# Patient Record
Sex: FEMALE | Race: BLACK OR AFRICAN AMERICAN | Employment: OTHER | URBAN - METROPOLITAN AREA
[De-identification: names, ages, dates, MRNs, and addresses within clinical notes are randomized per-mention and may not be internally consistent; named-entity substitution may affect disease eponyms.]

---

## 2017-03-15 ENCOUNTER — HOSPITAL ENCOUNTER (EMERGENCY)
Age: 22
Discharge: HOME | End: 2017-03-15
Attending: EMERGENCY MEDICINE
Payer: MEDICARE

## 2017-03-15 VITALS
TEMPERATURE: 98.2 F | BODY MASS INDEX: 20.89 KG/M2 | HEART RATE: 100 BPM | RESPIRATION RATE: 16 BRPM | HEIGHT: 66 IN | WEIGHT: 130 LBS | DIASTOLIC BLOOD PRESSURE: 101 MMHG | SYSTOLIC BLOOD PRESSURE: 154 MMHG | OXYGEN SATURATION: 100 %

## 2017-03-15 DIAGNOSIS — I15.9 SECONDARY HYPERTENSION: ICD-10-CM

## 2017-03-15 DIAGNOSIS — M54.50 CHRONIC BILATERAL LOW BACK PAIN WITHOUT SCIATICA: Primary | ICD-10-CM

## 2017-03-15 DIAGNOSIS — G89.29 CHRONIC BILATERAL LOW BACK PAIN WITHOUT SCIATICA: Primary | ICD-10-CM

## 2017-03-15 DIAGNOSIS — M62.838 SPASM OF MUSCLE: ICD-10-CM

## 2017-03-15 DIAGNOSIS — J06.9 UPPER RESPIRATORY TRACT INFECTION, UNSPECIFIED TYPE: ICD-10-CM

## 2017-03-15 PROCEDURE — 6370000000 HC RX 637 (ALT 250 FOR IP): Performed by: EMERGENCY MEDICINE

## 2017-03-15 PROCEDURE — 99283 EMERGENCY DEPT VISIT LOW MDM: CPT

## 2017-03-15 RX ORDER — CYCLOBENZAPRINE HCL 10 MG
10 TABLET ORAL ONCE
Status: COMPLETED | OUTPATIENT
Start: 2017-03-15 | End: 2017-03-15

## 2017-03-15 RX ORDER — IBUPROFEN 600 MG/1
600 TABLET ORAL EVERY 6 HOURS PRN
Qty: 30 TABLET | Refills: 0 | Status: SHIPPED | OUTPATIENT
Start: 2017-03-15 | End: 2018-08-19

## 2017-03-15 RX ORDER — IBUPROFEN 400 MG/1
400 TABLET ORAL ONCE
Status: COMPLETED | OUTPATIENT
Start: 2017-03-15 | End: 2017-03-15

## 2017-03-15 RX ORDER — CYCLOBENZAPRINE HCL 10 MG
10 TABLET ORAL 3 TIMES DAILY PRN
Qty: 30 TABLET | Refills: 0 | Status: SHIPPED | OUTPATIENT
Start: 2017-03-15 | End: 2017-03-25

## 2017-03-15 RX ADMIN — IBUPROFEN 400 MG: 400 TABLET, FILM COATED ORAL at 20:57

## 2017-03-15 RX ADMIN — CYCLOBENZAPRINE HYDROCHLORIDE 10 MG: 10 TABLET, FILM COATED ORAL at 20:57

## 2017-03-15 ASSESSMENT — PAIN SCALES - GENERAL
PAINLEVEL_OUTOF10: 8
PAINLEVEL_OUTOF10: 8

## 2017-03-15 ASSESSMENT — ENCOUNTER SYMPTOMS
SHORTNESS OF BREATH: 0
TROUBLE SWALLOWING: 0
PHOTOPHOBIA: 0
BACK PAIN: 1
ABDOMINAL PAIN: 0
RHINORRHEA: 1
SORE THROAT: 1
COUGH: 1

## 2017-03-15 ASSESSMENT — PAIN DESCRIPTION - PAIN TYPE: TYPE: ACUTE PAIN

## 2017-03-15 ASSESSMENT — PAIN DESCRIPTION - FREQUENCY: FREQUENCY: CONTINUOUS

## 2017-03-15 ASSESSMENT — PAIN DESCRIPTION - DESCRIPTORS: DESCRIPTORS: SHARP

## 2017-03-15 ASSESSMENT — PAIN DESCRIPTION - ORIENTATION: ORIENTATION: LOWER

## 2017-03-15 ASSESSMENT — PAIN DESCRIPTION - LOCATION: LOCATION: BACK

## 2017-08-25 ENCOUNTER — HOSPITAL ENCOUNTER (EMERGENCY)
Age: 22
Discharge: HOME | End: 2017-08-25
Attending: EMERGENCY MEDICINE
Payer: COMMERCIAL

## 2017-08-25 VITALS
HEART RATE: 79 BPM | DIASTOLIC BLOOD PRESSURE: 88 MMHG | WEIGHT: 130 LBS | HEIGHT: 66 IN | SYSTOLIC BLOOD PRESSURE: 142 MMHG | RESPIRATION RATE: 18 BRPM | OXYGEN SATURATION: 97 % | TEMPERATURE: 97.9 F | BODY MASS INDEX: 20.89 KG/M2

## 2017-08-25 DIAGNOSIS — N10 ACUTE PYELONEPHRITIS: Primary | ICD-10-CM

## 2017-08-25 LAB
-: ABNORMAL
AMORPHOUS: ABNORMAL
BACTERIA: ABNORMAL
BILIRUBIN URINE: NEGATIVE
CASTS UA: ABNORMAL /LPF (ref 0–8)
COLOR: YELLOW
COMMENT UA: ABNORMAL
CRYSTALS, UA: ABNORMAL /HPF
EPITHELIAL CELLS UA: ABNORMAL /HPF (ref 0–5)
GLUCOSE URINE: ABNORMAL
HCG(URINE) PREGNANCY TEST: NEGATIVE
KETONES, URINE: NEGATIVE
LEUKOCYTE ESTERASE, URINE: ABNORMAL
MUCUS: ABNORMAL
NITRITE, URINE: POSITIVE
OTHER OBSERVATIONS UA: ABNORMAL
PH UA: 7 (ref 5–8)
PROTEIN UA: ABNORMAL
RBC UA: ABNORMAL /HPF (ref 0–4)
RENAL EPITHELIAL, UA: ABNORMAL /HPF
SPECIFIC GRAVITY UA: 1.01 (ref 1–1.03)
TRICHOMONAS: ABNORMAL
TURBIDITY: ABNORMAL
URINE HGB: ABNORMAL
UROBILINOGEN, URINE: NORMAL
WBC UA: ABNORMAL /HPF (ref 0–5)
YEAST: ABNORMAL

## 2017-08-25 PROCEDURE — 96372 THER/PROPH/DIAG INJ SC/IM: CPT

## 2017-08-25 PROCEDURE — 6360000002 HC RX W HCPCS: Performed by: EMERGENCY MEDICINE

## 2017-08-25 PROCEDURE — 81001 URINALYSIS AUTO W/SCOPE: CPT

## 2017-08-25 PROCEDURE — 87077 CULTURE AEROBIC IDENTIFY: CPT

## 2017-08-25 PROCEDURE — 6370000000 HC RX 637 (ALT 250 FOR IP): Performed by: EMERGENCY MEDICINE

## 2017-08-25 PROCEDURE — 86403 PARTICLE AGGLUT ANTBDY SCRN: CPT

## 2017-08-25 PROCEDURE — 87186 SC STD MICRODIL/AGAR DIL: CPT

## 2017-08-25 PROCEDURE — 87086 URINE CULTURE/COLONY COUNT: CPT

## 2017-08-25 PROCEDURE — 84703 CHORIONIC GONADOTROPIN ASSAY: CPT

## 2017-08-25 PROCEDURE — 99283 EMERGENCY DEPT VISIT LOW MDM: CPT

## 2017-08-25 RX ORDER — CIPROFLOXACIN 500 MG/1
500 TABLET, FILM COATED ORAL ONCE
Status: COMPLETED | OUTPATIENT
Start: 2017-08-25 | End: 2017-08-25

## 2017-08-25 RX ORDER — KETOROLAC TROMETHAMINE 30 MG/ML
15 INJECTION, SOLUTION INTRAMUSCULAR; INTRAVENOUS ONCE
Status: COMPLETED | OUTPATIENT
Start: 2017-08-25 | End: 2017-08-25

## 2017-08-25 RX ORDER — CIPROFLOXACIN 500 MG/1
500 TABLET, FILM COATED ORAL 2 TIMES DAILY
Qty: 20 TABLET | Refills: 0 | Status: SHIPPED | OUTPATIENT
Start: 2017-08-25 | End: 2017-09-04

## 2017-08-25 RX ADMIN — KETOROLAC TROMETHAMINE 15 MG: 30 INJECTION, SOLUTION INTRAMUSCULAR at 14:18

## 2017-08-25 RX ADMIN — CIPROFLOXACIN 500 MG: 500 TABLET, FILM COATED ORAL at 14:41

## 2017-08-25 ASSESSMENT — PAIN SCALES - GENERAL
PAINLEVEL_OUTOF10: 8
PAINLEVEL_OUTOF10: 8

## 2017-08-25 ASSESSMENT — ENCOUNTER SYMPTOMS
VOMITING: 0
SHORTNESS OF BREATH: 0
NAUSEA: 0
BACK PAIN: 0
COUGH: 0
ABDOMINAL PAIN: 0
ABDOMINAL DISTENTION: 0

## 2017-08-25 ASSESSMENT — PAIN DESCRIPTION - LOCATION: LOCATION: ABDOMEN

## 2017-08-25 ASSESSMENT — PAIN DESCRIPTION - PAIN TYPE: TYPE: ACUTE PAIN

## 2017-08-25 ASSESSMENT — PAIN DESCRIPTION - FREQUENCY: FREQUENCY: CONTINUOUS

## 2017-08-25 ASSESSMENT — PAIN DESCRIPTION - ORIENTATION: ORIENTATION: LEFT;RIGHT

## 2017-08-25 ASSESSMENT — PAIN DESCRIPTION - DESCRIPTORS: DESCRIPTORS: SHARP;THROBBING

## 2017-08-27 LAB
CULTURE: ABNORMAL
Lab: ABNORMAL
ORGANISM: ABNORMAL
SPECIMEN DESCRIPTION: ABNORMAL
STATUS: ABNORMAL

## 2017-08-29 ENCOUNTER — HOSPITAL ENCOUNTER (OUTPATIENT)
Age: 22
Setting detail: OBSERVATION
Discharge: HOME | DRG: 690 | End: 2017-08-30
Attending: EMERGENCY MEDICINE | Admitting: EMERGENCY MEDICINE
Payer: COMMERCIAL

## 2017-08-29 DIAGNOSIS — N10 ACUTE PYELONEPHRITIS: Primary | ICD-10-CM

## 2017-08-29 DIAGNOSIS — I10 ESSENTIAL HYPERTENSION: ICD-10-CM

## 2017-08-29 DIAGNOSIS — K59.00 CONSTIPATION, UNSPECIFIED CONSTIPATION TYPE: ICD-10-CM

## 2017-08-29 DIAGNOSIS — R73.9 HYPERGLYCEMIA: ICD-10-CM

## 2017-08-29 DIAGNOSIS — R10.9 ACUTE RIGHT FLANK PAIN: ICD-10-CM

## 2017-08-29 DIAGNOSIS — N39.0 URINARY TRACT INFECTION WITHOUT HEMATURIA, SITE UNSPECIFIED: ICD-10-CM

## 2017-08-29 PROCEDURE — 96376 TX/PRO/DX INJ SAME DRUG ADON: CPT

## 2017-08-29 PROCEDURE — 99285 EMERGENCY DEPT VISIT HI MDM: CPT

## 2017-08-29 ASSESSMENT — PAIN DESCRIPTION - LOCATION: LOCATION: FLANK

## 2017-08-29 ASSESSMENT — PAIN DESCRIPTION - ORIENTATION: ORIENTATION: RIGHT

## 2017-08-29 ASSESSMENT — PAIN DESCRIPTION - DESCRIPTORS: DESCRIPTORS: CONSTANT;SHARP

## 2017-08-29 ASSESSMENT — PAIN DESCRIPTION - ONSET: ONSET: ON-GOING

## 2017-08-29 ASSESSMENT — PAIN SCALES - GENERAL: PAINLEVEL_OUTOF10: 9

## 2017-08-29 ASSESSMENT — PAIN DESCRIPTION - PAIN TYPE: TYPE: ACUTE PAIN

## 2017-08-29 ASSESSMENT — PAIN DESCRIPTION - FREQUENCY: FREQUENCY: CONTINUOUS

## 2017-08-29 ASSESSMENT — PAIN DESCRIPTION - PROGRESSION: CLINICAL_PROGRESSION: GRADUALLY WORSENING

## 2017-08-30 ENCOUNTER — APPOINTMENT (OUTPATIENT)
Dept: CT IMAGING | Age: 22
DRG: 690 | End: 2017-08-30
Payer: COMMERCIAL

## 2017-08-30 VITALS
RESPIRATION RATE: 16 BRPM | TEMPERATURE: 98 F | WEIGHT: 130 LBS | OXYGEN SATURATION: 98 % | HEART RATE: 85 BPM | HEIGHT: 66 IN | BODY MASS INDEX: 20.89 KG/M2 | SYSTOLIC BLOOD PRESSURE: 148 MMHG | DIASTOLIC BLOOD PRESSURE: 102 MMHG

## 2017-08-30 LAB
-: ABNORMAL
ABSOLUTE EOS #: 0.3 K/UL (ref 0–0.4)
ABSOLUTE LYMPH #: 4.5 K/UL (ref 1–4.8)
ABSOLUTE MONO #: 0.6 K/UL (ref 0.1–1.2)
AMORPHOUS: ABNORMAL
ANION GAP SERPL CALCULATED.3IONS-SCNC: 14 MMOL/L (ref 9–17)
BACTERIA: ABNORMAL
BASOPHILS # BLD: 1 %
BASOPHILS ABSOLUTE: 0.1 K/UL (ref 0–0.2)
BILIRUBIN URINE: NEGATIVE
BUN BLDV-MCNC: 5 MG/DL (ref 6–20)
BUN/CREAT BLD: ABNORMAL (ref 9–20)
CALCIUM SERPL-MCNC: 9 MG/DL (ref 8.6–10.4)
CASTS UA: ABNORMAL /LPF (ref 0–8)
CHLORIDE BLD-SCNC: 98 MMOL/L (ref 98–107)
CO2: 24 MMOL/L (ref 20–31)
COLOR: YELLOW
COMMENT UA: ABNORMAL
CREAT SERPL-MCNC: 0.81 MG/DL (ref 0.5–0.9)
CRYSTALS, UA: ABNORMAL /HPF
DIFFERENTIAL TYPE: ABNORMAL
EOSINOPHILS RELATIVE PERCENT: 3 %
EPITHELIAL CELLS UA: ABNORMAL /HPF (ref 0–5)
ESTIMATED AVERAGE GLUCOSE: 235 MG/DL
GFR AFRICAN AMERICAN: >60 ML/MIN
GFR NON-AFRICAN AMERICAN: >60 ML/MIN
GFR SERPL CREATININE-BSD FRML MDRD: ABNORMAL ML/MIN/{1.73_M2}
GFR SERPL CREATININE-BSD FRML MDRD: ABNORMAL ML/MIN/{1.73_M2}
GLUCOSE BLD-MCNC: 128 MG/DL (ref 65–105)
GLUCOSE BLD-MCNC: 164 MG/DL (ref 65–105)
GLUCOSE BLD-MCNC: 165 MG/DL (ref 65–105)
GLUCOSE BLD-MCNC: 172 MG/DL (ref 65–105)
GLUCOSE BLD-MCNC: 312 MG/DL (ref 70–99)
GLUCOSE BLD-MCNC: 79 MG/DL (ref 65–105)
GLUCOSE URINE: ABNORMAL
HBA1C MFR BLD: 9.8 % (ref 4–6)
HCG QUALITATIVE: NEGATIVE
HCT VFR BLD CALC: 41.1 % (ref 36–46)
HEMOGLOBIN: 13.8 G/DL (ref 12–16)
KETONES, URINE: NEGATIVE
LEUKOCYTE ESTERASE, URINE: ABNORMAL
LYMPHOCYTES # BLD: 44 %
MCH RBC QN AUTO: 26.3 PG (ref 26–34)
MCHC RBC AUTO-ENTMCNC: 33.6 G/DL (ref 31–37)
MCV RBC AUTO: 78.2 FL (ref 80–100)
MONOCYTES # BLD: 6 %
MUCUS: ABNORMAL
NITRITE, URINE: NEGATIVE
OTHER OBSERVATIONS UA: ABNORMAL
PDW BLD-RTO: 18.4 % (ref 12.5–15.4)
PH UA: 7 (ref 5–8)
PLATELET # BLD: 434 K/UL (ref 140–450)
PLATELET ESTIMATE: ABNORMAL
PMV BLD AUTO: 9.1 FL (ref 6–12)
POTASSIUM SERPL-SCNC: 4.2 MMOL/L (ref 3.7–5.3)
PROTEIN UA: ABNORMAL
RBC # BLD: 5.26 M/UL (ref 4–5.2)
RBC # BLD: ABNORMAL 10*6/UL
RBC UA: ABNORMAL /HPF (ref 0–4)
RENAL EPITHELIAL, UA: ABNORMAL /HPF
SEG NEUTROPHILS: 46 %
SEGMENTED NEUTROPHILS ABSOLUTE COUNT: 4.8 K/UL (ref 1.8–7.7)
SODIUM BLD-SCNC: 136 MMOL/L (ref 135–144)
SPECIFIC GRAVITY UA: 1.01 (ref 1–1.03)
TRICHOMONAS: ABNORMAL
TURBIDITY: ABNORMAL
URINE HGB: NEGATIVE
UROBILINOGEN, URINE: NORMAL
WBC # BLD: 10.3 K/UL (ref 3.5–11)
WBC # BLD: ABNORMAL 10*3/UL
WBC UA: ABNORMAL /HPF (ref 0–5)
YEAST: ABNORMAL

## 2017-08-30 PROCEDURE — G0108 DIAB MANAGE TRN  PER INDIV: HCPCS

## 2017-08-30 PROCEDURE — 6370000000 HC RX 637 (ALT 250 FOR IP): Performed by: EMERGENCY MEDICINE

## 2017-08-30 PROCEDURE — 83036 HEMOGLOBIN GLYCOSYLATED A1C: CPT

## 2017-08-30 PROCEDURE — 85025 COMPLETE CBC W/AUTO DIFF WBC: CPT

## 2017-08-30 PROCEDURE — G0378 HOSPITAL OBSERVATION PER HR: HCPCS

## 2017-08-30 PROCEDURE — 96374 THER/PROPH/DIAG INJ IV PUSH: CPT

## 2017-08-30 PROCEDURE — 2580000003 HC RX 258: Performed by: EMERGENCY MEDICINE

## 2017-08-30 PROCEDURE — 96375 TX/PRO/DX INJ NEW DRUG ADDON: CPT

## 2017-08-30 PROCEDURE — 96361 HYDRATE IV INFUSION ADD-ON: CPT

## 2017-08-30 PROCEDURE — 1200000000 HC SEMI PRIVATE

## 2017-08-30 PROCEDURE — 81001 URINALYSIS AUTO W/SCOPE: CPT

## 2017-08-30 PROCEDURE — 80048 BASIC METABOLIC PNL TOTAL CA: CPT

## 2017-08-30 PROCEDURE — 6360000002 HC RX W HCPCS: Performed by: EMERGENCY MEDICINE

## 2017-08-30 PROCEDURE — 84703 CHORIONIC GONADOTROPIN ASSAY: CPT

## 2017-08-30 PROCEDURE — 96372 THER/PROPH/DIAG INJ SC/IM: CPT

## 2017-08-30 PROCEDURE — 96365 THER/PROPH/DIAG IV INF INIT: CPT

## 2017-08-30 PROCEDURE — 74176 CT ABD & PELVIS W/O CONTRAST: CPT

## 2017-08-30 PROCEDURE — 87086 URINE CULTURE/COLONY COUNT: CPT

## 2017-08-30 PROCEDURE — 2500000003 HC RX 250 WO HCPCS: Performed by: EMERGENCY MEDICINE

## 2017-08-30 PROCEDURE — 82947 ASSAY GLUCOSE BLOOD QUANT: CPT

## 2017-08-30 RX ORDER — 0.9 % SODIUM CHLORIDE 0.9 %
1000 INTRAVENOUS SOLUTION INTRAVENOUS ONCE
Status: COMPLETED | OUTPATIENT
Start: 2017-08-30 | End: 2017-08-30

## 2017-08-30 RX ORDER — ONDANSETRON 2 MG/ML
4 INJECTION INTRAMUSCULAR; INTRAVENOUS ONCE
Status: COMPLETED | OUTPATIENT
Start: 2017-08-30 | End: 2017-08-30

## 2017-08-30 RX ORDER — MORPHINE SULFATE 4 MG/ML
4 INJECTION, SOLUTION INTRAMUSCULAR; INTRAVENOUS ONCE
Status: COMPLETED | OUTPATIENT
Start: 2017-08-30 | End: 2017-08-30

## 2017-08-30 RX ORDER — ONDANSETRON 2 MG/ML
4 INJECTION INTRAMUSCULAR; INTRAVENOUS EVERY 8 HOURS PRN
Status: DISCONTINUED | OUTPATIENT
Start: 2017-08-30 | End: 2017-08-30 | Stop reason: HOSPADM

## 2017-08-30 RX ORDER — DEXTROSE MONOHYDRATE 25 G/50ML
12.5 INJECTION, SOLUTION INTRAVENOUS PRN
Status: DISCONTINUED | OUTPATIENT
Start: 2017-08-30 | End: 2017-08-30 | Stop reason: HOSPADM

## 2017-08-30 RX ORDER — METOPROLOL TARTRATE 5 MG/5ML
5 INJECTION INTRAVENOUS EVERY 6 HOURS PRN
Status: DISCONTINUED | OUTPATIENT
Start: 2017-08-30 | End: 2017-08-30 | Stop reason: HOSPADM

## 2017-08-30 RX ORDER — SODIUM CHLORIDE 9 MG/ML
INJECTION, SOLUTION INTRAVENOUS CONTINUOUS
Status: DISCONTINUED | OUTPATIENT
Start: 2017-08-30 | End: 2017-08-30 | Stop reason: HOSPADM

## 2017-08-30 RX ORDER — METOPROLOL TARTRATE 5 MG/5ML
5 INJECTION INTRAVENOUS ONCE
Status: DISCONTINUED | OUTPATIENT
Start: 2017-08-30 | End: 2017-08-30 | Stop reason: HOSPADM

## 2017-08-30 RX ORDER — DOCUSATE SODIUM 100 MG/1
100 CAPSULE, LIQUID FILLED ORAL DAILY
Status: DISCONTINUED | OUTPATIENT
Start: 2017-08-30 | End: 2017-08-30 | Stop reason: HOSPADM

## 2017-08-30 RX ORDER — HYDROCODONE BITARTRATE AND ACETAMINOPHEN 5; 325 MG/1; MG/1
1 TABLET ORAL EVERY 4 HOURS PRN
Status: DISCONTINUED | OUTPATIENT
Start: 2017-08-30 | End: 2017-08-30 | Stop reason: HOSPADM

## 2017-08-30 RX ORDER — DOCUSATE SODIUM 100 MG/1
100 CAPSULE, LIQUID FILLED ORAL ONCE
Status: COMPLETED | OUTPATIENT
Start: 2017-08-30 | End: 2017-08-30

## 2017-08-30 RX ORDER — HYDROCODONE BITARTRATE AND ACETAMINOPHEN 5; 325 MG/1; MG/1
2 TABLET ORAL EVERY 4 HOURS PRN
Status: DISCONTINUED | OUTPATIENT
Start: 2017-08-30 | End: 2017-08-30 | Stop reason: HOSPADM

## 2017-08-30 RX ORDER — NICOTINE POLACRILEX 4 MG
15 LOZENGE BUCCAL PRN
Status: DISCONTINUED | OUTPATIENT
Start: 2017-08-30 | End: 2017-08-30 | Stop reason: HOSPADM

## 2017-08-30 RX ORDER — SODIUM CHLORIDE 0.9 % (FLUSH) 0.9 %
10 SYRINGE (ML) INJECTION PRN
Status: DISCONTINUED | OUTPATIENT
Start: 2017-08-30 | End: 2017-08-30 | Stop reason: HOSPADM

## 2017-08-30 RX ORDER — ACETAMINOPHEN 325 MG/1
650 TABLET ORAL EVERY 4 HOURS PRN
Status: DISCONTINUED | OUTPATIENT
Start: 2017-08-30 | End: 2017-08-30 | Stop reason: HOSPADM

## 2017-08-30 RX ORDER — DIPHENHYDRAMINE HCL 25 MG
25 TABLET ORAL EVERY 6 HOURS PRN
Status: DISCONTINUED | OUTPATIENT
Start: 2017-08-30 | End: 2017-08-30 | Stop reason: HOSPADM

## 2017-08-30 RX ORDER — DEXTROSE MONOHYDRATE 50 MG/ML
100 INJECTION, SOLUTION INTRAVENOUS PRN
Status: DISCONTINUED | OUTPATIENT
Start: 2017-08-30 | End: 2017-08-30 | Stop reason: HOSPADM

## 2017-08-30 RX ORDER — MORPHINE SULFATE 2 MG/ML
2 INJECTION, SOLUTION INTRAMUSCULAR; INTRAVENOUS EVERY 4 HOURS PRN
Status: DISCONTINUED | OUTPATIENT
Start: 2017-08-30 | End: 2017-08-30

## 2017-08-30 RX ORDER — MORPHINE SULFATE 4 MG/ML
4 INJECTION, SOLUTION INTRAMUSCULAR; INTRAVENOUS EVERY 4 HOURS PRN
Status: DISCONTINUED | OUTPATIENT
Start: 2017-08-30 | End: 2017-08-30

## 2017-08-30 RX ORDER — SODIUM CHLORIDE 0.9 % (FLUSH) 0.9 %
10 SYRINGE (ML) INJECTION EVERY 12 HOURS SCHEDULED
Status: DISCONTINUED | OUTPATIENT
Start: 2017-08-30 | End: 2017-08-30 | Stop reason: HOSPADM

## 2017-08-30 RX ADMIN — MORPHINE SULFATE 4 MG: 4 INJECTION, SOLUTION INTRAMUSCULAR; INTRAVENOUS at 03:56

## 2017-08-30 RX ADMIN — HYDROCODONE BITARTRATE AND ACETAMINOPHEN 2 TABLET: 5; 325 TABLET ORAL at 14:32

## 2017-08-30 RX ADMIN — SODIUM CHLORIDE: 9 INJECTION, SOLUTION INTRAVENOUS at 03:43

## 2017-08-30 RX ADMIN — HYDROCODONE BITARTRATE AND ACETAMINOPHEN 2 TABLET: 5; 325 TABLET ORAL at 08:23

## 2017-08-30 RX ADMIN — CEFTRIAXONE SODIUM 1 G: 1 INJECTION, POWDER, FOR SOLUTION INTRAMUSCULAR; INTRAVENOUS at 02:31

## 2017-08-30 RX ADMIN — DOCUSATE SODIUM 100 MG: 100 CAPSULE, LIQUID FILLED ORAL at 03:11

## 2017-08-30 RX ADMIN — DOCUSATE SODIUM 100 MG: 100 CAPSULE ORAL at 08:23

## 2017-08-30 RX ADMIN — ONDANSETRON 4 MG: 2 INJECTION, SOLUTION INTRAMUSCULAR; INTRAVENOUS at 05:44

## 2017-08-30 RX ADMIN — INSULIN LISPRO 1 UNITS: 100 INJECTION, SOLUTION INTRAVENOUS; SUBCUTANEOUS at 15:44

## 2017-08-30 RX ADMIN — ONDANSETRON 4 MG: 2 INJECTION, SOLUTION INTRAMUSCULAR; INTRAVENOUS at 00:58

## 2017-08-30 RX ADMIN — SODIUM CHLORIDE 1000 ML: 9 INJECTION, SOLUTION INTRAVENOUS at 00:57

## 2017-08-30 RX ADMIN — MORPHINE SULFATE 4 MG: 4 INJECTION, SOLUTION INTRAMUSCULAR; INTRAVENOUS at 00:58

## 2017-08-30 RX ADMIN — INSULIN LISPRO 5 UNITS: 100 INJECTION, SOLUTION INTRAVENOUS; SUBCUTANEOUS at 02:53

## 2017-08-30 RX ADMIN — METOPROLOL TARTRATE 5 MG: 5 INJECTION INTRAVENOUS at 03:56

## 2017-08-30 RX ADMIN — INSULIN LISPRO 1 UNITS: 100 INJECTION, SOLUTION INTRAVENOUS; SUBCUTANEOUS at 08:23

## 2017-08-30 ASSESSMENT — PAIN SCALES - GENERAL
PAINLEVEL_OUTOF10: 9
PAINLEVEL_OUTOF10: 9
PAINLEVEL_OUTOF10: 6
PAINLEVEL_OUTOF10: 5
PAINLEVEL_OUTOF10: 7
PAINLEVEL_OUTOF10: 8
PAINLEVEL_OUTOF10: 9
PAINLEVEL_OUTOF10: 5

## 2017-08-30 ASSESSMENT — ENCOUNTER SYMPTOMS
BACK PAIN: 0
RHINORRHEA: 0
SHORTNESS OF BREATH: 0
VOMITING: 0
DIARRHEA: 0
NAUSEA: 0
ABDOMINAL PAIN: 0
CHEST TIGHTNESS: 0
SORE THROAT: 0
CONSTIPATION: 0
BLOOD IN STOOL: 0

## 2017-08-30 ASSESSMENT — PAIN DESCRIPTION - PROGRESSION
CLINICAL_PROGRESSION: GRADUALLY IMPROVING
CLINICAL_PROGRESSION: GRADUALLY WORSENING

## 2017-08-30 ASSESSMENT — PAIN DESCRIPTION - FREQUENCY
FREQUENCY: CONTINUOUS
FREQUENCY: CONTINUOUS

## 2017-08-30 ASSESSMENT — PAIN DESCRIPTION - ORIENTATION
ORIENTATION: RIGHT
ORIENTATION: RIGHT

## 2017-08-30 ASSESSMENT — PAIN DESCRIPTION - PAIN TYPE
TYPE: ACUTE PAIN

## 2017-08-30 ASSESSMENT — PAIN DESCRIPTION - LOCATION
LOCATION: FLANK
LOCATION: ABDOMEN
LOCATION: FLANK

## 2017-08-30 ASSESSMENT — PAIN DESCRIPTION - ONSET
ONSET: ON-GOING
ONSET: ON-GOING

## 2017-08-30 ASSESSMENT — PAIN DESCRIPTION - DESCRIPTORS
DESCRIPTORS: CONSTANT;SHARP
DESCRIPTORS: SHARP

## 2017-08-31 LAB
CULTURE: NORMAL
CULTURE: NORMAL
Lab: NORMAL
SPECIMEN DESCRIPTION: NORMAL
STATUS: NORMAL

## 2017-12-16 ENCOUNTER — HOSPITAL ENCOUNTER (EMERGENCY)
Age: 22
Discharge: HOME | End: 2017-12-16
Attending: EMERGENCY MEDICINE
Payer: COMMERCIAL

## 2017-12-16 VITALS
RESPIRATION RATE: 14 BRPM | BODY MASS INDEX: 20.18 KG/M2 | SYSTOLIC BLOOD PRESSURE: 117 MMHG | DIASTOLIC BLOOD PRESSURE: 94 MMHG | OXYGEN SATURATION: 100 % | TEMPERATURE: 97.9 F | HEART RATE: 97 BPM | WEIGHT: 125 LBS

## 2017-12-16 DIAGNOSIS — N10 ACUTE PYELONEPHRITIS: Primary | ICD-10-CM

## 2017-12-16 LAB
-: ABNORMAL
ABSOLUTE EOS #: 0.16 K/UL (ref 0–0.44)
ABSOLUTE IMMATURE GRANULOCYTE: 0.16 K/UL (ref 0–0.3)
ABSOLUTE LYMPH #: 2.95 K/UL (ref 1.1–3.7)
ABSOLUTE MONO #: 1.8 K/UL (ref 0.1–1.2)
AMORPHOUS: ABNORMAL
ANION GAP SERPL CALCULATED.3IONS-SCNC: 12 MMOL/L (ref 9–17)
BACTERIA: ABNORMAL
BASOPHILS # BLD: 0 % (ref 0–2)
BASOPHILS ABSOLUTE: 0 K/UL (ref 0–0.2)
BILIRUBIN URINE: NEGATIVE
BUN BLDV-MCNC: 11 MG/DL (ref 6–20)
BUN/CREAT BLD: ABNORMAL (ref 9–20)
CALCIUM SERPL-MCNC: 8.3 MG/DL (ref 8.6–10.4)
CASTS UA: ABNORMAL /LPF (ref 0–2)
CHLORIDE BLD-SCNC: 98 MMOL/L (ref 98–107)
CO2: 24 MMOL/L (ref 20–31)
COLOR: YELLOW
COMMENT UA: ABNORMAL
CREAT SERPL-MCNC: 1.02 MG/DL (ref 0.5–0.9)
CRYSTALS, UA: ABNORMAL /HPF
DIFFERENTIAL TYPE: ABNORMAL
DIRECT EXAM: NORMAL
EOSINOPHILS RELATIVE PERCENT: 1 % (ref 1–4)
EPITHELIAL CELLS UA: ABNORMAL /HPF (ref 0–5)
GFR AFRICAN AMERICAN: >60 ML/MIN
GFR NON-AFRICAN AMERICAN: >60 ML/MIN
GFR SERPL CREATININE-BSD FRML MDRD: ABNORMAL ML/MIN/{1.73_M2}
GFR SERPL CREATININE-BSD FRML MDRD: ABNORMAL ML/MIN/{1.73_M2}
GLUCOSE BLD-MCNC: 249 MG/DL (ref 70–99)
GLUCOSE URINE: ABNORMAL
HCG(URINE) PREGNANCY TEST: NEGATIVE
HCT VFR BLD CALC: 35.5 % (ref 36.3–47.1)
HEMOGLOBIN: 12.3 G/DL (ref 11.9–15.1)
IMMATURE GRANULOCYTES: 1 %
KETONES, URINE: NEGATIVE
LEUKOCYTE ESTERASE, URINE: ABNORMAL
LYMPHOCYTES # BLD: 18 % (ref 24–43)
Lab: NORMAL
MCH RBC QN AUTO: 26.1 PG (ref 25.2–33.5)
MCHC RBC AUTO-ENTMCNC: 34.6 G/DL (ref 28.4–34.8)
MCV RBC AUTO: 75.4 FL (ref 82.6–102.9)
MONOCYTES # BLD: 11 % (ref 3–12)
MORPHOLOGY: ABNORMAL
MUCUS: ABNORMAL
NITRITE, URINE: POSITIVE
OTHER OBSERVATIONS UA: ABNORMAL
PDW BLD-RTO: 15.9 % (ref 11.8–14.4)
PH UA: 6.5 (ref 5–8)
PLATELET # BLD: 379 K/UL (ref 138–453)
PLATELET ESTIMATE: ABNORMAL
PMV BLD AUTO: 11.1 FL (ref 8.1–13.5)
POTASSIUM SERPL-SCNC: 4.3 MMOL/L (ref 3.7–5.3)
PROTEIN UA: ABNORMAL
RBC # BLD: 4.71 M/UL (ref 3.95–5.11)
RBC # BLD: ABNORMAL 10*6/UL
RBC UA: ABNORMAL /HPF (ref 0–2)
RENAL EPITHELIAL, UA: ABNORMAL /HPF
SEG NEUTROPHILS: 69 % (ref 36–65)
SEGMENTED NEUTROPHILS ABSOLUTE COUNT: 11.33 K/UL (ref 1.5–8.1)
SODIUM BLD-SCNC: 134 MMOL/L (ref 135–144)
SPECIFIC GRAVITY UA: 1.01 (ref 1–1.03)
SPECIMEN DESCRIPTION: NORMAL
STATUS: NORMAL
TRICHOMONAS: ABNORMAL
TURBIDITY: ABNORMAL
URINE HGB: ABNORMAL
UROBILINOGEN, URINE: NORMAL
WBC # BLD: 16.4 K/UL (ref 3.5–11.3)
WBC # BLD: ABNORMAL 10*3/UL
WBC UA: ABNORMAL /HPF (ref 0–5)
YEAST: ABNORMAL

## 2017-12-16 PROCEDURE — 84703 CHORIONIC GONADOTROPIN ASSAY: CPT

## 2017-12-16 PROCEDURE — 87186 SC STD MICRODIL/AGAR DIL: CPT

## 2017-12-16 PROCEDURE — 6360000002 HC RX W HCPCS: Performed by: STUDENT IN AN ORGANIZED HEALTH CARE EDUCATION/TRAINING PROGRAM

## 2017-12-16 PROCEDURE — 2580000003 HC RX 258: Performed by: STUDENT IN AN ORGANIZED HEALTH CARE EDUCATION/TRAINING PROGRAM

## 2017-12-16 PROCEDURE — 81001 URINALYSIS AUTO W/SCOPE: CPT

## 2017-12-16 PROCEDURE — 96374 THER/PROPH/DIAG INJ IV PUSH: CPT

## 2017-12-16 PROCEDURE — 87804 INFLUENZA ASSAY W/OPTIC: CPT

## 2017-12-16 PROCEDURE — 80048 BASIC METABOLIC PNL TOTAL CA: CPT

## 2017-12-16 PROCEDURE — 96375 TX/PRO/DX INJ NEW DRUG ADDON: CPT

## 2017-12-16 PROCEDURE — 87077 CULTURE AEROBIC IDENTIFY: CPT

## 2017-12-16 PROCEDURE — 87086 URINE CULTURE/COLONY COUNT: CPT

## 2017-12-16 PROCEDURE — 6370000000 HC RX 637 (ALT 250 FOR IP): Performed by: STUDENT IN AN ORGANIZED HEALTH CARE EDUCATION/TRAINING PROGRAM

## 2017-12-16 PROCEDURE — 99284 EMERGENCY DEPT VISIT MOD MDM: CPT

## 2017-12-16 PROCEDURE — 85025 COMPLETE CBC W/AUTO DIFF WBC: CPT

## 2017-12-16 RX ORDER — HYDROCODONE BITARTRATE AND ACETAMINOPHEN 5; 325 MG/1; MG/1
1 TABLET ORAL ONCE
Status: COMPLETED | OUTPATIENT
Start: 2017-12-16 | End: 2017-12-16

## 2017-12-16 RX ORDER — ONDANSETRON 4 MG/1
4 TABLET, FILM COATED ORAL EVERY 8 HOURS PRN
Qty: 8 TABLET | Refills: 0 | Status: SHIPPED | OUTPATIENT
Start: 2017-12-16 | End: 2018-08-19

## 2017-12-16 RX ORDER — ONDANSETRON 2 MG/ML
4 INJECTION INTRAMUSCULAR; INTRAVENOUS ONCE
Status: COMPLETED | OUTPATIENT
Start: 2017-12-16 | End: 2017-12-16

## 2017-12-16 RX ORDER — KETOROLAC TROMETHAMINE 30 MG/ML
30 INJECTION, SOLUTION INTRAMUSCULAR; INTRAVENOUS ONCE
Status: COMPLETED | OUTPATIENT
Start: 2017-12-16 | End: 2017-12-16

## 2017-12-16 RX ORDER — FENTANYL CITRATE 50 UG/ML
25 INJECTION, SOLUTION INTRAMUSCULAR; INTRAVENOUS ONCE
Status: COMPLETED | OUTPATIENT
Start: 2017-12-16 | End: 2017-12-16

## 2017-12-16 RX ORDER — CIPROFLOXACIN 500 MG/1
500 TABLET, FILM COATED ORAL 2 TIMES DAILY
Qty: 14 TABLET | Refills: 0 | Status: SHIPPED | OUTPATIENT
Start: 2017-12-16 | End: 2017-12-23

## 2017-12-16 RX ORDER — HYDROCODONE BITARTRATE AND ACETAMINOPHEN 5; 325 MG/1; MG/1
1 TABLET ORAL EVERY 6 HOURS PRN
Qty: 10 TABLET | Refills: 0 | Status: SHIPPED | OUTPATIENT
Start: 2017-12-16 | End: 2017-12-23

## 2017-12-16 RX ORDER — 0.9 % SODIUM CHLORIDE 0.9 %
1000 INTRAVENOUS SOLUTION INTRAVENOUS ONCE
Status: COMPLETED | OUTPATIENT
Start: 2017-12-16 | End: 2017-12-16

## 2017-12-16 RX ADMIN — KETOROLAC TROMETHAMINE 30 MG: 30 INJECTION, SOLUTION INTRAMUSCULAR at 14:59

## 2017-12-16 RX ADMIN — HYDROCODONE BITARTRATE AND ACETAMINOPHEN 1 TABLET: 5; 325 TABLET ORAL at 15:29

## 2017-12-16 RX ADMIN — CEFTRIAXONE SODIUM 1 G: 1 INJECTION, POWDER, FOR SOLUTION INTRAMUSCULAR; INTRAVENOUS at 15:22

## 2017-12-16 RX ADMIN — SODIUM CHLORIDE 1000 ML: 9 INJECTION, SOLUTION INTRAVENOUS at 13:57

## 2017-12-16 RX ADMIN — ONDANSETRON 4 MG: 2 INJECTION, SOLUTION INTRAMUSCULAR; INTRAVENOUS at 13:57

## 2017-12-16 RX ADMIN — FENTANYL CITRATE 25 MCG: 50 INJECTION INTRAMUSCULAR; INTRAVENOUS at 15:22

## 2017-12-16 ASSESSMENT — PAIN SCALES - GENERAL
PAINLEVEL_OUTOF10: 10

## 2017-12-16 ASSESSMENT — ENCOUNTER SYMPTOMS
BLOOD IN STOOL: 0
RHINORRHEA: 0
COUGH: 0
PHOTOPHOBIA: 0
VOMITING: 1
ABDOMINAL DISTENTION: 0
ABDOMINAL PAIN: 0
SHORTNESS OF BREATH: 0
NAUSEA: 1
SORE THROAT: 0
WHEEZING: 0
BACK PAIN: 1

## 2017-12-16 ASSESSMENT — PAIN DESCRIPTION - PROGRESSION: CLINICAL_PROGRESSION: GRADUALLY WORSENING

## 2017-12-16 ASSESSMENT — PAIN DESCRIPTION - DESCRIPTORS: DESCRIPTORS: ACHING

## 2017-12-16 ASSESSMENT — PAIN DESCRIPTION - LOCATION: LOCATION: GENERALIZED

## 2017-12-16 ASSESSMENT — PAIN DESCRIPTION - ONSET: ONSET: PROGRESSIVE

## 2017-12-16 ASSESSMENT — PAIN DESCRIPTION - PAIN TYPE: TYPE: ACUTE PAIN

## 2017-12-16 NOTE — ED PROVIDER NOTES
[facility] ED  Emergency Department Encounter  Emergency Medicine Resident     Pt Name: Alana Ravi  MRN: [id number]  [place] 1995  Date of evaluation: 12/16/17  PCP:  No primary care provider on file. CHIEF COMPLAINT       Chief Complaint   Patient presents with    Generalized Body Aches     pt with c/o generalized body aches, nausea, and loss of appetite x three days. pt type 1 diabetic. pt checked glucose pta and it was 115.  Nausea       HISTORY OF PRESENT ILLNESS  (Location/Symptom, Timing/Onset, Context/Setting, Quality, Duration, Modifying Factors, Severity.)      Alana Ravi is a 23 y.o. female who presents with Generalized body aches, nausea and vomiting with associated back pain. Patient states she's had symptoms like this before in August when she was diagnosed with pyelonephritis. She does not have any dysuria or hematuria. She is well-appearing and nontoxic. She denies any sore throat, congestion, abdominal pain, fever or chills. She does not take any medications and does not have any significant surgical history. PAST MEDICAL / SURGICAL / SOCIAL / FAMILY HISTORY      has a past medical history of Diabetes mellitus ([address] and MRSA (methicillin resistant staph aureus) culture positive. No significan t surgical history when discussed with patient. Social History     Social History    Marital status: Single     Spouse name: N/A    Number of children: N/A    Years of education: N/A     Occupational History    Not on file. Social History Main Topics    Smoking status: Current Every Day Smoker     Types: Cigarettes    Smokeless tobacco: Not on file      Comment: 5/day    Alcohol use No    Drug use: No    Sexual activity: No     Other Topics Concern    Not on file     Social History Narrative    No narrative on file       History reviewed. No pertinent family history.     Allergies:  Shrimp flavor    Home Medications:  Prior Result Value Ref Range    WBC 16.4 (H) 3.5 - 11.3 k/uL    RBC 4.71 3.95 - 5.11 m/uL    Hemoglobin 12.3 11.9 - 15.1 g/dL    Hematocrit 35.5 (L) 36.3 - 47.1 %    MCV 75.4 (L) 82.6 - 102.9 fL    MCH 26.1 25.2 - 33.5 pg    MCHC 34.6 28.4 - 34.8 g/dL    RDW 15.9 (H) 11.8 - 14.4 %    Platelets 876 286 - 331 k/uL    MPV 11.1 8.1 - 13.5 fL    Differential Type NOT REPORTED     WBC Morphology NOT REPORTED     RBC Morphology NOT REPORTED     Platelet Estimate NOT REPORTED     Immature Granulocytes 1 (H) 0 %    Seg Neutrophils 69 (H) 36 - 65 %    Lymphocytes 18 (L) 24 - 43 %    Monocytes 11 3 - 12 %    Eosinophils % 1 1 - 4 %    Basophils 0 0 - 2 %    Absolute Immature Granulocyte 0.16 0.00 - 0.30 k/uL    Segs Absolute 11.33 (H) 1.50 - 8.10 k/uL    Absolute Lymph # 2.95 1.10 - 3.70 k/uL    Absolute Mono # 1.80 (H) 0.10 - 1.20 k/uL    Absolute Eos # 0.16 0.00 - 0.44 k/uL    Basophils # 0.00 0.00 - 0.20 k/uL    Morphology ANISOCYTOSIS PRESENT    Basic Metabolic Panel   Result Value Ref Range    Glucose 249 (H) 70 - 99 mg/dL    BUN 11 6 - 20 mg/dL    CREATININE 1.02 (H) 0.50 - 0.90 mg/dL    Bun/Cre Ratio NOT REPORTED 9 - 20    Calcium 8.3 (L) 8.6 - 10.4 mg/dL    Sodium 134 (L) 135 - 144 mmol/L    Potassium 4.3 3.7 - 5.3 mmol/L    Chloride 98 98 - 107 mmol/L    CO2 24 20 - 31 mmol/L    Anion Gap 12 9 - 17 mmol/L    GFR Non-African American >60 >60 mL/min    GFR African American >60 >60 mL/min    GFR Comment          GFR Staging NOT REPORTED        IMPRESSION: Pyelonephritis      EMERGENCY DEPARTMENT COURSE:  42-year-old female presents with generalized body aches, nausea and vomiting with associated back pain. Patient has had pyelonephritis in the past.  We'll do labs, urine, flu swab, fluids and antiemetics and reevaluate. 3:49 PM Had discussion with patient on whether she felt well enough to go home and try and manage this on an outpatient basis.   Patient states she is feeling better after fluids and medication and would like to try and go home and manage this with oral medications. She is tolerating fluids by mouth. I believe this is reasonable. I encouraged her to return to the emergency department if she had any new or worsening signs or symptoms. FINAL IMPRESSION      1. Acute pyelonephritis          DISPOSITION / PLAN     DISPOSITION Decision to Discharge    PATIENT REFERRED TO:  [place] [id number]  [phone]  Schedule an appointment as soon as possible for a visit in 3 days  follow up from ER visit      DISCHARGE MEDICATIONS:  New Prescriptions    CIPROFLOXACIN (CIPRO) 500 MG TABLET    Take 1 tablet by mouth 2 times daily for 7 days    HYDROCODONE-ACETAMINOPHEN (NORCO) 5-325 MG PER TABLET    Take 1 tablet by mouth every 6 hours as needed for Pain .     ONDANSETRON (ZOFRAN) 4 MG TABLET    Take 1 tablet by mouth every 8 hours as needed for Nausea       Jossie Almanzar DO  Emergency Medicine Resident    (Please note that portions of this note were completed with a voice recognition program.  Efforts were made to edit the dictations but occasionally words are mis-transcribed.)       Yolanda Wang DO  Resident  12/16/17 4617

## 2017-12-17 LAB
CULTURE: ABNORMAL
CULTURE: ABNORMAL
Lab: ABNORMAL
ORGANISM: ABNORMAL
SPECIMEN DESCRIPTION: ABNORMAL
STATUS: ABNORMAL

## 2017-12-18 NOTE — PROGRESS NOTES
Reviewed patient's urine culture - culture positive for Ecoli. Patient was discharged on ciprofloxacin, and culture is sensitive to prescribed medication. Antibiotic prescribed at discharge is appropriate - no changes made to antibiotic regimen. Kin Meckel, PharmStephanie D.

## 2018-01-24 ENCOUNTER — HOSPITAL ENCOUNTER (EMERGENCY)
Age: 23
Discharge: HOME | End: 2018-01-25
Attending: EMERGENCY MEDICINE
Payer: COMMERCIAL

## 2018-01-24 DIAGNOSIS — R07.9 CHEST PAIN, UNSPECIFIED TYPE: Primary | ICD-10-CM

## 2018-01-24 LAB
EKG ATRIAL RATE: 85 BPM
EKG P AXIS: 68 DEGREES
EKG P-R INTERVAL: 112 MS
EKG Q-T INTERVAL: 354 MS
EKG QRS DURATION: 68 MS
EKG QTC CALCULATION (BAZETT): 421 MS
EKG R AXIS: 81 DEGREES
EKG T AXIS: 48 DEGREES
EKG VENTRICULAR RATE: 85 BPM

## 2018-01-24 PROCEDURE — 99285 EMERGENCY DEPT VISIT HI MDM: CPT

## 2018-01-24 PROCEDURE — 93005 ELECTROCARDIOGRAM TRACING: CPT

## 2018-01-24 ASSESSMENT — PAIN DESCRIPTION - PAIN TYPE: TYPE: ACUTE PAIN

## 2018-01-24 ASSESSMENT — PAIN DESCRIPTION - LOCATION: LOCATION: CHEST

## 2018-01-24 ASSESSMENT — PAIN SCALES - GENERAL: PAINLEVEL_OUTOF10: 8

## 2018-01-25 ENCOUNTER — APPOINTMENT (OUTPATIENT)
Dept: GENERAL RADIOLOGY | Age: 23
End: 2018-01-25
Payer: COMMERCIAL

## 2018-01-25 VITALS
HEIGHT: 65 IN | DIASTOLIC BLOOD PRESSURE: 92 MMHG | OXYGEN SATURATION: 98 % | SYSTOLIC BLOOD PRESSURE: 168 MMHG | WEIGHT: 130 LBS | RESPIRATION RATE: 16 BRPM | TEMPERATURE: 98.9 F | BODY MASS INDEX: 21.66 KG/M2 | HEART RATE: 96 BPM

## 2018-01-25 LAB
ABSOLUTE EOS #: 0.25 K/UL (ref 0–0.44)
ABSOLUTE IMMATURE GRANULOCYTE: 0.03 K/UL (ref 0–0.3)
ABSOLUTE LYMPH #: 4.61 K/UL (ref 1.1–3.7)
ABSOLUTE MONO #: 0.69 K/UL (ref 0.1–1.2)
ANION GAP SERPL CALCULATED.3IONS-SCNC: 10 MMOL/L (ref 9–17)
BASOPHILS # BLD: 1 % (ref 0–2)
BASOPHILS ABSOLUTE: 0.05 K/UL (ref 0–0.2)
BUN BLDV-MCNC: 8 MG/DL (ref 6–20)
BUN/CREAT BLD: ABNORMAL (ref 9–20)
CALCIUM SERPL-MCNC: 9 MG/DL (ref 8.6–10.4)
CHLORIDE BLD-SCNC: 99 MMOL/L (ref 98–107)
CO2: 23 MMOL/L (ref 20–31)
CREAT SERPL-MCNC: 0.8 MG/DL (ref 0.5–0.9)
D-DIMER QUANTITATIVE: 0.26 MG/L FEU
DIFFERENTIAL TYPE: ABNORMAL
EOSINOPHILS RELATIVE PERCENT: 2 % (ref 1–4)
GFR AFRICAN AMERICAN: >60 ML/MIN
GFR NON-AFRICAN AMERICAN: >60 ML/MIN
GFR SERPL CREATININE-BSD FRML MDRD: ABNORMAL ML/MIN/{1.73_M2}
GFR SERPL CREATININE-BSD FRML MDRD: ABNORMAL ML/MIN/{1.73_M2}
GLUCOSE BLD-MCNC: 347 MG/DL (ref 70–99)
HCT VFR BLD CALC: 35.9 % (ref 36.3–47.1)
HEMOGLOBIN: 12.4 G/DL (ref 11.9–15.1)
IMMATURE GRANULOCYTES: 0 %
LYMPHOCYTES # BLD: 43 % (ref 24–43)
MCH RBC QN AUTO: 26.4 PG (ref 25.2–33.5)
MCHC RBC AUTO-ENTMCNC: 34.5 G/DL (ref 28.4–34.8)
MCV RBC AUTO: 76.4 FL (ref 82.6–102.9)
MONOCYTES # BLD: 7 % (ref 3–12)
NRBC AUTOMATED: 0 PER 100 WBC
PDW BLD-RTO: 15.7 % (ref 11.8–14.4)
PLATELET # BLD: 404 K/UL (ref 138–453)
PLATELET ESTIMATE: ABNORMAL
PMV BLD AUTO: 11 FL (ref 8.1–13.5)
POC TROPONIN I: 0 NG/ML (ref 0–0.1)
POC TROPONIN INTERP: NORMAL
POTASSIUM SERPL-SCNC: 5.4 MMOL/L (ref 3.7–5.3)
RBC # BLD: 4.7 M/UL (ref 3.95–5.11)
RBC # BLD: ABNORMAL 10*6/UL
SEG NEUTROPHILS: 47 % (ref 36–65)
SEGMENTED NEUTROPHILS ABSOLUTE COUNT: 5 K/UL (ref 1.5–8.1)
SODIUM BLD-SCNC: 132 MMOL/L (ref 135–144)
WBC # BLD: 10.6 K/UL (ref 3.5–11.3)
WBC # BLD: ABNORMAL 10*3/UL

## 2018-01-25 PROCEDURE — 6370000000 HC RX 637 (ALT 250 FOR IP): Performed by: EMERGENCY MEDICINE

## 2018-01-25 PROCEDURE — 80048 BASIC METABOLIC PNL TOTAL CA: CPT

## 2018-01-25 PROCEDURE — 94640 AIRWAY INHALATION TREATMENT: CPT

## 2018-01-25 PROCEDURE — 71046 X-RAY EXAM CHEST 2 VIEWS: CPT

## 2018-01-25 PROCEDURE — 85379 FIBRIN DEGRADATION QUANT: CPT

## 2018-01-25 PROCEDURE — 85025 COMPLETE CBC W/AUTO DIFF WBC: CPT

## 2018-01-25 PROCEDURE — 84484 ASSAY OF TROPONIN QUANT: CPT

## 2018-01-25 PROCEDURE — 94664 DEMO&/EVAL PT USE INHALER: CPT

## 2018-01-25 RX ORDER — ALBUTEROL SULFATE 90 UG/1
2 AEROSOL, METERED RESPIRATORY (INHALATION) EVERY 6 HOURS PRN
Status: DISCONTINUED | OUTPATIENT
Start: 2018-01-25 | End: 2018-01-25 | Stop reason: HOSPADM

## 2018-01-25 RX ORDER — IBUPROFEN 800 MG/1
800 TABLET ORAL ONCE
Status: COMPLETED | OUTPATIENT
Start: 2018-01-25 | End: 2018-01-25

## 2018-01-25 RX ORDER — MAGNESIUM HYDROXIDE/ALUMINUM HYDROXICE/SIMETHICONE 120; 1200; 1200 MG/30ML; MG/30ML; MG/30ML
30 SUSPENSION ORAL
Status: COMPLETED | OUTPATIENT
Start: 2018-01-25 | End: 2018-01-25

## 2018-01-25 RX ORDER — IBUPROFEN 800 MG/1
800 TABLET ORAL EVERY 8 HOURS PRN
Qty: 12 TABLET | Refills: 0 | Status: SHIPPED | OUTPATIENT
Start: 2018-01-25 | End: 2018-08-19

## 2018-01-25 RX ADMIN — ALUMINUM HYDROXIDE, MAGNESIUM HYDROXIDE, AND SIMETHICONE 30 ML: 200; 200; 20 SUSPENSION ORAL at 00:45

## 2018-01-25 RX ADMIN — IBUPROFEN 800 MG: 800 TABLET, FILM COATED ORAL at 00:45

## 2018-01-25 RX ADMIN — ALBUTEROL SULFATE 2 PUFF: 90 AEROSOL, METERED RESPIRATORY (INHALATION) at 01:33

## 2018-01-25 ASSESSMENT — ENCOUNTER SYMPTOMS
VOMITING: 0
DIARRHEA: 0
RHINORRHEA: 0
SINUS PRESSURE: 0
BLOOD IN STOOL: 0
NAUSEA: 0
SHORTNESS OF BREATH: 0
WHEEZING: 1
PHOTOPHOBIA: 0
SORE THROAT: 0
COUGH: 1
ABDOMINAL PAIN: 0
CONSTIPATION: 0
BACK PAIN: 0

## 2018-01-25 ASSESSMENT — PAIN SCALES - GENERAL: PAINLEVEL_OUTOF10: 8

## 2018-01-25 NOTE — ED PROVIDER NOTES
medications    Medication Sig Start Date End Date Taking? Authorizing Provider   ibuprofen (ADVIL;MOTRIN) 800 MG tablet Take 1 tablet by mouth every 8 hours as needed for Pain 1/25/18  Yes Jose Samson, DO   ondansetron (ZOFRAN) 4 MG tablet Take 1 tablet by mouth every 8 hours as needed for Nausea 12/16/17   Dago Cortez, DO   insulin lispro (HUMALOG) 100 UNIT/ML injection vial Inject into the skin 3 times daily (before meals) Indications: 70:30    Historical Provider, MD   ibuprofen (ADVIL;MOTRIN) 600 MG tablet Take 1 tablet by mouth every 6 hours as needed for Pain 3/15/17   [facility], DO       REVIEW OF SYSTEMS    (2-9 systems for level 4, 10 or more for level 5)      Review of Systems   Constitutional: Negative for chills and fever. HENT: Negative for congestion, rhinorrhea, sinus pressure and sore throat. Eyes: Negative for photophobia and visual disturbance. Respiratory: Positive for cough and wheezing. Negative for shortness of breath. Cardiovascular: Positive for chest pain. Gastrointestinal: Negative for abdominal pain, blood in stool, constipation, diarrhea, nausea and vomiting. Genitourinary: Negative for dysuria and hematuria. Musculoskeletal: Negative for back pain and neck pain. Skin: Negative for rash. Neurological: Negative for dizziness, weakness, light-headedness, numbness and headaches. Psychiatric/Behavioral: Negative for suicidal ideas. PHYSICAL EXAM   (up to 7 for level 4, 8 or more for level 5)      INITIAL VITALS:   BP (!) 168/92   Pulse 96   Temp 98.9 °F (37.2 °C) (Oral)   Resp 16   Ht 5' 5\" (1.651 m)   Wt 130 lb (59 kg)   LMP 01/13/2018   SpO2 98%   BMI 21.63 kg/m²     Physical Exam   Constitutional: She is oriented to person, place, and time. She appears well-developed and well-nourished. No distress. HENT:   Head: Normocephalic and atraumatic.    Right Ear: Tympanic membrane normal.   Left Ear: Tympanic membrane normal.   Eyes: EOM are normal. Pupils are equal, round, and reactive to light. No scleral icterus. Neck: Normal range of motion. Neck supple. No JVD present. Cardiovascular: Normal rate, regular rhythm, normal heart sounds and intact distal pulses. Exam reveals no gallop and no friction rub. No murmur heard. Pulmonary/Chest: Effort normal. No respiratory distress. She has wheezes (mild in bilateral upper lobes). She has no rales. She exhibits tenderness (sternal). Abdominal: Soft. Bowel sounds are normal. She exhibits no distension and no mass. There is no tenderness. There is no rebound and no guarding. Musculoskeletal: Normal range of motion. She exhibits no edema. Lymphadenopathy:     She has no cervical adenopathy. Neurological: She is alert and oriented to person, place, and time. GCS eye subscore is 4. GCS verbal subscore is 5. GCS motor subscore is 6. Skin: Skin is warm and dry. No rash noted. She is not diaphoretic. Psychiatric: She expresses no homicidal and no suicidal ideation. Nursing note and vitals reviewed.       DIFFERENTIAL  DIAGNOSIS     PLAN (LABS / IMAGING / EKG):  Orders Placed This Encounter   Procedures    XR CHEST STANDARD (2 VW)    CBC auto differential    Basic Metabolic Panel w/ Reflex to MG    D-Dimer, Quantitative    POCT troponin    POCT troponin    EKG 12 lead       MEDICATIONS ORDERED:  Orders Placed This Encounter   Medications    ibuprofen (ADVIL;MOTRIN) tablet 800 mg    aluminum & magnesium hydroxide-simethicone (MAALOX) 200-200-20 MG/5ML suspension 30 mL    albuterol sulfate  (90 Base) MCG/ACT inhaler 2 puff    ibuprofen (ADVIL;MOTRIN) 800 MG tablet     Sig: Take 1 tablet by mouth every 8 hours as needed for Pain     Dispense:  12 tablet     Refill:  0       DDX: ACS, PE, musculoskeletal pain    DIAGNOSTIC RESULTS / EMERGENCY DEPARTMENT COURSE / MDM     LABS:  Results for orders placed or performed during the hospital encounter of 01/24/18   CBC auto differential

## 2018-01-25 NOTE — ED NOTES
Pt to ED with LS with c/o of CP. Pt states that CP started 1 hour PTA while sitting down. Pt states pain is in center of chest and does not radiate anywhere. Pt denies any SOB, abdominal pain, n/v/d. Pt states she has been out of Glucose test strips and her insulin x 1 wk. Pt has an appointment with PCP for refill on 2/2. Pt states she has maintained her regular diet of potatoes. Pt glucose was in 400s for EMS. Pt is A&O x 4. Pt on monitor. Await orders.              Suyapa June RN  01/25/18 0000

## 2018-08-18 ENCOUNTER — TELEPHONE (OUTPATIENT)
Dept: OTHER | Age: 23
End: 2018-08-18

## 2018-08-19 ENCOUNTER — HOSPITAL ENCOUNTER (EMERGENCY)
Age: 23
Discharge: HOME | End: 2018-08-19
Attending: EMERGENCY MEDICINE
Payer: COMMERCIAL

## 2018-08-19 VITALS
TEMPERATURE: 98.9 F | WEIGHT: 140 LBS | SYSTOLIC BLOOD PRESSURE: 128 MMHG | RESPIRATION RATE: 16 BRPM | HEART RATE: 108 BPM | OXYGEN SATURATION: 98 % | BODY MASS INDEX: 23.3 KG/M2 | DIASTOLIC BLOOD PRESSURE: 84 MMHG

## 2018-08-19 DIAGNOSIS — L03.119 CELLULITIS OF LOWER EXTREMITY, UNSPECIFIED LATERALITY: Primary | ICD-10-CM

## 2018-08-19 PROCEDURE — 99283 EMERGENCY DEPT VISIT LOW MDM: CPT

## 2018-08-19 PROCEDURE — 6370000000 HC RX 637 (ALT 250 FOR IP): Performed by: EMERGENCY MEDICINE

## 2018-08-19 RX ORDER — IBUPROFEN 800 MG/1
800 TABLET ORAL EVERY 8 HOURS PRN
Qty: 30 TABLET | Refills: 0 | Status: SHIPPED | OUTPATIENT
Start: 2018-08-19 | End: 2018-09-19 | Stop reason: ALTCHOICE

## 2018-08-19 RX ORDER — CEPHALEXIN 500 MG/1
500 CAPSULE ORAL 4 TIMES DAILY
Qty: 28 CAPSULE | Refills: 0 | Status: SHIPPED | OUTPATIENT
Start: 2018-08-19 | End: 2018-08-26

## 2018-08-19 RX ORDER — SULFAMETHOXAZOLE AND TRIMETHOPRIM 800; 160 MG/1; MG/1
1 TABLET ORAL ONCE
Status: COMPLETED | OUTPATIENT
Start: 2018-08-19 | End: 2018-08-19

## 2018-08-19 RX ORDER — SULFAMETHOXAZOLE AND TRIMETHOPRIM 800; 160 MG/1; MG/1
1 TABLET ORAL 2 TIMES DAILY
Qty: 14 TABLET | Refills: 0 | Status: SHIPPED | OUTPATIENT
Start: 2018-08-19 | End: 2018-08-26

## 2018-08-19 RX ORDER — CEPHALEXIN 500 MG/1
500 CAPSULE ORAL ONCE
Status: COMPLETED | OUTPATIENT
Start: 2018-08-19 | End: 2018-08-19

## 2018-08-19 RX ORDER — HYDROCODONE BITARTRATE AND ACETAMINOPHEN 5; 325 MG/1; MG/1
1 TABLET ORAL ONCE
Status: COMPLETED | OUTPATIENT
Start: 2018-08-19 | End: 2018-08-19

## 2018-08-19 RX ADMIN — CEPHALEXIN 500 MG: 500 CAPSULE ORAL at 07:13

## 2018-08-19 RX ADMIN — SULFAMETHOXAZOLE AND TRIMETHOPRIM 1 TABLET: 800; 160 TABLET ORAL at 07:13

## 2018-08-19 RX ADMIN — HYDROCODONE BITARTRATE AND ACETAMINOPHEN 1 TABLET: 5; 325 TABLET ORAL at 04:58

## 2018-08-19 ASSESSMENT — PAIN DESCRIPTION - FREQUENCY: FREQUENCY: INTERMITTENT

## 2018-08-19 ASSESSMENT — PAIN SCALES - GENERAL
PAINLEVEL_OUTOF10: 10
PAINLEVEL_OUTOF10: 10

## 2018-08-19 ASSESSMENT — PAIN DESCRIPTION - ORIENTATION: ORIENTATION: RIGHT

## 2018-08-19 ASSESSMENT — PAIN DESCRIPTION - DESCRIPTORS: DESCRIPTORS: BURNING;THROBBING

## 2018-08-19 ASSESSMENT — PAIN DESCRIPTION - LOCATION: LOCATION: LEG

## 2018-08-19 NOTE — ED NOTES
Pt sleeping on stretcher with call light in reach. Awaiting any further orders. RR even and non labored. Will continue to monitor.       Melba iJmenez RN  08/19/18 5012

## 2018-08-19 NOTE — ED PROVIDER NOTES
[facility] ED  Emergency Department Encounter  Emergency Medicine Resident Note     Pt Name: Keisha Huntley  MRN: [id number]  [place] 1995  Date of evaluation: 8/19/2018  PCP:  No primary care provider on file. CHIEF COMPLAINT       Chief Complaint   Patient presents with    Leg Pain       HISTORY OF PRESENT ILLNESS  (Location/Symptom, Timing/Onset, Context/Setting, Quality, Duration, Modifying Factors, Severity.)      Keisha Huntley is a 23 y.o. female who presents with left leg pain that started suddenly and woke her from her sleep. She states that it came out of nowhere and rates it as a 10/10. States it radiates down up and down the leg. Patient reports a history of diabetes. Denies any recent injury to the area. Does not typically get a reaction ot bugs like this. No similar areas in the past.  No fevers or chills. No history of DVTs or PEs in the past.  No recent travel, immobilizations, surgeries, or malignancies. PAST MEDICAL / SURGICAL / SOCIAL / FAMILY HISTORY     Past Medical History:  has a past medical history of Diabetes mellitus ([address] and MRSA (methicillin resistant staph aureus) culture positive. Past Surgical History: reviewed with patient and none reported. Allergies:  Shrimp flavor     Home Meds:   Prior to Visit Medications    Medication Sig Taking?  Authorizing Provider   ibuprofen (ADVIL;MOTRIN) 800 MG tablet Take 1 tablet by mouth every 8 hours as needed for Pain Yes Rufino Peter MD   cephALEXin (KEFLEX) 500 MG capsule Take 1 capsule by mouth 4 times daily for 7 days Yes Rufino Peter MD   sulfamethoxazole-trimethoprim (BACTRIM DS) 800-160 MG per tablet Take 1 tablet by mouth 2 times daily for 7 days Yes Rufino Peter MD   insulin lispro (HUMALOG) 100 UNIT/ML injection vial Inject into the skin 3 times daily (before meals) Indications: 70:30  Historical Provider, MD     Please note that medications prescribed at discharge will auto-populate into this medication list when note is refreshed. Please look at prescription date and prescriber to clarify. Family History: reviewed with patient and none reported. Social History: She reports that she has been smoking Cigarettes. She does not have any smokeless tobacco history on file. She reports that she does not drink alcohol or use drugs. She reports that she does not engage in sexual activity. REVIEW OF SYSTEMS    (2-9 systems for level 4, 10 or more for level 5)      Review of Systems   Constitutional: Negative for chills and fever. Gastrointestinal: Negative for nausea and vomiting. Musculoskeletal: Positive for myalgias. Negative for back pain, gait problem, neck pain and neck stiffness. Skin: Positive for wound. Negative for color change and pallor. Neurological: Negative for weakness and numbness. PHYSICAL EXAM   (up to 7 for level 4, 8 or more for level 5)      Initial Vitals   ED Triage Vitals   BP Temp Temp Source Pulse Resp SpO2 Height Weight   08/19/18 0444 08/19/18 0441 08/19/18 0441 08/19/18 0441 08/19/18 0627 08/19/18 0441 -- 08/19/18 0441   (!) 138/90 98.9 °F (37.2 °C) Oral 108 16 100 %  140 lb (63.5 kg)       Physical Exam   Constitutional: She is oriented to person, place, and time. Thin female who appears to be in pain, but non-toxic appearing. HENT:   Head: Normocephalic and atraumatic. Cardiovascular: Regular rhythm. Tachycardia present. Exam reveals no gallop and no friction rub. No murmur heard. 2+ DP/PT pulses on the left leg and normal capillary refill   Pulmonary/Chest: Effort normal and breath sounds normal. No respiratory distress. She has no wheezes. She has no rales. Musculoskeletal: Normal range of motion. She exhibits no deformity. Neurological: She is alert and oriented to person, place, and time. Skin:   Left leg with an erythematous area just distal to the knee joint.   Area is ~ 3 x 4 cm area of erythema, slightly raised with no fluctuance. Extremely tender. No active drainage. No other areas of lesions visualized. Nursing note and vitals reviewed. DIFFERENTIAL DIAGNOSIS/IMPRESSION     DDX: insect bite, local reaction, allergic reaction, cellulitis, abscess, erythema nodosum    Impression: 23 y.o. female who presents with leg pain. Patient has a 3x4 cm area of erythema that is raised, firm, and exquisitely tender. No history of similar areas. Concern for insect bite vs local reaction vs cellulitis +/- abscess, or erythema nodosum. Given location and appearance not concerning for DVT. Does not involve a joint. Give severe tenderness will give analgesics. Will do bedside ultrasound so for possible fluid collection. DIAGNOSTIC RESULTS     EKG: All EKG's are interpreted by the Emergency Department Physician who either signs or Co-signs this chart in the absence of a cardiologist.    Not clinically indicated at this time. LABS: Labs were reviewed by me and abnormal results are displayed above     Labs Reviewed - No data to display    RADIOLOGY: All plain film, CT, MRI, and formal ultrasound images (except ED bedside ultrasound) are read by the radiologist, see reports below, unless otherwise noted in ED Course, MDM or here. No results found. BEDSIDE ULTRASOUND:  SOFT TISSUE ULTRASOUND:   A limited bedside ultrasound of the soft tissue was performed. The purpose of this study was to evaluate for fluid collection concerning for abscess. The structures studied was the skin and underlying soft tissue. FINDINGS:  Study was Negative for soft tissue fluid collection. Incision and drainage was was not indicated. The study was technically adequate.     IMAGES:  Electronically uploaded to the PACS system      ED COURSE      ED Medication Orders     Start Ordered     Status Ordering Provider    08/19/18 0715 08/19/18 0709  cephALEXin (KEFLEX) capsule 500 mg  ONCE      Last MAR action:  Given - by [facility], SOHEILA NOBLE on 08/19/18 at 0713 Neo Young    08/19/18 0715 08/19/18 0709  sulfamethoxazole-trimethoprim (BACTRIM DS;SEPTRA DS) 800-160 MG per tablet 1 tablet  ONCE      Last MAR action:  Given - by Santy Bloom on 08/19/18 at 0713 Jose Francsico Dalys E    08/19/18 0500 08/19/18 0455  HYDROcodone-acetaminophen (NORCO) 5-325 MG per tablet 1 tablet  ONCE      Last MAR action:  Given - by Dima Ramirez on 08/19/18 at 0458 Josetataa Kubas E          EMERGENCY DEPARTMENT COURSE:    Patient's ultrasound negative. Still unclear etiology. No enlargement or development of new lesions while in the ED. Given that patient is diabetic and is at increased risk for infection, will treat as a cellulitis. Started on keflex and bactrim, first dose given in the ED. Patient instructed to call primary care provider for follow-up within 2 days. No PCP is listed in [facility] and therefore patient was provided with clinic list.  Return precautions provided. Patient is comfortable with discharge at this time. PROCEDURES:  None     CONSULTS:  None    CRITICAL CARE:  0 min, please also see attending note    FINAL IMPRESSION      1.  Cellulitis of lower extremity, unspecified laterality          DISPOSITION / PLAN     DISPOSITION Decision To Discharge 08/19/2018 07:21:51 AM      PATIENT REFERRED TO:  [facility]  [address] [place] [address]  [phone]  Go to   As needed, If symptoms worsen      DISCHARGE MEDICATIONS:  New Prescriptions    CEPHALEXIN (KEFLEX) 500 MG CAPSULE    Take 1 capsule by mouth 4 times daily for 7 days    IBUPROFEN (ADVIL;MOTRIN) 800 MG TABLET    Take 1 tablet by mouth every 8 hours as needed for Pain    SULFAMETHOXAZOLE-TRIMETHOPRIM (BACTRIM DS) 800-160 MG PER TABLET    Take 1 tablet by mouth 2 times daily for 7 days       Aubree Peterson MD  Emergency Medicine Resident    (Please note that portions of this note were completed with a voice recognition program. Efforts were made to edit the dictations but occasionally words are mis-transcribed.)        Faisal Garcia MD  Resident  08/21/18 2061

## 2018-08-19 NOTE — ED PROVIDER NOTES
[facility]     Emergency Department     Faculty Attestation    I performed a history and physical examination of the patient and discussed management with the resident. I have reviewed and agree with the residents findings including all diagnostic interpretations, and treatment plans as written. Any areas of disagreement are noted on the chart. I was personally present for the key portions of any procedures. I have documented in the chart those procedures where I was not present during the key portions. I have reviewed the emergency nurses triage note. I agree with the chief complaint, past medical history, past surgical history, allergies, medications, social and family history as documented unless otherwise noted below. Documentation of the HPI, Physical Exam and Medical Decision Making performed by abdiel is based on my personal performance of the HPI, PE and MDM. For Physician Assistant/ Nurse Practitioner cases/documentation I have personally evaluated this patient and have completed at least one if not all key elements of the E/M (history, physical exam, and MDM). Additional findings are as noted. Primary Care Physician: No primary care provider on file. History: This is a 23 y.o. female who presents to the Emergency Department with complaint of acute onset leg pain, and no specific distribution on the lateral aspect of left lower leg. It with redness, pain and swelling. She denies any trauma to the area, no bites no exposures. She denies any recent caffeine or mosquito bites. No new medications. Patient denies any fevers or chills, she does report that the pain radiates downward and upward. She does have a history of type 1 diabetes    Physical:   weight is 140 lb (63.5 kg). Her oral temperature is 98.9 °F (37.2 °C). Her blood pressure is 128/84 and her pulse is 108. Her oxygen saturation is 98%.     Patient with approximately 3 x 4 cm area of erythema, that is slightly raised, and exquisitely tender to palpation. No fluctuance palpated. No increased warmth noted, no additional skin or lesions noted on her legs.     Impression: painful nodule    Plan: ?localized reaction to bite v nodosa?  US, pain control, due to her diabetes, plan to start on abx and cover for cellulitis         Danielle Pena D.O, M.P.H  Attending Emergency Medicine Physician         Danielle Pena,   08/19/18 5490

## 2018-08-21 ASSESSMENT — ENCOUNTER SYMPTOMS
COLOR CHANGE: 0
BACK PAIN: 0
NAUSEA: 0
VOMITING: 0

## 2018-09-19 ENCOUNTER — APPOINTMENT (OUTPATIENT)
Dept: GENERAL RADIOLOGY | Age: 23
DRG: 603 | End: 2018-09-19
Payer: COMMERCIAL

## 2018-09-19 ENCOUNTER — HOSPITAL ENCOUNTER (EMERGENCY)
Age: 23
Discharge: HOME | DRG: 603 | End: 2018-09-20
Attending: EMERGENCY MEDICINE
Payer: COMMERCIAL

## 2018-09-19 VITALS
WEIGHT: 140 LBS | SYSTOLIC BLOOD PRESSURE: 135 MMHG | TEMPERATURE: 99 F | HEART RATE: 112 BPM | RESPIRATION RATE: 16 BRPM | OXYGEN SATURATION: 100 % | DIASTOLIC BLOOD PRESSURE: 92 MMHG | BODY MASS INDEX: 23.32 KG/M2 | HEIGHT: 65 IN

## 2018-09-19 DIAGNOSIS — L03.115 CELLULITIS OF RIGHT LOWER EXTREMITY: Primary | ICD-10-CM

## 2018-09-19 LAB
ABSOLUTE EOS #: 0.21 K/UL (ref 0–0.44)
ABSOLUTE IMMATURE GRANULOCYTE: 0.08 K/UL (ref 0–0.3)
ABSOLUTE LYMPH #: 3.45 K/UL (ref 1.1–3.7)
ABSOLUTE MONO #: 0.86 K/UL (ref 0.1–1.2)
ANION GAP SERPL CALCULATED.3IONS-SCNC: 9 MMOL/L (ref 9–17)
BASOPHILS # BLD: 0 % (ref 0–2)
BASOPHILS ABSOLUTE: 0.06 K/UL (ref 0–0.2)
BUN BLDV-MCNC: 10 MG/DL (ref 6–20)
BUN/CREAT BLD: ABNORMAL (ref 9–20)
C-REACTIVE PROTEIN: 34.5 MG/L (ref 0–5)
CALCIUM SERPL-MCNC: 8.2 MG/DL (ref 8.6–10.4)
CHLORIDE BLD-SCNC: 97 MMOL/L (ref 98–107)
CO2: 25 MMOL/L (ref 20–31)
CREAT SERPL-MCNC: 0.9 MG/DL (ref 0.5–0.9)
DIFFERENTIAL TYPE: ABNORMAL
EOSINOPHILS RELATIVE PERCENT: 2 % (ref 1–4)
GFR AFRICAN AMERICAN: >60 ML/MIN
GFR NON-AFRICAN AMERICAN: >60 ML/MIN
GFR SERPL CREATININE-BSD FRML MDRD: ABNORMAL ML/MIN/{1.73_M2}
GFR SERPL CREATININE-BSD FRML MDRD: ABNORMAL ML/MIN/{1.73_M2}
GLUCOSE BLD-MCNC: 319 MG/DL (ref 70–99)
HCT VFR BLD CALC: 35.3 % (ref 36.3–47.1)
HEMOGLOBIN: 12.2 G/DL (ref 11.9–15.1)
IMMATURE GRANULOCYTES: 1 %
LACTIC ACID, WHOLE BLOOD: 1.3 MMOL/L (ref 0.7–2.1)
LACTIC ACID: NORMAL MMOL/L
LYMPHOCYTES # BLD: 24 % (ref 24–43)
MCH RBC QN AUTO: 26.9 PG (ref 25.2–33.5)
MCHC RBC AUTO-ENTMCNC: 34.6 G/DL (ref 28.4–34.8)
MCV RBC AUTO: 77.9 FL (ref 82.6–102.9)
MONOCYTES # BLD: 6 % (ref 3–12)
NRBC AUTOMATED: 0 PER 100 WBC
PDW BLD-RTO: 15.5 % (ref 11.8–14.4)
PLATELET # BLD: 464 K/UL (ref 138–453)
PLATELET ESTIMATE: ABNORMAL
PMV BLD AUTO: 10.5 FL (ref 8.1–13.5)
POTASSIUM SERPL-SCNC: 4.9 MMOL/L (ref 3.7–5.3)
RBC # BLD: 4.53 M/UL (ref 3.95–5.11)
RBC # BLD: ABNORMAL 10*6/UL
SEDIMENTATION RATE, ERYTHROCYTE: 26 MM (ref 0–20)
SEG NEUTROPHILS: 67 % (ref 36–65)
SEGMENTED NEUTROPHILS ABSOLUTE COUNT: 9.77 K/UL (ref 1.5–8.1)
SODIUM BLD-SCNC: 131 MMOL/L (ref 135–144)
WBC # BLD: 14.4 K/UL (ref 3.5–11.3)
WBC # BLD: ABNORMAL 10*3/UL

## 2018-09-19 PROCEDURE — 85025 COMPLETE CBC W/AUTO DIFF WBC: CPT

## 2018-09-19 PROCEDURE — 80048 BASIC METABOLIC PNL TOTAL CA: CPT

## 2018-09-19 PROCEDURE — 73610 X-RAY EXAM OF ANKLE: CPT

## 2018-09-19 PROCEDURE — 99283 EMERGENCY DEPT VISIT LOW MDM: CPT

## 2018-09-19 PROCEDURE — 6370000000 HC RX 637 (ALT 250 FOR IP): Performed by: EMERGENCY MEDICINE

## 2018-09-19 PROCEDURE — 85651 RBC SED RATE NONAUTOMATED: CPT

## 2018-09-19 PROCEDURE — 83605 ASSAY OF LACTIC ACID: CPT

## 2018-09-19 PROCEDURE — 73630 X-RAY EXAM OF FOOT: CPT

## 2018-09-19 PROCEDURE — 86140 C-REACTIVE PROTEIN: CPT

## 2018-09-19 RX ORDER — CEPHALEXIN 500 MG/1
500 CAPSULE ORAL 4 TIMES DAILY
Qty: 28 CAPSULE | Refills: 0 | Status: ON HOLD | OUTPATIENT
Start: 2018-09-19 | End: 2018-09-22 | Stop reason: HOSPADM

## 2018-09-19 RX ORDER — SULFAMETHOXAZOLE AND TRIMETHOPRIM 800; 160 MG/1; MG/1
1 TABLET ORAL 2 TIMES DAILY
Qty: 14 TABLET | Refills: 0 | Status: ON HOLD | OUTPATIENT
Start: 2018-09-19 | End: 2018-09-22 | Stop reason: HOSPADM

## 2018-09-19 RX ORDER — IBUPROFEN 800 MG/1
800 TABLET ORAL EVERY 8 HOURS PRN
Qty: 30 TABLET | Refills: 0 | Status: SHIPPED | OUTPATIENT
Start: 2018-09-19 | End: 2019-01-08 | Stop reason: ALTCHOICE

## 2018-09-19 RX ORDER — SULFAMETHOXAZOLE AND TRIMETHOPRIM 800; 160 MG/1; MG/1
1 TABLET ORAL ONCE
Status: COMPLETED | OUTPATIENT
Start: 2018-09-20 | End: 2018-09-20

## 2018-09-19 RX ORDER — CEPHALEXIN 250 MG/1
500 CAPSULE ORAL ONCE
Status: COMPLETED | OUTPATIENT
Start: 2018-09-20 | End: 2018-09-20

## 2018-09-19 RX ORDER — OXYCODONE HYDROCHLORIDE AND ACETAMINOPHEN 5; 325 MG/1; MG/1
2 TABLET ORAL ONCE
Status: COMPLETED | OUTPATIENT
Start: 2018-09-19 | End: 2018-09-19

## 2018-09-19 RX ADMIN — OXYCODONE HYDROCHLORIDE AND ACETAMINOPHEN 2 TABLET: 5; 325 TABLET ORAL at 21:53

## 2018-09-19 ASSESSMENT — PAIN DESCRIPTION - LOCATION: LOCATION: LEG

## 2018-09-19 ASSESSMENT — PAIN SCALES - GENERAL: PAINLEVEL_OUTOF10: 10

## 2018-09-19 ASSESSMENT — PAIN DESCRIPTION - ORIENTATION: ORIENTATION: RIGHT;LOWER

## 2018-09-19 ASSESSMENT — PAIN DESCRIPTION - PAIN TYPE: TYPE: ACUTE PAIN

## 2018-09-20 PROCEDURE — 6370000000 HC RX 637 (ALT 250 FOR IP): Performed by: EMERGENCY MEDICINE

## 2018-09-20 RX ORDER — VANCOMYCIN HYDROCHLORIDE 1 G/200ML
1000 INJECTION, SOLUTION INTRAVENOUS ONCE
Status: DISCONTINUED | OUTPATIENT
Start: 2018-09-20 | End: 2018-09-20 | Stop reason: HOSPADM

## 2018-09-20 RX ADMIN — CEPHALEXIN 500 MG: 250 CAPSULE ORAL at 00:00

## 2018-09-20 RX ADMIN — SULFAMETHOXAZOLE AND TRIMETHOPRIM 1 TABLET: 800; 160 TABLET ORAL at 00:00

## 2018-09-20 ASSESSMENT — ENCOUNTER SYMPTOMS
ABDOMINAL PAIN: 0
NAUSEA: 0
COUGH: 0
VOMITING: 0
RHINORRHEA: 0
SHORTNESS OF BREATH: 0
EYE PAIN: 0

## 2018-09-20 NOTE — ED PROVIDER NOTES
Efforts were made to edit the dictations but occasionally words are mis-transcribed.  Whenever words are used in this note in any gender, they shall be construed as though they were used in the gender appropriate to the circumstances; and whenever words are used in this note in the singular or plural form, they shall be construed as though they were used in the form appropriate to the circumstances.)    MD Angus Gu  Attending Emergency Medicine Physician            Arcelia Shoemaker MD  09/19/18 9054       Arcelia Shoemaker MD  09/19/18 1909       Arcelia Shoemaker MD  09/20/18 1314

## 2018-09-20 NOTE — ED PROVIDER NOTES
(VANCOCIN) 1000 mg in dextrose 5% 200 mL IVPB       DDX: Cellulitis, DVT, septic arthritis, dependent edema    DIAGNOSTIC RESULTS / EMERGENCY DEPARTMENT COURSE / MDM     LABS:  Results for orders placed or performed during the hospital encounter of 09/19/18   CBC WITH AUTO DIFFERENTIAL   Result Value Ref Range    WBC 14.4 (H) 3.5 - 11.3 k/uL    RBC 4.53 3.95 - 5.11 m/uL    Hemoglobin 12.2 11.9 - 15.1 g/dL    Hematocrit 35.3 (L) 36.3 - 47.1 %    MCV 77.9 (L) 82.6 - 102.9 fL    MCH 26.9 25.2 - 33.5 pg    MCHC 34.6 28.4 - 34.8 g/dL    RDW 15.5 (H) 11.8 - 14.4 %    Platelets 542 (H) 209 - 453 k/uL    MPV 10.5 8.1 - 13.5 fL    NRBC Automated 0.0 0.0 per 100 WBC    Differential Type NOT REPORTED     Seg Neutrophils 67 (H) 36 - 65 %    Lymphocytes 24 24 - 43 %    Monocytes 6 3 - 12 %    Eosinophils % 2 1 - 4 %    Basophils 0 0 - 2 %    Immature Granulocytes 1 (H) 0 %    Segs Absolute 9.77 (H) 1.50 - 8.10 k/uL    Absolute Lymph # 3.45 1.10 - 3.70 k/uL    Absolute Mono # 0.86 0.10 - 1.20 k/uL    Absolute Eos # 0.21 0.00 - 0.44 k/uL    Basophils # 0.06 0.00 - 0.20 k/uL    Absolute Immature Granulocyte 0.08 0.00 - 0.30 k/uL    WBC Morphology NOT REPORTED     RBC Morphology ANISOCYTOSIS PRESENT     Platelet Estimate NOT REPORTED    BASIC METABOLIC PANEL   Result Value Ref Range    Glucose 319 (H) 70 - 99 mg/dL    BUN 10 6 - 20 mg/dL    CREATININE 0.90 0.50 - 0.90 mg/dL    Bun/Cre Ratio NOT REPORTED 9 - 20    Calcium 8.2 (L) 8.6 - 10.4 mg/dL    Sodium 131 (L) 135 - 144 mmol/L    Potassium 4.9 3.7 - 5.3 mmol/L    Chloride 97 (L) 98 - 107 mmol/L    CO2 25 20 - 31 mmol/L    Anion Gap 9 9 - 17 mmol/L    GFR Non-African American >60 >60 mL/min    GFR African American >60 >60 mL/min    GFR Comment          GFR Staging NOT REPORTED    C-REACTIVE PROTEIN   Result Value Ref Range    CRP 34.5 (H) 0.0 - 5.0 mg/L   Sedimentation Rate   Result Value Ref Range    Sed Rate 26 (H) 0 - 20 mm   Lactic Acid, Plasma   Result Value Ref Range Lactic Acid NOT REPORTED mmol/L    Lactic Acid, Whole Blood 1.3 0.7 - 2.1 mmol/L       IMPRESSION: Patient evaluated by myself and attending physician. Patient with swelling and erythema to the right ankle and right foot. Patient tender to palpation in this area but no crepitance. Is warm to the touch. Patient able wiggle toes. Good cap refill in all toes of the right foot. Sensation is intact. Based on history and physical, patient likely suffering from cellulitis. Given her atraumatic swelling will get Doppler to rule out blood clot. We'll get x-ray to evaluate for soft tissue gas. We'll get labs. RADIOLOGY:  Xr Ankle Right (min 3 Views)    Result Date: 9/19/2018  EXAMINATION: 3 XRAY VIEWS OF THE RIGHT ANKLE 9/19/2018 10:31 pm COMPARISON: None. HISTORY: ORDERING SYSTEM PROVIDED HISTORY: right ankle swelling TECHNOLOGIST PROVIDED HISTORY: right ankle swelling FINDINGS: No evidence of acute fracture or dislocation. Normal alignment of the ankle mortise. No focal osseous lesion. No evidence of joint effusion. No focal soft tissue abnormality. No acute abnormality of the ankle. Xr Foot Right (min 3 Views)    Result Date: 9/19/2018  EXAMINATION: 3 XRAY VIEWS OF THE RIGHT FOOT 9/19/2018 10:31 pm COMPARISON: None. HISTORY: ORDERING SYSTEM PROVIDED HISTORY: pain and swelling foot TECHNOLOGIST PROVIDED HISTORY: pain and swelling foot FINDINGS: Moderate soft tissue swelling of foot. Cortical margins intact. Alignment intact. Soft tissue swelling. EKG  None    All EKG's are interpreted by the Emergency Department Physician who either signs or Co-signs this chart in the absence of a cardiologist.    EMERGENCY DEPARTMENT COURSE:  Patient did have mildly elevated white count about 14 and was tachycardic. We'll concern for sepsis with believe her tachycardia is related to pain. Patient reassessed and sleeping comfortably in bed.   Bleach does have cellulitis and we'll add lactic acid on to

## 2018-09-21 ENCOUNTER — HOSPITAL ENCOUNTER (INPATIENT)
Age: 23
LOS: 1 days | Discharge: HOME | DRG: 603 | End: 2018-09-22
Attending: EMERGENCY MEDICINE | Admitting: EMERGENCY MEDICINE
Payer: COMMERCIAL

## 2018-09-21 DIAGNOSIS — L03.115 CELLULITIS OF RIGHT LOWER EXTREMITY: Primary | ICD-10-CM

## 2018-09-21 PROBLEM — L03.90 CELLULITIS: Status: ACTIVE | Noted: 2018-09-21

## 2018-09-21 LAB
ABSOLUTE EOS #: 0.19 K/UL (ref 0–0.44)
ABSOLUTE IMMATURE GRANULOCYTE: 0.09 K/UL (ref 0–0.3)
ABSOLUTE LYMPH #: 3.85 K/UL (ref 1.1–3.7)
ABSOLUTE MONO #: 0.87 K/UL (ref 0.1–1.2)
ANION GAP SERPL CALCULATED.3IONS-SCNC: 10 MMOL/L (ref 9–17)
ANION GAP SERPL CALCULATED.3IONS-SCNC: 8 MMOL/L (ref 9–17)
BASOPHILS # BLD: 0 % (ref 0–2)
BASOPHILS ABSOLUTE: 0.05 K/UL (ref 0–0.2)
BUN BLDV-MCNC: 8 MG/DL (ref 6–20)
BUN BLDV-MCNC: 9 MG/DL (ref 6–20)
BUN/CREAT BLD: ABNORMAL (ref 9–20)
BUN/CREAT BLD: ABNORMAL (ref 9–20)
C-REACTIVE PROTEIN: 41.2 MG/L (ref 0–5)
CALCIUM SERPL-MCNC: 7.8 MG/DL (ref 8.6–10.4)
CALCIUM SERPL-MCNC: 8.7 MG/DL (ref 8.6–10.4)
CHLORIDE BLD-SCNC: 102 MMOL/L (ref 98–107)
CHLORIDE BLD-SCNC: 99 MMOL/L (ref 98–107)
CO2: 23 MMOL/L (ref 20–31)
CO2: 25 MMOL/L (ref 20–31)
CREAT SERPL-MCNC: 0.91 MG/DL (ref 0.5–0.9)
CREAT SERPL-MCNC: 1.01 MG/DL (ref 0.5–0.9)
DIFFERENTIAL TYPE: ABNORMAL
EOSINOPHILS RELATIVE PERCENT: 2 % (ref 1–4)
ESTIMATED AVERAGE GLUCOSE: 283 MG/DL
GFR AFRICAN AMERICAN: >60 ML/MIN
GFR AFRICAN AMERICAN: >60 ML/MIN
GFR NON-AFRICAN AMERICAN: >60 ML/MIN
GFR NON-AFRICAN AMERICAN: >60 ML/MIN
GFR SERPL CREATININE-BSD FRML MDRD: ABNORMAL ML/MIN/{1.73_M2}
GLUCOSE BLD-MCNC: 139 MG/DL (ref 65–105)
GLUCOSE BLD-MCNC: 153 MG/DL (ref 70–99)
GLUCOSE BLD-MCNC: 225 MG/DL (ref 65–105)
GLUCOSE BLD-MCNC: 272 MG/DL (ref 65–105)
GLUCOSE BLD-MCNC: 326 MG/DL (ref 70–99)
GLUCOSE BLD-MCNC: 74 MG/DL (ref 65–105)
HBA1C MFR BLD: 11.5 % (ref 4–6)
HCG QUALITATIVE: NEGATIVE
HCT VFR BLD CALC: 37.6 % (ref 36.3–47.1)
HEMOGLOBIN: 12.8 G/DL (ref 11.9–15.1)
IMMATURE GRANULOCYTES: 1 %
LYMPHOCYTES # BLD: 30 % (ref 24–43)
MCH RBC QN AUTO: 26.1 PG (ref 25.2–33.5)
MCHC RBC AUTO-ENTMCNC: 34 G/DL (ref 28.4–34.8)
MCV RBC AUTO: 76.6 FL (ref 82.6–102.9)
MONOCYTES # BLD: 7 % (ref 3–12)
NRBC AUTOMATED: 0 PER 100 WBC
PDW BLD-RTO: 15.5 % (ref 11.8–14.4)
PLATELET # BLD: 492 K/UL (ref 138–453)
PLATELET ESTIMATE: ABNORMAL
PMV BLD AUTO: 10.1 FL (ref 8.1–13.5)
POTASSIUM SERPL-SCNC: 4.5 MMOL/L (ref 3.7–5.3)
POTASSIUM SERPL-SCNC: 4.6 MMOL/L (ref 3.7–5.3)
RBC # BLD: 4.91 M/UL (ref 3.95–5.11)
RBC # BLD: ABNORMAL 10*6/UL
SEDIMENTATION RATE, ERYTHROCYTE: 22 MM (ref 0–20)
SEG NEUTROPHILS: 60 % (ref 36–65)
SEGMENTED NEUTROPHILS ABSOLUTE COUNT: 7.89 K/UL (ref 1.5–8.1)
SODIUM BLD-SCNC: 132 MMOL/L (ref 135–144)
SODIUM BLD-SCNC: 135 MMOL/L (ref 135–144)
WBC # BLD: 12.9 K/UL (ref 3.5–11.3)
WBC # BLD: ABNORMAL 10*3/UL

## 2018-09-21 PROCEDURE — 2580000003 HC RX 258: Performed by: EMERGENCY MEDICINE

## 2018-09-21 PROCEDURE — 76937 US GUIDE VASCULAR ACCESS: CPT

## 2018-09-21 PROCEDURE — 6360000002 HC RX W HCPCS: Performed by: EMERGENCY MEDICINE

## 2018-09-21 PROCEDURE — 2580000003 HC RX 258: Performed by: STUDENT IN AN ORGANIZED HEALTH CARE EDUCATION/TRAINING PROGRAM

## 2018-09-21 PROCEDURE — 96366 THER/PROPH/DIAG IV INF ADDON: CPT

## 2018-09-21 PROCEDURE — 36415 COLL VENOUS BLD VENIPUNCTURE: CPT

## 2018-09-21 PROCEDURE — 93971 EXTREMITY STUDY: CPT

## 2018-09-21 PROCEDURE — 96367 TX/PROPH/DG ADDL SEQ IV INF: CPT

## 2018-09-21 PROCEDURE — 80048 BASIC METABOLIC PNL TOTAL CA: CPT

## 2018-09-21 PROCEDURE — 85651 RBC SED RATE NONAUTOMATED: CPT

## 2018-09-21 PROCEDURE — G0378 HOSPITAL OBSERVATION PER HR: HCPCS

## 2018-09-21 PROCEDURE — 85025 COMPLETE CBC W/AUTO DIFF WBC: CPT

## 2018-09-21 PROCEDURE — 96375 TX/PRO/DX INJ NEW DRUG ADDON: CPT

## 2018-09-21 PROCEDURE — 96365 THER/PROPH/DIAG IV INF INIT: CPT

## 2018-09-21 PROCEDURE — 86140 C-REACTIVE PROTEIN: CPT

## 2018-09-21 PROCEDURE — G0108 DIAB MANAGE TRN  PER INDIV: HCPCS

## 2018-09-21 PROCEDURE — 6370000000 HC RX 637 (ALT 250 FOR IP): Performed by: EMERGENCY MEDICINE

## 2018-09-21 PROCEDURE — 6370000000 HC RX 637 (ALT 250 FOR IP): Performed by: STUDENT IN AN ORGANIZED HEALTH CARE EDUCATION/TRAINING PROGRAM

## 2018-09-21 PROCEDURE — 1200000000 HC SEMI PRIVATE

## 2018-09-21 PROCEDURE — 99284 EMERGENCY DEPT VISIT MOD MDM: CPT

## 2018-09-21 PROCEDURE — 84703 CHORIONIC GONADOTROPIN ASSAY: CPT

## 2018-09-21 PROCEDURE — 82947 ASSAY GLUCOSE BLOOD QUANT: CPT

## 2018-09-21 PROCEDURE — 83036 HEMOGLOBIN GLYCOSYLATED A1C: CPT

## 2018-09-21 RX ORDER — OXYCODONE HYDROCHLORIDE AND ACETAMINOPHEN 5; 325 MG/1; MG/1
1 TABLET ORAL EVERY 4 HOURS PRN
Status: DISCONTINUED | OUTPATIENT
Start: 2018-09-21 | End: 2018-09-22 | Stop reason: HOSPADM

## 2018-09-21 RX ORDER — SODIUM CHLORIDE, SODIUM LACTATE, POTASSIUM CHLORIDE, CALCIUM CHLORIDE 600; 310; 30; 20 MG/100ML; MG/100ML; MG/100ML; MG/100ML
INJECTION, SOLUTION INTRAVENOUS CONTINUOUS
Status: DISCONTINUED | OUTPATIENT
Start: 2018-09-21 | End: 2018-09-22 | Stop reason: HOSPADM

## 2018-09-21 RX ORDER — INSULIN GLARGINE 100 [IU]/ML
10 INJECTION, SOLUTION SUBCUTANEOUS NIGHTLY
Status: DISCONTINUED | OUTPATIENT
Start: 2018-09-21 | End: 2018-09-21

## 2018-09-21 RX ORDER — INSULIN GLARGINE 100 [IU]/ML
15 INJECTION, SOLUTION SUBCUTANEOUS NIGHTLY
Status: DISCONTINUED | OUTPATIENT
Start: 2018-09-21 | End: 2018-09-22 | Stop reason: HOSPADM

## 2018-09-21 RX ORDER — OXYCODONE HYDROCHLORIDE AND ACETAMINOPHEN 5; 325 MG/1; MG/1
1 TABLET ORAL ONCE
Status: COMPLETED | OUTPATIENT
Start: 2018-09-21 | End: 2018-09-21

## 2018-09-21 RX ORDER — DEXTROSE MONOHYDRATE 25 G/50ML
12.5 INJECTION, SOLUTION INTRAVENOUS PRN
Status: DISCONTINUED | OUTPATIENT
Start: 2018-09-21 | End: 2018-09-22 | Stop reason: HOSPADM

## 2018-09-21 RX ORDER — ONDANSETRON 2 MG/ML
4 INJECTION INTRAMUSCULAR; INTRAVENOUS EVERY 8 HOURS PRN
Status: DISCONTINUED | OUTPATIENT
Start: 2018-09-21 | End: 2018-09-22 | Stop reason: HOSPADM

## 2018-09-21 RX ORDER — 0.9 % SODIUM CHLORIDE 0.9 %
1000 INTRAVENOUS SOLUTION INTRAVENOUS ONCE
Status: COMPLETED | OUTPATIENT
Start: 2018-09-21 | End: 2018-09-21

## 2018-09-21 RX ORDER — OXYCODONE HYDROCHLORIDE AND ACETAMINOPHEN 5; 325 MG/1; MG/1
2 TABLET ORAL EVERY 4 HOURS PRN
Status: DISCONTINUED | OUTPATIENT
Start: 2018-09-21 | End: 2018-09-22 | Stop reason: HOSPADM

## 2018-09-21 RX ORDER — DEXTROSE MONOHYDRATE 50 MG/ML
100 INJECTION, SOLUTION INTRAVENOUS PRN
Status: DISCONTINUED | OUTPATIENT
Start: 2018-09-21 | End: 2018-09-22 | Stop reason: HOSPADM

## 2018-09-21 RX ORDER — NICOTINE POLACRILEX 4 MG
15 LOZENGE BUCCAL PRN
Status: DISCONTINUED | OUTPATIENT
Start: 2018-09-21 | End: 2018-09-22 | Stop reason: HOSPADM

## 2018-09-21 RX ORDER — ACETAMINOPHEN 325 MG/1
650 TABLET ORAL EVERY 4 HOURS PRN
Status: DISCONTINUED | OUTPATIENT
Start: 2018-09-21 | End: 2018-09-22 | Stop reason: HOSPADM

## 2018-09-21 RX ORDER — VANCOMYCIN HYDROCHLORIDE 1 G/200ML
1000 INJECTION, SOLUTION INTRAVENOUS EVERY 12 HOURS
Status: DISCONTINUED | OUTPATIENT
Start: 2018-09-21 | End: 2018-09-22 | Stop reason: HOSPADM

## 2018-09-21 RX ORDER — SODIUM CHLORIDE 0.9 % (FLUSH) 0.9 %
10 SYRINGE (ML) INJECTION EVERY 12 HOURS SCHEDULED
Status: DISCONTINUED | OUTPATIENT
Start: 2018-09-21 | End: 2018-09-22 | Stop reason: HOSPADM

## 2018-09-21 RX ORDER — SODIUM CHLORIDE 0.9 % (FLUSH) 0.9 %
10 SYRINGE (ML) INJECTION PRN
Status: DISCONTINUED | OUTPATIENT
Start: 2018-09-21 | End: 2018-09-22 | Stop reason: HOSPADM

## 2018-09-21 RX ORDER — VANCOMYCIN HYDROCHLORIDE 1 G/200ML
15 INJECTION, SOLUTION INTRAVENOUS ONCE
Status: COMPLETED | OUTPATIENT
Start: 2018-09-21 | End: 2018-09-21

## 2018-09-21 RX ADMIN — INSULIN LISPRO 6 UNITS: 100 INJECTION, SOLUTION INTRAVENOUS; SUBCUTANEOUS at 09:44

## 2018-09-21 RX ADMIN — OXYCODONE HYDROCHLORIDE AND ACETAMINOPHEN 2 TABLET: 5; 325 TABLET ORAL at 21:46

## 2018-09-21 RX ADMIN — SODIUM CHLORIDE 1000 ML: 9 INJECTION, SOLUTION INTRAVENOUS at 02:10

## 2018-09-21 RX ADMIN — VANCOMYCIN HYDROCHLORIDE 1000 MG: 1 INJECTION, SOLUTION INTRAVENOUS at 14:54

## 2018-09-21 RX ADMIN — INSULIN GLARGINE 15 UNITS: 100 INJECTION, SOLUTION SUBCUTANEOUS at 21:54

## 2018-09-21 RX ADMIN — Medication 10 ML: at 21:59

## 2018-09-21 RX ADMIN — SODIUM CHLORIDE, POTASSIUM CHLORIDE, SODIUM LACTATE AND CALCIUM CHLORIDE: 600; 310; 30; 20 INJECTION, SOLUTION INTRAVENOUS at 09:44

## 2018-09-21 RX ADMIN — OXYCODONE HYDROCHLORIDE AND ACETAMINOPHEN 2 TABLET: 5; 325 TABLET ORAL at 07:24

## 2018-09-21 RX ADMIN — VANCOMYCIN HYDROCHLORIDE 1000 MG: 1 INJECTION, SOLUTION INTRAVENOUS at 02:43

## 2018-09-21 RX ADMIN — OXYCODONE HYDROCHLORIDE AND ACETAMINOPHEN 1 TABLET: 5; 325 TABLET ORAL at 01:30

## 2018-09-21 RX ADMIN — OXYCODONE HYDROCHLORIDE AND ACETAMINOPHEN 1 TABLET: 5; 325 TABLET ORAL at 14:55

## 2018-09-21 RX ADMIN — ONDANSETRON HYDROCHLORIDE 4 MG: 2 INJECTION, SOLUTION INTRAMUSCULAR; INTRAVENOUS at 17:04

## 2018-09-21 RX ADMIN — CEFTRIAXONE SODIUM 1 G: 1 INJECTION, POWDER, FOR SOLUTION INTRAMUSCULAR; INTRAVENOUS at 02:10

## 2018-09-21 ASSESSMENT — PAIN DESCRIPTION - ORIENTATION: ORIENTATION: RIGHT

## 2018-09-21 ASSESSMENT — PAIN SCALES - GENERAL
PAINLEVEL_OUTOF10: 8
PAINLEVEL_OUTOF10: 8
PAINLEVEL_OUTOF10: 5
PAINLEVEL_OUTOF10: 10
PAINLEVEL_OUTOF10: 8
PAINLEVEL_OUTOF10: 8
PAINLEVEL_OUTOF10: 9

## 2018-09-21 ASSESSMENT — PAIN DESCRIPTION - LOCATION: LOCATION: FOOT

## 2018-09-21 ASSESSMENT — ENCOUNTER SYMPTOMS
COUGH: 0
EYE PAIN: 0
ABDOMINAL PAIN: 0
SHORTNESS OF BREATH: 0
NAUSEA: 0
VOMITING: 0
RHINORRHEA: 0

## 2018-09-21 ASSESSMENT — PAIN DESCRIPTION - FREQUENCY: FREQUENCY: CONTINUOUS

## 2018-09-21 ASSESSMENT — PAIN DESCRIPTION - PAIN TYPE
TYPE: ACUTE PAIN

## 2018-09-21 ASSESSMENT — PAIN DESCRIPTION - DESCRIPTORS: DESCRIPTORS: CONSTANT;ACHING

## 2018-09-21 NOTE — H&P
Acute onset of right lower extremity Erythema, edema, and pain, Likely secondary to cellulitis:  -Labwork and ED significant for a mild leukocytosis of 12.9.  -Will obtain ESR, CRP.  -IV vancomycin, IV Rocephin. 2. Acute elevation in creatinine, uncertain etiology:  -Creatinine elevated to 1.01  -IV fluids. Reassess. 3. Acute pseudohyponatremia, secondary to hyperglycemia:  -Will improve blood glucose control. 4. Chronic Type 1 diabetes mellitus with poor control:  -HbA1c is 11.5, indicating poor control.   -Patient on medium sliding scale  -Will add 15 units of Lantus nightly. · Continue home medications and pain control  · Monitor vitals, labs, and imaging  · DISPO: pending consults and clinical improvement    CONSULTS:    PHARMACY TO DOSE VANCOMYCIN  PHARMACY TO DOSE VANCOMYCIN    PROCEDURES:  Not indicated     PATIENT REFERRED TO:    Juliet Francis MD  [address] [place] [address] [address]  [facility].  [phone]            --  Dana Redmond, DO Dana Redmond    This dictation was generated by voice recognition computer software. Although all attempts are made to edit the dictation for accuracy, there may be errors in the transcription that are not intended.

## 2018-09-21 NOTE — PLAN OF CARE
Problem: Infection:  Goal: Will remain free from infection  Will remain free from infection  Outcome: Ongoing      Problem: Pain:  Goal: Patient's pain/discomfort is manageable  Patient's pain/discomfort is manageable  Outcome: Ongoing

## 2018-09-21 NOTE — ED PROVIDER NOTES
[facility]  Emergency Department Encounter  Emergency Medicine Resident     Pt Name: Cabrera Shea  MRN: [id number]  [place] 1995  Date of evaluation: 9/21/18  PCP:  No primary care provider on file. CHIEF COMPLAINT       Chief Complaint   Patient presents with    Wound Infection       HISTORY OF PRESENT ILLNESS  (Location/Symptom, Timing/Onset, Context/Setting, Quality, Duration, Modifying Factors, Severity.)      Cabrera Shea is a 23 y.o. female who presents with Right leg swelling and pain. Patient was seen here yesterday with similar issue and diagnosed with cellulitis. It was recommended the patient be admitted but she left because she had to go home and take care of her children. Patient returns today stating that the swelling has gotten a little worse in her foot. Patient was given antibiotics but did not fill them. Patient denies any numbness. Patient denies any fevers, chest pain, shortness of breath, nausea, or vomiting. Agent states she is in agreement with admission. PAST MEDICAL / SURGICAL / SOCIAL / FAMILY HISTORY      has a past medical history of Diabetes mellitus ([address] and MRSA (methicillin resistant staph aureus) culture positive. has no past surgical history on file. Social History     Social History    Marital status: Single     Spouse name: N/A    Number of children: N/A    Years of education: N/A     Occupational History    Not on file. Social History Main Topics    Smoking status: Current Every Day Smoker     Types: Cigarettes    Smokeless tobacco: Not on file      Comment: 5/day    Alcohol use No    Drug use: No    Sexual activity: No     Other Topics Concern    Not on file     Social History Narrative    No narrative on file       History reviewed. No pertinent family history. Allergies:  Shrimp flavor    Home Medications:  Prior to Admission medications    Medication Sig Start Date End Date Taking?  Authorizing 10 9 - 17 mmol/L    GFR Non-African American >60 >60 mL/min    GFR African American >60 >60 mL/min    GFR Comment          GFR Staging NOT REPORTED    HCG Qualitative, Serum   Result Value Ref Range    hCG Qual NEGATIVE NEG       IMPRESSION: Patient evaluated by myself and the attending physician. Patient appears in no acute distress. Patient with edema circumferentially around the lower leg on the right slightly above her ankle down through her foot. Area is tender to touch. Warm and erythematous. Consistent with cellulitis. Slightly worsened yesterday but not significantly worse. Will repeat labs, get pregnancy test, and start antibiotics. Will plan for admission. Patient was both a Doppler yesterday but left prior to receiving this test to go home and take care of her children. We'll order venous Doppler to rule out DVT. RADIOLOGY:  None    EKG  None    All EKG's are interpreted by the Emergency Department Physician who either signs or Co-signs this chart in the absence of a cardiologist.    EMERGENCY DEPARTMENT COURSE:  Patient admitted to ETU. Antibiotics started. Labs with CBC showing a improved white count. Patient afebrile and not tachycardic. No concern for sepsis at this time. PROCEDURES:  None    CONSULTS:  PHARMACY TO DOSE VANCOMYCIN  PHARMACY TO DOSE VANCOMYCIN    CRITICAL CARE:  None    FINAL IMPRESSION      1.  Cellulitis of right lower extremity          DISPOSITION / PLAN     DISPOSITION Admitted 09/21/2018 02:50:56 AM      PATIENT REFERRED TO:  Baljinder Barton MD  [address] [place] [address] [address] [place] [address]  [phone]            DISCHARGE MEDICATIONS:  Current Discharge Medication List          Mike Jacinto DO  Emergency Medicine Resident    (Please note that portions of this note were completed with a voice recognition program.  Efforts were made to edit the dictations but occasionally words are mis-transcribed.)        Mike Jacinto DO  09/21/18 [id number]

## 2018-09-21 NOTE — ED PROVIDER NOTES
[facility]     Emergency Department     Faculty Attestation    I performed a history and physical examination of the patient and discussed management with the resident. I have reviewed and agree with the residents findings including all diagnostic interpretations, and treatment plans as written. Any areas of disagreement are noted on the chart. I was personally present for the key portions of any procedures. I have documented in the chart those procedures where I was not present during the key portions. I have reviewed the emergency nurses triage note. I agree with the chief complaint, past medical history, past surgical history, allergies, medications, social and family history as documented unless otherwise noted below. Documentation of the HPI, Physical Exam and Medical Decision Making performed by scribjulia is based on my personal performance of the HPI, PE and MDM. For Physician Assistant/ Nurse Practitioner cases/documentation I have personally evaluated this patient and have completed at least one if not all key elements of the E/M (history, physical exam, and MDM). Additional findings are as noted. 24 yo F return for r foot infection was supposed to be admitted, no fever pt left last night now agrees with admit,   pe gcs 15, erythema swelling dorsal r foot,     abx started, pt admitted for abx,       Pre-hypertension/Hypertension: The patient has been informed that they may have pre-hypertension or Hypertension based on a blood pressure reading in the emergency department. I recommend that the patient call the primary care provider listed on their discharge instructions or a physician of their choice this week to arrange follow up for further evaluation of possible pre-hypertension or Hypertension.       EKG Interpretation    Interpreted by me      CRITICAL CARE: There was a high probability of clinically significant/life threatening deterioration

## 2018-09-22 VITALS
WEIGHT: 121 LBS | RESPIRATION RATE: 16 BRPM | HEART RATE: 76 BPM | DIASTOLIC BLOOD PRESSURE: 73 MMHG | BODY MASS INDEX: 21.44 KG/M2 | OXYGEN SATURATION: 100 % | SYSTOLIC BLOOD PRESSURE: 118 MMHG | HEIGHT: 63 IN | TEMPERATURE: 97.7 F

## 2018-09-22 LAB
ABSOLUTE EOS #: 0.38 K/UL (ref 0–0.4)
ABSOLUTE IMMATURE GRANULOCYTE: 0 K/UL (ref 0–0.3)
ABSOLUTE LYMPH #: 5.07 K/UL (ref 1–4.8)
ABSOLUTE MONO #: 0.28 K/UL (ref 0.1–0.8)
ANION GAP SERPL CALCULATED.3IONS-SCNC: 11 MMOL/L (ref 9–17)
BASOPHILS # BLD: 0 % (ref 0–2)
BASOPHILS ABSOLUTE: 0 K/UL (ref 0–0.2)
BUN BLDV-MCNC: 7 MG/DL (ref 6–20)
BUN/CREAT BLD: ABNORMAL (ref 9–20)
CALCIUM SERPL-MCNC: 7.8 MG/DL (ref 8.6–10.4)
CHLORIDE BLD-SCNC: 102 MMOL/L (ref 98–107)
CO2: 21 MMOL/L (ref 20–31)
CREAT SERPL-MCNC: 0.9 MG/DL (ref 0.5–0.9)
DIFFERENTIAL TYPE: ABNORMAL
EOSINOPHILS RELATIVE PERCENT: 4 % (ref 1–4)
GFR AFRICAN AMERICAN: >60 ML/MIN
GFR NON-AFRICAN AMERICAN: >60 ML/MIN
GFR SERPL CREATININE-BSD FRML MDRD: ABNORMAL ML/MIN/{1.73_M2}
GFR SERPL CREATININE-BSD FRML MDRD: ABNORMAL ML/MIN/{1.73_M2}
GLUCOSE BLD-MCNC: 125 MG/DL (ref 70–99)
GLUCOSE BLD-MCNC: 137 MG/DL (ref 65–105)
GLUCOSE BLD-MCNC: 191 MG/DL (ref 65–105)
HCT VFR BLD CALC: 32.6 % (ref 36.3–47.1)
HEMOGLOBIN: 10.8 G/DL (ref 11.9–15.1)
IMMATURE GRANULOCYTES: 0 %
LYMPHOCYTES # BLD: 54 % (ref 24–44)
MCH RBC QN AUTO: 26.2 PG (ref 25.2–33.5)
MCHC RBC AUTO-ENTMCNC: 33.1 G/DL (ref 28.4–34.8)
MCV RBC AUTO: 79.1 FL (ref 82.6–102.9)
MONOCYTES # BLD: 3 % (ref 1–7)
MORPHOLOGY: ABNORMAL
NRBC AUTOMATED: 0 PER 100 WBC
PDW BLD-RTO: 15.6 % (ref 11.8–14.4)
PLATELET # BLD: 429 K/UL (ref 138–453)
PLATELET ESTIMATE: ABNORMAL
PMV BLD AUTO: 10 FL (ref 8.1–13.5)
POTASSIUM SERPL-SCNC: 3.8 MMOL/L (ref 3.7–5.3)
RBC # BLD: 4.12 M/UL (ref 3.95–5.11)
RBC # BLD: ABNORMAL 10*6/UL
SEG NEUTROPHILS: 39 % (ref 36–66)
SEGMENTED NEUTROPHILS ABSOLUTE COUNT: 3.67 K/UL (ref 1.8–7.7)
SODIUM BLD-SCNC: 134 MMOL/L (ref 135–144)
WBC # BLD: 9.4 K/UL (ref 3.5–11.3)
WBC # BLD: ABNORMAL 10*3/UL

## 2018-09-22 PROCEDURE — 80048 BASIC METABOLIC PNL TOTAL CA: CPT

## 2018-09-22 PROCEDURE — 96376 TX/PRO/DX INJ SAME DRUG ADON: CPT

## 2018-09-22 PROCEDURE — 6370000000 HC RX 637 (ALT 250 FOR IP): Performed by: EMERGENCY MEDICINE

## 2018-09-22 PROCEDURE — 36415 COLL VENOUS BLD VENIPUNCTURE: CPT

## 2018-09-22 PROCEDURE — 82947 ASSAY GLUCOSE BLOOD QUANT: CPT

## 2018-09-22 PROCEDURE — G0378 HOSPITAL OBSERVATION PER HR: HCPCS

## 2018-09-22 PROCEDURE — 6360000002 HC RX W HCPCS: Performed by: EMERGENCY MEDICINE

## 2018-09-22 PROCEDURE — 85025 COMPLETE CBC W/AUTO DIFF WBC: CPT

## 2018-09-22 PROCEDURE — 96366 THER/PROPH/DIAG IV INF ADDON: CPT

## 2018-09-22 PROCEDURE — 2580000003 HC RX 258: Performed by: STUDENT IN AN ORGANIZED HEALTH CARE EDUCATION/TRAINING PROGRAM

## 2018-09-22 RX ORDER — SULFAMETHOXAZOLE AND TRIMETHOPRIM 400; 80 MG/1; MG/1
1 TABLET ORAL 2 TIMES DAILY
Qty: 14 TABLET | Refills: 0 | Status: SHIPPED | OUTPATIENT
Start: 2018-09-22 | End: 2018-09-29

## 2018-09-22 RX ORDER — CEPHALEXIN 500 MG/1
500 CAPSULE ORAL 2 TIMES DAILY
Qty: 40 CAPSULE | Refills: 0 | Status: SHIPPED | OUTPATIENT
Start: 2018-09-22 | End: 2018-09-29

## 2018-09-22 RX ADMIN — ONDANSETRON HYDROCHLORIDE 4 MG: 2 INJECTION, SOLUTION INTRAMUSCULAR; INTRAVENOUS at 07:12

## 2018-09-22 RX ADMIN — SODIUM CHLORIDE, POTASSIUM CHLORIDE, SODIUM LACTATE AND CALCIUM CHLORIDE: 600; 310; 30; 20 INJECTION, SOLUTION INTRAVENOUS at 03:41

## 2018-09-22 RX ADMIN — VANCOMYCIN HYDROCHLORIDE 1000 MG: 1 INJECTION, SOLUTION INTRAVENOUS at 03:41

## 2018-09-22 RX ADMIN — INSULIN LISPRO 2 UNITS: 100 INJECTION, SOLUTION INTRAVENOUS; SUBCUTANEOUS at 13:50

## 2018-09-22 RX ADMIN — OXYCODONE HYDROCHLORIDE AND ACETAMINOPHEN 2 TABLET: 5; 325 TABLET ORAL at 03:41

## 2018-09-22 RX ADMIN — OXYCODONE HYDROCHLORIDE AND ACETAMINOPHEN 2 TABLET: 5; 325 TABLET ORAL at 11:23

## 2018-09-22 ASSESSMENT — PAIN DESCRIPTION - PAIN TYPE: TYPE: ACUTE PAIN

## 2018-09-22 ASSESSMENT — PAIN SCALES - GENERAL
PAINLEVEL_OUTOF10: 0
PAINLEVEL_OUTOF10: 7
PAINLEVEL_OUTOF10: 10

## 2018-09-22 ASSESSMENT — PAIN DESCRIPTION - LOCATION: LOCATION: LEG

## 2018-09-22 ASSESSMENT — PAIN DESCRIPTION - ORIENTATION: ORIENTATION: RIGHT

## 2018-09-22 ASSESSMENT — PAIN DESCRIPTION - DESCRIPTORS: DESCRIPTORS: ACHING

## 2018-09-22 NOTE — PLAN OF CARE
Problem: Infection:  Goal: Will remain free from infection  Will remain free from infection   Outcome: Ongoing      Problem: Pain:  Goal: Patient's pain/discomfort is manageable  Patient's pain/discomfort is manageable   Outcome: Ongoing    Goal: Pain level will decrease  Pain level will decrease   Outcome: Ongoing    Goal: Control of acute pain  Control of acute pain   Outcome: Ongoing    Goal: Control of chronic pain  Control of chronic pain   Outcome: Ongoing      Problem: Falls - Risk of:  Goal: Will remain free from falls  Will remain free from falls   Outcome: Ongoing    Goal: Absence of physical injury  Absence of physical injury   Outcome: Ongoing

## 2018-09-22 NOTE — PROGRESS NOTES
[facility]  CDU / OBSERVATION eNCOUnter  Attending NOte       I performed a history and physical examination of the patient and discussed management with the resident. I reviewed the residents note and agree with the documented findings and plan of care. Any areas of disagreement are noted on the chart. I was personally present for the key portions of any procedures. I have documented in the chart those procedures where I was not present during the key portions. I have reviewed the nurses notes. I agree with the chief complaint, past medical history, past surgical history, allergies, medications, social and family history as documented unless otherwise noted below. The Family history, social history, and ROS are effectively unchanged since admission unless noted elsewhere in the chart. Ongoing treatment for cellulitis. Patient has been seen by diabetic educator. Patient with hemoglobin A1c of 11.5 indicating poor control. Blood sugars here have been running high but patient not ketotic. Awaiting morning labs. We will need to begin discharge planning with finding patient primary care physician for follow-up.     Vomitting this morning but much improved swelling    Michelle Ding MD  Attending Emergency  Physician

## 2018-09-23 ENCOUNTER — CARE COORDINATION (OUTPATIENT)
Dept: CASE MANAGEMENT | Age: 23
End: 2018-09-23

## 2018-11-05 ENCOUNTER — HOSPITAL ENCOUNTER (EMERGENCY)
Age: 23
Discharge: HOME | End: 2018-11-05
Attending: EMERGENCY MEDICINE
Payer: COMMERCIAL

## 2018-11-05 VITALS
HEART RATE: 94 BPM | BODY MASS INDEX: 20.83 KG/M2 | WEIGHT: 122 LBS | HEIGHT: 64 IN | DIASTOLIC BLOOD PRESSURE: 94 MMHG | RESPIRATION RATE: 14 BRPM | OXYGEN SATURATION: 100 % | SYSTOLIC BLOOD PRESSURE: 141 MMHG | TEMPERATURE: 98.8 F

## 2018-11-05 DIAGNOSIS — J06.9 VIRAL URI WITH COUGH: Primary | ICD-10-CM

## 2018-11-05 PROCEDURE — G0382 LEV 3 HOSP TYPE B ED VISIT: HCPCS

## 2018-11-05 PROCEDURE — 6370000000 HC RX 637 (ALT 250 FOR IP): Performed by: STUDENT IN AN ORGANIZED HEALTH CARE EDUCATION/TRAINING PROGRAM

## 2018-11-05 RX ORDER — BENZONATATE 100 MG/1
100 CAPSULE ORAL 3 TIMES DAILY PRN
Qty: 21 CAPSULE | Refills: 0 | Status: SHIPPED | OUTPATIENT
Start: 2018-11-05 | End: 2018-11-12

## 2018-11-05 RX ORDER — BENZONATATE 100 MG/1
100 CAPSULE ORAL ONCE
Status: COMPLETED | OUTPATIENT
Start: 2018-11-05 | End: 2018-11-05

## 2018-11-05 RX ADMIN — BENZONATATE 100 MG: 100 CAPSULE ORAL at 21:06

## 2018-11-05 ASSESSMENT — ENCOUNTER SYMPTOMS
EYE DISCHARGE: 0
BACK PAIN: 0
EYE PAIN: 0
SORE THROAT: 0
VOMITING: 0
ABDOMINAL PAIN: 0
COUGH: 1
DOUBLE VISION: 0
SPUTUM PRODUCTION: 0
WHEEZING: 0
SHORTNESS OF BREATH: 0
NAUSEA: 0
HEARTBURN: 0
BLURRED VISION: 0

## 2018-11-05 ASSESSMENT — PAIN SCALES - GENERAL: PAINLEVEL_OUTOF10: 8

## 2018-11-05 ASSESSMENT — PAIN DESCRIPTION - PAIN TYPE: TYPE: ACUTE PAIN

## 2018-11-05 ASSESSMENT — PAIN DESCRIPTION - LOCATION: LOCATION: HEAD

## 2018-11-06 NOTE — ED PROVIDER NOTES
[facility] ED     Emergency Department     Faculty Attestation    I performed a history and physical examination of the patient and discussed management with the resident. I reviewed the residents note and agree with the documented findings and plan of care. Any areas of disagreement are noted on the chart. I was personally present for the key portions of any procedures. I have documented in the chart those procedures where I was not present during the key portions. I have reviewed the emergency nurses triage note. I agree with the chief complaint, past medical history, past surgical history, allergies, medications, social and family history as documented unless otherwise noted below. For Physician Assistant/ Nurse Practitioner cases/documentation I have personally evaluated this patient and have completed at least one if not all key elements of the E/M (history, physical exam, and MDM). Additional findings are as noted. URI, no fever, minimally productive cough. No chest pain. No vomiting no diarrhea. Multiple children at home sick with similar symptoms last week. On exam well-appearing watching TV. Throat clear except for cobblestoning. Lungs clear no wheezing or rhonchi. Heart normal.  Abdomen soft nontender.   Do feel has URI, we'll discharge home supportive care with [facility]     Almita Kearns MD, Teddi Gitelman  Attending Emergency  Physician             Deana Kearns MD  11/05/18 [id number]

## 2019-01-08 ENCOUNTER — OFFICE VISIT (OUTPATIENT)
Dept: FAMILY MEDICINE CLINIC | Age: 24
End: 2019-01-08
Payer: COMMERCIAL

## 2019-01-08 ENCOUNTER — HOSPITAL ENCOUNTER (OUTPATIENT)
Age: 24
Setting detail: SPECIMEN
Discharge: HOME | End: 2019-01-08
Payer: COMMERCIAL

## 2019-01-08 VITALS
SYSTOLIC BLOOD PRESSURE: 124 MMHG | RESPIRATION RATE: 16 BRPM | OXYGEN SATURATION: 99 % | HEIGHT: 65 IN | DIASTOLIC BLOOD PRESSURE: 78 MMHG | WEIGHT: 116 LBS | BODY MASS INDEX: 19.33 KG/M2 | TEMPERATURE: 98.4 F | HEART RATE: 71 BPM

## 2019-01-08 DIAGNOSIS — E10.42 TYPE 1 DIABETES MELLITUS WITH DIABETIC POLYNEUROPATHY (HCC): Primary | ICD-10-CM

## 2019-01-08 DIAGNOSIS — Z13.29 SCREENING FOR THYROID DISORDER: ICD-10-CM

## 2019-01-08 DIAGNOSIS — D64.9 ANEMIA, UNSPECIFIED TYPE: ICD-10-CM

## 2019-01-08 DIAGNOSIS — Z13.220 SCREENING FOR HYPERLIPIDEMIA: ICD-10-CM

## 2019-01-08 DIAGNOSIS — G25.81 RESTLESS LEG SYNDROME: ICD-10-CM

## 2019-01-08 DIAGNOSIS — B37.31 VULVOVAGINAL CANDIDIASIS: ICD-10-CM

## 2019-01-08 DIAGNOSIS — E10.42 TYPE 1 DIABETES MELLITUS WITH DIABETIC POLYNEUROPATHY (HCC): ICD-10-CM

## 2019-01-08 PROBLEM — L03.90 CELLULITIS: Status: RESOLVED | Noted: 2018-09-21 | Resolved: 2019-01-08

## 2019-01-08 LAB
ABSOLUTE EOS #: 0.16 K/UL (ref 0–0.44)
ABSOLUTE IMMATURE GRANULOCYTE: 0.04 K/UL (ref 0–0.3)
ABSOLUTE LYMPH #: 3.76 K/UL (ref 1.1–3.7)
ABSOLUTE MONO #: 0.45 K/UL (ref 0.1–1.2)
BASOPHILS # BLD: 1 % (ref 0–2)
BASOPHILS ABSOLUTE: 0.06 K/UL (ref 0–0.2)
CREATININE URINE: 30.8 MG/DL (ref 28–217)
DIFFERENTIAL TYPE: ABNORMAL
EOSINOPHILS RELATIVE PERCENT: 2 % (ref 1–4)
HBA1C MFR BLD: 10.1 %
HCT VFR BLD CALC: 36.6 % (ref 36.3–47.1)
HEMOGLOBIN: 12.1 G/DL (ref 11.9–15.1)
IMMATURE GRANULOCYTES: 0 %
LYMPHOCYTES # BLD: 40 % (ref 24–43)
MCH RBC QN AUTO: 24.9 PG (ref 25.2–33.5)
MCHC RBC AUTO-ENTMCNC: 33.1 G/DL (ref 28.4–34.8)
MCV RBC AUTO: 75.3 FL (ref 82.6–102.9)
MICROALBUMIN/CREAT 24H UR: 217 MG/L
MICROALBUMIN/CREAT UR-RTO: 705 MCG/MG CREAT
MONOCYTES # BLD: 5 % (ref 3–12)
NRBC AUTOMATED: 0 PER 100 WBC
PDW BLD-RTO: 16.2 % (ref 11.8–14.4)
PLATELET # BLD: 461 K/UL (ref 138–453)
PLATELET ESTIMATE: ABNORMAL
PMV BLD AUTO: 11.4 FL (ref 8.1–13.5)
RBC # BLD: 4.86 M/UL (ref 3.95–5.11)
RBC # BLD: ABNORMAL 10*6/UL
SEG NEUTROPHILS: 52 % (ref 36–65)
SEGMENTED NEUTROPHILS ABSOLUTE COUNT: 4.87 K/UL (ref 1.5–8.1)
WBC # BLD: 9.3 K/UL (ref 3.5–11.3)
WBC # BLD: ABNORMAL 10*3/UL

## 2019-01-08 PROCEDURE — 83036 HEMOGLOBIN GLYCOSYLATED A1C: CPT | Performed by: INTERNAL MEDICINE

## 2019-01-08 PROCEDURE — 99203 OFFICE O/P NEW LOW 30 MIN: CPT | Performed by: INTERNAL MEDICINE

## 2019-01-08 RX ORDER — GLUCOSAMINE HCL/CHONDROITIN SU 500-400 MG
CAPSULE ORAL
Qty: 50 STRIP | Refills: 6 | Status: SHIPPED | OUTPATIENT
Start: 2019-01-08 | End: 2020-02-11 | Stop reason: SDUPTHER

## 2019-01-08 RX ORDER — FLUCONAZOLE 100 MG/1
100 TABLET ORAL DAILY
Qty: 7 TABLET | Refills: 0 | Status: SHIPPED | OUTPATIENT
Start: 2019-01-08 | End: 2019-01-15

## 2019-01-08 RX ORDER — LANCETS 30 GAUGE
1 EACH MISCELLANEOUS 4 TIMES DAILY
Qty: 200 EACH | Refills: 5 | Status: SHIPPED | OUTPATIENT
Start: 2019-01-08 | End: 2020-02-11 | Stop reason: SDUPTHER

## 2019-01-08 RX ORDER — BLOOD SUGAR DIAGNOSTIC
200 STRIP MISCELLANEOUS 4 TIMES DAILY
Qty: 200 EACH | Refills: 5 | Status: SHIPPED | OUTPATIENT
Start: 2019-01-08 | End: 2020-02-11 | Stop reason: SDUPTHER

## 2019-01-08 ASSESSMENT — PATIENT HEALTH QUESTIONNAIRE - PHQ9
2. FEELING DOWN, DEPRESSED OR HOPELESS: 0
SUM OF ALL RESPONSES TO PHQ QUESTIONS 1-9: 0
SUM OF ALL RESPONSES TO PHQ QUESTIONS 1-9: 0
SUM OF ALL RESPONSES TO PHQ9 QUESTIONS 1 & 2: 0
1. LITTLE INTEREST OR PLEASURE IN DOING THINGS: 0

## 2019-01-09 ENCOUNTER — TELEPHONE (OUTPATIENT)
Dept: FAMILY MEDICINE CLINIC | Age: 24
End: 2019-01-09

## 2019-01-09 DIAGNOSIS — E61.1 IRON DEFICIENCY: Primary | ICD-10-CM

## 2019-01-09 LAB
ALBUMIN SERPL-MCNC: 3.1 G/DL (ref 3.5–5.2)
ALBUMIN/GLOBULIN RATIO: 0.8 (ref 1–2.5)
ALP BLD-CCNC: 115 U/L (ref 35–104)
ALT SERPL-CCNC: 10 U/L (ref 5–33)
ANION GAP SERPL CALCULATED.3IONS-SCNC: 12 MMOL/L (ref 9–17)
AST SERPL-CCNC: 14 U/L
BILIRUB SERPL-MCNC: <0.1 MG/DL (ref 0.3–1.2)
BUN BLDV-MCNC: 7 MG/DL (ref 6–20)
BUN/CREAT BLD: ABNORMAL (ref 9–20)
CALCIUM SERPL-MCNC: 8.9 MG/DL (ref 8.6–10.4)
CHLORIDE BLD-SCNC: 99 MMOL/L (ref 98–107)
CHOLESTEROL, FASTING: 216 MG/DL
CHOLESTEROL/HDL RATIO: 3.8
CO2: 23 MMOL/L (ref 20–31)
CREAT SERPL-MCNC: 0.96 MG/DL (ref 0.5–0.9)
FERRITIN: 7 UG/L (ref 13–150)
GFR AFRICAN AMERICAN: >60 ML/MIN
GFR NON-AFRICAN AMERICAN: >60 ML/MIN
GFR SERPL CREATININE-BSD FRML MDRD: ABNORMAL ML/MIN/{1.73_M2}
GFR SERPL CREATININE-BSD FRML MDRD: ABNORMAL ML/MIN/{1.73_M2}
GLUCOSE BLD-MCNC: 427 MG/DL (ref 70–99)
HDLC SERPL-MCNC: 57 MG/DL
IRON SATURATION: 8 % (ref 20–55)
IRON: 20 UG/DL (ref 37–145)
LDL CHOLESTEROL: 131 MG/DL (ref 0–130)
POTASSIUM SERPL-SCNC: 5.1 MMOL/L (ref 3.7–5.3)
SODIUM BLD-SCNC: 134 MMOL/L (ref 135–144)
TOTAL IRON BINDING CAPACITY: 248 UG/DL (ref 250–450)
TOTAL PROTEIN: 7.1 G/DL (ref 6.4–8.3)
TRIGLYCERIDE, FASTING: 140 MG/DL
TSH SERPL DL<=0.05 MIU/L-ACNC: 1.55 MIU/L (ref 0.3–5)
UNSATURATED IRON BINDING CAPACITY: 228 UG/DL (ref 112–347)
VLDLC SERPL CALC-MCNC: ABNORMAL MG/DL (ref 1–30)

## 2019-01-09 RX ORDER — LANOLIN ALCOHOL/MO/W.PET/CERES
325 CREAM (GRAM) TOPICAL
Qty: 270 TABLET | Refills: 1 | Status: SHIPPED
Start: 2019-01-09 | End: 2020-02-11 | Stop reason: CLARIF

## 2019-01-11 ENCOUNTER — TELEPHONE (OUTPATIENT)
Dept: FAMILY MEDICINE CLINIC | Age: 24
End: 2019-01-11

## 2019-01-11 DIAGNOSIS — E10.42 TYPE 1 DIABETES MELLITUS WITH DIABETIC POLYNEUROPATHY (HCC): Primary | ICD-10-CM

## 2019-01-25 ENCOUNTER — OFFICE VISIT (OUTPATIENT)
Dept: FAMILY MEDICINE CLINIC | Age: 24
End: 2019-01-25
Payer: COMMERCIAL

## 2019-01-25 VITALS
HEART RATE: 79 BPM | TEMPERATURE: 98.6 F | WEIGHT: 118.8 LBS | DIASTOLIC BLOOD PRESSURE: 78 MMHG | OXYGEN SATURATION: 100 % | SYSTOLIC BLOOD PRESSURE: 120 MMHG | BODY MASS INDEX: 19.77 KG/M2

## 2019-01-25 DIAGNOSIS — E10.42 TYPE 1 DIABETES MELLITUS WITH DIABETIC POLYNEUROPATHY (HCC): Primary | ICD-10-CM

## 2019-01-25 DIAGNOSIS — M72.2 PLANTAR FASCIITIS, BILATERAL: ICD-10-CM

## 2019-01-25 DIAGNOSIS — B35.1 ONYCHOMYCOSIS: ICD-10-CM

## 2019-01-25 DIAGNOSIS — Z30.018 HORMONAL CONTRACEPTIVE: ICD-10-CM

## 2019-01-25 DIAGNOSIS — R80.9 MICROALBUMINURIA DUE TO TYPE 1 DIABETES MELLITUS (HCC): ICD-10-CM

## 2019-01-25 DIAGNOSIS — E10.29 MICROALBUMINURIA DUE TO TYPE 1 DIABETES MELLITUS (HCC): ICD-10-CM

## 2019-01-25 PROCEDURE — 99214 OFFICE O/P EST MOD 30 MIN: CPT | Performed by: INTERNAL MEDICINE

## 2019-01-25 RX ORDER — KETOCONAZOLE 20 MG/G
CREAM TOPICAL
Qty: 30 G | Refills: 1 | Status: SHIPPED | OUTPATIENT
Start: 2019-01-25 | End: 2020-02-11 | Stop reason: SDUPTHER

## 2019-01-25 RX ORDER — LISINOPRIL 2.5 MG/1
2.5 TABLET ORAL DAILY
Qty: 90 TABLET | Refills: 1 | Status: SHIPPED | OUTPATIENT
Start: 2019-01-25 | End: 2020-02-11 | Stop reason: SDUPTHER

## 2019-01-25 RX ORDER — NORETHINDRONE ACETATE AND ETHINYL ESTRADIOL 1MG-20(21)
1 KIT ORAL DAILY
Qty: 1 PACKET | Refills: 3 | Status: SHIPPED
Start: 2019-01-25 | End: 2020-02-11 | Stop reason: CLARIF

## 2019-01-29 DIAGNOSIS — E10.42 TYPE 1 DIABETES MELLITUS WITH DIABETIC POLYNEUROPATHY (HCC): Primary | ICD-10-CM

## 2019-01-31 ENCOUNTER — TELEPHONE (OUTPATIENT)
Dept: FAMILY MEDICINE CLINIC | Age: 24
End: 2019-01-31

## 2019-02-01 DIAGNOSIS — E10.42 TYPE 1 DIABETES MELLITUS WITH DIABETIC POLYNEUROPATHY (HCC): Primary | ICD-10-CM

## 2019-02-19 DIAGNOSIS — E10.42 TYPE 1 DIABETES MELLITUS WITH DIABETIC POLYNEUROPATHY (HCC): ICD-10-CM

## 2019-04-17 ENCOUNTER — CARE COORDINATION (OUTPATIENT)
Dept: CARE COORDINATION | Age: 24
End: 2019-04-17

## 2019-04-17 NOTE — LETTER
4/17/2019    [place] [id number]      Dear Brigido Barron,    My name is Sheryle Bogus and I am a registered nurse who partners with Betina Coe MD to improve patients' health. Betina Coe MD believes you would benefit from working with me. As a member of your health care team, I would work with other providers involved in your care, offer education for your specific health conditions, and connect you with additional resources as needed. I will collaborate with Betina Coe MD to support you in following your treatment plan. The additional support I provide is no additional cost to you. My primary focus is to help you achieve specific goals and improve your health. We are committed to walk with you on this journey and look forward to working with you. Please call me to further discuss your healthcare needs. I am available by phone or for appointments at the office.   You can reach me at [phone]    In good health,     Sheryle Bogus, RN

## 2019-04-17 NOTE — CARE COORDINATION
Spoke with sister, asked she have [address] call me back at [phone] when she gets back from the store. Did not receive a call back. Mailed a letter. Will call again in a week. No future appointments.

## 2019-04-25 ENCOUNTER — CARE COORDINATION (OUTPATIENT)
Dept: CARE COORDINATION | Age: 24
End: 2019-04-25

## 2019-04-25 NOTE — CARE COORDINATION
Spoke with patient. She recently moved to [place] and will be finding a new physician soon. She is checking her blood sugars a couple of times a week, are under 200. She has enough medications and insulin to last a few weeks, encouraged her to call and get an appt soon before she runs out of medications. Will not enroll in care coordination.

## 2019-08-07 ENCOUNTER — TELEPHONE (OUTPATIENT)
Dept: PEDIATRIC CARDIOLOGY | Age: 24
End: 2019-08-07

## 2020-02-11 ENCOUNTER — HOSPITAL ENCOUNTER (OUTPATIENT)
Age: 25
Setting detail: SPECIMEN
Discharge: HOME | End: 2020-02-11
Payer: MEDICARE

## 2020-02-11 ENCOUNTER — OFFICE VISIT (OUTPATIENT)
Dept: FAMILY MEDICINE CLINIC | Age: 25
End: 2020-02-11

## 2020-02-11 VITALS
SYSTOLIC BLOOD PRESSURE: 142 MMHG | WEIGHT: 126.5 LBS | DIASTOLIC BLOOD PRESSURE: 95 MMHG | BODY MASS INDEX: 21.08 KG/M2 | HEART RATE: 89 BPM | OXYGEN SATURATION: 99 % | HEIGHT: 65 IN

## 2020-02-11 LAB
ABSOLUTE EOS #: 0.32 K/UL (ref 0–0.44)
ABSOLUTE IMMATURE GRANULOCYTE: <0.03 K/UL (ref 0–0.3)
ABSOLUTE LYMPH #: 2.72 K/UL (ref 1.1–3.7)
ABSOLUTE MONO #: 0.42 K/UL (ref 0.1–1.2)
ALBUMIN SERPL-MCNC: 3 G/DL (ref 3.5–5.2)
ALBUMIN/GLOBULIN RATIO: 0.8 (ref 1–2.5)
ALP BLD-CCNC: 90 U/L (ref 35–104)
ALT SERPL-CCNC: 9 U/L (ref 5–33)
ANION GAP SERPL CALCULATED.3IONS-SCNC: 13 MMOL/L (ref 9–17)
AST SERPL-CCNC: 13 U/L
BASOPHILS # BLD: 1 % (ref 0–2)
BASOPHILS ABSOLUTE: 0.06 K/UL (ref 0–0.2)
BILIRUB SERPL-MCNC: 0.22 MG/DL (ref 0.3–1.2)
BUN BLDV-MCNC: 7 MG/DL (ref 6–20)
BUN/CREAT BLD: ABNORMAL (ref 9–20)
CALCIUM SERPL-MCNC: 8.3 MG/DL (ref 8.6–10.4)
CHLORIDE BLD-SCNC: 105 MMOL/L (ref 98–107)
CHOLESTEROL, FASTING: 169 MG/DL
CHOLESTEROL/HDL RATIO: 2.7
CO2: 23 MMOL/L (ref 20–31)
CREAT SERPL-MCNC: 0.84 MG/DL (ref 0.5–0.9)
CREATININE URINE: 93.4 MG/DL (ref 28–217)
DIFFERENTIAL TYPE: ABNORMAL
EOSINOPHILS RELATIVE PERCENT: 5 % (ref 1–4)
GFR AFRICAN AMERICAN: >60 ML/MIN
GFR NON-AFRICAN AMERICAN: >60 ML/MIN
GFR SERPL CREATININE-BSD FRML MDRD: ABNORMAL ML/MIN/{1.73_M2}
GFR SERPL CREATININE-BSD FRML MDRD: ABNORMAL ML/MIN/{1.73_M2}
GLUCOSE BLD-MCNC: 177 MG/DL (ref 70–99)
HBA1C MFR BLD: 7.6 %
HCT VFR BLD CALC: 30.9 % (ref 36.3–47.1)
HDLC SERPL-MCNC: 63 MG/DL
HEMOGLOBIN: 10.3 G/DL (ref 11.9–15.1)
IMMATURE GRANULOCYTES: 0 %
LDL CHOLESTEROL: 84 MG/DL (ref 0–130)
LYMPHOCYTES # BLD: 39 % (ref 24–43)
MCH RBC QN AUTO: 27.6 PG (ref 25.2–33.5)
MCHC RBC AUTO-ENTMCNC: 33.3 G/DL (ref 28.4–34.8)
MCV RBC AUTO: 82.8 FL (ref 82.6–102.9)
MICROALBUMIN/CREAT 24H UR: 477 MG/L
MICROALBUMIN/CREAT UR-RTO: 511 MCG/MG CREAT
MONOCYTES # BLD: 6 % (ref 3–12)
NRBC AUTOMATED: 0 PER 100 WBC
PDW BLD-RTO: 18.6 % (ref 11.8–14.4)
PLATELET # BLD: 560 K/UL (ref 138–453)
PLATELET ESTIMATE: ABNORMAL
PMV BLD AUTO: 10.6 FL (ref 8.1–13.5)
POTASSIUM SERPL-SCNC: 4.8 MMOL/L (ref 3.7–5.3)
RBC # BLD: 3.73 M/UL (ref 3.95–5.11)
RBC # BLD: ABNORMAL 10*6/UL
SEG NEUTROPHILS: 49 % (ref 36–65)
SEGMENTED NEUTROPHILS ABSOLUTE COUNT: 3.36 K/UL (ref 1.5–8.1)
SODIUM BLD-SCNC: 141 MMOL/L (ref 135–144)
TOTAL PROTEIN: 7 G/DL (ref 6.4–8.3)
TRIGLYCERIDE, FASTING: 111 MG/DL
TSH SERPL DL<=0.05 MIU/L-ACNC: 1.01 MIU/L (ref 0.3–5)
VLDLC SERPL CALC-MCNC: NORMAL MG/DL (ref 1–30)
WBC # BLD: 6.9 K/UL (ref 3.5–11.3)
WBC # BLD: ABNORMAL 10*3/UL

## 2020-02-11 PROCEDURE — 83036 HEMOGLOBIN GLYCOSYLATED A1C: CPT | Performed by: INTERNAL MEDICINE

## 2020-02-11 PROCEDURE — 3051F HG A1C>EQUAL 7.0%<8.0%: CPT | Performed by: INTERNAL MEDICINE

## 2020-02-11 PROCEDURE — 99214 OFFICE O/P EST MOD 30 MIN: CPT | Performed by: INTERNAL MEDICINE

## 2020-02-11 RX ORDER — BLOOD SUGAR DIAGNOSTIC
200 STRIP MISCELLANEOUS 4 TIMES DAILY
Qty: 200 EACH | Refills: 5 | Status: SHIPPED | OUTPATIENT
Start: 2020-02-11 | End: 2020-02-18

## 2020-02-11 RX ORDER — LANCETS 30 GAUGE
1 EACH MISCELLANEOUS 4 TIMES DAILY
Qty: 200 EACH | Refills: 5 | Status: SHIPPED | OUTPATIENT
Start: 2020-02-11 | End: 2020-03-08 | Stop reason: SDUPTHER

## 2020-02-11 RX ORDER — KETOCONAZOLE 20 MG/G
CREAM TOPICAL
Qty: 30 G | Refills: 1 | Status: SHIPPED | OUTPATIENT
Start: 2020-02-11 | End: 2020-08-20 | Stop reason: SDUPTHER

## 2020-02-11 RX ORDER — GLUCOSAMINE HCL/CHONDROITIN SU 500-400 MG
CAPSULE ORAL
Qty: 50 STRIP | Refills: 6 | Status: SHIPPED | OUTPATIENT
Start: 2020-02-11 | End: 2020-03-08 | Stop reason: SDUPTHER

## 2020-02-11 RX ORDER — LISINOPRIL 2.5 MG/1
2.5 TABLET ORAL DAILY
Qty: 90 TABLET | Refills: 1 | Status: SHIPPED | OUTPATIENT
Start: 2020-02-11 | End: 2020-08-20 | Stop reason: SDUPTHER

## 2020-02-11 ASSESSMENT — PATIENT HEALTH QUESTIONNAIRE - PHQ9
1. LITTLE INTEREST OR PLEASURE IN DOING THINGS: 0
SUM OF ALL RESPONSES TO PHQ QUESTIONS 1-9: 0
2. FEELING DOWN, DEPRESSED OR HOPELESS: 0
SUM OF ALL RESPONSES TO PHQ9 QUESTIONS 1 & 2: 0
SUM OF ALL RESPONSES TO PHQ QUESTIONS 1-9: 0

## 2020-02-11 NOTE — PROGRESS NOTES
Subjective:       Patient ID: Anthony Temple is a 30 y.o. female who presents for   Chief Complaint   Patient presents with    Diabetes       HPI:  Nursing note reviewed and discussed with patient. Here to follow-up  Moved since last visit to [place] [address], moved back to [place] two weeks ago   Sugars 115-375 in the last week   She ran out of her meds two days ago. Continues to have paresthesias in her feet, not sure she wants to take anything for it   She was in the hospital in [place] [address] for LLE swelling, she states was possibly diagnosed with blood clots but has not been treated with anticoagulants. She is not sure what that means. Patient's medications, allergies, past medical, surgical, social and family histories were reviewed and updated as appropriate. Social History     Tobacco Use    Smoking status: Current Every Day Smoker     Types: Cigarettes    Smokeless tobacco: Never Used    Tobacco comment: 5/day   Substance Use Topics    Alcohol use: No        Review of Systems  Energy level good overall, and weight is stable. No chest pain or shortness of breath. Bowels have been normal without constipation or diarrhea         Objective:        Physical Exam:  BP (!) 142/95 (Site: Left Upper Arm, Position: Sitting, Cuff Size: Medium Adult)   Pulse 89   Ht 5' 5\" (1.651 m)   Wt 126 lb 8 oz (57.4 kg)   LMP 02/03/2020   SpO2 99%   BMI 21.05 kg/m²     General: Alert and oriented, in no distress. Patient ambulating with normal gait. Normal body habitus. Chest: clear with no wheezes or rales. No retractions, or use of accessory muscles noted. Cardiovascular: PMI is not displaced, and no thrill noted. Regular rate and rhythm with no rub, murmur or gallop. There is no peripheral edema. Pedal pulses are normal.   Abdomen: Abdomen is soft and nontender. The bowel sounds are normal.   Musculoskeletal: There are no deformities of the the extremities. Patient has all ten fingers intact.  The patient has full range of motion on all 4 extremities without pain. Skin: The skin is warm and dry. There are no rashes noted. Prior to Visit Medications    Medication Sig Taking? Authorizing Provider   insulin aspart (NOVOLOG FLEXPEN) 100 UNIT/ML injection pen Inject 2-12 units subcutaneously TID based on sliding scale Yes Emperatriz Mcgill MD   insulin glargine (BASAGLAR KWIKPEN) 100 UNIT/ML injection pen Inject 12 Units into the skin nightly Yes Vernon Ryan MD   lisinopril (PRINIVIL;ZESTRIL) 2.5 MG tablet Take 1 tablet by mouth daily Yes Vernon Ryan MD   ketoconazole (NIZORAL) 2 % cream Apply topically daily. Yes Emperatriz Mcgill MD   Insulin Pen Needle ([employer] PEN NEEDLES 31G) 31G X 8 MM MISC 1 each by Does not apply route daily Yes Vernon Ryan MD   Lancets MISC 1 each by Does not apply route 4 times daily Yes Vernon Ryan MD   Alcohol Swabs (ALCOHOL PADS) 70 % PADS 200 Units by Does not apply route 4 times daily Yes Vernon Ryan MD   blood glucose monitor strips Check blood sugars four times daily as directed. Vernon Ryan MD       Data Review    Lab Results   Component Value Date    LABA1C 7.6 02/11/2020     Lab Results   Component Value Date     09/21/2018          Assessment/Plan:      1. Type 1 diabetes mellitus with diabetic polyneuropathy (HCC)  - POCT glycosylated hemoglobin (Hb A1C)  - insulin aspart (NOVOLOG FLEXPEN) 100 UNIT/ML injection pen; Inject 2-12 units subcutaneously TID based on sliding scale  Dispense: 2 pen; Refill: 5  - insulin glargine (BASAGLAR KWIKPEN) 100 UNIT/ML injection pen; Inject 12 Units into the skin nightly  Dispense: 2 pen; Refill: 5  - Alcohol Swabs (ALCOHOL PADS) 70 % PADS; 200 Units by Does not apply route 4 times daily  Dispense: 200 each; Refill: 5  - blood glucose monitor strips; Check blood sugars four times daily as directed. Dispense: 50 strip;  Refill: 6  - Insulin Pen Needle ([employer] PEN NEEDLES 31G) 31G X 8 MM MISC; 1 each by Does not apply route daily  Dispense: 100 each; Refill: 3  - Lancets MISC; 1 each by Does not apply route 4 times daily  Dispense: 200 each; Refill: 5  - Microalbumin / Creatinine Urine Ratio; Future  - blood glucose monitor kit and supplies; use check blood sugar as directed  Dispense: 1 kit; Refill: 0    2. Onychomycosis  - ketoconazole (NIZORAL) 2 % cream; Apply topically daily. Dispense: 30 g; Refill: 1    3. Microalbuminuria due to type 1 diabetes mellitus (HCC)  - lisinopril (PRINIVIL;ZESTRIL) 2.5 MG tablet; Take 1 tablet by mouth daily  Dispense: 90 tablet; Refill: 1    4. Screening for hyperlipidemia  - Lipid, Fasting; Future  - Comprehensive Metabolic Panel; Future    5. Elevated blood pressure reading  Repeat BP lower today     6. Screening for deficiency anemia  - CBC With Auto Differential; Future    7.  Screening for thyroid disorder  - TSH With Reflex Ft4; Future           Electronically signed by Anuja Snider MD on 2/11/2020 at 10:12 AM

## 2020-02-18 RX ORDER — BLOOD SUGAR DIAGNOSTIC
1 STRIP MISCELLANEOUS 4 TIMES DAILY
Qty: 200 EACH | Refills: 5 | Status: SHIPPED | OUTPATIENT
Start: 2020-02-18 | End: 2020-08-20 | Stop reason: SDUPTHER

## 2020-03-08 ENCOUNTER — HOSPITAL ENCOUNTER (EMERGENCY)
Age: 25
Discharge: HOME | End: 2020-03-08
Attending: EMERGENCY MEDICINE
Payer: MEDICARE

## 2020-03-08 VITALS
SYSTOLIC BLOOD PRESSURE: 136 MMHG | WEIGHT: 135 LBS | BODY MASS INDEX: 22.47 KG/M2 | TEMPERATURE: 98.4 F | RESPIRATION RATE: 14 BRPM | OXYGEN SATURATION: 100 % | DIASTOLIC BLOOD PRESSURE: 84 MMHG | HEART RATE: 90 BPM

## 2020-03-08 LAB — GLUCOSE BLD-MCNC: 184 MG/DL (ref 65–105)

## 2020-03-08 PROCEDURE — 82947 ASSAY GLUCOSE BLOOD QUANT: CPT

## 2020-03-08 PROCEDURE — 99283 EMERGENCY DEPT VISIT LOW MDM: CPT

## 2020-03-08 RX ORDER — PEN NEEDLE, DIABETIC 31 GX5/16"
1 NEEDLE, DISPOSABLE MISCELLANEOUS DAILY
Qty: 100 EACH | Refills: 0 | Status: SHIPPED | OUTPATIENT
Start: 2020-03-08 | End: 2020-08-20 | Stop reason: SDUPTHER

## 2020-03-08 RX ORDER — INSULIN ASPART 100 [IU]/ML
INJECTION, SOLUTION INTRAVENOUS; SUBCUTANEOUS
Qty: 2 PEN | Refills: 1 | Status: SHIPPED | OUTPATIENT
Start: 2020-03-08 | End: 2020-08-20 | Stop reason: SDUPTHER

## 2020-03-08 RX ORDER — GLUCOSAMINE HCL/CHONDROITIN SU 500-400 MG
CAPSULE ORAL
Qty: 50 STRIP | Refills: 0 | Status: SHIPPED | OUTPATIENT
Start: 2020-03-08 | End: 2020-08-20 | Stop reason: SDUPTHER

## 2020-03-08 RX ORDER — INSULIN GLARGINE 100 [IU]/ML
12 INJECTION, SOLUTION SUBCUTANEOUS NIGHTLY
Qty: 2 PEN | Refills: 1 | Status: SHIPPED | OUTPATIENT
Start: 2020-03-08 | End: 2020-08-20 | Stop reason: SDUPTHER

## 2020-03-08 RX ORDER — LANCETS 30 GAUGE
1 EACH MISCELLANEOUS 4 TIMES DAILY
Qty: 100 EACH | Refills: 0 | Status: SHIPPED | OUTPATIENT
Start: 2020-03-08 | End: 2020-08-20 | Stop reason: SDUPTHER

## 2020-03-08 ASSESSMENT — PAIN DESCRIPTION - LOCATION: LOCATION: LEG;KNEE

## 2020-03-08 ASSESSMENT — PAIN DESCRIPTION - DESCRIPTORS: DESCRIPTORS: ACHING

## 2020-03-08 ASSESSMENT — PAIN DESCRIPTION - ONSET: ONSET: ON-GOING

## 2020-03-08 ASSESSMENT — PAIN DESCRIPTION - ORIENTATION: ORIENTATION: LEFT

## 2020-03-08 ASSESSMENT — PAIN DESCRIPTION - FREQUENCY: FREQUENCY: CONTINUOUS

## 2020-03-08 ASSESSMENT — PAIN DESCRIPTION - PROGRESSION: CLINICAL_PROGRESSION: NOT CHANGED

## 2020-03-08 ASSESSMENT — PAIN DESCRIPTION - PAIN TYPE: TYPE: CHRONIC PAIN

## 2020-03-08 NOTE — ED PROVIDER NOTES
[facility]  Emergency Department Encounter  Emergency Medicine Resident     Pt Name: Storm Lucio  MRN: [id number]  [place] 1995  Date of evaluation: 3/8/20  PCP:  Lisa Navarro MD    CHIEF COMPLAINT       Chief Complaint   Patient presents with    Other     \"No sugar medicine in 4 month\"       HISTORY OF PRESENT ILLNESS  (Location/Symptom, Timing/Onset, Context/Setting, Quality, Duration, Modifying Factors, Severity.)    Storm Lucio is a 30 y.o. female who presents with concern that she is out of her medications. Moved here from [place] and has established care at a point place however she has been unable to fill her prescription due to issues with her Medicare. She has a past medical history of hypertension and diabetes. She has not taken her insulin in approximately 4 months. She does not feel lightheaded palpitations chest pain shortness of breath nausea vomiting diarrhea weakness numbness increasing neuropathy lightheadedness dizziness or have any other complaints. Patient states that she is unable to fill her prescription because she has Medicare/Medicaid issues and was told that she needs to go back to [place] to obtain paperwork and she is not able to do this. PAST MEDICAL / SURGICAL / SOCIAL / FAMILY HISTORY    has a past medical history of Diabetes mellitus ([address] and MRSA (methicillin resistant staph aureus) culture positive. has a past surgical history that includes  section.     Social History     Socioeconomic History    Marital status: Single     Spouse name: Not on file    Number of children: Not on file    Years of education: Not on file    Highest education level: Not on file   Occupational History    Not on file   Social Needs    Financial resource strain: Not on file    Food insecurity     Worry: Not on file     Inability: Not on file    Transportation needs     Medical: Not on file     Non-medical: Not on file Tobacco Use    Smoking status: Current Every Day Smoker     Types: Cigarettes    Smokeless tobacco: Never Used    Tobacco comment: 5/day   Substance and Sexual Activity    Alcohol use: No    Drug use: No    Sexual activity: Never   Lifestyle    Physical activity     Days per week: Not on file     Minutes per session: Not on file    Stress: Not on file   Relationships    Social connections     Talks on phone: Not on file     Gets together: Not on file     Attends Congregation service: Not on file     Active member of club or organization: Not on file     Attends meetings of clubs or organizations: Not on file     Relationship status: Not on file    Intimate partner violence     Fear of current or ex partner: Not on file     Emotionally abused: Not on file     Physically abused: Not on file     Forced sexual activity: Not on file   Other Topics Concern    Not on file   Social History Narrative    Not on file       History reviewed. No pertinent family history. Allergies:    Shrimp flavor    Home Medications:  Prior to Admission medications    Medication Sig Start Date End Date Taking? Authorizing Provider   insulin aspart (NOVOLOG FLEXPEN) 100 UNIT/ML injection pen Inject 2-12 units subcutaneously TID based on sliding scale 3/8/20  Yes Austyn Hidalgo, DO   insulin glargine [facility] 100 UNIT/ML injection pen Inject 12 Units into the skin nightly 3/8/20  Yes Rafael Arriaga, DO   blood glucose monitor kit and supplies use check blood sugar as directed 3/8/20 3/8/21 Yes Austyn Hidalgo, DO   blood glucose monitor strips Check blood sugars four times daily as directed.  3/8/20 3/8/21 Yes Austyn Damian, DO   Lancets MISC 1 each by Does not apply route 4 times daily 3/8/20  Yes Austyn Hidalgo, DO   Insulin Pen Needle ([employer] PEN NEEDLES 31G) 31G X 8 MM MISC 1 each by Does not apply route daily 3/8/20  Yes Rafael Arriaga, DO   Alcohol Swabs (ALCOHOL PADS) 70 % PADS Apply 1 each topically 4 times daily 2/18/20   Emperatriz Potts MD   ketoconazole (NIZORAL) 2 % cream Apply topically daily. 2/11/20   Emperatriz Potts MD   lisinopril (PRINIVIL;ZESTRIL) 2.5 MG tablet Take 1 tablet by mouth daily 2/11/20   Emperatriz Potts MD       REVIEW OF SYSTEMS    (2-9 systems for level 4, 10 or more for level 5)      Constitutional : - fever , - chills, - weight change  HENT: - hearing loss , - rhinorrhea , - tinnitus , - trouble swallowing , - voice change  Eyes: - photophobia , - visual disturbance  Resp: - cough , - shortness of breath   Cardio: - chest pain , - leg swelling , - palpitations  GI: - nausea, - vomiting, - abd pain , - black/bloody BMs, - constipation , - diarrhea   Endocrine: - heat intolerance , - cold intolerance  : - dysuria, - frequency, - hematuria   MSK: - back pain , - neck pain   ALG: - food allergies  Neuro: - dizziness ,  - headache, - weakness, - syncope  Skin: - wound , - rash   Heme: - bruise easily  Psych: - agitation , - confusion      PHYSICAL EXAM   (up to 7 for level 4, 8 or more for level 5)     INITIAL VITALS:  weight is 135 lb (61.2 kg). Her oral temperature is 98.4 °F (36.9 °C). Her blood pressure is 136/84 and her pulse is 90. Her respiration is 14 and oxygen saturation is 100%.      Physical Exam  GENERAL: Not in acute distress, well developed, not diaphoretic  HENT: normocephalic, nose nml  EYES: No sclearal icterus, no occular discharge  NECK: No JVD, trachea midline  PULM: Effort normal, no audible wheezing or stridor  NEURO: Alert, awake, responsive      DIFFERENTIAL  DIAGNOSIS/ MDM   PLAN (LABS / IMAGING / EKG):  Orders Placed This Encounter   Procedures    Inpatient consult to Social Work    GLUCOSE, WHOLE BLOOD    POCT Glucose       MEDICATIONS ORDERED:  Orders Placed This Encounter   Medications    insulin aspart (NOVOLOG FLEXPEN) 100 UNIT/ML injection pen     Sig: Inject 2-12 units subcutaneously TID based on sliding scale     Dispense:  2 pen     Refill:  1    insulin

## 2020-05-21 ENCOUNTER — HOSPITAL ENCOUNTER (INPATIENT)
Age: 25
LOS: 1 days | Discharge: HOME | DRG: 690 | End: 2020-05-22
Attending: EMERGENCY MEDICINE | Admitting: EMERGENCY MEDICINE
Payer: MEDICARE

## 2020-05-21 ENCOUNTER — APPOINTMENT (OUTPATIENT)
Dept: CT IMAGING | Age: 25
DRG: 690 | End: 2020-05-21
Payer: MEDICARE

## 2020-05-21 ENCOUNTER — APPOINTMENT (OUTPATIENT)
Dept: ULTRASOUND IMAGING | Age: 25
DRG: 690 | End: 2020-05-21
Payer: MEDICARE

## 2020-05-21 PROBLEM — R10.9 INTRACTABLE ABDOMINAL PAIN: Status: ACTIVE | Noted: 2020-05-21

## 2020-05-21 PROBLEM — R11.2 INTRACTABLE VOMITING WITH NAUSEA: Status: ACTIVE | Noted: 2020-05-21

## 2020-05-21 LAB
-: ABNORMAL
ABSOLUTE EOS #: 0.33 K/UL (ref 0–0.44)
ABSOLUTE IMMATURE GRANULOCYTE: 0.06 K/UL (ref 0–0.3)
ABSOLUTE LYMPH #: 4.78 K/UL (ref 1.1–3.7)
ABSOLUTE MONO #: 0.9 K/UL (ref 0.1–1.2)
ALBUMIN SERPL-MCNC: 2.9 G/DL (ref 3.5–5.2)
ALBUMIN/GLOBULIN RATIO: 0.7 (ref 1–2.5)
ALP BLD-CCNC: 112 U/L (ref 35–104)
ALT SERPL-CCNC: 9 U/L (ref 5–33)
AMORPHOUS: ABNORMAL
AMPHETAMINE SCREEN URINE: NEGATIVE
ANION GAP SERPL CALCULATED.3IONS-SCNC: 14 MMOL/L (ref 9–17)
AST SERPL-CCNC: 13 U/L
BACTERIA: ABNORMAL
BARBITURATE SCREEN URINE: NEGATIVE
BASOPHILS # BLD: 1 % (ref 0–2)
BASOPHILS ABSOLUTE: 0.06 K/UL (ref 0–0.2)
BENZODIAZEPINE SCREEN, URINE: NEGATIVE
BETA-HYDROXYBUTYRATE: 0.03 MMOL/L (ref 0.02–0.27)
BILIRUB SERPL-MCNC: 0.17 MG/DL (ref 0.3–1.2)
BILIRUBIN URINE: NEGATIVE
BUN BLDV-MCNC: 6 MG/DL (ref 6–20)
BUN/CREAT BLD: ABNORMAL (ref 9–20)
BUPRENORPHINE URINE: ABNORMAL
CALCIUM SERPL-MCNC: 8.1 MG/DL (ref 8.6–10.4)
CANNABINOID SCREEN URINE: POSITIVE
CASTS UA: ABNORMAL /LPF (ref 0–8)
CHLORIDE BLD-SCNC: 103 MMOL/L (ref 98–107)
CHP ED QC CHECK: NORMAL
CO2: 21 MMOL/L (ref 20–31)
COCAINE METABOLITE, URINE: NEGATIVE
COLOR: YELLOW
COMMENT UA: ABNORMAL
CREAT SERPL-MCNC: 0.85 MG/DL (ref 0.5–0.9)
CRYSTALS, UA: ABNORMAL /HPF
DIFFERENTIAL TYPE: ABNORMAL
EOSINOPHILS RELATIVE PERCENT: 3 % (ref 1–4)
EPITHELIAL CELLS UA: ABNORMAL /HPF (ref 0–5)
GFR AFRICAN AMERICAN: >60 ML/MIN
GFR NON-AFRICAN AMERICAN: >60 ML/MIN
GFR SERPL CREATININE-BSD FRML MDRD: ABNORMAL ML/MIN/{1.73_M2}
GFR SERPL CREATININE-BSD FRML MDRD: ABNORMAL ML/MIN/{1.73_M2}
GLUCOSE BLD-MCNC: 126 MG/DL (ref 65–105)
GLUCOSE BLD-MCNC: 276 MG/DL
GLUCOSE BLD-MCNC: 276 MG/DL (ref 65–105)
GLUCOSE BLD-MCNC: 291 MG/DL (ref 70–99)
GLUCOSE BLD-MCNC: 311 MG/DL (ref 65–105)
GLUCOSE URINE: ABNORMAL
HCG QUALITATIVE: NEGATIVE
HCT VFR BLD CALC: 34.2 % (ref 36.3–47.1)
HEMOGLOBIN: 12.1 G/DL (ref 11.9–15.1)
IMMATURE GRANULOCYTES: 1 %
KETONES, URINE: NEGATIVE
LEUKOCYTE ESTERASE, URINE: ABNORMAL
LIPASE: 27 U/L (ref 13–60)
LYMPHOCYTES # BLD: 41 % (ref 24–43)
MCH RBC QN AUTO: 28.1 PG (ref 25.2–33.5)
MCHC RBC AUTO-ENTMCNC: 35.4 G/DL (ref 28.4–34.8)
MCV RBC AUTO: 79.4 FL (ref 82.6–102.9)
MDMA URINE: ABNORMAL
METHADONE SCREEN, URINE: NEGATIVE
METHAMPHETAMINE, URINE: ABNORMAL
MONOCYTES # BLD: 8 % (ref 3–12)
MUCUS: ABNORMAL
NITRITE, URINE: NEGATIVE
NRBC AUTOMATED: 0 PER 100 WBC
OPIATES, URINE: NEGATIVE
OTHER OBSERVATIONS UA: ABNORMAL
OXYCODONE SCREEN URINE: NEGATIVE
PDW BLD-RTO: 18.6 % (ref 11.8–14.4)
PH UA: 8 (ref 5–8)
PHENCYCLIDINE, URINE: NEGATIVE
PLATELET # BLD: 426 K/UL (ref 138–453)
PLATELET ESTIMATE: ABNORMAL
PMV BLD AUTO: 10.3 FL (ref 8.1–13.5)
POTASSIUM SERPL-SCNC: 4.3 MMOL/L (ref 3.7–5.3)
PROPOXYPHENE, URINE: ABNORMAL
PROTEIN UA: ABNORMAL
RBC # BLD: 4.31 M/UL (ref 3.95–5.11)
RBC # BLD: ABNORMAL 10*6/UL
RBC UA: ABNORMAL /HPF (ref 0–4)
RENAL EPITHELIAL, UA: ABNORMAL /HPF
SEG NEUTROPHILS: 46 % (ref 36–65)
SEGMENTED NEUTROPHILS ABSOLUTE COUNT: 5.56 K/UL (ref 1.5–8.1)
SODIUM BLD-SCNC: 138 MMOL/L (ref 135–144)
SPECIFIC GRAVITY UA: 1.01 (ref 1–1.03)
TEST INFORMATION: ABNORMAL
TOTAL PROTEIN: 7.3 G/DL (ref 6.4–8.3)
TRICHOMONAS: ABNORMAL
TRICYCLIC ANTIDEPRESSANTS, UR: ABNORMAL
TURBIDITY: CLEAR
URINE HGB: ABNORMAL
UROBILINOGEN, URINE: NORMAL
WBC # BLD: 11.7 K/UL (ref 3.5–11.3)
WBC # BLD: ABNORMAL 10*3/UL
WBC UA: ABNORMAL /HPF (ref 0–5)
YEAST: ABNORMAL

## 2020-05-21 PROCEDURE — 2500000003 HC RX 250 WO HCPCS: Performed by: STUDENT IN AN ORGANIZED HEALTH CARE EDUCATION/TRAINING PROGRAM

## 2020-05-21 PROCEDURE — 6360000002 HC RX W HCPCS: Performed by: STUDENT IN AN ORGANIZED HEALTH CARE EDUCATION/TRAINING PROGRAM

## 2020-05-21 PROCEDURE — 1200000000 HC SEMI PRIVATE

## 2020-05-21 PROCEDURE — 6360000004 HC RX CONTRAST MEDICATION: Performed by: STUDENT IN AN ORGANIZED HEALTH CARE EDUCATION/TRAINING PROGRAM

## 2020-05-21 PROCEDURE — 80053 COMPREHEN METABOLIC PANEL: CPT

## 2020-05-21 PROCEDURE — 74176 CT ABD & PELVIS W/O CONTRAST: CPT

## 2020-05-21 PROCEDURE — 82010 KETONE BODYS QUAN: CPT

## 2020-05-21 PROCEDURE — 81001 URINALYSIS AUTO W/SCOPE: CPT

## 2020-05-21 PROCEDURE — 6370000000 HC RX 637 (ALT 250 FOR IP): Performed by: STUDENT IN AN ORGANIZED HEALTH CARE EDUCATION/TRAINING PROGRAM

## 2020-05-21 PROCEDURE — 96374 THER/PROPH/DIAG INJ IV PUSH: CPT

## 2020-05-21 PROCEDURE — 2580000003 HC RX 258: Performed by: STUDENT IN AN ORGANIZED HEALTH CARE EDUCATION/TRAINING PROGRAM

## 2020-05-21 PROCEDURE — 76856 US EXAM PELVIC COMPLETE: CPT

## 2020-05-21 PROCEDURE — 2580000003 HC RX 258: Performed by: EMERGENCY MEDICINE

## 2020-05-21 PROCEDURE — 96372 THER/PROPH/DIAG INJ SC/IM: CPT

## 2020-05-21 PROCEDURE — 82947 ASSAY GLUCOSE BLOOD QUANT: CPT

## 2020-05-21 PROCEDURE — 85025 COMPLETE CBC W/AUTO DIFF WBC: CPT

## 2020-05-21 PROCEDURE — 96375 TX/PRO/DX INJ NEW DRUG ADDON: CPT

## 2020-05-21 PROCEDURE — 93976 VASCULAR STUDY: CPT

## 2020-05-21 PROCEDURE — 84703 CHORIONIC GONADOTROPIN ASSAY: CPT

## 2020-05-21 PROCEDURE — 6360000002 HC RX W HCPCS: Performed by: EMERGENCY MEDICINE

## 2020-05-21 PROCEDURE — 76937 US GUIDE VASCULAR ACCESS: CPT

## 2020-05-21 PROCEDURE — 99285 EMERGENCY DEPT VISIT HI MDM: CPT

## 2020-05-21 PROCEDURE — 80307 DRUG TEST PRSMV CHEM ANLYZR: CPT

## 2020-05-21 PROCEDURE — 96361 HYDRATE IV INFUSION ADD-ON: CPT

## 2020-05-21 PROCEDURE — 83690 ASSAY OF LIPASE: CPT

## 2020-05-21 RX ORDER — HALOPERIDOL 5 MG/ML
2 INJECTION INTRAMUSCULAR ONCE
Status: COMPLETED | OUTPATIENT
Start: 2020-05-21 | End: 2020-05-21

## 2020-05-21 RX ORDER — ACETAMINOPHEN 325 MG/1
650 TABLET ORAL EVERY 4 HOURS PRN
Status: DISCONTINUED | OUTPATIENT
Start: 2020-05-21 | End: 2020-05-22 | Stop reason: HOSPADM

## 2020-05-21 RX ORDER — PROMETHAZINE HYDROCHLORIDE 25 MG/ML
12.5 INJECTION, SOLUTION INTRAMUSCULAR; INTRAVENOUS ONCE
Status: COMPLETED | OUTPATIENT
Start: 2020-05-21 | End: 2020-05-21

## 2020-05-21 RX ORDER — NICOTINE POLACRILEX 4 MG
15 LOZENGE BUCCAL PRN
Status: DISCONTINUED | OUTPATIENT
Start: 2020-05-21 | End: 2020-05-22 | Stop reason: HOSPADM

## 2020-05-21 RX ORDER — LISINOPRIL 2.5 MG/1
2.5 TABLET ORAL DAILY
Status: DISCONTINUED | OUTPATIENT
Start: 2020-05-21 | End: 2020-05-22 | Stop reason: HOSPADM

## 2020-05-21 RX ORDER — ONDANSETRON 2 MG/ML
4 INJECTION INTRAMUSCULAR; INTRAVENOUS EVERY 4 HOURS PRN
Status: DISCONTINUED | OUTPATIENT
Start: 2020-05-21 | End: 2020-05-22 | Stop reason: HOSPADM

## 2020-05-21 RX ORDER — DEXTROSE MONOHYDRATE 25 G/50ML
12.5 INJECTION, SOLUTION INTRAVENOUS PRN
Status: DISCONTINUED | OUTPATIENT
Start: 2020-05-21 | End: 2020-05-22 | Stop reason: HOSPADM

## 2020-05-21 RX ORDER — ONDANSETRON 2 MG/ML
4 INJECTION INTRAMUSCULAR; INTRAVENOUS ONCE
Status: COMPLETED | OUTPATIENT
Start: 2020-05-21 | End: 2020-05-21

## 2020-05-21 RX ORDER — SODIUM CHLORIDE 9 MG/ML
INJECTION, SOLUTION INTRAVENOUS CONTINUOUS
Status: DISCONTINUED | OUTPATIENT
Start: 2020-05-21 | End: 2020-05-22

## 2020-05-21 RX ORDER — DEXTROSE MONOHYDRATE 50 MG/ML
100 INJECTION, SOLUTION INTRAVENOUS PRN
Status: DISCONTINUED | OUTPATIENT
Start: 2020-05-21 | End: 2020-05-22 | Stop reason: HOSPADM

## 2020-05-21 RX ORDER — SODIUM CHLORIDE 0.9 % (FLUSH) 0.9 %
10 SYRINGE (ML) INJECTION EVERY 12 HOURS SCHEDULED
Status: DISCONTINUED | OUTPATIENT
Start: 2020-05-21 | End: 2020-05-22 | Stop reason: HOSPADM

## 2020-05-21 RX ORDER — MORPHINE SULFATE 4 MG/ML
4 INJECTION, SOLUTION INTRAMUSCULAR; INTRAVENOUS ONCE
Status: COMPLETED | OUTPATIENT
Start: 2020-05-21 | End: 2020-05-21

## 2020-05-21 RX ORDER — ONDANSETRON 2 MG/ML
4 INJECTION INTRAMUSCULAR; INTRAVENOUS EVERY 8 HOURS PRN
Status: DISCONTINUED | OUTPATIENT
Start: 2020-05-21 | End: 2020-05-21

## 2020-05-21 RX ORDER — SODIUM CHLORIDE 0.9 % (FLUSH) 0.9 %
10 SYRINGE (ML) INJECTION PRN
Status: DISCONTINUED | OUTPATIENT
Start: 2020-05-21 | End: 2020-05-22 | Stop reason: HOSPADM

## 2020-05-21 RX ORDER — 0.9 % SODIUM CHLORIDE 0.9 %
1000 INTRAVENOUS SOLUTION INTRAVENOUS ONCE
Status: COMPLETED | OUTPATIENT
Start: 2020-05-21 | End: 2020-05-21

## 2020-05-21 RX ADMIN — ONDANSETRON 4 MG: 2 INJECTION INTRAMUSCULAR; INTRAVENOUS at 09:36

## 2020-05-21 RX ADMIN — Medication 10 ML: at 09:36

## 2020-05-21 RX ADMIN — ACETAMINOPHEN 650 MG: 325 TABLET ORAL at 23:36

## 2020-05-21 RX ADMIN — FAMOTIDINE 20 MG: 10 INJECTION INTRAVENOUS at 05:04

## 2020-05-21 RX ADMIN — SODIUM CHLORIDE: 9 INJECTION, SOLUTION INTRAVENOUS at 23:36

## 2020-05-21 RX ADMIN — CEFTRIAXONE SODIUM 1 G: 1 INJECTION, POWDER, FOR SOLUTION INTRAMUSCULAR; INTRAVENOUS at 06:05

## 2020-05-21 RX ADMIN — SODIUM CHLORIDE: 9 INJECTION, SOLUTION INTRAVENOUS at 13:36

## 2020-05-21 RX ADMIN — SODIUM CHLORIDE 1000 ML: 9 INJECTION, SOLUTION INTRAVENOUS at 04:42

## 2020-05-21 RX ADMIN — ONDANSETRON 4 MG: 2 INJECTION, SOLUTION INTRAMUSCULAR; INTRAVENOUS at 04:42

## 2020-05-21 RX ADMIN — MORPHINE SULFATE 4 MG: 4 INJECTION INTRAVENOUS at 13:35

## 2020-05-21 RX ADMIN — PROMETHAZINE HYDROCHLORIDE 12.5 MG: 25 INJECTION INTRAMUSCULAR; INTRAVENOUS at 05:03

## 2020-05-21 RX ADMIN — HALOPERIDOL LACTATE 2 MG: 5 INJECTION, SOLUTION INTRAMUSCULAR at 06:05

## 2020-05-21 RX ADMIN — INSULIN LISPRO 4 UNITS: 100 INJECTION, SOLUTION INTRAVENOUS; SUBCUTANEOUS at 13:58

## 2020-05-21 RX ADMIN — LISINOPRIL 2.5 MG: 2.5 TABLET ORAL at 13:58

## 2020-05-21 ASSESSMENT — PAIN SCALES - GENERAL
PAINLEVEL_OUTOF10: 0
PAINLEVEL_OUTOF10: 6
PAINLEVEL_OUTOF10: 10
PAINLEVEL_OUTOF10: 7
PAINLEVEL_OUTOF10: 7
PAINLEVEL_OUTOF10: 0
PAINLEVEL_OUTOF10: 0

## 2020-05-21 ASSESSMENT — PAIN DESCRIPTION - LOCATION
LOCATION: ABDOMEN

## 2020-05-21 ASSESSMENT — PAIN DESCRIPTION - PAIN TYPE
TYPE: ACUTE PAIN

## 2020-05-21 ASSESSMENT — ENCOUNTER SYMPTOMS
DIARRHEA: 0
VOMITING: 1
ABDOMINAL PAIN: 1
BLOOD IN STOOL: 0
SORE THROAT: 0
COUGH: 0
SHORTNESS OF BREATH: 0
NAUSEA: 1

## 2020-05-21 ASSESSMENT — PAIN DESCRIPTION - DESCRIPTORS: DESCRIPTORS: CRAMPING

## 2020-05-21 ASSESSMENT — PAIN DESCRIPTION - ORIENTATION: ORIENTATION: MID

## 2020-05-21 NOTE — ED PROVIDER NOTES
[facility]  Emergency Department  Emergency Medicine Resident Sign-out     Care of José Miguel Juares was assumed from Dr. Leidy Richter and is being seen for Abdominal Pain; Nausea; and Emesis  . The patient's initial evaluation and plan have been discussed with the prior provider who initially evaluated the patient.      EMERGENCY DEPARTMENT COURSE / MEDICAL DECISION MAKING:       MEDICATIONS GIVEN:  Orders Placed This Encounter   Medications    ondansetron (ZOFRAN) injection 4 mg    0.9 % sodium chloride bolus    famotidine (PEPCID) injection 20 mg    promethazine (PHENERGAN) injection 12.5 mg    cefTRIAXone (ROCEPHIN) 1 g IVPB in 50 mL D5W minibag    haloperidol lactate (HALDOL) injection 2 mg    iohexol (OMNIPAQUE 350) solution 75 mL       LABS / RADIOLOGY:     Labs Reviewed   CBC WITH AUTO DIFFERENTIAL - Abnormal; Notable for the following components:       Result Value    WBC 11.7 (*)     Hematocrit 34.2 (*)     MCV 79.4 (*)     MCHC 35.4 (*)     RDW 18.6 (*)     Immature Granulocytes 1 (*)     Absolute Lymph # 4.78 (*)     All other components within normal limits   COMPREHENSIVE METABOLIC PANEL W/ REFLEX TO MG FOR LOW K - Abnormal; Notable for the following components:    Glucose 291 (*)     Calcium 8.1 (*)     Alkaline Phosphatase 112 (*)     Total Bilirubin 0.17 (*)     Alb 2.9 (*)     Albumin/Globulin Ratio 0.7 (*)     All other components within normal limits   URINALYSIS - Abnormal; Notable for the following components:    Glucose, Ur 3+ (*)     Urine Hgb TRACE (*)     Protein, UA 2+ (*)     Leukocyte Esterase, Urine MODERATE (*)     All other components within normal limits   URINE DRUG SCREEN - Abnormal; Notable for the following components:    Cannabinoid Scrn, Ur POSITIVE (*)     All other components within normal limits   MICROSCOPIC URINALYSIS - Abnormal; Notable for the following components:    Bacteria, UA MANY (*)     All other components within normal limits POC GLUCOSE FINGERSTICK - Abnormal; Notable for the following components:    POC Glucose 276 (*)     All other components within normal limits   POCT GLUCOSE - Normal   LIPASE   HCG, SERUM, QUALITATIVE   BETA-HYDROXYBUTYRATE       No results found. RECENT VITALS:     Temp: 98 °F (36.7 °C),  Pulse: 72, Resp: 18, BP: (!) 155/92, SpO2: 100 %    ED Course as of May 21 1735   Thu May 21, 2020   0516 WBC(!): 11.7 [ZT]   0516 POC Glucose(!): 276 [ZT]   0600 Spoke to patient about results. She is still dry heaving. Does not think she can go home due to persistent nausea and vomiting and inability to tolerate p.o. intake. Will admit to observation unit. Will give 1 g Rocephin to treat UTI. Can switch to p.o. once improvement of nausea and vomiting. There is concern that nausea vomiting is secondary to cyclic vomiting. Will give Haldol as well. [ZT]   0600 Patient again declined pelvic exam    [ZT]   0809 Await CT scan. [MS]      ED Course User Index  [MS] Moe Gardiner DO  [ZT] Marin Clemente DO       This patient is a 30 y.o. Female with nausea, vomiting, abdominal pain. Started suddenly prior to arrival.  Positive for UTI. Started on Rocephin. Zofran, Phenergan, Haldol. Declining pelvic exam.  Will get CT scan rule out any acute intra-abdominal process. Admitted to observation unit. OUTSTANDING TASKS / RECOMMENDATIONS:    1. Assess CT scan  2. Admitted to observation unit     FINAL IMPRESSION:     1. Intractable vomiting with nausea    2.  Acute cystitis without hematuria        DISPOSITION:         DISPOSITION:  []  Discharge   []  Transfer -    [x]  Admission -     []  Against Medical Advice   []  Eloped   FOLLOW-UP: Emperatriz Marquis MD  [address] [address]  [phone]           DISCHARGE MEDICATIONS: New Prescriptions    No medications on file          Moe Gardiner DO  Emergency Medicine Resident  [address]

## 2020-05-21 NOTE — H&P
[facility]  CDU / OBSERVATION eNCOUnter  Resident Note     Pt Name: Iesha Salazar  MRN: [id number]  [place] 1995  Date of evaluation: 5/21/20  Patient's PCP is :  Geni Cazares MD    [address] [place] [address]       Chief Complaint   Patient presents with    Abdominal Pain    Nausea    Emesis         HISTORY OF PRESENT ILLNESS    Iesha Salazar is a 30 y.o. female with type 1 diabetes who presents with acute onset lower nonradiating abdominal pain with intractable nausea and vomiting. Started prior to arrival in emergency department. Failed to improve in emergency department with antiemetics, admitted to observation for intractable nausea and vomiting and symptomatic management. Location/Symptom: Nausea and vomiting  Timing/Onset: Acute  Provocation: Unknown  Quality: Intractable  Radiation: Lower abdominal pain  Severity: Severe  Timing/Duration: Hours  Modifying Factors: None    REVIEW OF SYSTEMS       General ROS - No fevers, No malaise   Ophthalmic ROS - No discharge, No changes in vision  ENT ROS -  No sore throat, No rhinorrhea,   Respiratory ROS - no shortness of breath, no cough, no  wheezing  Cardiovascular ROS - No chest pain, no dyspnea on exertion  Gastrointestinal ROS -lower abdominal pain, with nausea and vomiting, no change in bowel habits, no black or bloody stools  Genito-Urinary ROS - No dysuria, trouble voiding, or hematuria  Musculoskeletal ROS - No myalgias, No arthalgias  Neurological ROS - No headache, no dizziness/lightheadedness, No focal weakness, no loss of sensation  Dermatological ROS - No lesions, No rash     (PQRS) Advance directives on face sheet per hospital policy. No change unless specifically mentioned in chart    PAST MEDICAL HISTORY    has a past medical history of Diabetes mellitus ([address] and MRSA (methicillin resistant staph aureus) culture positive.     I have reviewed the past medical history with the patient and it is pertinent to

## 2020-05-21 NOTE — ED PROVIDER NOTES
sliding scale 3/8/20   Wicho Carbajal, DO   insulin glargine [facility]) 100 UNIT/ML injection pen Inject 12 Units into the skin nightly 3/8/20   Wicho Carbajal, DO   blood glucose monitor kit and supplies use check blood sugar as directed 3/8/20 3/8/21  Wicho Carbajal, DO   blood glucose monitor strips Check blood sugars four times daily as directed. 3/8/20 3/8/21  Wicho Carbajal, DO   Lancets MISC 1 each by Does not apply route 4 times daily 3/8/20   Wicho Carbajal, DO   Insulin Pen Needle ([employer] PEN NEEDLES 31G) 31G X 8 MM MISC 1 each by Does not apply route daily 3/8/20   Wicho Carbajal, DO   Alcohol Swabs (ALCOHOL PADS) 70 % PADS Apply 1 each topically 4 times daily 2/18/20   Emperatriz Novoa MD   ketoconazole (NIZORAL) 2 % cream Apply topically daily. 2/11/20   Emperatriz Novoa MD   lisinopril (PRINIVIL;ZESTRIL) 2.5 MG tablet Take 1 tablet by mouth daily 2/11/20   Emperatriz Novoa MD       REVIEW OF SYSTEMS    (2-9 systems for level 4, 10 or more for level 5)      Review of Systems   Constitutional: Positive for chills. Negative for fever. HENT: Negative for congestion and sore throat. Eyes: Negative for visual disturbance. Respiratory: Negative for cough and shortness of breath. Cardiovascular: Negative for chest pain. Gastrointestinal: Positive for abdominal pain, nausea and vomiting. Negative for blood in stool and diarrhea. Genitourinary: Negative for dysuria and hematuria. Musculoskeletal: Negative for neck pain and neck stiffness. Skin: Negative for rash. Neurological: Negative for dizziness, numbness and headaches. PHYSICAL EXAM   (up to 7 for level 4, 8 or more for level 5)      INITIAL VITALS:   BP (!) 155/92   Pulse 72   Temp 98 °F (36.7 °C) (Oral)   Resp 18   Ht 5' 5\" (1.651 m)   Wt 148 lb (67.1 kg)   LMP 05/13/2020 (Approximate)   SpO2 100%   BMI 24.63 kg/m²     Physical Exam  Vitals signs reviewed.    Constitutional:       General: She is not in acute DIAGNOSTIC RESULTS / EMERGENCY DEPARTMENT COURSE / MDM     Results for orders placed or performed during the hospital encounter of 05/21/20   CBC Auto Differential   Result Value Ref Range    WBC 11.7 (H) 3.5 - 11.3 k/uL    RBC 4.31 3.95 - 5.11 m/uL    Hemoglobin 12.1 11.9 - 15.1 g/dL    Hematocrit 34.2 (L) 36.3 - 47.1 %    MCV 79.4 (L) 82.6 - 102.9 fL    MCH 28.1 25.2 - 33.5 pg    MCHC 35.4 (H) 28.4 - 34.8 g/dL    RDW 18.6 (H) 11.8 - 14.4 %    Platelets 718 300 - 096 k/uL    MPV 10.3 8.1 - 13.5 fL    NRBC Automated 0.0 0.0 per 100 WBC    Differential Type NOT REPORTED     Seg Neutrophils 46 36 - 65 %    Lymphocytes 41 24 - 43 %    Monocytes 8 3 - 12 %    Eosinophils % 3 1 - 4 %    Basophils 1 0 - 2 %    Immature Granulocytes 1 (H) 0 %    Segs Absolute 5.56 1.50 - 8.10 k/uL    Absolute Lymph # 4.78 (H) 1.10 - 3.70 k/uL    Absolute Mono # 0.90 0.10 - 1.20 k/uL    Absolute Eos # 0.33 0.00 - 0.44 k/uL    Basophils Absolute 0.06 0.00 - 0.20 k/uL    Absolute Immature Granulocyte 0.06 0.00 - 0.30 k/uL    WBC Morphology NOT REPORTED     RBC Morphology ANISOCYTOSIS PRESENT     Platelet Estimate NOT REPORTED    Comprehensive Metabolic Panel w/ Reflex to MG   Result Value Ref Range    Glucose 291 (H) 70 - 99 mg/dL    BUN 6 6 - 20 mg/dL    CREATININE 0.85 0.50 - 0.90 mg/dL    Bun/Cre Ratio NOT REPORTED 9 - 20    Calcium 8.1 (L) 8.6 - 10.4 mg/dL    Sodium 138 135 - 144 mmol/L    Potassium 4.3 3.7 - 5.3 mmol/L    Chloride 103 98 - 107 mmol/L    CO2 21 20 - 31 mmol/L    Anion Gap 14 9 - 17 mmol/L    Alkaline Phosphatase 112 (H) 35 - 104 U/L    ALT 9 5 - 33 U/L    AST 13 <32 U/L    Total Bilirubin 0.17 (L) 0.3 - 1.2 mg/dL    Total Protein 7.3 6.4 - 8.3 g/dL    Alb 2.9 (L) 3.5 - 5.2 g/dL    Albumin/Globulin Ratio 0.7 (L) 1.0 - 2.5    GFR Non-African American >60 >60 mL/min    GFR African American >60 >60 mL/min    GFR Comment          GFR Staging NOT REPORTED    Lipase   Result Value Ref Range    Lipase 27 13 - 60 U/L Urinalysis, reflex to microscopic   Result Value Ref Range    Color, UA YELLOW YELLOW    Turbidity UA CLEAR CLEAR    Glucose, Ur 3+ (A) NEGATIVE    Bilirubin Urine NEGATIVE NEGATIVE    Ketones, Urine NEGATIVE NEGATIVE    Specific Gravity, UA 1.012 1.005 - 1.030    Urine Hgb TRACE (A) NEGATIVE    pH, UA 8.0 5.0 - 8.0    Protein, UA 2+ (A) NEGATIVE    Urobilinogen, Urine Normal Normal    Nitrite, Urine NEGATIVE NEGATIVE    Leukocyte Esterase, Urine MODERATE (A) NEGATIVE    Urinalysis Comments NOT REPORTED    HCG Qualitative, Serum   Result Value Ref Range    hCG Qual NEGATIVE NEGATIVE   Urine Drug Screen   Result Value Ref Range    Amphetamine Screen, Ur NEGATIVE NEGATIVE    Barbiturate Screen, Ur NEGATIVE NEGATIVE    Benzodiazepine Screen, Urine NEGATIVE NEGATIVE    Cocaine Metabolite, Urine NEGATIVE NEGATIVE    Methadone Screen, Urine NEGATIVE NEGATIVE    Opiates, Urine NEGATIVE NEGATIVE    Phencyclidine, Urine NEGATIVE NEGATIVE    Propoxyphene, Urine NOT REPORTED NEGATIVE    Cannabinoid Scrn, Ur POSITIVE (A) NEGATIVE    Oxycodone Screen, Ur NEGATIVE NEGATIVE    Methamphetamine, Urine NOT REPORTED NEGATIVE    Tricyclic Antidepressants, Urine NOT REPORTED NEGATIVE    MDMA, Urine NOT REPORTED NEGATIVE    Buprenorphine Urine NOT REPORTED NEGATIVE    Test Information       Assay provides medical screening only. The absence of expected drug(s) and/or metabolite(s) may indicate diluted or adulterated urine, limitations of testing or timing of collection. Beta-Hydroxybutyrate   Result Value Ref Range    Beta-Hydroxybutyrate 0.03 0.02 - 0.27 mmol/L   Microscopic Urinalysis   Result Value Ref Range    -          WBC, UA 50  0 - 5 /HPF    RBC, UA 2 TO 5 0 - 4 /HPF    Casts UA  0 - 8 /LPF     0 TO 2 HYALINE Reference range defined for non-centrifuged specimen.     Crystals, UA NOT REPORTED None /HPF    Epithelial Cells UA 0 TO 2 0 - 5 /HPF    Renal Epithelial, UA NOT REPORTED 0 /HPF    Bacteria, UA MANY (A) None also offer pelvic exam with pelvic swabs. Work-up at this time includes CBC, BMP, beta hydroxybutyrate, urinalysis, beta-hCG, point-of-care glucose. Also give fluids. Zofran and possibly Phenergan as needed for nausea. No improvement with Zofran. Requiring Phenergan. No anion gap. Low suspicion for DKA. Urinalysis is showing positive UTI. Given nausea vomiting will treat with 1 g Rocephin. Likely transition to p.o. antibiotics once nausea and vomiting is controlled. ED Course as of May 21 0819   Thu May 21, 2020   0516 WBC(!): 11.7 [ZT]   0516 POC Glucose(!): 276 [ZT]   0600 Spoke to patient about results. She is still dry heaving. Does not think she can go home due to persistent nausea and vomiting and inability to tolerate p.o. intake. Will admit to observation unit. Will give 1 g Rocephin to treat UTI. Can switch to p.o. once improvement of nausea and vomiting. There is concern that nausea vomiting is secondary to cyclic vomiting. Will give Haldol as well. [ZT]   0600 Patient again declined pelvic exam    [ZT]   0809 Await CT scan. [MS]      ED Course User Index  [MS] Kwadwo Rodriguez,   [ZT] Faisal December, DO     Admitted to observation unit for intractable nausea and vomiting. Recommend continuing antiemetics including Zofran, Phenergan. Due to positive urine drug screen for cannabis this may be cyclic vomiting syndrome related to cannabis use. Symptoms may improve with additional Haldol or capsaicin cream.  Patient will need continued fluid rehydration. 8410. Did speak to attending at this point will obtain CT abdomen and pelvis with contrast.  Last CT scan in our system is from 2016. FINAL IMPRESSION      1. Intractable vomiting with nausea    2.  Acute cystitis without hematuria          DISPOSITION / PLAN     DISPOSITION Admitted 05/21/2020 06:06:54 AM      PATIENT REFERRED TO:  Emperatriz العلي, [address] [facility] [address] [place] [address] Blvd:  New Prescriptions    No medications on file       Kelsey Price,   Emergency Medicine Resident    (Please note that portions of thisnote were completed with a voice recognition program.  Efforts were made to edit the dictations but occasionally words are mis-transcribed.)       Kelsey Price,   Resident  05/21/20 [address], DO  Resident  05/21/20 [address], DO  Resident  05/21/20 [id number]

## 2020-05-21 NOTE — CARE COORDINATION
Case Management Initial Discharge Plan  José Miguel Juares,             Met with:patient to discuss discharge plans. Information verified: address, contacts, phone number, , insurance Yes  Emergency Contact/Next of Kin name & number:   PCP: Raghav Shirley MD  Date of last visit: not sure     Insurance Provider: medicare medicaid     Discharge Planning    Living Arrangements:  Family Members   Support Systems:  Family Members    Home has 1 stories  one stairs to climb to get into front door, none stairs to climb to reach second floor elevator   Location of bedroom/bathroom in home main     Patient able to perform ADL's:Independent    Current Services (outpatient & in home) none  DME equipment: na  DME provider: wilfred      Potential Assistance Needed:  N/A    Patient agreeable to home care: No  [place] of choice provided:  n/a    Prior SNF/Rehab Placement and Facility: none  Agreeable to SNF/Rehab: No  [place] of choice provided: n/a   Evaluation: n/a    Expected Discharge date:     Patient expects to be discharged to:  home  Follow Up Appointment: Best Day/ Time:      Transportation provider: [address]   Transportation arrangements needed for discharge: No    Readmission Risk              Risk of Unplanned Readmission:        0             Does patient have a readmission risk score greater than 14?: No  If yes, follow-up appointment must be made within 7 days of discharge.      Goals of Care: stop being sick      Discharge Plan: DC to home independently; has established pcp care, JFS cab to home           Electronically signed by Diana Cagle RN on 20 at 1:39 PM EDT

## 2020-05-22 VITALS
SYSTOLIC BLOOD PRESSURE: 128 MMHG | DIASTOLIC BLOOD PRESSURE: 83 MMHG | TEMPERATURE: 98.8 F | HEIGHT: 65 IN | HEART RATE: 84 BPM | OXYGEN SATURATION: 98 % | WEIGHT: 123 LBS | RESPIRATION RATE: 18 BRPM | BODY MASS INDEX: 20.49 KG/M2

## 2020-05-22 LAB
ANION GAP SERPL CALCULATED.3IONS-SCNC: 8 MMOL/L (ref 9–17)
BUN BLDV-MCNC: 7 MG/DL (ref 6–20)
BUN/CREAT BLD: ABNORMAL (ref 9–20)
CALCIUM SERPL-MCNC: 7.8 MG/DL (ref 8.6–10.4)
CHLORIDE BLD-SCNC: 108 MMOL/L (ref 98–107)
CO2: 22 MMOL/L (ref 20–31)
CREAT SERPL-MCNC: 0.93 MG/DL (ref 0.5–0.9)
GFR AFRICAN AMERICAN: >60 ML/MIN
GFR NON-AFRICAN AMERICAN: >60 ML/MIN
GFR SERPL CREATININE-BSD FRML MDRD: ABNORMAL ML/MIN/{1.73_M2}
GFR SERPL CREATININE-BSD FRML MDRD: ABNORMAL ML/MIN/{1.73_M2}
GLUCOSE BLD-MCNC: 123 MG/DL (ref 65–105)
GLUCOSE BLD-MCNC: 132 MG/DL (ref 70–99)
GLUCOSE BLD-MCNC: 149 MG/DL (ref 65–105)
HCT VFR BLD CALC: 27.4 % (ref 36.3–47.1)
HEMOGLOBIN: 9.3 G/DL (ref 11.9–15.1)
MCH RBC QN AUTO: 27.8 PG (ref 25.2–33.5)
MCHC RBC AUTO-ENTMCNC: 33.9 G/DL (ref 28.4–34.8)
MCV RBC AUTO: 82 FL (ref 82.6–102.9)
NRBC AUTOMATED: 0 PER 100 WBC
PDW BLD-RTO: 18.7 % (ref 11.8–14.4)
PLATELET # BLD: 375 K/UL (ref 138–453)
PMV BLD AUTO: 10.8 FL (ref 8.1–13.5)
POTASSIUM SERPL-SCNC: 3.7 MMOL/L (ref 3.7–5.3)
RBC # BLD: 3.34 M/UL (ref 3.95–5.11)
SODIUM BLD-SCNC: 138 MMOL/L (ref 135–144)
WBC # BLD: 12.7 K/UL (ref 3.5–11.3)

## 2020-05-22 PROCEDURE — 6370000000 HC RX 637 (ALT 250 FOR IP): Performed by: STUDENT IN AN ORGANIZED HEALTH CARE EDUCATION/TRAINING PROGRAM

## 2020-05-22 PROCEDURE — 2580000003 HC RX 258: Performed by: STUDENT IN AN ORGANIZED HEALTH CARE EDUCATION/TRAINING PROGRAM

## 2020-05-22 PROCEDURE — 36415 COLL VENOUS BLD VENIPUNCTURE: CPT

## 2020-05-22 PROCEDURE — 82947 ASSAY GLUCOSE BLOOD QUANT: CPT

## 2020-05-22 PROCEDURE — 85027 COMPLETE CBC AUTOMATED: CPT

## 2020-05-22 PROCEDURE — 80048 BASIC METABOLIC PNL TOTAL CA: CPT

## 2020-05-22 PROCEDURE — 6360000002 HC RX W HCPCS: Performed by: STUDENT IN AN ORGANIZED HEALTH CARE EDUCATION/TRAINING PROGRAM

## 2020-05-22 PROCEDURE — 2580000003 HC RX 258: Performed by: EMERGENCY MEDICINE

## 2020-05-22 RX ORDER — CEPHALEXIN 500 MG/1
500 CAPSULE ORAL 2 TIMES DAILY
Qty: 10 CAPSULE | Refills: 0 | Status: SHIPPED | OUTPATIENT
Start: 2020-05-22 | End: 2020-05-27

## 2020-05-22 RX ADMIN — LISINOPRIL 2.5 MG: 2.5 TABLET ORAL at 08:22

## 2020-05-22 RX ADMIN — ACETAMINOPHEN 650 MG: 325 TABLET ORAL at 04:56

## 2020-05-22 RX ADMIN — SODIUM CHLORIDE: 9 INJECTION, SOLUTION INTRAVENOUS at 04:58

## 2020-05-22 RX ADMIN — ACETAMINOPHEN 650 MG: 325 TABLET ORAL at 08:35

## 2020-05-22 RX ADMIN — INSULIN LISPRO 1 UNITS: 100 INJECTION, SOLUTION INTRAVENOUS; SUBCUTANEOUS at 08:22

## 2020-05-22 RX ADMIN — CEFTRIAXONE SODIUM 1 G: 1 INJECTION, POWDER, FOR SOLUTION INTRAMUSCULAR; INTRAVENOUS at 08:32

## 2020-05-22 ASSESSMENT — PAIN SCALES - GENERAL
PAINLEVEL_OUTOF10: 7
PAINLEVEL_OUTOF10: 6

## 2020-05-22 NOTE — PROGRESS NOTES
Discharge order received. IV removed. Pt tolerated lunch with no complaints. Writer went over discharge instructions with pt including follow up appointments and medications, pt verbalized understanding. Pt waiting for ride home.

## 2020-05-22 NOTE — PROGRESS NOTES
OBS/CDU   RESIDENT NOTE      Patients PCP is:  JADYN SPENCER MD        SUBJECTIVE      No acute events overnight. Patient has complete resolution of symptoms, able to tolerate diet without getting nauseous or vomiting, urinating without difficulty, no abdominal pain. PHYSICAL EXAM      General: NAD, AO X 3  Heent: EMOI, PERRL  Neck: SUPPLE, NO JVD  Cardiovascular: RRR, S1S2  Pulmonary: CTAB, NO SOB  Abdomen: SOFT, NTTP, ND, +BS  Extremities: +2/4 PULSES DISTAL, NO SWELLING  Neuro / Psych: NO NUMBNESS OR TINGLING, MENTATION AT BASELINE    PERTINENT TEST /EXAMS      I have reviewed all available laboratory results. MEDICATIONS CURRENT   lisinopril (PRINIVIL;ZESTRIL) tablet 2.5 mg, Daily  insulin lispro (HUMALOG) injection vial 0-6 Units, TID WC  insulin lispro (HUMALOG) injection vial 0-3 Units, Nightly  sodium chloride flush 0.9 % injection 10 mL, 2 times per day  sodium chloride flush 0.9 % injection 10 mL, PRN  acetaminophen (TYLENOL) tablet 650 mg, Q4H PRN  enoxaparin (LOVENOX) injection 40 mg, Daily  iohexol (OMNIPAQUE 350) solution 75 mL, ONCE PRN  glucose (GLUTOSE) 40 % oral gel 15 g, PRN  dextrose 50 % IV solution, PRN  glucagon (rDNA) injection 1 mg, PRN  dextrose 5 % solution, PRN  0.9 % sodium chloride infusion, Continuous  ondansetron (ZOFRAN) injection 4 mg, Q4H PRN  cefTRIAXone (ROCEPHIN) 1 g IVPB in 50 mL D5W minibag, Q24H        All medication charted and reviewed. CONSULTS      IP CONSULT TO IV TEAM    ASSESSMENT/PLAN       Donal Ortiz is a 30 y.o. female who presents with    1.)  Acute onset nausea and vomiting, uncertain etiology. Resolved. History of cannabinoid use. CT abdomen pelvis found edema of right kidney descending colon-sigmoid colon haziness. Ultrasound significant for multiple cysts in the right adnexal area. Follow-up with OB     2.)  Acute urinary tract infection, uncertain etiology.    Transition to oral Keflex     3.)  Chronic type 1 diabetes, etiology autoimmune. Continue home medications    4.) Acute anemia, secondary to dilution    · Continue home medications  · Monitor vitals, labs, and imaging  · DISPO: pending consults and clinical improvement    --  Will Ruiz  Emergency Medicine Resident Physician     This dictation was generated by voice recognition computer software. Although all attempts are made to edit the dictation for accuracy, there may be errors in the transcription that are not intended.

## 2020-05-22 NOTE — DISCHARGE SUMMARY
strips  Check blood sugars four times daily as directed. ketoconazole 2 % cream  Commonly known as:  NIZORAL  Apply topically daily.      [employer] Pen Needles 31G 31G X 8 MM Misc  Generic drug:  Insulin Pen Needle  1 each by Does not apply route daily     Lancets Misc  1 each by Does not apply route 4 times daily     lisinopril 2.5 MG tablet  Commonly known as:  PRINIVIL;ZESTRIL  Take 1 tablet by mouth daily     NovoLOG FlexPen 100 UNIT/ML injection pen  Generic drug:  insulin aspart  Inject 2-12 units subcutaneously TID based on sliding scale           Where to Get Your Medications      These medications were sent to [address] [address]    Phone:  [phone]   · cephALEXin 500 MG capsule         Diet:  No diet orders on file, advance as tolerated     Activity:  As tolerated    Consultants: IP CONSULT TO IV TEAM    Procedures:  Not indicated     Diagnostic Test:   Results for orders placed or performed during the hospital encounter of 05/21/20   CBC Auto Differential   Result Value Ref Range    WBC 11.7 (H) 3.5 - 11.3 k/uL    RBC 4.31 3.95 - 5.11 m/uL    Hemoglobin 12.1 11.9 - 15.1 g/dL    Hematocrit 34.2 (L) 36.3 - 47.1 %    MCV 79.4 (L) 82.6 - 102.9 fL    MCH 28.1 25.2 - 33.5 pg    MCHC 35.4 (H) 28.4 - 34.8 g/dL    RDW 18.6 (H) 11.8 - 14.4 %    Platelets 790 283 - 601 k/uL    MPV 10.3 8.1 - 13.5 fL    NRBC Automated 0.0 0.0 per 100 WBC    Differential Type NOT REPORTED     Seg Neutrophils 46 36 - 65 %    Lymphocytes 41 24 - 43 %    Monocytes 8 3 - 12 %    Eosinophils % 3 1 - 4 %    Basophils 1 0 - 2 %    Immature Granulocytes 1 (H) 0 %    Segs Absolute 5.56 1.50 - 8.10 k/uL    Absolute Lymph # 4.78 (H) 1.10 - 3.70 k/uL    Absolute Mono # 0.90 0.10 - 1.20 k/uL    Absolute Eos # 0.33 0.00 - 0.44 k/uL    Basophils Absolute 0.06 0.00 - 0.20 k/uL    Absolute Immature Granulocyte 0.06 0.00 - 0.30 k/uL    WBC Morphology NOT REPORTED     RBC Morphology ANISOCYTOSIS PRESENT     Platelet Estimate NOT REPORTED    Comprehensive Metabolic Panel w/ Reflex to MG   Result Value Ref Range    Glucose 291 (H) 70 - 99 mg/dL    BUN 6 6 - 20 mg/dL    CREATININE 0.85 0.50 - 0.90 mg/dL    Bun/Cre Ratio NOT REPORTED 9 - 20    Calcium 8.1 (L) 8.6 - 10.4 mg/dL    Sodium 138 135 - 144 mmol/L    Potassium 4.3 3.7 - 5.3 mmol/L    Chloride 103 98 - 107 mmol/L    CO2 21 20 - 31 mmol/L    Anion Gap 14 9 - 17 mmol/L    Alkaline Phosphatase 112 (H) 35 - 104 U/L    ALT 9 5 - 33 U/L    AST 13 <32 U/L    Total Bilirubin 0.17 (L) 0.3 - 1.2 mg/dL    Total Protein 7.3 6.4 - 8.3 g/dL    Alb 2.9 (L) 3.5 - 5.2 g/dL    Albumin/Globulin Ratio 0.7 (L) 1.0 - 2.5    GFR Non-African American >60 >60 mL/min    GFR African American >60 >60 mL/min    GFR Comment          GFR Staging NOT REPORTED    Lipase   Result Value Ref Range    Lipase 27 13 - 60 U/L   Urinalysis, reflex to microscopic   Result Value Ref Range    Color, UA YELLOW YELLOW    Turbidity UA CLEAR CLEAR    Glucose, Ur 3+ (A) NEGATIVE    Bilirubin Urine NEGATIVE NEGATIVE    Ketones, Urine NEGATIVE NEGATIVE    Specific Gravity, UA 1.012 1.005 - 1.030    Urine Hgb TRACE (A) NEGATIVE    pH, UA 8.0 5.0 - 8.0    Protein, UA 2+ (A) NEGATIVE    Urobilinogen, Urine Normal Normal    Nitrite, Urine NEGATIVE NEGATIVE    Leukocyte Esterase, Urine MODERATE (A) NEGATIVE    Urinalysis Comments NOT REPORTED    HCG Qualitative, Serum   Result Value Ref Range    hCG Qual NEGATIVE NEGATIVE   Urine Drug Screen   Result Value Ref Range    Amphetamine Screen, Ur NEGATIVE NEGATIVE    Barbiturate Screen, Ur NEGATIVE NEGATIVE    Benzodiazepine Screen, Urine NEGATIVE NEGATIVE    Cocaine Metabolite, Urine NEGATIVE NEGATIVE    Methadone Screen, Urine NEGATIVE NEGATIVE    Opiates, Urine NEGATIVE NEGATIVE    Phencyclidine, Urine NEGATIVE NEGATIVE    Propoxyphene, Urine NOT REPORTED NEGATIVE    Cannabinoid Scrn, Ur POSITIVE (A) NEGATIVE Oxycodone Screen, Ur NEGATIVE NEGATIVE    Methamphetamine, Urine NOT REPORTED NEGATIVE    Tricyclic Antidepressants, Urine NOT REPORTED NEGATIVE    MDMA, Urine NOT REPORTED NEGATIVE    Buprenorphine Urine NOT REPORTED NEGATIVE    Test Information       Assay provides medical screening only. The absence of expected drug(s) and/or metabolite(s) may indicate diluted or adulterated urine, limitations of testing or timing of collection. Beta-Hydroxybutyrate   Result Value Ref Range    Beta-Hydroxybutyrate 0.03 0.02 - 0.27 mmol/L   Microscopic Urinalysis   Result Value Ref Range    -          WBC, UA 50  0 - 5 /HPF    RBC, UA 2 TO 5 0 - 4 /HPF    Casts UA  0 - 8 /LPF     0 TO 2 HYALINE Reference range defined for non-centrifuged specimen. Crystals, UA NOT REPORTED None /HPF    Epithelial Cells UA 0 TO 2 0 - 5 /HPF    Renal Epithelial, UA NOT REPORTED 0 /HPF    Bacteria, UA MANY (A) None    Mucus, UA NOT REPORTED None    Trichomonas, UA NOT REPORTED None    Amorphous, UA NOT REPORTED None    Other Observations UA NOT REPORTED NOT REQ.     Yeast, UA NOT REPORTED None   BASIC METABOLIC PANEL   Result Value Ref Range    Glucose 132 (H) 70 - 99 mg/dL    BUN 7 6 - 20 mg/dL    CREATININE 0.93 (H) 0.50 - 0.90 mg/dL    Bun/Cre Ratio NOT REPORTED 9 - 20    Calcium 7.8 (L) 8.6 - 10.4 mg/dL    Sodium 138 135 - 144 mmol/L    Potassium 3.7 3.7 - 5.3 mmol/L    Chloride 108 (H) 98 - 107 mmol/L    CO2 22 20 - 31 mmol/L    Anion Gap 8 (L) 9 - 17 mmol/L    GFR Non-African American >60 >60 mL/min    GFR African American >60 >60 mL/min    GFR Comment          GFR Staging NOT REPORTED    CBC   Result Value Ref Range    WBC 12.7 (H) 3.5 - 11.3 k/uL    RBC 3.34 (L) 3.95 - 5.11 m/uL    Hemoglobin 9.3 (L) 11.9 - 15.1 g/dL    Hematocrit 27.4 (L) 36.3 - 47.1 %    MCV 82.0 (L) 82.6 - 102.9 fL    MCH 27.8 25.2 - 33.5 pg    MCHC 33.9 28.4 - 34.8 g/dL    RDW 18.7 (H) 11.8 - 14.4 %    Platelets 548 685 - 884 k/uL    MPV 10.8 8.1 - 13.5 fL

## 2020-05-22 NOTE — PROGRESS NOTES
[facility]  CDU / OBSERVATION eNCOUnter  Attending NOte       I performed a history and physical examination of the patient and discussed management with the resident. I reviewed the residents note and agree with the documented findings and plan of care. Any areas of disagreement are noted on the chart. I was personally present for the key portions of any procedures. I have documented in the chart those procedures where I was not present during the key portions. I have reviewed the nurses notes. I agree with the chief complaint, past medical history, past surgical history, allergies, medications, social and family history as documented unless otherwise noted below. The Family history, social history, and ROS are effectively unchanged since admission unless noted elsewhere in the chart. Patient remarkably better this morning. Noted drop in hemoglobin likely due to dilution as the patient has been on 200 cc IV fluids per hour and all this morning. Will cease IV and have the patient do oral challenge. Patient is completely nontender. Patient is remarkably improved on exam.  Patient is awake alert sitting up in bed and stating she is ready to go home. Significant improvement over yesterday. Will give oral challenge now. If handling oral well will discharge. Will have patient follow-up with OB/GYN for complex cyst.  Imaging yesterday did not show any evidence of peritoneal fluid. Patient not lightheaded. Normal vital signs. Anticipating discharge this morning.     Candy Sorensen MD  Attending Emergency  Physician

## 2020-05-26 ENCOUNTER — CARE COORDINATION (OUTPATIENT)
Dept: CASE MANAGEMENT | Age: 25
End: 2020-05-26

## 2020-05-26 NOTE — CARE COORDINATION
BPCI-A Medical Bundle Patient:  Working DR UTI  Admitting Location: [facility]  Adm Date: 20  Date of Discharge: 20  Bundle End Date: 20     90-day post-acute outreach and tracking with qualifying DRG. Saleem 45 Transitions Initial BPCI Follow Up Call - call attempt day #1 -   Only contact # indicates \"number not in service\"  Unable to leave VM message  Call within 2 business days of discharge: Yes    Patient: Armand Nieto Patient : 1995   MRN: [id number]  Reason for Admission: nausea, vomiting, UTI  Discharge Date: 20   RARS: Readmission Risk Score: 11      Last Discharge [address] [address]       Complaint Diagnosis Description Type Department Provider    20 Abdominal Pain; Nausea; Emesis Intractable vomiting with nausea . .. ED to Hosp-Admission (Discharged) (ADMITTED) [address] ONC Rosemarie Frankel, MD; [facility]. ..            Spoke with: pharmacist - keflex never picked up - discharged on     Facility: [facility]  Non-face-to-face services provided:  Scheduled appointment with PCP-routed to schedule pool  Obtained and reviewed discharge summary and/or continuity of care documents  Assessment and support for treatment adherence and medication management-contacted pharmacy where script was eprescribed -keflex was never picked up      Juliette Echavarria RN

## 2020-05-27 ENCOUNTER — CARE COORDINATION (OUTPATIENT)
Dept: CASE MANAGEMENT | Age: 25
End: 2020-05-27

## 2020-05-27 NOTE — CARE COORDINATION
BPCI-A Medical Bundle Patient:  Working DR UTI  Admitting Location: [facility]  Adm Date: 20  Date of Discharge: 20  Bundle End Date: 20     90-day post-acute outreach and tracking with qualifying DRG. [address] Initial Follow Up Call - unsuccessful attempt #2 to reach patient - only contact number listed is \"out of service. \"    Confirmed with patient's pharmacy that keflex was never picked up. Routed encounter to CTNs who follow BPCI for 90 days. Call within 2 business days of discharge: Yes    Patient: Alanna Winchester Patient : 1995   MRN: [id number]  Reason for Admission: UTI  Discharge Date: 20 RARS: Readmission Risk Score: 11      Last Discharge [facility]       Complaint Diagnosis Description Type Department Provider    20 Abdominal Pain; Nausea; Emesis Intractable vomiting with nausea . .. [facility]; [facility]. ..            Facility: [facility]    Non-face-to-face services provided:  Obtained and reviewed discharge summary and/or continuity of care documents      Kingston Dobbs, RN

## 2020-06-03 ENCOUNTER — CARE COORDINATION (OUTPATIENT)
Dept: CASE MANAGEMENT | Age: 25
End: 2020-06-03

## 2020-06-10 ENCOUNTER — CARE COORDINATION (OUTPATIENT)
Dept: CASE MANAGEMENT | Age: 25
End: 2020-06-10

## 2020-06-10 NOTE — CARE COORDINATION
Saleem 45 Transitions Follow Up Call    6/10/2020    Patient: José Miguel Juares  Patient : 1995   MRN: [id number]  Reason for Admission:   Discharge Date: 20 RARS: Readmission Risk Score: 11         Spoke with: Carmel Bojorquez briefly to patient. Patient disengaged in conversation. Patient reports she is doing fine. She denies nausea, vomiting, urinary burning, frequency, urgency or dysuria. She states she completed her antibiotics. Appetite is good. No questions or concerns. Will continue to follow. Care Transitions Subsequent and Final Call    Subsequent and Final Calls  Do you have any ongoing symptoms?:  No  Have your medications changed?:  No  Do you have any questions related to your medications?:  No  Do you currently have any active services?:  No  Do you have any needs or concerns that I can assist you with?:  No  Identified Barriers:  None  Care Transitions Interventions  Other Interventions: Follow Up  No future appointments.     David Serrano LPN

## 2020-06-22 ENCOUNTER — CARE COORDINATION (OUTPATIENT)
Dept: CASE MANAGEMENT | Age: 25
End: 2020-06-22

## 2020-07-06 ENCOUNTER — CARE COORDINATION (OUTPATIENT)
Dept: CASE MANAGEMENT | Age: 25
End: 2020-07-06

## 2020-07-06 NOTE — CARE COORDINATION
Saleem 45 Transitions Follow Up Call    2020    Patient: Pernell Moore  Patient : 1995   MRN: [id number]  Reason for Admission:   Discharge Date: 20 RARS: Readmission Risk Score: 11         Attempted to contact patient for follow up BPCI-A transition call. Unable to leave a voicemail message to return call. Will continue to follow. Care Transitions Subsequent and Final Call    Subsequent and Final Calls  Care Transitions Interventions  Other Interventions: Follow Up  No future appointments.     Renzo Izquierdo LPN

## 2020-07-17 ENCOUNTER — CARE COORDINATION (OUTPATIENT)
Dept: CASE MANAGEMENT | Age: 25
End: 2020-07-17

## 2020-07-17 NOTE — CARE COORDINATION
Saleem 45 Transitions Follow Up Call    2020    Patient: Amadou Olivera  Patient : 1995   MRN: [id number]  Reason for Admission:   Discharge Date: 20 RARS: Readmission Risk Score: 11         Attempted to contact patient for follow up BPCI-A transition call. Recording says number is not in service. Unable to leave a voicemail message to return call. Will continue to follow. Care Transitions Subsequent and Final Call    Subsequent and Final Calls  Care Transitions Interventions  Other Interventions: Follow Up  No future appointments.     Ale Atwood LPN

## 2020-07-24 ENCOUNTER — TELEPHONE (OUTPATIENT)
Dept: ADMINISTRATIVE | Age: 25
End: 2020-07-24

## 2020-07-30 ENCOUNTER — CARE COORDINATION (OUTPATIENT)
Dept: CASE MANAGEMENT | Age: 25
End: 2020-07-30

## 2020-08-11 ENCOUNTER — HOSPITAL ENCOUNTER (EMERGENCY)
Age: 25
Discharge: HOME | End: 2020-08-11
Attending: STUDENT IN AN ORGANIZED HEALTH CARE EDUCATION/TRAINING PROGRAM
Payer: COMMERCIAL

## 2020-08-11 VITALS
HEART RATE: 80 BPM | RESPIRATION RATE: 18 BRPM | SYSTOLIC BLOOD PRESSURE: 140 MMHG | TEMPERATURE: 96.8 F | OXYGEN SATURATION: 100 % | DIASTOLIC BLOOD PRESSURE: 100 MMHG

## 2020-08-11 LAB
-: ABNORMAL
ABSOLUTE EOS #: 0.41 K/UL (ref 0–0.4)
ABSOLUTE IMMATURE GRANULOCYTE: 0 K/UL (ref 0–0.3)
ABSOLUTE LYMPH #: 4.53 K/UL (ref 1–4.8)
ABSOLUTE MONO #: 0.72 K/UL (ref 0.1–0.8)
ALBUMIN SERPL-MCNC: 2.8 G/DL (ref 3.5–5.2)
ALBUMIN/GLOBULIN RATIO: 0.7 (ref 1–2.5)
ALLEN TEST: ABNORMAL
ALP BLD-CCNC: 96 U/L (ref 35–104)
ALT SERPL-CCNC: 7 U/L (ref 5–33)
AMORPHOUS: ABNORMAL
ANION GAP SERPL CALCULATED.3IONS-SCNC: 10 MMOL/L (ref 9–17)
ANION GAP: 7 MMOL/L (ref 7–16)
AST SERPL-CCNC: 18 U/L
BACTERIA: ABNORMAL
BASOPHILS # BLD: 0 % (ref 0–2)
BASOPHILS ABSOLUTE: 0 K/UL (ref 0–0.2)
BILIRUB SERPL-MCNC: 0.21 MG/DL (ref 0.3–1.2)
BILIRUBIN URINE: NEGATIVE
BUN BLDV-MCNC: 3 MG/DL (ref 6–20)
BUN/CREAT BLD: ABNORMAL (ref 9–20)
CALCIUM SERPL-MCNC: 8.8 MG/DL (ref 8.6–10.4)
CASTS UA: ABNORMAL /LPF (ref 0–8)
CHLORIDE BLD-SCNC: 104 MMOL/L (ref 98–107)
CO2: 24 MMOL/L (ref 20–31)
COLOR: YELLOW
CREAT SERPL-MCNC: 0.86 MG/DL (ref 0.5–0.9)
CRYSTALS, UA: ABNORMAL /HPF
DIFFERENTIAL TYPE: ABNORMAL
EOSINOPHILS RELATIVE PERCENT: 4 % (ref 1–4)
EPITHELIAL CELLS UA: ABNORMAL /HPF (ref 0–5)
FIO2: ABNORMAL
GFR AFRICAN AMERICAN: >60 ML/MIN
GFR NON-AFRICAN AMERICAN: >60 ML/MIN
GFR NON-AFRICAN AMERICAN: >60 ML/MIN
GFR SERPL CREATININE-BSD FRML MDRD: >60 ML/MIN
GFR SERPL CREATININE-BSD FRML MDRD: ABNORMAL ML/MIN/{1.73_M2}
GFR SERPL CREATININE-BSD FRML MDRD: ABNORMAL ML/MIN/{1.73_M2}
GFR SERPL CREATININE-BSD FRML MDRD: NORMAL ML/MIN/{1.73_M2}
GLUCOSE BLD-MCNC: 153 MG/DL (ref 70–99)
GLUCOSE BLD-MCNC: 166 MG/DL (ref 74–100)
GLUCOSE URINE: NEGATIVE
HCG QUALITATIVE: NEGATIVE
HCO3 VENOUS: 30.5 MMOL/L (ref 22–29)
HCT VFR BLD CALC: 33.1 % (ref 36.3–47.1)
HEMOGLOBIN: 11.5 G/DL (ref 11.9–15.1)
IMMATURE GRANULOCYTES: 0 %
KETONES, URINE: NEGATIVE
LACTIC ACID, WHOLE BLOOD: 2.2 MMOL/L (ref 0.7–2.1)
LEUKOCYTE ESTERASE, URINE: ABNORMAL
LIPASE: 20 U/L (ref 13–60)
LYMPHOCYTES # BLD: 44 % (ref 24–44)
MCH RBC QN AUTO: 29.4 PG (ref 25.2–33.5)
MCHC RBC AUTO-ENTMCNC: 34.7 G/DL (ref 28.4–34.8)
MCV RBC AUTO: 84.7 FL (ref 82.6–102.9)
MODE: ABNORMAL
MONOCYTES # BLD: 7 % (ref 1–7)
MORPHOLOGY: ABNORMAL
MUCUS: ABNORMAL
NEGATIVE BASE EXCESS, VEN: ABNORMAL (ref 0–2)
NITRITE, URINE: NEGATIVE
NRBC AUTOMATED: 0 PER 100 WBC
O2 DEVICE/FLOW/%: ABNORMAL
O2 SAT, VEN: 46 % (ref 60–85)
OTHER OBSERVATIONS UA: ABNORMAL
PATIENT TEMP: ABNORMAL
PCO2, VEN: 58.9 MM HG (ref 41–51)
PDW BLD-RTO: 17.2 % (ref 11.8–14.4)
PH UA: 7 (ref 5–8)
PH VENOUS: 7.32 (ref 7.32–7.43)
PLATELET # BLD: 549 K/UL (ref 138–453)
PLATELET ESTIMATE: ABNORMAL
PMV BLD AUTO: 10 FL (ref 8.1–13.5)
PO2, VEN: 28.2 MM HG (ref 30–50)
POC CHLORIDE: 105 MMOL/L (ref 98–107)
POC CREATININE: 1 MG/DL (ref 0.51–1.19)
POC HEMATOCRIT: 36 % (ref 36–46)
POC HEMOGLOBIN: 12.3 G/DL (ref 12–16)
POC IONIZED CALCIUM: 1.26 MMOL/L (ref 1.15–1.33)
POC LACTIC ACID: 1.99 MMOL/L (ref 0.56–1.39)
POC PCO2 TEMP: ABNORMAL MM HG
POC PH TEMP: ABNORMAL
POC PO2 TEMP: ABNORMAL MM HG
POC POTASSIUM: 3.9 MMOL/L (ref 3.5–4.5)
POC SODIUM: 142 MMOL/L (ref 138–146)
POSITIVE BASE EXCESS, VEN: 3 (ref 0–3)
POTASSIUM SERPL-SCNC: 4 MMOL/L (ref 3.7–5.3)
PROTEIN UA: ABNORMAL
RBC # BLD: 3.91 M/UL (ref 3.95–5.11)
RBC # BLD: ABNORMAL 10*6/UL
RBC UA: ABNORMAL /HPF (ref 0–4)
RENAL EPITHELIAL, UA: ABNORMAL /HPF
SAMPLE SITE: ABNORMAL
SEG NEUTROPHILS: 45 % (ref 36–66)
SEGMENTED NEUTROPHILS ABSOLUTE COUNT: 4.64 K/UL (ref 1.8–7.7)
SODIUM BLD-SCNC: 138 MMOL/L (ref 135–144)
SPECIFIC GRAVITY UA: 1.02 (ref 1–1.03)
TOTAL CO2, VENOUS: 32 MMOL/L (ref 23–30)
TOTAL PROTEIN: 7 G/DL (ref 6.4–8.3)
TRICHOMONAS: ABNORMAL
TURBIDITY: ABNORMAL
URINE HGB: ABNORMAL
UROBILINOGEN, URINE: NORMAL
WBC # BLD: 10.3 K/UL (ref 3.5–11.3)
WBC # BLD: ABNORMAL 10*3/UL
WBC UA: ABNORMAL /HPF (ref 0–5)
YEAST: ABNORMAL

## 2020-08-11 PROCEDURE — 82565 ASSAY OF CREATININE: CPT

## 2020-08-11 PROCEDURE — 84703 CHORIONIC GONADOTROPIN ASSAY: CPT

## 2020-08-11 PROCEDURE — 84132 ASSAY OF SERUM POTASSIUM: CPT

## 2020-08-11 PROCEDURE — 82435 ASSAY OF BLOOD CHLORIDE: CPT

## 2020-08-11 PROCEDURE — 96375 TX/PRO/DX INJ NEW DRUG ADDON: CPT

## 2020-08-11 PROCEDURE — 84295 ASSAY OF SERUM SODIUM: CPT

## 2020-08-11 PROCEDURE — 2580000003 HC RX 258: Performed by: STUDENT IN AN ORGANIZED HEALTH CARE EDUCATION/TRAINING PROGRAM

## 2020-08-11 PROCEDURE — 85014 HEMATOCRIT: CPT

## 2020-08-11 PROCEDURE — 99283 EMERGENCY DEPT VISIT LOW MDM: CPT

## 2020-08-11 PROCEDURE — 81001 URINALYSIS AUTO W/SCOPE: CPT

## 2020-08-11 PROCEDURE — 82330 ASSAY OF CALCIUM: CPT

## 2020-08-11 PROCEDURE — 96374 THER/PROPH/DIAG INJ IV PUSH: CPT

## 2020-08-11 PROCEDURE — 82803 BLOOD GASES ANY COMBINATION: CPT

## 2020-08-11 PROCEDURE — 6360000002 HC RX W HCPCS: Performed by: STUDENT IN AN ORGANIZED HEALTH CARE EDUCATION/TRAINING PROGRAM

## 2020-08-11 PROCEDURE — 96376 TX/PRO/DX INJ SAME DRUG ADON: CPT

## 2020-08-11 PROCEDURE — 85025 COMPLETE CBC W/AUTO DIFF WBC: CPT

## 2020-08-11 PROCEDURE — 80053 COMPREHEN METABOLIC PANEL: CPT

## 2020-08-11 PROCEDURE — 83690 ASSAY OF LIPASE: CPT

## 2020-08-11 PROCEDURE — 6370000000 HC RX 637 (ALT 250 FOR IP): Performed by: STUDENT IN AN ORGANIZED HEALTH CARE EDUCATION/TRAINING PROGRAM

## 2020-08-11 PROCEDURE — 83605 ASSAY OF LACTIC ACID: CPT

## 2020-08-11 PROCEDURE — 82947 ASSAY GLUCOSE BLOOD QUANT: CPT

## 2020-08-11 RX ORDER — DICYCLOMINE HYDROCHLORIDE 10 MG/1
10 CAPSULE ORAL ONCE
Status: DISCONTINUED | OUTPATIENT
Start: 2020-08-11 | End: 2020-08-11

## 2020-08-11 RX ORDER — DICYCLOMINE HYDROCHLORIDE 10 MG/1
10 CAPSULE ORAL ONCE
Status: DISCONTINUED | OUTPATIENT
Start: 2020-08-11 | End: 2020-08-11 | Stop reason: HOSPADM

## 2020-08-11 RX ORDER — 0.9 % SODIUM CHLORIDE 0.9 %
1000 INTRAVENOUS SOLUTION INTRAVENOUS ONCE
Status: COMPLETED | OUTPATIENT
Start: 2020-08-11 | End: 2020-08-11

## 2020-08-11 RX ORDER — ONDANSETRON 2 MG/ML
4 INJECTION INTRAMUSCULAR; INTRAVENOUS ONCE
Status: COMPLETED | OUTPATIENT
Start: 2020-08-11 | End: 2020-08-11

## 2020-08-11 RX ORDER — DICYCLOMINE HYDROCHLORIDE 10 MG/ML
20 INJECTION INTRAMUSCULAR ONCE
Status: DISCONTINUED | OUTPATIENT
Start: 2020-08-11 | End: 2020-08-11

## 2020-08-11 RX ORDER — ACETAMINOPHEN 500 MG
1000 TABLET ORAL EVERY 8 HOURS
Qty: 42 TABLET | Refills: 0 | Status: SHIPPED | OUTPATIENT
Start: 2020-08-11 | End: 2020-08-20 | Stop reason: SDUPTHER

## 2020-08-11 RX ORDER — CEPHALEXIN 500 MG/1
500 CAPSULE ORAL 2 TIMES DAILY
Qty: 20 CAPSULE | Refills: 0 | Status: SHIPPED | OUTPATIENT
Start: 2020-08-11 | End: 2020-08-20 | Stop reason: ALTCHOICE

## 2020-08-11 RX ORDER — KETOROLAC TROMETHAMINE 15 MG/ML
15 INJECTION, SOLUTION INTRAMUSCULAR; INTRAVENOUS ONCE
Status: COMPLETED | OUTPATIENT
Start: 2020-08-11 | End: 2020-08-11

## 2020-08-11 RX ORDER — MAGNESIUM HYDROXIDE/ALUMINUM HYDROXICE/SIMETHICONE 120; 1200; 1200 MG/30ML; MG/30ML; MG/30ML
30 SUSPENSION ORAL ONCE
Status: COMPLETED | OUTPATIENT
Start: 2020-08-11 | End: 2020-08-11

## 2020-08-11 RX ORDER — CEPHALEXIN 250 MG/1
500 CAPSULE ORAL ONCE
Status: COMPLETED | OUTPATIENT
Start: 2020-08-11 | End: 2020-08-11

## 2020-08-11 RX ORDER — ONDANSETRON 4 MG/1
4 TABLET, ORALLY DISINTEGRATING ORAL EVERY 8 HOURS PRN
Qty: 10 TABLET | Refills: 0 | Status: SHIPPED | OUTPATIENT
Start: 2020-08-11 | End: 2020-08-20 | Stop reason: ALTCHOICE

## 2020-08-11 RX ORDER — ACETAMINOPHEN 500 MG
1000 TABLET ORAL ONCE
Status: COMPLETED | OUTPATIENT
Start: 2020-08-11 | End: 2020-08-11

## 2020-08-11 RX ORDER — DICYCLOMINE HYDROCHLORIDE 10 MG/1
10 CAPSULE ORAL EVERY 6 HOURS PRN
Qty: 20 CAPSULE | Refills: 0 | Status: SHIPPED | OUTPATIENT
Start: 2020-08-11 | End: 2020-08-20 | Stop reason: ALTCHOICE

## 2020-08-11 RX ADMIN — CEPHALEXIN 500 MG: 250 CAPSULE ORAL at 17:50

## 2020-08-11 RX ADMIN — SODIUM CHLORIDE 1000 ML: 9 INJECTION, SOLUTION INTRAVENOUS at 15:05

## 2020-08-11 RX ADMIN — ONDANSETRON 4 MG: 2 INJECTION INTRAMUSCULAR; INTRAVENOUS at 15:59

## 2020-08-11 RX ADMIN — ACETAMINOPHEN 1000 MG: 500 TABLET ORAL at 16:00

## 2020-08-11 RX ADMIN — KETOROLAC TROMETHAMINE 15 MG: 15 INJECTION, SOLUTION INTRAMUSCULAR; INTRAVENOUS at 15:05

## 2020-08-11 RX ADMIN — ALUMINUM HYDROXIDE, MAGNESIUM HYDROXIDE, AND SIMETHICONE 30 ML: 200; 200; 20 SUSPENSION ORAL at 15:59

## 2020-08-11 RX ADMIN — ONDANSETRON 4 MG: 2 INJECTION, SOLUTION INTRAMUSCULAR; INTRAVENOUS at 15:04

## 2020-08-11 ASSESSMENT — ENCOUNTER SYMPTOMS
NAUSEA: 1
BACK PAIN: 0
ABDOMINAL PAIN: 1
SHORTNESS OF BREATH: 0
BLOOD IN STOOL: 0
COUGH: 0
CONSTIPATION: 0
SORE THROAT: 0
VOMITING: 1
DIARRHEA: 0

## 2020-08-11 ASSESSMENT — PAIN DESCRIPTION - FREQUENCY: FREQUENCY: CONTINUOUS

## 2020-08-11 ASSESSMENT — PAIN SCALES - GENERAL: PAINLEVEL_OUTOF10: 9

## 2020-08-11 ASSESSMENT — PAIN DESCRIPTION - PAIN TYPE: TYPE: ACUTE PAIN

## 2020-08-11 ASSESSMENT — PAIN DESCRIPTION - LOCATION: LOCATION: ABDOMEN

## 2020-08-11 ASSESSMENT — PAIN DESCRIPTION - DESCRIPTORS: DESCRIPTORS: DISCOMFORT

## 2020-08-11 ASSESSMENT — PAIN DESCRIPTION - PROGRESSION: CLINICAL_PROGRESSION: NOT CHANGED

## 2020-08-11 NOTE — ED PROVIDER NOTES
[facility]     Emergency Department     Faculty Note/ Attestation      Pt Name: Jim Luz                                       MRN: [id number]  [place] 1995  Date of evaluation: 8/11/2020  Patients PCP:    Lorelei Hercules MD    Attestation  I performed a history and physical examination of the patient/ or directly observed  and discussed management with the resident. I reviewed the residents note and agree with the documented findings and plan of care. Any areas of disagreement are noted on the chart. I was personally present for the key portions of any procedures. I have documented in the chart those procedures where I was not present during the key portions. I have reviewed the emergency nurses triage note. I agree with the chief complaint, past medical history, past surgical history, allergies, medications, social and family history as documented unless otherwise noted below. For Physician Assistant/ Nurse Practitioner cases/documentation I have personally evaluated this patient and have completed at least one if not all key elements of the E/M (history, physical exam, and MDM). Additional findings are as noted. Initial Screens:             Vitals:    Vitals:    08/11/20 1421   Temp: 96.8 °F (36 °C)   TempSrc: Oral       Chief Complaint      Chief Complaint   Patient presents with    Abdominal Pain     pt states she was at a laundry mat and abdominal pain started, pain rated 8/10    Emesis          oral temperature is 96.8 °F (36 °C).             DIAGNOSTIC RESULTS       RADIOLOGY:   No orders to display         LABS:  Labs Reviewed   CBC WITH AUTO DIFFERENTIAL - Abnormal; Notable for the following components:       Result Value    RBC 3.91 (*)     Hemoglobin 11.5 (*)     Hematocrit 33.1 (*)     RDW 17.2 (*)     Platelets 418 (*)     Absolute Eos # 0.41 (*)     All other components within normal limits   COMPREHENSIVE METABOLIC PANEL W/ REFLEX TO MG FOR LOW K - Abnormal; Notable for the following components:    Glucose 153 (*)     BUN 3 (*)     Total Bilirubin 0.21 (*)     Alb 2.8 (*)     Albumin/Globulin Ratio 0.7 (*)     All other components within normal limits   LACTIC ACID, WHOLE BLOOD - Abnormal; Notable for the following components:    Lactic Acid, Whole Blood 2.2 (*)     All other components within normal limits   VENOUS BLOOD GAS, POINT OF CARE - Abnormal; Notable for the following components:    pCO2, Magen 58.9 (*)     pO2, Magen 28.2 (*)     HCO3, Venous 30.5 (*)     Total CO2, Venous 32 (*)     O2 Sat, Magen 46 (*)     All other components within normal limits   LACTIC ACID,POINT OF CARE - Abnormal; Notable for the following components:    POC Lactic Acid 1.99 (*)     All other components within normal limits   POCT GLUCOSE - Abnormal; Notable for the following components:    POC Glucose 166 (*)     All other components within normal limits   LIPASE   HCG, SERUM, QUALITATIVE   HGB/HCT   SODIUM (POC)   POTASSIUM (POC)   CHLORIDE (POC)   CALCIUM, IONIC (POC)   URINALYSIS WITH MICROSCOPIC   LACTIC ACID, PLASMA   CREATININE W/GFR POINT OF CARE   ANION GAP (CALC) POC         EMERGENCY DEPARTMENT COURSE:     -------------------------       , Temp: 96.8 °F (36 °C),  ,      System Problem List     Patient Active Problem List   Diagnosis    Yeast vaginitis    Type 1 diabetes mellitus with diabetic polyneuropathy (HCC)    Pregestational diabetes mellitus, modified White class C    Intractable vomiting with nausea    Intractable abdominal pain       Comments  Chronic Prob List noted    Sudden onset diffuse crampy abdominal pain w/ N/V, nonbloody, nonbilious  Upper abdominal tenderness on exam, no lower  Type 1 diabetic, blood sugar 153, mild lactic elevation 2.2  Will rehydrate, reassess    Cherelle Worthy DO  Attending Emergency Physician        Cherelle Worthy DO  08/11/20 1492

## 2020-08-11 NOTE — ED PROVIDER NOTES
[facility]  FACULTY HANDOFF       Handoff taken on the following patient from prior Attending Physician:  Pt Name: Claudettela Cassi  PCP:  Emmanuelle Graves MD    Attestation  I was available and discussed any additional care issues that arose and coordinated the management plans with the resident(s) caring for the patient during my duty period. Any areas of disagreement with resident's documentation of care or procedures are noted on the chart. I was personally present for the key portions of any/all procedures during my duty period. I have documented in the chart those procedures where I was not present during the key portions.              John Webb MD  08/11/20 7784

## 2020-08-11 NOTE — ED NOTES
Pt reports to the ED via triage with c/o abdominal pain and emesis. Pt states she was doing her laundry and she got abdominal pain that is all over and rated 9/10, pt states she denies any injury, pt states she hasn't eaten anything today that would cause this pain. Pt has had multiple episodes of vomiting. Pt is A&Ox4, RR even and non labored.      Delores Santos RN  08/11/20 7256

## 2020-08-11 NOTE — ED NOTES
Urine labeled and sent to lab. Patient refusing lab draw for lactic acid.       Dallin Troncoso RN  08/11/20 8988

## 2020-08-11 NOTE — ED PROVIDER NOTES
[address] [facility]  ED  Emergency Department Encounter  EmergencyMedicine Resident     Pt Nito De La Rosa  MRN: [id number]  [place] 1995  Date of evaluation: 20  PCP:  Lorelei Hercules MD    CHIEF COMPLAINT       Chief Complaint   Patient presents with    Abdominal Pain     pt states she was at a laundry mat and abdominal pain started, pain rated 8/10    Emesis       HISTORY OF PRESENT ILLNESS  (Location/Symptom, Timing/Onset, Context/Setting, Quality, Duration, Modifying Factors, Severity.)      Jim Luz is a 30 y.o. female who presents with abdominal pain, nausea, vomiting. Patient was at the [facility] today when she had acute onset generalized abdominal cramping associated with nausea, x3 nonbloody emesis. Abdominal pain comes and goes, nothing makes it better or worse. She has not had any to eat today. She denies any fevers chills, diarrhea, constipation, dysuria or frequency. Patient has a history of diabetes type 1, states she has been compliant with medications and that her blood sugars have been normal.    PAST MEDICAL / SURGICAL / SOCIAL / FAMILY HISTORY      has a past medical history of Diabetes mellitus ([address] MRSA (methicillin resistant staph aureus) culture positive, and Ovarian cyst.     has a past surgical history that includes  section.     Social History     Socioeconomic History    Marital status: Single     Spouse name: Not on file    Number of children: Not on file    Years of education: Not on file    Highest education level: Not on file   Occupational History    Not on file   Social Needs    Financial resource strain: Not on file    Food insecurity     Worry: Not on file     Inability: Not on file    Transportation needs     Medical: Not on file     Non-medical: Not on file   Tobacco Use    Smoking status: Current Every Day Smoker     Types: Cigarettes    Smokeless tobacco: Never Used    Tobacco comment: 5/day Substance and Sexual Activity    Alcohol use: No    Drug use: No    Sexual activity: Never   Lifestyle    Physical activity     Days per week: Not on file     Minutes per session: Not on file    Stress: Not on file   Relationships    Social connections     Talks on phone: Not on file     Gets together: Not on file     Attends Latter day service: Not on file     Active member of club or organization: Not on file     Attends meetings of clubs or organizations: Not on file     Relationship status: Not on file    Intimate partner violence     Fear of current or ex partner: Not on file     Emotionally abused: Not on file     Physically abused: Not on file     Forced sexual activity: Not on file   Other Topics Concern    Not on file   Social History Narrative    Not on file       History reviewed. No pertinent family history. Allergies:  Shrimp flavor    Home Medications:  Prior to Admission medications    Medication Sig Start Date End Date Taking?  Authorizing Provider   ondansetron (ZOFRAN ODT) 4 MG disintegrating tablet Take 1 tablet by mouth every 8 hours as needed for Nausea 8/11/20  Yes Ritika Mei,    acetaminophen (TYLENOL) 500 MG tablet Take 2 tablets by mouth every 8 hours for 7 days 8/11/20 8/18/20 Yes Juno Silverman DO   dicyclomine (BENTYL) 10 MG capsule Take 1 capsule by mouth every 6 hours as needed (cramps) 8/11/20 8/16/20 Yes Juno Silverman DO   cephALEXin (KEFLEX) 500 MG capsule Take 1 capsule by mouth 2 times daily for 10 days 8/11/20 8/21/20 Yes Juno Silverman DO   insulin aspart (NOVOLOG FLEXPEN) 100 UNIT/ML injection pen Inject 2-12 units subcutaneously TID based on sliding scale 3/8/20   Carolann Kenny DO   insulin glargine [facility]) 100 UNIT/ML injection pen Inject 12 Units into the skin nightly 3/8/20   Carolann Kenny DO   blood glucose monitor kit and supplies use check blood sugar as directed 3/8/20 3/8/21  Carolann Kenny DO   blood glucose Pulmonary effort is normal.      Breath sounds: Normal breath sounds. Abdominal:      General: Bowel sounds are normal. There is no distension. Palpations: Abdomen is soft. Tenderness: There is abdominal tenderness (mild upper quadrants). There is no guarding or rebound. Musculoskeletal: Normal range of motion. Skin:     General: Skin is warm and dry. Neurological:      General: No focal deficit present. Mental Status: She is alert and oriented to person, place, and time.          DIFFERENTIAL  DIAGNOSIS     PLAN (LABS / IMAGING / EKG):  Orders Placed This Encounter   Procedures    CBC Auto Differential    Comprehensive Metabolic Panel w/ Reflex to MG    Lipase    Urinalysis with Microscopic    Lactic Acid, Whole Blood    HCG Qualitative, Serum    Hemoglobin and hematocrit, blood    SODIUM (POC)    POTASSIUM (POC)    CHLORIDE (POC)    CALCIUM, IONIC (POC)    Lactic Acid, Plasma    Venous Blood Gas, POC    Creatinine W/GFR Point of Care    Lactic Acid, POC    POCT Glucose    Anion Gap (Calc) POC       MEDICATIONS ORDERED:  Orders Placed This Encounter   Medications    ondansetron (ZOFRAN) injection 4 mg    ketorolac (TORADOL) injection 15 mg    0.9 % sodium chloride bolus    DISCONTD: dicyclomine (BENTYL) capsule 10 mg    ondansetron (ZOFRAN) injection 4 mg    aluminum & magnesium hydroxide-simethicone (MAALOX) 200-200-20 MG/5ML suspension 30 mL    acetaminophen (TYLENOL) tablet 1,000 mg    DISCONTD: dicyclomine (BENTYL) injection 20 mg    dicyclomine (BENTYL) capsule 10 mg    cephALEXin (KEFLEX) capsule 500 mg    ondansetron (ZOFRAN ODT) 4 MG disintegrating tablet     Sig: Take 1 tablet by mouth every 8 hours as needed for Nausea     Dispense:  10 tablet     Refill:  0    acetaminophen (TYLENOL) 500 MG tablet     Sig: Take 2 tablets by mouth every 8 hours for 7 days     Dispense:  42 tablet     Refill:  0    dicyclomine (BENTYL) 10 MG capsule     Sig: Take 1 capsule by mouth every 6 hours as needed (cramps)     Dispense:  20 capsule     Refill:  0    cephALEXin (KEFLEX) 500 MG capsule     Sig: Take 1 capsule by mouth 2 times daily for 10 days     Dispense:  20 capsule     Refill:  0       DIAGNOSTIC RESULTS / EMERGENCY DEPARTMENT COURSE / MDM   LAB RESULTS:  Results for orders placed or performed during the hospital encounter of 08/11/20   CBC Auto Differential   Result Value Ref Range    WBC 10.3 3.5 - 11.3 k/uL    RBC 3.91 (L) 3.95 - 5.11 m/uL    Hemoglobin 11.5 (L) 11.9 - 15.1 g/dL    Hematocrit 33.1 (L) 36.3 - 47.1 %    MCV 84.7 82.6 - 102.9 fL    MCH 29.4 25.2 - 33.5 pg    MCHC 34.7 28.4 - 34.8 g/dL    RDW 17.2 (H) 11.8 - 14.4 %    Platelets 746 (H) 450 - 453 k/uL    MPV 10.0 8.1 - 13.5 fL    NRBC Automated 0.0 0.0 per 100 WBC    Differential Type NOT REPORTED     WBC Morphology NOT REPORTED     RBC Morphology NOT REPORTED     Platelet Estimate NOT REPORTED     Immature Granulocytes 0 0 %    Seg Neutrophils 45 36 - 66 %    Lymphocytes 44 24 - 44 %    Monocytes 7 1 - 7 %    Eosinophils % 4 1 - 4 %    Basophils 0 0 - 2 %    Absolute Immature Granulocyte 0.00 0.00 - 0.30 k/uL    Segs Absolute 4.64 1.8 - 7.7 k/uL    Absolute Lymph # 4.53 1.0 - 4.8 k/uL    Absolute Mono # 0.72 0.1 - 0.8 k/uL    Absolute Eos # 0.41 (H) 0.0 - 0.4 k/uL    Basophils Absolute 0.00 0.0 - 0.2 k/uL    Morphology ANISOCYTOSIS PRESENT    Comprehensive Metabolic Panel w/ Reflex to MG   Result Value Ref Range    Glucose 153 (H) 70 - 99 mg/dL    BUN 3 (L) 6 - 20 mg/dL    CREATININE 0.86 0.50 - 0.90 mg/dL    Bun/Cre Ratio NOT REPORTED 9 - 20    Calcium 8.8 8.6 - 10.4 mg/dL    Sodium 138 135 - 144 mmol/L    Potassium 4.0 3.7 - 5.3 mmol/L    Chloride 104 98 - 107 mmol/L    CO2 24 20 - 31 mmol/L    Anion Gap 10 9 - 17 mmol/L    Alkaline Phosphatase 96 35 - 104 U/L    ALT 7 5 - 33 U/L    AST 18 <32 U/L    Total Bilirubin 0.21 (L) 0.3 - 1.2 mg/dL    Total Protein 7.0 6.4 - 8.3 g/dL    Alb 2.8 (L) 3.5 - 5.2 g/dL    Albumin/Globulin Ratio 0.7 (L) 1.0 - 2.5    GFR Non-African American >60 >60 mL/min    GFR African American >60 >60 mL/min    GFR Comment          GFR Staging NOT REPORTED    Lipase   Result Value Ref Range    Lipase 20 13 - 60 U/L   Urinalysis with Microscopic   Result Value Ref Range    Color, UA YELLOW YELLOW    Turbidity UA TURBID (A) CLEAR    Glucose, Ur NEGATIVE NEGATIVE    Bilirubin Urine NEGATIVE NEGATIVE    Ketones, Urine NEGATIVE NEGATIVE    Specific Gravity, UA 1.017 1.005 - 1.030    Urine Hgb TRACE (A) NEGATIVE    pH, UA 7.0 5.0 - 8.0    Protein, UA 2+ (A) NEGATIVE    Urobilinogen, Urine Normal Normal    Nitrite, Urine NEGATIVE NEGATIVE    Leukocyte Esterase, Urine LARGE (A) NEGATIVE    -          WBC, UA TOO NUMEROUS TO COUNT 0 - 5 /HPF    RBC, UA 2 TO 5 0 - 4 /HPF    Casts UA  0 - 8 /LPF     5 TO 10 HYALINE Reference range defined for non-centrifuged specimen. Crystals, UA NOT REPORTED None /HPF    Epithelial Cells UA 10 TO 20 0 - 5 /HPF    Renal Epithelial, UA NOT REPORTED 0 /HPF    Bacteria, UA MANY (A) None    Mucus, UA NOT REPORTED None    Trichomonas, UA NOT REPORTED None    Amorphous, UA NOT REPORTED None    Other Observations UA NOT REPORTED NOT REQ.     Yeast, UA NOT REPORTED None   Lactic Acid, Whole Blood   Result Value Ref Range    Lactic Acid, Whole Blood 2.2 (H) 0.7 - 2.1 mmol/L   HCG Qualitative, Serum   Result Value Ref Range    hCG Qual NEGATIVE NEGATIVE   Hemoglobin and hematocrit, blood   Result Value Ref Range    POC Hemoglobin 12.3 12.0 - 16.0 g/dL    POC Hematocrit 36 36 - 46 %   SODIUM (POC)   Result Value Ref Range    POC Sodium 142 138 - 146 mmol/L   POTASSIUM (POC)   Result Value Ref Range    POC Potassium 3.9 3.5 - 4.5 mmol/L   CHLORIDE (POC)   Result Value Ref Range    POC Chloride 105 98 - 107 mmol/L   CALCIUM, IONIC (POC)   Result Value Ref Range    POC Ionized Calcium 1.26 1.15 - 1.33 mmol/L   Venous Blood Gas, POC   Result Value Ref Range    pH, Magen 7.322 7.320 - 7.430    pCO2, Magen 58.9 (H) 41.0 - 51.0 mm Hg    pO2, Magen 28.2 (L) 30.0 - 50.0 mm Hg    HCO3, Venous 30.5 (H) 22.0 - 29.0 mmol/L    Total CO2, Venous 32 (H) 23.0 - 30.0 mmol/L    Negative Base Excess, Magen NOT REPORTED 0.0 - 2.0    Positive Base Excess, Magen 3 0.0 - 3.0    O2 Sat, Magen 46 (L) 60.0 - 85.0 %    O2 Device/Flow/% NOT REPORTED     Cruz Test NOT REPORTED     Sample Site NOT REPORTED     Mode NOT REPORTED     FIO2 NOT REPORTED     Pt Temp NOT REPORTED     POC pH Temp NOT REPORTED     POC pCO2 Temp NOT REPORTED mm Hg    POC pO2 Temp NOT REPORTED mm Hg   Creatinine W/GFR Point of Care   Result Value Ref Range    POC Creatinine 1.00 0.51 - 1.19 mg/dL    GFR Comment >60 >60 mL/min    GFR Non-African American >60 >60 mL/min    GFR Comment         Lactic Acid, POC   Result Value Ref Range    POC Lactic Acid 1.99 (H) 0.56 - 1.39 mmol/L   POCT Glucose   Result Value Ref Range    POC Glucose 166 (H) 74 - 100 mg/dL   Anion Gap (Calc) POC   Result Value Ref Range    Anion Gap 7 7 - 16 mmol/L       IMPRESSION: UTI, non-specific abdominal pain    RADIOLOGY:  No orders to display        EKG    All EKG's are interpreted by the Emergency Department Physician who either signs or Co-signs this chart in the absence of a cardiologist.    [facility]:    Patient comes in for evaluation of abdominal pain. She appears uncomfortable but no acute distress, vital signs are within normal limits. Physical exam is remarkable for some mild tenderness to the upper quadrants however no rebound no peritoneal signs. Will obtain abdominal labs, urinalysis, CBC, point-of-care glucose and a venous blood gas to rule out DKA. Will treat symptomatically with Zofran, Toradol, Bentyl, Maalox. ED Course as of Aug 11 1743   Tue Aug 11, 2020   1510 Lactic acid elevated will repeat after 1 L normal saline is finished  Other abdominal labs appear to be within normal limits patient is slightly anemic, no leukocytosis. Lipase normal.  LFTs normal.  Doubt DKA as patient has normal pH. [CS]   2159 Patient still having abdominal cramps. Nausea and vomiting has somewhat improved. Will re-dose patient with Zofran, administer Maalox and Bentyl. Reassess. [CS]   1132 Pt refusing lab draws or IM bentyl will switch to PO.    [CS]   1659 Urinalysis positive for bacteria. Will like to treat with Keflex. [CS]      ED Course User Index  [CS] Kingsley Pizano DO     Patient symptomatically improved over ED course. Positive for UTI treated with Keflex. Discharge patient home on Keflex and Tylenol, Bentyl, Zofran. Given return precautions for new worsening concerning complaints. PROCEDURES:    CONSULTS:  None    CRITICAL CARE:  Please see attending note    FINAL IMPRESSION      1. Urinary tract infection without hematuria, site unspecified    2. Generalized abdominal pain          DISPOSITION / PLAN     DISPOSITION Decision To Discharge 08/11/2020 05:31:23 PM      PATIENT REFERRED TO:  No follow-up provider specified.     DISCHARGE MEDICATIONS:  New Prescriptions    ACETAMINOPHEN (TYLENOL) 500 MG TABLET    Take 2 tablets by mouth every 8 hours for 7 days    CEPHALEXIN (KEFLEX) 500 MG CAPSULE    Take 1 capsule by mouth 2 times daily for 10 days    DICYCLOMINE (BENTYL) 10 MG CAPSULE    Take 1 capsule by mouth every 6 hours as needed (cramps)    ONDANSETRON (ZOFRAN ODT) 4 MG DISINTEGRATING TABLET    Take 1 tablet by mouth every 8 hours as needed for Nausea       Kingsley Pizano DO  Emergency Medicine Resident    (Please note that portions of thisnote were completed with a voice recognition program.  Efforts were made to edit the dictations but occasionally words are mis-transcribed.)       Kingsley Pizano DO  Resident  08/11/20 1014

## 2020-08-11 NOTE — ED NOTES
Pt states she is unable to collect urine at this time, will continue to monitor.       Jose Barker, SANDRITA  08/11/20 [id number]

## 2020-08-12 ENCOUNTER — HOSPITAL ENCOUNTER (EMERGENCY)
Age: 25
Discharge: LEFT BEFORE DISCHARGE | End: 2020-08-12
Attending: EMERGENCY MEDICINE
Payer: COMMERCIAL

## 2020-08-12 VITALS
BODY MASS INDEX: 23.32 KG/M2 | OXYGEN SATURATION: 98 % | RESPIRATION RATE: 16 BRPM | SYSTOLIC BLOOD PRESSURE: 142 MMHG | HEART RATE: 99 BPM | HEIGHT: 65 IN | WEIGHT: 140 LBS | DIASTOLIC BLOOD PRESSURE: 94 MMHG | TEMPERATURE: 98.4 F

## 2020-08-12 LAB
DIRECT EXAM: ABNORMAL
Lab: ABNORMAL
SPECIMEN DESCRIPTION: ABNORMAL

## 2020-08-12 PROCEDURE — 87491 CHLMYD TRACH DNA AMP PROBE: CPT

## 2020-08-12 PROCEDURE — 87480 CANDIDA DNA DIR PROBE: CPT

## 2020-08-12 PROCEDURE — 87591 N.GONORRHOEAE DNA AMP PROB: CPT

## 2020-08-12 PROCEDURE — 83690 ASSAY OF LIPASE: CPT

## 2020-08-12 PROCEDURE — 99283 EMERGENCY DEPT VISIT LOW MDM: CPT

## 2020-08-12 PROCEDURE — 80053 COMPREHEN METABOLIC PANEL: CPT

## 2020-08-12 PROCEDURE — 85025 COMPLETE CBC W/AUTO DIFF WBC: CPT

## 2020-08-12 PROCEDURE — 87510 GARDNER VAG DNA DIR PROBE: CPT

## 2020-08-12 PROCEDURE — 87660 TRICHOMONAS VAGIN DIR PROBE: CPT

## 2020-08-12 PROCEDURE — 2580000003 HC RX 258: Performed by: STUDENT IN AN ORGANIZED HEALTH CARE EDUCATION/TRAINING PROGRAM

## 2020-08-12 PROCEDURE — 6370000000 HC RX 637 (ALT 250 FOR IP): Performed by: STUDENT IN AN ORGANIZED HEALTH CARE EDUCATION/TRAINING PROGRAM

## 2020-08-12 RX ORDER — 0.9 % SODIUM CHLORIDE 0.9 %
1000 INTRAVENOUS SOLUTION INTRAVENOUS ONCE
Status: COMPLETED | OUTPATIENT
Start: 2020-08-12 | End: 2020-08-12

## 2020-08-12 RX ORDER — FAMOTIDINE 20 MG/1
20 TABLET, FILM COATED ORAL ONCE
Status: COMPLETED | OUTPATIENT
Start: 2020-08-12 | End: 2020-08-12

## 2020-08-12 RX ORDER — LIDOCAINE HYDROCHLORIDE 20 MG/ML
15 SOLUTION OROPHARYNGEAL
Status: DISCONTINUED | OUTPATIENT
Start: 2020-08-12 | End: 2020-08-12 | Stop reason: HOSPADM

## 2020-08-12 RX ORDER — MAGNESIUM HYDROXIDE/ALUMINUM HYDROXICE/SIMETHICONE 120; 1200; 1200 MG/30ML; MG/30ML; MG/30ML
30 SUSPENSION ORAL ONCE
Status: COMPLETED | OUTPATIENT
Start: 2020-08-12 | End: 2020-08-12

## 2020-08-12 RX ADMIN — FAMOTIDINE 20 MG: 20 TABLET, FILM COATED ORAL at 18:25

## 2020-08-12 RX ADMIN — SODIUM CHLORIDE 1000 ML: 9 INJECTION, SOLUTION INTRAVENOUS at 18:42

## 2020-08-12 RX ADMIN — LIDOCAINE HYDROCHLORIDE 15 ML: 20 SOLUTION ORAL; TOPICAL at 18:25

## 2020-08-12 RX ADMIN — ALUMINUM HYDROXIDE, MAGNESIUM HYDROXIDE, AND SIMETHICONE 30 ML: 200; 200; 20 SUSPENSION ORAL at 18:25

## 2020-08-12 ASSESSMENT — PAIN DESCRIPTION - PAIN TYPE: TYPE: ACUTE PAIN

## 2020-08-12 ASSESSMENT — PAIN SCALES - GENERAL: PAINLEVEL_OUTOF10: 6

## 2020-08-12 ASSESSMENT — ENCOUNTER SYMPTOMS
ABDOMINAL PAIN: 1
NAUSEA: 0
VOMITING: 0
SHORTNESS OF BREATH: 0
BACK PAIN: 0

## 2020-08-12 ASSESSMENT — PAIN DESCRIPTION - LOCATION: LOCATION: ABDOMEN

## 2020-08-12 NOTE — LETTER
[facility] ED [place] [address]  Phone: [phone]               August 12, 2020    Patient: Pernell Moore   YOB: 1995   Date of Visit: 8/12/2020       To Whom It May Concern:    Julita Denise was seen and treated in our emergency department on 08/11/2020 and on 08/12/2020.         Sincerely,       Scharlene Schirmer, RN         Signature:__________________________________

## 2020-08-12 NOTE — ED TRIAGE NOTES
Pt arrived to  ED with c/o abdominal pain. Pt stated she was just here for the same complaint yesterday and was discharged with meds but the sharp pains in her belly are still bothering her. Pt denies vomiting today or diarrhea. Pt is alert and oriented x4.

## 2020-08-12 NOTE — ED PROVIDER NOTES
[facility]     Emergency Department     Faculty Attestation    I performed a history and physical examination of the patient and discussed management with the resident. I reviewed the resident´s note and agree with the documented findings and plan of care. Any areas of disagreement are noted on the chart. I was personally present for the key portions of any procedures. I have documented in the chart those procedures where I was not present during the key portions. I have reviewed the emergency nurses triage note. I agree with the chief complaint, past medical history, past surgical history, allergies, medications, social and family history as documented unless otherwise noted below. For Physician Assistant/ Nurse Practitioner cases/documentation I have personally evaluated this patient and have completed at least one if not all key elements of the E/M (history, physical exam, and MDM). Additional findings are as noted. Patient yesterday, diagnosed with UTI, patient states that the nausea and vomiting has improved but she still has intermittent sharp abdominal pain. Patient is insulin-dependent diabetic. On exam she has mild diffuse abdominal pain without guarding or rebounding. No CVA tenderness, patient appears comfortable.      Luh Carrera MD  08/12/20 [id number]

## 2020-08-13 LAB
C TRACH DNA GENITAL QL NAA+PROBE: NEGATIVE
N. GONORRHOEAE DNA: NEGATIVE
SPECIMEN DESCRIPTION: NORMAL

## 2020-08-13 NOTE — ED PROVIDER NOTES
Not on file     Gets together: Not on file     Attends Bahai service: Not on file     Active member of club or organization: Not on file     Attends meetings of clubs or organizations: Not on file     Relationship status: Not on file    Intimate partner violence     Fear of current or ex partner: Not on file     Emotionally abused: Not on file     Physically abused: Not on file     Forced sexual activity: Not on file   Other Topics Concern    Not on file   Social History Narrative    Not on file       History reviewed. No pertinent family history. Allergies:  Shrimp flavor    Home Medications:  Prior to Admission medications    Medication Sig Start Date End Date Taking? Authorizing Provider   ondansetron (ZOFRAN ODT) 4 MG disintegrating tablet Take 1 tablet by mouth every 8 hours as needed for Nausea 8/11/20   Watson Joy, DO   acetaminophen (TYLENOL) 500 MG tablet Take 2 tablets by mouth every 8 hours for 7 days 8/11/20 8/18/20  Watson Joy, DO   dicyclomine (BENTYL) 10 MG capsule Take 1 capsule by mouth every 6 hours as needed (cramps) 8/11/20 8/16/20  Watson Joy, DO   cephALEXin (KEFLEX) 500 MG capsule Take 1 capsule by mouth 2 times daily for 10 days 8/11/20 8/21/20  Watson Joy, DO   insulin aspart (NOVOLOG FLEXPEN) 100 UNIT/ML injection pen Inject 2-12 units subcutaneously TID based on sliding scale 3/8/20   Aaron Hall DO   insulin glargine [facility]) 100 UNIT/ML injection pen Inject 12 Units into the skin nightly 3/8/20   Aaron Hall DO   blood glucose monitor kit and supplies use check blood sugar as directed 3/8/20 3/8/21  Aaron Hall DO   blood glucose monitor strips Check blood sugars four times daily as directed.  3/8/20 3/8/21  Aaron Hall DO   Lancets MISC 1 each by Does not apply route 4 times daily 3/8/20   Aaron Hall DO   Insulin Pen Needle ([employer] PEN NEEDLES 31G) 31G X 8 MM MISC 1 each by Does not apply route daily 3/8/20 Abdomen is soft. Tenderness: There is abdominal tenderness (generalized tenderness). Musculoskeletal: Normal range of motion. General: No tenderness. Skin:     General: Skin is warm and dry. Capillary Refill: Capillary refill takes less than 2 seconds. Findings: No erythema or rash. Neurological:      Mental Status: She is alert and oriented to person, place, and time. Sensory: No sensory deficit. Deep Tendon Reflexes: Reflexes normal.   Psychiatric:         Behavior: Behavior normal.         DIFFERENTIAL  DIAGNOSIS     PLAN (LABS / IMAGING / EKG):  Orders Placed This Encounter   Procedures    C.trachomatis N.gonorrhoeae DNA    VAGINITIS DNA PROBE    CBC Auto Differential    Comprehensive Metabolic Panel w/ Reflex to MG    Lipase    Vaginal exam    Saline lock IV    Insert peripheral IV       MEDICATIONS ORDERED:  Orders Placed This Encounter   Medications    aluminum & magnesium hydroxide-simethicone (MAALOX) 200-200-20 MG/5ML suspension 30 mL    lidocaine viscous hcl (XYLOCAINE) 2 % solution 15 mL    famotidine (PEPCID) tablet 20 mg    0.9 % sodium chloride bolus       DDX:           Appendicitis  Bowel Obstruction  Metabolic disorders (DKA, KAIT, Vincent's and such)  Pancreatitis  Perforated bowel  Peritonitis  Cholecystitis  PID  Vaginal infection      Initial MDM/Plan: 30 y.o. female who presents with abdominal pain, persistent from yesterday was seen here in the emergency department treated for cyclic vomiting, said that the pain resolved and she went home, continues to have pain is now today no nausea or vomiting now. Denies any fevers or chills, on exam she has generalized tenderness in the abdominal area, no other findings. Will get a basic work-up started here in the emergency department along with plans to do a pelvic work-up.     DIAGNOSTIC RESULTS / EMERGENCYDEPARTMENT COURSE / MDM     LABS:  No results found for this visit on 08/12/20. RADIOLOGY:  No orders to display     . EMERGENCY DEPARTMENT COURSE:  ED Course as of Aug 12 2121   Wed Aug 12, 2020   1821 Patient seen and assessed the emergency department no acute respiratory cardiovascular distress. Patient seen yesterday for abdominal pain and multiple episodes of emesis, work-up unremarkable, patient was have found to have UTI and was treated with Keflex, discharged home says that the pain did go away upon being discharged, he was not having nausea or vomiting when she left. [PS]   2029 Patient refusing IV draw for blood at this time, states her abdominal pain is getting better but not completely gone, was agreeable for pelvic exam.  If pathology is found will be treated if not patient will be discharged with GI follow-up for further evaluation. Vitals remained stable, patient is afebrile. She did provide us with a urine sample. [PS]      ED Course User Index  [PS] Tonia Marley MD          PROCEDURES:  None    CONSULTS:  None    CRITICAL CARE:  Please see attending note    FINAL IMPRESSION      1. Generalized abdominal pain          DISPOSITION / PLAN     DISPOSITION Eloped - Left Before Treatment Complete 08/12/2020 09:05:28 PM       PATIENT REFERRED TO:  No follow-up provider specified.     DISCHARGE MEDICATIONS:  New Prescriptions    No medications on file       Tonia Marley MD  Emergency Medicine Resident    (Please note that portions of this note were completed with a voice recognition program.Efforts were made to edit the dictations but occasionally words are mis-transcribed.)     Tonia Marley MD  Resident  08/12/20 2121

## 2020-08-14 ENCOUNTER — CARE COORDINATION (OUTPATIENT)
Dept: CASE MANAGEMENT | Age: 25
End: 2020-08-14

## 2020-08-20 ENCOUNTER — TELEMEDICINE (OUTPATIENT)
Dept: FAMILY MEDICINE CLINIC | Age: 25
End: 2020-08-20
Payer: COMMERCIAL

## 2020-08-20 ENCOUNTER — CARE COORDINATION (OUTPATIENT)
Dept: CASE MANAGEMENT | Age: 25
End: 2020-08-20

## 2020-08-20 PROCEDURE — 99214 OFFICE O/P EST MOD 30 MIN: CPT | Performed by: INTERNAL MEDICINE

## 2020-08-20 PROCEDURE — 3051F HG A1C>EQUAL 7.0%<8.0%: CPT | Performed by: INTERNAL MEDICINE

## 2020-08-20 PROCEDURE — G8420 CALC BMI NORM PARAMETERS: HCPCS | Performed by: INTERNAL MEDICINE

## 2020-08-20 PROCEDURE — G8427 DOCREV CUR MEDS BY ELIG CLIN: HCPCS | Performed by: INTERNAL MEDICINE

## 2020-08-20 PROCEDURE — 4004F PT TOBACCO SCREEN RCVD TLK: CPT | Performed by: INTERNAL MEDICINE

## 2020-08-20 PROCEDURE — 2022F DILAT RTA XM EVC RTNOPTHY: CPT | Performed by: INTERNAL MEDICINE

## 2020-08-20 RX ORDER — LANCETS 30 GAUGE
1 EACH MISCELLANEOUS 4 TIMES DAILY
Qty: 100 EACH | Refills: 0 | Status: SHIPPED | OUTPATIENT
Start: 2020-08-20 | End: 2020-12-18

## 2020-08-20 RX ORDER — LISINOPRIL 2.5 MG/1
2.5 TABLET ORAL DAILY
Qty: 90 TABLET | Refills: 1 | Status: SHIPPED | OUTPATIENT
Start: 2020-08-20 | End: 2020-12-17 | Stop reason: SDUPTHER

## 2020-08-20 RX ORDER — GLUCOSAMINE HCL/CHONDROITIN SU 500-400 MG
CAPSULE ORAL
Qty: 50 STRIP | Refills: 0 | Status: SHIPPED | OUTPATIENT
Start: 2020-08-20 | End: 2020-12-18

## 2020-08-20 RX ORDER — INSULIN ASPART 100 [IU]/ML
INJECTION, SOLUTION INTRAVENOUS; SUBCUTANEOUS
Qty: 2 PEN | Refills: 1 | Status: SHIPPED | OUTPATIENT
Start: 2020-08-20 | End: 2020-12-17 | Stop reason: SDUPTHER

## 2020-08-20 RX ORDER — PEN NEEDLE, DIABETIC 31 GX5/16"
1 NEEDLE, DISPOSABLE MISCELLANEOUS DAILY
Qty: 100 EACH | Refills: 0 | Status: SHIPPED | OUTPATIENT
Start: 2020-08-20 | End: 2020-12-18

## 2020-08-20 RX ORDER — KETOCONAZOLE 20 MG/G
CREAM TOPICAL
Qty: 30 G | Refills: 1 | Status: SHIPPED | OUTPATIENT
Start: 2020-08-20 | End: 2021-03-22 | Stop reason: SDUPTHER

## 2020-08-20 RX ORDER — INSULIN GLARGINE 100 [IU]/ML
12 INJECTION, SOLUTION SUBCUTANEOUS NIGHTLY
Qty: 2 PEN | Refills: 1 | Status: SHIPPED | OUTPATIENT
Start: 2020-08-20 | End: 2020-12-17 | Stop reason: SDUPTHER

## 2020-08-20 RX ORDER — BLOOD SUGAR DIAGNOSTIC
1 STRIP MISCELLANEOUS 4 TIMES DAILY
Qty: 200 EACH | Refills: 5 | Status: SHIPPED | OUTPATIENT
Start: 2020-08-20 | End: 2021-01-28 | Stop reason: SDUPTHER

## 2020-08-20 RX ORDER — ACETAMINOPHEN 500 MG
1000 TABLET ORAL EVERY 8 HOURS
Qty: 42 TABLET | Refills: 0 | Status: SHIPPED | OUTPATIENT
Start: 2020-08-20 | End: 2020-11-23

## 2020-08-20 NOTE — PROGRESS NOTES
Genny Dinero is doing a virtual visit for medication refills.   Pt moved recently and states all he DM supplies and meter were thrown away

## 2020-08-20 NOTE — CARE COORDINATION
[facility] Transitions Follow Up Call    2020    Patient: Bobo Bingham  Patient : 1995   MRN: [id number]  Reason for Admission:   Discharge Date: 20 RARS: Readmission Risk Score: 6         Spoke with: Peg Ny, patient    Care Transitions Subsequent and Final Call    Subsequent and Final Calls  Do you have any ongoing symptoms?:  No  Have your medications changed?:  No  Do you have any questions related to your medications?:  No  Do you currently have any active services?:  No  Do you have any needs or concerns that I can assist you with?:  No  Identified Barriers:  None  Care Transitions Interventions  Other Interventions: Follow Up:  Contacted patient for final BPCI-A follow up. Spoke briefly with patient. Yeni Panda stated she is doing fine. She denies having any signs/symtpoms of UTI. She also denies having abdominal pain, nausea/vomiting, diarrhea. Reports she is moving her bowels regularly. She stated she is not having issues currently. Discussed signs/symptoms and when to contact MD with any new or worsening symptoms. She stated she has a virtual visit with PCP today. Informed her that this will be the final BPCI-A follow up call. She does not have any questions or concerns at this time.       Future Appointments   Date Time Provider Hayden Coto   2020  3:00 PM MD Linn Márquez RN

## 2020-08-20 NOTE — PROGRESS NOTES
2020    TELEHEALTH EVALUATION -- Audio/Visual (During NPSFK-73 public health emergency)    HPI:    Michelle Alejandra (:  1995) has requested an audio/video evaluation for the following concern(s):    Needs refills on all meds   Moved close to [address] [facility] - at beginning of the year. Sugars are mostly good, denies any episodes of hypo-or hyperglycemia. She has some fatigue and worsening paresthesias and burning pain in her legs and feet. Denies vision problems, polyuria, polydipsia, weight loss. Review of Systems   Constitutional: Negative for fatigue, fever and unexpected weight change. Respiratory: Negative for cough, choking, chest tightness, shortness of breath and wheezing. Cardiovascular: Negative for chest pain, palpitations and leg swelling. Gastrointestinal: Negative for abdominal pain, anal bleeding, blood in stool, constipation, diarrhea, nausea and vomiting. Endocrine: Negative. Musculoskeletal: Negative for joint swelling and myalgias. Skin: Negative. Neurological: Negative for dizziness. Psychiatric/Behavioral: Negative for sleep disturbance. All other systems reviewed and are negative. Prior to Visit Medications    Medication Sig Taking? Authorizing Provider   insulin aspart (NOVOLOG FLEXPEN) 100 UNIT/ML injection pen Inject 2-12 units subcutaneously TID based on sliding scale Yes Carlito Hidalgo, DO   insulin glargine (BASAGLAR KWIKPEN) 100 UNIT/ML injection pen Inject 12 Units into the skin nightly Yes Carlito Hidalgo, DO   blood glucose monitor kit and supplies use check blood sugar as directed Yes Jeni Akers, DO   blood glucose monitor strips Check blood sugars four times daily as directed.  Yes Carlito Hidalgo, DO   Lancets MISC 1 each by Does not apply route 4 times daily Yes Kevin Hidalgo, DO   Insulin Pen Needle (KROGER PEN NEEDLES 31G) 31G X 8 MM MISC 1 each by Does not apply route daily Yes Jeni kAers, DO Alcohol Swabs (ALCOHOL PADS) 70 % PADS Apply 1 each topically 4 times daily Yes Emperatriz Perera MD   ketoconazole (NIZORAL) 2 % cream Apply topically daily. Yes Horacio Suarez MD   lisinopril (PRINIVIL;ZESTRIL) 2.5 MG tablet Take 1 tablet by mouth daily Yes Horacio Suarez MD   acetaminophen (TYLENOL) 500 MG tablet Take 2 tablets by mouth every 8 hours for 7 days  Joseph Guillen,        Social History     Tobacco Use    Smoking status: Current Every Day Smoker     Types: Cigarettes    Smokeless tobacco: Never Used    Tobacco comment: 5/day   Substance Use Topics    Alcohol use: No    Drug use: No        Past Medical History:   Diagnosis Date    Diabetes mellitus ([facility] [address]     MRSA (methicillin resistant staph aureus) culture positive 2016    abdomen    Ovarian cyst 2019    Right side   ,   Past Surgical History:   Procedure Laterality Date     SECTION     , No family history on file. PHYSICAL EXAMINATION:  [ INSTRUCTIONS:  \"[x]\" Indicates a positive item  \"[]\" Indicates a negative item  -- DELETE ALL ITEMS NOT EXAMINED]  Vital Signs: (As obtained by patient/caregiver or practitioner observation)    Blood pressure-  Heart rate-    Respiratory rate-    Temperature-  Pulse oximetry-     Constitutional: [x] Appears well-developed and well-nourished [x] No apparent distress      [] Abnormal-   Mental status  [x] Alert and awake  [x] Oriented to person/place/time [x]Able to follow commands      Eyes:  EOM    [x]  Normal  [] Abnormal-  Sclera  [x]  Normal  [] Abnormal -         Discharge [x]  None visible  [] Abnormal -    HENT:   [x] Normocephalic, atraumatic.   [] Abnormal   [x] Mouth/Throat: Mucous membranes are moist.     External Ears [x] Normal  [] Abnormal-     Neck: [x] No visualized mass     Pulmonary/Chest: [x] Respiratory effort normal.  [x] No visualized signs of difficulty breathing or respiratory distress        [] Abnormal-      Musculoskeletal:   [x] Normal gait with no signs of ataxia         [x] Normal range of motion of neck        [] Abnormal-       Neurological:        [x] No Facial Asymmetry (Cranial nerve 7 motor function) (limited exam to video visit)          [x] No gaze palsy        [] Abnormal-         Skin:        [x] No significant exanthematous lesions or discoloration noted on facial skin         [] Abnormal-            Psychiatric:       [x] Normal Affect [x] No Hallucinations        [] Abnormal-     Other pertinent observable physical exam findings-     ASSESSMENT/PLAN:  1. Type 1 diabetes mellitus with diabetic polyneuropathy (HCC)  - insulin aspart (NOVOLOG FLEXPEN) 100 UNIT/ML injection pen; Inject 2-12 units subcutaneously TID based on sliding scale  Dispense: 2 pen; Refill: 1  - insulin glargine (BASAGLAR KWIKPEN) 100 UNIT/ML injection pen; Inject 12 Units into the skin nightly  Dispense: 2 pen; Refill: 1  - blood glucose monitor kit and supplies; use check blood sugar as directed  Dispense: 1 kit; Refill: 0  - blood glucose monitor strips; Check blood sugars four times daily as directed. Dispense: 50 strip; Refill: 0  - Lancets MISC; 1 each by Does not apply route 4 times daily  Dispense: 100 each; Refill: 0  - Insulin Pen Needle (KROGER PEN NEEDLES 31G) 31G X 8 MM MISC; 1 each by Does not apply route daily  Dispense: 100 each; Refill: 0  - Alcohol Swabs (ALCOHOL PADS) 70 % PADS; Apply 1 each topically 4 times daily  Dispense: 200 each; Refill: 5    2. Onychomycosis  - ketoconazole (NIZORAL) 2 % cream; Apply topically daily. Dispense: 30 g; Refill: 1    3. Microalbuminuria due to type 1 diabetes mellitus (HCC)  - lisinopril (PRINIVIL;ZESTRIL) 2.5 MG tablet; Take 1 tablet by mouth daily  Dispense: 90 tablet; Refill: 1      No follow-ups on file. Adrienne Osorio is a 30 y.o. female being evaluated by a Virtual Visit (video visit) encounter to address concerns as mentioned above. A caregiver was present when appropriate.  Due to this being a TeleHealth encounter (During SGURC-68 public health emergency), evaluation of the following organ systems was limited: Vitals/Constitutional/EENT/Resp/CV/GI//MS/Neuro/Skin/Heme-Lymph-Imm. Pursuant to the emergency declaration under the [address] [place] [address], [address] [place] [address] authority and the [employer] Act, this Virtual Visit was conducted with patient's (and/or legal guardian's) consent, to reduce the patient's risk of exposure to COVID-19 and provide necessary medical care. The patient (and/or legal guardian) has also been advised to contact this office for worsening conditions or problems, and seek emergency medical treatment and/or call 911 if deemed necessary. Patient identification was verified at the start of the visit: Yes    Total time spent on this encounter: Not billed by time    Services were provided through a video synchronous discussion virtually to substitute for in-person clinic visit. Patient and provider were located at their individual homes. --JADYN Delvalle MD on 8/20/2020 at 3:20 PM    An electronic signature was used to authenticate this note.

## 2020-08-27 ENCOUNTER — HOSPITAL ENCOUNTER (EMERGENCY)
Age: 25
Discharge: HOME | End: 2020-08-27
Attending: EMERGENCY MEDICINE
Payer: COMMERCIAL

## 2020-08-27 VITALS
TEMPERATURE: 98.8 F | OXYGEN SATURATION: 100 % | HEIGHT: 65 IN | RESPIRATION RATE: 16 BRPM | WEIGHT: 135 LBS | DIASTOLIC BLOOD PRESSURE: 74 MMHG | HEART RATE: 76 BPM | SYSTOLIC BLOOD PRESSURE: 148 MMHG | BODY MASS INDEX: 22.49 KG/M2

## 2020-08-27 PROCEDURE — 90715 TDAP VACCINE 7 YRS/> IM: CPT | Performed by: STUDENT IN AN ORGANIZED HEALTH CARE EDUCATION/TRAINING PROGRAM

## 2020-08-27 PROCEDURE — 99283 EMERGENCY DEPT VISIT LOW MDM: CPT

## 2020-08-27 PROCEDURE — 90471 IMMUNIZATION ADMIN: CPT | Performed by: STUDENT IN AN ORGANIZED HEALTH CARE EDUCATION/TRAINING PROGRAM

## 2020-08-27 PROCEDURE — 6360000002 HC RX W HCPCS: Performed by: STUDENT IN AN ORGANIZED HEALTH CARE EDUCATION/TRAINING PROGRAM

## 2020-08-27 PROCEDURE — 6370000000 HC RX 637 (ALT 250 FOR IP): Performed by: STUDENT IN AN ORGANIZED HEALTH CARE EDUCATION/TRAINING PROGRAM

## 2020-08-27 PROCEDURE — 96372 THER/PROPH/DIAG INJ SC/IM: CPT

## 2020-08-27 RX ORDER — CEPHALEXIN 500 MG/1
500 CAPSULE ORAL ONCE
Status: COMPLETED | OUTPATIENT
Start: 2020-08-27 | End: 2020-08-27

## 2020-08-27 RX ORDER — KETOROLAC TROMETHAMINE 30 MG/ML
30 INJECTION, SOLUTION INTRAMUSCULAR; INTRAVENOUS ONCE
Status: COMPLETED | OUTPATIENT
Start: 2020-08-27 | End: 2020-08-27

## 2020-08-27 RX ORDER — CEPHALEXIN 250 MG/1
500 CAPSULE ORAL 4 TIMES DAILY
Qty: 56 CAPSULE | Refills: 0 | Status: SHIPPED | OUTPATIENT
Start: 2020-08-27 | End: 2020-09-03

## 2020-08-27 RX ORDER — BACITRACIN, NEOMYCIN, POLYMYXIN B 400; 3.5; 5 [USP'U]/G; MG/G; [USP'U]/G
OINTMENT TOPICAL
Qty: 1 TUBE | Refills: 0 | Status: SHIPPED | OUTPATIENT
Start: 2020-08-27 | End: 2020-09-06

## 2020-08-27 RX ORDER — IBUPROFEN 600 MG/1
600 TABLET ORAL 4 TIMES DAILY PRN
Qty: 40 TABLET | Refills: 0 | Status: SHIPPED | OUTPATIENT
Start: 2020-08-27 | End: 2020-12-17 | Stop reason: SDUPTHER

## 2020-08-27 RX ADMIN — KETOROLAC TROMETHAMINE 30 MG: 30 INJECTION, SOLUTION INTRAMUSCULAR at 22:22

## 2020-08-27 RX ADMIN — CEPHALEXIN 500 MG: 500 CAPSULE ORAL at 22:21

## 2020-08-27 RX ADMIN — TETANUS TOXOID, REDUCED DIPHTHERIA TOXOID AND ACELLULAR PERTUSSIS VACCINE, ADSORBED 0.5 ML: 5; 2.5; 8; 8; 2.5 SUSPENSION INTRAMUSCULAR at 22:21

## 2020-08-27 ASSESSMENT — PAIN SCALES - GENERAL
PAINLEVEL_OUTOF10: 10
PAINLEVEL_OUTOF10: 10

## 2020-08-27 ASSESSMENT — PAIN DESCRIPTION - DESCRIPTORS: DESCRIPTORS: BURNING

## 2020-08-27 ASSESSMENT — ENCOUNTER SYMPTOMS
ABDOMINAL PAIN: 0
SHORTNESS OF BREATH: 0

## 2020-08-27 ASSESSMENT — PAIN DESCRIPTION - LOCATION: LOCATION: LEG

## 2020-08-27 ASSESSMENT — PAIN DESCRIPTION - PAIN TYPE: TYPE: ACUTE PAIN

## 2020-08-27 ASSESSMENT — PAIN DESCRIPTION - ORIENTATION: ORIENTATION: RIGHT
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
## 2020-08-30 ASSESSMENT — ENCOUNTER SYMPTOMS
DIARRHEA: 0
ABDOMINAL PAIN: 0
CHOKING: 0
VOMITING: 0
COUGH: 0
SHORTNESS OF BREATH: 0
BLOOD IN STOOL: 0
CONSTIPATION: 0
ANAL BLEEDING: 0
NAUSEA: 0
WHEEZING: 0
CHEST TIGHTNESS: 0

## 2020-10-06 ENCOUNTER — HOSPITAL ENCOUNTER (EMERGENCY)
Age: 25
Discharge: HOME | End: 2020-10-06
Attending: EMERGENCY MEDICINE
Payer: COMMERCIAL

## 2020-10-06 VITALS
HEART RATE: 75 BPM | SYSTOLIC BLOOD PRESSURE: 143 MMHG | RESPIRATION RATE: 16 BRPM | TEMPERATURE: 97.9 F | BODY MASS INDEX: 20.83 KG/M2 | DIASTOLIC BLOOD PRESSURE: 85 MMHG | OXYGEN SATURATION: 99 % | WEIGHT: 125 LBS | HEIGHT: 65 IN

## 2020-10-06 PROCEDURE — 99283 EMERGENCY DEPT VISIT LOW MDM: CPT

## 2020-10-06 RX ORDER — CHLORHEXIDINE GLUCONATE 0.12 MG/ML
15 RINSE ORAL 2 TIMES DAILY
Qty: 420 ML | Refills: 0 | Status: SHIPPED | OUTPATIENT
Start: 2020-10-06 | End: 2020-10-20

## 2020-10-06 ASSESSMENT — ENCOUNTER SYMPTOMS
ABDOMINAL PAIN: 0
NAUSEA: 0
VOMITING: 0
VOICE CHANGE: 0
WHEEZING: 0
SHORTNESS OF BREATH: 0
FACIAL SWELLING: 0
DIARRHEA: 0
COUGH: 0
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
## 2020-10-06 NOTE — ED NOTES
[facility]  [address]  Phone: [phone]  Fax: [phone]    Patient Referral Fax     To:  Patient Referral Coordinator Fax:  [phone]  Referral from:  [address] [address] y.o.      [phone] (home)      Additional Notes: 10- at 10 am    Signature: Tere Gonzalez Date: 10/6/20         Tere Gonzalez RN  10/06/20 1320

## 2020-10-06 NOTE — ED NOTES
met with patient at bedside. Patient reported she needed paperwork stating she was seen here for [facility].  encouraged patient to show shelter her discharge paperwork. No other needs at this time.        BERT Manning, LETICIA Ruiz  10/06/20 7063

## 2020-11-23 ENCOUNTER — HOSPITAL ENCOUNTER (EMERGENCY)
Age: 25
Discharge: HOME | End: 2020-11-23
Attending: EMERGENCY MEDICINE
Payer: COMMERCIAL

## 2020-11-23 ENCOUNTER — APPOINTMENT (OUTPATIENT)
Dept: ULTRASOUND IMAGING | Age: 25
End: 2020-11-23
Payer: COMMERCIAL

## 2020-11-23 VITALS
RESPIRATION RATE: 16 BRPM | TEMPERATURE: 98.6 F | SYSTOLIC BLOOD PRESSURE: 168 MMHG | OXYGEN SATURATION: 100 % | BODY MASS INDEX: 21.63 KG/M2 | HEART RATE: 92 BPM | DIASTOLIC BLOOD PRESSURE: 98 MMHG | WEIGHT: 130 LBS

## 2020-11-23 LAB
-: ABNORMAL
ABSOLUTE EOS #: 0.2 K/UL (ref 0–0.4)
ABSOLUTE IMMATURE GRANULOCYTE: ABNORMAL K/UL (ref 0–0.3)
ABSOLUTE LYMPH #: 3.1 K/UL (ref 1–4.8)
ABSOLUTE MONO #: 0.6 K/UL (ref 0.1–1.3)
ALBUMIN SERPL-MCNC: 2.8 G/DL (ref 3.5–5.2)
ALBUMIN/GLOBULIN RATIO: ABNORMAL (ref 1–2.5)
ALP BLD-CCNC: 100 U/L (ref 35–104)
ALT SERPL-CCNC: 9 U/L (ref 5–33)
AMORPHOUS: ABNORMAL
ANION GAP SERPL CALCULATED.3IONS-SCNC: 8 MMOL/L (ref 9–17)
AST SERPL-CCNC: 18 U/L
BACTERIA: ABNORMAL
BASOPHILS # BLD: 1 % (ref 0–2)
BASOPHILS ABSOLUTE: 0.1 K/UL (ref 0–0.2)
BILIRUB SERPL-MCNC: 0.36 MG/DL (ref 0.3–1.2)
BILIRUBIN URINE: NEGATIVE
BUN BLDV-MCNC: 4 MG/DL (ref 6–20)
BUN/CREAT BLD: ABNORMAL (ref 9–20)
CALCIUM SERPL-MCNC: 8.1 MG/DL (ref 8.6–10.4)
CASTS UA: ABNORMAL /LPF
CHLORIDE BLD-SCNC: 106 MMOL/L (ref 98–107)
CO2: 23 MMOL/L (ref 20–31)
COLOR: YELLOW
COMMENT UA: ABNORMAL
CREAT SERPL-MCNC: 1.03 MG/DL (ref 0.5–0.9)
CRYSTALS, UA: ABNORMAL /HPF
DIFFERENTIAL TYPE: ABNORMAL
DIRECT EXAM: ABNORMAL
EOSINOPHILS RELATIVE PERCENT: 3 % (ref 0–4)
EPITHELIAL CELLS UA: ABNORMAL /HPF
GFR AFRICAN AMERICAN: >60 ML/MIN
GFR NON-AFRICAN AMERICAN: >60 ML/MIN
GFR SERPL CREATININE-BSD FRML MDRD: ABNORMAL ML/MIN/{1.73_M2}
GFR SERPL CREATININE-BSD FRML MDRD: ABNORMAL ML/MIN/{1.73_M2}
GLUCOSE BLD-MCNC: 139 MG/DL (ref 70–99)
GLUCOSE URINE: NEGATIVE
HCG QUALITATIVE: NEGATIVE
HCT VFR BLD CALC: 32.6 % (ref 36–46)
HEMOGLOBIN: 11.5 G/DL (ref 12–16)
IMMATURE GRANULOCYTES: ABNORMAL %
KETONES, URINE: NEGATIVE
LEUKOCYTE ESTERASE, URINE: ABNORMAL
LYMPHOCYTES # BLD: 46 % (ref 24–44)
Lab: ABNORMAL
MCH RBC QN AUTO: 32.8 PG (ref 26–34)
MCHC RBC AUTO-ENTMCNC: 35.2 G/DL (ref 31–37)
MCV RBC AUTO: 93.1 FL (ref 80–100)
MONOCYTES # BLD: 9 % (ref 1–7)
MUCUS: ABNORMAL
NITRITE, URINE: POSITIVE
NRBC AUTOMATED: ABNORMAL PER 100 WBC
OTHER OBSERVATIONS UA: ABNORMAL
PDW BLD-RTO: 18.6 % (ref 11.5–14.9)
PH UA: 6 (ref 5–8)
PLATELET # BLD: 427 K/UL (ref 150–450)
PLATELET ESTIMATE: ABNORMAL
PMV BLD AUTO: 8 FL (ref 6–12)
POTASSIUM SERPL-SCNC: 3.6 MMOL/L (ref 3.7–5.3)
PROTEIN UA: ABNORMAL
RBC # BLD: 3.5 M/UL (ref 4–5.2)
RBC # BLD: ABNORMAL 10*6/UL
RBC UA: ABNORMAL /HPF
RENAL EPITHELIAL, UA: ABNORMAL /HPF
SEG NEUTROPHILS: 41 % (ref 36–66)
SEGMENTED NEUTROPHILS ABSOLUTE COUNT: 2.8 K/UL (ref 1.3–9.1)
SODIUM BLD-SCNC: 137 MMOL/L (ref 135–144)
SPECIFIC GRAVITY UA: 1.01 (ref 1–1.03)
SPECIMEN DESCRIPTION: ABNORMAL
TOTAL PROTEIN: 7.2 G/DL (ref 6.4–8.3)
TRICHOMONAS: ABNORMAL
TURBIDITY: ABNORMAL
URINE HGB: ABNORMAL
UROBILINOGEN, URINE: NORMAL
WBC # BLD: 6.8 K/UL (ref 3.5–11)
WBC # BLD: ABNORMAL 10*3/UL
WBC UA: ABNORMAL /HPF
YEAST: ABNORMAL

## 2020-11-23 PROCEDURE — 87088 URINE BACTERIA CULTURE: CPT

## 2020-11-23 PROCEDURE — 76856 US EXAM PELVIC COMPLETE: CPT

## 2020-11-23 PROCEDURE — 84703 CHORIONIC GONADOTROPIN ASSAY: CPT

## 2020-11-23 PROCEDURE — 6360000002 HC RX W HCPCS: Performed by: EMERGENCY MEDICINE

## 2020-11-23 PROCEDURE — 96374 THER/PROPH/DIAG INJ IV PUSH: CPT

## 2020-11-23 PROCEDURE — 87660 TRICHOMONAS VAGIN DIR PROBE: CPT

## 2020-11-23 PROCEDURE — 87186 SC STD MICRODIL/AGAR DIL: CPT

## 2020-11-23 PROCEDURE — 6370000000 HC RX 637 (ALT 250 FOR IP): Performed by: EMERGENCY MEDICINE

## 2020-11-23 PROCEDURE — 87480 CANDIDA DNA DIR PROBE: CPT

## 2020-11-23 PROCEDURE — 93976 VASCULAR STUDY: CPT

## 2020-11-23 PROCEDURE — 80053 COMPREHEN METABOLIC PANEL: CPT

## 2020-11-23 PROCEDURE — 81001 URINALYSIS AUTO W/SCOPE: CPT

## 2020-11-23 PROCEDURE — 87591 N.GONORRHOEAE DNA AMP PROB: CPT

## 2020-11-23 PROCEDURE — 87086 URINE CULTURE/COLONY COUNT: CPT

## 2020-11-23 PROCEDURE — 87491 CHLMYD TRACH DNA AMP PROBE: CPT

## 2020-11-23 PROCEDURE — 87510 GARDNER VAG DNA DIR PROBE: CPT

## 2020-11-23 PROCEDURE — 99284 EMERGENCY DEPT VISIT MOD MDM: CPT

## 2020-11-23 PROCEDURE — 85025 COMPLETE CBC W/AUTO DIFF WBC: CPT

## 2020-11-23 RX ORDER — METRONIDAZOLE 500 MG/1
500 TABLET ORAL 2 TIMES DAILY
Qty: 14 TABLET | Refills: 0 | Status: SHIPPED | OUTPATIENT
Start: 2020-11-23 | End: 2020-11-30

## 2020-11-23 RX ORDER — ACETAMINOPHEN 500 MG
1000 TABLET ORAL EVERY 6 HOURS PRN
Qty: 60 TABLET | Refills: 0 | Status: SHIPPED | OUTPATIENT
Start: 2020-11-23 | End: 2020-12-17 | Stop reason: SDUPTHER

## 2020-11-23 RX ORDER — MORPHINE SULFATE 4 MG/ML
4 INJECTION, SOLUTION INTRAMUSCULAR; INTRAVENOUS ONCE
Status: COMPLETED | OUTPATIENT
Start: 2020-11-23 | End: 2020-11-23

## 2020-11-23 RX ORDER — METRONIDAZOLE 500 MG/1
500 TABLET ORAL ONCE
Status: COMPLETED | OUTPATIENT
Start: 2020-11-23 | End: 2020-11-23

## 2020-11-23 RX ORDER — CEPHALEXIN 250 MG/1
500 CAPSULE ORAL ONCE
Status: COMPLETED | OUTPATIENT
Start: 2020-11-23 | End: 2020-11-23

## 2020-11-23 RX ORDER — CEPHALEXIN 500 MG/1
500 CAPSULE ORAL 2 TIMES DAILY
Qty: 10 CAPSULE | Refills: 0 | Status: SHIPPED | OUTPATIENT
Start: 2020-11-23 | End: 2020-11-28

## 2020-11-23 RX ADMIN — MORPHINE SULFATE 4 MG: 4 INJECTION, SOLUTION INTRAMUSCULAR; INTRAVENOUS at 20:23

## 2020-11-23 RX ADMIN — CEPHALEXIN 500 MG: 250 CAPSULE ORAL at 20:23

## 2020-11-23 RX ADMIN — METRONIDAZOLE 500 MG: 500 TABLET ORAL at 21:25

## 2020-11-23 ASSESSMENT — PAIN SCALES - GENERAL
PAINLEVEL_OUTOF10: 10
PAINLEVEL_OUTOF10: 10

## 2020-11-23 ASSESSMENT — PAIN DESCRIPTION - PAIN TYPE: TYPE: ACUTE PAIN

## 2020-11-23 ASSESSMENT — PAIN DESCRIPTION - LOCATION: LOCATION: ABDOMEN

## 2020-11-23 ASSESSMENT — ENCOUNTER SYMPTOMS: ABDOMINAL PAIN: 1

## 2020-11-23 NOTE — ED PROVIDER NOTES
EMERGENCY DEPARTMENT ENCOUNTER    Pt Name: Justin Ackerman  MRN: [id number]  [place] 1995  Date of evaluation: 20  CHIEF COMPLAINT       Chief Complaint   Patient presents with    Abdominal Pain     HISTORY OF PRESENT ILLNESS     Abdominal Pain   Pain location:  Suprapubic  Pain quality: aching and cramping    Pain radiates to:  Does not radiate  Pain severity:  Moderate  Onset quality:  Gradual  Timing:  Constant  Progression:  Worsening  Chronicity:  Recurrent (ovarian cysts)  Relieved by:  Nothing  Worsened by:  Nothing  Ineffective treatments:  Acetaminophen and NSAIDs      No flank pain  No fevers        REVIEW OF SYSTEMS     Review of Systems   Gastrointestinal: Positive for abdominal pain. All other systems reviewed and are negative. PASTMEDICAL HISTORY     Past Medical History:   Diagnosis Date    Diabetes mellitus ([facility] [address]     MRSA (methicillin resistant staph aureus) culture positive 2016    abdomen    Ovarian cyst 2019    Right side     Past Problem List  Patient Active Problem List   Diagnosis Code    Yeast vaginitis B37.3    Type 1 diabetes mellitus with diabetic polyneuropathy ([facility] [address] E10.42    Pregestational diabetes mellitus, modified White class C O24.319    Intractable vomiting with nausea R11.2    Intractable abdominal pain R10.9     SURGICAL HISTORY       Past Surgical History:   Procedure Laterality Date     SECTION       CURRENT MEDICATIONS       Previous Medications    ALCOHOL SWABS (ALCOHOL PADS) 70 % PADS    Apply 1 each topically 4 times daily    BLOOD GLUCOSE MONITOR KIT AND SUPPLIES    use check blood sugar as directed    BLOOD GLUCOSE MONITOR STRIPS    Check blood sugars four times daily as directed.     IBUPROFEN (ADVIL;MOTRIN) 600 MG TABLET    Take 1 tablet by mouth 4 times daily as needed for Pain    INSULIN ASPART (NOVOLOG FLEXPEN) 100 UNIT/ML INJECTION PEN    Inject 2-12 units subcutaneously TID based on sliding scale    INSULIN GLARGINE [facility] KWIKPEN) 100 UNIT/ML INJECTION PEN    Inject 12 Units into the skin nightly    INSULIN PEN NEEDLE ([employer] PEN NEEDLES 31G) 31G X 8 MM MISC    1 each by Does not apply route daily    KETOCONAZOLE (NIZORAL) 2 % CREAM    Apply topically daily. LANCETS MISC    1 each by Does not apply route 4 times daily    LISINOPRIL (PRINIVIL;ZESTRIL) 2.5 MG TABLET    Take 1 tablet by mouth daily     ALLERGIES     is allergic to shrimp flavor. FAMILY HISTORY     She indicated that her mother is . She indicated that her father is . SOCIAL HISTORY       Social History     Tobacco Use    Smoking status: Current Every Day Smoker     Types: Cigarettes    Smokeless tobacco: Never Used    Tobacco comment: 5/day   Substance Use Topics    Alcohol use: No    Drug use: No     PHYSICAL EXAM     INITIAL VITALS: BP (!) 168/98   Pulse 92   Temp 98.6 °F (37 °C)   Resp 16   Wt 130 lb (59 kg)   SpO2 100%   BMI 21.63 kg/m²    Physical Exam  Vitals signs reviewed. Constitutional:       General: She is not in acute distress. Appearance: Normal appearance. She is well-developed. She is not diaphoretic. HENT:      Head: Normocephalic and atraumatic. Right Ear: External ear normal.      Left Ear: External ear normal.      Nose: Nose normal. No congestion. Mouth/Throat:      Mouth: Mucous membranes are moist.      Pharynx: Oropharynx is clear. Eyes:      General:         Right eye: No discharge. Left eye: No discharge. Conjunctiva/sclera: Conjunctivae normal.      Pupils: Pupils are equal, round, and reactive to light. Neck:      Musculoskeletal: Normal range of motion and neck supple. Trachea: No tracheal deviation. Cardiovascular:      Rate and Rhythm: Normal rate and regular rhythm. Pulses: Normal pulses. Heart sounds: Normal heart sounds. Pulmonary:      Effort: Pulmonary effort is normal. No respiratory distress. Breath sounds: Normal breath sounds.  No stridor. No wheezing or rales. Abdominal:      Palpations: Abdomen is soft. Tenderness: There is no abdominal tenderness. There is no guarding or rebound. Musculoskeletal: Normal range of motion. General: No tenderness or deformity. Skin:     General: Skin is warm and dry. Capillary Refill: Capillary refill takes less than 2 seconds. Findings: No erythema or rash. Neurological:      General: No focal deficit present. Mental Status: She is alert and oriented to person, place, and time. Cranial Nerves: No cranial nerve deficit. Coordination: Coordination normal.   Psychiatric:         Mood and Affect: Mood normal.         Behavior: Behavior normal.         Thought Content: Thought content normal.         Judgment: Judgment normal.         MEDICAL DECISION MAKING:     Treating uti and BV  Reviewed US and labs  rx abx for the infections  She is nontoxic well appearing  Do not suspect pyelo or stone  Do not suspect sepsis or PID  Discussed with patient anticipatory guidance, discharge instructions, follow up PCP and her gyn 24 hours         Procedures    DIAGNOSTIC RESULTS     RADIOLOGY:All plain film, CT, MRI, and formal ultrasound images (except ED bedside ultrasound) are read by the radiologist, see reports below, unless otherwisenoted in MDM or here. US PELVIS COMPLETE   Final Result   Simple left ovarian cyst without evidence of torsion. RECOMMENDATIONS:   3.3 cm simple ovarian cyst. No follow-up imaging is recommended. Reference: Radiology 2010 Sep;256(3):943-54         US DUP ABD PEL RETRO SCROT LIMITED    (Results Pending)     LABS: All lab results were reviewed by myself, and all abnormals are listed below. Labs Reviewed   VAGINITIS DNA PROBE - Abnormal; Notable for the following components:       Result Value    Direct Exam POSITIVE for Gardnerella vaginalis.  (*)     All other components within normal limits   CBC WITH AUTO DIFFERENTIAL - Abnormal; Notable for the following components:    RBC 3.50 (*)     Hemoglobin 11.5 (*)     Hematocrit 32.6 (*)     RDW 18.6 (*)     Lymphocytes 46 (*)     Monocytes 9 (*)     All other components within normal limits   COMPREHENSIVE METABOLIC PANEL - Abnormal; Notable for the following components:    Glucose 139 (*)     BUN 4 (*)     CREATININE 1.03 (*)     Calcium 8.1 (*)     Potassium 3.6 (*)     Anion Gap 8 (*)     Alb 2.8 (*)     All other components within normal limits   URINALYSIS - Abnormal; Notable for the following components:    Turbidity UA TURBID (*)     Urine Hgb TRACE (*)     Protein, UA 1+ (*)     Nitrite, Urine POSITIVE (*)     Leukocyte Esterase, Urine LARGE (*)     All other components within normal limits   MICROSCOPIC URINALYSIS - Abnormal; Notable for the following components:    Bacteria, UA MANY (*)     All other components within normal limits   C.TRACHOMATIS N.GONORRHOEAE DNA   CULTURE, URINE   HCG, SERUM, QUALITATIVE       EMERGENCY DEPARTMENTCOURSE:         Vitals:    Vitals:    11/23/20 1801   BP: (!) 168/98   Pulse: 92   Resp: 16   Temp: 98.6 °F (37 °C)   SpO2: 100%   Weight: 130 lb (59 kg)       The patient was given the following medications while in the emergency department:  Orders Placed This Encounter   Medications    morphine sulfate (PF) injection 4 mg    cephALEXin (KEFLEX) capsule 500 mg     Order Specific Question:   Antimicrobial Indications     Answer:   Urinary Tract Infection    metroNIDAZOLE (FLAGYL) tablet 500 mg     Order Specific Question:   Antimicrobial Indications     Answer:    Other     Order Specific Question:   Other Abx Indication     Answer:   BV     Order Specific Question:   Suspected Organism(s)     Answer:   BV    acetaminophen (TYLENOL) 500 MG tablet     Sig: Take 2 tablets by mouth every 6 hours as needed for Pain     Dispense:  60 tablet     Refill:  0    cephALEXin (KEFLEX) 500 MG capsule     Sig: Take 1 capsule by mouth 2 times daily for 5 days Dispense:  10 capsule     Refill:  0    metroNIDAZOLE (FLAGYL) 500 MG tablet     Sig: Take 1 tablet by mouth 2 times daily for 7 days     Dispense:  14 tablet     Refill:  0     FINAL IMPRESSION      1. Acute cystitis without hematuria    2. Bacterial vaginosis    3.  Lower abdominal pain          DISPOSITION/PLAN   DISPOSITION Decision To Discharge 11/23/2020 08:59:26 PM      PATIENT REFERRED TO:  Emperatriz Bartonings, [id number] [facility] [address] [phone]    Schedule an appointment as soon as possible for a visit in 1 day      DISCHARGE MEDICATIONS:  New Prescriptions    ACETAMINOPHEN (TYLENOL) 500 MG TABLET    Take 2 tablets by mouth every 6 hours as needed for Pain    CEPHALEXIN (KEFLEX) 500 MG CAPSULE    Take 1 capsule by mouth 2 times daily for 5 days    METRONIDAZOLE (FLAGYL) 500 MG TABLET    Take 1 tablet by mouth 2 times daily for 7 days     Cherylene Ser, MD  Attending Emergency Physician                    Cherylene Ser, MD  11/23/20 1708

## 2020-11-23 NOTE — ED TRIAGE NOTES
Mode of arrival (squad #, walk in, police, etc) : Walk In        Chief complaint(s): Abdominal pain        Arrival Note (brief scenario, treatment PTA, etc). : Pt arrives to ED c/o abdominal pain. Patient states that she has a cyst on her ovary and think that it might be causing her pain. Patient denies nausea, vomiting or diarrhea. C= \"Have you ever felt that you should Cut down on your drinking? \"  No  A= \"Have people Annoyed you by criticizing your drinking? \"  No  G= \"Have you ever felt bad or Guilty about your drinking? \"  No  E= \"Have you ever had a drink as an Eye-opener first thing in the morning to steady your nerves or to help a hangover? \"  No      Deferred []      Reason for deferring: N/A    *If yes to two or more: probable alcohol abuse. *

## 2020-11-24 ENCOUNTER — TELEPHONE (OUTPATIENT)
Dept: FAMILY MEDICINE CLINIC | Age: 25
End: 2020-11-24

## 2020-11-25 LAB
CULTURE: ABNORMAL
Lab: ABNORMAL
SPECIMEN DESCRIPTION: ABNORMAL

## 2020-12-17 ENCOUNTER — TELEMEDICINE (OUTPATIENT)
Dept: FAMILY MEDICINE CLINIC | Age: 25
End: 2020-12-17
Payer: COMMERCIAL

## 2020-12-17 PROCEDURE — 99214 OFFICE O/P EST MOD 30 MIN: CPT | Performed by: INTERNAL MEDICINE

## 2020-12-17 PROCEDURE — 2022F DILAT RTA XM EVC RTNOPTHY: CPT | Performed by: INTERNAL MEDICINE

## 2020-12-17 PROCEDURE — 4004F PT TOBACCO SCREEN RCVD TLK: CPT | Performed by: INTERNAL MEDICINE

## 2020-12-17 PROCEDURE — G8420 CALC BMI NORM PARAMETERS: HCPCS | Performed by: INTERNAL MEDICINE

## 2020-12-17 PROCEDURE — G8427 DOCREV CUR MEDS BY ELIG CLIN: HCPCS | Performed by: INTERNAL MEDICINE

## 2020-12-17 PROCEDURE — G8484 FLU IMMUNIZE NO ADMIN: HCPCS | Performed by: INTERNAL MEDICINE

## 2020-12-17 PROCEDURE — 1111F DSCHRG MED/CURRENT MED MERGE: CPT | Performed by: INTERNAL MEDICINE

## 2020-12-17 PROCEDURE — 3051F HG A1C>EQUAL 7.0%<8.0%: CPT | Performed by: INTERNAL MEDICINE

## 2020-12-17 RX ORDER — FAMOTIDINE 20 MG/1
20 TABLET, FILM COATED ORAL 2 TIMES DAILY
Qty: 60 TABLET | Refills: 3 | Status: SHIPPED | OUTPATIENT
Start: 2020-12-17 | End: 2021-03-22 | Stop reason: SDUPTHER

## 2020-12-17 RX ORDER — INSULIN GLARGINE 100 [IU]/ML
12 INJECTION, SOLUTION SUBCUTANEOUS NIGHTLY
Qty: 2 PEN | Refills: 1 | Status: SHIPPED | OUTPATIENT
Start: 2020-12-17 | End: 2021-03-22 | Stop reason: SDUPTHER

## 2020-12-17 RX ORDER — POLYETHYLENE GLYCOL 3350 17 G/17G
17 POWDER, FOR SOLUTION ORAL DAILY
Qty: 1530 G | Refills: 1 | Status: SHIPPED | OUTPATIENT
Start: 2020-12-17 | End: 2021-01-16

## 2020-12-17 RX ORDER — INSULIN ASPART 100 [IU]/ML
INJECTION, SOLUTION INTRAVENOUS; SUBCUTANEOUS
Qty: 2 PEN | Refills: 1 | Status: SHIPPED | OUTPATIENT
Start: 2020-12-17 | End: 2021-03-22 | Stop reason: SDUPTHER

## 2020-12-17 RX ORDER — IBUPROFEN 600 MG/1
600 TABLET ORAL 4 TIMES DAILY PRN
Qty: 40 TABLET | Refills: 3 | Status: SHIPPED | OUTPATIENT
Start: 2020-12-17 | End: 2021-01-28 | Stop reason: SDUPTHER

## 2020-12-17 RX ORDER — LISINOPRIL 2.5 MG/1
2.5 TABLET ORAL DAILY
Qty: 90 TABLET | Refills: 1 | Status: SHIPPED | OUTPATIENT
Start: 2020-12-17 | End: 2021-01-28 | Stop reason: SDUPTHER

## 2020-12-17 RX ORDER — ACETAMINOPHEN 500 MG
1000 TABLET ORAL EVERY 6 HOURS PRN
Qty: 60 TABLET | Refills: 3 | Status: SHIPPED | OUTPATIENT
Start: 2020-12-17 | End: 2021-03-22 | Stop reason: SDUPTHER

## 2020-12-17 SDOH — ECONOMIC STABILITY: INCOME INSECURITY: HOW HARD IS IT FOR YOU TO PAY FOR THE VERY BASICS LIKE FOOD, HOUSING, MEDICAL CARE, AND HEATING?: NOT ASKED

## 2020-12-17 SDOH — ECONOMIC STABILITY: FOOD INSECURITY: WITHIN THE PAST 12 MONTHS, THE FOOD YOU BOUGHT JUST DIDN'T LAST AND YOU DIDN'T HAVE MONEY TO GET MORE.: SOMETIMES TRUE

## 2020-12-17 SDOH — ECONOMIC STABILITY: TRANSPORTATION INSECURITY
IN THE PAST 12 MONTHS, HAS THE LACK OF TRANSPORTATION KEPT YOU FROM MEDICAL APPOINTMENTS OR FROM GETTING MEDICATIONS?: NOT ASKED

## 2020-12-17 SDOH — ECONOMIC STABILITY: TRANSPORTATION INSECURITY
IN THE PAST 12 MONTHS, HAS LACK OF TRANSPORTATION KEPT YOU FROM MEETINGS, WORK, OR FROM GETTING THINGS NEEDED FOR DAILY LIVING?: NOT ASKED

## 2020-12-17 SDOH — ECONOMIC STABILITY: FOOD INSECURITY: WITHIN THE PAST 12 MONTHS, YOU WORRIED THAT YOUR FOOD WOULD RUN OUT BEFORE YOU GOT MONEY TO BUY MORE.: SOMETIMES TRUE

## 2020-12-17 NOTE — PROGRESS NOTES
2020    TELEHEALTH EVALUATION -- Audio/Visual (During HHETD-00 public health emergency)    HPI:    Jovanny Arreola (:  1995) has requested an audio/video evaluation for the following concern(s):    continues to have generalized abdominal pain. No exacerbaiting or relieving factis. Intermittent, sharp and dull pain, sometimes has to curl up in bed for 30-60 min for the pain to ease. She has tried tylenol 3 for the pain. She has BMs every 3-4 days, small pebbly stools. She denies straining, but has some pain with BMs. Treated for UTI and BV a recent ER visit. Menses are regular, once a month, light, lasting 2-3. No vaginal discharge. She reports sugars are stable, no hypoglycemia or hyperglycemia. She is eating okay. No vision changes, polyuria, polydipsia, new paresthesias. Would like a referral to program to get food boxes. Review of Systems   Constitutional: Negative for fatigue, fever and unexpected weight change. Respiratory: Negative for cough, choking, chest tightness, shortness of breath and wheezing. Cardiovascular: Negative for chest pain, palpitations and leg swelling. Gastrointestinal: Negative for abdominal pain, anal bleeding, blood in stool, constipation, diarrhea, nausea and vomiting. Endocrine: Negative. Musculoskeletal: Negative for joint swelling and myalgias. Skin: Negative. Neurological: Negative for dizziness. Psychiatric/Behavioral: Negative for sleep disturbance. All other systems reviewed and are negative. Prior to Visit Medications    Medication Sig Taking?  Authorizing Provider   acetaminophen (TYLENOL) 500 MG tablet Take 2 tablets by mouth every 6 hours as needed for Pain Yes Sharifa Bardales MD   ibuprofen (ADVIL;MOTRIN) 600 MG tablet Take 1 tablet by mouth 4 times daily as needed for Pain Yes Prisca Lee DO   insulin aspart (NOVOLOG FLEXPEN) 100 UNIT/ML injection pen Inject 2-12 units subcutaneously TID based on sliding scale Yes Emperatriz Moyer MD   insulin glargine [facility]) 100 UNIT/ML injection pen Inject 12 Units into the skin nightly Yes Jessie Matute MD   blood glucose monitor kit and supplies use check blood sugar as directed Yes Emperatriz Moyer MD   blood glucose monitor strips Check blood sugars four times daily as directed. Yes Jessie Matute MD   Lancets MISC 1 each by Does not apply route 4 times daily Yes Emperatriz Moyer MD   Insulin Pen Needle ([employer] PEN NEEDLES 31G) 31G X 8 MM MISC 1 each by Does not apply route daily Yes Jessie Matute MD   Alcohol Swabs (ALCOHOL PADS) 70 % PADS Apply 1 each topically 4 times daily Yes Jessie Matute MD   ketoconazole (NIZORAL) 2 % cream Apply topically daily. Yes Jessie Matute MD   lisinopril (PRINIVIL;ZESTRIL) 2.5 MG tablet Take 1 tablet by mouth daily Yes Emperatriz Moyer MD       Social History     Tobacco Use    Smoking status: Current Every Day Smoker     Types: Cigarettes    Smokeless tobacco: Never Used    Tobacco comment: 5/day   Substance Use Topics    Alcohol use: No    Drug use: No        Past Medical History:   Diagnosis Date    Diabetes mellitus ([facility] [address]     MRSA (methicillin resistant staph aureus) culture positive 2016    abdomen    Ovarian cyst 2019    Right side   ,   Past Surgical History:   Procedure Laterality Date     SECTION     , No family history on file.     PHYSICAL EXAMINATION:  [ INSTRUCTIONS:  \"[x]\" Indicates a positive item  \"[]\" Indicates a negative item  -- DELETE ALL ITEMS NOT EXAMINED]  Vital Signs: (As obtained by patient/caregiver or practitioner observation)    Blood pressure-  Heart rate-    Respiratory rate-    Temperature-  Pulse oximetry-     Constitutional: [x] Appears well-developed and well-nourished [x] No apparent distress      [] Abnormal-   Mental status  [x] Alert and awake  [x] Oriented to person/place/time [x]Able to follow commands      Eyes:  EOM    [x]  Normal  [] Abnormal-  Sclera  [x]  Normal  [] Abnormal -         Discharge [x]  None visible  [] Abnormal -    HENT:   [x] Normocephalic, atraumatic. [] Abnormal   [x] Mouth/Throat: Mucous membranes are moist.     External Ears [x] Normal  [] Abnormal-     Neck: [x] No visualized mass     Pulmonary/Chest: [x] Respiratory effort normal.  [x] No visualized signs of difficulty breathing or respiratory distress        [] Abnormal-      Musculoskeletal:   [x] Normal gait with no signs of ataxia         [x] Normal range of motion of neck        [] Abnormal-       Neurological:        [x] No Facial Asymmetry (Cranial nerve 7 motor function) (limited exam to video visit)          [x] No gaze palsy        [] Abnormal-         Skin:        [x] No significant exanthematous lesions or discoloration noted on facial skin         [] Abnormal-            Psychiatric:       [x] Normal Affect [x] No Hallucinations        [] Abnormal-     Other pertinent observable physical exam findings-     ASSESSMENT/PLAN:  1. Type 1 diabetes mellitus with diabetic polyneuropathy (HCC)  - insulin glargine (BASAGLAR KWIKPEN) 100 UNIT/ML injection pen; Inject 12 Units into the skin nightly  Dispense: 2 pen; Refill: 1  - insulin aspart (NOVOLOG FLEXPEN) 100 UNIT/ML injection pen; Inject 2-12 units subcutaneously TID based on sliding scale  Dispense: 2 pen; Refill: 1    2. Microalbuminuria due to type 1 diabetes mellitus (HCC)  - lisinopril (PRINIVIL;ZESTRIL) 2.5 MG tablet; Take 1 tablet by mouth daily  Dispense: 90 tablet; Refill: 1    3. Generalized abdominal pain  - acetaminophen (TYLENOL) 500 MG tablet; Take 2 tablets by mouth every 6 hours as needed for Pain  Dispense: 60 tablet; Refill: 3  - ibuprofen (ADVIL;MOTRIN) 600 MG tablet; Take 1 tablet by mouth 4 times daily as needed for Pain  Dispense: 40 tablet; Refill: 3  - famotidine (PEPCID) 20 MG tablet; Take 1 tablet by mouth 2 times daily  Dispense: 60 tablet;  Refill: 3  - AK DISCHARGE MEDS RECONCILED W/ CURRENT OUTPATIENT MED LIST    4. Other constipation  - polyethylene glycol (GLYCOLAX) 17 GM/SCOOP powder; Take 17 g by mouth daily  Dispense: 1530 g; Refill: 1      No follow-ups on file. Bety Shaver is a 30 y.o. female being evaluated by a Virtual Visit (video visit) encounter to address concerns as mentioned above. A caregiver was present when appropriate. Due to this being a TeleHealth encounter (During [place] public health emergency), evaluation of the following organ systems was limited: Vitals/Constitutional/EENT/Resp/CV/GI//MS/Neuro/Skin/Heme-Lymph-Imm. Pursuant to the emergency declaration under the [address] [place] [address] authority and the [employer] Act, this Virtual Visit was conducted with patient's (and/or legal guardian's) consent, to reduce the patient's risk of exposure to COVID-19 and provide necessary medical care. The patient (and/or legal guardian) has also been advised to contact this office for worsening conditions or problems, and seek emergency medical treatment and/or call 911 if deemed necessary. Patient identification was verified at the start of the visit: Yes    Total time spent on this encounter: Not billed by time    Services were provided through a video synchronous discussion virtually to substitute for in-person clinic visit. Patient and provider were located at their individual homes. --JADYN Mendes MD on 12/17/2020 at 2:46 PM    An electronic signature was used to authenticate this note.

## 2020-12-18 RX ORDER — CALCIUM CITRATE/VITAMIN D3 200MG-6.25
TABLET ORAL
Qty: 100 STRIP | Refills: 5 | Status: SHIPPED | OUTPATIENT
Start: 2020-12-18 | End: 2021-01-29 | Stop reason: SDUPTHER

## 2020-12-18 RX ORDER — LANCING DEVICE
EACH MISCELLANEOUS
Qty: 100 EACH | Refills: 5 | Status: SHIPPED | OUTPATIENT
Start: 2020-12-18 | End: 2021-01-28 | Stop reason: SDUPTHER

## 2020-12-18 RX ORDER — GLUCOSAM/CHON-MSM1/C/MANG/BOSW 500-416.6
TABLET ORAL
Qty: 100 EACH | Refills: 5 | Status: SHIPPED | OUTPATIENT
Start: 2020-12-18 | End: 2021-01-28 | Stop reason: SDUPTHER

## 2020-12-18 ASSESSMENT — ENCOUNTER SYMPTOMS
ANAL BLEEDING: 0
NAUSEA: 0
CHOKING: 0
VOMITING: 0
COUGH: 0
CONSTIPATION: 0
DIARRHEA: 0
SHORTNESS OF BREATH: 0
BLOOD IN STOOL: 0
CHEST TIGHTNESS: 0
ABDOMINAL PAIN: 0
WHEEZING: 0

## 2020-12-18 NOTE — TELEPHONE ENCOUNTER
Last visit: 12/18/20  Last Med refill: 8/20/20  Does patient have enough medication for 72 hours: No:     Next Visit Date:  No future appointments. Health Maintenance   Topic Date Due    Hepatitis B vaccine (3 of 3 - Risk 3-dose series) 02/14/1998    Pneumococcal 0-64 years Vaccine (1 of 1 - PPSV23) 12/05/1999    Varicella vaccine (2 of 2 - 2-dose childhood series) 08/09/2000    HIV screen  12/05/2008    Cervical cancer screen  12/05/2014    Diabetic retinal exam  01/21/2020    Diabetic foot exam  01/31/2020    Annual Wellness Visit (AWV)  04/26/2020    Flu vaccine (1) 09/01/2020    A1C test (Diabetic or Prediabetic)  02/11/2021    Diabetic microalbuminuria test  02/11/2021    Lipid screen  02/11/2021    Potassium monitoring  11/23/2021    Creatinine monitoring  11/23/2021    DTaP/Tdap/Td vaccine (6 - Td) 08/27/2030    Hib vaccine  Completed    Hepatitis A vaccine  Aged Out    Meningococcal (ACWY) vaccine  Aged Out       Hemoglobin A1C (%)   Date Value   02/11/2020 7.6   01/08/2019 10.1   09/21/2018 11.5 (H)             ( goal A1C is < 7)   Microalb/Crt.  Ratio (mcg/mg creat)   Date Value   02/11/2020 511 (H)     LDL Cholesterol (mg/dL)   Date Value   02/11/2020 84   01/08/2019 131 (H)       (goal LDL is <100)   AST (U/L)   Date Value   11/23/2020 18     ALT (U/L)   Date Value   11/23/2020 9     BUN (mg/dL)   Date Value   11/23/2020 4 (L)     BP Readings from Last 3 Encounters:   11/23/20 (!) 168/98   10/06/20 (!) 143/85   08/27/20 (!) 148/74          (goal 120/80)    All Future Testing planned in [facility]  Lab Frequency Next Occurrence   Hemoglobin A1C Once 01/09/2021               Patient Active Problem List:     Yeast vaginitis     Type 1 diabetes mellitus with diabetic polyneuropathy (HCC)     Pregestational diabetes mellitus, modified White class C     Intractable vomiting with nausea     Intractable abdominal pain

## 2021-01-28 DIAGNOSIS — E10.42 TYPE 1 DIABETES MELLITUS WITH DIABETIC POLYNEUROPATHY (HCC): ICD-10-CM

## 2021-01-28 DIAGNOSIS — R80.9 MICROALBUMINURIA DUE TO TYPE 1 DIABETES MELLITUS (HCC): ICD-10-CM

## 2021-01-28 DIAGNOSIS — R10.84 GENERALIZED ABDOMINAL PAIN: ICD-10-CM

## 2021-01-28 DIAGNOSIS — E10.29 MICROALBUMINURIA DUE TO TYPE 1 DIABETES MELLITUS (HCC): ICD-10-CM

## 2021-01-29 RX ORDER — LISINOPRIL 2.5 MG/1
2.5 TABLET ORAL DAILY
Qty: 90 TABLET | Refills: 1 | Status: SHIPPED | OUTPATIENT
Start: 2021-01-29 | End: 2021-02-04 | Stop reason: SDUPTHER

## 2021-01-29 RX ORDER — LANCING DEVICE
EACH MISCELLANEOUS
Qty: 100 EACH | Refills: 5 | Status: SHIPPED | OUTPATIENT
Start: 2021-01-29 | End: 2021-02-04 | Stop reason: SDUPTHER

## 2021-01-29 RX ORDER — CALCIUM CITRATE/VITAMIN D3 200MG-6.25
TABLET ORAL
Qty: 100 STRIP | Refills: 5 | Status: SHIPPED | OUTPATIENT
Start: 2021-01-29 | End: 2021-02-04 | Stop reason: SDUPTHER

## 2021-01-29 RX ORDER — BLOOD SUGAR DIAGNOSTIC
1 STRIP MISCELLANEOUS 4 TIMES DAILY
Qty: 200 EACH | Refills: 5 | Status: SHIPPED | OUTPATIENT
Start: 2021-01-29 | End: 2021-02-04 | Stop reason: SDUPTHER

## 2021-01-29 RX ORDER — GLUCOSAM/CHON-MSM1/C/MANG/BOSW 500-416.6
TABLET ORAL
Qty: 100 EACH | Refills: 5 | Status: SHIPPED | OUTPATIENT
Start: 2021-01-29 | End: 2021-02-04 | Stop reason: SDUPTHER

## 2021-01-29 RX ORDER — IBUPROFEN 600 MG/1
600 TABLET ORAL 4 TIMES DAILY PRN
Qty: 120 TABLET | Refills: 1 | Status: SHIPPED | OUTPATIENT
Start: 2021-01-29 | End: 2021-02-04 | Stop reason: SDUPTHER

## 2021-02-03 DIAGNOSIS — E10.29 MICROALBUMINURIA DUE TO TYPE 1 DIABETES MELLITUS (HCC): ICD-10-CM

## 2021-02-03 DIAGNOSIS — R80.9 MICROALBUMINURIA DUE TO TYPE 1 DIABETES MELLITUS (HCC): ICD-10-CM

## 2021-02-03 DIAGNOSIS — R10.84 GENERALIZED ABDOMINAL PAIN: ICD-10-CM

## 2021-02-03 DIAGNOSIS — E10.42 TYPE 1 DIABETES MELLITUS WITH DIABETIC POLYNEUROPATHY (HCC): ICD-10-CM

## 2021-02-04 RX ORDER — BLOOD SUGAR DIAGNOSTIC
1 STRIP MISCELLANEOUS 4 TIMES DAILY
Qty: 200 EACH | Refills: 5 | Status: SHIPPED | OUTPATIENT
Start: 2021-02-04 | End: 2021-09-22 | Stop reason: SDUPTHER

## 2021-02-04 RX ORDER — LANCING DEVICE
EACH MISCELLANEOUS
Qty: 100 EACH | Refills: 5 | Status: SHIPPED | OUTPATIENT
Start: 2021-02-04 | End: 2021-04-05

## 2021-02-04 RX ORDER — GLUCOSAM/CHON-MSM1/C/MANG/BOSW 500-416.6
TABLET ORAL
Qty: 100 EACH | Refills: 5 | Status: SHIPPED | OUTPATIENT
Start: 2021-02-04 | End: 2021-04-05

## 2021-02-04 RX ORDER — LISINOPRIL 2.5 MG/1
2.5 TABLET ORAL DAILY
Qty: 90 TABLET | Refills: 1 | Status: SHIPPED | OUTPATIENT
Start: 2021-02-04 | End: 2021-09-22 | Stop reason: SDUPTHER

## 2021-02-04 RX ORDER — CALCIUM CITRATE/VITAMIN D3 200MG-6.25
TABLET ORAL
Qty: 100 STRIP | Refills: 5 | Status: SHIPPED | OUTPATIENT
Start: 2021-02-04 | End: 2021-04-05

## 2021-02-04 RX ORDER — IBUPROFEN 600 MG/1
600 TABLET ORAL 4 TIMES DAILY PRN
Qty: 120 TABLET | Refills: 1 | Status: SHIPPED | OUTPATIENT
Start: 2021-02-04 | End: 2021-03-24

## 2021-02-21 ENCOUNTER — HOSPITAL ENCOUNTER (EMERGENCY)
Age: 26
Discharge: HOME | End: 2021-02-21
Attending: EMERGENCY MEDICINE
Payer: MEDICAID

## 2021-02-21 VITALS
OXYGEN SATURATION: 100 % | RESPIRATION RATE: 20 BRPM | WEIGHT: 130 LBS | DIASTOLIC BLOOD PRESSURE: 107 MMHG | TEMPERATURE: 97 F | SYSTOLIC BLOOD PRESSURE: 152 MMHG | HEART RATE: 84 BPM | BODY MASS INDEX: 21.63 KG/M2

## 2021-02-21 DIAGNOSIS — N30.00 ACUTE CYSTITIS WITHOUT HEMATURIA: Primary | ICD-10-CM

## 2021-02-21 LAB
-: ABNORMAL
ABSOLUTE EOS #: 0.24 K/UL (ref 0–0.44)
ABSOLUTE IMMATURE GRANULOCYTE: <0.03 K/UL (ref 0–0.3)
ABSOLUTE LYMPH #: 2.84 K/UL (ref 1.1–3.7)
ABSOLUTE MONO #: 0.39 K/UL (ref 0.1–1.2)
ALBUMIN SERPL-MCNC: 2.7 G/DL (ref 3.5–5.2)
ALBUMIN/GLOBULIN RATIO: 0.6 (ref 1–2.5)
ALP BLD-CCNC: 102 U/L (ref 35–104)
ALT SERPL-CCNC: 12 U/L (ref 5–33)
AMORPHOUS: ABNORMAL
ANION GAP SERPL CALCULATED.3IONS-SCNC: 9 MMOL/L (ref 9–17)
AST SERPL-CCNC: 22 U/L
BACTERIA: ABNORMAL
BASOPHILS # BLD: 1 % (ref 0–2)
BASOPHILS ABSOLUTE: 0.04 K/UL (ref 0–0.2)
BILIRUB SERPL-MCNC: 0.23 MG/DL (ref 0.3–1.2)
BILIRUBIN DIRECT: 0.08 MG/DL
BILIRUBIN URINE: NEGATIVE
BILIRUBIN, INDIRECT: 0.15 MG/DL (ref 0–1)
BUN BLDV-MCNC: 4 MG/DL (ref 6–20)
BUN/CREAT BLD: ABNORMAL (ref 9–20)
CALCIUM SERPL-MCNC: 7.9 MG/DL (ref 8.6–10.4)
CASTS UA: ABNORMAL /LPF (ref 0–8)
CHLORIDE BLD-SCNC: 107 MMOL/L (ref 98–107)
CHP ED QC CHECK: YES
CO2: 21 MMOL/L (ref 20–31)
COLOR: YELLOW
COMMENT UA: ABNORMAL
CREAT SERPL-MCNC: 0.71 MG/DL (ref 0.5–0.9)
CRYSTALS, UA: ABNORMAL /HPF
DIFFERENTIAL TYPE: ABNORMAL
EOSINOPHILS RELATIVE PERCENT: 5 % (ref 1–4)
EPITHELIAL CELLS UA: ABNORMAL /HPF (ref 0–5)
GFR AFRICAN AMERICAN: >60 ML/MIN
GFR NON-AFRICAN AMERICAN: >60 ML/MIN
GFR SERPL CREATININE-BSD FRML MDRD: ABNORMAL ML/MIN/{1.73_M2}
GFR SERPL CREATININE-BSD FRML MDRD: ABNORMAL ML/MIN/{1.73_M2}
GLOBULIN: ABNORMAL G/DL (ref 1.5–3.8)
GLUCOSE BLD-MCNC: 113 MG/DL (ref 70–99)
GLUCOSE BLD-MCNC: 115 MG/DL
GLUCOSE URINE: NEGATIVE
HCG QUALITATIVE: NEGATIVE
HCG(URINE) PREGNANCY TEST: NEGATIVE
HCT VFR BLD CALC: 34.1 % (ref 36.3–47.1)
HEMOGLOBIN: 11.7 G/DL (ref 11.9–15.1)
IMMATURE GRANULOCYTES: 0 %
KETONES, URINE: NEGATIVE
LEUKOCYTE ESTERASE, URINE: ABNORMAL
LIPASE: 17 U/L (ref 13–60)
LYMPHOCYTES # BLD: 54 % (ref 24–43)
MCH RBC QN AUTO: 32.6 PG (ref 25.2–33.5)
MCHC RBC AUTO-ENTMCNC: 34.3 G/DL (ref 28.4–34.8)
MCV RBC AUTO: 95 FL (ref 82.6–102.9)
MONOCYTES # BLD: 8 % (ref 3–12)
MUCUS: ABNORMAL
NITRITE, URINE: NEGATIVE
NRBC AUTOMATED: 0 PER 100 WBC
OTHER OBSERVATIONS UA: ABNORMAL
PDW BLD-RTO: 16.3 % (ref 11.8–14.4)
PH UA: 8 (ref 5–8)
PLATELET # BLD: 466 K/UL (ref 138–453)
PLATELET ESTIMATE: ABNORMAL
PMV BLD AUTO: 10.5 FL (ref 8.1–13.5)
POTASSIUM SERPL-SCNC: 4.3 MMOL/L (ref 3.7–5.3)
PROTEIN UA: NEGATIVE
RBC # BLD: 3.59 M/UL (ref 3.95–5.11)
RBC # BLD: ABNORMAL 10*6/UL
RBC UA: ABNORMAL /HPF (ref 0–4)
RENAL EPITHELIAL, UA: ABNORMAL /HPF
SEG NEUTROPHILS: 32 % (ref 36–65)
SEGMENTED NEUTROPHILS ABSOLUTE COUNT: 1.64 K/UL (ref 1.5–8.1)
SODIUM BLD-SCNC: 137 MMOL/L (ref 135–144)
SPECIFIC GRAVITY UA: 1.01 (ref 1–1.03)
TOTAL PROTEIN: 7.2 G/DL (ref 6.4–8.3)
TRICHOMONAS: ABNORMAL
TURBIDITY: CLEAR
URINE HGB: NEGATIVE
UROBILINOGEN, URINE: NORMAL
WBC # BLD: 5.2 K/UL (ref 3.5–11.3)
WBC # BLD: ABNORMAL 10*3/UL
WBC UA: ABNORMAL /HPF (ref 0–5)
YEAST: ABNORMAL

## 2021-02-21 PROCEDURE — 80076 HEPATIC FUNCTION PANEL: CPT

## 2021-02-21 PROCEDURE — 87088 URINE BACTERIA CULTURE: CPT

## 2021-02-21 PROCEDURE — 82330 ASSAY OF CALCIUM: CPT

## 2021-02-21 PROCEDURE — 84295 ASSAY OF SERUM SODIUM: CPT

## 2021-02-21 PROCEDURE — 82947 ASSAY GLUCOSE BLOOD QUANT: CPT

## 2021-02-21 PROCEDURE — 99284 EMERGENCY DEPT VISIT MOD MDM: CPT

## 2021-02-21 PROCEDURE — 6360000002 HC RX W HCPCS: Performed by: STUDENT IN AN ORGANIZED HEALTH CARE EDUCATION/TRAINING PROGRAM

## 2021-02-21 PROCEDURE — 87086 URINE CULTURE/COLONY COUNT: CPT

## 2021-02-21 PROCEDURE — 84703 CHORIONIC GONADOTROPIN ASSAY: CPT

## 2021-02-21 PROCEDURE — 96375 TX/PRO/DX INJ NEW DRUG ADDON: CPT

## 2021-02-21 PROCEDURE — 81001 URINALYSIS AUTO W/SCOPE: CPT

## 2021-02-21 PROCEDURE — 96374 THER/PROPH/DIAG INJ IV PUSH: CPT

## 2021-02-21 PROCEDURE — 82565 ASSAY OF CREATININE: CPT

## 2021-02-21 PROCEDURE — 81025 URINE PREGNANCY TEST: CPT

## 2021-02-21 PROCEDURE — 83690 ASSAY OF LIPASE: CPT

## 2021-02-21 PROCEDURE — 2500000003 HC RX 250 WO HCPCS: Performed by: STUDENT IN AN ORGANIZED HEALTH CARE EDUCATION/TRAINING PROGRAM

## 2021-02-21 PROCEDURE — 82803 BLOOD GASES ANY COMBINATION: CPT

## 2021-02-21 PROCEDURE — 85014 HEMATOCRIT: CPT

## 2021-02-21 PROCEDURE — 87186 SC STD MICRODIL/AGAR DIL: CPT

## 2021-02-21 PROCEDURE — 83605 ASSAY OF LACTIC ACID: CPT

## 2021-02-21 PROCEDURE — 82435 ASSAY OF BLOOD CHLORIDE: CPT

## 2021-02-21 PROCEDURE — 84132 ASSAY OF SERUM POTASSIUM: CPT

## 2021-02-21 PROCEDURE — 80048 BASIC METABOLIC PNL TOTAL CA: CPT

## 2021-02-21 PROCEDURE — 85025 COMPLETE CBC W/AUTO DIFF WBC: CPT

## 2021-02-21 RX ORDER — METOCLOPRAMIDE HYDROCHLORIDE 5 MG/ML
10 INJECTION INTRAMUSCULAR; INTRAVENOUS ONCE
Status: COMPLETED | OUTPATIENT
Start: 2021-02-21 | End: 2021-02-21

## 2021-02-21 RX ORDER — NITROFURANTOIN 25; 75 MG/1; MG/1
100 CAPSULE ORAL 2 TIMES DAILY
Qty: 20 CAPSULE | Refills: 0 | Status: SHIPPED | OUTPATIENT
Start: 2021-02-21 | End: 2021-03-03

## 2021-02-21 RX ORDER — DIPHENHYDRAMINE HYDROCHLORIDE 50 MG/ML
25 INJECTION INTRAMUSCULAR; INTRAVENOUS ONCE
Status: COMPLETED | OUTPATIENT
Start: 2021-02-21 | End: 2021-02-21

## 2021-02-21 RX ADMIN — METOCLOPRAMIDE 10 MG: 5 INJECTION, SOLUTION INTRAMUSCULAR; INTRAVENOUS at 12:33

## 2021-02-21 RX ADMIN — DIPHENHYDRAMINE HYDROCHLORIDE 25 MG: 50 INJECTION, SOLUTION INTRAMUSCULAR; INTRAVENOUS at 12:33

## 2021-02-21 RX ADMIN — FAMOTIDINE 20 MG: 10 INJECTION INTRAVENOUS at 12:34

## 2021-02-21 ASSESSMENT — ENCOUNTER SYMPTOMS
EYE PAIN: 0
CONSTIPATION: 0
WHEEZING: 0
DIARRHEA: 0
COUGH: 0
VOMITING: 1
TROUBLE SWALLOWING: 0
CHEST TIGHTNESS: 0
NAUSEA: 1
BACK PAIN: 0
SHORTNESS OF BREATH: 0
ABDOMINAL PAIN: 1
PHOTOPHOBIA: 0

## 2021-02-21 ASSESSMENT — PAIN DESCRIPTION - PAIN TYPE: TYPE: ACUTE PAIN

## 2021-02-21 ASSESSMENT — PAIN SCALES - GENERAL: PAINLEVEL_OUTOF10: 7

## 2021-02-21 NOTE — ED PROVIDER NOTES
[facility]  ED  Emergency Department Encounter  Emergency Medicine Resident     Pt Name: Alon Calabrese  MRN: [id number]  Yosephgfshawn 1995  Date of evaluation: 21  PCP:  Rey Doss MD    [address] [place] [address]       Chief Complaint   Patient presents with    Abdominal Pain     Pt to ED with c/o RLQ pain and vomiting since last night, pt denies urinary complaints, no diarrhea or constipation, no fevers or chills, pt has paperwork for an A1C draw, states her glucometer broke and she cannot check her sugars at home, pt reports she only eats [place] potatoes    Emesis       HISTORY OFPRESENT ILLNESS  (Location/Symptom, Timing/Onset, Context/Setting, Quality, Duration, Modifying Factors,Severity.)      Alon Calabrese is a 30 y.o. female who presents with right lower quadrant pain and vomiting since last night. She states she has a history of an ovarian cyst that has caused pain similar to this in the past.  She denies any urinary or bowel symptoms. States she has been able to tolerate juice and she last ate a meal last night. Of note, patient is a type I diabetic, she states she did not take her insulin today. She states her blood sugars have been around 105 in the mornings. Patient reports a history of hypertension and states she takes  lisinopril but did not take her medication today. She is unsure of the exact date of her last menstrual period but states it was in January. She denies sexual activity. Patient denies any fevers, chills, fatigue. She denies any other concerns or complaints at this time. PAST MEDICAL / SURGICAL / SOCIAL / FAMILY HISTORY      has a past medical history of Diabetes mellitus ([address] MRSA (methicillin resistant staph aureus) culture positive, and Ovarian cyst.     has a past surgical history that includes  section.      Social History     Socioeconomic History    Marital status: Single     Spouse name: Not on file    Number of children: Not on file    Years of education: Not on file    Highest education level: Not on file   Occupational History    Not on file   Social Needs    Financial resource strain: Not on file    Food insecurity     Worry: Sometimes true     Inability: Sometimes true   [employer] needs     Medical: Not on file     Non-medical: Not on file   Tobacco Use    Smoking status: Current Every Day Smoker     Types: Cigarettes    Smokeless tobacco: Never Used    Tobacco comment: 5/day   Substance and Sexual Activity    Alcohol use: No    Drug use: No    Sexual activity: Never   Lifestyle    Physical activity     Days per week: Not on file     Minutes per session: Not on file    Stress: Not on file   Relationships    Social connections     Talks on phone: Not on file     Gets together: Not on file     Attends Taoist service: Not on file     Active member of club or organization: Not on file     Attends meetings of clubs or organizations: Not on file     Relationship status: Not on file    Intimate partner violence     Fear of current or ex partner: Not on file     Emotionally abused: Not on file     Physically abused: Not on file     Forced sexual activity: Not on file   Other Topics Concern    Not on file   Social History Narrative    Not on file       No family history on file. Allergies:  Shrimp flavor    Home Medications:  Prior to Admission medications    Medication Sig Start Date End Date Taking?  Authorizing Provider   nitrofurantoin, macrocrystal-monohydrate, (MACROBID) 100 MG capsule Take 1 capsule by mouth 2 times daily for 10 days 2/21/21 3/3/21 Yes Esperanza Lou,    Insulin Pen Needle (COMFORT EZ PEN NEEDLES) 31G X 8 MM MISC USE 1 needle four times daily AS DIRECTED 2/4/21   Emperatriz Nichole MD   ibuprofen (ADVIL;MOTRIN) 600 MG tablet Take 1 tablet by mouth 4 times daily as needed for Pain 2/4/21   Emperatriz Nichole MD   lisinopril (PRINIVIL;ZESTRIL) 2.5 MG tablet Take 1 tablet by mouth daily 2/4/21   Emperatriz Roberson MD   Alcohol Swabs (ALCOHOL PADS) 70 % PADS Apply 1 each topically 4 times daily 2/4/21   Emperatriz Roberson MD   blood glucose test strips (TRUE METRIX BLOOD GLUCOSE TEST) strip CHECK BLOOD SUGARS FOUR TIMES DAILY AS DIRECTED. 2/4/21   Emperatriz Roberson MD   TRUEplus Lancets 33G MISC USE 1 LANCETS ROUTE 4 TIMES DAILY 2/4/21   Emperatriz Roberson MD   insulin glargine (BASAGLAR KWIKPEN) 100 UNIT/ML injection pen Inject 12 Units into the skin nightly 12/17/20   Emperatriz Roberson MD   acetaminophen (TYLENOL) 500 MG tablet Take 2 tablets by mouth every 6 hours as needed for Pain 12/17/20   Emperatriz Roberson MD   insulin aspart (NOVOLOG FLEXPEN) 100 UNIT/ML injection pen Inject 2-12 units subcutaneously TID based on sliding scale 12/17/20   Emperatriz Roberson MD   famotidine (PEPCID) 20 MG tablet Take 1 tablet by mouth 2 times daily 12/17/20   Emperatriz Roberson MD   blood glucose monitor kit and supplies use check blood sugar as directed 8/20/20 8/20/21  Emperatriz Roberson MD   ketoconazole (NIZORAL) 2 % cream Apply topically daily. 8/20/20   Emperatriz Roberson MD       REVIEW OFSYSTEMS    (2-9 systems for level 4, 10 or more for level 5)      Review of Systems   Constitutional: Negative for chills, fatigue and fever. HENT: Negative for congestion, ear pain and trouble swallowing. Eyes: Negative for photophobia, pain and visual disturbance. Respiratory: Negative for cough, chest tightness, shortness of breath and wheezing. Cardiovascular: Negative for chest pain. Gastrointestinal: Positive for abdominal pain, nausea and vomiting. Negative for constipation and diarrhea. Reports right lower quadrant pain and vomiting since last night. Pain is sharp in nature and does not radiate. Genitourinary: Negative for decreased urine volume, difficulty urinating, dysuria, flank pain, frequency, hematuria, urgency, vaginal bleeding and vaginal discharge.    Musculoskeletal: Negative for back pain and neck pain. Skin: Negative for rash and wound. Neurological: Negative for dizziness, weakness, light-headedness and headaches. Psychiatric/Behavioral: Negative. PHYSICAL EXAM   (up to 7 for level 4, 8 or more forlevel 5)      INITIAL VITALS:   ED Triage Vitals   BP Temp Temp src Pulse Resp SpO2 Height Weight   02/21/21 1140 02/21/21 1132 -- 02/21/21 1140 02/21/21 1140 02/21/21 1140 -- 02/21/21 1140   (!) 152/107 97 °F (36.1 °C)  84 20 100 %  130 lb (59 kg)       Physical Exam  Constitutional:       General: She is not in acute distress. Appearance: She is normal weight. She is not ill-appearing or toxic-appearing. HENT:      Head: Normocephalic and atraumatic. Mouth/Throat:      Mouth: Mucous membranes are moist.      Pharynx: Oropharynx is clear. Eyes:      Extraocular Movements: Extraocular movements intact. Cardiovascular:      Rate and Rhythm: Normal rate and regular rhythm. Heart sounds: Normal heart sounds. No murmur. No friction rub. No gallop. Pulmonary:      Effort: Pulmonary effort is normal.      Breath sounds: Wheezing present. No rhonchi or rales. Comments: Wheezing noted in the right upper lobe. Clinically asymptomatic. Patient reports a history of smoking. Abdominal:      General: Bowel sounds are normal. There is no distension. Palpations: Abdomen is soft. There is no mass. Tenderness: There is abdominal tenderness in the right lower quadrant. There is no guarding or rebound. Skin:     General: Skin is warm. Findings: No erythema or rash. Neurological:      General: No focal deficit present. Mental Status: She is alert and oriented to person, place, and time.          DIFFERENTIAL  DIAGNOSIS     PLAN (LABS / IMAGING / EKG):  Orders Placed This Encounter   Procedures    Culture, Urine    Urinalysis Reflex to Culture    CBC Auto Differential    Basic Metabolic Panel    HEPATIC FUNCTION PANEL    LIPASE    Leukocyte Esterase, Urine LARGE (*)     All other components within normal limits   CBC WITH AUTO DIFFERENTIAL - Abnormal; Notable for the following components:    RBC 3.59 (*)     Hemoglobin 11.7 (*)     Hematocrit 34.1 (*)     RDW 16.3 (*)     Platelets 009 (*)     Seg Neutrophils 32 (*)     Lymphocytes 54 (*)     Eosinophils % 5 (*)     All other components within normal limits   BASIC METABOLIC PANEL - Abnormal; Notable for the following components:    Glucose 113 (*)     BUN 4 (*)     Calcium 7.9 (*)     All other components within normal limits   HEPATIC FUNCTION PANEL - Abnormal; Notable for the following components:    Albumin 2.7 (*)     Total Bilirubin 0.23 (*)     Albumin/Globulin Ratio 0.6 (*)     All other components within normal limits   MICROSCOPIC URINALYSIS - Abnormal; Notable for the following components:    Bacteria, UA MANY (*)     All other components within normal limits   POCT GLUCOSE - Normal   CULTURE, URINE   LIPASE   HCG, SERUM, QUALITATIVE   PREGNANCY, URINE   BLOOD GAS, VENOUS           No results found. PROCEDURES:  None    CONSULTS:  None    CRITICAL CARE:  Please see attending note    FINAL IMPRESSION      1.  Acute cystitis without hematuria          DISPOSITION / PLAN     DISPOSITION        PATIENT REFERRED TO:  Emperatriz Grove MD  [address] [address]  [phone]    On 2/22/2021  ED follow up within 36 hours with increasing pain      DISCHARGE MEDICATIONS:  Discharge Medication List as of 2/21/2021  1:05 PM      START taking these medications    Details   nitrofurantoin, macrocrystal-monohydrate, (MACROBID) 100 MG capsule Take 1 capsule by mouth 2 times daily for 10 days, Disp-20 capsule, R-0Print             Young Lopez DO  Emergency Medicine Resident    (Please note that portions of this note were completed with a voice recognition program.Efforts were made to edit the dictations but occasionally words are mis-transcribed.)      Onesimo Kim Roma Conn,   Resident  02/21/21 2805

## 2021-02-21 NOTE — ED NOTES
Pt to restroom with steady gait unassisted     [address] [place] [address], RN  02/21/21 5934

## 2021-02-21 NOTE — ED PROVIDER NOTES
[facility]     Emergency Department     Faculty Note/ Attestation      Pt Name: Bernadine Lai                                       MRN: [id number]  Tariq 1995  Date of evaluation: 2/21/2021    Patients PCP:    Jennifer Rollins MD    Attestation  I performed a history and physical examination of the patient and discussed management with the resident. I reviewed the residents note and agree with the documented findings and plan of care. Any areas of disagreement are noted on the chart. I was personally present for the key portions of any procedures. I have documented in the chart those procedures where I was not present during the key portions. I have reviewed the emergency nurses triage note. I agree with the chief complaint, past medical history, past surgical history, allergies, medications, social and family history as documented unless otherwise noted below. For Physician Assistant/ Nurse Practitioner cases/documentation I have personally evaluated this patient and have completed at least one if not all key elements of the E/M (history, physical exam, and MDM). Additional findings are as noted.     Initial Screens:        Dong Coma Scale  Eye Opening: Spontaneous  Best Verbal Response: Oriented  Best Motor Response: Obeys commands  [place] Coma Scale Score: 15    Vitals:    Vitals:    02/21/21 1132 02/21/21 1140   BP:  (!) 152/107   Pulse:  84   Resp:  20   Temp: 97 °F (36.1 °C)    SpO2:  100%   Weight:  130 lb (59 kg)       CHIEF COMPLAINT       Chief Complaint   Patient presents with    Abdominal Pain     Pt to ED with c/o RLQ pain and vomiting since last night, pt denies urinary complaints, no diarrhea or constipation, no fevers or chills, pt has paperwork for an A1C draw, states her glucometer broke and she cannot check her sugars at home, pt reports she only eats [place] potatoes    Emesis       The pt is a 29 YO F with pain in the RLQ pain pt has a cyst Trachea: No tracheal deviation. Pulmonary:      Effort: Pulmonary effort is normal. No respiratory distress. Breath sounds: No stridor. Abdominal:      Tenderness: There is abdominal tenderness (RUQ more than lower at this time no mcburnies point ttp) in the right upper quadrant. Musculoskeletal: Normal range of motion. Skin:     General: Skin is warm and dry. Neurological:      Mental Status: She is alert and oriented to person, place, and time. Coordination: Coordination normal.   Psychiatric:         Behavior: Behavior normal.           Comments  The patient presents complaining of RLQ abdominal pain. Based on exam of the patient the patient is very unlikely to have: ectopic or TOA as pt not having any discharge or signs of PID gradual onset unlikely torsion. The pt has signs of UTI and no diarrhea or bleeding consistent with Diverticulitis no hernia on exam.  Appy less likely we discussed risks benefits for CT and at this time do not recommend as pt reasonable and if pain not improving in 12 -36 hours needs to be re seen. The patient was re evaluated at this time the pt noted improvement of pain. Vital signs were reviewed and stable. The pts abdomen was re examined showing No RLQ pain. The pts abdominal pain was not concerning at this time for appy. Follow up was discussed and planned for the next 24 -36 hours. They stated that they would follow up either with their PCP or return to the ER. Navarro DO, RDMS.   Attending Emergency Physician          Derian Alfaro DO  02/21/21 6422

## 2021-02-22 LAB
ALLEN TEST: ABNORMAL
ANION GAP: 6 MMOL/L (ref 7–16)
CULTURE: ABNORMAL
FIO2: ABNORMAL
GFR NON-AFRICAN AMERICAN: >60 ML/MIN
GFR SERPL CREATININE-BSD FRML MDRD: >60 ML/MIN
GFR SERPL CREATININE-BSD FRML MDRD: NORMAL ML/MIN/{1.73_M2}
GLUCOSE BLD-MCNC: 115 MG/DL (ref 74–100)
HCO3 VENOUS: 23.7 MMOL/L (ref 22–29)
Lab: ABNORMAL
MODE: ABNORMAL
NEGATIVE BASE EXCESS, VEN: 2 (ref 0–2)
O2 DEVICE/FLOW/%: ABNORMAL
O2 SAT, VEN: 39 % (ref 60–85)
PATIENT TEMP: ABNORMAL
PCO2, VEN: 42.2 MM HG (ref 41–51)
PH VENOUS: 7.36 (ref 7.32–7.43)
PO2, VEN: 23.5 MM HG (ref 30–50)
POC CHLORIDE: 110 MMOL/L (ref 98–107)
POC CREATININE: 0.68 MG/DL (ref 0.51–1.19)
POC HEMATOCRIT: 38 % (ref 36–46)
POC HEMOGLOBIN: 12.8 G/DL (ref 12–16)
POC IONIZED CALCIUM: 1.07 MMOL/L (ref 1.15–1.33)
POC LACTIC ACID: 1.45 MMOL/L (ref 0.56–1.39)
POC PCO2 TEMP: ABNORMAL MM HG
POC PH TEMP: ABNORMAL
POC PO2 TEMP: ABNORMAL MM HG
POC POTASSIUM: 4.2 MMOL/L (ref 3.5–4.5)
POC SODIUM: 140 MMOL/L (ref 138–146)
POSITIVE BASE EXCESS, VEN: ABNORMAL (ref 0–3)
SAMPLE SITE: ABNORMAL
SPECIMEN DESCRIPTION: ABNORMAL
TOTAL CO2, VENOUS: 25 MMOL/L (ref 23–30)

## 2021-02-23 NOTE — PROGRESS NOTES
Reviewed patient's urine culture - culture positive for E. coli. Patient was discharged on Nitrofurantoin 100 mg twice daily for 10 days, and culture is sensitive to prescribed medication. Antibiotic prescribed at discharge is appropriate - no changes made to antibiotic regimen.      Sintia Palacios, PharmD  2/23/2021  11:20 AM

## 2021-03-22 ENCOUNTER — OFFICE VISIT (OUTPATIENT)
Dept: FAMILY MEDICINE CLINIC | Age: 26
End: 2021-03-22
Payer: MEDICARE

## 2021-03-22 VITALS
HEIGHT: 65 IN | TEMPERATURE: 96.9 F | BODY MASS INDEX: 18.23 KG/M2 | OXYGEN SATURATION: 100 % | SYSTOLIC BLOOD PRESSURE: 138 MMHG | DIASTOLIC BLOOD PRESSURE: 84 MMHG | HEART RATE: 86 BPM | WEIGHT: 109.4 LBS

## 2021-03-22 DIAGNOSIS — R10.84 GENERALIZED ABDOMINAL PAIN: ICD-10-CM

## 2021-03-22 DIAGNOSIS — E10.42 TYPE 1 DIABETES MELLITUS WITH DIABETIC POLYNEUROPATHY (HCC): Primary | ICD-10-CM

## 2021-03-22 DIAGNOSIS — R10.9 RECURRENT ABDOMINAL PAIN: ICD-10-CM

## 2021-03-22 DIAGNOSIS — Z13.220 SCREENING FOR HYPERLIPIDEMIA: ICD-10-CM

## 2021-03-22 DIAGNOSIS — Z72.0 TOBACCO ABUSE: ICD-10-CM

## 2021-03-22 DIAGNOSIS — Z11.59 ENCOUNTER FOR HEPATITIS C SCREENING TEST FOR LOW RISK PATIENT: ICD-10-CM

## 2021-03-22 DIAGNOSIS — Z13.0 SCREENING, ANEMIA, DEFICIENCY, IRON: ICD-10-CM

## 2021-03-22 DIAGNOSIS — B35.1 ONYCHOMYCOSIS: ICD-10-CM

## 2021-03-22 DIAGNOSIS — G25.81 RESTLESS LEG SYNDROME: ICD-10-CM

## 2021-03-22 LAB
CREATININE URINE POCT: ABNORMAL
HBA1C MFR BLD: 6.3 %
MICROALBUMIN/CREAT 24H UR: ABNORMAL MG/G{CREAT}
MICROALBUMIN/CREAT UR-RTO: ABNORMAL

## 2021-03-22 PROCEDURE — G8484 FLU IMMUNIZE NO ADMIN: HCPCS | Performed by: INTERNAL MEDICINE

## 2021-03-22 PROCEDURE — 99214 OFFICE O/P EST MOD 30 MIN: CPT | Performed by: INTERNAL MEDICINE

## 2021-03-22 PROCEDURE — 4004F PT TOBACCO SCREEN RCVD TLK: CPT | Performed by: INTERNAL MEDICINE

## 2021-03-22 PROCEDURE — 3046F HEMOGLOBIN A1C LEVEL >9.0%: CPT | Performed by: INTERNAL MEDICINE

## 2021-03-22 PROCEDURE — 82044 UR ALBUMIN SEMIQUANTITATIVE: CPT | Performed by: INTERNAL MEDICINE

## 2021-03-22 PROCEDURE — G8427 DOCREV CUR MEDS BY ELIG CLIN: HCPCS | Performed by: INTERNAL MEDICINE

## 2021-03-22 PROCEDURE — G8419 CALC BMI OUT NRM PARAM NOF/U: HCPCS | Performed by: INTERNAL MEDICINE

## 2021-03-22 PROCEDURE — 2022F DILAT RTA XM EVC RTNOPTHY: CPT | Performed by: INTERNAL MEDICINE

## 2021-03-22 PROCEDURE — 83036 HEMOGLOBIN GLYCOSYLATED A1C: CPT | Performed by: INTERNAL MEDICINE

## 2021-03-22 RX ORDER — ACETAMINOPHEN 500 MG
1000 TABLET ORAL EVERY 6 HOURS PRN
Qty: 180 TABLET | Refills: 1 | Status: SHIPPED | OUTPATIENT
Start: 2021-03-22 | End: 2021-09-22 | Stop reason: SDUPTHER

## 2021-03-22 RX ORDER — INSULIN ASPART 100 [IU]/ML
INJECTION, SOLUTION INTRAVENOUS; SUBCUTANEOUS
Qty: 15 PEN | Refills: 1 | Status: SHIPPED | OUTPATIENT
Start: 2021-03-22 | End: 2021-08-03

## 2021-03-22 RX ORDER — KETOCONAZOLE 20 MG/G
CREAM TOPICAL
Qty: 60 G | Refills: 1 | Status: SHIPPED | OUTPATIENT
Start: 2021-03-22 | End: 2021-09-22 | Stop reason: SDUPTHER

## 2021-03-22 RX ORDER — FAMOTIDINE 20 MG/1
20 TABLET, FILM COATED ORAL 2 TIMES DAILY
Qty: 180 TABLET | Refills: 1 | Status: SHIPPED | OUTPATIENT
Start: 2021-03-22 | End: 2021-05-24

## 2021-03-22 RX ORDER — INSULIN GLARGINE 100 [IU]/ML
12 INJECTION, SOLUTION SUBCUTANEOUS NIGHTLY
Qty: 6 PEN | Refills: 1 | Status: SHIPPED | OUTPATIENT
Start: 2021-03-22 | End: 2021-06-10 | Stop reason: CLARIF

## 2021-03-22 RX ORDER — ROPINIROLE 0.25 MG/1
0.25 TABLET, FILM COATED ORAL NIGHTLY
Qty: 30 TABLET | Refills: 2 | Status: SHIPPED | OUTPATIENT
Start: 2021-03-22 | End: 2021-05-19

## 2021-03-22 SDOH — ECONOMIC STABILITY: INCOME INSECURITY: HOW HARD IS IT FOR YOU TO PAY FOR THE VERY BASICS LIKE FOOD, HOUSING, MEDICAL CARE, AND HEATING?: SOMEWHAT HARD

## 2021-03-22 SDOH — ECONOMIC STABILITY: TRANSPORTATION INSECURITY
IN THE PAST 12 MONTHS, HAS THE LACK OF TRANSPORTATION KEPT YOU FROM MEDICAL APPOINTMENTS OR FROM GETTING MEDICATIONS?: YES

## 2021-03-22 ASSESSMENT — ENCOUNTER SYMPTOMS
NAUSEA: 1
DIARRHEA: 0
BLOOD IN STOOL: 0
WHEEZING: 0
FLATUS: 0
CHOKING: 0
COUGH: 0
BELCHING: 0
ABDOMINAL PAIN: 1
HEMATOCHEZIA: 0
ANAL BLEEDING: 0
CONSTIPATION: 0
CHEST TIGHTNESS: 0
VOMITING: 0
SHORTNESS OF BREATH: 0

## 2021-03-22 ASSESSMENT — PATIENT HEALTH QUESTIONNAIRE - PHQ9
SUM OF ALL RESPONSES TO PHQ QUESTIONS 1-9: 0
2. FEELING DOWN, DEPRESSED OR HOPELESS: 0
SUM OF ALL RESPONSES TO PHQ9 QUESTIONS 1 & 2: 0
SUM OF ALL RESPONSES TO PHQ QUESTIONS 1-9: 0
1. LITTLE INTEREST OR PLEASURE IN DOING THINGS: 0

## 2021-03-22 ASSESSMENT — VISUAL ACUITY: OU: 1

## 2021-03-22 NOTE — PROGRESS NOTES
Subjective:       Patient ID:     Douglas Bryan is a 30 y.o. female who presents for   Chief Complaint   Patient presents with    Diabetes       HPI:  Nursing note reviewed and discussed with patient. Diabetes  She presents for her follow-up diabetic visit. She has type 2 diabetes mellitus. Pertinent negatives for hypoglycemia include no dizziness or headaches. Pertinent negatives for diabetes include no chest pain, no fatigue and no weight loss. Abdominal Pain  This is a recurrent problem. The current episode started more than 1 month ago. The onset quality is sudden. The problem occurs intermittently. The pain is located in the RUQ and RLQ. The pain is moderate. The quality of the pain is sharp. The abdominal pain does not radiate. Associated symptoms include nausea. Pertinent negatives include no anorexia, arthralgias, belching, constipation, diarrhea, dysuria, fever, flatus, frequency, headaches, hematochezia, hematuria, melena, myalgias, vomiting or weight loss. Nothing aggravates the pain. The pain is relieved by nothing. constipation is resolved. Abdominal pain occurs every other day, typically occurs right after she eats spicy food. She is lactose intolerant at baseline, avoids dairy. She reports menses are regular, lasting 3-4 days, no clots or cramping. Patient's medications, allergies, past medical, surgical, social and family histories were reviewed and updated as appropriate.     Past Medical History:   Diagnosis Date    Diabetes mellitus ([address]     MRSA (methicillin resistant staph aureus) culture positive 2016    abdomen    Ovarian cyst 2019    Right side     Past Surgical History:   Procedure Laterality Date     SECTION         Social History     Tobacco Use    Smoking status: Current Every Day Smoker     Types: Cigarettes    Smokeless tobacco: Never Used    Tobacco comment: 5/day   Substance Use Topics    Alcohol use: No      Patient Active Problem List Diagnosis    Yeast vaginitis    Type 1 diabetes mellitus with diabetic polyneuropathy (HCC)    Pregestational diabetes mellitus, modified White class C    Intractable vomiting with nausea    Intractable abdominal pain         Prior to Visit Medications    Medication Sig Taking? Authorizing Provider   Insulin Pen Needle (COMFORT EZ PEN NEEDLES) 31G X 8 MM MISC USE 1 needle four times daily AS DIRECTED Yes Emperatriz Anguiano MD   ibuprofen (ADVIL;MOTRIN) 600 MG tablet Take 1 tablet by mouth 4 times daily as needed for Pain Yes Emperatriz Anguiano MD   lisinopril (PRINIVIL;ZESTRIL) 2.5 MG tablet Take 1 tablet by mouth daily Yes Mark Alejandra MD   Alcohol Swabs (ALCOHOL PADS) 70 % PADS Apply 1 each topically 4 times daily Yes Mark Alejandra MD   blood glucose test strips (TRUE METRIX BLOOD GLUCOSE TEST) strip CHECK BLOOD SUGARS FOUR TIMES DAILY AS DIRECTED. Yes Emperatriz Anguiano MD   TRUEplus Lancets 33G MISC USE 1 LANCETS ROUTE 4 TIMES DAILY Yes Emperatriz Anguiano MD   insulin glargine (BASAGLAR KWIKPEN) 100 UNIT/ML injection pen Inject 12 Units into the skin nightly Yes Mark Alejandra MD   acetaminophen (TYLENOL) 500 MG tablet Take 2 tablets by mouth every 6 hours as needed for Pain Yes Emperatriz Anguiano MD   insulin aspart (NOVOLOG FLEXPEN) 100 UNIT/ML injection pen Inject 2-12 units subcutaneously TID based on sliding scale Yes Emperatriz Anguiano MD   famotidine (PEPCID) 20 MG tablet Take 1 tablet by mouth 2 times daily Yes Mark Alejandra MD   blood glucose monitor kit and supplies use check blood sugar as directed Yes Emperatriz Anguiano MD   ketoconazole (NIZORAL) 2 % cream Apply topically daily. Yes Mark Alejandra MD     Review of Systems  Review of Systems   Constitutional: Negative for fatigue, fever, unexpected weight change and weight loss. Respiratory: Negative for cough, choking, chest tightness, shortness of breath and wheezing.     Cardiovascular: Negative for chest pain, palpitations and leg swelling. Gastrointestinal: Positive for abdominal pain and nausea. Negative for anal bleeding, anorexia, blood in stool, constipation, diarrhea, flatus, hematochezia, melena and vomiting. Endocrine: Negative. Genitourinary: Negative for dysuria, frequency and hematuria. Musculoskeletal: Negative for arthralgias, joint swelling and myalgias. Skin: Negative. Neurological: Negative for dizziness and headaches. Psychiatric/Behavioral: Negative for sleep disturbance. All other systems reviewed and are negative. Objective:       Physical Exam:  /84 (Site: Right Upper Arm, Position: Sitting, Cuff Size: Small Adult)   Pulse 86   Temp 96.9 °F (36.1 °C) (Temporal)   Ht 5' 5\" (1.651 m)   Wt 109 lb 6.4 oz (49.6 kg)   LMP 03/15/2021   SpO2 100%   BMI 18.21 kg/m²   Physical Exam  Vitals signs and nursing note reviewed. Constitutional:       General: She is not in acute distress. Appearance: She is well-developed. She is not ill-appearing. Eyes:      General: Lids are normal. Vision grossly intact. Cardiovascular:      Rate and Rhythm: Normal rate and regular rhythm. Heart sounds: Normal heart sounds, S1 normal and S2 normal. No murmur. No friction rub. No gallop. Pulmonary:      Effort: Pulmonary effort is normal. No respiratory distress. Breath sounds: Normal breath sounds. No wheezing. Abdominal:      General: Bowel sounds are normal.      Palpations: Abdomen is soft. There is no mass. Tenderness: There is no abdominal tenderness. There is no guarding. Musculoskeletal: Normal range of motion. Skin:     General: Skin is warm and dry. Capillary Refill: Capillary refill takes less than 2 seconds. Neurological:      General: No focal deficit present. Mental Status: She is alert and oriented to person, place, and time.      Diabetic Foot Exam    Pulses:  normal,   Edema: negative  Skin: abnormal - dystrophic nail changes bilateral great toes and rigth 5th toe, tinea pedis noted     Sensory exam  Monofilament Sensation:  normal  (minimum of 5 random plantar locations tested, avoid callous areas - > 1 area with absence of sensation is + for neuropathy)      Plus one of the following  Pinprick:  Intact  Proprioception:  Intact  Vibration (128 Hz):  N/A          Data Review  A1c today 6.3%       Assessment/Plan:      1. Type 1 diabetes mellitus with diabetic polyneuropathy (HCC)  -  DIABETES FOOT EXAM  - POCT glycosylated hemoglobin (Hb A1C)  - POCT microalbumin  - insulin glargine (BASAGLAR KWIKPEN) 100 UNIT/ML injection pen; Inject 12 Units into the skin nightly  Dispense: 6 pen; Refill: 1  - insulin aspart (NOVOLOG FLEXPEN) 100 UNIT/ML injection pen; Inject 2-12 units subcutaneously TID based on sliding scale  Dispense: 15 pen; Refill: 1  - External Referral To Ophthalmology    2. Generalized abdominal pain  - acetaminophen (TYLENOL) 500 MG tablet; Take 2 tablets by mouth every 6 hours as needed for Pain  Dispense: 180 tablet; Refill: 1  - famotidine (PEPCID) 20 MG tablet; Take 1 tablet by mouth 2 times daily  Dispense: 180 tablet; Refill: 1    3. Onychomycosis  - ketoconazole (NIZORAL) 2 % cream; Apply topically daily. Dispense: 60 g; Refill: 1    4. Screening for hyperlipidemia  - Lipid, Fasting; Future    5. Encounter for hepatitis C screening test for low risk patient  - Hepatitis C Antibody; Future    6. Recurrent abdominal pain  - Ambulatory referral to Gastroenterology    7. Screening, anemia, deficiency, iron  - CBC With Auto Differential; Future    8. Restless leg syndrome  - rOPINIRole (REQUIP) 0.25 MG tablet; Take 1 tablet by mouth nightly  Dispense: 30 tablet; Refill: 2    9.  Tobacco abuse  Not ready to quit at this time   Counseling 5 min to quit            Health Maintenance Due   Topic Date Due    Hepatitis C screen  Never done    Hepatitis B vaccine (3 of 3 - Risk 3-dose series) 02/14/1998    Pneumococcal 0-64 years Vaccine (1 of 1 - PPSV23) Never done    Varicella vaccine (2 of 2 - 2-dose childhood series) 08/09/2000    HIV screen  Never done    Cervical cancer screen  Never done    Diabetic retinal exam  01/21/2020    Diabetic foot exam  01/31/2020    A1C test (Diabetic or Prediabetic)  02/11/2021    Diabetic microalbuminuria test  02/11/2021    Lipid screen  02/11/2021       Electronically signed by Eleuterio Castro MD on 3/22/2021 at 10:20 AM

## 2021-03-22 NOTE — PATIENT INSTRUCTIONS
Patient Education        Stopping Smoking: Care Instructions  Your Care Instructions     Cigarette smokers crave the nicotine in cigarettes. Giving it up is much harder than simply changing a habit. Your body has to stop craving the nicotine. It is hard to quit, but you can do it. There are many tools that people use to quit smoking. You may find that combining tools works best for you. There are several steps to quitting. First you get ready to quit. Then you get support to help you. After that, you learn new skills and behaviors to become a nonsmoker. For many people, a necessary step is getting and using medicine. Your doctor will help you set up the plan that best meets your needs. You may want to attend a smoking cessation program to help you quit smoking. When you choose a program, look for one that has proven success. Ask your doctor for ideas. You will greatly increase your chances of success if you take medicine as well as get counseling or join a cessation program.  Some of the changes you feel when you first quit tobacco are uncomfortable. Your body will miss the nicotine at first, and you may feel short-tempered and grumpy. You may have trouble sleeping or concentrating. Medicine can help you deal with these symptoms. You may struggle with changing your smoking habits and rituals. The last step is the tricky one: Be prepared for the smoking urge to continue for a time. This is a lot to deal with, but keep at it. You will feel better. Follow-up care is a key part of your treatment and safety. Be sure to make and go to all appointments, and call your doctor if you are having problems. It's also a good idea to know your test results and keep a list of the medicines you take. How can you care for yourself at home? · Ask your family, friends, and coworkers for support. You have a better chance of quitting if you have help and support.   · Join a support group, such as Nicotine Anonymous, for people who are trying to quit smoking. · Consider signing up for a smoking cessation program, such as the American Lung Association's Freedom from Smoking program.  · Get text messaging support. Go to the website at www.smokefree. gov to sign up for the [facility] program.  · Set a quit date. Pick your date carefully so that it is not right in the middle of a big deadline or stressful time. Once you quit, do not even take a puff. Get rid of all ashtrays and lighters after your last cigarette. Clean your house and your clothes so that they do not smell of smoke. · Learn how to be a nonsmoker. Think about ways you can avoid those things that make you reach for a cigarette. ? Avoid situations that put you at greatest risk for smoking. For some people, it is hard to have a drink with friends without smoking. For others, they might skip a coffee break with coworkers who smoke. ? Change your daily routine. Take a different route to work or eat a meal in a different place. · Cut down on stress. Calm yourself or release tension by doing an activity you enjoy, such as reading a book, taking a hot bath, or gardening. · Talk to your doctor or pharmacist about nicotine replacement therapy, which replaces the nicotine in your body. You still get nicotine but you do not use tobacco. Nicotine replacement products help you slowly reduce the amount of nicotine you need. These products come in several forms, many of them available over-the-counter:  ? Nicotine patches  ? Nicotine gum and lozenges  ? Nicotine inhaler  · Ask your doctor about bupropion (Wellbutrin) or varenicline (Chantix), which are prescription medicines. They do not contain nicotine. They help you by reducing withdrawal symptoms, such as stress and anxiety. · Some people find hypnosis, acupuncture, and massage helpful for ending the smoking habit. · Eat a healthy diet and get regular exercise.  Having healthy habits will help your body move past its craving for nicotine. · Be prepared to keep trying. Most people are not successful the first few times they try to quit. Do not get mad at yourself if you smoke again. Make a list of things you learned and think about when you want to try again, such as next week, next month, or next year. Where can you learn more? Go to https://Livefyrepematewbrittaney.Skipjump. org and sign in to your [employer] account. Enter R756 in the ShoeSize.Me box to learn more about \"Stopping Smoking: Care Instructions. \"     If you do not have an account, please click on the \"Sign Up Now\" link. Current as of: March 12, 2020               Content Version: 12.8  © 2006-2021 Healthwise, Incorporated. Care instructions adapted under license by [facility] ([facility]). If you have questions about a medical condition or this instruction, always ask your healthcare professional. [address] any warranty or liability for your use of this information.

## 2021-03-23 NOTE — TELEPHONE ENCOUNTER
Last visit: 3/22/21  Last Med refill: 2/4/21  Does patient have enough medication for 72 hours: Yes    Next Visit Date:  Future Appointments   Date Time Provider Hayden Coto   9/22/2021 11:00 AM Emperatriz Barton  [address] Maintenance   Topic Date Due    Hepatitis C screen  Never done    Hepatitis B vaccine (3 of 3 - Risk 3-dose series) 02/14/1998    Varicella vaccine (2 of 2 - 2-dose childhood series) 08/09/2000    Cervical cancer screen  Never done    Diabetic retinal exam  01/21/2020    Lipid screen  02/11/2021    Annual Wellness Visit (AWV)  Never done    Pneumococcal 0-64 years Vaccine (1 of 1 - PPSV23) 06/22/2021 (Originally 12/5/1999)    Flu vaccine (1) 03/22/2022 (Originally 9/1/2020)    COVID-19 Vaccine (1) 03/22/2022 (Originally 12/5/2009)    Potassium monitoring  02/21/2022    Creatinine monitoring  02/21/2022    Diabetic foot exam  03/22/2022    A1C test (Diabetic or Prediabetic)  03/22/2022    Diabetic microalbuminuria test  03/22/2022    DTaP/Tdap/Td vaccine (6 - Td) 08/27/2030    Hib vaccine  Completed    HIV screen  Completed    Hepatitis A vaccine  Aged Out    Meningococcal (ACWY) vaccine  Aged Out       Hemoglobin A1C (%)   Date Value   03/22/2021 6.3   02/11/2020 7.6   01/08/2019 10.1             ( goal A1C is < 7)   Microalb/Crt.  Ratio (mcg/mg creat)   Date Value   02/11/2020 511 (H)     LDL Cholesterol (mg/dL)   Date Value   02/11/2020 84   01/08/2019 131 (H)       (goal LDL is <100)   AST (U/L)   Date Value   02/21/2021 22     ALT (U/L)   Date Value   02/21/2021 12     BUN (mg/dL)   Date Value   02/21/2021 4 (L)     BP Readings from Last 3 Encounters:   03/22/21 138/84   02/21/21 (!) 152/107   11/23/20 (!) 168/98          (goal 120/80)    All Future Testing planned in [facility]  Lab Frequency Next Occurrence   Lipid, Fasting Once 04/21/2021   Hepatitis C Antibody Once 04/22/2021   CBC With Auto Differential Once 04/22/2021 Patient Active Problem List:     Yeast vaginitis     Type 1 diabetes mellitus with diabetic polyneuropathy (HCC)     Pregestational diabetes mellitus, modified White class C     Intractable vomiting with nausea     Intractable abdominal pain

## 2021-03-24 RX ORDER — IBUPROFEN 600 MG/1
TABLET ORAL
Qty: 120 TABLET | Refills: 1 | Status: SHIPPED | OUTPATIENT
Start: 2021-03-24 | End: 2021-06-01

## 2021-04-03 DIAGNOSIS — E10.42 TYPE 1 DIABETES MELLITUS WITH DIABETIC POLYNEUROPATHY (HCC): ICD-10-CM

## 2021-04-05 RX ORDER — CALCIUM CITRATE/VITAMIN D3 200MG-6.25
TABLET ORAL
Qty: 400 STRIP | Refills: 1 | Status: SHIPPED | OUTPATIENT
Start: 2021-04-05 | End: 2021-08-23

## 2021-04-05 RX ORDER — PEN NEEDLE, DIABETIC 31 GX5/16"
NEEDLE, DISPOSABLE MISCELLANEOUS
Qty: 400 EACH | Refills: 1 | Status: SHIPPED | OUTPATIENT
Start: 2021-04-05 | End: 2021-08-23

## 2021-04-05 RX ORDER — GLUCOSAM/CHON-MSM1/C/MANG/BOSW 500-416.6
TABLET ORAL
Qty: 400 EACH | Refills: 1 | Status: SHIPPED | OUTPATIENT
Start: 2021-04-05 | End: 2021-08-23

## 2021-04-05 NOTE — TELEPHONE ENCOUNTER
Last visit: 3/22/21  Last Med refill: 8/20/20  Does patient have enough medication for 72 hours: Yes    Next Visit Date:  Future Appointments   Date Time Provider Hayden Castilloi   5/6/2021  1:45 PM MD valdo Kirby Stephanie Flor   9/22/2021 11:00 AM Emperatriz Perez  [address] Maintenance   Topic Date Due    Hepatitis C screen  Never done    Hepatitis B vaccine (3 of 3 - Risk 3-dose series) 02/14/1998    Varicella vaccine (2 of 2 - 2-dose childhood series) 08/09/2000    Cervical cancer screen  Never done    Diabetic retinal exam  01/21/2020    Lipid screen  02/11/2021    Annual Wellness Visit (AWV)  Never done    Pneumococcal 0-64 years Vaccine (1 of 1 - PPSV23) 06/22/2021 (Originally 12/5/1999)    Flu vaccine (Season Ended) 03/22/2022 (Originally 9/1/2021)    COVID-19 Vaccine (1) 03/22/2022 (Originally 12/5/2009)    Potassium monitoring  02/21/2022    Creatinine monitoring  02/21/2022    Diabetic foot exam  03/22/2022    A1C test (Diabetic or Prediabetic)  03/22/2022    Diabetic microalbuminuria test  03/22/2022    DTaP/Tdap/Td vaccine (6 - Td) 08/27/2030    Hib vaccine  Completed    HIV screen  Completed    Hepatitis A vaccine  Aged Out    Meningococcal (ACWY) vaccine  Aged Out       Hemoglobin A1C (%)   Date Value   03/22/2021 6.3   02/11/2020 7.6   01/08/2019 10.1             ( goal A1C is < 7)   Microalb/Crt.  Ratio (mcg/mg creat)   Date Value   02/11/2020 511 (H)     LDL Cholesterol (mg/dL)   Date Value   02/11/2020 84   01/08/2019 131 (H)       (goal LDL is <100)   AST (U/L)   Date Value   02/21/2021 22     ALT (U/L)   Date Value   02/21/2021 12     BUN (mg/dL)   Date Value   02/21/2021 4 (L)     BP Readings from Last 3 Encounters:   03/22/21 138/84   02/21/21 (!) 152/107   11/23/20 (!) 168/98          (goal 120/80)    All Future Testing planned in [facility]  Lab Frequency Next Occurrence   Lipid, Fasting Once 04/21/2021   Hepatitis C Antibody Once 04/22/2021   CBC With Auto Differential Once 04/22/2021               Patient Active Problem List:     Yeast vaginitis     Type 1 diabetes mellitus with diabetic polyneuropathy (HCC)     Pregestational diabetes mellitus, modified White class C     Intractable vomiting with nausea     Intractable abdominal pain

## 2021-04-22 ENCOUNTER — APPOINTMENT (OUTPATIENT)
Dept: CT IMAGING | Age: 26
End: 2021-04-22
Payer: MEDICARE

## 2021-04-22 ENCOUNTER — HOSPITAL ENCOUNTER (EMERGENCY)
Age: 26
Discharge: HOME | End: 2021-04-22
Attending: EMERGENCY MEDICINE
Payer: MEDICARE

## 2021-04-22 VITALS
TEMPERATURE: 98.4 F | RESPIRATION RATE: 24 BRPM | OXYGEN SATURATION: 98 % | SYSTOLIC BLOOD PRESSURE: 148 MMHG | DIASTOLIC BLOOD PRESSURE: 98 MMHG | HEART RATE: 89 BPM

## 2021-04-22 DIAGNOSIS — R11.2 NON-INTRACTABLE VOMITING WITH NAUSEA, UNSPECIFIED VOMITING TYPE: Primary | ICD-10-CM

## 2021-04-22 DIAGNOSIS — N39.0 ACUTE UTI: ICD-10-CM

## 2021-04-22 LAB
-: ABNORMAL
ABSOLUTE EOS #: 0 K/UL (ref 0–0.4)
ABSOLUTE IMMATURE GRANULOCYTE: ABNORMAL K/UL (ref 0–0.3)
ABSOLUTE LYMPH #: 1.7 K/UL (ref 1–4.8)
ABSOLUTE MONO #: 0.8 K/UL (ref 0.1–1.3)
ALBUMIN SERPL-MCNC: 2.8 G/DL (ref 3.5–5.2)
ALBUMIN/GLOBULIN RATIO: ABNORMAL (ref 1–2.5)
ALLEN TEST: ABNORMAL
ALP BLD-CCNC: 110 U/L (ref 35–104)
ALT SERPL-CCNC: 10 U/L (ref 5–33)
AMORPHOUS: ABNORMAL
ANION GAP SERPL CALCULATED.3IONS-SCNC: 9 MMOL/L (ref 9–17)
AST SERPL-CCNC: 18 U/L
BACTERIA: ABNORMAL
BASOPHILS # BLD: 0 % (ref 0–2)
BASOPHILS ABSOLUTE: 0 K/UL (ref 0–0.2)
BETA-HYDROXYBUTYRATE: 0.42 MMOL/L (ref 0.02–0.27)
BILIRUB SERPL-MCNC: 0.54 MG/DL (ref 0.3–1.2)
BILIRUBIN URINE: NEGATIVE
BUN BLDV-MCNC: 12 MG/DL (ref 6–20)
BUN/CREAT BLD: ABNORMAL (ref 9–20)
CALCIUM SERPL-MCNC: 8.7 MG/DL (ref 8.6–10.4)
CARBOXYHEMOGLOBIN: 4.3 % (ref 0–5)
CASTS UA: ABNORMAL /LPF
CHLORIDE BLD-SCNC: 102 MMOL/L (ref 98–107)
CHP ED QC CHECK: NORMAL
CO2: 29 MMOL/L (ref 20–31)
COLOR: YELLOW
COMMENT UA: ABNORMAL
CREAT SERPL-MCNC: 1.05 MG/DL (ref 0.5–0.9)
CRYSTALS, UA: ABNORMAL /HPF
DIFFERENTIAL TYPE: ABNORMAL
EOSINOPHILS RELATIVE PERCENT: 0 % (ref 0–4)
EPITHELIAL CELLS UA: ABNORMAL /HPF
FIO2: ABNORMAL
GFR AFRICAN AMERICAN: >60 ML/MIN
GFR NON-AFRICAN AMERICAN: >60 ML/MIN
GFR SERPL CREATININE-BSD FRML MDRD: ABNORMAL ML/MIN/{1.73_M2}
GFR SERPL CREATININE-BSD FRML MDRD: ABNORMAL ML/MIN/{1.73_M2}
GLUCOSE BLD-MCNC: 192 MG/DL
GLUCOSE BLD-MCNC: 192 MG/DL (ref 65–105)
GLUCOSE BLD-MCNC: 195 MG/DL (ref 70–99)
GLUCOSE URINE: NEGATIVE
HCG QUALITATIVE: NEGATIVE
HCO3 VENOUS: 29.9 MMOL/L (ref 24–30)
HCT VFR BLD CALC: 32.9 % (ref 36–46)
HEMOGLOBIN: 11.2 G/DL (ref 12–16)
IMMATURE GRANULOCYTES: ABNORMAL %
KETONES, URINE: NEGATIVE
LACTIC ACID: 2.1 MMOL/L (ref 0.5–2.2)
LEUKOCYTE ESTERASE, URINE: ABNORMAL
LIPASE: 16 U/L (ref 13–60)
LYMPHOCYTES # BLD: 18 % (ref 24–44)
MCH RBC QN AUTO: 34.7 PG (ref 26–34)
MCHC RBC AUTO-ENTMCNC: 34 G/DL (ref 31–37)
MCV RBC AUTO: 102 FL (ref 80–100)
METHEMOGLOBIN: 0.2 % (ref 0–1.9)
MODE: ABNORMAL
MONOCYTES # BLD: 8 % (ref 1–7)
MUCUS: ABNORMAL
NEGATIVE BASE EXCESS, VEN: ABNORMAL MMOL/L (ref 0–2)
NITRITE, URINE: POSITIVE
NOTIFICATION TIME: ABNORMAL
NOTIFICATION: ABNORMAL
NRBC AUTOMATED: ABNORMAL PER 100 WBC
O2 DEVICE/FLOW/%: ABNORMAL
O2 SAT, VEN: 83.7 % (ref 60–85)
OTHER OBSERVATIONS UA: ABNORMAL
OXYHEMOGLOBIN: ABNORMAL % (ref 95–98)
PATIENT TEMP: 37
PCO2, VEN, TEMP ADJ: ABNORMAL MMHG (ref 39–55)
PCO2, VEN: 41.2 (ref 39–55)
PDW BLD-RTO: 18.1 % (ref 11.5–14.9)
PEEP/CPAP: ABNORMAL
PH UA: 7 (ref 5–8)
PH VENOUS: 7.47 (ref 7.32–7.42)
PH, VEN, TEMP ADJ: ABNORMAL (ref 7.32–7.42)
PLATELET # BLD: 572 K/UL (ref 150–450)
PLATELET ESTIMATE: ABNORMAL
PMV BLD AUTO: 7.8 FL (ref 6–12)
PO2, VEN, TEMP ADJ: ABNORMAL MMHG (ref 30–50)
PO2, VEN: 50.6 (ref 30–50)
POSITIVE BASE EXCESS, VEN: 6.2 MMOL/L (ref 0–2)
POTASSIUM SERPL-SCNC: 3.6 MMOL/L (ref 3.7–5.3)
PROTEIN UA: ABNORMAL
PSV: ABNORMAL
PT. POSITION: ABNORMAL
RBC # BLD: 3.23 M/UL (ref 4–5.2)
RBC # BLD: ABNORMAL 10*6/UL
RBC UA: ABNORMAL /HPF
RENAL EPITHELIAL, UA: ABNORMAL /HPF
RESPIRATORY RATE: ABNORMAL
SAMPLE SITE: ABNORMAL
SEG NEUTROPHILS: 74 % (ref 36–66)
SEGMENTED NEUTROPHILS ABSOLUTE COUNT: 7 K/UL (ref 1.3–9.1)
SET RATE: ABNORMAL
SODIUM BLD-SCNC: 140 MMOL/L (ref 135–144)
SPECIFIC GRAVITY UA: 1.05 (ref 1–1.03)
TEXT FOR RESPIRATORY: ABNORMAL
TOTAL HB: ABNORMAL G/DL (ref 12–16)
TOTAL PROTEIN: 7.4 G/DL (ref 6.4–8.3)
TOTAL RATE: ABNORMAL
TRICHOMONAS: ABNORMAL
TURBIDITY: ABNORMAL
URINE HGB: ABNORMAL
UROBILINOGEN, URINE: NORMAL
VT: ABNORMAL
WBC # BLD: 9.6 K/UL (ref 3.5–11)
WBC # BLD: ABNORMAL 10*3/UL
WBC UA: ABNORMAL /HPF
YEAST: ABNORMAL

## 2021-04-22 PROCEDURE — 82805 BLOOD GASES W/O2 SATURATION: CPT

## 2021-04-22 PROCEDURE — 96375 TX/PRO/DX INJ NEW DRUG ADDON: CPT

## 2021-04-22 PROCEDURE — 83605 ASSAY OF LACTIC ACID: CPT

## 2021-04-22 PROCEDURE — 82800 BLOOD PH: CPT

## 2021-04-22 PROCEDURE — 82010 KETONE BODYS QUAN: CPT

## 2021-04-22 PROCEDURE — 36415 COLL VENOUS BLD VENIPUNCTURE: CPT

## 2021-04-22 PROCEDURE — 6360000004 HC RX CONTRAST MEDICATION: Performed by: EMERGENCY MEDICINE

## 2021-04-22 PROCEDURE — 80053 COMPREHEN METABOLIC PANEL: CPT

## 2021-04-22 PROCEDURE — 96374 THER/PROPH/DIAG INJ IV PUSH: CPT

## 2021-04-22 PROCEDURE — 82947 ASSAY GLUCOSE BLOOD QUANT: CPT

## 2021-04-22 PROCEDURE — 83690 ASSAY OF LIPASE: CPT

## 2021-04-22 PROCEDURE — 84703 CHORIONIC GONADOTROPIN ASSAY: CPT

## 2021-04-22 PROCEDURE — 2580000003 HC RX 258: Performed by: EMERGENCY MEDICINE

## 2021-04-22 PROCEDURE — 81001 URINALYSIS AUTO W/SCOPE: CPT

## 2021-04-22 PROCEDURE — 74177 CT ABD & PELVIS W/CONTRAST: CPT

## 2021-04-22 PROCEDURE — 99283 EMERGENCY DEPT VISIT LOW MDM: CPT

## 2021-04-22 PROCEDURE — 85025 COMPLETE CBC W/AUTO DIFF WBC: CPT

## 2021-04-22 PROCEDURE — 6360000002 HC RX W HCPCS: Performed by: EMERGENCY MEDICINE

## 2021-04-22 RX ORDER — METOCLOPRAMIDE HYDROCHLORIDE 5 MG/ML
10 INJECTION INTRAMUSCULAR; INTRAVENOUS ONCE
Status: COMPLETED | OUTPATIENT
Start: 2021-04-22 | End: 2021-04-22

## 2021-04-22 RX ORDER — DICYCLOMINE HYDROCHLORIDE 10 MG/1
10 CAPSULE ORAL EVERY 6 HOURS PRN
Qty: 20 CAPSULE | Refills: 0 | Status: SHIPPED | OUTPATIENT
Start: 2021-04-22 | End: 2021-05-24 | Stop reason: ALTCHOICE

## 2021-04-22 RX ORDER — 0.9 % SODIUM CHLORIDE 0.9 %
1000 INTRAVENOUS SOLUTION INTRAVENOUS ONCE
Status: COMPLETED | OUTPATIENT
Start: 2021-04-22 | End: 2021-04-22

## 2021-04-22 RX ORDER — CEPHALEXIN 500 MG/1
500 CAPSULE ORAL 2 TIMES DAILY
Qty: 14 CAPSULE | Refills: 0 | Status: SHIPPED | OUTPATIENT
Start: 2021-04-22 | End: 2021-04-29

## 2021-04-22 RX ORDER — 0.9 % SODIUM CHLORIDE 0.9 %
80 INTRAVENOUS SOLUTION INTRAVENOUS ONCE
Status: COMPLETED | OUTPATIENT
Start: 2021-04-22 | End: 2021-04-22

## 2021-04-22 RX ORDER — ONDANSETRON 2 MG/ML
4 INJECTION INTRAMUSCULAR; INTRAVENOUS ONCE
Status: COMPLETED | OUTPATIENT
Start: 2021-04-22 | End: 2021-04-22

## 2021-04-22 RX ORDER — SODIUM CHLORIDE 0.9 % (FLUSH) 0.9 %
10 SYRINGE (ML) INJECTION PRN
Status: DISCONTINUED | OUTPATIENT
Start: 2021-04-22 | End: 2021-04-22 | Stop reason: HOSPADM

## 2021-04-22 RX ORDER — MORPHINE SULFATE 4 MG/ML
4 INJECTION, SOLUTION INTRAMUSCULAR; INTRAVENOUS ONCE
Status: COMPLETED | OUTPATIENT
Start: 2021-04-22 | End: 2021-04-22

## 2021-04-22 RX ORDER — ONDANSETRON 4 MG/1
4 TABLET, ORALLY DISINTEGRATING ORAL EVERY 8 HOURS PRN
Qty: 20 TABLET | Refills: 0 | Status: SHIPPED | OUTPATIENT
Start: 2021-04-22 | End: 2021-09-22 | Stop reason: SDUPTHER

## 2021-04-22 RX ADMIN — ONDANSETRON 4 MG: 2 INJECTION INTRAMUSCULAR; INTRAVENOUS at 16:09

## 2021-04-22 RX ADMIN — SODIUM CHLORIDE 80 ML: 9 INJECTION, SOLUTION INTRAVENOUS at 14:28

## 2021-04-22 RX ADMIN — SODIUM CHLORIDE, PRESERVATIVE FREE 10 ML: 5 INJECTION INTRAVENOUS at 14:29

## 2021-04-22 RX ADMIN — IOPAMIDOL 75 ML: 755 INJECTION, SOLUTION INTRAVENOUS at 14:28

## 2021-04-22 RX ADMIN — SODIUM CHLORIDE 1000 ML: 9 INJECTION, SOLUTION INTRAVENOUS at 12:16

## 2021-04-22 RX ADMIN — MORPHINE SULFATE 4 MG: 4 INJECTION, SOLUTION INTRAMUSCULAR; INTRAVENOUS at 13:29

## 2021-04-22 RX ADMIN — METOCLOPRAMIDE 10 MG: 5 INJECTION, SOLUTION INTRAMUSCULAR; INTRAVENOUS at 12:17

## 2021-04-22 ASSESSMENT — ENCOUNTER SYMPTOMS
VOMITING: 1
SHORTNESS OF BREATH: 0
BACK PAIN: 0
NAUSEA: 1
ABDOMINAL PAIN: 1
COLOR CHANGE: 0
EYE PAIN: 0

## 2021-04-22 NOTE — ED NOTES
PT moaning/ pt is rocking back and forth in bed. Pt updated on CT order, pt received pain meds.       Valentine Pang RN  04/22/21 8735

## 2021-04-22 NOTE — ED PROVIDER NOTES
EMERGENCY DEPARTMENT ENCOUNTER    Pt Name: Shane Elliott  MRN: [id number]  [place] 1995  Date of evaluation: 4/22/21  CHIEF COMPLAINT       Chief Complaint   Patient presents with    Emesis     HISTORY OF PRESENT ILLNESS   57-year-old female presents for complaint of nausea and vomiting. Patient reports she been having nausea and vomiting for the last 3 days, states that is been persistent since then, denies anything making it better or worse, admits associated generalized abdominal pain worse with vomiting, denies any diarrhea, denies any fevers or chills, denies any vaginal bleeding, vaginal discharge, dysuria or hematuria. Patient reports this is happened before. Patient does have a history of diabetes, reports that her sugars have been within normal ranges, has been compliant with her insulin. Patient denies any marijuana use. The history is provided by the patient. REVIEW OF SYSTEMS     Review of Systems   Constitutional: Negative for fever. HENT: Negative for congestion and ear pain. Eyes: Negative for pain. Respiratory: Negative for shortness of breath. Cardiovascular: Negative for chest pain, palpitations and leg swelling. Gastrointestinal: Positive for abdominal pain, nausea and vomiting. Genitourinary: Negative for dysuria and flank pain. Musculoskeletal: Negative for back pain. Skin: Negative for color change. Neurological: Negative for numbness and headaches. Psychiatric/Behavioral: Negative for confusion. All other systems reviewed and are negative.     PASTMEDICAL HISTORY     Past Medical History:   Diagnosis Date    Diabetes mellitus ([address]     MRSA (methicillin resistant staph aureus) culture positive 12/21/2016    abdomen    Ovarian cyst 2019    Right side     Past Problem List  Patient Active Problem List   Diagnosis Code    Yeast vaginitis B37.3    Type 1 diabetes mellitus with diabetic polyneuropathy ([address] E10.42    Pregestational diabetes mellitus, modified White class C O24.319    Intractable vomiting with nausea R11.2    Intractable abdominal pain R10.9     SURGICAL HISTORY       Past Surgical History:   Procedure Laterality Date     SECTION       CURRENT MEDICATIONS       Discharge Medication List as of 2021  5:11 PM      CONTINUE these medications which have NOT CHANGED    Details   blood glucose test strips (TRUE METRIX BLOOD GLUCOSE TEST) strip CHECK BLOOD SUGAR FOUR TIMES DAILY AS DIRECTED, Disp-400 strip, R-1Normal      TRUEplus Lancets 33G MISC Disp-400 each, R-1, NormalTEST BLOOD SUGAR FOUR TIMES DAILY      Insulin Pen Needle (DROPLET PEN NEEDLES) 31G X 8 MM MISC USE FOUR TIMES DAILY AS DIRECTED, Disp-400 each, R-1Normal       MG tablet TAKE 1 TABLET FOUR TIMES DAILY AS NEEDED FOR PAIN, Disp-120 tablet, R-1Normal      insulin glargine (BASAGLAR KWIKPEN) 100 UNIT/ML injection pen Inject 12 Units into the skin nightly, Disp-6 pen, R-1Normal      acetaminophen (TYLENOL) 500 MG tablet Take 2 tablets by mouth every 6 hours as needed for Pain, Disp-180 tablet, R-1Normal      insulin aspart (NOVOLOG FLEXPEN) 100 UNIT/ML injection pen Inject 2-12 units subcutaneously TID based on sliding scale, Disp-15 pen, R-1Normal      famotidine (PEPCID) 20 MG tablet Take 1 tablet by mouth 2 times daily, Disp-180 tablet, R-1Normal      ketoconazole (NIZORAL) 2 % cream Apply topically daily. , Disp-60 g, R-1, Normal      rOPINIRole (REQUIP) 0.25 MG tablet Take 1 tablet by mouth nightly, Disp-30 tablet, R-2Normal      lisinopril (PRINIVIL;ZESTRIL) 2.5 MG tablet Take 1 tablet by mouth daily, Disp-90 tablet, R-1Normal      Alcohol Swabs (ALCOHOL PADS) 70 % PADS 4 TIMES DAILY Starting Thu 2021, Disp-200 each, R-5, Normal      blood glucose monitor kit and supplies use check blood sugar as directed, Disp-1 kit,R-0, Normal           ALLERGIES     is allergic to shrimp flavor.   FAMILY HISTORY     She indicated that her mother is . She indicated that her father is . SOCIAL HISTORY       Social History     Tobacco Use    Smoking status: Current Every Day Smoker     Packs/day: 0.25     Types: Cigarettes    Smokeless tobacco: Never Used    Tobacco comment: 5/day   Substance Use Topics    Alcohol use: No    Drug use: No     PHYSICAL EXAM     INITIAL VITALS: BP (!) 148/98 Comment: pt vomiting  Pulse 89   Temp 98.4 °F (36.9 °C) (Temporal)   Resp 24   LMP 2021   SpO2 98%    Physical Exam  Vitals signs and nursing note reviewed. Constitutional:       General: She is not in acute distress. Appearance: Normal appearance. She is not toxic-appearing. HENT:      Head: Normocephalic and atraumatic. Nose: Nose normal.      Mouth/Throat:      Mouth: Mucous membranes are moist.      Pharynx: Oropharynx is clear. Eyes:      Extraocular Movements: Extraocular movements intact. Conjunctiva/sclera: Conjunctivae normal.   Neck:      Musculoskeletal: Normal range of motion. Cardiovascular:      Rate and Rhythm: Normal rate and regular rhythm. Pulses: Normal pulses. Heart sounds: Normal heart sounds. Pulmonary:      Effort: Pulmonary effort is normal.      Breath sounds: Normal breath sounds. Abdominal:      General: Bowel sounds are normal. There is no distension. Palpations: Abdomen is soft. Tenderness: There is generalized abdominal tenderness. Musculoskeletal: Normal range of motion. Skin:     General: Skin is warm and dry. Capillary Refill: Capillary refill takes less than 2 seconds. Neurological:      General: No focal deficit present. Mental Status: She is alert.    Psychiatric:         Mood and Affect: Mood normal.         MEDICAL DECISION MAKIN-year-old female presents for complaint of nausea and vomiting, on initial exam patient does appear uncomfortable, dry heaving, generalized abdominal tenderness, will check labs, provide IV fluids all abnormals are listed below.   Labs Reviewed   CBC WITH AUTO DIFFERENTIAL - Abnormal; Notable for the following components:       Result Value    RBC 3.23 (*)     Hemoglobin 11.2 (*)     Hematocrit 32.9 (*)     .0 (*)     MCH 34.7 (*)     RDW 18.1 (*)     Platelets 565 (*)     Seg Neutrophils 74 (*)     Lymphocytes 18 (*)     Monocytes 8 (*)     All other components within normal limits   COMPREHENSIVE METABOLIC PANEL W/ REFLEX TO MG FOR LOW K - Abnormal; Notable for the following components:    Glucose 195 (*)     CREATININE 1.05 (*)     Potassium 3.6 (*)     Alkaline Phosphatase 110 (*)     Albumin 2.8 (*)     All other components within normal limits   URINALYSIS - Abnormal; Notable for the following components:    Turbidity UA CLOUDY (*)     Specific Gravity, UA 1.055 (*)     Urine Hgb SMALL (*)     Protein, UA TRACE (*)     Nitrite, Urine POSITIVE (*)     Leukocyte Esterase, Urine LARGE (*)     All other components within normal limits   BLOOD GAS, VENOUS - Abnormal; Notable for the following components:    pH, Magen 7.469 (*)     pO2, Magen 50.6 (*)     Positive Base Excess, Magen 6.2 (*)     All other components within normal limits   BETA-HYDROXYBUTYRATE - Abnormal; Notable for the following components:    Beta-Hydroxybutyrate 0.42 (*)     All other components within normal limits   MICROSCOPIC URINALYSIS - Abnormal; Notable for the following components:    Bacteria, UA MODERATE (*)     All other components within normal limits   POC GLUCOSE FINGERSTICK - Abnormal; Notable for the following components:    POC Glucose 192 (*)     All other components within normal limits   POCT GLUCOSE - Normal   LIPASE   HCG, SERUM, QUALITATIVE   LACTIC ACID       EMERGENCY DEPARTMENTCOURSE:         Vitals:    Vitals:    04/22/21 1111 04/22/21 1228   BP:  (!) 148/98   Pulse: 77 89   Resp: 24    Temp:  98.4 °F (36.9 °C)   TempSrc: Axillary Temporal   SpO2: 98%        The patient was given the following medications while in the emergency department:  Orders Placed This Encounter   Medications    0.9 % sodium chloride bolus    metoclopramide (REGLAN) injection 10 mg    morphine sulfate (PF) injection 4 mg    DISCONTD: sodium chloride flush 0.9 % injection 10 mL    0.9 % sodium chloride bolus    iopamidol (ISOVUE-370) 76 % injection 75 mL    ondansetron (ZOFRAN ODT) 4 MG disintegrating tablet     Sig: Take 1 tablet by mouth every 8 hours as needed for Nausea or Vomiting     Dispense:  20 tablet     Refill:  0    dicyclomine (BENTYL) 10 MG capsule     Sig: Take 1 capsule by mouth every 6 hours as needed (Pain)     Dispense:  20 capsule     Refill:  0    ondansetron (ZOFRAN) injection 4 mg    cephALEXin (KEFLEX) 500 MG capsule     Sig: Take 1 capsule by mouth 2 times daily for 7 days     Dispense:  14 capsule     Refill:  0     CONSULTS:  None    FINAL IMPRESSION      1. Non-intractable vomiting with nausea, unspecified vomiting type    2.  Acute UTI          DISPOSITION/PLAN   DISPOSITION  04/22/2021 04:23:58 PM      PATIENT REFERRED TO:  Emperatriz Lee, [address] [place] [address]  [phone]    Schedule an appointment as soon as possible for a visit       [facility] ED  [place] [address]  [phone]    As needed, If symptoms worsen    DISCHARGE MEDICATIONS:  Discharge Medication List as of 4/22/2021  5:11 PM      START taking these medications    Details   ondansetron (ZOFRAN ODT) 4 MG disintegrating tablet Take 1 tablet by mouth every 8 hours as needed for Nausea or Vomiting, Disp-20 tablet, R-0Print      dicyclomine (BENTYL) 10 MG capsule Take 1 capsule by mouth every 6 hours as needed (Pain), Disp-20 capsule, R-0Print      cephALEXin (KEFLEX) 500 MG capsule Take 1 capsule by mouth 2 times daily for 7 days, Disp-14 capsule, R-0Print           Mishel Blackwell DO  Attending Emergency Physician                  Mishel Blackwell DO  04/23/21 0036

## 2021-04-22 NOTE — ED NOTES
Pt refusing to reposition for blood sugar check pt raising arm over head while laying on stomach. Lights off , warmed blankets x 2 , pt requested Ice chips- given to pt.      Ashley Rocha RN  04/22/21 2956

## 2021-05-18 DIAGNOSIS — G25.81 RESTLESS LEG SYNDROME: ICD-10-CM

## 2021-05-19 RX ORDER — ROPINIROLE 0.25 MG/1
TABLET, FILM COATED ORAL
Qty: 90 TABLET | Refills: 1 | Status: SHIPPED | OUTPATIENT
Start: 2021-05-19 | End: 2021-09-22 | Stop reason: SDUPTHER

## 2021-05-19 NOTE — TELEPHONE ENCOUNTER
Last visit: 03/22/2021  Last Med refill: 04/22/2021  Does patient have enough medication for 72 hours: Yes    Next Visit Date:  Future Appointments   Date Time Provider Hayden Chica   6/14/2021  1:15 PM Brea Jo MD [facility] exc [id number]   9/22/2021 11:00 AM Emperatriz Skaggs  [address] Maintenance   Topic Date Due    Hepatitis C screen  Never done    Hepatitis B vaccine (3 of 3 - Risk 3-dose series) 02/14/1998    Varicella vaccine (2 of 2 - 2-dose childhood series) 08/09/2000    Cervical cancer screen  Never done    Diabetic retinal exam  01/21/2020    Lipid screen  02/11/2021    Annual Wellness Visit (AWV)  Never done    Pneumococcal 0-64 years Vaccine (1 of 2 - PPSV23) 06/22/2021 (Originally 12/5/1999)    Flu vaccine (Season Ended) 03/22/2022 (Originally 9/1/2021)    COVID-19 Vaccine (1) 03/22/2022 (Originally 12/5/2005)    Diabetic foot exam  03/22/2022    A1C test (Diabetic or Prediabetic)  03/22/2022    Diabetic microalbuminuria test  03/22/2022    Potassium monitoring  04/22/2022    Creatinine monitoring  04/22/2022    DTaP/Tdap/Td vaccine (6 - Td) 08/27/2030    Hib vaccine  Completed    HIV screen  Completed    Hepatitis A vaccine  Aged Out    Meningococcal (ACWY) vaccine  Aged Out       Hemoglobin A1C (%)   Date Value   03/22/2021 6.3   02/11/2020 7.6   01/08/2019 10.1             ( goal A1C is < 7)   Microalb/Crt.  Ratio (mcg/mg creat)   Date Value   02/11/2020 511 (H)     LDL Cholesterol (mg/dL)   Date Value   02/11/2020 84   01/08/2019 131 (H)       (goal LDL is <100)   AST (U/L)   Date Value   04/22/2021 18     ALT (U/L)   Date Value   04/22/2021 10     BUN (mg/dL)   Date Value   04/22/2021 12     BP Readings from Last 3 Encounters:   04/22/21 (!) 148/98   03/22/21 138/84   02/21/21 (!) 152/107          (goal 120/80)    All Future Testing planned in [facility]  Lab Frequency Next Occurrence   Lipid, Fasting Once 05/29/2021   Hepatitis C Antibody Once 05/30/2021   CBC With Auto Differential Once 05/30/2021               Patient Active Problem List:     Yeast vaginitis     Type 1 diabetes mellitus with diabetic polyneuropathy (HCC)     Pregestational diabetes mellitus, modified White class C     Intractable vomiting with nausea     Intractable abdominal pain

## 2021-05-24 ENCOUNTER — HOSPITAL ENCOUNTER (OUTPATIENT)
Age: 26
Setting detail: SPECIMEN
Discharge: HOME | End: 2021-05-24
Payer: MEDICARE

## 2021-05-24 ENCOUNTER — OFFICE VISIT (OUTPATIENT)
Dept: FAMILY MEDICINE CLINIC | Age: 26
End: 2021-05-24
Payer: MEDICARE

## 2021-05-24 VITALS
DIASTOLIC BLOOD PRESSURE: 82 MMHG | SYSTOLIC BLOOD PRESSURE: 120 MMHG | OXYGEN SATURATION: 100 % | WEIGHT: 100 LBS | TEMPERATURE: 97.5 F | BODY MASS INDEX: 16.64 KG/M2 | HEART RATE: 82 BPM

## 2021-05-24 DIAGNOSIS — R63.4 UNINTENTIONAL WEIGHT LOSS OF MORE THAN 10 POUNDS IN 90 DAYS: ICD-10-CM

## 2021-05-24 DIAGNOSIS — Z13.220 SCREENING FOR HYPERLIPIDEMIA: ICD-10-CM

## 2021-05-24 DIAGNOSIS — E55.9 VITAMIN D DEFICIENCY: ICD-10-CM

## 2021-05-24 DIAGNOSIS — K29.50 CHRONIC GASTRITIS WITHOUT BLEEDING, UNSPECIFIED GASTRITIS TYPE: ICD-10-CM

## 2021-05-24 DIAGNOSIS — Z11.59 ENCOUNTER FOR HEPATITIS C SCREENING TEST FOR LOW RISK PATIENT: ICD-10-CM

## 2021-05-24 DIAGNOSIS — E87.6 HYPOKALEMIA: Primary | ICD-10-CM

## 2021-05-24 DIAGNOSIS — Z13.0 SCREENING, ANEMIA, DEFICIENCY, IRON: ICD-10-CM

## 2021-05-24 DIAGNOSIS — E87.6 HYPOKALEMIA: ICD-10-CM

## 2021-05-24 LAB
ABSOLUTE EOS #: 0.13 K/UL (ref 0–0.44)
ABSOLUTE IMMATURE GRANULOCYTE: <0.03 K/UL (ref 0–0.3)
ABSOLUTE LYMPH #: 2.26 K/UL (ref 1.1–3.7)
ABSOLUTE MONO #: 0.37 K/UL (ref 0.1–1.2)
ALBUMIN SERPL-MCNC: 2.5 G/DL (ref 3.5–5.2)
ALBUMIN/GLOBULIN RATIO: 0.7 (ref 1–2.5)
ALP BLD-CCNC: 106 U/L (ref 35–104)
ALT SERPL-CCNC: 9 U/L (ref 5–33)
ANION GAP SERPL CALCULATED.3IONS-SCNC: 8 MMOL/L (ref 9–17)
AST SERPL-CCNC: 19 U/L
BASOPHILS # BLD: 1 % (ref 0–2)
BASOPHILS ABSOLUTE: 0.03 K/UL (ref 0–0.2)
BILIRUB SERPL-MCNC: 0.34 MG/DL (ref 0.3–1.2)
BUN BLDV-MCNC: 3 MG/DL (ref 6–20)
BUN/CREAT BLD: ABNORMAL (ref 9–20)
CALCIUM SERPL-MCNC: 8.6 MG/DL (ref 8.6–10.4)
CHLORIDE BLD-SCNC: 107 MMOL/L (ref 98–107)
CHOLESTEROL, FASTING: 118 MG/DL
CHOLESTEROL/HDL RATIO: 2.1
CO2: 26 MMOL/L (ref 20–31)
CREAT SERPL-MCNC: 0.74 MG/DL (ref 0.5–0.9)
DIFFERENTIAL TYPE: ABNORMAL
EOSINOPHILS RELATIVE PERCENT: 2 % (ref 1–4)
GFR AFRICAN AMERICAN: >60 ML/MIN
GFR NON-AFRICAN AMERICAN: >60 ML/MIN
GFR SERPL CREATININE-BSD FRML MDRD: ABNORMAL ML/MIN/{1.73_M2}
GFR SERPL CREATININE-BSD FRML MDRD: ABNORMAL ML/MIN/{1.73_M2}
GLUCOSE BLD-MCNC: 80 MG/DL (ref 70–99)
HCT VFR BLD CALC: 54.1 % (ref 36.3–47.1)
HDLC SERPL-MCNC: 56 MG/DL
HEMOGLOBIN: 19.1 G/DL (ref 11.9–15.1)
IMMATURE GRANULOCYTES: 0 %
LDL CHOLESTEROL: 45 MG/DL (ref 0–130)
LYMPHOCYTES # BLD: 41 % (ref 24–43)
MCH RBC QN AUTO: 32.6 PG (ref 25.2–33.5)
MCHC RBC AUTO-ENTMCNC: 35.3 G/DL (ref 28.4–34.8)
MCV RBC AUTO: 92.5 FL (ref 82.6–102.9)
MONOCYTES # BLD: 7 % (ref 3–12)
NRBC AUTOMATED: 0 PER 100 WBC
PDW BLD-RTO: 14.6 % (ref 11.8–14.4)
PLATELET # BLD: 208 K/UL (ref 138–453)
PLATELET ESTIMATE: ABNORMAL
PMV BLD AUTO: 10.1 FL (ref 8.1–13.5)
POTASSIUM SERPL-SCNC: 4.9 MMOL/L (ref 3.7–5.3)
PREALBUMIN: 13.7 MG/DL (ref 20–40)
RBC # BLD: 5.85 M/UL (ref 3.95–5.11)
RBC # BLD: ABNORMAL 10*6/UL
SEG NEUTROPHILS: 49 % (ref 36–65)
SEGMENTED NEUTROPHILS ABSOLUTE COUNT: 2.69 K/UL (ref 1.5–8.1)
SODIUM BLD-SCNC: 141 MMOL/L (ref 135–144)
TOTAL PROTEIN: 6.2 G/DL (ref 6.4–8.3)
TRIGLYCERIDE, FASTING: 85 MG/DL
VITAMIN D 25-HYDROXY: 10 NG/ML (ref 30–100)
VLDLC SERPL CALC-MCNC: NORMAL MG/DL (ref 1–30)
WBC # BLD: 5.5 K/UL (ref 3.5–11.3)
WBC # BLD: ABNORMAL 10*3/UL

## 2021-05-24 PROCEDURE — 99214 OFFICE O/P EST MOD 30 MIN: CPT | Performed by: INTERNAL MEDICINE

## 2021-05-24 PROCEDURE — G8419 CALC BMI OUT NRM PARAM NOF/U: HCPCS | Performed by: INTERNAL MEDICINE

## 2021-05-24 PROCEDURE — G8427 DOCREV CUR MEDS BY ELIG CLIN: HCPCS | Performed by: INTERNAL MEDICINE

## 2021-05-24 PROCEDURE — 4004F PT TOBACCO SCREEN RCVD TLK: CPT | Performed by: INTERNAL MEDICINE

## 2021-05-24 RX ORDER — OMEPRAZOLE 20 MG/1
20 CAPSULE, DELAYED RELEASE ORAL
Qty: 90 CAPSULE | Refills: 1 | Status: SHIPPED | OUTPATIENT
Start: 2021-05-24 | End: 2021-09-02

## 2021-05-24 ASSESSMENT — ENCOUNTER SYMPTOMS
CHEST TIGHTNESS: 0
COUGH: 0
NAUSEA: 0
DIARRHEA: 0
CHOKING: 0
CONSTIPATION: 0
BLOOD IN STOOL: 0
WHEEZING: 0
ANAL BLEEDING: 0
VOMITING: 0
ABDOMINAL PAIN: 0
SHORTNESS OF BREATH: 0

## 2021-05-24 NOTE — PROGRESS NOTES
Pt is here today due to weight loss. She was 148 lbs in April and is now 100 lbs. She states has not changed any eating habits.

## 2021-05-24 NOTE — PROGRESS NOTES
Subjective:       Patient ID:     Fermín Hernandez is a 30 y.o. female who presents for   Chief Complaint   Patient presents with    Weight Loss       HPI:  Nursing note reviewed and discussed with patient. Pt states she has lost >20 lbs in the last three weeks, but she weighed 109 lbs at this office in march, doesn't think she had gained that much weight sicne her last visit here. She continues to have abdominal pain, about 30 min to an hour after eating. Eating three times a day. Burning pain with occasional nausea. Eating chicken, veggies, fish, fruit. She denies difficulty getting food. No diarrhea, constipation. She was given pepcid last visit, taking it BID. Patient's medications, allergies, past medical, surgical, social and family histories were reviewed and updated as appropriate. Past Medical History:   Diagnosis Date    Diabetes mellitus ([facility] [address]     MRSA (methicillin resistant staph aureus) culture positive 2016    abdomen    Ovarian cyst 2019    Right side     Past Surgical History:   Procedure Laterality Date     SECTION         Social History     Tobacco Use    Smoking status: Current Every Day Smoker     Packs/day: 0.25     Types: Cigarettes    Smokeless tobacco: Never Used    Tobacco comment: 5/day   Substance Use Topics    Alcohol use: No      Patient Active Problem List   Diagnosis    Yeast vaginitis    Type 1 diabetes mellitus with diabetic polyneuropathy ([facility] [address]    Pregestational diabetes mellitus, modified White class C    Intractable vomiting with nausea    Intractable abdominal pain         Prior to Visit Medications    Medication Sig Taking?  Authorizing Provider   rOPINIRole (REQUIP) 0.25 MG tablet TAKE 1 TABLET EVERY NIGHT Yes Emperatriz Up MD   ondansetron (ZOFRAN ODT) 4 MG disintegrating tablet Take 1 tablet by mouth every 8 hours as needed for Nausea or Vomiting Yes Trenton Muñoz DO   dicyclomine (BENTYL) 10 MG capsule Take 1 capsule by mouth every 6 hours as needed (Pain) Yes Trenton Muñoz, DO   blood glucose test strips (TRUE METRIX BLOOD GLUCOSE TEST) strip CHECK BLOOD SUGAR FOUR TIMES DAILY AS DIRECTED Yes Emperatriz Villa MD   TRUEplus Lancets 33G MISC TEST BLOOD SUGAR FOUR TIMES DAILY Yes Emperatriz Villa MD   Insulin Pen Needle (DROPLET PEN NEEDLES) 31G X 8 MM MISC USE FOUR TIMES DAILY AS DIRECTED Yes Emperatriz Villa MD    MG tablet TAKE 1 TABLET FOUR TIMES DAILY AS NEEDED FOR PAIN Yes Emperatriz Villa MD   insulin glargine (BASAGLAR KWIKPEN) 100 UNIT/ML injection pen Inject 12 Units into the skin nightly Yes Celso Stoddard MD   acetaminophen (TYLENOL) 500 MG tablet Take 2 tablets by mouth every 6 hours as needed for Pain Yes Emperatriz Villa MD   insulin aspart (NOVOLOG FLEXPEN) 100 UNIT/ML injection pen Inject 2-12 units subcutaneously TID based on sliding scale Yes Emperatriz Villa MD   famotidine (PEPCID) 20 MG tablet Take 1 tablet by mouth 2 times daily Yes Celso Stoddard MD   ketoconazole (NIZORAL) 2 % cream Apply topically daily. Yes Celso Stoddard MD   lisinopril (PRINIVIL;ZESTRIL) 2.5 MG tablet Take 1 tablet by mouth daily Yes Celso Stoddard MD   Alcohol Swabs (ALCOHOL PADS) 70 % PADS Apply 1 each topically 4 times daily Yes Celso Stoddard MD   blood glucose monitor kit and supplies use check blood sugar as directed Yes Emperatriz Villa MD     Review of Systems  Review of Systems   Constitutional: Negative for fatigue, fever and unexpected weight change. Respiratory: Negative for cough, choking, chest tightness, shortness of breath and wheezing. Cardiovascular: Negative for chest pain, palpitations and leg swelling. Gastrointestinal: Negative for abdominal pain, anal bleeding, blood in stool, constipation, diarrhea, nausea and vomiting. Endocrine: Negative. Musculoskeletal: Negative for joint swelling and myalgias. Skin: Negative. Neurological: Negative for dizziness. Psychiatric/Behavioral: Negative for sleep disturbance. All other systems reviewed and are negative. Objective:       Physical Exam:  /82 (Site: Right Upper Arm, Position: Sitting, Cuff Size: Small Adult)   Pulse 82   Temp 97.5 °F (36.4 °C) (Temporal)   Wt 100 lb (45.4 kg)   LMP 05/15/2021   SpO2 100%   BMI 16.64 kg/m²    Wt Readings from Last 3 Encounters:   05/24/21 100 lb (45.4 kg)   03/22/21 109 lb 6.4 oz (49.6 kg)   02/21/21 130 lb (59 kg)       Physical Exam  Vitals and nursing note reviewed. Constitutional:       General: She is not in acute distress. Appearance: She is well-developed. She is not ill-appearing. Eyes:      General: Lids are normal. Vision grossly intact. Cardiovascular:      Rate and Rhythm: Normal rate and regular rhythm. Heart sounds: Normal heart sounds, S1 normal and S2 normal. No murmur heard. No friction rub. No gallop. Pulmonary:      Effort: Pulmonary effort is normal. No respiratory distress. Breath sounds: Normal breath sounds. No wheezing. Abdominal:      General: Bowel sounds are normal.      Palpations: Abdomen is soft. There is no mass. Tenderness: There is no abdominal tenderness. There is no guarding. Musculoskeletal:         General: Normal range of motion. Skin:     General: Skin is warm and dry. Capillary Refill: Capillary refill takes less than 2 seconds. Neurological:      General: No focal deficit present. Mental Status: She is alert and oriented to person, place, and time. Data Review  Lab Results   Component Value Date    LABA1C 6.3 03/22/2021     Lab Results   Component Value Date     09/21/2018            Assessment/Plan:      1. Hypokalemia  - Comprehensive Metabolic Panel; Future    2. Unintentional weight loss of more than 10 pounds in 90 days  - Comprehensive Metabolic Panel; Future  - Prealbumin; Future  - Celiac Disease Panel;  Future  - Mayank Mcclelland MD, Gastroenterology, [facility]    3. Vitamin D deficiency  - Vitamin D 25 Hydroxy; Future    4. Chronic gastritis without bleeding, unspecified gastritis type  - omeprazole (PRILOSEC) 20 MG delayed release capsule; Take 1 capsule by mouth every morning (before breakfast)  Dispense: 90 capsule;  Refill: 1           Health Maintenance Due   Topic Date Due    Hepatitis C screen  Never done    Hepatitis B vaccine (3 of 3 - Risk 3-dose series) 02/14/1998    Varicella vaccine (2 of 2 - 2-dose childhood series) 08/09/2000    Cervical cancer screen  Never done    Diabetic retinal exam  01/21/2020    Lipid screen  02/11/2021    Annual Wellness Visit (AWV)  Never done       Electronically signed by Tato Balderrama MD on 5/24/2021 at 11:27 AM

## 2021-05-25 LAB — HEPATITIS C ANTIBODY: NONREACTIVE

## 2021-05-26 LAB
GLIADIN DEAMINIDATED PEPTIDE AB IGA: 1.8 U/ML
GLIADIN DEAMINIDATED PEPTIDE AB IGG: 0.8 U/ML
IGA: 391 MG/DL (ref 70–400)
TISSUE TRANSGLUTAMINASE IGA: 0.9 U/ML

## 2021-05-28 DIAGNOSIS — R10.84 GENERALIZED ABDOMINAL PAIN: ICD-10-CM

## 2021-05-28 ASSESSMENT — VISUAL ACUITY: OU: 1

## 2021-06-01 RX ORDER — IBUPROFEN 600 MG/1
TABLET ORAL
Qty: 120 TABLET | Refills: 1 | Status: SHIPPED | OUTPATIENT
Start: 2021-06-01 | End: 2021-07-19

## 2021-06-01 NOTE — TELEPHONE ENCOUNTER
Last visit: 5/24/21  Last Med refill: 3/24/21  Does patient have enough medication for 72 hours: Yes    Next Visit Date:  Future Appointments   Date Time Provider Hayden Chica   6/14/2021  1:15 PM Jayjay Holly MD [facility] exc [facility]   8/24/2021 10:30 AM Emperatriz Heck MD [facility] FP [facility]   9/22/2021 11:00 AM Emperatriz Heck  [address] Maintenance   Topic Date Due    Hepatitis B vaccine (3 of 3 - Risk 3-dose series) 02/14/1998    Varicella vaccine (2 of 2 - 2-dose childhood series) 08/09/2000    Cervical cancer screen  Never done    Diabetic retinal exam  01/21/2020    Annual Wellness Visit (AWV)  Never done    Pneumococcal 0-64 years Vaccine (1 of 2 - PPSV23) 06/22/2021 (Originally 12/5/1999)    Flu vaccine (Season Ended) 03/22/2022 (Originally 9/1/2021)    COVID-19 Vaccine (1) 03/22/2022 (Originally 12/5/2005)    Diabetic foot exam  03/22/2022    A1C test (Diabetic or Prediabetic)  03/22/2022    Diabetic microalbuminuria test  03/22/2022    Lipid screen  05/24/2022    Potassium monitoring  05/24/2022    Creatinine monitoring  05/24/2022    DTaP/Tdap/Td vaccine (6 - Td) 08/27/2030    Hib vaccine  Completed    Hepatitis C screen  Completed    HIV screen  Completed    Hepatitis A vaccine  Aged Out    Meningococcal (ACWY) vaccine  Aged Out       Hemoglobin A1C (%)   Date Value   03/22/2021 6.3   02/11/2020 7.6   01/08/2019 10.1             ( goal A1C is < 7)   Microalb/Crt.  Ratio (mcg/mg creat)   Date Value   02/11/2020 511 (H)     LDL Cholesterol (mg/dL)   Date Value   05/24/2021 45   02/11/2020 84       (goal LDL is <100)   AST (U/L)   Date Value   05/24/2021 19     ALT (U/L)   Date Value   05/24/2021 9     BUN (mg/dL)   Date Value   05/24/2021 3 (L)     BP Readings from Last 3 Encounters:   05/24/21 120/82   04/22/21 (!) 148/98   03/22/21 138/84          (goal 120/80)    All Future Testing planned in [facility]              Patient Active Problem List: Yeast vaginitis     Type 1 diabetes mellitus with diabetic polyneuropathy (HCC)     Pregestational diabetes mellitus, modified White class C     Intractable vomiting with nausea     Intractable abdominal pain

## 2021-06-10 RX ORDER — INSULIN GLARGINE 100 [IU]/ML
12 INJECTION, SOLUTION SUBCUTANEOUS NIGHTLY
Qty: 5 PEN | Refills: 0 | Status: SHIPPED | OUTPATIENT
Start: 2021-06-10 | End: 2021-09-10

## 2021-06-14 ENCOUNTER — OFFICE VISIT (OUTPATIENT)
Dept: GASTROENTEROLOGY | Age: 26
End: 2021-06-14
Payer: MEDICARE

## 2021-06-14 VITALS — BODY MASS INDEX: 17.14 KG/M2 | WEIGHT: 103 LBS

## 2021-06-14 DIAGNOSIS — R10.9 CHRONIC ABDOMINAL PAIN: Primary | ICD-10-CM

## 2021-06-14 DIAGNOSIS — G89.29 CHRONIC ABDOMINAL PAIN: Primary | ICD-10-CM

## 2021-06-14 PROCEDURE — 4004F PT TOBACCO SCREEN RCVD TLK: CPT | Performed by: INTERNAL MEDICINE

## 2021-06-14 PROCEDURE — G8419 CALC BMI OUT NRM PARAM NOF/U: HCPCS | Performed by: INTERNAL MEDICINE

## 2021-06-14 PROCEDURE — 99204 OFFICE O/P NEW MOD 45 MIN: CPT | Performed by: INTERNAL MEDICINE

## 2021-06-14 PROCEDURE — G8427 DOCREV CUR MEDS BY ELIG CLIN: HCPCS | Performed by: INTERNAL MEDICINE

## 2021-06-14 RX ORDER — DICYCLOMINE HYDROCHLORIDE 10 MG/1
10 CAPSULE ORAL 3 TIMES DAILY PRN
Qty: 120 CAPSULE | Refills: 3 | Status: SHIPPED | OUTPATIENT
Start: 2021-06-14 | End: 2021-10-18 | Stop reason: SDUPTHER

## 2021-06-14 ASSESSMENT — ENCOUNTER SYMPTOMS
SINUS PRESSURE: 1
ABDOMINAL DISTENTION: 1
BACK PAIN: 1
ABDOMINAL PAIN: 1
TROUBLE SWALLOWING: 0
COUGH: 1
CHOKING: 0
WHEEZING: 0
BLOOD IN STOOL: 0
NAUSEA: 0
DIARRHEA: 0
EYES NEGATIVE: 1
VOICE CHANGE: 0
CONSTIPATION: 0
RECTAL PAIN: 0
ANAL BLEEDING: 0
VOMITING: 0
SORE THROAT: 0

## 2021-06-14 NOTE — PROGRESS NOTES
Reason for Referral:   Montana Wilkes MD  [address] [place] [address] Drive,  [facility] [address]    Chief Complaint   Patient presents with    Abdominal Pain     NP, X2-3 months, right side, sharp stabbing pressure that can last 30+ minutes. Happens randomly. Bowel are normal.             HISTORY OF PRESENT ILLNESS: Ms.Destaney Pelayo [facility] is a 30 y.o. female , referred for evaluation of abdominal pain. We will. Pressure sensation. Intermittent. Several times a week. May last for 5 minutes to an hour or so. No clear triggers. Bowels moving okay. No blood in stool or melena. She has been having issues for several months. CT scan recently showed possible mild enterocolitis. She reports no diarrhea. No family history of GI pathology. No smoking or drinking. She has diabetes type 1. Past Medical,Family, and Social History reviewed and does not contribute to the patient presentingcondition. Patient's PMH/PSH,SH,PSYCH Hx, MEDs, ALLERGIES, and ROS were all reviewed and updated in the appropriate sections.     PAST MEDICAL HISTORY:  Past Medical History:   Diagnosis Date    Diabetes mellitus ([address]     MRSA (methicillin resistant staph aureus) culture positive 2016    abdomen    Ovarian cyst 2019    Right side       Past Surgical History:   Procedure Laterality Date     SECTION         CURRENT MEDICATIONS:    Current Outpatient Medications:     insulin glargine (LANTUS SOLOSTAR) 100 UNIT/ML injection pen, Inject 12 Units into the skin nightly, Disp: 5 pen, Rfl: 0     MG tablet, TAKE 1 TABLET FOUR TIMES DAILY AS NEEDED FOR PAIN, Disp: 120 tablet, Rfl: 1    omeprazole (PRILOSEC) 20 MG delayed release capsule, Take 1 capsule by mouth every morning (before breakfast), Disp: 90 capsule, Rfl: 1    rOPINIRole (REQUIP) 0.25 MG tablet, TAKE 1 TABLET EVERY NIGHT, Disp: 90 tablet, Rfl: 1    ondansetron (ZOFRAN ODT) 4 MG disintegrating tablet, Take 1 tablet by mouth every 8 hours as needed for Nausea or Vomiting, Disp: 20 tablet, Rfl: 0    blood glucose test strips (TRUE METRIX BLOOD GLUCOSE TEST) strip, CHECK BLOOD SUGAR FOUR TIMES DAILY AS DIRECTED, Disp: 400 strip, Rfl: 1    TRUEplus Lancets 33G MISC, TEST BLOOD SUGAR FOUR TIMES DAILY, Disp: 400 each, Rfl: 1    Insulin Pen Needle (DROPLET PEN NEEDLES) 31G X 8 MM MISC, USE FOUR TIMES DAILY AS DIRECTED, Disp: 400 each, Rfl: 1    acetaminophen (TYLENOL) 500 MG tablet, Take 2 tablets by mouth every 6 hours as needed for Pain, Disp: 180 tablet, Rfl: 1    insulin aspart (NOVOLOG FLEXPEN) 100 UNIT/ML injection pen, Inject 2-12 units subcutaneously TID based on sliding scale, Disp: 15 pen, Rfl: 1    ketoconazole (NIZORAL) 2 % cream, Apply topically daily. , Disp: 60 g, Rfl: 1    lisinopril (PRINIVIL;ZESTRIL) 2.5 MG tablet, Take 1 tablet by mouth daily, Disp: 90 tablet, Rfl: 1    Alcohol Swabs (ALCOHOL PADS) 70 % PADS, Apply 1 each topically 4 times daily, Disp: 200 each, Rfl: 5    blood glucose monitor kit and supplies, use check blood sugar as directed, Disp: 1 kit, Rfl: 0    ALLERGIES:   Allergies   Allergen Reactions    Blueberry [Vaccinium Angustifolium] Anaphylaxis     Tongue swells up, needs epipen    Shrimp Flavor Anaphylaxis     OK with fish, allergic to shrimp and lobster  OK with IV contrast       FAMILY HISTORY: No family history on file.       SOCIAL HISTORY:   Social History     Socioeconomic History    Marital status: Single     Spouse name: Not on file    Number of children: Not on file    Years of education: Not on file    Highest education level: Not on file   Occupational History    Not on file   Tobacco Use    Smoking status: Current Every Day Smoker     Packs/day: 0.25     Types: Cigarettes    Smokeless tobacco: Never Used    Tobacco comment: 5/day   Substance and Sexual Activity    Alcohol use: No    Drug use: No    Sexual activity: Never   Other Topics Concern    Not on file   Social History Narrative    Not on file     Social Determinants of Health     Financial Resource Strain: Medium Risk    Difficulty of Paying Living Expenses: Somewhat hard   Food Insecurity: Food Insecurity Present    Worried About Running Out of Food in the Last Year: Often true    Shantelle of Food in the Last Year: Often true   Transportation Needs: Unmet Transportation Needs    Lack of Transportation (Medical): Yes    Lack of Transportation (Non-Medical): Yes   Physical Activity:     Days of Exercise per Week:     Minutes of Exercise per Session:    Stress:     Feeling of Stress :    Social Connections:     Frequency of Communication with Friends and Family:     Frequency of Social Gatherings with Friends and Family:     Attends Taoism Services:     Active Member of Clubs or Organizations:     Attends Club or Organization Meetings:     Marital Status:    Intimate Partner Violence:     Fear of Current or Ex-Partner:     Emotionally Abused:     Physically Abused:     Sexually Abused:        REVIEW OF SYSTEMS: A 12-point review of systemswas obtained and pertinent positives and negatives were enumerated above in the history of present illness. All other reviewed systems / symptoms were negative. Review of Systems   Constitutional: Negative. Negative for appetite change, fatigue and unexpected weight change. HENT: Positive for postnasal drip and sinus pressure. Negative for dental problem, sore throat, trouble swallowing and voice change. Eyes: Negative. Negative for visual disturbance. Respiratory: Positive for cough (clears throat a lot). Negative for choking and wheezing. Cardiovascular: Positive for leg swelling. Negative for chest pain and palpitations. Gastrointestinal: Positive for abdominal distention and abdominal pain. Negative for anal bleeding, blood in stool, constipation, diarrhea, nausea, rectal pain and vomiting. Endocrine: Negative. Genitourinary: Negative. Negative for difficulty urinating. Musculoskeletal: Positive for back pain. Negative for arthralgias, gait problem and myalgias. Skin: Negative. Allergic/Immunologic: Positive for environmental allergies. Negative for food allergies. Neurological: Negative. Negative for dizziness, weakness, light-headedness, numbness and headaches. Hematological: Negative. Does not bruise/bleed easily. Psychiatric/Behavioral: Negative. Negative for sleep disturbance. The patient is not nervous/anxious. LABORATORY DATA: Reviewed  Lab Results   Component Value Date    WBC 5.5 05/24/2021    HGB 19.1 (H) 05/24/2021    HCT 54.1 (H) 05/24/2021    MCV 92.5 05/24/2021     05/24/2021     05/24/2021    K 4.9 05/24/2021     05/24/2021    CO2 26 05/24/2021    BUN 3 (L) 05/24/2021    CREATININE 0.74 05/24/2021    LABALBU 2.5 (L) 05/24/2021    BILITOT 0.34 05/24/2021    ALKPHOS 106 (H) 05/24/2021    AST 19 05/24/2021    ALT 9 05/24/2021         Lab Results   Component Value Date    RBC 5.85 (H) 05/24/2021    HGB 19.1 (H) 05/24/2021    MCV 92.5 05/24/2021    MCH 32.6 05/24/2021    MCHC 35.3 (H) 05/24/2021    RDW 14.6 (H) 05/24/2021    MPV 10.1 05/24/2021    BASOPCT 1 05/24/2021    LYMPHSABS 2.26 05/24/2021    MONOSABS 0.37 05/24/2021    NEUTROABS 2.69 05/24/2021    EOSABS 0.13 05/24/2021    BASOSABS 0.03 05/24/2021         DIAGNOSTIC TESTING:     No results found. LMP 05/15/2021     PHYSICAL EXAMINATION: Vital signs reviewed per the nursing documentation. There is no height or weight on file to calculate BMI. Physical Exam      I personally reviewed the nurse's notes and documentation and I agree with her notes. General: alert, appears stated age and cooperative Psych: Normal. and Alert and oriented, appropriate affect. . Normal affect. Mentation normal  HEENT: PERRLA. Clear conjunctivae and sclerae. Moist oral mucosae, no lesions or ulcers. The neck is supple, without lymphadenopathy or jugular venous distension.  No masses. Normal thyroid. Cardiovascular: S1 S2 RRR no rubs or murmurs. Pulmonary: clear BL. No accessory muscle usage. Abdominal Exam: Soft, NT ND, no hepato or spleno megaly, +BS, no ascites. No groin masses or lymphadenopathy. Extremities: no lower ext edema. Skin: Warm skin. No skin rash. No spider nevi palmar erythema nail dystrophy. Joint: No joint swelling or deformity. Neurological: intact sensory. DTR+. IMPRESSION: Ms. Shanta Anthony is a 30 y.o. female with abdominal pain. Very likely functional.  Trial of Bentyl. She reports no history of glaucoma. Follow-up in 3 to 4 weeks. In case her symptoms do not improve we may need to investigate further. She has mild elevated alkaline phosphatase. We will obtain isoenzymes. He also has elevated H&H. We will recheck CBC. Spent 30 minutes providing patient education and counseling. Thank you for allowing me to participate in the care of Ms. Deng. For any further questions please do not hesitate to contact me. I have reviewed and agree with the MA/LPN ROS. Note is dictated utilizing voice recognition software. Unfortunately this leads to occasional typographical errors. Please contact our office if you have any questions.     Ofelia Baker MD  [facility]  O: #[phone]

## 2021-07-16 DIAGNOSIS — R10.84 GENERALIZED ABDOMINAL PAIN: ICD-10-CM

## 2021-07-19 RX ORDER — IBUPROFEN 600 MG/1
TABLET ORAL
Qty: 120 TABLET | Refills: 1 | Status: SHIPPED | OUTPATIENT
Start: 2021-07-19 | End: 2021-09-27

## 2021-07-19 NOTE — TELEPHONE ENCOUNTER
Last visit: 05/24/2021  Last Med refill: 06/26/2021  Does patient have enough medication for 72 hours: yes    Next Visit Date:  Future Appointments   Date Time Provider Hayden Castilloi   8/24/2021 10:30 AM Emperatriz Watt MD [facility] FP [facility]   9/2/2021 10:00 AM Ina Patel MD [facility] exc [facility]   9/22/2021 11:00 AM Emperatriz Watt  [address] Maintenance   Topic Date Due    Hepatitis B vaccine (3 of 3 - Risk 3-dose series) 02/14/1998    Pneumococcal 0-64 years Vaccine (1 of 2 - PPSV23) Never done    Varicella vaccine (2 of 2 - 2-dose childhood series) 08/09/2000    Cervical cancer screen  Never done    Diabetic retinal exam  01/21/2020    Annual Wellness Visit (AWV)  Never done    COVID-19 Vaccine (1) 03/22/2022 (Originally 12/5/2005)    Flu vaccine (1) 09/01/2021    Diabetic foot exam  03/22/2022    A1C test (Diabetic or Prediabetic)  03/22/2022    Diabetic microalbuminuria test  03/22/2022    Lipid screen  05/24/2022    Potassium monitoring  05/24/2022    Creatinine monitoring  05/24/2022    DTaP/Tdap/Td vaccine (6 - Td or Tdap) 08/27/2030    Hib vaccine  Completed    Hepatitis C screen  Completed    HIV screen  Completed    Hepatitis A vaccine  Aged Out    Meningococcal (ACWY) vaccine  Aged Out       Hemoglobin A1C (%)   Date Value   03/22/2021 6.3   02/11/2020 7.6   01/08/2019 10.1             ( goal A1C is < 7)   Microalb/Crt.  Ratio (mcg/mg creat)   Date Value   02/11/2020 511 (H)     LDL Cholesterol (mg/dL)   Date Value   05/24/2021 45   02/11/2020 84       (goal LDL is <100)   AST (U/L)   Date Value   05/24/2021 19     ALT (U/L)   Date Value   05/24/2021 9     BUN (mg/dL)   Date Value   05/24/2021 3 (L)     BP Readings from Last 3 Encounters:   05/24/21 120/82   04/22/21 (!) 148/98   03/22/21 138/84          (goal 120/80)    All Future Testing planned in [facility]  Lab Frequency Next Occurrence   Alkaline Phosphatase, Isoenzymes Once 06/14/2021   CBC With Auto Differential Once 06/14/2021               Patient Active Problem List:     Yeast vaginitis     Type 1 diabetes mellitus with diabetic polyneuropathy (HCC)     Pregestational diabetes mellitus, modified White class C     Intractable vomiting with nausea     Intractable abdominal pain

## 2021-08-02 DIAGNOSIS — E10.42 TYPE 1 DIABETES MELLITUS WITH DIABETIC POLYNEUROPATHY (HCC): ICD-10-CM

## 2021-08-03 RX ORDER — INSULIN ASPART 100 [IU]/ML
INJECTION, SOLUTION INTRAVENOUS; SUBCUTANEOUS
Qty: 15 PEN | Refills: 1 | Status: SHIPPED | OUTPATIENT
Start: 2021-08-03 | End: 2021-12-16

## 2021-08-03 RX ORDER — FAMOTIDINE 20 MG/1
TABLET, FILM COATED ORAL
Qty: 180 TABLET | OUTPATIENT
Start: 2021-08-03

## 2021-08-03 NOTE — TELEPHONE ENCOUNTER
Last visit: 5/24/21  Last Med refill: 3/22/21  Does patient have enough medication for 72 hours: Yes    Next Visit Date:  Future Appointments   Date Time Provider Hayden Chica   8/24/2021 10:30 AM Emperatriz Enciso MD [facility] FP [facility]   9/2/2021 10:00 AM Mikey Urena MD [facility] exc [facility]   9/22/2021 11:00 AM mEperatriz Enciso  [address] Maintenance   Topic Date Due    Hepatitis B vaccine (3 of 3 - Risk 3-dose series) 02/14/1998    Pneumococcal 0-64 years Vaccine (1 of 2 - PPSV23) Never done    Varicella vaccine (2 of 2 - 2-dose childhood series) 08/09/2000    Cervical cancer screen  Never done    Diabetic retinal exam  01/21/2020    Annual Wellness Visit (AWV)  Never done    COVID-19 Vaccine (1) 03/22/2022 (Originally 12/5/2005)    Flu vaccine (1) 09/01/2021    Diabetic foot exam  03/22/2022    A1C test (Diabetic or Prediabetic)  03/22/2022    Diabetic microalbuminuria test  03/22/2022    Lipid screen  05/24/2022    Potassium monitoring  05/24/2022    Creatinine monitoring  05/24/2022    DTaP/Tdap/Td vaccine (6 - Td or Tdap) 08/27/2030    Hib vaccine  Completed    Hepatitis C screen  Completed    HIV screen  Completed    Hepatitis A vaccine  Aged Out    Meningococcal (ACWY) vaccine  Aged Out       Hemoglobin A1C (%)   Date Value   03/22/2021 6.3   02/11/2020 7.6   01/08/2019 10.1             ( goal A1C is < 7)   Microalb/Crt.  Ratio (mcg/mg creat)   Date Value   02/11/2020 511 (H)     LDL Cholesterol (mg/dL)   Date Value   05/24/2021 45   02/11/2020 84       (goal LDL is <100)   AST (U/L)   Date Value   05/24/2021 19     ALT (U/L)   Date Value   05/24/2021 9     BUN (mg/dL)   Date Value   05/24/2021 3 (L)     BP Readings from Last 3 Encounters:   05/24/21 120/82   04/22/21 (!) 148/98   03/22/21 138/84          (goal 120/80)    All Future Testing planned in [facility]  Lab Frequency Next Occurrence   Alkaline Phosphatase, Isoenzymes Once 06/14/2021   CBC With Auto Differential Once 06/14/2021               Patient Active Problem List:     Yeast vaginitis     Type 1 diabetes mellitus with diabetic polyneuropathy (HCC)     Pregestational diabetes mellitus, modified White class C     Intractable vomiting with nausea     Intractable abdominal pain

## 2021-08-20 DIAGNOSIS — E10.42 TYPE 1 DIABETES MELLITUS WITH DIABETIC POLYNEUROPATHY (HCC): ICD-10-CM

## 2021-08-23 RX ORDER — CALCIUM CITRATE/VITAMIN D3 200MG-6.25
TABLET ORAL
Qty: 400 STRIP | Refills: 1 | Status: SHIPPED | OUTPATIENT
Start: 2021-08-23 | End: 2022-01-18

## 2021-08-23 RX ORDER — PEN NEEDLE, DIABETIC 31 GX5/16"
NEEDLE, DISPOSABLE MISCELLANEOUS
Qty: 400 EACH | Refills: 1 | Status: SHIPPED | OUTPATIENT
Start: 2021-08-23 | End: 2022-01-18

## 2021-08-23 RX ORDER — GLUCOSAM/CHON-MSM1/C/MANG/BOSW 500-416.6
TABLET ORAL
Qty: 400 EACH | Refills: 1 | Status: SHIPPED | OUTPATIENT
Start: 2021-08-23 | End: 2022-01-18

## 2021-08-23 NOTE — TELEPHONE ENCOUNTER
Last visit: 05/24/2021  Last Med refill: 06/25/2021  Does patient have enough medication for 72 hours: No:     Next Visit Date:  Future Appointments   Date Time Provider Hayden Chica   8/24/2021 10:30 AM Arti Sheryle Danas, MD [facility]   9/2/2021 10:00 AM Candido Andre MD [facility]   9/22/2021 11:00 AM Arti Sheryle Danas,  [address] [facility]   Topic Date Due    Hepatitis B vaccine (3 of 3 - Risk 3-dose series) 02/14/1998    Pneumococcal 0-64 years Vaccine (1 of 2 - PPSV23) Never done    Varicella vaccine (2 of 2 - 2-dose childhood series) 08/09/2000    Cervical cancer screen  Never done    Diabetic retinal exam  01/21/2020    Annual Wellness Visit (AWV)  Never done    COVID-19 Vaccine (1) 03/22/2022 (Originally 12/5/2005)    Flu vaccine (1) 09/01/2021    Diabetic foot exam  03/22/2022    A1C test (Diabetic or Prediabetic)  03/22/2022    Diabetic microalbuminuria test  03/22/2022    Lipid screen  05/24/2022    Potassium monitoring  05/24/2022    Creatinine monitoring  05/24/2022    DTaP/Tdap/Td vaccine (6 - Td or Tdap) 08/27/2030    Hib vaccine  Completed    Hepatitis C screen  Completed    HIV screen  Completed    Hepatitis A vaccine  Aged Out    Meningococcal (ACWY) vaccine  Aged Out       Hemoglobin A1C (%)   Date Value   03/22/2021 6.3   02/11/2020 7.6   01/08/2019 10.1             ( goal A1C is < 7)   Microalb/Crt.  Ratio (mcg/mg creat)   Date Value   02/11/2020 511 (H)     LDL Cholesterol (mg/dL)   Date Value   05/24/2021 45   02/11/2020 84       (goal LDL is <100)   AST (U/L)   Date Value   05/24/2021 19     ALT (U/L)   Date Value   05/24/2021 9     BUN (mg/dL)   Date Value   05/24/2021 3 (L)     BP Readings from Last 3 Encounters:   05/24/21 120/82   04/22/21 (!) 148/98   03/22/21 138/84          (goal 120/80)    All Future Testing planned in [facility]  Lab Frequency Next Occurrence   Alkaline Phosphatase, Isoenzymes Once 06/14/2021   CBC With Auto Differential Once 06/14/2021               Patient Active Problem List:     Yeast vaginitis     Type 1 diabetes mellitus with diabetic polyneuropathy (HCC)     Pregestational diabetes mellitus, modified White class C     Intractable vomiting with nausea     Intractable abdominal pain

## 2021-09-02 ENCOUNTER — TELEPHONE (OUTPATIENT)
Dept: GASTROENTEROLOGY | Age: 26
End: 2021-09-02

## 2021-09-02 ENCOUNTER — OFFICE VISIT (OUTPATIENT)
Dept: GASTROENTEROLOGY | Age: 26
End: 2021-09-02
Payer: COMMERCIAL

## 2021-09-02 VITALS — BODY MASS INDEX: 15.98 KG/M2 | WEIGHT: 96 LBS

## 2021-09-02 DIAGNOSIS — R11.2 NAUSEA AND VOMITING, INTRACTABILITY OF VOMITING NOT SPECIFIED, UNSPECIFIED VOMITING TYPE: Primary | ICD-10-CM

## 2021-09-02 PROCEDURE — 4004F PT TOBACCO SCREEN RCVD TLK: CPT | Performed by: INTERNAL MEDICINE

## 2021-09-02 PROCEDURE — G8419 CALC BMI OUT NRM PARAM NOF/U: HCPCS | Performed by: INTERNAL MEDICINE

## 2021-09-02 PROCEDURE — 99213 OFFICE O/P EST LOW 20 MIN: CPT | Performed by: INTERNAL MEDICINE

## 2021-09-02 PROCEDURE — G8427 DOCREV CUR MEDS BY ELIG CLIN: HCPCS | Performed by: INTERNAL MEDICINE

## 2021-09-02 RX ORDER — PANTOPRAZOLE SODIUM 40 MG/1
40 TABLET, DELAYED RELEASE ORAL
Qty: 30 TABLET | Refills: 5 | Status: SHIPPED | OUTPATIENT
Start: 2021-09-02 | End: 2021-10-18 | Stop reason: SDUPTHER

## 2021-09-02 ASSESSMENT — ENCOUNTER SYMPTOMS
SINUS PRESSURE: 1
VOMITING: 0
COUGH: 1
ABDOMINAL DISTENTION: 1
TROUBLE SWALLOWING: 0
ABDOMINAL PAIN: 1
ANAL BLEEDING: 0
BACK PAIN: 1
CHOKING: 0
RECTAL PAIN: 0
SORE THROAT: 0
NAUSEA: 0
EYES NEGATIVE: 1
BLOOD IN STOOL: 0
CONSTIPATION: 0
DIARRHEA: 0
WHEEZING: 0
VOICE CHANGE: 0

## 2021-09-02 NOTE — PROGRESS NOTES
GI CLINIC FOLLOW UP    INTERVAL HISTORY:   No referring provider defined for this encounter. Chief Complaint   Patient presents with    Nausea & Vomiting     She has terrible nausea and has been vomiting a lot. she has been very sick from throwing up and sometimes can't get out of bed. Bentyl helps the pain but  food just makes her throw up she even tried ensure and that makes her throw up. HISTORY OF PRESENT ILLNESS: Ms.Destaney Chhaya Garcia is a 30 y.o. female with abdominal pain. Nausea and vomiting. Epigastric discomfort. Frequent nausea and vomiting. She quit marijuana almost 3 months ago. Bowels moving okay. Past Medical,Family, and Social History reviewed and does not contribute to the patient presentingcondition. Patient's PMH/PSH,SH,PSYCH Hx, MEDs, ALLERGIES, and ROS were all reviewed and updated in the appropriate sections.     PAST MEDICAL HISTORY:  Past Medical History:   Diagnosis Date    Diabetes mellitus ([facility] [address]     MRSA (methicillin resistant staph aureus) culture positive 2016    abdomen    Ovarian cyst 2019    Right side       Past Surgical History:   Procedure Laterality Date     SECTION         CURRENT MEDICATIONS:    Current Outpatient Medications:     TRUE METRIX BLOOD GLUCOSE TEST strip, CHECK BLOOD SUGAR FOUR TIMES DAILY AS DIRECTED, Disp: 400 strip, Rfl: 1    TRUEplus Lancets 33G MISC, TEST BLOOD SUGAR FOUR TIMES DAILY, Disp: 400 each, Rfl: 1    DROPLET PEN NEEDLES 31G X 8 MM MISC, USE FOUR TIMES DAILY AS DIRECTED, Disp: 400 each, Rfl: 1    insulin aspart (NOVOLOG FLEXPEN) 100 UNIT/ML injection pen, INJECT 2 TO 12 UNITS SUBCUTANEOUSLY THREE TIMES DAILY PER SLIDING SCALE AS DIRECTED (DISCARD PEN 28 DAYS AFTER OPENING), Disp: 15 pen, Rfl: 1     MG tablet, TAKE 1 TABLET FOUR TIMES DAILY AS NEEDED FOR PAIN, Disp: 120 tablet, Rfl: 1    dicyclomine (BENTYL) 10 MG capsule, Take 1 capsule by mouth 3 times daily as needed (abd pain), Disp: 120 capsule, Rfl: 3    insulin glargine (LANTUS SOLOSTAR) 100 UNIT/ML injection pen, Inject 12 Units into the skin nightly, Disp: 5 pen, Rfl: 0    omeprazole (PRILOSEC) 20 MG delayed release capsule, Take 1 capsule by mouth every morning (before breakfast), Disp: 90 capsule, Rfl: 1    rOPINIRole (REQUIP) 0.25 MG tablet, TAKE 1 TABLET EVERY NIGHT, Disp: 90 tablet, Rfl: 1    ondansetron (ZOFRAN ODT) 4 MG disintegrating tablet, Take 1 tablet by mouth every 8 hours as needed for Nausea or Vomiting, Disp: 20 tablet, Rfl: 0    acetaminophen (TYLENOL) 500 MG tablet, Take 2 tablets by mouth every 6 hours as needed for Pain, Disp: 180 tablet, Rfl: 1    ketoconazole (NIZORAL) 2 % cream, Apply topically daily. , Disp: 60 g, Rfl: 1    lisinopril (PRINIVIL;ZESTRIL) 2.5 MG tablet, Take 1 tablet by mouth daily, Disp: 90 tablet, Rfl: 1    Alcohol Swabs (ALCOHOL PADS) 70 % PADS, Apply 1 each topically 4 times daily, Disp: 200 each, Rfl: 5    blood glucose monitor kit and supplies, use check blood sugar as directed, Disp: 1 kit, Rfl: 0    ALLERGIES:   Allergies   Allergen Reactions    Blueberry [Vaccinium Angustifolium] Anaphylaxis     Tongue swells up, needs epipen    Shrimp Flavor Anaphylaxis     OK with fish, allergic to shrimp and lobster  OK with IV contrast       FAMILY HISTORY: No family history on file.       SOCIAL HISTORY:   Social History     Socioeconomic History    Marital status: Single     Spouse name: Not on file    Number of children: Not on file    Years of education: Not on file    Highest education level: Not on file   Occupational History    Not on file   Tobacco Use    Smoking status: Current Every Day Smoker     Packs/day: 0.25     Types: Cigarettes    Smokeless tobacco: Never Used    Tobacco comment: 5/day   Substance and Sexual Activity    Alcohol use: No    Drug use: No    Sexual activity: Never   Other Topics Concern    Not on file   Social History Narrative    Not on file Social Determinants of Health     Financial Resource Strain: Medium Risk    Difficulty of Paying Living Expenses: Somewhat hard   Food Insecurity: Food Insecurity Present    Worried About Running Out of Food in the Last Year: Often true    Shantelle of Food in the Last Year: Often true   Transportation Needs: Unmet Transportation Needs    Lack of Transportation (Medical): Yes    Lack of Transportation (Non-Medical): Yes   Physical Activity:     Days of Exercise per Week:     Minutes of Exercise per Session:    Stress:     Feeling of Stress :    Social Connections:     Frequency of Communication with Friends and Family:     Frequency of Social Gatherings with Friends and Family:     Attends [employer] Services:     Active Member of Clubs or Organizations:     Attends Club or Organization Meetings:     Marital Status:    Intimate Partner Violence:     Fear of Current or Ex-Partner:     Emotionally Abused:     Physically Abused:     Sexually Abused:        REVIEW OF SYSTEMS: A 12-point review of systemswas obtained and pertinent positives and negatives were enumerated above in the history of present illness. All other reviewed systems / symptoms were negative. Review of Systems   Constitutional: Negative. Negative for appetite change, fatigue and unexpected weight change. HENT: Positive for postnasal drip and sinus pressure. Negative for dental problem, sore throat, trouble swallowing and voice change. Eyes: Negative. Negative for visual disturbance. Respiratory: Positive for cough (clears throat a lot). Negative for choking and wheezing. Cardiovascular: Positive for leg swelling. Negative for chest pain and palpitations. Gastrointestinal: Positive for abdominal distention and abdominal pain. Negative for anal bleeding, blood in stool, constipation, diarrhea, nausea, rectal pain and vomiting. Endocrine: Negative. Genitourinary: Negative. Negative for difficulty urinating. Musculoskeletal: Positive for back pain. Negative for arthralgias, gait problem and myalgias. Skin: Negative. Allergic/Immunologic: Positive for environmental allergies. Negative for food allergies. Neurological: Negative. Negative for dizziness, weakness, light-headedness, numbness and headaches. Hematological: Negative. Does not bruise/bleed easily. Psychiatric/Behavioral: Negative. Negative for sleep disturbance. The patient is not nervous/anxious. LABORATORY DATA: Reviewed  Lab Results   Component Value Date    WBC 5.5 05/24/2021    HGB 19.1 (H) 05/24/2021    HCT 54.1 (H) 05/24/2021    MCV 92.5 05/24/2021     05/24/2021     05/24/2021    K 4.9 05/24/2021     05/24/2021    CO2 26 05/24/2021    BUN 3 (L) 05/24/2021    CREATININE 0.74 05/24/2021    LABALBU 2.5 (L) 05/24/2021    BILITOT 0.34 05/24/2021    ALKPHOS 106 (H) 05/24/2021    AST 19 05/24/2021    ALT 9 05/24/2021         Lab Results   Component Value Date    RBC 5.85 (H) 05/24/2021    HGB 19.1 (H) 05/24/2021    MCV 92.5 05/24/2021    MCH 32.6 05/24/2021    MCHC 35.3 (H) 05/24/2021    RDW 14.6 (H) 05/24/2021    MPV 10.1 05/24/2021    BASOPCT 1 05/24/2021    LYMPHSABS 2.26 05/24/2021    MONOSABS 0.37 05/24/2021    NEUTROABS 2.69 05/24/2021    EOSABS 0.13 05/24/2021    BASOSABS 0.03 05/24/2021         DIAGNOSTIC TESTING:     No results found. PHYSICAL EXAMINATION: Vital signs reviewed per the nursing documentation. There were no vitals taken for this visit. There is no height or weight on file to calculate BMI. Physical Exam      I personally reviewed the nurse's notes and documentation and I agree with her notes. General: alert, appears stated age and cooperative Psych: Normal. and Alert and oriented, appropriate affect. . Normal affect. Mentation normal  HEENT: PERRLA. Clear conjunctivae and sclerae. Moist oral mucosae, no lesions or ulcers.   The neck is supple, without lymphadenopathy or jugular venous distension. No masses. Normal thyroid. Cardiovascular: S1 S2 RRR no rubs or murmurs. Pulmonary: clear BL. No accessory muscle usage. Abdominal Exam: Soft, NT ND, no hepato or spleno megaly, +BS, no ascites. IMPRESSION: Ms. Denise Garcia is a 30 y.o. female with abdominal pain. Nausea and vomiting. Plan for EGD. Increase PPI to twice a day. Stay off marijuana. Gastric emptying study. Follow-up in 3 to 4 weeks. Thank you for allowing me to participate in the care of Ms. Deng. For any further questions please do not hesitate to contact me. I have reviewed and agree with the ROS entered by the MA/LPN. Note is dictated utilizing voice recognition software. Unfortunately this leads to occasional typographical errors. Please contact our office if you have any questions.     Jarrett Negrete MD  [facility]  O: #[phone]

## 2021-09-02 NOTE — TELEPHONE ENCOUNTER
Writer spoke to pt over the phone informing her she is sched for covid test at [facility] 9/16/21 @ 2:50pm.  Pt voices her understanding.

## 2021-09-10 ENCOUNTER — TELEPHONE (OUTPATIENT)
Dept: GASTROENTEROLOGY | Age: 26
End: 2021-09-10

## 2021-09-10 RX ORDER — INSULIN GLARGINE 100 [IU]/ML
INJECTION, SOLUTION SUBCUTANEOUS
Qty: 15 ML | Refills: 3 | Status: SHIPPED | OUTPATIENT
Start: 2021-09-10 | End: 2021-09-22

## 2021-09-10 NOTE — TELEPHONE ENCOUNTER
Last visit: 05/24/2021  Last Med refill: 06/10/2021  Does patient have enough medication for 72 hours: Yes    Next Visit Date:  Future Appointments   Date Time Provider Hayden Coto   9/10/2021 10:00 AM STA NM ROOM 1 [facility] Radiolog   9/16/2021  2:50 PM SCHEDULE, [facility] COVID SCREENING [facility] COV [facility]   9/22/2021 11:00 AM Emperatriz Kang MD [facility]   10/18/2021  3:00 PM Lito Potts MD [facility] exc [address] Maintenance   Topic Date Due    Hepatitis B vaccine (3 of 3 - Risk 3-dose series) 02/14/1998    Pneumococcal 0-64 years Vaccine (1 of 2 - PPSV23) Never done    Varicella vaccine (2 of 2 - 2-dose childhood series) 08/09/2000    Pap smear  Never done    Diabetic retinal exam  01/21/2020    Annual Wellness Visit (AWV)  Never done    Flu vaccine (1) Never done    COVID-19 Vaccine (1) 03/22/2022 (Originally 12/5/2005)    Diabetic foot exam  03/22/2022    A1C test (Diabetic or Prediabetic)  03/22/2022    Diabetic microalbuminuria test  03/22/2022    Lipid screen  05/24/2022    Potassium monitoring  05/24/2022    Creatinine monitoring  05/24/2022    DTaP/Tdap/Td vaccine (6 - Td or Tdap) 08/27/2030    Hib vaccine  Completed    Hepatitis C screen  Completed    HIV screen  Completed    Hepatitis A vaccine  Aged Out    Meningococcal (ACWY) vaccine  Aged Out       Hemoglobin A1C (%)   Date Value   03/22/2021 6.3   02/11/2020 7.6   01/08/2019 10.1             ( goal A1C is < 7)   Microalb/Crt.  Ratio (mcg/mg creat)   Date Value   02/11/2020 511 (H)     LDL Cholesterol (mg/dL)   Date Value   05/24/2021 45   02/11/2020 84       (goal LDL is <100)   AST (U/L)   Date Value   05/24/2021 19     ALT (U/L)   Date Value   05/24/2021 9     BUN (mg/dL)   Date Value   05/24/2021 3 (L)     BP Readings from Last 3 Encounters:   05/24/21 120/82   04/22/21 (!) 148/98   03/22/21 138/84          (goal 120/80)    All Future Testing planned in [facility]  Lab Frequency Next Occurrence   Alkaline Phosphatase, Isoenzymes Once 06/14/2021   CBC With Auto Differential Once 06/14/2021   EGD Routine Once 09/02/2021   NM GASTRIC EMPTYING Once 09/02/2021               Patient Active Problem List:     Yeast vaginitis     Type 1 diabetes mellitus with diabetic polyneuropathy (HCC)     Pregestational diabetes mellitus, modified White class C     Intractable vomiting with nausea     Intractable abdominal pain

## 2021-09-10 NOTE — TELEPHONE ENCOUNTER
Clare Macdonald from radiology called. Pt is there for test.  She states she can not eggs or toast. They need to know if they can do a liquid test.  Writer spoke with Dr Shayan Loaiza. He stated test needs cancelled if they have no other food options for pt. He does not need to have liquid test.  Clare Macdonald and patient are both notified on speaker phone. They have no other options for food. Test will be cancelled.

## 2021-09-15 ENCOUNTER — TELEPHONE (OUTPATIENT)
Dept: GASTROENTEROLOGY | Age: 26
End: 2021-09-15

## 2021-09-16 ENCOUNTER — HOSPITAL ENCOUNTER (OUTPATIENT)
Dept: LAB | Age: 26
Setting detail: SPECIMEN
Discharge: HOME | End: 2021-09-16
Payer: MEDICARE

## 2021-09-16 DIAGNOSIS — Z01.818 PREOP TESTING: Primary | ICD-10-CM

## 2021-09-16 PROCEDURE — U0005 INFEC AGEN DETEC AMPLI PROBE: HCPCS

## 2021-09-16 PROCEDURE — U0003 INFECTIOUS AGENT DETECTION BY NUCLEIC ACID (DNA OR RNA); SEVERE ACUTE RESPIRATORY SYNDROME CORONAVIRUS 2 (SARS-COV-2) (CORONAVIRUS DISEASE [COVID-19]), AMPLIFIED PROBE TECHNIQUE, MAKING USE OF HIGH THROUGHPUT TECHNOLOGIES AS DESCRIBED BY CMS-2020-01-R: HCPCS

## 2021-09-17 LAB
SARS-COV-2: NORMAL
SARS-COV-2: NOT DETECTED
SOURCE: NORMAL

## 2021-09-20 ENCOUNTER — ANESTHESIA (OUTPATIENT)
Dept: OPERATING ROOM | Age: 26
End: 2021-09-20
Payer: MEDICARE

## 2021-09-20 ENCOUNTER — ANESTHESIA EVENT (OUTPATIENT)
Dept: OPERATING ROOM | Age: 26
End: 2021-09-20
Payer: MEDICARE

## 2021-09-20 ENCOUNTER — HOSPITAL ENCOUNTER (OUTPATIENT)
Age: 26
Setting detail: OUTPATIENT SURGERY
Discharge: HOME | End: 2021-09-20
Attending: INTERNAL MEDICINE | Admitting: INTERNAL MEDICINE
Payer: MEDICARE

## 2021-09-20 VITALS
BODY MASS INDEX: 16.33 KG/M2 | OXYGEN SATURATION: 98 % | HEIGHT: 65 IN | DIASTOLIC BLOOD PRESSURE: 80 MMHG | WEIGHT: 98 LBS | RESPIRATION RATE: 22 BRPM | SYSTOLIC BLOOD PRESSURE: 116 MMHG | HEART RATE: 79 BPM | TEMPERATURE: 96.8 F

## 2021-09-20 VITALS — DIASTOLIC BLOOD PRESSURE: 76 MMHG | SYSTOLIC BLOOD PRESSURE: 109 MMHG | OXYGEN SATURATION: 100 %

## 2021-09-20 LAB
GLUCOSE BLD-MCNC: 132 MG/DL (ref 65–105)
GLUCOSE BLD-MCNC: 146 MG/DL (ref 65–105)
HCG, PREGNANCY URINE (POC): NEGATIVE

## 2021-09-20 PROCEDURE — 88342 IMHCHEM/IMCYTCHM 1ST ANTB: CPT

## 2021-09-20 PROCEDURE — 2709999900 HC NON-CHARGEABLE SUPPLY: Performed by: INTERNAL MEDICINE

## 2021-09-20 PROCEDURE — 43239 EGD BIOPSY SINGLE/MULTIPLE: CPT | Performed by: INTERNAL MEDICINE

## 2021-09-20 PROCEDURE — 7100000010 HC PHASE II RECOVERY - FIRST 15 MIN: Performed by: INTERNAL MEDICINE

## 2021-09-20 PROCEDURE — 3700000000 HC ANESTHESIA ATTENDED CARE: Performed by: INTERNAL MEDICINE

## 2021-09-20 PROCEDURE — 81025 URINE PREGNANCY TEST: CPT

## 2021-09-20 PROCEDURE — 6360000002 HC RX W HCPCS: Performed by: NURSE ANESTHETIST, CERTIFIED REGISTERED

## 2021-09-20 PROCEDURE — 7100000011 HC PHASE II RECOVERY - ADDTL 15 MIN: Performed by: INTERNAL MEDICINE

## 2021-09-20 PROCEDURE — 3609012400 HC EGD TRANSORAL BIOPSY SINGLE/MULTIPLE: Performed by: INTERNAL MEDICINE

## 2021-09-20 PROCEDURE — 88305 TISSUE EXAM BY PATHOLOGIST: CPT

## 2021-09-20 PROCEDURE — 2500000003 HC RX 250 WO HCPCS: Performed by: NURSE ANESTHETIST, CERTIFIED REGISTERED

## 2021-09-20 PROCEDURE — 2580000003 HC RX 258: Performed by: ANESTHESIOLOGY

## 2021-09-20 PROCEDURE — 2580000003 HC RX 258: Performed by: NURSE ANESTHETIST, CERTIFIED REGISTERED

## 2021-09-20 PROCEDURE — 82947 ASSAY GLUCOSE BLOOD QUANT: CPT

## 2021-09-20 RX ORDER — ONDANSETRON 2 MG/ML
4 INJECTION INTRAMUSCULAR; INTRAVENOUS
Status: DISCONTINUED | OUTPATIENT
Start: 2021-09-20 | End: 2021-09-20 | Stop reason: HOSPADM

## 2021-09-20 RX ORDER — LIDOCAINE HYDROCHLORIDE 10 MG/ML
1 INJECTION, SOLUTION EPIDURAL; INFILTRATION; INTRACAUDAL; PERINEURAL
Status: DISCONTINUED | OUTPATIENT
Start: 2021-09-21 | End: 2021-09-20 | Stop reason: HOSPADM

## 2021-09-20 RX ORDER — LIDOCAINE HYDROCHLORIDE 20 MG/ML
INJECTION, SOLUTION INFILTRATION; PERINEURAL PRN
Status: DISCONTINUED | OUTPATIENT
Start: 2021-09-20 | End: 2021-09-20 | Stop reason: SDUPTHER

## 2021-09-20 RX ORDER — MIDAZOLAM HYDROCHLORIDE 1 MG/ML
INJECTION INTRAMUSCULAR; INTRAVENOUS PRN
Status: DISCONTINUED | OUTPATIENT
Start: 2021-09-20 | End: 2021-09-20 | Stop reason: SDUPTHER

## 2021-09-20 RX ORDER — SODIUM CHLORIDE, SODIUM LACTATE, POTASSIUM CHLORIDE, CALCIUM CHLORIDE 600; 310; 30; 20 MG/100ML; MG/100ML; MG/100ML; MG/100ML
INJECTION, SOLUTION INTRAVENOUS CONTINUOUS
Status: DISCONTINUED | OUTPATIENT
Start: 2021-09-21 | End: 2021-09-20 | Stop reason: HOSPADM

## 2021-09-20 RX ORDER — PROPOFOL 10 MG/ML
INJECTION, EMULSION INTRAVENOUS PRN
Status: DISCONTINUED | OUTPATIENT
Start: 2021-09-20 | End: 2021-09-20 | Stop reason: SDUPTHER

## 2021-09-20 RX ORDER — SODIUM CHLORIDE, SODIUM LACTATE, POTASSIUM CHLORIDE, CALCIUM CHLORIDE 600; 310; 30; 20 MG/100ML; MG/100ML; MG/100ML; MG/100ML
INJECTION, SOLUTION INTRAVENOUS CONTINUOUS PRN
Status: DISCONTINUED | OUTPATIENT
Start: 2021-09-20 | End: 2021-09-20 | Stop reason: SDUPTHER

## 2021-09-20 RX ADMIN — MIDAZOLAM 2 MG: 1 INJECTION INTRAMUSCULAR; INTRAVENOUS at 12:03

## 2021-09-20 RX ADMIN — PROPOFOL 120 MG: 10 INJECTION, EMULSION INTRAVENOUS at 12:06

## 2021-09-20 RX ADMIN — SODIUM CHLORIDE, POTASSIUM CHLORIDE, SODIUM LACTATE AND CALCIUM CHLORIDE: 600; 310; 30; 20 INJECTION, SOLUTION INTRAVENOUS at 11:44

## 2021-09-20 RX ADMIN — PROPOFOL 30 MG: 10 INJECTION, EMULSION INTRAVENOUS at 12:08

## 2021-09-20 RX ADMIN — SODIUM CHLORIDE, POTASSIUM CHLORIDE, SODIUM LACTATE AND CALCIUM CHLORIDE: 600; 310; 30; 20 INJECTION, SOLUTION INTRAVENOUS at 12:03

## 2021-09-20 RX ADMIN — LIDOCAINE HYDROCHLORIDE 60 MG: 20 INJECTION, SOLUTION INFILTRATION; PERINEURAL at 12:06

## 2021-09-20 ASSESSMENT — PULMONARY FUNCTION TESTS
PIF_VALUE: 1
PIF_VALUE: 2
PIF_VALUE: 1
PIF_VALUE: 0
PIF_VALUE: 1
PIF_VALUE: 0
PIF_VALUE: 1

## 2021-09-20 ASSESSMENT — PAIN DESCRIPTION - PAIN TYPE: TYPE: CHRONIC PAIN

## 2021-09-20 ASSESSMENT — PAIN DESCRIPTION - LOCATION: LOCATION: ABDOMEN

## 2021-09-20 ASSESSMENT — ENCOUNTER SYMPTOMS: SHORTNESS OF BREATH: 0

## 2021-09-20 ASSESSMENT — PAIN SCALES - GENERAL
PAINLEVEL_OUTOF10: 0
PAINLEVEL_OUTOF10: 0
PAINLEVEL_OUTOF10: 5
PAINLEVEL_OUTOF10: 0

## 2021-09-20 ASSESSMENT — PAIN DESCRIPTION - DESCRIPTORS: DESCRIPTORS: SHARP

## 2021-09-20 NOTE — H&P
History and Physical GI Update    Pt Name: Roma Rizzo  MRN: [id number]  YOB: 1995  Date of evaluation: 9/20/2021      [x] I have reviewed the office visit found in [facility] dated 9/2/21 by Dr. Velia Urena which meets the criteria for an Interval History and Physical note and is attached below. [x] I have examined  Roma Rizzo and there are no changes to the patient or plans for a EGD . by Dr Velia Urena  for Nausea and Vomiting, epigastric pain. Denies history of ulcers, hiatal hernia, acid reflux/GERD, or IBS. Previous EGD none Patient today denies fever, chills, night sweats, YES pain and unexplained weight loss (40 lbs in 3 months). Last ate and drank yesterday prior to midnight. Blood sugar 130mg/dl    Vital signs: BP (!) 134/93   Pulse 105   Temp 96.9 °F (36.1 °C) (Temporal)   Resp 20   LMP 09/01/2021   SpO2 100%     Allergies:  Blueberry [vaccinium angustifolium] and Shrimp flavor    Medications:   No current facility-administered medications on file prior to encounter.      Current Outpatient Medications on File Prior to Encounter   Medication Sig Dispense Refill    pantoprazole (PROTONIX) 40 MG tablet Take 1 tablet by mouth 2 times daily (before meals) 30 tablet 5    TRUE METRIX BLOOD GLUCOSE TEST strip CHECK BLOOD SUGAR FOUR TIMES DAILY AS DIRECTED 400 strip 1    TRUEplus Lancets 33G MISC TEST BLOOD SUGAR FOUR TIMES DAILY 400 each 1    DROPLET PEN NEEDLES 31G X 8 MM MISC USE FOUR TIMES DAILY AS DIRECTED 400 each 1    insulin aspart (NOVOLOG FLEXPEN) 100 UNIT/ML injection pen INJECT 2 TO 12 UNITS SUBCUTANEOUSLY THREE TIMES DAILY PER SLIDING SCALE AS DIRECTED (DISCARD PEN 28 DAYS AFTER OPENING) 15 pen 1     MG tablet TAKE 1 TABLET FOUR TIMES DAILY AS NEEDED FOR PAIN 120 tablet 1    dicyclomine (BENTYL) 10 MG capsule Take 1 capsule by mouth 3 times daily as needed (abd pain) 120 capsule 3    rOPINIRole (REQUIP) 0.25 MG tablet TAKE 1 TABLET EVERY NIGHT 90 tablet 1    ondansetron (ZOFRAN ODT) 4 MG disintegrating tablet Take 1 tablet by mouth every 8 hours as needed for Nausea or Vomiting 20 tablet 0    acetaminophen (TYLENOL) 500 MG tablet Take 2 tablets by mouth every 6 hours as needed for Pain 180 tablet 1    ketoconazole (NIZORAL) 2 % cream Apply topically daily. 60 g 1    lisinopril (PRINIVIL;ZESTRIL) 2.5 MG tablet Take 1 tablet by mouth daily 90 tablet 1    Alcohol Swabs (ALCOHOL PADS) 70 % PADS Apply 1 each topically 4 times daily 200 each 5            Prior to Admission medications    Medication Sig Start Date End Date Taking?  Authorizing Provider   LANTUS SOLOSTAR 100 UNIT/ML injection pen INJECT 12 UNITS INTO THE SKIN NIGHTLY  (DISCARD PEN 28 DAYS AFTER OPENING) 9/10/21   Emperatriz Prakash MD   pantoprazole (PROTONIX) 40 MG tablet Take 1 tablet by mouth 2 times daily (before meals) 9/2/21   Ilsa Aase, MD   TRUE METRIX BLOOD GLUCOSE TEST strip CHECK BLOOD SUGAR FOUR TIMES DAILY AS DIRECTED 8/23/21   Emperatriz Prakash MD   TRUEplus Lancets 33G MISC TEST BLOOD SUGAR FOUR TIMES DAILY 8/23/21   Emperatriz Prakash MD   DROPLET PEN NEEDLES 31G X 8 MM MISC USE FOUR TIMES DAILY AS DIRECTED 8/23/21   Emperatriz Prakash MD   insulin aspart (NOVOLOG FLEXPEN) 100 UNIT/ML injection pen INJECT 2 TO 12 UNITS SUBCUTANEOUSLY THREE TIMES DAILY PER SLIDING SCALE AS DIRECTED (DISCARD PEN 28 DAYS AFTER OPENING) 8/3/21   Emperatriz Prakash MD    MG tablet TAKE 1 TABLET FOUR TIMES DAILY AS NEEDED FOR PAIN 7/19/21   Emperatriz Prakash MD   dicyclomine (BENTYL) 10 MG capsule Take 1 capsule by mouth 3 times daily as needed (abd pain) 6/14/21   Ilsa Aase, MD   rOPINIRole (REQUIP) 0.25 MG tablet TAKE 1 TABLET EVERY NIGHT 5/19/21   Emperatriz Prakash MD   ondansetron (ZOFRAN ODT) 4 MG disintegrating tablet Take 1 tablet by mouth every 8 hours as needed for Nausea or Vomiting 4/22/21   Pastor Muñoz DO   acetaminophen (TYLENOL) 500 MG tablet Take 2 tablets by mouth every 6 hours as needed for Pain 3/22/21   Emperatriz Leigh MD   ketoconazole (NIZORAL) 2 % cream Apply topically daily. 3/22/21   Emperatriz Leigh MD   lisinopril (PRINIVIL;ZESTRIL) 2.5 MG tablet Take 1 tablet by mouth daily 2/4/21   Emperatriz Leigh MD   Alcohol Swabs (ALCOHOL PADS) 70 % PADS Apply 1 each topically 4 times daily 2/4/21   Emperatriz Leigh MD       This is a 30 y.o. female who is  pleasant, cooperative, alert and oriented x3, in no acute distress. Heart: Heart regular rate and rhythm   Lungs:clear to auscultation without wheezes or rales    Abdomen: soft, nontender, nondistended, no masses or organomegaly  soft, nontender, nondistended, no masses or organomegaly. bowel sounds present . Labs:  Recent Labs     09/20/21  1120   HCG NEGATIVE       Venida Mainland, APRN - CNP  APRN, APRN  Electronically signed 9/20/2021 at 11:27 Rehan Borja MD   Physician   Specialty:  Gastroenterology   Progress Notes      Signed   Encounter Date:  9/2/2021         Related encounter: Office Visit from 9/2/2021 in [facility]     Show:Clear all  [x]Manual[x]Template[x]Copied    Added by:  [x]Ileana Blackman MD    []Pato for details          GI CLINIC FOLLOW UP     INTERVAL HISTORY:   No referring provider defined for this encounter. Chief Complaint   Patient presents with    Nausea & Vomiting       She has terrible nausea and has been vomiting a lot. she has been very sick from throwing up and sometimes can't get out of bed. Bentyl helps the pain but  food just makes her throw up she even tried ensure and that makes her throw up. HISTORY OF PRESENT ILLNESS: Ms.Destaney Umberto Guevara is a 30 y.o. female with abdominal pain. Nausea and vomiting. Epigastric discomfort. Frequent nausea and vomiting. She quit marijuana almost 3 months ago. Bowels moving okay.      Past Medical,Family, and Social History reviewed and does not contribute to the patient presentingcondition. Patient's PMH/PSH,SH,PSYCH Hx, MEDs, ALLERGIES, and ROS were all reviewed and updated in the appropriate sections.      PAST MEDICAL HISTORY:  Past Medical History        Past Medical History:   Diagnosis Date    Diabetes mellitus ([address]      MRSA (methicillin resistant staph aureus) culture positive 2016     abdomen    Ovarian cyst 2019     Right side            Past Surgical History         Past Surgical History:   Procedure Laterality Date     SECTION                CURRENT MEDICATIONS:    Current Medication      Current Outpatient Medications:     TRUE METRIX BLOOD GLUCOSE TEST strip, CHECK BLOOD SUGAR FOUR TIMES DAILY AS DIRECTED, Disp: 400 strip, Rfl: 1    TRUEplus Lancets 33G MISC, TEST BLOOD SUGAR FOUR TIMES DAILY, Disp: 400 each, Rfl: 1    DROPLET PEN NEEDLES 31G X 8 MM MISC, USE FOUR TIMES DAILY AS DIRECTED, Disp: 400 each, Rfl: 1    insulin aspart (NOVOLOG FLEXPEN) 100 UNIT/ML injection pen, INJECT 2 TO 12 UNITS SUBCUTANEOUSLY THREE TIMES DAILY PER SLIDING SCALE AS DIRECTED (DISCARD PEN 28 DAYS AFTER OPENING), Disp: 15 pen, Rfl: 1     MG tablet, TAKE 1 TABLET FOUR TIMES DAILY AS NEEDED FOR PAIN, Disp: 120 tablet, Rfl: 1    dicyclomine (BENTYL) 10 MG capsule, Take 1 capsule by mouth 3 times daily as needed (abd pain), Disp: 120 capsule, Rfl: 3    insulin glargine (LANTUS SOLOSTAR) 100 UNIT/ML injection pen, Inject 12 Units into the skin nightly, Disp: 5 pen, Rfl: 0    omeprazole (PRILOSEC) 20 MG delayed release capsule, Take 1 capsule by mouth every morning (before breakfast), Disp: 90 capsule, Rfl: 1    rOPINIRole (REQUIP) 0.25 MG tablet, TAKE 1 TABLET EVERY NIGHT, Disp: 90 tablet, Rfl: 1    ondansetron (ZOFRAN ODT) 4 MG disintegrating tablet, Take 1 tablet by mouth every 8 hours as needed for Nausea or Vomiting, Disp: 20 tablet, Rfl: 0    acetaminophen (TYLENOL) 500 MG tablet, Take 2 tablets by mouth every 6 hours as needed for Pain, Disp: 180 tablet, Rfl: 1    ketoconazole (NIZORAL) 2 % cream, Apply topically daily. , Disp: 60 g, Rfl: 1    lisinopril (PRINIVIL;ZESTRIL) 2.5 MG tablet, Take 1 tablet by mouth daily, Disp: 90 tablet, Rfl: 1    Alcohol Swabs (ALCOHOL PADS) 70 % PADS, Apply 1 each topically 4 times daily, Disp: 200 each, Rfl: 5    blood glucose monitor kit and supplies, use check blood sugar as directed, Disp: 1 kit, Rfl: 0        ALLERGIES:         Allergies   Allergen Reactions    Blueberry [Vaccinium Angustifolium] Anaphylaxis       Tongue swells up, needs epipen    Shrimp Flavor Anaphylaxis       OK with fish, allergic to shrimp and lobster  OK with IV contrast         FAMILY HISTORY:   Family History   No family history on file. SOCIAL HISTORY:   Social History               Socioeconomic History    Marital status: Single       Spouse name: Not on file    Number of children: Not on file    Years of education: Not on file    Highest education level: Not on file   Occupational History    Not on file   Tobacco Use    Smoking status: Current Every Day Smoker       Packs/day: 0.25       Types: Cigarettes    Smokeless tobacco: Never Used    Tobacco comment: 5/day   Substance and Sexual Activity    Alcohol use: No    Drug use: No    Sexual activity: Never   Other Topics Concern    Not on file   Social History Narrative    Not on file      Social Determinants of Health          Financial Resource Strain: Medium Risk    Difficulty of Paying Living Expenses: Somewhat hard   Food Insecurity: Food Insecurity Present    Worried About Running Out of Food in the Last Year: Often true    Shantelle of Food in the Last Year: Often true   Transportation Needs: Unmet Transportation Needs    Lack of Transportation (Medical):  Yes    Lack of Transportation (Non-Medical): Yes   Physical Activity:     Days of Exercise per Week:     Minutes of Exercise per Session:    Stress:     Feeling of Stress :    Social Connections:     Frequency of Communication with Friends and Family:     Frequency of Social Gatherings with Friends and Family:     Attends Denominational Services:     Active Member of Clubs or Organizations:     Attends Club or Organization Meetings:     Marital Status:    Intimate Partner Violence:     Fear of Current or Ex-Partner:     Emotionally Abused:     Physically Abused:     Sexually Abused:             REVIEW OF SYSTEMS: A 12-point review of systemswas obtained and pertinent positives and negatives were enumerated above in the history of present illness. All other reviewed systems / symptoms were negative. Review of Systems   Constitutional: Negative. Negative for appetite change, fatigue and unexpected weight change. HENT: Positive for postnasal drip and sinus pressure. Negative for dental problem, sore throat, trouble swallowing and voice change. Eyes: Negative. Negative for visual disturbance. Respiratory: Positive for cough (clears throat a lot). Negative for choking and wheezing. Cardiovascular: Positive for leg swelling. Negative for chest pain and palpitations. Gastrointestinal: Positive for abdominal distention and abdominal pain. Negative for anal bleeding, blood in stool, constipation, diarrhea, nausea, rectal pain and vomiting. Endocrine: Negative. Genitourinary: Negative. Negative for difficulty urinating. Musculoskeletal: Positive for back pain. Negative for arthralgias, gait problem and myalgias. Skin: Negative. Allergic/Immunologic: Positive for environmental allergies. Negative for food allergies. Neurological: Negative. Negative for dizziness, weakness, light-headedness, numbness and headaches. Hematological: Negative. Does not bruise/bleed easily. Psychiatric/Behavioral: Negative. Negative for sleep disturbance. The patient is not nervous/anxious.                 LABORATORY DATA: Reviewed        Lab Results   Component Value Date     WBC 5.5 05/24/2021     HGB 19.1 (H) 05/24/2021     HCT 54.1 (H) 05/24/2021     MCV 92.5 05/24/2021      05/24/2021      05/24/2021     K 4.9 05/24/2021      05/24/2021     CO2 26 05/24/2021     BUN 3 (L) 05/24/2021     CREATININE 0.74 05/24/2021     LABALBU 2.5 (L) 05/24/2021     BILITOT 0.34 05/24/2021     ALKPHOS 106 (H) 05/24/2021     AST 19 05/24/2021     ALT 9 05/24/2021                  Lab Results   Component Value Date     RBC 5.85 (H) 05/24/2021     HGB 19.1 (H) 05/24/2021     MCV 92.5 05/24/2021     MCH 32.6 05/24/2021     MCHC 35.3 (H) 05/24/2021     RDW 14.6 (H) 05/24/2021     MPV 10.1 05/24/2021     BASOPCT 1 05/24/2021     LYMPHSABS 2.26 05/24/2021     MONOSABS 0.37 05/24/2021     NEUTROABS 2.69 05/24/2021     EOSABS 0.13 05/24/2021     BASOSABS 0.03 05/24/2021            DIAGNOSTIC TESTING:      No results found. PHYSICAL EXAMINATION: Vital signs reviewed per the nursing documentation. There were no vitals taken for this visit. There is no height or weight on file to calculate BMI. Physical Exam        I personally reviewed the nurse's notes and documentation and I agree with her notes. General: alert, appears stated age and cooperative Psych: Normal. and Alert and oriented, appropriate affect. . Normal affect. Mentation normal  HEENT: PERRLA. Clear conjunctivae and sclerae. Moist oral mucosae, no lesions or ulcers. The neck is supple, without lymphadenopathy or jugular venous distension. No masses. Normal thyroid. Cardiovascular: S1 S2 RRR no rubs or murmurs. Pulmonary: clear BL. No accessory muscle usage. Abdominal Exam: Soft, NT ND, no hepato or spleno megaly, +BS, no ascites. IMPRESSION: Ms. Lucia Mesa is a 30 y.o. female with abdominal pain. Nausea and vomiting. Plan for EGD. Increase PPI to twice a day. Stay off marijuana. Gastric emptying study. Follow-up in 3 to 4 weeks. Thank you for allowing me to participate in the care of Ms. Deng. For any further questions please do not hesitate to contact me. I have reviewed and agree with the ROS entered by the MA/LPN. Note is dictated utilizing voice recognition software. Unfortunately this leads to occasional typographical errors. Please contact our office if you have any questions.      Ayan Ortega MD  [facility]  O: #[phone]

## 2021-09-20 NOTE — ANESTHESIA PRE PROCEDURE
(PRINIVIL;ZESTRIL) 2.5 MG tablet Take 1 tablet by mouth daily 21   Emperatriz Carrera MD   Alcohol Swabs (ALCOHOL PADS) 70 % PADS Apply 1 each topically 4 times daily 21   Emperatriz Carrera MD       Current medications:    No current facility-administered medications for this encounter. Allergies: Allergies   Allergen Reactions    Blueberry [Vaccinium Angustifolium] Anaphylaxis     Tongue swells up, needs epipen    Shrimp Flavor Anaphylaxis     OK with fish, allergic to shrimp and lobster  OK with IV contrast       Problem List:    Patient Active Problem List   Diagnosis Code    Yeast vaginitis B37.3    Type 1 diabetes mellitus with diabetic polyneuropathy ([facility] [address] E10.42    Pregestational diabetes mellitus, modified White class C O24.319    Intractable vomiting with nausea R11.2    Intractable abdominal pain R10.9       Past Medical History:        Diagnosis Date    Diabetes mellitus ([facility] [address]     MRSA (methicillin resistant staph aureus) culture positive 2016    abdomen    Ovarian cyst 2019    Right side       Past Surgical History:        Procedure Laterality Date     SECTION         Social History:    Social History     Tobacco Use    Smoking status: Current Every Day Smoker     Packs/day: 0.25     Types: Cigarettes    Smokeless tobacco: Never Used    Tobacco comment: 5/day   Substance Use Topics    Alcohol use: No                                Ready to quit: Not Answered  Counseling given: Not Answered  Comment: 5/day      Vital Signs (Current): There were no vitals filed for this visit.                                            BP Readings from Last 3 Encounters:   21 120/82   21 (!) 148/98   21 138/84       NPO Status:                                                                                 BMI:   Wt Readings from Last 3 Encounters:   21 96 lb (43.5 kg)   21 103 lb (46.7 kg)   21 100 lb (45.4 kg)     There is no height or weight on file to calculate BMI.    CBC:   Lab Results   Component Value Date    WBC 5.5 05/24/2021    RBC 5.85 05/24/2021    HGB 19.1 05/24/2021    HCT 54.1 05/24/2021    MCV 92.5 05/24/2021    RDW 14.6 05/24/2021     05/24/2021       CMP:   Lab Results   Component Value Date     05/24/2021    K 4.9 05/24/2021     05/24/2021    CO2 26 05/24/2021    BUN 3 05/24/2021    CREATININE 0.74 05/24/2021    GFRAA >60 05/24/2021    LABGLOM >60 05/24/2021    GLUCOSE 80 05/24/2021    PROT 6.2 05/24/2021    CALCIUM 8.6 05/24/2021    BILITOT 0.34 05/24/2021    ALKPHOS 106 05/24/2021    AST 19 05/24/2021    ALT 9 05/24/2021       POC Tests: No results for input(s): POCGLU, POCNA, POCK, POCCL, POCBUN, POCHEMO, POCHCT in the last 72 hours. Coags: No results found for: PROTIME, INR, APTT    HCG (If Applicable):   Lab Results   Component Value Date    PREGTESTUR NEGATIVE 02/21/2021        ABGs: No results found for: PHART, PO2ART, PSM2IYV, JAA8TRR, BEART, R9LZGRHZ     Type & Screen (If Applicable):  No results found for: LABABO, LABRH    Drug/Infectious Status (If Applicable):  Lab Results   Component Value Date    HEPCAB NONREACTIVE 05/24/2021       COVID-19 Screening (If Applicable):   Lab Results   Component Value Date    COVID19 Not Detected 09/16/2021           Anesthesia Evaluation    Airway: Mallampati: I  TM distance: >3 FB   Neck ROM: full  Mouth opening: > = 3 FB Dental:          Pulmonary:   (+) asthma:     (-) shortness of breath                           Cardiovascular:                      Neuro/Psych:               GI/Hepatic/Renal:             Endo/Other:    (+) Diabetes, . Abdominal:             Vascular:           Other Findings:             Anesthesia Plan      general     ASA 2                                 Marcia Stanley MD   9/20/2021

## 2021-09-20 NOTE — OP NOTE
[facility]     PROCEDURE DATE: 09/20/21    REFERRING PHYSICIAN: No ref. provider found     PRIMARY CARE PROVIDER: JADYN Tim MD    ATTENDING PHYSICIAN: Rachel Kimbrough MD     HISTORY: Ms. Justin Ackerman is a 30 y.o. female who presents to the [place] [address] Endoscopy unit for upper endoscopy. The patient's clinical history is remarkable for abd pain. She is currently medically stable and appropriate for the planned procedure. PREOPERATIVE DIAGNOSIS: abd pain. PROCEDURES:   1) Transoral Upper Endoscopy, biopsy. POSTOPERATIVE DIAGNOSIS:   Mild gastritis     SPECIMENS: Biopsy    MEDICATIONS:   MAC per anesthesia    EBL: minimal    INSTRUMENT: Olympus GIF-H190 flexible Gastroscope. PREPARATION: The nature and character of the procedure as well as risks, benefits, and alternatives were discussed with the patient and informed consent was obtained. Complications were said to include, but were not limited to: medication allergy, medication reaction, cardiovascular and respiratory problems, bleeding, perforation, infection, and/or missed diagnosis. Following arrival in the endoscopy room, the patient was placed in the left lateral decubitus position and final time-out accomplished in the presence of the nursing staff. Baseline vital signs were obtained and reviewed, and IV sedation was subsequently initiated. FINDINGS:   Esophagus: The esophagus was inspected to the Z-line. The endoscopic exam showed no pathology. Stomach: The stomach was inspected in both forward and retroflex fashion and was appropriately distensible. The cardia, fundus, incisura, antrum and pylorus were identified via direct visualization. The endoscopic exam showed edematous folds in proximal stomach. Biopsies were obtained to exclude underlying H. pylori infection. Duodenum: The proximal small bowel was inspected through the bulb, sweep, and second portion of the duodenum.  The

## 2021-09-22 ENCOUNTER — OFFICE VISIT (OUTPATIENT)
Dept: FAMILY MEDICINE CLINIC | Age: 26
End: 2021-09-22
Payer: MEDICARE

## 2021-09-22 VITALS
DIASTOLIC BLOOD PRESSURE: 70 MMHG | TEMPERATURE: 97.3 F | OXYGEN SATURATION: 100 % | BODY MASS INDEX: 16.44 KG/M2 | HEART RATE: 98 BPM | WEIGHT: 98.8 LBS | SYSTOLIC BLOOD PRESSURE: 104 MMHG

## 2021-09-22 DIAGNOSIS — E10.29 MICROALBUMINURIA DUE TO TYPE 1 DIABETES MELLITUS (HCC): ICD-10-CM

## 2021-09-22 DIAGNOSIS — R11.2 INTRACTABLE VOMITING WITH NAUSEA: ICD-10-CM

## 2021-09-22 DIAGNOSIS — R80.9 MICROALBUMINURIA DUE TO TYPE 1 DIABETES MELLITUS (HCC): ICD-10-CM

## 2021-09-22 DIAGNOSIS — R10.84 GENERALIZED ABDOMINAL PAIN: ICD-10-CM

## 2021-09-22 DIAGNOSIS — E43 PROTEIN-CALORIE MALNUTRITION, SEVERE (HCC): ICD-10-CM

## 2021-09-22 DIAGNOSIS — G25.81 RESTLESS LEG SYNDROME: ICD-10-CM

## 2021-09-22 DIAGNOSIS — E10.42 TYPE 1 DIABETES MELLITUS WITH DIABETIC POLYNEUROPATHY (HCC): Primary | ICD-10-CM

## 2021-09-22 DIAGNOSIS — B35.1 ONYCHOMYCOSIS: ICD-10-CM

## 2021-09-22 DIAGNOSIS — F12.90 MARIJUANA USE, CONTINUOUS: ICD-10-CM

## 2021-09-22 LAB — HBA1C MFR BLD: 5.5 %

## 2021-09-22 PROCEDURE — 2022F DILAT RTA XM EVC RTNOPTHY: CPT | Performed by: INTERNAL MEDICINE

## 2021-09-22 PROCEDURE — 99214 OFFICE O/P EST MOD 30 MIN: CPT | Performed by: INTERNAL MEDICINE

## 2021-09-22 PROCEDURE — G8419 CALC BMI OUT NRM PARAM NOF/U: HCPCS | Performed by: INTERNAL MEDICINE

## 2021-09-22 PROCEDURE — G8427 DOCREV CUR MEDS BY ELIG CLIN: HCPCS | Performed by: INTERNAL MEDICINE

## 2021-09-22 PROCEDURE — 83036 HEMOGLOBIN GLYCOSYLATED A1C: CPT | Performed by: INTERNAL MEDICINE

## 2021-09-22 PROCEDURE — 4004F PT TOBACCO SCREEN RCVD TLK: CPT | Performed by: INTERNAL MEDICINE

## 2021-09-22 PROCEDURE — 3044F HG A1C LEVEL LT 7.0%: CPT | Performed by: INTERNAL MEDICINE

## 2021-09-22 RX ORDER — BLOOD SUGAR DIAGNOSTIC
1 STRIP MISCELLANEOUS 4 TIMES DAILY
Qty: 200 EACH | Refills: 5 | Status: SHIPPED | OUTPATIENT
Start: 2021-09-22 | End: 2022-05-12

## 2021-09-22 RX ORDER — ACETAMINOPHEN 500 MG
1000 TABLET ORAL EVERY 6 HOURS PRN
Qty: 180 TABLET | Refills: 1 | Status: SHIPPED | OUTPATIENT
Start: 2021-09-22 | End: 2021-11-03

## 2021-09-22 RX ORDER — INSULIN GLARGINE 100 [IU]/ML
10 INJECTION, SOLUTION SUBCUTANEOUS NIGHTLY
Qty: 15 ML | Refills: 3 | Status: SHIPPED | OUTPATIENT
Start: 2021-09-22 | End: 2022-03-23 | Stop reason: SDUPTHER

## 2021-09-22 RX ORDER — KETOCONAZOLE 20 MG/G
CREAM TOPICAL
Qty: 120 G | Refills: 1 | Status: SHIPPED | OUTPATIENT
Start: 2021-09-22 | End: 2022-01-12

## 2021-09-22 RX ORDER — ROPINIROLE 0.25 MG/1
TABLET, FILM COATED ORAL
Qty: 90 TABLET | Refills: 1 | Status: SHIPPED | OUTPATIENT
Start: 2021-09-22 | End: 2022-03-18

## 2021-09-22 RX ORDER — ONDANSETRON 4 MG/1
4 TABLET, ORALLY DISINTEGRATING ORAL EVERY 8 HOURS PRN
Qty: 60 TABLET | Refills: 1 | Status: SHIPPED | OUTPATIENT
Start: 2021-09-22 | End: 2022-03-23 | Stop reason: SDUPTHER

## 2021-09-22 RX ORDER — FEEDER CONTAINER WITH PUMP SET
1 EACH MISCELLANEOUS 3 TIMES DAILY
Qty: 90 EACH | Refills: 3 | Status: ON HOLD | OUTPATIENT
Start: 2021-09-22 | End: 2022-04-12 | Stop reason: SDUPTHER

## 2021-09-22 RX ORDER — LISINOPRIL 2.5 MG/1
2.5 TABLET ORAL DAILY
Qty: 90 TABLET | Refills: 1 | Status: SHIPPED | OUTPATIENT
Start: 2021-09-22 | End: 2022-03-18

## 2021-09-22 ASSESSMENT — ENCOUNTER SYMPTOMS
BLOOD IN STOOL: 0
CHEST TIGHTNESS: 0
DIARRHEA: 1
CHOKING: 0
COUGH: 0
ANAL BLEEDING: 0
WHEEZING: 0
NAUSEA: 1
CONSTIPATION: 0
ABDOMINAL PAIN: 0
VOMITING: 0
SHORTNESS OF BREATH: 0

## 2021-09-22 ASSESSMENT — VISUAL ACUITY: OU: 1

## 2021-09-22 NOTE — PROGRESS NOTES
Subjective:       Patient ID:     Chacho Carrasquillo is a 30 y.o. female who presents for   Chief Complaint   Patient presents with    6 Month Follow-Up    Diabetes       HPI:  Nursing note reviewed and discussed with patient. Diabetes - doing well with current regimen. Denies hypoglycemia, hyperglycemia, fatigue, polyuria, polydipsia, paresthesias, vision changes, dizziness. Eye exam was not done. Not following with podiatry. Patient is taking ACE inhibitor/ARB. Complications of diabetes include peripheral neuropathy. On ACEI for renal protection, tolerating lisinopril without chest pain, palpitations, dizziness, peripheral edema, dyspnea on exertion, orthopnea, paroxysmal nocturnal dyspnea. Hyperlipidemia-tolerating current regimen without myalgias, dyspepsia, jaundice. Mostly compliant with diet recommendations for low carb diet, not very compliant with exercise recommendations. Cardiovascular risk factors: diabetes mellitus and dyslipidemia      Would like a dexcom system, would like to stop doing fingerstick glucose checks     Patient's medications, allergies, past medical, surgical, social and family histories were reviewed and updated as appropriate.     Past Medical History:   Diagnosis Date    [facility] mellitus [facility] [address]     Diabetic neuropathy, type II diabetes mellitus ([facility] [address]     Hypertension     Kidney infection     MRSA (methicillin resistant staph aureus) culture positive 2016    abdomen    Nausea & vomiting     Ovarian cyst 2019    Right side    UTI (urinary tract infection)      Past Surgical History:   Procedure Laterality Date     SECTION      UPPER GASTROINTESTINAL ENDOSCOPY N/A 2021    EGD BIOPSY performed by Jovita Bell MD at [facility] History     Tobacco Use    Smoking status: Current Every Day Smoker     Packs/day: 0.50     Types: Cigarettes    Smokeless tobacco: Never Used    Tobacco comment: 5/day   Substance Use Topics    Alcohol use: No      Patient Active Problem List   Diagnosis    Yeast vaginitis    Type 1 diabetes mellitus with diabetic polyneuropathy ([facility] [address]    Pregestational diabetes mellitus, modified White class C    Intractable vomiting with nausea    Intractable abdominal pain         Prior to Visit Medications    Medication Sig Taking? Authorizing Provider   LANTUS SOLOSTAR 100 UNIT/ML injection pen INJECT 12 UNITS INTO THE SKIN NIGHTLY  (DISCARD PEN 28 DAYS AFTER OPENING) Yes Emperatriz Coelho MD   pantoprazole (PROTONIX) 40 MG tablet Take 1 tablet by mouth 2 times daily (before meals) Yes Liz Banegas MD   TRUE METRIX BLOOD GLUCOSE TEST strip CHECK BLOOD SUGAR FOUR TIMES DAILY AS DIRECTED Yes Emperatriz Coelho MD   TRUEplus Lancets 33G MISC TEST BLOOD SUGAR FOUR TIMES DAILY Yes Emperatriz Coelho MD   DROPLET PEN NEEDLES 31G X 8 MM MISC USE FOUR TIMES DAILY AS DIRECTED Yes Emperatriz Coelho MD   insulin aspart (NOVOLOG FLEXPEN) 100 UNIT/ML injection pen INJECT 2 TO 12 UNITS SUBCUTANEOUSLY THREE TIMES DAILY PER SLIDING SCALE AS DIRECTED (DISCARD PEN 28 DAYS AFTER OPENING) Yes Emperatriz Coelho MD    MG tablet TAKE 1 TABLET FOUR TIMES DAILY AS NEEDED FOR PAIN Yes Emperatriz Coelho MD   dicyclomine (BENTYL) 10 MG capsule Take 1 capsule by mouth 3 times daily as needed (abd pain) Yes Shadi Baker MD   rOPINIRole (REQUIP) 0.25 MG tablet TAKE 1 TABLET EVERY NIGHT Yes Emperatriz Coelho MD   ondansetron (ZOFRAN ODT) 4 MG disintegrating tablet Take 1 tablet by mouth every 8 hours as needed for Nausea or Vomiting Yes Donell Muñoz DO   acetaminophen (TYLENOL) 500 MG tablet Take 2 tablets by mouth every 6 hours as needed for Pain Yes Emperatriz Coelho MD   ketoconazole (NIZORAL) 2 % cream Apply topically daily.  Yes Anju Dumont MD   lisinopril (PRINIVIL;ZESTRIL) 2.5 MG tablet Take 1 tablet by mouth daily Yes Anju Dumont MD   Alcohol Swabs (ALCOHOL PADS) 70 % PADS Apply 1 each topically 4 times daily Yes Emperatriz Melecio Ku MD     Review of Systems  Review of Systems   Constitutional: Positive for unexpected weight change. Negative for fatigue and fever. Respiratory: Negative for cough, choking, chest tightness, shortness of breath and wheezing. Cardiovascular: Negative for chest pain, palpitations and leg swelling. Gastrointestinal: Positive for diarrhea and nausea. Negative for abdominal pain, anal bleeding, blood in stool, constipation and vomiting. Endocrine: Negative. Musculoskeletal: Negative for joint swelling and myalgias. Skin: Negative. Neurological: Negative for dizziness. Psychiatric/Behavioral: Negative for sleep disturbance. All other systems reviewed and are negative. Objective:       Physical Exam:  /70   Pulse 98   Temp 97.3 °F (36.3 °C) (Temporal)   Wt 98 lb 12.8 oz (44.8 kg)   LMP 09/01/2021   SpO2 100%   BMI 16.44 kg/m²    Wt Readings from Last 3 Encounters:   09/22/21 98 lb 12.8 oz (44.8 kg)   09/20/21 98 lb (44.5 kg)   09/02/21 96 lb (43.5 kg)       Physical Exam  Vitals and nursing note reviewed. Constitutional:       General: She is not in acute distress. Appearance: She is well-developed. She is not ill-appearing. Eyes:      General: Lids are normal. Vision grossly intact. Cardiovascular:      Rate and Rhythm: Normal rate and regular rhythm. Heart sounds: Normal heart sounds, S1 normal and S2 normal. No murmur heard. No friction rub. No gallop. Pulmonary:      Effort: Pulmonary effort is normal. No respiratory distress. Breath sounds: Normal breath sounds. No wheezing. Abdominal:      General: Bowel sounds are normal.      Palpations: Abdomen is soft. There is no mass. Tenderness: There is no abdominal tenderness. There is no guarding. Musculoskeletal:         General: Normal range of motion. Skin:     General: Skin is warm and dry. Capillary Refill: Capillary refill takes less than 2 seconds.    Neurological: General: No focal deficit present. Mental Status: She is alert and oriented to person, place, and time. Data Review  A1c today 5.5%  GI note reviewed in chart      Assessment/Plan:      1. Type 1 diabetes mellitus with diabetic polyneuropathy (HCC)  - Alcohol Swabs (ALCOHOL PADS) 70 % PADS; Apply 1 each topically 4 times daily  Dispense: 200 each; Refill: 5  - POCT glycosylated hemoglobin (Hb A1C)  - insulin glargine (LANTUS SOLOSTAR) 100 UNIT/ML injection pen; Inject 10 Units into the skin nightly  Dispense: 15 mL; Refill: 3    2. Microalbuminuria due to type 1 diabetes mellitus (HCC)  - lisinopril (PRINIVIL;ZESTRIL) 2.5 MG tablet; Take 1 tablet by mouth daily  Dispense: 90 tablet; Refill: 1    3. Onychomycosis  - ketoconazole (NIZORAL) 2 % cream; Apply topically daily. Dispense: 120 g; Refill: 1    4. Generalized abdominal pain  - acetaminophen (TYLENOL) 500 MG tablet; Take 2 tablets by mouth every 6 hours as needed for Pain  Dispense: 180 tablet; Refill: 1    5. Restless leg syndrome  - rOPINIRole (REQUIP) 0.25 MG tablet; TAKE 1 TABLET EVERY NIGHT  Dispense: 90 tablet; Refill: 1    6. Intractable vomiting with nausea  - ondansetron (ZOFRAN ODT) 4 MG disintegrating tablet; Take 1 tablet by mouth every 8 hours as needed for Nausea or Vomiting  Dispense: 60 tablet; Refill: 1    7. Protein-calorie malnutrition, severe (HCC)  - Nutritional Supplements (ENSURE HIGH PROTEIN) LIQD; Take 1 Bottle by mouth 3 times daily  Dispense: 90 each; Refill: 3  Following with GI     8.  Marijuana use, continuous  Counseled to cut back          Health Maintenance Due   Topic Date Due    Pap smear  Never done    Diabetic retinal exam  01/21/2020    Annual Wellness Visit (AWV)  Never done       Electronically signed by Lsahay Romero MD on 9/22/2021 at 11:19 AM

## 2021-09-22 NOTE — PATIENT INSTRUCTIONS
Call Dr Micky Joseph office next week if you have not heard from them about the results of your EGD and what your next steps should be regarding the weight loss. Try taking the zofran about an hour before you have to wake up and leave bed in the morning, see if that helps prevent the nausea and vomiting that you experience in the morning.

## 2021-09-23 LAB — SURGICAL PATHOLOGY REPORT: NORMAL

## 2021-09-25 DIAGNOSIS — R10.84 GENERALIZED ABDOMINAL PAIN: ICD-10-CM

## 2021-09-27 RX ORDER — IBUPROFEN 600 MG/1
TABLET ORAL
Qty: 120 TABLET | Refills: 1 | Status: SHIPPED | OUTPATIENT
Start: 2021-09-27 | End: 2021-11-16

## 2021-09-27 NOTE — TELEPHONE ENCOUNTER
Last visit: 09/22/2021  Last Med refill: 08/16/2021  Does patient have enough medication for 72 hours: No:     Next Visit Date:  Future Appointments   Date Time Provider Hayden Coto   10/18/2021  3:00 PM Ayan Ortega MD grtl[facility] exc [facility]   12/22/2021  1:00 PM Emperatriz Lockwood  [address] Maintenance   Topic Date Due    Pap smear  Never done    Diabetic retinal exam  01/21/2020    Annual Wellness Visit (AWV)  Never done    COVID-19 Vaccine (1) 03/22/2022 (Originally 12/5/2005)    Hepatitis B vaccine (3 of 3 - Risk 3-dose series) 09/22/2022 (Originally 2/14/1998)    Flu vaccine (1) 09/22/2022 (Originally 9/1/2021)    Varicella vaccine (2 of 2 - 2-dose childhood series) 09/22/2026 (Originally 8/9/2000)    Pneumococcal 0-64 years Vaccine (1 of 2 - PPSV23) 09/22/2026 (Originally 12/5/1999)    Diabetic foot exam  03/22/2022    Lipid screen  05/24/2022    Potassium monitoring  05/24/2022    Creatinine monitoring  05/24/2022    A1C test (Diabetic or Prediabetic)  09/22/2022    DTaP/Tdap/Td vaccine (6 - Td or Tdap) 08/27/2030    Hib vaccine  Completed    Hepatitis C screen  Completed    HIV screen  Completed    Hepatitis A vaccine  Aged Out    Meningococcal (ACWY) vaccine  Aged Out       Hemoglobin A1C (%)   Date Value   09/22/2021 5.5   03/22/2021 6.3   02/11/2020 7.6             ( goal A1C is < 7)   Microalb/Crt.  Ratio (mcg/mg creat)   Date Value   02/11/2020 511 (H)     LDL Cholesterol (mg/dL)   Date Value   05/24/2021 45   02/11/2020 84       (goal LDL is <100)   AST (U/L)   Date Value   05/24/2021 19     ALT (U/L)   Date Value   05/24/2021 9     BUN (mg/dL)   Date Value   05/24/2021 3 (L)     BP Readings from Last 3 Encounters:   09/22/21 104/70   09/20/21 116/80   09/20/21 109/76          (goal 120/80)    All Future Testing planned in [facility]  Lab Frequency Next Occurrence   Alkaline Phosphatase, Isoenzymes Once 06/14/2021   CBC With Auto Differential Once 06/14/2021   EGD Routine Once 09/02/2021   COVID-19 Once 09/14/2021               Patient Active Problem List:     Type 1 diabetes mellitus with diabetic polyneuropathy (HCC)     Intractable vomiting with nausea     Intractable abdominal pain     Microalbuminuria due to type 1 diabetes mellitus (HCC)     Generalized abdominal pain     Restless leg syndrome     Protein-calorie malnutrition, severe (HCC)     Marijuana use, continuous     Onychomycosis

## 2021-09-30 ENCOUNTER — TELEPHONE (OUTPATIENT)
Dept: GASTROENTEROLOGY | Age: 26
End: 2021-09-30

## 2021-09-30 RX ORDER — AMOXICILLIN 500 MG/1
1000 TABLET, FILM COATED ORAL 2 TIMES DAILY
Qty: 56 TABLET | Refills: 0 | Status: SHIPPED | OUTPATIENT
Start: 2021-09-30 | End: 2021-10-14

## 2021-09-30 RX ORDER — OMEPRAZOLE 20 MG/1
CAPSULE, DELAYED RELEASE ORAL
Qty: 90 CAPSULE | OUTPATIENT
Start: 2021-09-30

## 2021-09-30 RX ORDER — CLARITHROMYCIN 500 MG/1
500 TABLET, COATED ORAL 2 TIMES DAILY
Qty: 28 TABLET | Refills: 0 | Status: SHIPPED | OUTPATIENT
Start: 2021-09-30 | End: 2021-10-14

## 2021-09-30 NOTE — TELEPHONE ENCOUNTER
H. Pylori positive. Writer prepared and signed script per Dr Shayan Loaiza. Patient is notified and pharmacy is confirmed.

## 2021-10-18 ENCOUNTER — OFFICE VISIT (OUTPATIENT)
Dept: GASTROENTEROLOGY | Age: 26
End: 2021-10-18
Payer: MEDICARE

## 2021-10-18 VITALS
OXYGEN SATURATION: 98 % | HEART RATE: 106 BPM | SYSTOLIC BLOOD PRESSURE: 146 MMHG | DIASTOLIC BLOOD PRESSURE: 95 MMHG | TEMPERATURE: 97.2 F | WEIGHT: 97 LBS | HEIGHT: 65 IN | BODY MASS INDEX: 16.16 KG/M2

## 2021-10-18 DIAGNOSIS — B96.81 HELICOBACTER PYLORI GASTRITIS: Primary | ICD-10-CM

## 2021-10-18 DIAGNOSIS — K29.70 HELICOBACTER PYLORI GASTRITIS: Primary | ICD-10-CM

## 2021-10-18 PROCEDURE — 4004F PT TOBACCO SCREEN RCVD TLK: CPT | Performed by: INTERNAL MEDICINE

## 2021-10-18 PROCEDURE — 99213 OFFICE O/P EST LOW 20 MIN: CPT | Performed by: INTERNAL MEDICINE

## 2021-10-18 PROCEDURE — G8419 CALC BMI OUT NRM PARAM NOF/U: HCPCS | Performed by: INTERNAL MEDICINE

## 2021-10-18 PROCEDURE — G8484 FLU IMMUNIZE NO ADMIN: HCPCS | Performed by: INTERNAL MEDICINE

## 2021-10-18 PROCEDURE — G8427 DOCREV CUR MEDS BY ELIG CLIN: HCPCS | Performed by: INTERNAL MEDICINE

## 2021-10-18 RX ORDER — DICYCLOMINE HYDROCHLORIDE 10 MG/1
10 CAPSULE ORAL 3 TIMES DAILY PRN
Qty: 120 CAPSULE | Refills: 3 | Status: SHIPPED | OUTPATIENT
Start: 2021-10-18 | End: 2021-12-02 | Stop reason: SDUPTHER

## 2021-10-18 RX ORDER — PANTOPRAZOLE SODIUM 40 MG/1
40 TABLET, DELAYED RELEASE ORAL
Qty: 30 TABLET | Refills: 5 | Status: SHIPPED | OUTPATIENT
Start: 2021-10-18 | End: 2021-12-02 | Stop reason: SDUPTHER

## 2021-10-18 ASSESSMENT — ENCOUNTER SYMPTOMS: NAUSEA: 1

## 2021-10-18 NOTE — PROGRESS NOTES
GI CLINIC FOLLOW UP    INTERVAL HISTORY:   No referring provider defined for this encounter. Chief Complaint   Patient presents with    Follow-up     gastric emptying study     EGD    Nausea     every day            HISTORY OF PRESENT ILLNESS: Ms.Destaney Sakshi Granados is a 30 y.o. female abdominal pain. She feels much better. EGD showed H. pylori gastritis. She will finish treatment tomorrow. She has been able to eat better. Past Medical,Family, and Social History reviewed and does not contribute to the patient presentingcondition. Patient's PMH/PSH,SH,PSYCH Hx, MEDs, ALLERGIES, and ROS were all reviewed and updated in the appropriate sections. PAST MEDICAL HISTORY:  Past Medical History:   Diagnosis Date    Diabetes mellitus ([facility] [address]     Diabetic neuropathy, type II diabetes mellitus ([facility] [address]     Hypertension     Kidney infection     MRSA (methicillin resistant staph aureus) culture positive 2016    abdomen    Nausea & vomiting     Ovarian cyst 2019    Right side    UTI (urinary tract infection)        Past Surgical History:   Procedure Laterality Date     SECTION      UPPER GASTROINTESTINAL ENDOSCOPY N/A 2021    EGD BIOPSY performed by Elsie Snellen, MD at [address] [place] [address] Avenue:    Current Outpatient Medications:      MG tablet, TAKE 1 TABLET FOUR TIMES DAILY AS NEEDED FOR PAIN, Disp: 120 tablet, Rfl: 1    Alcohol Swabs (ALCOHOL PADS) 70 % PADS, Apply 1 each topically 4 times daily, Disp: 200 each, Rfl: 5    lisinopril (PRINIVIL;ZESTRIL) 2.5 MG tablet, Take 1 tablet by mouth daily, Disp: 90 tablet, Rfl: 1    ketoconazole (NIZORAL) 2 % cream, Apply topically daily. , Disp: 120 g, Rfl: 1    acetaminophen (TYLENOL) 500 MG tablet, Take 2 tablets by mouth every 6 hours as needed for Pain, Disp: 180 tablet, Rfl: 1    rOPINIRole (REQUIP) 0.25 MG tablet, TAKE 1 TABLET EVERY NIGHT, Disp: 90 tablet, Rfl: 1    ondansetron (ZOFRAN ODT) 4 MG disintegrating tablet, Take 1 tablet by mouth every 8 hours as needed for Nausea or Vomiting, Disp: 60 tablet, Rfl: 1    insulin glargine (LANTUS SOLOSTAR) 100 UNIT/ML injection pen, Inject 10 Units into the skin nightly, Disp: 15 mL, Rfl: 3    Nutritional Supplements (ENSURE HIGH PROTEIN) LIQD, Take 1 Bottle by mouth 3 times daily, Disp: 90 each, Rfl: 3    pantoprazole (PROTONIX) 40 MG tablet, Take 1 tablet by mouth 2 times daily (before meals), Disp: 30 tablet, Rfl: 5    TRUE METRIX BLOOD GLUCOSE TEST strip, CHECK BLOOD SUGAR FOUR TIMES DAILY AS DIRECTED, Disp: 400 strip, Rfl: 1    TRUEplus Lancets 33G MISC, TEST BLOOD SUGAR FOUR TIMES DAILY, Disp: 400 each, Rfl: 1    DROPLET PEN NEEDLES 31G X 8 MM MISC, USE FOUR TIMES DAILY AS DIRECTED, Disp: 400 each, Rfl: 1    insulin aspart (NOVOLOG FLEXPEN) 100 UNIT/ML injection pen, INJECT 2 TO 12 UNITS SUBCUTANEOUSLY THREE TIMES DAILY PER SLIDING SCALE AS DIRECTED (DISCARD PEN 28 DAYS AFTER OPENING), Disp: 15 pen, Rfl: 1    dicyclomine (BENTYL) 10 MG capsule, Take 1 capsule by mouth 3 times daily as needed (abd pain), Disp: 120 capsule, Rfl: 3    ALLERGIES:   Allergies   Allergen Reactions    Blueberry [Vaccinium Angustifolium] Anaphylaxis     Tongue swells up, needs epipen    Shrimp Flavor Anaphylaxis     OK with fish, allergic to shrimp and lobster  OK with IV contrast       FAMILY HISTORY: History reviewed. No pertinent family history.       SOCIAL HISTORY:   Social History     Socioeconomic History    Marital status: Single     Spouse name: Not on file    Number of children: Not on file    Years of education: Not on file    Highest education level: Not on file   Occupational History    Not on file   Tobacco Use    Smoking status: Current Every Day Smoker     Packs/day: 0.50     Types: Cigarettes    Smokeless tobacco: Never Used    Tobacco comment: 5/day   Substance and Sexual Activity    Alcohol use: No    Drug use: Yes     Types: Marijuana Comment: 1-2x per day    Sexual activity: Never   Other Topics Concern    Not on file   Social History Narrative    Not on file     Social Determinants of Health     Financial Resource Strain: Medium Risk    Difficulty of Paying Living Expenses: Somewhat hard   Food Insecurity: Food Insecurity Present    Worried About Running Out of Food in the Last Year: Often true    [place]elle of Food in the Last Year: Often true   Transportation Needs: Unmet Transportation Needs    Lack of Transportation (Medical): Yes    Lack of Transportation (Non-Medical): Yes   Physical Activity:     Days of Exercise per Week:     Minutes of Exercise per Session:    Stress:     Feeling of Stress :    Social Connections:     Frequency of Communication with Friends and Family:     Frequency of Social Gatherings with Friends and Family:     Attends Worship Services:     Active Member of Clubs or Organizations:     Attends Club or Organization Meetings:     Marital Status:    Intimate Partner Violence:     Fear of Current or Ex-Partner:     Emotionally Abused:     Physically Abused:     Sexually Abused:        REVIEW OF SYSTEMS: A 12-point review of systemswas obtained and pertinent positives and negatives were enumerated above in the history of present illness. All other reviewed systems / symptoms were negative. Review of Systems   Gastrointestinal: Positive for nausea.            LABORATORY DATA: Reviewed  Lab Results   Component Value Date    WBC 5.5 05/24/2021    HGB 19.1 (H) 05/24/2021    HCT 54.1 (H) 05/24/2021    MCV 92.5 05/24/2021     05/24/2021     05/24/2021    K 4.9 05/24/2021     05/24/2021    CO2 26 05/24/2021    BUN 3 (L) 05/24/2021    CREATININE 0.74 05/24/2021    LABALBU 2.5 (L) 05/24/2021    BILITOT 0.34 05/24/2021    ALKPHOS 106 (H) 05/24/2021    AST 19 05/24/2021    ALT 9 05/24/2021         Lab Results   Component Value Date    RBC 5.85 (H) 05/24/2021    HGB 19.1 (H) 05/24/2021    MCV 92.5 05/24/2021    MCH 32.6 05/24/2021    MCHC 35.3 (H) 05/24/2021    RDW 14.6 (H) 05/24/2021    MPV 10.1 05/24/2021    BASOPCT 1 05/24/2021    LYMPHSABS 2.26 05/24/2021    MONOSABS 0.37 05/24/2021    NEUTROABS 2.69 05/24/2021    EOSABS 0.13 05/24/2021    BASOSABS 0.03 05/24/2021         DIAGNOSTIC TESTING:     No results found. PHYSICAL EXAMINATION: Vital signs reviewed per the nursing documentation. Wt 97 lb (44 kg)   BMI 16.14 kg/m²   Body mass index is 16.14 kg/m². Physical Exam      I personally reviewed the nurse's notes and documentation and I agree with her notes. General: alert, appears stated age and cooperative Psych: Normal. and Alert and oriented, appropriate affect. . Normal affect. Mentation normal  HEENT: PERRLA. Clear conjunctivae and sclerae. Moist oral mucosae, no lesions or ulcers. The neck is supple, without lymphadenopathy or jugular venous distension. No masses. Normal thyroid. Cardiovascular: S1 S2 RRR no rubs or murmurs. Pulmonary: clear BL. No accessory muscle usage. Abdominal Exam: Soft, NT ND, no hepato or spleno megaly, +BS, no ascites. IMPRESSION: Ms. Monalisa Carranza is a 30 y.o. female with abdominal pain. H. pylori gastritis. She will finish treatment tomorrow. Stop taking Protonix after that. We will plan for H. pylori breath test in 1 month. Follow-up after that. Thank you for allowing me to participate in the care of Ms. Deng. For any further questions please do not hesitate to contact me. I have reviewed and agree with the ROS entered by the MA/LPN. Note is dictated utilizing voice recognition software. Unfortunately this leads to occasional typographical errors. Please contact our office if you have any questions.     Chetan Leigh MD  [facility]  O: #[phone]

## 2021-11-02 DIAGNOSIS — R10.84 GENERALIZED ABDOMINAL PAIN: ICD-10-CM

## 2021-11-03 RX ORDER — PSEUDOEPHED/ACETAMINOPH/DIPHEN 30MG-500MG
TABLET ORAL
Qty: 180 TABLET | Refills: 1 | Status: SHIPPED | OUTPATIENT
Start: 2021-11-03 | End: 2021-12-14

## 2021-11-03 NOTE — TELEPHONE ENCOUNTER
Last visit: 09/22/2021  Last Med refill: 10/22/2021  Does patient have enough medication for 72 hours: No:     Next Visit Date:  Future Appointments   Date Time Provider Hayden Chica   12/2/2021  1:15 PM Leandro Koehler MD grtl[facility] exc [facility]   12/22/2021  1:00 PM Emperatriz Starkey  [address] Maintenance   Topic Date Due    Pap smear  Never done    Diabetic retinal exam  01/21/2020    COVID-19 Vaccine (1) 03/22/2022 (Originally 12/5/2005)    Hepatitis B vaccine (3 of 3 - Risk 3-dose series) 09/22/2022 (Originally 2/14/1998)    Flu vaccine (1) 09/22/2022 (Originally 9/1/2021)    Varicella vaccine (2 of 2 - 2-dose childhood series) 09/22/2026 (Originally 8/9/2000)    Pneumococcal 0-64 years Vaccine (1 of 2 - PPSV23) 09/22/2026 (Originally 12/5/1999)    Diabetic foot exam  03/22/2022    Lipid screen  05/24/2022    Potassium monitoring  05/24/2022    Creatinine monitoring  05/24/2022    A1C test (Diabetic or Prediabetic)  09/22/2022    DTaP/Tdap/Td vaccine (6 - Td or Tdap) 08/27/2030    Hib vaccine  Completed    Hepatitis C screen  Completed    HIV screen  Completed    Hepatitis A vaccine  Aged Out    Meningococcal (ACWY) vaccine  Aged Out       Hemoglobin A1C (%)   Date Value   09/22/2021 5.5   03/22/2021 6.3   02/11/2020 7.6             ( goal A1C is < 7)   Microalb/Crt.  Ratio (mcg/mg creat)   Date Value   02/11/2020 511 (H)     LDL Cholesterol (mg/dL)   Date Value   05/24/2021 45   02/11/2020 84       (goal LDL is <100)   AST (U/L)   Date Value   05/24/2021 19     ALT (U/L)   Date Value   05/24/2021 9     BUN (mg/dL)   Date Value   05/24/2021 3 (L)     BP Readings from Last 3 Encounters:   10/18/21 (!) 146/95   09/22/21 104/70   09/20/21 116/80          (goal 120/80)    All Future Testing planned in [facility]  Lab Frequency Next Occurrence   Alkaline Phosphatase, Isoenzymes Once 06/14/2021   CBC With Auto Differential Once 06/14/2021   EGD Routine Once 09/02/2021

## 2021-11-15 DIAGNOSIS — R10.84 GENERALIZED ABDOMINAL PAIN: ICD-10-CM

## 2021-11-16 RX ORDER — IBUPROFEN 600 MG/1
TABLET ORAL
Qty: 120 TABLET | Refills: 1 | Status: SHIPPED | OUTPATIENT
Start: 2021-11-16 | End: 2022-03-14

## 2021-11-16 NOTE — TELEPHONE ENCOUNTER
Last visit: 9/22/21  Last Med refill: 9/27/21  Does patient have enough medication for 72 hours: Yes    Next Visit Date:  Future Appointments   Date Time Provider Hayden Chica   12/2/2021  1:15 PM Jordyn Barnes MD [facility] exc [facility]   12/22/2021  1:00 PM Emperatriz Clemens  [address] Maintenance   Topic Date Due    Pap smear  Never done    Diabetic retinal exam  01/21/2020    COVID-19 Vaccine (1) 03/22/2022 (Originally 12/5/2005)    Hepatitis B vaccine (3 of 3 - Risk 3-dose series) 09/22/2022 (Originally 2/14/1998)    Flu vaccine (1) 09/22/2022 (Originally 9/1/2021)    Varicella vaccine (2 of 2 - 2-dose childhood series) 09/22/2026 (Originally 8/9/2000)    Pneumococcal 0-64 years Vaccine (1 of 2 - PPSV23) 09/22/2026 (Originally 12/5/1999)    Diabetic foot exam  03/22/2022    Lipid screen  05/24/2022    Potassium monitoring  05/24/2022    Creatinine monitoring  05/24/2022    A1C test (Diabetic or Prediabetic)  09/22/2022    DTaP/Tdap/Td vaccine (6 - Td or Tdap) 08/27/2030    Hib vaccine  Completed    Hepatitis C screen  Completed    HIV screen  Completed    Hepatitis A vaccine  Aged Out    Meningococcal (ACWY) vaccine  Aged Out       Hemoglobin A1C (%)   Date Value   09/22/2021 5.5   03/22/2021 6.3   02/11/2020 7.6             ( goal A1C is < 7)   Microalb/Crt.  Ratio (mcg/mg creat)   Date Value   02/11/2020 511 (H)     LDL Cholesterol (mg/dL)   Date Value   05/24/2021 45   02/11/2020 84       (goal LDL is <100)   AST (U/L)   Date Value   05/24/2021 19     ALT (U/L)   Date Value   05/24/2021 9     BUN (mg/dL)   Date Value   05/24/2021 3 (L)     BP Readings from Last 3 Encounters:   10/18/21 (!) 146/95   09/22/21 104/70   09/20/21 116/80          (goal 120/80)    All Future Testing planned in [facility]  Lab Frequency Next Occurrence   Alkaline Phosphatase, Isoenzymes Once 06/14/2021   CBC With Auto Differential Once 06/14/2021   EGD Routine Once 09/02/2021   COVID-19 Once 09/14/2021   H.  Pylori Breath Test Once 11/18/2021               Patient Active Problem List:     Type 1 diabetes mellitus with diabetic polyneuropathy (HCC)     Intractable vomiting with nausea     Intractable abdominal pain     Microalbuminuria due to type 1 diabetes mellitus (HCC)     Generalized abdominal pain     Restless leg syndrome     Protein-calorie malnutrition, severe (HCC)     Marijuana use, continuous     Onychomycosis

## 2021-11-28 ENCOUNTER — HOSPITAL ENCOUNTER (EMERGENCY)
Age: 26
Discharge: HOME | End: 2021-11-28
Attending: EMERGENCY MEDICINE

## 2021-11-28 DIAGNOSIS — Z53.21 ELOPED FROM EMERGENCY DEPARTMENT: Primary | ICD-10-CM

## 2021-11-28 RX ORDER — KETOROLAC TROMETHAMINE 30 MG/ML
15 INJECTION, SOLUTION INTRAMUSCULAR; INTRAVENOUS ONCE
Status: DISCONTINUED | OUTPATIENT
Start: 2021-11-28 | End: 2021-11-28 | Stop reason: HOSPADM

## 2021-11-28 RX ORDER — 0.9 % SODIUM CHLORIDE 0.9 %
1000 INTRAVENOUS SOLUTION INTRAVENOUS ONCE
Status: DISCONTINUED | OUTPATIENT
Start: 2021-11-28 | End: 2021-11-28 | Stop reason: HOSPADM

## 2021-11-28 RX ORDER — PROMETHAZINE HYDROCHLORIDE 25 MG/ML
12.5 INJECTION, SOLUTION INTRAMUSCULAR; INTRAVENOUS ONCE
Status: DISCONTINUED | OUTPATIENT
Start: 2021-11-28 | End: 2021-11-28 | Stop reason: HOSPADM

## 2021-11-28 RX ORDER — DICYCLOMINE HYDROCHLORIDE 10 MG/ML
20 INJECTION INTRAMUSCULAR ONCE
Status: DISCONTINUED | OUTPATIENT
Start: 2021-11-28 | End: 2021-11-28 | Stop reason: HOSPADM

## 2021-11-28 ASSESSMENT — PAIN DESCRIPTION - FREQUENCY: FREQUENCY: CONTINUOUS

## 2021-11-28 ASSESSMENT — PAIN DESCRIPTION - LOCATION: LOCATION: HEAD

## 2021-11-28 ASSESSMENT — PAIN DESCRIPTION - DESCRIPTORS: DESCRIPTORS: CRUSHING

## 2021-11-28 ASSESSMENT — PAIN DESCRIPTION - PAIN TYPE: TYPE: ACUTE PAIN

## 2021-11-28 ASSESSMENT — PAIN SCALES - GENERAL: PAINLEVEL_OUTOF10: 10

## 2021-11-28 NOTE — ED NOTES
Patient not in room. Registration states they saw her walk into the lobby. Patient not in lobby.       Charles Eli RN  11/28/21 [id number]

## 2021-11-28 NOTE — ED NOTES
Unable to locate patient. ED provider made aware, charge nurse made aware.       Kate Grider RN  11/28/21 [id number]

## 2021-11-28 NOTE — ED NOTES
ED Resident at bedside. Patient dry heaving into trash can. States she is going home. Both myself and Resident encouraged patient to stay and be treated.       Rylee Douglass RN  11/28/21 [phone]

## 2021-12-02 ENCOUNTER — OFFICE VISIT (OUTPATIENT)
Dept: GASTROENTEROLOGY | Age: 26
End: 2021-12-02
Payer: MEDICARE

## 2021-12-02 VITALS
TEMPERATURE: 96.7 F | HEART RATE: 102 BPM | BODY MASS INDEX: 15.73 KG/M2 | SYSTOLIC BLOOD PRESSURE: 120 MMHG | DIASTOLIC BLOOD PRESSURE: 91 MMHG | WEIGHT: 94.4 LBS | HEIGHT: 65 IN

## 2021-12-02 DIAGNOSIS — K29.70 HELICOBACTER PYLORI GASTRITIS: ICD-10-CM

## 2021-12-02 DIAGNOSIS — B96.81 HELICOBACTER PYLORI GASTRITIS: ICD-10-CM

## 2021-12-02 DIAGNOSIS — R10.13 EPIGASTRIC PAIN: Primary | ICD-10-CM

## 2021-12-02 PROCEDURE — 99213 OFFICE O/P EST LOW 20 MIN: CPT | Performed by: INTERNAL MEDICINE

## 2021-12-02 PROCEDURE — 4004F PT TOBACCO SCREEN RCVD TLK: CPT | Performed by: INTERNAL MEDICINE

## 2021-12-02 PROCEDURE — G8484 FLU IMMUNIZE NO ADMIN: HCPCS | Performed by: INTERNAL MEDICINE

## 2021-12-02 PROCEDURE — G8419 CALC BMI OUT NRM PARAM NOF/U: HCPCS | Performed by: INTERNAL MEDICINE

## 2021-12-02 PROCEDURE — G8427 DOCREV CUR MEDS BY ELIG CLIN: HCPCS | Performed by: INTERNAL MEDICINE

## 2021-12-02 RX ORDER — PANTOPRAZOLE SODIUM 40 MG/1
40 TABLET, DELAYED RELEASE ORAL
Qty: 30 TABLET | Refills: 5 | Status: SHIPPED | OUTPATIENT
Start: 2021-12-02 | End: 2022-02-16

## 2021-12-02 RX ORDER — FAMOTIDINE 40 MG/1
40 TABLET, FILM COATED ORAL EVERY EVENING
Qty: 30 TABLET | Refills: 3 | Status: SHIPPED | OUTPATIENT
Start: 2021-12-02 | End: 2022-02-09

## 2021-12-02 RX ORDER — DICYCLOMINE HYDROCHLORIDE 10 MG/1
10 CAPSULE ORAL 3 TIMES DAILY PRN
Qty: 120 CAPSULE | Refills: 3 | Status: SHIPPED | OUTPATIENT
Start: 2021-12-02 | End: 2021-12-22

## 2021-12-02 ASSESSMENT — ENCOUNTER SYMPTOMS
CONSTIPATION: 0
DIARRHEA: 0
STRIDOR: 0
ABDOMINAL DISTENTION: 0
SORE THROAT: 0
TROUBLE SWALLOWING: 0
RECTAL PAIN: 0
WHEEZING: 0
BACK PAIN: 0
BLOOD IN STOOL: 0
CHEST TIGHTNESS: 0
CHOKING: 0
NAUSEA: 1
ABDOMINAL PAIN: 0
APNEA: 0
ANAL BLEEDING: 0
SHORTNESS OF BREATH: 1
VOMITING: 1
COUGH: 0

## 2021-12-02 NOTE — PROGRESS NOTES
GI CLINIC FOLLOW UP    INTERVAL HISTORY:   No referring provider defined for this encounter. No chief complaint on file. HISTORY OF PRESENT ILLNESS: Ms.Destaney Radha Peters is a 30 y.o. female with H. pylori gastritis. She was feeling much better after treatment for H. pylori. He has been off Protonix for a month. She reports worsening epigastric pain with some vomiting. She has not had her H. pylori test done as she forgot to get it done. She smokes. Past Medical,Family, and Social History reviewed and does not contribute to the patient presenting condition. Patient's PMH/PSH,SH,PSYCH Hx, MEDs, ALLERGIES, and ROS were all reviewed and updated in the appropriate sections.     PAST MEDICAL HISTORY:  Past Medical History:   Diagnosis Date    Diabetes mellitus [facility] [facility]     Diabetic neuropathy, type II diabetes mellitus ([facility]     Hypertension     Kidney infection     MRSA (methicillin resistant staph aureus) culture positive 2016    abdomen    Nausea & vomiting     Ovarian cyst 2019    Right side    UTI (urinary tract infection)        Past Surgical History:   Procedure Laterality Date     SECTION      UPPER GASTROINTESTINAL ENDOSCOPY N/A 2021    EGD BIOPSY performed by Madelin Coyne MD at [facility]:    Current Outpatient Medications:      MG tablet, TAKE 1 TABLET FOUR TIMES DAILY AS NEEDED FOR PAIN, Disp: 120 tablet, Rfl: 1    ACETAMINOPHEN EXTRA STRENGTH 500 MG tablet, TAKE 2 TABLETS BY MOUTH EVERY 6 HOURS AS NEEDED FOR PAIN, Disp: 180 tablet, Rfl: 1    pantoprazole (PROTONIX) 40 MG tablet, Take 1 tablet by mouth 2 times daily (before meals), Disp: 30 tablet, Rfl: 5    dicyclomine (BENTYL) 10 MG capsule, Take 1 capsule by mouth 3 times daily as needed (abd pain), Disp: 120 capsule, Rfl: 3    Alcohol Swabs (ALCOHOL PADS) 70 % PADS, Apply 1 each topically 4 times daily, Disp: 200 each, Rfl: 5    lisinopril (PRINIVIL;ZESTRIL) 2.5 MG tablet, Take 1 tablet by mouth daily, Disp: 90 tablet, Rfl: 1    ketoconazole (NIZORAL) 2 % cream, Apply topically daily. , Disp: 120 g, Rfl: 1    rOPINIRole (REQUIP) 0.25 MG tablet, TAKE 1 TABLET EVERY NIGHT, Disp: 90 tablet, Rfl: 1    ondansetron (ZOFRAN ODT) 4 MG disintegrating tablet, Take 1 tablet by mouth every 8 hours as needed for Nausea or Vomiting, Disp: 60 tablet, Rfl: 1    insulin glargine (LANTUS SOLOSTAR) 100 UNIT/ML injection pen, Inject 10 Units into the skin nightly, Disp: 15 mL, Rfl: 3    Nutritional Supplements (ENSURE HIGH PROTEIN) LIQD, Take 1 Bottle by mouth 3 times daily, Disp: 90 each, Rfl: 3    TRUE METRIX BLOOD GLUCOSE TEST strip, CHECK BLOOD SUGAR FOUR TIMES DAILY AS DIRECTED, Disp: 400 strip, Rfl: 1    TRUEplus Lancets 33G MISC, TEST BLOOD SUGAR FOUR TIMES DAILY, Disp: 400 each, Rfl: 1    DROPLET PEN NEEDLES 31G X 8 MM MISC, USE FOUR TIMES DAILY AS DIRECTED, Disp: 400 each, Rfl: 1    insulin aspart (NOVOLOG FLEXPEN) 100 UNIT/ML injection pen, INJECT 2 TO 12 UNITS SUBCUTANEOUSLY THREE TIMES DAILY PER SLIDING SCALE AS DIRECTED (DISCARD PEN 28 DAYS AFTER OPENING), Disp: 15 pen, Rfl: 1    ALLERGIES:   Allergies   Allergen Reactions    Blueberry [Vaccinium Angustifolium] Anaphylaxis     Tongue swells up, needs epipen    Shrimp Flavor Anaphylaxis     OK with fish, allergic to shrimp and lobster  OK with IV contrast       FAMILY HISTORY: No family history on file.       SOCIAL HISTORY:   Social History     Socioeconomic History    Marital status: Single     Spouse name: Not on file    Number of children: Not on file    Years of education: Not on file    Highest education level: Not on file   Occupational History    Not on file   Tobacco Use    Smoking status: Current Every Day Smoker     Packs/day: 0.50     Types: Cigarettes    Smokeless tobacco: Never Used    Tobacco comment: 5/day   Substance and Sexual Activity    Alcohol use: No    Drug use: Yes     Types: Marijuana Marlys Ayon     Comment: 1-2x per day    Sexual activity: Never   Other Topics Concern    Not on file   Social History Narrative    Not on file     Social Determinants of Health     Financial Resource Strain: Medium Risk    Difficulty of Paying Living Expenses: Somewhat hard   Food Insecurity: Food Insecurity Present    Worried About Running Out of Food in the Last Year: Often true    Sh[place]elle of Food in the Last Year: Often true   Transportation Needs: Unmet Transportation Needs    Lack of Transportation (Medical): Yes    Lack of Transportation (Non-Medical): Yes   Physical Activity:     Days of Exercise per Week: Not on file    Minutes of Exercise per Session: Not on file   Stress:     Feeling of Stress : Not on file   Social Connections:     Frequency of Communication with Friends and Family: Not on file    Frequency of Social Gatherings with Friends and Family: Not on file    Attends [employer] Services: Not on file    Active Member of [address] [place] [address] or Organizations: Not on file    Attends Club or Organization Meetings: Not on file    Marital Status: Not on file   Intimate Partner Violence:     Fear of Current or Ex-Partner: Not on file    Emotionally Abused: Not on file    Physically Abused: Not on file    Sexually Abused: Not on file   Housing Stability:     Unable to Pay for Housing in the Last Year: Not on file    Number of [place] in the Last Year: Not on file    Unstable Housing in the Last Year: Not on file       REVIEW OF SYSTEMS: A 12-point review of systemswas obtained and pertinent positives and negatives were enumerated above in the history of present illness. All other reviewed systems / symptoms were negative. Review of Systems   Constitutional: Positive for appetite change, fatigue and unexpected weight change. HENT: Negative for congestion, sore throat and trouble swallowing. Respiratory: Positive for shortness of breath.  Negative for apnea, cough, choking, chest tightness, wheezing and stridor. Cardiovascular: Negative. Gastrointestinal: Positive for nausea and vomiting. Negative for abdominal distention, abdominal pain, anal bleeding, blood in stool, constipation, diarrhea and rectal pain. Musculoskeletal: Negative for back pain and neck pain. Neurological: Positive for dizziness, weakness, light-headedness and headaches. Psychiatric/Behavioral: Positive for sleep disturbance. LABORATORY DATA: Reviewed  Lab Results   Component Value Date    WBC 5.5 05/24/2021    HGB 19.1 (H) 05/24/2021    HCT 54.1 (H) 05/24/2021    MCV 92.5 05/24/2021     05/24/2021     05/24/2021    K 4.9 05/24/2021     05/24/2021    CO2 26 05/24/2021    BUN 3 (L) 05/24/2021    CREATININE 0.74 05/24/2021    LABALBU 2.5 (L) 05/24/2021    BILITOT 0.34 05/24/2021    ALKPHOS 106 (H) 05/24/2021    AST 19 05/24/2021    ALT 9 05/24/2021         Lab Results   Component Value Date    RBC 5.85 (H) 05/24/2021    HGB 19.1 (H) 05/24/2021    MCV 92.5 05/24/2021    MCH 32.6 05/24/2021    MCHC 35.3 (H) 05/24/2021    RDW 14.6 (H) 05/24/2021    MPV 10.1 05/24/2021    BASOPCT 1 05/24/2021    LYMPHSABS 2.26 05/24/2021    MONOSABS 0.37 05/24/2021    NEUTROABS 2.69 05/24/2021    EOSABS 0.13 05/24/2021    BASOSABS 0.03 05/24/2021         DIAGNOSTIC TESTING:     No results found. PHYSICAL EXAMINATION: Vital signs reviewed per the nursing documentation. LMP 11/21/2021 (Exact Date)   There is no height or weight on file to calculate BMI. Physical Exam      I personally reviewed the nurse's notes and documentation and I agree with her notes. General: alert, appears stated age and cooperative Psych: Normal. and Alert and oriented, appropriate affect. . Normal affect. Mentation normal  HEENT: PERRLA. Clear conjunctivae and sclerae. Moist oral mucosae, no lesions or ulcers. The neck is supple, without lymphadenopathy or jugular venous distension. No masses. Normal thyroid. Cardiovascular: S1 S2 RRR no rubs or murmurs. Pulmonary: clear BL. No accessory muscle usage. Abdominal Exam: Soft, NT ND, no hepato or spleno megaly, +BS, no ascites. IMPRESSION: Ms. Betsy Coles is a 30 y.o. female with epigastric pain and vomiting. H. pylori gastritis. We will give her prescription for Pepcid. We instructed her to get H. pylori breath test done. Follow-up after that. Thank you for allowing me to participate in the care of Ms. Deng. For any further questions please do not hesitate to contact me. I have reviewed and agree with the ROS entered by the MA/LPN. Note is dictated utilizing voice recognition software. Unfortunately this leads to occasional typographical errors. Please contact our office if you have any questions.     Radha Petit MD  [facility]  O: #[phone]

## 2021-12-13 DIAGNOSIS — R10.84 GENERALIZED ABDOMINAL PAIN: ICD-10-CM

## 2021-12-14 RX ORDER — PSEUDOEPHED/ACETAMINOPH/DIPHEN 30MG-500MG
TABLET ORAL
Qty: 180 TABLET | Refills: 1 | Status: SHIPPED | OUTPATIENT
Start: 2021-12-14

## 2021-12-14 NOTE — TELEPHONE ENCOUNTER
Last visit: 9/22/21  Last Med refill: 11/3/21  Does patient have enough medication for 72 hours: Yes    Next Visit Date:  Future Appointments   Date Time Provider Hayden Chica   12/22/2021  1:00 PM Emperatriz Joshua MD [facility]   1/27/2022  3:00 PM Dnaial Garcia MD [facility] [address] Maintenance   Topic Date Due    Pap smear  Never done    Diabetic retinal exam  01/21/2020    COVID-19 Vaccine (1) 03/22/2022 (Originally 12/5/2005)    Hepatitis B vaccine (3 of 3 - Risk 3-dose series) 09/22/2022 (Originally 2/14/1998)    Flu vaccine (1) 09/22/2022 (Originally 9/1/2021)    Varicella vaccine (2 of 2 - 2-dose childhood series) 09/22/2026 (Originally 8/9/2000)    Pneumococcal 0-64 years Vaccine (1 of 2 - PPSV23) 09/22/2026 (Originally 12/5/1999)    Diabetic foot exam  03/22/2022    Lipid screen  05/24/2022    Potassium monitoring  05/24/2022    Creatinine monitoring  05/24/2022    A1C test (Diabetic or Prediabetic)  09/22/2022    DTaP/Tdap/Td vaccine (6 - Td or Tdap) 08/27/2030    Hib vaccine  Completed    Hepatitis C screen  Completed    HIV screen  Completed    Hepatitis A vaccine  Aged Out    Meningococcal (ACWY) vaccine  Aged Out       Hemoglobin A1C (%)   Date Value   09/22/2021 5.5   03/22/2021 6.3   02/11/2020 7.6             ( goal A1C is < 7)   Microalb/Crt.  Ratio (mcg/mg creat)   Date Value   02/11/2020 511 (H)     LDL Cholesterol (mg/dL)   Date Value   05/24/2021 45   02/11/2020 84       (goal LDL is <100)   AST (U/L)   Date Value   05/24/2021 19     ALT (U/L)   Date Value   05/24/2021 9     BUN (mg/dL)   Date Value   05/24/2021 3 (L)     BP Readings from Last 3 Encounters:   12/02/21 (!) 120/91   10/18/21 (!) 146/95   09/22/21 104/70          (goal 120/80)    All Future Testing planned in [facility]  Lab Frequency Next Occurrence   Alkaline Phosphatase, Isoenzymes Once 06/14/2021   CBC With Auto Differential Once 06/14/2021   EGD Routine Once 09/02/2021   COVID-19 Once 09/14/2021   H.  Pylori Breath Test Once 11/18/2021               Patient Active Problem List:     Type 1 diabetes mellitus with diabetic polyneuropathy (HCC)     Intractable vomiting with nausea     Intractable abdominal pain     Microalbuminuria due to type 1 diabetes mellitus (HCC)     Generalized abdominal pain     Restless leg syndrome     Protein-calorie malnutrition, severe (HCC)     Marijuana use, continuous     Onychomycosis

## 2021-12-15 DIAGNOSIS — E10.42 TYPE 1 DIABETES MELLITUS WITH DIABETIC POLYNEUROPATHY (HCC): ICD-10-CM

## 2021-12-16 RX ORDER — INSULIN ASPART 100 [IU]/ML
INJECTION, SOLUTION INTRAVENOUS; SUBCUTANEOUS
Qty: 45 ML | Refills: 5 | Status: SHIPPED | OUTPATIENT
Start: 2021-12-16 | End: 2022-07-25 | Stop reason: SDUPTHER

## 2021-12-16 NOTE — TELEPHONE ENCOUNTER
Last visit: 9/22/21  Last Med refill: 8/3/21  Does patient have enough medication for 72 hours: Yes    Next Visit Date:  Future Appointments   Date Time Provider Hayden Chica   12/22/2021  1:00 PM Emperatriz Leon MD [facility]   1/27/2022  3:00 PM Isela Arguello MD [facility] [address] Maintenance   Topic Date Due    Pap smear  Never done    Diabetic retinal exam  01/21/2020    COVID-19 Vaccine (1) 03/22/2022 (Originally 12/5/2005)    Hepatitis B vaccine (3 of 3 - Risk 3-dose series) 09/22/2022 (Originally 2/14/1998)    Flu vaccine (1) 09/22/2022 (Originally 9/1/2021)    Varicella vaccine (2 of 2 - 2-dose childhood series) 09/22/2026 (Originally 8/9/2000)    Pneumococcal 0-64 years Vaccine (1 of 2 - PPSV23) 09/22/2026 (Originally 12/5/1999)    Diabetic foot exam  03/22/2022    Lipid screen  05/24/2022    Potassium monitoring  05/24/2022    Creatinine monitoring  05/24/2022    A1C test (Diabetic or Prediabetic)  09/22/2022    DTaP/Tdap/Td vaccine (6 - Td or Tdap) 08/27/2030    Hib vaccine  Completed    Hepatitis C screen  Completed    HIV screen  Completed    Hepatitis A vaccine  Aged Out    Meningococcal (ACWY) vaccine  Aged Out       Hemoglobin A1C (%)   Date Value   09/22/2021 5.5   03/22/2021 6.3   02/11/2020 7.6             ( goal A1C is < 7)   Microalb/Crt.  Ratio (mcg/mg creat)   Date Value   02/11/2020 511 (H)     LDL Cholesterol (mg/dL)   Date Value   05/24/2021 45   02/11/2020 84       (goal LDL is <100)   AST (U/L)   Date Value   05/24/2021 19     ALT (U/L)   Date Value   05/24/2021 9     BUN (mg/dL)   Date Value   05/24/2021 3 (L)     BP Readings from Last 3 Encounters:   12/02/21 (!) 120/91   10/18/21 (!) 146/95   09/22/21 104/70          (goal 120/80)    All Future Testing planned in [facility]  Lab Frequency Next Occurrence   Alkaline Phosphatase, Isoenzymes Once 06/14/2021   CBC With Auto Differential Once 06/14/2021   EGD Routine Once 09/02/2021   COVID-19 Once 09/14/2021   H.  Pylori Breath Test Once 11/18/2021               Patient Active Problem List:     Type 1 diabetes mellitus with diabetic polyneuropathy (HCC)     Intractable vomiting with nausea     Intractable abdominal pain     Microalbuminuria due to type 1 diabetes mellitus (HCC)     Generalized abdominal pain     Restless leg syndrome     Protein-calorie malnutrition, severe (HCC)     Marijuana use, continuous     Onychomycosis

## 2021-12-22 ENCOUNTER — OFFICE VISIT (OUTPATIENT)
Dept: FAMILY MEDICINE CLINIC | Age: 26
End: 2021-12-22
Payer: MEDICARE

## 2021-12-22 VITALS
DIASTOLIC BLOOD PRESSURE: 88 MMHG | HEART RATE: 91 BPM | OXYGEN SATURATION: 96 % | SYSTOLIC BLOOD PRESSURE: 120 MMHG | WEIGHT: 96.8 LBS | BODY MASS INDEX: 16.11 KG/M2

## 2021-12-22 DIAGNOSIS — R80.9 MICROALBUMINURIA DUE TO TYPE 1 DIABETES MELLITUS (HCC): ICD-10-CM

## 2021-12-22 DIAGNOSIS — R20.2 PARESTHESIA OF HAND, BILATERAL: ICD-10-CM

## 2021-12-22 DIAGNOSIS — E10.29 MICROALBUMINURIA DUE TO TYPE 1 DIABETES MELLITUS (HCC): ICD-10-CM

## 2021-12-22 DIAGNOSIS — E10.42 TYPE 1 DIABETES MELLITUS WITH DIABETIC POLYNEUROPATHY (HCC): Primary | ICD-10-CM

## 2021-12-22 DIAGNOSIS — N92.6 IRREGULAR MENSES: ICD-10-CM

## 2021-12-22 DIAGNOSIS — E43 PROTEIN-CALORIE MALNUTRITION, SEVERE (HCC): ICD-10-CM

## 2021-12-22 LAB — HBA1C MFR BLD: 5.9 %

## 2021-12-22 PROCEDURE — 99214 OFFICE O/P EST MOD 30 MIN: CPT | Performed by: INTERNAL MEDICINE

## 2021-12-22 PROCEDURE — G8419 CALC BMI OUT NRM PARAM NOF/U: HCPCS | Performed by: INTERNAL MEDICINE

## 2021-12-22 PROCEDURE — G8484 FLU IMMUNIZE NO ADMIN: HCPCS | Performed by: INTERNAL MEDICINE

## 2021-12-22 PROCEDURE — 2022F DILAT RTA XM EVC RTNOPTHY: CPT | Performed by: INTERNAL MEDICINE

## 2021-12-22 PROCEDURE — 83036 HEMOGLOBIN GLYCOSYLATED A1C: CPT | Performed by: INTERNAL MEDICINE

## 2021-12-22 PROCEDURE — 4004F PT TOBACCO SCREEN RCVD TLK: CPT | Performed by: INTERNAL MEDICINE

## 2021-12-22 PROCEDURE — G8427 DOCREV CUR MEDS BY ELIG CLIN: HCPCS | Performed by: INTERNAL MEDICINE

## 2021-12-22 ASSESSMENT — ENCOUNTER SYMPTOMS
WHEEZING: 0
CONSTIPATION: 0
ABDOMINAL PAIN: 0
BLOOD IN STOOL: 0
SHORTNESS OF BREATH: 0
COUGH: 0
DIARRHEA: 0
CHEST TIGHTNESS: 0
NAUSEA: 0
ANAL BLEEDING: 0
VOMITING: 0
CHOKING: 0

## 2021-12-22 ASSESSMENT — VISUAL ACUITY: OU: 1

## 2021-12-22 NOTE — PROGRESS NOTES
Pt is here today for a 3 month f/u    Pt says she does not need refills at this time     Pt says she needs an order for her diabetic eye exam     Pt says her fingertips on both hands have been going numb, says it is just whenever nothing that she has noticed brings it on

## 2021-12-22 NOTE — PROGRESS NOTES
Subjective:       Patient ID:     Danielle Waite is a 27 y.o. female who presents for   Chief Complaint   Patient presents with    3 Month Follow-Up       HPI:  Nursing note reviewed and discussed with patient. Numbness of hands - both hands, all fingers. Does wake her up at night. She has numbness and tingling in her feet at night. Menses are irregular for last 4-5 months, occurring every other month. Following with GI for weight loss and epigastric pain - had EGD done, treated fr H pylori. She is eating three meals plus two snacks daily. Diabetes - doing well with current regimen. Denies hypoglycemia, hyperglycemia, fatigue, polyuria, polydipsia, paresthesias, vision changes, dizziness. Eye exam overdue. Not following with podiatry. Patient is taking ACE inhibitor/ARB. Complications of diabetes include peripheral neuropathy - not on medication . Hyperlipidemia-tolerating current regimen without myalgias, dyspepsia, jaundice. Mostly compliant with diet recommendations for low carb diet, not very compliant with exercise recommendations. Cardiovascular risk factors: diabetes mellitus, dyslipidemia, sedentary lifestyle and smoking/ tobacco exposure - 5 cig/day         Patient's medications, allergies, past medical, surgical, social and family histories were reviewed and updated as appropriate.     Past Medical History:   Diagnosis Date    Diabetes mellitus ([address]     Diabetic neuropathy, type II diabetes mellitus ([address]     Hypertension     Kidney infection     MRSA (methicillin resistant staph aureus) culture positive 2016    abdomen    Nausea & vomiting     Ovarian cyst 2019    Right side    UTI (urinary tract infection)      Past Surgical History:   Procedure Laterality Date     SECTION      UPPER GASTROINTESTINAL ENDOSCOPY N/A 2021    [facility]GD [facility]SY [facility] by Flakito Mcmahon MD at [facility] History     Tobacco Use    Smoking status: Current Every Day Smoker     Packs/day: 0.50     Types: Cigarettes    Smokeless tobacco: Never Used    Tobacco comment: 5/day   Substance Use Topics    Alcohol use: No      Patient Active Problem List   Diagnosis    Type 1 diabetes mellitus with diabetic polyneuropathy (HCC)    Intractable vomiting with nausea    Intractable abdominal pain    Microalbuminuria due to type 1 diabetes mellitus (HCC)    Generalized abdominal pain    Restless leg syndrome    Protein-calorie malnutrition, severe (HCC)    Marijuana use, continuous    Onychomycosis         Prior to Visit Medications    Medication Sig Taking? Authorizing Provider   NOVOLOG FLEXPEN 100 UNIT/ML injection pen INJECT 2 TO 12 UNITS SUBCUTANEOUSLY THREE TIMES DAILY PER SLIDING SCALE AS DIRECTED (DISCARD PEN 28 DAYS AFTER OPENING) Yes Emperatriz Dougherty MD   ACETAMINOPHEN EXTRA STRENGTH 500 MG tablet TAKE 2 TABLETS BY MOUTH EVERY 6 HOURS AS NEEDED FOR PAIN Yes Emperatriz Dougherty MD   pantoprazole (PROTONIX) 40 MG tablet Take 1 tablet by mouth 2 times daily (before meals) Yes Kev Quiroz MD    MG tablet TAKE 1 TABLET FOUR TIMES DAILY AS NEEDED FOR PAIN Yes Emperatriz Dougherty MD   Alcohol Swabs (ALCOHOL PADS) 70 % PADS Apply 1 each topically 4 times daily Yes Rupali Perry MD   lisinopril (PRINIVIL;ZESTRIL) 2.5 MG tablet Take 1 tablet by mouth daily Yes Rupali Perry MD   ketoconazole (NIZORAL) 2 % cream Apply topically daily.  Yes Rupali Perry MD   rOPINIRole (REQUIP) 0.25 MG tablet TAKE 1 TABLET EVERY NIGHT Yes Emperatriz Dougherty MD   ondansetron (ZOFRAN ODT) 4 MG disintegrating tablet Take 1 tablet by mouth every 8 hours as needed for Nausea or Vomiting Yes Emperatriz Dougherty MD   insulin glargine (LANTUS SOLOSTAR) 100 UNIT/ML injection pen Inject 10 Units into the skin nightly Yes Emperatriz Doguherty MD   Nutritional Supplements (ENSURE HIGH PROTEIN) LIQD Take 1 Bottle by mouth 3 times daily Yes Emperatriz Dougherty MD   TRUE METRIX BLOOD GLUCOSE TEST strip CHECK BLOOD SUGAR FOUR TIMES DAILY AS DIRECTED Yes Emperatriz Isbell MD   TRUEplus Lancets 33G MISC TEST BLOOD SUGAR FOUR TIMES DAILY Yes Emperatriz Isbell MD   DROPLET PEN NEEDLES 31G X 8 MM MISC USE FOUR TIMES DAILY AS DIRECTED Yes Chrissy Canela MD   dicyclomine (BENTYL) 10 MG capsule Take 1 capsule by mouth 3 times daily as needed (abd pain)  Patient not taking: Reported on 12/22/2021  Dg Ryder MD   famotidine (PEPCID) 40 MG tablet Take 1 tablet by mouth every evening  Patient not taking: Reported on 12/22/2021  Dg Ryder MD     Review of Systems  Review of Systems   Constitutional: Negative for fatigue, fever and unexpected weight change. Respiratory: Negative for cough, choking, chest tightness, shortness of breath and wheezing. Cardiovascular: Negative for chest pain, palpitations and leg swelling. Gastrointestinal: Negative for abdominal pain, anal bleeding, blood in stool, constipation, diarrhea, nausea and vomiting. Endocrine: Negative. Genitourinary: Positive for menstrual problem. Musculoskeletal: Negative for joint swelling and myalgias. Skin: Negative. Neurological: Positive for numbness. Negative for dizziness. Psychiatric/Behavioral: Negative for sleep disturbance. All other systems reviewed and are negative. Objective:       Physical Exam:  /88 (Site: Left Upper Arm, Position: Sitting, Cuff Size: Medium Adult)   Pulse 91   Wt 96 lb 12.8 oz (43.9 kg)   LMP 12/01/2021   SpO2 96%   BMI 16.11 kg/m²    Wt Readings from Last 3 Encounters:   12/22/21 96 lb 12.8 oz (43.9 kg)   12/02/21 94 lb 6.4 oz (42.8 kg)   10/18/21 97 lb (44 kg)       Physical Exam  Vitals and nursing note reviewed. Constitutional:       General: She is not in acute distress. Appearance: She is well-developed. She is not ill-appearing. Eyes:      General: Lids are normal. Vision grossly intact. Cardiovascular:      Rate and Rhythm: Normal rate and regular rhythm. Heart sounds: Normal heart sounds, S1 normal and S2 normal. No murmur heard. No friction rub. No gallop. Pulmonary:      Effort: Pulmonary effort is normal. No respiratory distress. Breath sounds: Normal breath sounds. No wheezing. Abdominal:      General: Bowel sounds are normal.      Palpations: Abdomen is soft. There is no mass. Tenderness: There is no abdominal tenderness. There is no guarding. Musculoskeletal:         General: Normal range of motion. Skin:     General: Skin is warm and dry. Capillary Refill: Capillary refill takes less than 2 seconds. Neurological:      General: No focal deficit present. Mental Status: She is alert and oriented to person, place, and time. Data Review  A1c today 5.9% - up from 5.5% LV   GI notes, EGD report reviewed in chart   Previous abdominal imaging reviewed in chart      Assessment/Plan:      1. Type 1 diabetes mellitus with diabetic polyneuropathy (HCC)  - POCT glycosylated hemoglobin (Hb A1C)  - pregabalin (LYRICA) 25 MG capsule; Take 1 capsule by mouth every evening for 30 days. Dispense: 30 capsule; Refill: 0    2. Paresthesia of hand, bilateral  - EMG; Future  - Procare Comfort Form Wrist Brace  - pregabalin (LYRICA) 25 MG capsule; Take 1 capsule by mouth every evening for 30 days. Dispense: 30 capsule; Refill: 0    3. Protein-calorie malnutrition, severe ([address]  F/u GI     4. Microalbuminuria due to type 1 diabetes mellitus (HCC)  Continue lisinopril     5.  Irregular menses  Likely due to dramatic weight loss   - [facility]           Health Maintenance Due   Topic Date Due    Pap smear  Never done    Diabetic retinal exam  01/21/2020       Electronically signed by Nidia Cueva MD on 12/22/2021 at 1:38 PM

## 2021-12-23 RX ORDER — PREGABALIN 25 MG/1
25 CAPSULE ORAL EVERY EVENING
Qty: 30 CAPSULE | Refills: 0 | Status: SHIPPED | OUTPATIENT
Start: 2021-12-23 | End: 2022-03-23

## 2022-01-11 DIAGNOSIS — B35.1 ONYCHOMYCOSIS: ICD-10-CM

## 2022-01-12 RX ORDER — KETOCONAZOLE 20 MG/G
CREAM TOPICAL
Qty: 60 G | Refills: 1 | Status: SHIPPED | OUTPATIENT
Start: 2022-01-12 | End: 2022-03-08

## 2022-01-12 NOTE — TELEPHONE ENCOUNTER
Last visit: 12/22/2021  Last Med refill: 12/19/2021  Does patient have enough medication for 72 hours: No:     Next Visit Date:  Future Appointments   Date Time Provider Hayden Coto   1/27/2022  3:00 PM Kiran Youssef MD grtlk exc [facility]   2/24/2022  2:30  [facility] EMG  [facility] EMG [facility]   2/24/2022  2:30 PM Baron Alvarado MD [facility] med/reha [facility]   3/23/2022  2:00 PM Emperatriz Aguirre MD [facility] FP [facility]   4/4/2022  3:45 PM Adryan Hidalgo [facility] 43, DO [facility] OB/Gyn [address] Maintenance   Topic Date Due    Pap smear  Never done    Diabetic retinal exam  01/21/2020    COVID-19 Vaccine (1) 03/22/2022 (Originally 12/5/1998)    Hepatitis B vaccine (3 of 3 - Risk 3-dose series) 09/22/2022 (Originally 2/14/1998)    Flu vaccine (1) 09/22/2022 (Originally 9/1/2021)    Varicella vaccine (2 of 2 - 2-dose childhood series) 09/22/2026 (Originally 8/9/2000)    Pneumococcal 0-64 years Vaccine (1 of 2 - PPSV23) 09/22/2026 (Originally 12/5/1999)    Diabetic foot exam  03/22/2022    Depression Screen  03/22/2022    Lipid screen  05/24/2022    Potassium monitoring  05/24/2022    Creatinine monitoring  05/24/2022    A1C test (Diabetic or Prediabetic)  12/22/2022    DTaP/Tdap/Td vaccine (6 - Td or Tdap) 08/27/2030    Hib vaccine  Completed    Hepatitis C screen  Completed    HIV screen  Completed    Hepatitis A vaccine  Aged Out    Meningococcal (ACWY) vaccine  Aged Out       Hemoglobin A1C (%)   Date Value   12/22/2021 5.9   09/22/2021 5.5   03/22/2021 6.3             ( goal A1C is < 7)   Microalb/Crt.  Ratio (mcg/mg creat)   Date Value   02/11/2020 511 (H)     LDL Cholesterol (mg/dL)   Date Value   05/24/2021 45   02/11/2020 84       (goal LDL is <100)   AST (U/L)   Date Value   05/24/2021 19     ALT (U/L)   Date Value   05/24/2021 9     BUN (mg/dL)   Date Value   05/24/2021 3 (L)     BP Readings from Last 3 Encounters:   12/22/21 120/88   12/02/21 (!) 120/91   10/18/21 (!) 146/95          (goal 120/80)    All Future Testing planned in [facility]  Lab Frequency Next Occurrence   Alkaline Phosphatase, Isoenzymes Once 06/14/2021   CBC With Auto Differential Once 06/14/2021   EGD Routine Once 09/02/2021   COVID-19 Once 09/14/2021   H.  Pylori Breath Test Once 11/18/2021   EMG Once 01/27/2022               Patient Active Problem List:     Type 1 diabetes mellitus with diabetic polyneuropathy (HCC)     Intractable vomiting with nausea     Intractable abdominal pain     Microalbuminuria due to type 1 diabetes mellitus (HCC)     Generalized abdominal pain     Restless leg syndrome     Protein-calorie malnutrition, severe (HCC)     Marijuana use, continuous     Onychomycosis

## 2022-01-17 DIAGNOSIS — E10.42 TYPE 1 DIABETES MELLITUS WITH DIABETIC POLYNEUROPATHY (HCC): ICD-10-CM

## 2022-01-18 RX ORDER — PEN NEEDLE, DIABETIC 31 GX5/16"
NEEDLE, DISPOSABLE MISCELLANEOUS
Qty: 400 EACH | Refills: 1 | Status: SHIPPED | OUTPATIENT
Start: 2022-01-18 | End: 2022-06-02

## 2022-01-18 RX ORDER — GLUCOSAM/CHON-MSM1/C/MANG/BOSW 500-416.6
TABLET ORAL
Qty: 400 EACH | Refills: 1 | Status: SHIPPED | OUTPATIENT
Start: 2022-01-18 | End: 2022-06-02

## 2022-01-18 RX ORDER — CALCIUM CITRATE/VITAMIN D3 200MG-6.25
TABLET ORAL
Qty: 400 STRIP | Refills: 1 | Status: SHIPPED | OUTPATIENT
Start: 2022-01-18 | End: 2022-06-02

## 2022-01-18 NOTE — TELEPHONE ENCOUNTER
Last visit: 12/22/2021  Last Med refill: 08/23/2021 08/23/2021 08/23/2021  Does patient have enough medication for 72 hours: No:     Next Visit Date:  Future Appointments   Date Time Provider Hayden Coto   1/27/2022  3:00 PM Kiran Youssef MD grtlk exc [facility]   2/24/2022  2:30  [facility] EMG  [facility]CZ EMG [facility]   2/24/2022  2:30 PM Baron Alvarado MD [facility] med/reha [facility]   3/23/2022  2:00 PM Emperatriz Aguirre MD [facility] FP [facility]   4/4/2022  3:45 PM Adryan Hidalgo [facility] 43, DO [facility] OB/Gyn [address] Maintenance   Topic Date Due    Pap smear  Never done    Diabetic retinal exam  01/21/2020    COVID-19 Vaccine (1) 03/22/2022 (Originally 12/5/1998)    Hepatitis B vaccine (3 of 3 - Risk 3-dose series) 09/22/2022 (Originally 2/14/1998)    Flu vaccine (1) 09/22/2022 (Originally 9/1/2021)    Varicella vaccine (2 of 2 - 2-dose childhood series) 09/22/2026 (Originally 8/9/2000)    Pneumococcal 0-64 years Vaccine (1 of 2 - PPSV23) 09/22/2026 (Originally 12/5/1999)    Diabetic foot exam  03/22/2022    Depression Screen  03/22/2022    Lipid screen  05/24/2022    Potassium monitoring  05/24/2022    Creatinine monitoring  05/24/2022    A1C test (Diabetic or Prediabetic)  12/22/2022    DTaP/Tdap/Td vaccine (6 - Td or Tdap) 08/27/2030    Hib vaccine  Completed    Hepatitis C screen  Completed    HIV screen  Completed    Hepatitis A vaccine  Aged Out    Meningococcal (ACWY) vaccine  Aged Out       Hemoglobin A1C (%)   Date Value   12/22/2021 5.9   09/22/2021 5.5   03/22/2021 6.3             ( goal A1C is < 7)   Microalb/Crt.  Ratio (mcg/mg creat)   Date Value   02/11/2020 511 (H)     LDL Cholesterol (mg/dL)   Date Value   05/24/2021 45   02/11/2020 84       (goal LDL is <100)   AST (U/L)   Date Value   05/24/2021 19     ALT (U/L)   Date Value   05/24/2021 9     BUN (mg/dL)   Date Value   05/24/2021 3 (L)     BP Readings from Last 3 Encounters:   12/22/21 120/88   12/02/21 (!) 120/91   10/18/21 Opal Joseph ) 146/95          (goal 120/80)    All Future Testing planned in [facility]  Lab Frequency Next Occurrence   Alkaline Phosphatase, Isoenzymes Once 06/14/2021   CBC With Auto Differential Once 06/14/2021   EGD Routine Once 09/02/2021   COVID-19 Once 09/14/2021   H.  Pylori Breath Test Once 11/18/2021   EMG Once 01/27/2022               Patient Active Problem List:     Type 1 diabetes mellitus with diabetic polyneuropathy (HCC)     Intractable vomiting with nausea     Intractable abdominal pain     Microalbuminuria due to type 1 diabetes mellitus (HCC)     Generalized abdominal pain     Restless leg syndrome     Protein-calorie malnutrition, severe (HCC)     Marijuana use, continuous     Onychomycosis

## 2022-01-27 ENCOUNTER — OFFICE VISIT (OUTPATIENT)
Dept: GASTROENTEROLOGY | Age: 27
End: 2022-01-27
Payer: MEDICARE

## 2022-01-27 VITALS — SYSTOLIC BLOOD PRESSURE: 138 MMHG | WEIGHT: 95 LBS | BODY MASS INDEX: 15.81 KG/M2 | DIASTOLIC BLOOD PRESSURE: 89 MMHG

## 2022-01-27 DIAGNOSIS — K29.70 HELICOBACTER PYLORI GASTRITIS: Primary | ICD-10-CM

## 2022-01-27 DIAGNOSIS — B96.81 HELICOBACTER PYLORI GASTRITIS: Primary | ICD-10-CM

## 2022-01-27 DIAGNOSIS — E46 MALNUTRITION, UNSPECIFIED TYPE (HCC): ICD-10-CM

## 2022-01-27 PROCEDURE — G8419 CALC BMI OUT NRM PARAM NOF/U: HCPCS | Performed by: INTERNAL MEDICINE

## 2022-01-27 PROCEDURE — G8427 DOCREV CUR MEDS BY ELIG CLIN: HCPCS | Performed by: INTERNAL MEDICINE

## 2022-01-27 PROCEDURE — 99213 OFFICE O/P EST LOW 20 MIN: CPT | Performed by: INTERNAL MEDICINE

## 2022-01-27 PROCEDURE — 4004F PT TOBACCO SCREEN RCVD TLK: CPT | Performed by: INTERNAL MEDICINE

## 2022-01-27 PROCEDURE — G8484 FLU IMMUNIZE NO ADMIN: HCPCS | Performed by: INTERNAL MEDICINE

## 2022-01-27 ASSESSMENT — ENCOUNTER SYMPTOMS
ALLERGIC/IMMUNOLOGIC NEGATIVE: 1
RESPIRATORY NEGATIVE: 1
ABDOMINAL PAIN: 1
EYES NEGATIVE: 1

## 2022-01-27 NOTE — PROGRESS NOTES
GI CLINIC FOLLOW UP    INTERVAL HISTORY:   No referring provider defined for this encounter. No chief complaint on file. HISTORY OF PRESENT ILLNESS: Ms.Destaney Pelayo Infmouna is a 27 y.o. female with malnutrition and H. pylori gastritis. She has not had her breath test done. She has not been taking Pepcid. She reports eating okay. No nausea or vomiting. He smokes cigarettes and marijuana. She has not been able to gain weight. Past Medical,Family, and Social History reviewed and does not contribute to the patient presenting condition. Patient's PMH/PSH,SH,PSYCH Hx, MEDs, ALLERGIES, and ROS were all reviewed and updated in the appropriate sections. PAST MEDICAL HISTORY:  Past Medical History:   Diagnosis Date    Diabetes mellitus ([address]     Diabetic neuropathy, type II diabetes mellitus ([address]     Hypertension     Kidney infection     MRSA (methicillin resistant staph aureus) culture positive 2016    abdomen    Nausea & vomiting     Ovarian cyst 2019    Right side    UTI (urinary tract infection)        Past Surgical History:   Procedure Laterality Date     SECTION      UPPER GASTROINTESTINAL ENDOSCOPY N/A 2021    EGD BIOPSY performed by Horacio Padilla MD at [facility]:    Current Outpatient Medications:     TRUE METRIX BLOOD GLUCOSE TEST strip, CHECK BLOOD SUGAR FOUR TIMES DAILY AS DIRECTED, Disp: 400 strip, Rfl: 1    TRUEplus Lancets 33G MISC, TEST BLOOD SUGAR FOUR TIMES DAILY, Disp: 400 each, Rfl: 1    DROPLET PEN NEEDLES 31G X 8 MM MISC, USE FOUR TIMES DAILY AS DIRECTED, Disp: 400 each, Rfl: 1    ketoconazole (NIZORAL) 2 % cream, APPLY TOPICALLY DAILY. , Disp: 60 g, Rfl: 1    NOVOLOG FLEXPEN 100 UNIT/ML injection pen, INJECT 2 TO 12 UNITS SUBCUTANEOUSLY THREE TIMES DAILY PER SLIDING SCALE AS DIRECTED (DISCARD PEN 28 DAYS AFTER OPENING), Disp: 45 mL, Rfl: 5    ACETAMINOPHEN EXTRA STRENGTH 500 MG tablet, TAKE 2 TABLETS BY MOUTH EVERY 6 HOURS AS NEEDED FOR PAIN, Disp: 180 tablet, Rfl: 1    pantoprazole (PROTONIX) 40 MG tablet, Take 1 tablet by mouth 2 times daily (before meals), Disp: 30 tablet, Rfl: 5     MG tablet, TAKE 1 TABLET FOUR TIMES DAILY AS NEEDED FOR PAIN, Disp: 120 tablet, Rfl: 1    Alcohol Swabs (ALCOHOL PADS) 70 % PADS, Apply 1 each topically 4 times daily, Disp: 200 each, Rfl: 5    lisinopril (PRINIVIL;ZESTRIL) 2.5 MG tablet, Take 1 tablet by mouth daily, Disp: 90 tablet, Rfl: 1    rOPINIRole (REQUIP) 0.25 MG tablet, TAKE 1 TABLET EVERY NIGHT, Disp: 90 tablet, Rfl: 1    ondansetron (ZOFRAN ODT) 4 MG disintegrating tablet, Take 1 tablet by mouth every 8 hours as needed for Nausea or Vomiting, Disp: 60 tablet, Rfl: 1    insulin glargine (LANTUS SOLOSTAR) 100 UNIT/ML injection pen, Inject 10 Units into the skin nightly, Disp: 15 mL, Rfl: 3    Nutritional Supplements (ENSURE HIGH PROTEIN) LIQD, Take 1 Bottle by mouth 3 times daily, Disp: 90 each, Rfl: 3    pregabalin (LYRICA) 25 MG capsule, Take 1 capsule by mouth every evening for 30 days. , Disp: 30 capsule, Rfl: 0    famotidine (PEPCID) 40 MG tablet, Take 1 tablet by mouth every evening (Patient not taking: Reported on 12/22/2021), Disp: 30 tablet, Rfl: 3    ALLERGIES:   Allergies   Allergen Reactions    Blueberry [Vaccinium Angustifolium] Anaphylaxis     Tongue swells up, needs epipen    Shrimp Flavor Anaphylaxis     OK with fish, allergic to shrimp and lobster  OK with IV contrast       FAMILY HISTORY: History reviewed. No pertinent family history.       SOCIAL HISTORY:   Social History     Socioeconomic History    Marital status: Single     Spouse name: Not on file    Number of children: Not on file    Years of education: Not on file    Highest education level: Not on file   Occupational History    Not on file   Tobacco Use    Smoking status: Current Every Day Smoker     Packs/day: 0.50     Types: Cigarettes    Smokeless tobacco: Never Used   [facility] Tobacco comment: 5/day   Substance and Sexual Activity    Alcohol use: No    Drug use: Yes     Types: Marijuana Anoopya Thomas)     Comment: 1-2x per day    Sexual activity: Never   Other Topics Concern    Not on file   Social History Narrative    Not on file     Social Determinants of Health     Financial Resource Strain: Medium Risk    Difficulty of Paying Living Expenses: Somewhat hard   Food Insecurity: Food Insecurity Present    Worried About Running Out of Food in the Last Year: Often true    Shantelle of Food in the Last Year: Often true   Transportation Needs: Unmet Transportation Needs    Lack of Transportation (Medical): Yes    Lack of Transportation (Non-Medical): Yes   Physical Activity:     Days of Exercise per Week: Not on file    Minutes of Exercise per Session: Not on file   Stress:     Feeling of Stress : Not on file   Social Connections:     Frequency of Communication with Friends and Family: Not on file    Frequency of Social Gatherings with Friends and Family: Not on file    Attends Christianity Services: Not on file    Active Member of [address] [place] [address] or Organizations: Not on file    Attends Club or Organization Meetings: Not on file    Marital Status: Not on file   Intimate Partner Violence:     Fear of Current or Ex-Partner: Not on file    Emotionally Abused: Not on file    Physically Abused: Not on file    Sexually Abused: Not on file   Housing Stability:     Unable to Pay for Housing in the Last Year: Not on file    Number of [place] in the Last Year: Not on file    Unstable Housing in the Last Year: Not on file       REVIEW OF SYSTEMS: A 12-point review of systemswas obtained and pertinent positives and negatives were enumerated above in the history of present illness. All other reviewed systems / symptoms were negative. Review of Systems   Constitutional: Negative. HENT: Negative. Eyes: Negative. Respiratory: Negative. Cardiovascular: Negative.     Gastrointestinal: Positive for abdominal pain. Endocrine: Negative. Genitourinary: Negative. Musculoskeletal: Negative. Skin: Negative. Allergic/Immunologic: Negative. Neurological: Negative. Hematological: Negative. Psychiatric/Behavioral: Negative. LABORATORY DATA: Reviewed  Lab Results   Component Value Date    WBC 5.5 05/24/2021    HGB 19.1 (H) 05/24/2021    HCT 54.1 (H) 05/24/2021    MCV 92.5 05/24/2021     05/24/2021     05/24/2021    K 4.9 05/24/2021     05/24/2021    CO2 26 05/24/2021    BUN 3 (L) 05/24/2021    CREATININE 0.74 05/24/2021    LABALBU 2.5 (L) 05/24/2021    BILITOT 0.34 05/24/2021    ALKPHOS 106 (H) 05/24/2021    AST 19 05/24/2021    ALT 9 05/24/2021         Lab Results   Component Value Date    RBC 5.85 (H) 05/24/2021    HGB 19.1 (H) 05/24/2021    MCV 92.5 05/24/2021    MCH 32.6 05/24/2021    MCHC 35.3 (H) 05/24/2021    RDW 14.6 (H) 05/24/2021    MPV 10.1 05/24/2021    BASOPCT 1 05/24/2021    LYMPHSABS 2.26 05/24/2021    MONOSABS 0.37 05/24/2021    NEUTROABS 2.69 05/24/2021    EOSABS 0.13 05/24/2021    BASOSABS 0.03 05/24/2021         DIAGNOSTIC TESTING:     No results found. PHYSICAL EXAMINATION: Vital signs reviewed per the nursing documentation. There were no vitals taken for this visit. There is no height or weight on file to calculate BMI. Physical Exam      I personally reviewed the nurse's notes and documentation and I agree with her notes. General: alert, appears stated age and cooperative Psych: Normal. and Alert and oriented, appropriate affect. . Normal affect. Mentation normal  HEENT: PERRLA. Clear conjunctivae and sclerae. Moist oral mucosae, no lesions or ulcers. The neck is supple, without lymphadenopathy or jugular venous distension. No masses. Normal thyroid. Cardiovascular: S1 S2 RRR no rubs or murmurs. Pulmonary: clear BL. No accessory muscle usage.   Abdominal Exam: Soft, NT ND, no hepato or spleno megaly, +BS, no ascites. IMPRESSION: Ms. Seth Do is a 27 y.o. female with H. pylori gastritis. We again reviewed the patient the need to do the test to ensure eradication of the infection. We discussed the risk of cancer from chronic H. pylori infection. Dietary changes. Quit marijuana and smoking. Follow-up in 2 months. In case she is not able to gain weight we may need to again discussed the option of feeding tube. Thank you for allowing me to participate in the care of Ms. Deng. For any further questions please do not hesitate to contact me. I have reviewed and agree with the ROS entered by the MA/LPN. Note is dictated utilizing voice recognition software. Unfortunately this leads to occasional typographical errors. Please contact our office if you have any questions.     Valdo Choi MD  [facility]  O: #[phone]

## 2022-02-09 RX ORDER — FAMOTIDINE 40 MG/1
TABLET, FILM COATED ORAL
Qty: 90 TABLET | Refills: 1 | Status: ON HOLD | OUTPATIENT
Start: 2022-02-09 | End: 2022-04-12 | Stop reason: HOSPADM

## 2022-02-16 RX ORDER — PANTOPRAZOLE SODIUM 40 MG/1
TABLET, DELAYED RELEASE ORAL
Qty: 180 TABLET | Refills: 1 | Status: ON HOLD | OUTPATIENT
Start: 2022-02-16 | End: 2022-04-12 | Stop reason: HOSPADM

## 2022-03-07 DIAGNOSIS — B35.1 ONYCHOMYCOSIS: ICD-10-CM

## 2022-03-08 RX ORDER — KETOCONAZOLE 20 MG/G
CREAM TOPICAL
Qty: 60 G | Refills: 1 | Status: SHIPPED | OUTPATIENT
Start: 2022-03-08 | End: 2022-05-19

## 2022-03-08 NOTE — TELEPHONE ENCOUNTER
Last visit: 01/27/2022  Last Med refill: 01/12/2022  Does patient have enough medication for 72 hours: Yes    Next Visit Date:  Future Appointments   Date Time Provider Hayden Coto   3/23/2022  2:00 PM Emperatriz Henderson MD [facility] FP [facility]   4/4/2022  3:45 PM Ileana Floyd Cori, [facility] OB/Gyn [facility]OLP   4/19/2022  8:30 AM [facility] EMG  [facility] EMG [facility]   4/19/2022  8:30 AM Rafael Hartley MD [facility] med/reha [facility]P   5/2/2022  2:00 PM Lorenzo Graves MD grtlk exc [address] Maintenance   Topic Date Due    Pap smear  Never done    Diabetic retinal exam  01/21/2020    Diabetic foot exam  03/22/2022    Depression Screen  03/22/2022    COVID-19 Vaccine (1) 03/22/2022 (Originally 12/5/1998)    Hepatitis B vaccine (3 of 3 - Risk 3-dose series) 09/22/2022 (Originally 2/14/1998)    Flu vaccine (1) 09/22/2022 (Originally 9/1/2021)    Varicella vaccine (2 of 2 - 2-dose childhood series) 09/22/2026 (Originally 8/9/2000)    Pneumococcal 0-64 years Vaccine (1 of 2 - PPSV23) 09/22/2026 (Originally 12/5/1999)    Lipid screen  05/24/2022    Potassium monitoring  05/24/2022    Creatinine monitoring  05/24/2022    A1C test (Diabetic or Prediabetic)  12/22/2022    DTaP/Tdap/Td vaccine (6 - Td or Tdap) 08/27/2030    Hib vaccine  Completed    Hepatitis C screen  Completed    HIV screen  Completed    Hepatitis A vaccine  Aged Out    Meningococcal (ACWY) vaccine  Aged Out       Hemoglobin A1C (%)   Date Value   12/22/2021 5.9   09/22/2021 5.5   03/22/2021 6.3             ( goal A1C is < 7)   Microalb/Crt.  Ratio (mcg/mg creat)   Date Value   02/11/2020 511 (H)     LDL Cholesterol (mg/dL)   Date Value   05/24/2021 45   02/11/2020 84       (goal LDL is <100)   AST (U/L)   Date Value   05/24/2021 19     ALT (U/L)   Date Value   05/24/2021 9     BUN (mg/dL)   Date Value   05/24/2021 3 (L)     BP Readings from Last 3 Encounters:   01/27/22 138/89   12/22/21 120/88   12/02/21 (!) 120/91          (goal 120/80)    All Future Testing planned in [facility]  Lab Frequency Next Occurrence   Alkaline Phosphatase, Isoenzymes Once 06/14/2021   CBC With Auto Differential Once 06/14/2021   COVID-19 Once 09/14/2021   H. Pylori Breath Test Once 11/18/2021   EMG Once 05/01/2022   H.  Pylori Breath Test Once 01/27/2022               Patient Active Problem List:     Type 1 diabetes mellitus with diabetic polyneuropathy (HCC)     Intractable vomiting with nausea     Intractable abdominal pain     Microalbuminuria due to type 1 diabetes mellitus (HCC)     Generalized abdominal pain     Restless leg syndrome     Protein-calorie malnutrition, severe (HCC)     Marijuana use, continuous     Onychomycosis

## 2022-03-11 DIAGNOSIS — R10.84 GENERALIZED ABDOMINAL PAIN: ICD-10-CM

## 2022-03-14 RX ORDER — IBUPROFEN 600 MG/1
TABLET ORAL
Qty: 120 TABLET | Refills: 1 | Status: SHIPPED | OUTPATIENT
Start: 2022-03-14 | End: 2022-05-05

## 2022-03-14 NOTE — TELEPHONE ENCOUNTER
Last visit: 12/22/2021  Frutoso Octavio Med refill: 02/17/2022  Does patient have enough medication for 72 hours: No:     Next Visit Date:  Future Appointments   Date Time Provider Hayden Coto   3/23/2022  2:00 PM Emperatriz Manzanares MD [facility] FP [facility]PP   4/4/2022  3:45 PM Cynthia Carpio [facility] 45, DO [facility] OB/Gyn TOLP   4/19/2022  8:30 AM [facility] EMG  [facility]Z EMG [facility]   4/19/2022  8:30 AM Alise Galeano MD [facility] med/reha TOLP   5/2/2022  2:00 PM Antonia Nicholson MD grtlk exc [address] Maintenance   Topic Date Due    Pap smear  Never done    Diabetic retinal exam  01/21/2020    Diabetic foot exam  03/22/2022    Depression Screen  03/22/2022    COVID-19 Vaccine (1) 03/22/2022 (Originally 12/5/1998)    Hepatitis B vaccine (3 of 3 - Risk 3-dose series) 09/22/2022 (Originally 2/14/1998)    Flu vaccine (1) 09/22/2022 (Originally 9/1/2021)    Varicella vaccine (2 of 2 - 2-dose childhood series) 09/22/2026 (Originally 8/9/2000)    Pneumococcal 0-64 years Vaccine (1 of 2 - PPSV23) 09/22/2026 (Originally 12/5/1999)    Lipid screen  05/24/2022    Potassium monitoring  05/24/2022    Creatinine monitoring  05/24/2022    A1C test (Diabetic or Prediabetic)  12/22/2022    DTaP/Tdap/Td vaccine (6 - Td or Tdap) 08/27/2030    Hib vaccine  Completed    Hepatitis C screen  Completed    HIV screen  Completed    Hepatitis A vaccine  Aged Out    Meningococcal (ACWY) vaccine  Aged Out       Hemoglobin A1C (%)   Date Value   12/22/2021 5.9   09/22/2021 5.5   03/22/2021 6.3             ( goal A1C is < 7)   Microalb/Crt.  Ratio (mcg/mg creat)   Date Value   02/11/2020 511 (H)     LDL Cholesterol (mg/dL)   Date Value   05/24/2021 45   02/11/2020 84       (goal LDL is <100)   AST (U/L)   Date Value   05/24/2021 19     ALT (U/L)   Date Value   05/24/2021 9     BUN (mg/dL)   Date Value   05/24/2021 3 (L)     BP Readings from Last 3 Encounters:   01/27/22 138/89   12/22/21 120/88   12/02/21 (!) 120/91          (goal 120/80)    All Future Testing planned in [facility]  Lab Frequency Next Occurrence   Alkaline Phosphatase, Isoenzymes Once 06/14/2021   CBC With Auto Differential Once 06/14/2021   COVID-19 Once 09/14/2021   H. Pylori Breath Test Once 11/18/2021   EMG Once 05/01/2022   H.  Pylori Breath Test Once 01/27/2022               Patient Active Problem List:     Type 1 diabetes mellitus with diabetic polyneuropathy (HCC)     Intractable vomiting with nausea     Intractable abdominal pain     Microalbuminuria due to type 1 diabetes mellitus (HCC)     Generalized abdominal pain     Restless leg syndrome     Protein-calorie malnutrition, severe (HCC)     Marijuana use, continuous     Onychomycosis

## 2022-03-17 DIAGNOSIS — R80.9 MICROALBUMINURIA DUE TO TYPE 1 DIABETES MELLITUS (HCC): ICD-10-CM

## 2022-03-17 DIAGNOSIS — G25.81 RESTLESS LEG SYNDROME: ICD-10-CM

## 2022-03-17 DIAGNOSIS — E10.29 MICROALBUMINURIA DUE TO TYPE 1 DIABETES MELLITUS (HCC): ICD-10-CM

## 2022-03-18 RX ORDER — LISINOPRIL 2.5 MG/1
TABLET ORAL
Qty: 90 TABLET | Refills: 1 | Status: SHIPPED | OUTPATIENT
Start: 2022-03-18 | End: 2022-07-25 | Stop reason: SDUPTHER

## 2022-03-18 RX ORDER — ROPINIROLE 0.25 MG/1
TABLET, FILM COATED ORAL
Qty: 90 TABLET | Refills: 1 | Status: SHIPPED | OUTPATIENT
Start: 2022-03-18 | End: 2022-07-25 | Stop reason: SDUPTHER

## 2022-03-18 NOTE — TELEPHONE ENCOUNTER
Last visit: 12/22/2021  Last Med refill: 09/22/2021  Does patient have enough medication for 72 hours: Yes    Next Visit Date:  Future Appointments   Date Time Provider Hayden Coto   3/23/2022  2:00 PM Emperatriz Up MD [facility] FP [facility]   4/4/2022  3:45 PM [facility] 45, DO [facility] OB/Gyn M[facility]OLPP   4/19/2022  8:30 AM [facility] EMG  [facility]Z EM[facility]   4/19/2022  8:30 AM Inessa Street MD [facility] med/reha [facility]   5/2/2022  2:00 PM Sonny Cazares MD grtlk exc [address] Maintenance   Topic Date Due    Pap smear  Never done    Diabetic retinal exam  01/21/2020    Diabetic foot exam  03/22/2022    Depression Screen  03/22/2022    COVID-19 Vaccine (1) 03/22/2022 (Originally 12/5/1998)    Hepatitis B vaccine (3 of 3 - Risk 3-dose series) 09/22/2022 (Originally 2/14/1998)    Flu vaccine (1) 09/22/2022 (Originally 9/1/2021)    Varicella vaccine (2 of 2 - 2-dose childhood series) 09/22/2026 (Originally 8/9/2000)    Pneumococcal 0-64 years Vaccine (1 of 2 - PPSV23) 09/22/2026 (Originally 12/5/1999)    Lipid screen  05/24/2022    Potassium monitoring  05/24/2022    Creatinine monitoring  05/24/2022    A1C test (Diabetic or Prediabetic)  12/22/2022    DTaP/Tdap/Td vaccine (6 - Td or Tdap) 08/27/2030    Hib vaccine  Completed    Hepatitis C screen  Completed    HIV screen  Completed    Hepatitis A vaccine  Aged Out    Meningococcal (ACWY) vaccine  Aged Out       Hemoglobin A1C (%)   Date Value   12/22/2021 5.9   09/22/2021 5.5   03/22/2021 6.3             ( goal A1C is < 7)   Microalb/Crt.  Ratio (mcg/mg creat)   Date Value   02/11/2020 511 (H)     LDL Cholesterol (mg/dL)   Date Value   05/24/2021 45   02/11/2020 84       (goal LDL is <100)   AST (U/L)   Date Value   05/24/2021 19     ALT (U/L)   Date Value   05/24/2021 9     BUN (mg/dL)   Date Value   05/24/2021 3 (L)     BP Readings from Last 3 Encounters:   01/27/22 138/89   12/22/21 120/88   12/02/21 (!) 120/91          (goal 120/80)    All Future Testing planned in [facility]  Lab Frequency Next Occurrence   Alkaline Phosphatase, Isoenzymes Once 06/14/2021   CBC With Auto Differential Once 06/14/2021   COVID-19 Once 09/14/2021   H. Pylori Breath Test Once 11/18/2021   EMG Once 05/01/2022   H.  Pylori Breath Test Once 01/27/2022               Patient Active Problem List:     Type 1 diabetes mellitus with diabetic polyneuropathy (HCC)     Intractable vomiting with nausea     Intractable abdominal pain     Microalbuminuria due to type 1 diabetes mellitus (HCC)     Generalized abdominal pain     Restless leg syndrome     Protein-calorie malnutrition, severe (HCC)     Marijuana use, continuous     Onychomycosis

## 2022-03-23 ENCOUNTER — OFFICE VISIT (OUTPATIENT)
Dept: FAMILY MEDICINE CLINIC | Age: 27
End: 2022-03-23
Payer: MEDICARE

## 2022-03-23 ENCOUNTER — HOSPITAL ENCOUNTER (OUTPATIENT)
Age: 27
Setting detail: SPECIMEN
Discharge: HOME | End: 2022-03-23

## 2022-03-23 VITALS
BODY MASS INDEX: 15.78 KG/M2 | HEART RATE: 107 BPM | SYSTOLIC BLOOD PRESSURE: 120 MMHG | OXYGEN SATURATION: 96 % | WEIGHT: 94.8 LBS | TEMPERATURE: 97.8 F | DIASTOLIC BLOOD PRESSURE: 78 MMHG

## 2022-03-23 DIAGNOSIS — N92.6 IRREGULAR MENSES: ICD-10-CM

## 2022-03-23 DIAGNOSIS — R80.9 MICROALBUMINURIA DUE TO TYPE 1 DIABETES MELLITUS (HCC): ICD-10-CM

## 2022-03-23 DIAGNOSIS — R11.2 INTRACTABLE VOMITING WITH NAUSEA: ICD-10-CM

## 2022-03-23 DIAGNOSIS — E10.29 MICROALBUMINURIA DUE TO TYPE 1 DIABETES MELLITUS (HCC): ICD-10-CM

## 2022-03-23 DIAGNOSIS — E10.42 TYPE 1 DIABETES MELLITUS WITH DIABETIC POLYNEUROPATHY (HCC): ICD-10-CM

## 2022-03-23 DIAGNOSIS — E43 PROTEIN-CALORIE MALNUTRITION, SEVERE (HCC): Primary | ICD-10-CM

## 2022-03-23 DIAGNOSIS — B96.81 HELICOBACTER PYLORI GASTRITIS: ICD-10-CM

## 2022-03-23 DIAGNOSIS — K29.70 HELICOBACTER PYLORI GASTRITIS: ICD-10-CM

## 2022-03-23 LAB — HBA1C MFR BLD: 6.3 %

## 2022-03-23 PROCEDURE — G8484 FLU IMMUNIZE NO ADMIN: HCPCS | Performed by: INTERNAL MEDICINE

## 2022-03-23 PROCEDURE — 4004F PT TOBACCO SCREEN RCVD TLK: CPT | Performed by: INTERNAL MEDICINE

## 2022-03-23 PROCEDURE — G8427 DOCREV CUR MEDS BY ELIG CLIN: HCPCS | Performed by: INTERNAL MEDICINE

## 2022-03-23 PROCEDURE — 83036 HEMOGLOBIN GLYCOSYLATED A1C: CPT | Performed by: INTERNAL MEDICINE

## 2022-03-23 PROCEDURE — 2022F DILAT RTA XM EVC RTNOPTHY: CPT | Performed by: INTERNAL MEDICINE

## 2022-03-23 PROCEDURE — 3046F HEMOGLOBIN A1C LEVEL >9.0%: CPT | Performed by: INTERNAL MEDICINE

## 2022-03-23 PROCEDURE — 99214 OFFICE O/P EST MOD 30 MIN: CPT | Performed by: INTERNAL MEDICINE

## 2022-03-23 PROCEDURE — G8419 CALC BMI OUT NRM PARAM NOF/U: HCPCS | Performed by: INTERNAL MEDICINE

## 2022-03-23 RX ORDER — ONDANSETRON 4 MG/1
4 TABLET, ORALLY DISINTEGRATING ORAL EVERY 8 HOURS PRN
Qty: 60 TABLET | Refills: 1 | Status: ON HOLD | OUTPATIENT
Start: 2022-03-23 | End: 2022-04-12 | Stop reason: HOSPADM

## 2022-03-23 RX ORDER — DICYCLOMINE HYDROCHLORIDE 10 MG/1
1 CAPSULE ORAL 4 TIMES DAILY
COMMUNITY
Start: 2022-02-09 | End: 2022-04-26

## 2022-03-23 RX ORDER — INSULIN GLARGINE 100 [IU]/ML
10 INJECTION, SOLUTION SUBCUTANEOUS NIGHTLY
Qty: 15 ML | Refills: 3 | Status: SHIPPED | OUTPATIENT
Start: 2022-03-23 | End: 2022-07-25 | Stop reason: SDUPTHER

## 2022-03-23 SDOH — ECONOMIC STABILITY: FOOD INSECURITY: WITHIN THE PAST 12 MONTHS, YOU WORRIED THAT YOUR FOOD WOULD RUN OUT BEFORE YOU GOT MONEY TO BUY MORE.: OFTEN TRUE

## 2022-03-23 SDOH — ECONOMIC STABILITY: FOOD INSECURITY: WITHIN THE PAST 12 MONTHS, THE FOOD YOU BOUGHT JUST DIDN'T LAST AND YOU DIDN'T HAVE MONEY TO GET MORE.: OFTEN TRUE

## 2022-03-23 ASSESSMENT — SOCIAL DETERMINANTS OF HEALTH (SDOH): HOW HARD IS IT FOR YOU TO PAY FOR THE VERY BASICS LIKE FOOD, HOUSING, MEDICAL CARE, AND HEATING?: HARD

## 2022-03-23 ASSESSMENT — PATIENT HEALTH QUESTIONNAIRE - PHQ9
2. FEELING DOWN, DEPRESSED OR HOPELESS: 0
SUM OF ALL RESPONSES TO PHQ QUESTIONS 1-9: 0
SUM OF ALL RESPONSES TO PHQ9 QUESTIONS 1 & 2: 0
SUM OF ALL RESPONSES TO PHQ QUESTIONS 1-9: 0
10. IF YOU CHECKED OFF ANY PROBLEMS, HOW DIFFICULT HAVE THESE PROBLEMS MADE IT FOR YOU TO DO YOUR WORK, TAKE CARE OF THINGS AT HOME, OR GET ALONG WITH OTHER PEOPLE: 0
8. MOVING OR SPEAKING SO SLOWLY THAT OTHER PEOPLE COULD HAVE NOTICED. OR THE OPPOSITE, BEING SO FIGETY OR RESTLESS THAT YOU HAVE BEEN MOVING AROUND A LOT MORE THAN USUAL: 0
SUM OF ALL RESPONSES TO PHQ QUESTIONS 1-9: 0
SUM OF ALL RESPONSES TO PHQ QUESTIONS 1-9: 0
3. TROUBLE FALLING OR STAYING ASLEEP: 0
5. POOR APPETITE OR OVEREATING: 0
4. FEELING TIRED OR HAVING LITTLE ENERGY: 0
1. LITTLE INTEREST OR PLEASURE IN DOING THINGS: 0
7. TROUBLE CONCENTRATING ON THINGS, SUCH AS READING THE NEWSPAPER OR WATCHING TELEVISION: 0
9. THOUGHTS THAT YOU WOULD BE BETTER OFF DEAD, OR OF HURTING YOURSELF: 0
6. FEELING BAD ABOUT YOURSELF - OR THAT YOU ARE A FAILURE OR HAVE LET YOURSELF OR YOUR FAMILY DOWN: 0

## 2022-03-23 ASSESSMENT — ENCOUNTER SYMPTOMS
DIARRHEA: 0
COUGH: 0
ABDOMINAL PAIN: 0
CHEST TIGHTNESS: 0
CONSTIPATION: 0
NAUSEA: 0
VOMITING: 0
ANAL BLEEDING: 0
BLOOD IN STOOL: 0
CHOKING: 0
WHEEZING: 0
SHORTNESS OF BREATH: 0

## 2022-03-23 ASSESSMENT — VISUAL ACUITY: OU: 1

## 2022-03-23 NOTE — PATIENT INSTRUCTIONS
FOOD RESOURCES      RESOURCE SERVICE PHONE [employer]   Outreach of Reji Rod 442 [phone]  Administrative [email or web address]   Mustard [employer] [phone]  Debby. [address] 47 - and Fax N/A   Sal Guevara of [facility] [phone] [address] Emma Bro [phone] [address] / Jose Francisco Araya [phone]  [place] 2366 Assistance Programs [phone]  William Lauro. [place] Feed Your Neighbor [phone]  Natalie Fatima 178 [phone]  [address] [facility] [address] [phone] [address] [phone]  Service-Intake www.su[email or web address]. 5353 [facility] Emergency Food Pantry [phone]  Service-Intake www.[facility]. [facility] [address]. [phone]  Service-Intake www.stpaulscommunitycenter. [address] [place] [address] [phone]  Administrative www. [facility]. [address] Bank Back Pack Program [phone]  Kristel Dia. [facility] 78 Meals Program [phone]  [address] [place] [address] and Shelter for men www.[facility] Hospital[facility]. [facility] for Social and 351 [facility] 3554 2970 www. [facility] Centersayntertoningo. [address] [place] [address] [phone]  [facility] Jose Childers 187. com    [facility] Women [employer] Emergency Assistance [phone] [address] Feed Your Neighbor [phone] Administrative N/A   [facility] 65 22 [phone]  [address] / Tino Eglin [phone]  112 [facility] [phone]  300 [facility] Pantry / Meals [phone]  [address]  www. [facility]. [facility] 50 Pantry / [employer] [phone]  Administrative www. pricila. [facility] [phone]  [address] of 15[address] [facility] [place] [phone]  1840 [facility] Pantry and Grooming Supplies [phone]  [address]  [email or web address]    300 [place] [phone]  1300 [facility] PASSAVANT-CRANBERRY-ER Positive Living Program [phone] ext: [phone] [facility] www. Mevvy. GuestCrew.com    Food for [address] 42-30-72-51 www. feedtoledo. [place] 50 Pantry [phone] [address] [place] of [address] [phone] http://[email or web address] [email or web address]    777 [facility] [phone]  227 [facility] Family Pantry and [place] [phone] [email or web address]    201 [facility] [phone]  . [facility] www. [email or web address]    [facility] [phone]  Administrative factoledo. 375 [facility] Guevarae,15Th Floor [phone]  Toll Free www. Gladitood    Body of 3 [facility] for [place] 1 Pantry ([phone]) [phone] [facility] [phone] www. [facility]. Parature     Living [address] [phone]  [address] Morning Blessings [phone]  Administrative N/A   [facility] Feed Your Neighbor [phone]  100 [facility] for the Poor Food Pantry [phone]  4 [facility]. [facility]/   [facility] of [facility]eptiPl[facility] Centerskylar 30 Pantry  [phone] Jeffery Manen 13 Emergency Food and Hygiene Pantry [phone]  Administrative www. [facility]. [facility] [address] [phone]  2000 [facility] [phone]  [address] tv    Helping Hands of [facility] Health Way / Wendy Moreira / Roxane Calhoun [phone]  607 [facility]. [address] Pantry [facility] [phone]  [place] [address] [phone]  [address] [place] [address]  [phone]  Citus Data.pt    125 [facility] Pantry [phone] [email or web address]   400 [facility] Pantry  [phone] N/A   Food for [address] Pantry [phone] N/A     Updated 1/30/20

## 2022-03-23 NOTE — PROGRESS NOTES
Subjective:       Patient ID:     Jovani Heck is a 27 y.o. female who presents for   Chief Complaint   Patient presents with    Diabetes       HPI:  Nursing note reviewed and discussed with patient. HPI  Wasn't able to get her ensure for the last 3 months due to cost issue - was using food stamps to buy. Never found out which medical supply company to use - she needs to call [employer]. Food stamps have been cut off all the way, having a hard time affording food, she is getting some help from food coffman. Trying to eat three times a day. Still smoking about half a packper day   Needs to do H pylori breath test per GI   Diabetes - doing well with current regimen. Denies hypoglycemia, hyperglycemia, fatigue, polyuria, polydipsia, paresthesias, vision changes, dizziness. Eye exam scheduled on 3/22. Not following with podiatry. Patient is taking ACE inhibitor/ARB. Complications of diabetes include polyneuropathy. Hyperlipidemia-tolerating current regimen without myalgias, dyspepsia, jaundice. Mostly compliant with diet recommendations for low carb diet, not very compliant with exercise recommendations. Cardiovascular risk factors: diabetes mellitus, dyslipidemia, sedentary lifestyle and smoking/ tobacco exposure      Patient's medications, allergies, past medical, surgical, social and family histories were reviewed and updated as appropriate.     Past Medical History:   Diagnosis Date    Diabetes mellitus ([address]     Diabetic neuropathy, type II diabetes mellitus ([address]     Hypertension     Kidney infection     MRSA (methicillin resistant staph aureus) culture positive 2016    abdomen    Nausea & vomiting     Ovarian cyst 2019    Right side    UTI (urinary tract infection)      Past Surgical History:   Procedure Laterality Date     SECTION      UPPER GASTROINTESTINAL ENDOSCOPY N/A 2021    EGD BIOPSY performed by Lorenzo Graves MD at [place] [address] History Tobacco Use    Smoking status: Current Every Day Smoker     Packs/day: 0.50     Types: Cigarettes    Smokeless tobacco: Never Used    Tobacco comment: 5/day   Substance Use Topics    Alcohol use: No      Patient Active Problem List   Diagnosis    Type 1 diabetes mellitus with diabetic polyneuropathy (HCC)    Intractable vomiting with nausea    Intractable abdominal pain    Microalbuminuria due to type 1 diabetes mellitus (HCC)    Generalized abdominal pain    Restless leg syndrome    Protein-calorie malnutrition, severe (HCC)    Marijuana use, continuous    Onychomycosis         Prior to Visit Medications    Medication Sig Taking? Authorizing Provider   lisinopril (PRINIVIL;ZESTRIL) 2.5 MG tablet TAKE 1 TABLET EVERY DAY Yes Emperatriz Mcneal MD   rOPINIRole (REQUIP) 0.25 MG tablet TAKE 1 TABLET EVERY NIGHT Yes Emperatriz Mcneal MD    MG tablet TAKE 1 TABLET FOUR TIMES DAILY AS NEEDED FOR PAIN Yes Emperatriz Mcneal MD   ketoconazole (NIZORAL) 2 % cream APPLY TOPICALLY DAILY. Yes Emperatriz Mcneal MD   pantoprazole (PROTONIX) 40 MG tablet TAKE 1 TABLET TWICE DAILY BEFORE MEALS Yes Mary Busch MD   famotidine (PEPCID) 40 MG tablet TAKE 1 TABLET EVERY EVENING Yes Mary Busch MD   TRUE METRIX BLOOD GLUCOSE TEST strip CHECK BLOOD SUGAR FOUR TIMES DAILY AS DIRECTED Yes Malu Feliz MD   TRUEplus Lancets 33G MISC TEST BLOOD SUGAR FOUR TIMES DAILY Yes Emperatriz Mcneal MD   DROPLET PEN NEEDLES 31G X 8 MM MISC USE FOUR TIMES DAILY AS DIRECTED Yes Emperatriz Mcneal MD   pregabalin (LYRICA) 25 MG capsule Take 1 capsule by mouth every evening for 30 days.  Yes Emperatriz Mcneal MD   NOVOLOG FLEXPEN 100 UNIT/ML injection pen INJECT 2 TO 12 UNITS SUBCUTANEOUSLY THREE TIMES DAILY PER SLIDING SCALE AS DIRECTED (DISCARD PEN 28 DAYS AFTER OPENING) Yes Emperatriz Mcneal MD   ACETAMINOPHEN EXTRA STRENGTH 500 MG tablet TAKE 2 TABLETS BY MOUTH EVERY 6 HOURS AS NEEDED FOR PAIN Yes Malu Feliz MD Alcohol Swabs (ALCOHOL PADS) 70 % PADS Apply 1 each topically 4 times daily Yes Emperatriz Bowman MD   ondansetron (ZOFRAN ODT) 4 MG disintegrating tablet Take 1 tablet by mouth every 8 hours as needed for Nausea or Vomiting Yes Emperatriz Bowman MD   insulin glargine (LANTUS SOLOSTAR) 100 UNIT/ML injection pen Inject 10 Units into the skin nightly Yes Emperatriz Bowman MD   dicyclomine (BENTYL) 10 MG capsule Take 1 capsule by mouth 4 times daily  Scherrie Nyhan, MD   Nutritional Supplements (ENSURE HIGH PROTEIN) LIQD Take 1 Bottle by mouth 3 times daily  Patient not taking: Reported on 3/23/2022  Emperatriz Bowman MD     Review of Systems  Review of Systems   Constitutional: Negative for fatigue, fever and unexpected weight change. Respiratory: Negative for cough, choking, chest tightness, shortness of breath and wheezing. Cardiovascular: Negative for chest pain, palpitations and leg swelling. Gastrointestinal: Negative for abdominal pain, anal bleeding, blood in stool, constipation, diarrhea, nausea and vomiting. Endocrine: Negative. Musculoskeletal: Negative for joint swelling and myalgias. Skin: Negative. Neurological: Negative for dizziness. Psychiatric/Behavioral: Negative for sleep disturbance. All other systems reviewed and are negative. Objective:       Physical Exam:  /78   Pulse 107   Temp 97.8 °F (36.6 °C) (Temporal)   Wt 94 lb 12.8 oz (43 kg)   LMP 03/03/2022   SpO2 96%   BMI 15.78 kg/m²   Wt Readings from Last 3 Encounters:   03/23/22 94 lb 12.8 oz (43 kg)   01/27/22 95 lb (43.1 kg)   12/22/21 96 lb 12.8 oz (43.9 kg)         Physical Exam  Vitals and nursing note reviewed. Constitutional:       General: She is not in acute distress. Appearance: She is well-developed. She is not ill-appearing. Eyes:      General: Lids are normal. Vision grossly intact. Cardiovascular:      Rate and Rhythm: Normal rate and regular rhythm.       Heart sounds: Normal heart sounds, S1 normal and S2 normal. No murmur heard. No friction rub. No gallop. Pulmonary:      Effort: Pulmonary effort is normal. No respiratory distress. Breath sounds: Normal breath sounds. No wheezing. Abdominal:      General: Bowel sounds are normal.      Palpations: Abdomen is soft. There is no mass. Tenderness: There is no abdominal tenderness. There is no guarding. Musculoskeletal:         General: Normal range of motion. Skin:     General: Skin is warm and dry. Capillary Refill: Capillary refill takes less than 2 seconds. Neurological:      General: No focal deficit present. Mental Status: She is alert and oriented to person, place, and time. Data Review  No visits with results within 2 Month(s) from this visit. Latest known visit with results is:   Office Visit on 12/22/2021   Component Date Value    Hemoglobin A1C 12/22/2021 5.9      Lab Results   Component Value Date    HDL 56 05/24/2021     A1c 6.3% today      Assessment/Plan:      1. Type 1 diabetes mellitus with diabetic polyneuropathy (HCC)  -  DIABETES FOOT EXAM  - POCT glycosylated hemoglobin (Hb A1C)  - insulin glargine (LANTUS SOLOSTAR) 100 UNIT/ML injection pen; Inject 10 Units into the skin nightly  Dispense: 15 mL; Refill: 3  - [facility] Referral for Social Determinants of Health    2. Intractable vomiting with nausea  - ondansetron (ZOFRAN ODT) 4 MG disintegrating tablet; Take 1 tablet by mouth every 8 hours as needed for Nausea or Vomiting  Dispense: 60 tablet; Refill: 1    3. Protein-calorie malnutrition, severe ([address]  Following with GI   - [facility] Referral for Social Determinants of Health    4. Irregular menses  Likely duie to #3    5.  Microalbuminuria due to type 1 diabetes mellitus ([address]  Continue lisinopril            Health Maintenance Due   Topic Date Due    Pap smear  Never done    Diabetic foot exam  03/22/2022       Electronically signed by Joleen Mckeon MD on 3/23/2022 at 2:28 PM

## 2022-03-24 ENCOUNTER — TELEPHONE (OUTPATIENT)
Dept: FAMILY MEDICINE CLINIC | Age: 27
End: 2022-03-24

## 2022-03-24 LAB — H PYLORI BREATH TEST: POSITIVE

## 2022-03-24 NOTE — TELEPHONE ENCOUNTER
[place] was contacted  by writer to discuss referral for SDOH related needs. Writer spoke with: Patient and explained the services and assistance that can be provided through the patient navigation program.     Patient agreeable to receiving resources and support from [employer]. Discussed the following with the patient:      Needs and background info: Pt was cut off from SNAP. States she took all documents in like she was asked (utilies, rent, etc) but was told that she gets too much in [place]. Has been using some food pantries in her neighborhood. States lights and gas are too high to afford. Behind on electric by two months. Water bill is reasonable, but everything else is too high. Rents a house. Diabetes is managed right now. Sometimes within the week, sugar will \"go crazy\" because she hasn't eaten. A1c is \"6 something\" now. Was \"in the 4s\" but went up. Plan of Care: TBD. Will look for assistance programs that pt might qualify for      Follow up with patient necessary: yes    Follow up with resource organization necessary: N/A    Patient has given verbal permission to submit applications on their behalf. N/A    Patient was provided with writer's contact information should they require any additional assistance.        Valery

## 2022-03-31 ENCOUNTER — TELEPHONE (OUTPATIENT)
Dept: FAMILY MEDICINE CLINIC | Age: 27
End: 2022-03-31

## 2022-03-31 NOTE — TELEPHONE ENCOUNTER
CHW called back about previous conversation. Pt receives SSI. States she is still paying for Ensure out of pocket. Has not tried to call her insurance company yet, but was about to call before CHW called. She will call the insurance company to see if they will cover her Ensure and if she has trouble getting them to do that, will call CHW back by Monday. Pt is not interested in the [employer] for budgeting assistance. If no response from patient by Monday, CHW will close case.

## 2022-03-31 NOTE — TELEPHONE ENCOUNTER
Pt called to let CHW know that she called [employer] and was told they would send her a packet in the mail that she will have to fill that out so she can get Ensure and other OTC medical supplies covered. It will take up to 14 days for her to receive it.

## 2022-04-11 ENCOUNTER — HOSPITAL ENCOUNTER (OUTPATIENT)
Age: 27
Setting detail: OBSERVATION
Discharge: HOME | End: 2022-04-12
Attending: EMERGENCY MEDICINE | Admitting: EMERGENCY MEDICINE
Payer: MEDICARE

## 2022-04-11 ENCOUNTER — APPOINTMENT (OUTPATIENT)
Dept: CT IMAGING | Age: 27
End: 2022-04-11
Payer: MEDICARE

## 2022-04-11 DIAGNOSIS — N30.01 ACUTE CYSTITIS WITH HEMATURIA: Primary | ICD-10-CM

## 2022-04-11 DIAGNOSIS — R10.9 INTRACTABLE ABDOMINAL PAIN: ICD-10-CM

## 2022-04-11 DIAGNOSIS — R11.2 NON-INTRACTABLE VOMITING WITH NAUSEA, UNSPECIFIED VOMITING TYPE: ICD-10-CM

## 2022-04-11 DIAGNOSIS — E43 PROTEIN-CALORIE MALNUTRITION, SEVERE (HCC): ICD-10-CM

## 2022-04-11 PROBLEM — N39.0 UTI (URINARY TRACT INFECTION): Status: ACTIVE | Noted: 2022-04-11

## 2022-04-11 LAB
-: ABNORMAL
ABSOLUTE EOS #: <0.03 K/UL (ref 0–0.44)
ABSOLUTE IMMATURE GRANULOCYTE: 0.03 K/UL (ref 0–0.3)
ABSOLUTE LYMPH #: 2.12 K/UL (ref 1.1–3.7)
ABSOLUTE MONO #: 0.51 K/UL (ref 0.1–1.2)
ALBUMIN SERPL-MCNC: 2.7 G/DL (ref 3.5–5.2)
ALBUMIN/GLOBULIN RATIO: 0.7 (ref 1–2.5)
ALP BLD-CCNC: 88 U/L (ref 35–104)
ALT SERPL-CCNC: 11 U/L (ref 5–33)
ANION GAP SERPL CALCULATED.3IONS-SCNC: 8 MMOL/L (ref 9–17)
AST SERPL-CCNC: 16 U/L
BACTERIA: ABNORMAL
BASOPHILS # BLD: 0 % (ref 0–2)
BASOPHILS ABSOLUTE: <0.03 K/UL (ref 0–0.2)
BILIRUB SERPL-MCNC: 0.57 MG/DL (ref 0.3–1.2)
BILIRUBIN URINE: NEGATIVE
BUN BLDV-MCNC: 8 MG/DL (ref 6–20)
CALCIUM SERPL-MCNC: 8.5 MG/DL (ref 8.6–10.4)
CASTS UA: ABNORMAL /LPF (ref 0–8)
CHLORIDE BLD-SCNC: 111 MMOL/L (ref 98–107)
CHP ED QC CHECK: NORMAL
CO2: 27 MMOL/L (ref 20–31)
COLOR: YELLOW
CREAT SERPL-MCNC: 0.94 MG/DL (ref 0.5–0.9)
EOSINOPHILS RELATIVE PERCENT: 0 % (ref 1–4)
EPITHELIAL CELLS UA: ABNORMAL /HPF (ref 0–5)
GFR AFRICAN AMERICAN: >60 ML/MIN
GFR NON-AFRICAN AMERICAN: >60 ML/MIN
GFR SERPL CREATININE-BSD FRML MDRD: ABNORMAL ML/MIN/{1.73_M2}
GLUCOSE BLD-MCNC: 123 MG/DL (ref 65–105)
GLUCOSE BLD-MCNC: 136 MG/DL (ref 65–105)
GLUCOSE BLD-MCNC: 141 MG/DL (ref 65–105)
GLUCOSE BLD-MCNC: 160 MG/DL
GLUCOSE BLD-MCNC: 160 MG/DL (ref 65–105)
GLUCOSE BLD-MCNC: 161 MG/DL (ref 70–99)
GLUCOSE URINE: NEGATIVE
HCG QUALITATIVE: NEGATIVE
HCT VFR BLD CALC: 26.1 % (ref 36.3–47.1)
HEMOGLOBIN: 9.5 G/DL (ref 11.9–15.1)
IMMATURE GRANULOCYTES: 1 %
KETONES, URINE: NEGATIVE
LEUKOCYTE ESTERASE, URINE: ABNORMAL
LIPASE: 10 U/L (ref 13–60)
LYMPHOCYTES # BLD: 34 % (ref 24–43)
MAGNESIUM: 1.6 MG/DL (ref 1.6–2.6)
MCH RBC QN AUTO: 36.8 PG (ref 25.2–33.5)
MCHC RBC AUTO-ENTMCNC: 36.4 G/DL (ref 28.4–34.8)
MCV RBC AUTO: 101.2 FL (ref 82.6–102.9)
MONOCYTES # BLD: 8 % (ref 3–12)
NITRITE, URINE: POSITIVE
NRBC AUTOMATED: 0 PER 100 WBC
PDW BLD-RTO: 16.4 % (ref 11.8–14.4)
PH UA: 8 (ref 5–8)
PLATELET # BLD: 436 K/UL (ref 138–453)
PMV BLD AUTO: 9.9 FL (ref 8.1–13.5)
POTASSIUM SERPL-SCNC: 3.7 MMOL/L (ref 3.7–5.3)
PROTEIN UA: ABNORMAL
RBC # BLD: 2.58 M/UL (ref 3.95–5.11)
RBC # BLD: ABNORMAL 10*6/UL
RBC UA: ABNORMAL /HPF (ref 0–4)
SARS-COV-2, RAPID: NOT DETECTED
SEG NEUTROPHILS: 57 % (ref 36–65)
SEGMENTED NEUTROPHILS ABSOLUTE COUNT: 3.51 K/UL (ref 1.5–8.1)
SODIUM BLD-SCNC: 146 MMOL/L (ref 135–144)
SPECIFIC GRAVITY UA: 1.04 (ref 1–1.03)
SPECIMEN DESCRIPTION: NORMAL
TOTAL PROTEIN: 6.5 G/DL (ref 6.4–8.3)
TURBIDITY: ABNORMAL
URINE HGB: ABNORMAL
UROBILINOGEN, URINE: NORMAL
WBC # BLD: 6.2 K/UL (ref 3.5–11.3)
WBC UA: ABNORMAL /HPF (ref 0–5)

## 2022-04-11 PROCEDURE — 80053 COMPREHEN METABOLIC PANEL: CPT

## 2022-04-11 PROCEDURE — 6360000002 HC RX W HCPCS: Performed by: STUDENT IN AN ORGANIZED HEALTH CARE EDUCATION/TRAINING PROGRAM

## 2022-04-11 PROCEDURE — 96376 TX/PRO/DX INJ SAME DRUG ADON: CPT

## 2022-04-11 PROCEDURE — 96374 THER/PROPH/DIAG INJ IV PUSH: CPT

## 2022-04-11 PROCEDURE — 93005 ELECTROCARDIOGRAM TRACING: CPT | Performed by: STUDENT IN AN ORGANIZED HEALTH CARE EDUCATION/TRAINING PROGRAM

## 2022-04-11 PROCEDURE — 6360000004 HC RX CONTRAST MEDICATION: Performed by: STUDENT IN AN ORGANIZED HEALTH CARE EDUCATION/TRAINING PROGRAM

## 2022-04-11 PROCEDURE — 87635 SARS-COV-2 COVID-19 AMP PRB: CPT

## 2022-04-11 PROCEDURE — 85025 COMPLETE CBC W/AUTO DIFF WBC: CPT

## 2022-04-11 PROCEDURE — 82947 ASSAY GLUCOSE BLOOD QUANT: CPT

## 2022-04-11 PROCEDURE — 96372 THER/PROPH/DIAG INJ SC/IM: CPT

## 2022-04-11 PROCEDURE — G0378 HOSPITAL OBSERVATION PER HR: HCPCS

## 2022-04-11 PROCEDURE — 2580000003 HC RX 258: Performed by: EMERGENCY MEDICINE

## 2022-04-11 PROCEDURE — 96365 THER/PROPH/DIAG IV INF INIT: CPT

## 2022-04-11 PROCEDURE — 2580000003 HC RX 258: Performed by: STUDENT IN AN ORGANIZED HEALTH CARE EDUCATION/TRAINING PROGRAM

## 2022-04-11 PROCEDURE — 81001 URINALYSIS AUTO W/SCOPE: CPT

## 2022-04-11 PROCEDURE — 83690 ASSAY OF LIPASE: CPT

## 2022-04-11 PROCEDURE — 83735 ASSAY OF MAGNESIUM: CPT

## 2022-04-11 PROCEDURE — A4216 STERILE WATER/SALINE, 10 ML: HCPCS | Performed by: STUDENT IN AN ORGANIZED HEALTH CARE EDUCATION/TRAINING PROGRAM

## 2022-04-11 PROCEDURE — 2500000003 HC RX 250 WO HCPCS: Performed by: STUDENT IN AN ORGANIZED HEALTH CARE EDUCATION/TRAINING PROGRAM

## 2022-04-11 PROCEDURE — 6360000002 HC RX W HCPCS: Performed by: EMERGENCY MEDICINE

## 2022-04-11 PROCEDURE — 96375 TX/PRO/DX INJ NEW DRUG ADDON: CPT

## 2022-04-11 PROCEDURE — 74177 CT ABD & PELVIS W/CONTRAST: CPT

## 2022-04-11 PROCEDURE — 99284 EMERGENCY DEPT VISIT MOD MDM: CPT

## 2022-04-11 PROCEDURE — 84703 CHORIONIC GONADOTROPIN ASSAY: CPT

## 2022-04-11 RX ORDER — DEXTROSE MONOHYDRATE 50 MG/ML
100 INJECTION, SOLUTION INTRAVENOUS PRN
Status: DISCONTINUED | OUTPATIENT
Start: 2022-04-11 | End: 2022-04-12 | Stop reason: HOSPADM

## 2022-04-11 RX ORDER — LISINOPRIL 2.5 MG/1
2.5 TABLET ORAL DAILY
Status: DISCONTINUED | OUTPATIENT
Start: 2022-04-11 | End: 2022-04-12 | Stop reason: HOSPADM

## 2022-04-11 RX ORDER — PROMETHAZINE HYDROCHLORIDE 25 MG/ML
6.25 INJECTION, SOLUTION INTRAMUSCULAR; INTRAVENOUS EVERY 6 HOURS PRN
Status: DISCONTINUED | OUTPATIENT
Start: 2022-04-11 | End: 2022-04-12 | Stop reason: HOSPADM

## 2022-04-11 RX ORDER — CEFTRIAXONE 1 G/1
INJECTION, POWDER, FOR SOLUTION INTRAMUSCULAR; INTRAVENOUS
Status: DISPENSED
Start: 2022-04-11 | End: 2022-04-12

## 2022-04-11 RX ORDER — MORPHINE SULFATE 4 MG/ML
2 INJECTION, SOLUTION INTRAMUSCULAR; INTRAVENOUS ONCE
Status: DISCONTINUED | OUTPATIENT
Start: 2022-04-11 | End: 2022-04-11

## 2022-04-11 RX ORDER — DEXTROSE MONOHYDRATE 25 G/50ML
12.5 INJECTION, SOLUTION INTRAVENOUS PRN
Status: DISCONTINUED | OUTPATIENT
Start: 2022-04-11 | End: 2022-04-12 | Stop reason: HOSPADM

## 2022-04-11 RX ORDER — ONDANSETRON 4 MG/1
4 TABLET, ORALLY DISINTEGRATING ORAL EVERY 8 HOURS PRN
Status: DISCONTINUED | OUTPATIENT
Start: 2022-04-11 | End: 2022-04-12 | Stop reason: HOSPADM

## 2022-04-11 RX ORDER — LABETALOL HYDROCHLORIDE 5 MG/ML
10 INJECTION, SOLUTION INTRAVENOUS ONCE
Status: COMPLETED | OUTPATIENT
Start: 2022-04-11 | End: 2022-04-11

## 2022-04-11 RX ORDER — INSULIN GLARGINE 100 [IU]/ML
10 INJECTION, SOLUTION SUBCUTANEOUS NIGHTLY
Status: DISCONTINUED | OUTPATIENT
Start: 2022-04-11 | End: 2022-04-12 | Stop reason: HOSPADM

## 2022-04-11 RX ORDER — NICOTINE POLACRILEX 4 MG
15 LOZENGE BUCCAL PRN
Status: DISCONTINUED | OUTPATIENT
Start: 2022-04-11 | End: 2022-04-12 | Stop reason: HOSPADM

## 2022-04-11 RX ORDER — FENTANYL CITRATE 50 UG/ML
25 INJECTION, SOLUTION INTRAMUSCULAR; INTRAVENOUS ONCE
Status: COMPLETED | OUTPATIENT
Start: 2022-04-11 | End: 2022-04-11

## 2022-04-11 RX ORDER — ONDANSETRON 2 MG/ML
4 INJECTION INTRAMUSCULAR; INTRAVENOUS ONCE
Status: COMPLETED | OUTPATIENT
Start: 2022-04-11 | End: 2022-04-11

## 2022-04-11 RX ORDER — MORPHINE SULFATE 4 MG/ML
2 INJECTION, SOLUTION INTRAMUSCULAR; INTRAVENOUS ONCE
Status: COMPLETED | OUTPATIENT
Start: 2022-04-11 | End: 2022-04-11

## 2022-04-11 RX ORDER — ONDANSETRON 2 MG/ML
4 INJECTION INTRAMUSCULAR; INTRAVENOUS EVERY 6 HOURS PRN
Status: DISCONTINUED | OUTPATIENT
Start: 2022-04-11 | End: 2022-04-12 | Stop reason: HOSPADM

## 2022-04-11 RX ORDER — ACETAMINOPHEN 500 MG
500 TABLET ORAL EVERY 6 HOURS PRN
Status: DISCONTINUED | OUTPATIENT
Start: 2022-04-11 | End: 2022-04-12 | Stop reason: HOSPADM

## 2022-04-11 RX ORDER — SODIUM CHLORIDE 9 MG/ML
INJECTION, SOLUTION INTRAVENOUS PRN
Status: DISCONTINUED | OUTPATIENT
Start: 2022-04-11 | End: 2022-04-12 | Stop reason: HOSPADM

## 2022-04-11 RX ORDER — KETOROLAC TROMETHAMINE 30 MG/ML
30 INJECTION, SOLUTION INTRAMUSCULAR; INTRAVENOUS ONCE
Status: COMPLETED | OUTPATIENT
Start: 2022-04-11 | End: 2022-04-11

## 2022-04-11 RX ORDER — 0.9 % SODIUM CHLORIDE 0.9 %
1000 INTRAVENOUS SOLUTION INTRAVENOUS ONCE
Status: COMPLETED | OUTPATIENT
Start: 2022-04-11 | End: 2022-04-11

## 2022-04-11 RX ORDER — ACETAMINOPHEN 325 MG/1
650 TABLET ORAL EVERY 4 HOURS PRN
Status: DISCONTINUED | OUTPATIENT
Start: 2022-04-11 | End: 2022-04-12 | Stop reason: HOSPADM

## 2022-04-11 RX ORDER — IBUPROFEN 400 MG/1
400 TABLET ORAL EVERY 6 HOURS PRN
Status: DISCONTINUED | OUTPATIENT
Start: 2022-04-11 | End: 2022-04-12 | Stop reason: HOSPADM

## 2022-04-11 RX ORDER — FAMOTIDINE 20 MG/1
40 TABLET, FILM COATED ORAL DAILY
Status: DISCONTINUED | OUTPATIENT
Start: 2022-04-11 | End: 2022-04-12 | Stop reason: HOSPADM

## 2022-04-11 RX ORDER — SODIUM CHLORIDE 0.9 % (FLUSH) 0.9 %
5-40 SYRINGE (ML) INJECTION EVERY 12 HOURS SCHEDULED
Status: DISCONTINUED | OUTPATIENT
Start: 2022-04-11 | End: 2022-04-12 | Stop reason: HOSPADM

## 2022-04-11 RX ORDER — ROPINIROLE 0.25 MG/1
0.25 TABLET, FILM COATED ORAL NIGHTLY
Status: DISCONTINUED | OUTPATIENT
Start: 2022-04-11 | End: 2022-04-12 | Stop reason: HOSPADM

## 2022-04-11 RX ORDER — SODIUM CHLORIDE 0.9 % (FLUSH) 0.9 %
5-40 SYRINGE (ML) INJECTION PRN
Status: DISCONTINUED | OUTPATIENT
Start: 2022-04-11 | End: 2022-04-12 | Stop reason: HOSPADM

## 2022-04-11 RX ORDER — SODIUM CHLORIDE 9 MG/ML
INJECTION, SOLUTION INTRAVENOUS CONTINUOUS
Status: DISCONTINUED | OUTPATIENT
Start: 2022-04-11 | End: 2022-04-12 | Stop reason: HOSPADM

## 2022-04-11 RX ADMIN — CEFTRIAXONE SODIUM 1000 MG: 1 INJECTION, POWDER, FOR SOLUTION INTRAMUSCULAR; INTRAVENOUS at 16:42

## 2022-04-11 RX ADMIN — SODIUM CHLORIDE: 9 INJECTION, SOLUTION INTRAVENOUS at 21:24

## 2022-04-11 RX ADMIN — SODIUM CHLORIDE 1000 ML: 9 INJECTION, SOLUTION INTRAVENOUS at 12:46

## 2022-04-11 RX ADMIN — SODIUM CHLORIDE, PRESERVATIVE FREE 10 ML: 5 INJECTION INTRAVENOUS at 21:01

## 2022-04-11 RX ADMIN — PROMETHAZINE HYDROCHLORIDE 6.25 MG: 25 INJECTION INTRAMUSCULAR; INTRAVENOUS at 23:45

## 2022-04-11 RX ADMIN — FENTANYL CITRATE 25 MCG: 50 INJECTION, SOLUTION INTRAMUSCULAR; INTRAVENOUS at 12:37

## 2022-04-11 RX ADMIN — KETOROLAC TROMETHAMINE 30 MG: 30 INJECTION, SOLUTION INTRAMUSCULAR at 21:20

## 2022-04-11 RX ADMIN — ONDANSETRON 4 MG: 2 INJECTION INTRAMUSCULAR; INTRAVENOUS at 17:05

## 2022-04-11 RX ADMIN — ONDANSETRON 4 MG: 2 INJECTION INTRAMUSCULAR; INTRAVENOUS at 12:38

## 2022-04-11 RX ADMIN — MORPHINE SULFATE 2 MG: 4 INJECTION INTRAVENOUS at 15:10

## 2022-04-11 RX ADMIN — IOPAMIDOL 75 ML: 755 INJECTION, SOLUTION INTRAVENOUS at 13:55

## 2022-04-11 RX ADMIN — ONDANSETRON 4 MG: 2 INJECTION INTRAMUSCULAR; INTRAVENOUS at 21:00

## 2022-04-11 RX ADMIN — FAMOTIDINE 20 MG: 10 INJECTION INTRAVENOUS at 12:37

## 2022-04-11 RX ADMIN — MORPHINE SULFATE 2 MG: 4 INJECTION, SOLUTION INTRAMUSCULAR; INTRAVENOUS at 13:10

## 2022-04-11 RX ADMIN — Medication 10 MG: at 18:23

## 2022-04-11 ASSESSMENT — PAIN SCALES - GENERAL
PAINLEVEL_OUTOF10: 9
PAINLEVEL_OUTOF10: 10

## 2022-04-11 ASSESSMENT — PAIN - FUNCTIONAL ASSESSMENT: PAIN_FUNCTIONAL_ASSESSMENT: ACTIVITIES ARE NOT PREVENTED

## 2022-04-11 ASSESSMENT — PAIN DESCRIPTION - DESCRIPTORS
DESCRIPTORS: STABBING
DESCRIPTORS: STABBING

## 2022-04-11 ASSESSMENT — PAIN DESCRIPTION - PROGRESSION: CLINICAL_PROGRESSION: NOT CHANGED

## 2022-04-11 ASSESSMENT — PAIN DESCRIPTION - ORIENTATION: ORIENTATION: MID;LOWER

## 2022-04-11 ASSESSMENT — ENCOUNTER SYMPTOMS
VOMITING: 1
RHINORRHEA: 0
DIARRHEA: 0
EYE REDNESS: 0
NAUSEA: 1
COUGH: 0
SHORTNESS OF BREATH: 0
ABDOMINAL PAIN: 1

## 2022-04-11 ASSESSMENT — PAIN DESCRIPTION - ONSET: ONSET: ON-GOING

## 2022-04-11 ASSESSMENT — PAIN DESCRIPTION - LOCATION: LOCATION: ABDOMEN

## 2022-04-11 ASSESSMENT — PAIN DESCRIPTION - FREQUENCY: FREQUENCY: CONTINUOUS

## 2022-04-11 ASSESSMENT — PAIN DESCRIPTION - PAIN TYPE
TYPE: ACUTE PAIN
TYPE: ACUTE PAIN

## 2022-04-11 NOTE — ED NOTES
Ppresents to the ED with C/O abdominal pain. Pt stated that the pain is stabbing and constant. Pt stated that her pain is extremely bad and it started 4 days ago. Pt is tearful. Pt placed on full cardiac monitor. Will continue to monitor and reassess.       Paulina Ambriz RN  04/11/22 [id number]

## 2022-04-11 NOTE — ED NOTES
Obs resident notified about blood pressure and request for pain medication for floor orders.      Diana Rodríguez, SANDRITA  04/11/22 2872

## 2022-04-11 NOTE — ED PROVIDER NOTES
[facility]  Emergency Department  Faculty Attestation     I performed a history and physical examination of the patient and discussed management with the resident. I reviewed the residents note and agree with the documented findings and plan of care. Any areas of disagreement are noted on the chart. I was personally present for the key portions of any procedures. I have documented in the chart those procedures where I was not present during the key portions. I have reviewed the emergency nurses triage note. I agree with the chief complaint, past medical history, past surgical history, allergies, medications, social and family history as documented unless otherwise noted below. For Physician Assistant/ Nurse Practitioner cases/documentation I have personally evaluated this patient and have completed at least one if not all key elements of the E/M (history, physical exam, and MDM). Additional findings are as noted. Primary Care Physician:  Aleshia Branch MD    Screenings:  [unfilled]    CHIEF COMPLAINT       Chief Complaint   Patient presents with    Abdominal Pain       RECENT VITALS:   Temp: 97.7 °F (36.5 °C),  Pulse: 73, Resp: 27, BP: (!) 185/124    LABS:  Labs Reviewed   POC GLUCOSE FINGERSTICK - Abnormal; Notable for the following components:       Result Value    POC Glucose 160 (*)     All other components within normal limits   POCT GLUCOSE - Normal   CBC WITH AUTO DIFFERENTIAL   COMPREHENSIVE METABOLIC PANEL   MAGNESIUM   LIPASE   HCG, SERUM, QUALITATIVE       Radiology  CT ABDOMEN PELVIS W IV CONTRAST Additional Contrast? None    (Results Pending)       CRITICAL CARE: There was a high probability of clinically significant/life threatening deterioration in this patient's condition which required my urgent intervention. Total critical care time was 10 minutes. This excludes any time for separately reportable procedures.      EKG:   EKG Interpretation    Interpreted by me    Rhythm: normal sinus   Rate: normal  Axis: normal  Ectopy: none  Conduction: Short GA  ST Segments: no acute change  T Waves: Inversion V2  Q Waves: none    Clinical Impression: Short GA, nonspecific T inversion    Attending Physician Additional  Notes    Patient is 3 days of severe abdominal pain, unable to lie supine. Persistent vomiting no blood. She does admit to shortness of breath but no cough sputum hemoptysis she is sweaty but no fevers. No UTI symptoms. On exam she is in severe distress secondary pain, tachypneic, moaning, hypertensive, currently afebrile, anicteric. Unable to lie supine. No CVA tenderness. Abdomen is firm with diffuse guarding and significant tenderness but no distention or tympany. Mouth is parched. No odor to the breath. Lungs are clear. Impression is acute abdomen, dehydration, rule out DKA, perforation, other cause. Plan is IV access, IV fluid bolus. Analgesics, antiemetic. Point-of-care chemistries, CT abdomen, reassess, anticipate admission. Richy Toscano MD, [facility]  Attending Emergency  Physician               Philip Zurita MD  04/11/22 1238       Philip Zurita MD  04/11/22 1251

## 2022-04-11 NOTE — ED PROVIDER NOTES
[facility] ED  Emergency Department Encounter  Emergency Medicine Resident     Pt Name: Maxine Collier  MRN: [id number]  Lisatrongfshawn 1995  Date of evaluation: 22  PCP:  Fozia Putnam MD    This patient was evaluated in the Emergency Department for symptoms described in the history of present illness. The patient was evaluated in the context of the global COVID-19 pandemic, which necessitated consideration that the patient might be at risk for infection with the SARS-CoV-2 virus that causes COVID-19. Institutional protocols and algorithms that pertain to the evaluation of patients at risk for COVID-19 are in a state of rapid change based on information released by regulatory bodies including the CDC and federal and state organizations. These policies and algorithms were followed during the patient's care in the ED. CHIEF COMPLAINT       Chief Complaint   Patient presents with    Abdominal Pain     HISTORY OFPRESENT ILLNESS  (Location/Symptom, Timing/Onset, Context/Setting, Quality, Duration, Modifying Factors,Severity.)      Maxine Collier is a 27 y.o. female who presents with abdominal pain, nausea and vomiting x4 days. Inability to tolerate p.o. intake or taking medications. Denies any fevers, headaches, chest pain or shortness of breath. Patient only eats potatoes and fruit. Hx of marijuana use, intractable vomiting and T1DM with polyneuropathy    PAST MEDICAL / SURGICAL / SOCIAL / FAMILY HISTORY      has a past medical history of Diabetes mellitus ([address] Diabetic neuropathy, type II diabetes mellitus ([address] Hypertension, Kidney infection, MRSA (methicillin resistant staph aureus) culture positive, Nausea & vomiting, Ovarian cyst, and UTI (urinary tract infection). has a past surgical history that includes  section and Upper gastrointestinal endoscopy (N/A, 2021).     Social History     Socioeconomic History    Marital status: Single Spouse name: Not on file    Number of children: Not on file    Years of education: Not on file    Highest education level: Not on file   Occupational History    Not on file   Tobacco Use    Smoking status: Current Every Day Smoker     Packs/day: 0.50     Types: Cigarettes    Smokeless tobacco: Never Used    Tobacco comment: 5/day   Substance and Sexual Activity    Alcohol use: No    Drug use: Yes     Types: Marijuana (Weed)     Comment: 1-2x per day    Sexual activity: Never   Other Topics Concern    Not on file   Social History Narrative    Not on file     Social Determinants of Health     Financial Resource Strain: High Risk    Difficulty of Paying Living Expenses: Hard   Food Insecurity: Food Insecurity Present    Worried About Running Out of Food in the Last Year: Often true    [place] of Food in the Last Year: Often true   Transportation Needs:     Lack of Transportation (Medical): Not on file    Lack of Transportation (Non-Medical): Not on file   Physical Activity:     Days of Exercise per Week: Not on file    Minutes of Exercise per Session: Not on file   Stress:     Feeling of Stress : Not on file   Social Connections:     Frequency of Communication with Friends and Family: Not on file    Frequency of Social Gatherings with Friends and Family: Not on file    Attends Holiness Services: Not on file    Active Member of [address] [place] [address] [employer] or Organizations: Not on file    Attends Club or Organization Meetings: Not on file    Marital Status: Not on file   Intimate Partner Violence:     Fear of Current or Ex-Partner: Not on file    Emotionally Abused: Not on file    Physically Abused: Not on file    Sexually Abused: Not on file   Housing Stability:     Unable to Pay for Housing in the Last Year: Not on file    Number of [place] in the Last Year: Not on file    Unstable Housing in the Last Year: Not on file       History reviewed. No pertinent family history.     Allergies:  Blueberry [vaccinium angustifolium] and Shrimp flavor    Home Medications:  Prior to Admission medications    Medication Sig Start Date End Date Taking? Authorizing Provider   dicyclomine (BENTYL) 10 MG capsule Take 1 capsule by mouth 4 times daily 2/9/22   Rosaura Roper MD   insulin glargine (LANTUS SOLOSTAR) 100 UNIT/ML injection pen Inject 10 Units into the skin nightly 3/23/22   Emperatriz George MD   ondansetron (ZOFRAN ODT) 4 MG disintegrating tablet Take 1 tablet by mouth every 8 hours as needed for Nausea or Vomiting 3/23/22   Emperatriz George MD   lisinopril (PRINIVIL;ZESTRIL) 2.5 MG tablet TAKE 1 TABLET EVERY DAY 3/18/22   Emperatriz George MD   rOPINIRole (REQUIP) 0.25 MG tablet TAKE 1 TABLET EVERY NIGHT 3/18/22   Emperatriz George MD    MG tablet TAKE 1 TABLET FOUR TIMES DAILY AS NEEDED FOR PAIN 3/14/22   Emperatriz George MD   ketoconazole (NIZORAL) 2 % cream APPLY TOPICALLY DAILY.  3/8/22   Emperatriz George MD   pantoprazole (PROTONIX) 40 MG tablet TAKE 1 TABLET TWICE DAILY BEFORE MEALS 2/16/22   Rosaura Roper MD   famotidine (PEPCID) 40 MG tablet TAKE 1 TABLET EVERY EVENING 2/9/22   Rosaura Roper MD   TRUE METRIX BLOOD GLUCOSE TEST strip CHECK BLOOD SUGAR FOUR TIMES DAILY AS DIRECTED 1/18/22   Emperatriz George MD   TRUEplus Lancets 33G MISC TEST BLOOD SUGAR FOUR TIMES DAILY 1/18/22   Emperatriz George MD   DROPLET PEN NEEDLES 31G X 8 MM MISC USE FOUR TIMES DAILY AS DIRECTED 1/18/22   Emperatriz George MD   NOVOLOG FLEXPEN 100 UNIT/ML injection pen INJECT 2 TO 12 UNITS SUBCUTANEOUSLY THREE TIMES DAILY PER SLIDING SCALE AS DIRECTED (DISCARD PEN 28 DAYS AFTER OPENING) 12/16/21   Emperatriz George MD   ACETAMINOPHEN EXTRA STRENGTH 500 MG tablet TAKE 2 TABLETS BY MOUTH EVERY 6 HOURS AS NEEDED FOR PAIN 12/14/21   Emperatriz George MD   Alcohol Swabs (ALCOHOL PADS) 70 % PADS Apply 1 each topically 4 times daily 9/22/21   Emperatriz George MD   Nutritional Supplements (ENSURE HIGH PROTEIN) LIQD Take 1 Bottle by mouth 3 times daily  Patient not taking: Reported on 3/23/2022 9/22/21   Emperatriz Silverio MD       REVIEW OF SYSTEMS    (2-9 systems for level 4, 10 or more for level 5)      Review of Systems   Constitutional: Positive for appetite change. Negative for activity change and fever. HENT: Negative for congestion and rhinorrhea. Eyes: Negative for redness. Respiratory: Negative for cough and shortness of breath. Cardiovascular: Negative for chest pain. Gastrointestinal: Positive for abdominal pain, nausea and vomiting. Negative for diarrhea. Genitourinary: Negative for dysuria and hematuria. Musculoskeletal: Negative for joint swelling. Skin: Negative for rash and wound. Neurological: Negative for headaches. PHYSICAL EXAM   (up to 7 for level 4, 8 or more for level 5)     INITIAL VITALS:    height is 5' 5\" (1.651 m) and weight is 94 lb (42.6 kg). Her oral temperature is 97.7 °F (36.5 °C). Her blood pressure is 187/103 (abnormal) and her pulse is 77. Her respiration is 29 and oxygen saturation is 100%. Physical Exam  Constitutional:       General: She is in acute distress. Appearance: She is ill-appearing. Comments: Cachectic appearing   HENT:      Head: Normocephalic and atraumatic. Nose: Nose normal.      Mouth/Throat:      Comments: Dry mucus membranes  Eyes:      Extraocular Movements: Extraocular movements intact. Pupils: Pupils are equal, round, and reactive to light. Cardiovascular:      Rate and Rhythm: Normal rate and regular rhythm. Heart sounds: Normal heart sounds. No murmur heard. Pulmonary:      Breath sounds: No wheezing or rhonchi. Comments: Tachypnea  Chest:      Chest wall: No tenderness. Abdominal:      General: Abdomen is flat. There is no distension. Tenderness: There is abdominal tenderness. Comments: Voluntary guarding with attempts at palpation of the abdomen.   Patient endorsing pain over suprapubic as well as Order Specific Question:   Antimicrobial Indications     Answer:   Urinary Tract Infection    cefTRIAXone (ROCEPHIN) 1 g injection     [place] Arreagail: cabinet override    acetaminophen (TYLENOL) tablet 500 mg    famotidine (PEPCID) tablet 40 mg    ibuprofen (ADVIL;MOTRIN) tablet 400 mg    insulin glargine (LANTUS) injection vial 10 Units    lisinopril (PRINIVIL;ZESTRIL) tablet 2.5 mg    rOPINIRole (REQUIP) tablet 0.25 mg    glucose (GLUTOSE) 40 % oral gel 15 g    dextrose 50 % IV solution    glucagon (rDNA) injection 1 mg    dextrose 5 % solution    insulin lispro (HUMALOG) injection vial 0-6 Units    insulin lispro (HUMALOG) injection vial 0-3 Units    cefTRIAXone (ROCEPHIN) 1,000 mg in sterile water 10 mL IV syringe     Order Specific Question:   Antimicrobial Indications     Answer:   Urinary Tract Infection    0.9 % sodium chloride infusion    sodium chloride flush 0.9 % injection 5-40 mL    sodium chloride flush 0.9 % injection 5-40 mL    0.9 % sodium chloride infusion    acetaminophen (TYLENOL) tablet 650 mg    OR Linked Order Group     ondansetron (ZOFRAN-ODT) disintegrating tablet 4 mg     ondansetron (ZOFRAN) injection 4 mg    ondansetron (ZOFRAN) injection 4 mg    labetalol (NORMODYNE;TRANDATE) injection 10 mg    promethazine (PHENERGAN) injection 6.25 mg    ketorolac (TORADOL) injection 30 mg     DDX: Gastritis, appendicitis, pregnancy, cyclical vomiting syndrome, pancreatitis    Initial MDM/Plan: 27 y.o. female who presents with 4 days of nausea, vomiting, abdominal pain progressively worsened. Patient with 4 intake and only eats potatoes and fruit. Patient is in significant pain and is voluntary guarding with attempts at abdominal examination. Will give pain control, Zofran in addition to lab work to assess for DKA, metabolic disturbance, pancreatitis as well as CT imaging to assess for appendicitis.     DIAGNOSTIC RESULTS / EMERGENCY DEPARTMENT COURSE / MDM LABS:  Labs Reviewed   CBC WITH AUTO DIFFERENTIAL - Abnormal; Notable for the following components:       Result Value    RBC 2.58 (*)     Hemoglobin 9.5 (*)     Hematocrit 26.1 (*)     MCH 36.8 (*)     MCHC 36.4 (*)     RDW 16.4 (*)     Eosinophils % 0 (*)     Immature Granulocytes 1 (*)     All other components within normal limits   COMPREHENSIVE METABOLIC PANEL - Abnormal; Notable for the following components:    Glucose 161 (*)     CREATININE 0.94 (*)     Calcium 8.5 (*)     Sodium 146 (*)     Chloride 111 (*)     Anion Gap 8 (*)     Albumin 2.7 (*)     Albumin/Globulin Ratio 0.7 (*)     All other components within normal limits   LIPASE - Abnormal; Notable for the following components:    Lipase 10 (*)     All other components within normal limits   URINALYSIS WITH MICROSCOPIC - Abnormal; Notable for the following components:    Turbidity UA Cloudy (*)     Specific Gravity, UA 1.036 (*)     Urine Hgb TRACE (*)     Protein, UA NEGATIVE  Verified by sulfosalicylic acid test. (*)     Nitrite, Urine POSITIVE (*)     Leukocyte Esterase, Urine LARGE (*)     Bacteria, UA MANY (*)     All other components within normal limits   POC GLUCOSE FINGERSTICK - Abnormal; Notable for the following components:    POC Glucose 160 (*)     All other components within normal limits   POC GLUCOSE FINGERSTICK - Abnormal; Notable for the following components:    POC Glucose 141 (*)     All other components within normal limits   POCT GLUCOSE - Normal   COVID-19, RAPID   MAGNESIUM   HCG, SERUM, QUALITATIVE     RADIOLOGY:  CT ABDOMEN PELVIS W IV CONTRAST Additional Contrast? None    Result Date: 4/11/2022  EXAMINATION: CT OF THE ABDOMEN AND PELVIS WITH CONTRAST 4/11/2022 1:50 pm TECHNIQUE: CT of the abdomen and pelvis was performed with the administration of intravenous contrast. Multiplanar reformatted images are provided for review.  Dose modulation, iterative reconstruction, and/or weight based adjustment of the mA/kV was utilized to reduce the radiation dose to as low as reasonably achievable. COMPARISON: Multiple prior examinations most recently 05/21/2020 HISTORY: ORDERING SYSTEM PROVIDED HISTORY: diffuse abdominal pain FINDINGS: Lower Chest: Normal heart size. Lung bases clear. Organs: Mild periportal edema. Otherwise liver, spleen, pancreas, adrenal glands and kidneys appear unremarkable. Gallbladder appears slightly elongated but otherwise appears unremarkable GI/Bowel: No evidence of bowel obstruction or inflammation. Normal appendix. Pelvis: Bladder and uterus appear unremarkable Peritoneum/Retroperitoneum: Normal caliber abdominal aorta with mild infrarenal atherosclerosis. Few stable borderline to mildly prominent left retroperitoneal lymph nodes. No ascites or free air. Bones/Soft Tissues: No significant osseous abnormality. *No evidence of bowel obstruction or definite inflammation including normal appendix. *Mild periportal edema which is nonspecific but may relate to hydration status, hepatocellular disease or be reactive in nature. *Few incidental/chronic findings as described. EKG  Normal sinus rhythm. Normal axis. Slightly shortned [place], but otherwise normal intervals. No ST segment elevation or depression. T wave inversion noted in V2.      All EKG's are interpreted by the Emergency Department Physician who either signs or Co-signs this chart in the absence of a cardiologist.    EMERGENCY DEPARTMENT COURSE:  ED Course as of 04/11/22 2009 Mon Apr 11, 2022   1312 Hemoglobin Quant(!): 9.5  anemia [CP]   1509 Sodium(!): 146  Hypernatremia, likely related to dehydration [CP]   1636 Urinalysis with Microscopic(!):    Color, UA Yellow   Turbidity UA Cloudy(!)   Glucose, UA NEGATIVE   Bilirubin, Urine NEGATIVE   Ketones, Urine NEGATIVE   Specific Gravity, UA 1.036(!)   Urine Hgb TRACE(!)   pH, UA 8.0   Protein, UA NEGATIVE  Verified by sulfosalicylic acid test.(!)   Urobilinogen, Urine Normal   Nitrite, Urine POSITIVE(!)   Leukocyte Esterase, Urine LARGE(!)   -        WBC, UA TOO NUMEROUS TO COUNT   RBC, UA 2 TO 5   Casts UA 5 TO 10 HYALINE Reference range defined for non-centrifuged specimen. Epithelial Cells, UA 2 TO 5   Bacteria, UA MANY(!)  Patient with UTI, large leukocyte esterase and positive nitrite and many bacteria. [CP]   3741 Patient continues to have abdominal pain. Will give IV antibiotic. With intractable abdominal pain, will admit overnight for pain control and IV antibiotics. [CP]      ED Course User Index  [CP] Andres Elaine MD     Patient is a 27year old female with history of H pylori gastritis presenting to the ED for evaluation of abdominal pain, nausea, vomiting and diarrhea x4 days. Patient does have a history of T1DM with diabetic polyneuropathy, intractable vomiting and protein calorie malnutrition as she only eats potatoes and fruits. She is hypertensive, but has been unable to take home medication due to nausea/vomiting. She was tachypneic on initial evaluation due to pain. This did resolve once pain treated. She did have significant abdominal tenderness with voluntary guarding. Nausea vomiting did resolve after zofran. Found to be anemic as well as hypernatremic and hyperchloremic secondary to dehydration and chronic malnutrition. CT abdomen does show some mild periportal edema but no acute findings. After much encouragement, patient did give urine sample which was positive for UTI and was started on IV antibiotics. Patient with continued abdominal pain despite pain medication. Will admit to observation unit for IV antibiotics, pain control and GI evaluation. PROCEDURES:  None    CONSULTS:  None    CRITICAL CARE:  Please see attending note    FINAL IMPRESSION      1. Acute cystitis with hematuria    2. Intractable abdominal pain    3.  Non-intractable vomiting with nausea, unspecified vomiting type        DISPOSITION / PLAN     DISPOSITION      Admit to observation for IV antibiotics for UTI, pain control and GI evaluation. PATIENTREFERRED TO:  No follow-up provider specified.     DISCHARGE MEDICATIONS:  Current Discharge Medication List          Ihsan Rebolledo MD  Emergency Medicine Resident    (Please note that portions of this note were completed with a voice recognition program.  Efforts were made to edit the dictations but occasionally words are mis-transcribed.)         Mirna Queen MD  Resident  04/11/22 2009

## 2022-04-12 VITALS
RESPIRATION RATE: 18 BRPM | BODY MASS INDEX: 15.08 KG/M2 | DIASTOLIC BLOOD PRESSURE: 82 MMHG | WEIGHT: 90.5 LBS | TEMPERATURE: 98.4 F | HEIGHT: 65 IN | HEART RATE: 81 BPM | OXYGEN SATURATION: 100 % | SYSTOLIC BLOOD PRESSURE: 115 MMHG

## 2022-04-12 DIAGNOSIS — R10.84 GENERALIZED ABDOMINAL PAIN: ICD-10-CM

## 2022-04-12 LAB
EKG ATRIAL RATE: 62 BPM
EKG P AXIS: 69 DEGREES
EKG P-R INTERVAL: 96 MS
EKG Q-T INTERVAL: 438 MS
EKG QRS DURATION: 74 MS
EKG QTC CALCULATION (BAZETT): 444 MS
EKG R AXIS: 84 DEGREES
EKG T AXIS: 62 DEGREES
EKG VENTRICULAR RATE: 62 BPM
GLUCOSE BLD-MCNC: 101 MG/DL (ref 65–105)
GLUCOSE BLD-MCNC: 130 MG/DL (ref 65–105)
GLUCOSE BLD-MCNC: 146 MG/DL (ref 65–105)
GLUCOSE BLD-MCNC: 80 MG/DL (ref 65–105)

## 2022-04-12 PROCEDURE — 2580000003 HC RX 258: Performed by: STUDENT IN AN ORGANIZED HEALTH CARE EDUCATION/TRAINING PROGRAM

## 2022-04-12 PROCEDURE — G0378 HOSPITAL OBSERVATION PER HR: HCPCS

## 2022-04-12 PROCEDURE — 82947 ASSAY GLUCOSE BLOOD QUANT: CPT

## 2022-04-12 PROCEDURE — 2500000003 HC RX 250 WO HCPCS: Performed by: STUDENT IN AN ORGANIZED HEALTH CARE EDUCATION/TRAINING PROGRAM

## 2022-04-12 PROCEDURE — 6360000002 HC RX W HCPCS: Performed by: STUDENT IN AN ORGANIZED HEALTH CARE EDUCATION/TRAINING PROGRAM

## 2022-04-12 PROCEDURE — 96375 TX/PRO/DX INJ NEW DRUG ADDON: CPT

## 2022-04-12 PROCEDURE — 6370000000 HC RX 637 (ALT 250 FOR IP): Performed by: STUDENT IN AN ORGANIZED HEALTH CARE EDUCATION/TRAINING PROGRAM

## 2022-04-12 PROCEDURE — 96376 TX/PRO/DX INJ SAME DRUG ADON: CPT

## 2022-04-12 RX ORDER — PANTOPRAZOLE SODIUM 40 MG/1
40 TABLET, DELAYED RELEASE ORAL
Qty: 30 TABLET | Refills: 0 | Status: SHIPPED | OUTPATIENT
Start: 2022-04-12

## 2022-04-12 RX ORDER — ONDANSETRON 4 MG/1
4 TABLET, ORALLY DISINTEGRATING ORAL EVERY 8 HOURS PRN
Qty: 6 TABLET | Refills: 0 | Status: SHIPPED | OUTPATIENT
Start: 2022-04-12 | End: 2022-04-14

## 2022-04-12 RX ORDER — FAMOTIDINE 20 MG/1
20 TABLET, FILM COATED ORAL NIGHTLY
Qty: 30 TABLET | Refills: 0 | Status: SHIPPED | OUTPATIENT
Start: 2022-04-12

## 2022-04-12 RX ORDER — CEPHALEXIN 500 MG/1
500 CAPSULE ORAL 2 TIMES DAILY
Qty: 14 CAPSULE | Refills: 0 | Status: SHIPPED | OUTPATIENT
Start: 2022-04-12 | End: 2022-04-19

## 2022-04-12 RX ORDER — FEEDER CONTAINER WITH PUMP SET
1 EACH MISCELLANEOUS 3 TIMES DAILY
Qty: 90 EACH | Refills: 3 | Status: SHIPPED | OUTPATIENT
Start: 2022-04-12 | End: 2022-04-13 | Stop reason: SDUPTHER

## 2022-04-12 RX ADMIN — SODIUM CHLORIDE: 9 INJECTION, SOLUTION INTRAVENOUS at 09:55

## 2022-04-12 RX ADMIN — IBUPROFEN 400 MG: 400 TABLET, FILM COATED ORAL at 08:45

## 2022-04-12 RX ADMIN — CEFTRIAXONE SODIUM 1000 MG: 1 INJECTION, POWDER, FOR SOLUTION INTRAMUSCULAR; INTRAVENOUS at 17:06

## 2022-04-12 RX ADMIN — ONDANSETRON 4 MG: 2 INJECTION INTRAMUSCULAR; INTRAVENOUS at 08:26

## 2022-04-12 RX ADMIN — ACETAMINOPHEN 650 MG: 325 TABLET ORAL at 08:48

## 2022-04-12 RX ADMIN — ONDANSETRON 4 MG: 2 INJECTION INTRAMUSCULAR; INTRAVENOUS at 17:05

## 2022-04-12 RX ADMIN — ACETAMINOPHEN 650 MG: 325 TABLET ORAL at 17:05

## 2022-04-12 RX ADMIN — IBUPROFEN 400 MG: 400 TABLET, FILM COATED ORAL at 17:05

## 2022-04-12 RX ADMIN — CEFTRIAXONE SODIUM 1000 MG: 1 INJECTION, POWDER, FOR SOLUTION INTRAMUSCULAR; INTRAVENOUS at 09:40

## 2022-04-12 RX ADMIN — FAMOTIDINE 40 MG: 20 TABLET, FILM COATED ORAL at 08:24

## 2022-04-12 RX ADMIN — ACETAMINOPHEN 650 MG: 325 TABLET ORAL at 13:02

## 2022-04-12 RX ADMIN — LISINOPRIL 2.5 MG: 2.5 TABLET ORAL at 08:24

## 2022-04-12 ASSESSMENT — PAIN DESCRIPTION - LOCATION
LOCATION: ABDOMEN
LOCATION: ABDOMEN

## 2022-04-12 ASSESSMENT — PAIN SCALES - GENERAL
PAINLEVEL_OUTOF10: 2
PAINLEVEL_OUTOF10: 7
PAINLEVEL_OUTOF10: 5
PAINLEVEL_OUTOF10: 7
PAINLEVEL_OUTOF10: 5
PAINLEVEL_OUTOF10: 6
PAINLEVEL_OUTOF10: 7
PAINLEVEL_OUTOF10: 0

## 2022-04-12 ASSESSMENT — PAIN DESCRIPTION - PAIN TYPE
TYPE: ACUTE PAIN
TYPE: ACUTE PAIN

## 2022-04-12 ASSESSMENT — PAIN - FUNCTIONAL ASSESSMENT
PAIN_FUNCTIONAL_ASSESSMENT: ACTIVITIES ARE NOT PREVENTED
PAIN_FUNCTIONAL_ASSESSMENT: ACTIVITIES ARE NOT PREVENTED

## 2022-04-12 ASSESSMENT — PAIN DESCRIPTION - PROGRESSION
CLINICAL_PROGRESSION: GRADUALLY IMPROVING
CLINICAL_PROGRESSION: GRADUALLY IMPROVING

## 2022-04-12 ASSESSMENT — PAIN DESCRIPTION - ONSET
ONSET: ON-GOING
ONSET: ON-GOING

## 2022-04-12 ASSESSMENT — PAIN DESCRIPTION - FREQUENCY
FREQUENCY: INTERMITTENT
FREQUENCY: INTERMITTENT

## 2022-04-12 ASSESSMENT — PAIN SCALES - WONG BAKER
WONGBAKER_NUMERICALRESPONSE: 2
WONGBAKER_NUMERICALRESPONSE: 0

## 2022-04-12 ASSESSMENT — PAIN DESCRIPTION - DESCRIPTORS
DESCRIPTORS: STABBING
DESCRIPTORS: ACHING

## 2022-04-12 ASSESSMENT — PAIN DESCRIPTION - ORIENTATION
ORIENTATION: LOWER;MID
ORIENTATION: LOWER;MID

## 2022-04-12 NOTE — PROGRESS NOTES
CLINICAL PHARMACY NOTE: MEDS TO BEDS    Total # of Prescriptions Filled: 3   The following medications were delivered to the patient:  · Ondansetron  · Famotidine  · Keflex    Additional Documentation:

## 2022-04-12 NOTE — PROGRESS NOTES
[facility]  CDU / OBSERVATION ENCOUNTER  ATTENDING NOTE       I performed a history and physical examination of the patient and discussed management with the resident or midlevel provider. I reviewed the resident or midlevel provider's note and agree with the documented findings and plan of care. Any areas of disagreement are noted on the chart. I was personally present for the key portions of any procedures. I have documented in the chart those procedures where I was not present during the key portions. I have reviewed the nurses notes. I agree with the chief complaint, past medical history, past surgical history, allergies, medications, social and family history as documented unless otherwise noted below. The Family history, social history, and ROS are effectively unchanged since admission unless noted elsewhere in the chart. Patient with abdominal discomfort and intractable nausea vomiting. Patient unable to tolerate p.o. Patient per ER note typically only eats fruit and potatoes. Patient has diabetes. Patient has need for ongoing IV hydration. History of marijuana use as well. CT negative. Patient dehydrated and with urinary tract infection. Requiring IV fluids and hydration. Patient for glucose monitoring. Patient for laboratory monitoring. Patient for IV antibiotics.     Rick Leon MD  Attending Emergency  Physician

## 2022-04-12 NOTE — PLAN OF CARE
Problem: Pain:  Goal: Pain level will decrease  Description: Pain level will decrease  4/12/2022 0926 by Nanci Gatica RN  Outcome: Ongoing  4/12/2022 0036 by Eagle Quezada RN  Outcome: Ongoing  Goal: Control of acute pain  Description: Control of acute pain  4/12/2022 0926 by Nanci Gatica RN  Outcome: Ongoing  4/12/2022 0036 by Eagle Quezada RN  Outcome: Ongoing  Goal: Control of chronic pain  Description: Control of chronic pain  4/12/2022 0926 by Nanci Gatica RN  Outcome: Ongoing  4/12/2022 0036 by Eagle Quezada RN  Outcome: Ongoing     Problem: Pain:  Goal: Pain level will decrease  Description: Pain level will decrease  4/12/2022 0926 by Nanci Gatica RN  Outcome: Ongoing  4/12/2022 0036 by Eagle Quezada RN  Outcome: Ongoing  Goal: Control of acute pain  Description: Control of acute pain  4/12/2022 0926 by Nanci Gatica RN  Outcome: Ongoing  4/12/2022 0036 by Eagle Quezada RN  Outcome: Ongoing  Goal: Control of chronic pain  Description: Control of chronic pain  4/12/2022 0926 by Nanci Gatica RN  Outcome: Ongoing  4/12/2022 0036 by Eagle Quezada RN  Outcome: Ongoing

## 2022-04-12 NOTE — H&P
[facility]  CDU / OBSERVATION eNCOUnter  Resident Note     Pt Name: Maribel Del Cid  MRN: [id number]  [place] 1995  Date of evaluation: 4/12/22  Patient's PCP is :  Crispin Villanueva MD    [address] [place] [address]       Chief Complaint   Patient presents with    Abdominal Pain         HISTORY OF PRESENT ILLNESS    Maribel Del Cid is a 27 y.o. female who presented to the emergency department with abdominal pain, nausea, vomiting for 4 days. She was unable to tolerate any oral intake or take any medications at home. Patient was noted to only eat potatoes and fruit and has a protein calorie malnutrition. Patient has history of marijuana use, intractable vomiting and type 1 diabetes with polyneuropathy. In the emergency department, she was initially tachypneic, this resolved once pain was treated. Her nausea and vomiting did resolve after some Zofran. She was found to be anemic as well as hyponatremic and hypochloremic secondary to dehydration and malnutrition. CT abdomen showed some mild periportal edema but there are otherwise no acute findings. Patient eventually had a urinalysis performed which was positive for UTI and patient was started on IV antibiotics. Patient was placed in the observation unit for IV antibiotics, pain control and potential GI evaluation. At time evaluation this morning, patient has had significant provement in pain, nausea, vomiting. Has been able to tolerate oral intake. Is comfortable being discharged home this afternoon.     Location/Symptom: Abdominal  Timing/Onset: 4 days ago  Provocation: Eating/drinking  Quality: Cramping  Radiation: None  Severity: 8 out of 10  Timing/Duration: Persistent  Modifying Factors: Improved with pain medication, IV antibiotics    REVIEW OF SYSTEMS     General ROS - No fevers, No malaise   Ophthalmic ROS - No discharge, No changes in vision  ENT ROS -  No sore throat, No rhinorrhea,   Respiratory ROS - no shortness of breath, no cough, no  wheezing  Cardiovascular ROS - No chest pain, no dyspnea on exertion  Gastrointestinal ROS - +abdominal pain, no nausea or vomiting, no change in bowel habits, no black or bloody stools  Genito-Urinary ROS - No dysuria, trouble voiding, or hematuria  Musculoskeletal ROS - No myalgias, No arthalgias  Neurological ROS - No headache, no dizziness/lightheadedness, No focal weakness, no loss of sensation  Dermatological ROS - No lesions, No rash     (PQRS) Advance directives on face sheet per hospital policy. No change unless specifically mentioned in chart    PAST MEDICAL HISTORY    has a past medical history of Diabetes mellitus ([facility] [address] Diabetic neuropathy, type II diabetes mellitus ([facility] [address] Hypertension, Kidney infection, MRSA (methicillin resistant staph aureus) culture positive, Nausea & vomiting, Ovarian cyst, and UTI (urinary tract infection). I have reviewed the past medical history with the patient and it is pertinent to this complaint. SURGICAL HISTORY      has a past surgical history that includes  section and Upper gastrointestinal endoscopy (N/A, 2021). I have reviewed and agree with Surgical History entered and it is pertinent to this complaint.      CURRENT MEDICATIONS     acetaminophen (TYLENOL) tablet 500 mg, Q6H PRN  famotidine (PEPCID) tablet 40 mg, Daily  ibuprofen (ADVIL;MOTRIN) tablet 400 mg, Q6H PRN  insulin glargine (LANTUS) injection vial 10 Units, Nightly  lisinopril (PRINIVIL;ZESTRIL) tablet 2.5 mg, Daily  rOPINIRole (REQUIP) tablet 0.25 mg, Nightly  glucose (GLUTOSE) 40 % oral gel 15 g, PRN  dextrose 50 % IV solution, PRN  glucagon (rDNA) injection 1 mg, PRN  dextrose 5 % solution, PRN  insulin lispro (HUMALOG) injection vial 0-6 Units, TID WC  insulin lispro (HUMALOG) injection vial 0-3 Units, Nightly  cefTRIAXone (ROCEPHIN) 1,000 mg in sterile water 10 mL IV syringe, Q12H  0.9 % sodium chloride infusion, Continuous  sodium chloride flush 0.9 % injection 5-40 mL, 2 times per day  sodium chloride flush 0.9 % injection 5-40 mL, PRN  0.9 % sodium chloride infusion, PRN  acetaminophen (TYLENOL) tablet 650 mg, Q4H PRN  ondansetron (ZOFRAN-ODT) disintegrating tablet 4 mg, Q8H PRN   Or  ondansetron (ZOFRAN) injection 4 mg, Q6H PRN  promethazine (PHENERGAN) injection 6.25 mg, Q6H PRN        All medication charted and reviewed. ALLERGIES     is allergic to blueberry [vaccinium angustifolium] and shrimp flavor. FAMILY HISTORY     She indicated that her mother is . She indicated that her father is . family history is not on file. The patient denies any pertinent family history. I have reviewed and agree with the family history entered. I have reviewed the Family History and it is not significant to the case    SOCIAL HISTORY      reports that she has been smoking cigarettes. She has been smoking about 0.50 packs per day. She has never used smokeless tobacco. She reports current drug use. Drug: Marijuana Gaynelle Waterman). She reports that she does not drink alcohol. I have reviewed and agree with all Social.  There are no concerns for substance abuse/use. PHYSICAL EXAM     INITIAL VITALS:  height is 5' 5\" (1.651 m) and weight is 94 lb (42.6 kg). Her oral temperature is 98.1 °F (36.7 °C). Her blood pressure is 153/111 (abnormal) and her pulse is 68. Her respiration is 18 and oxygen saturation is 96%.       CONSTITUTIONAL: AOx4, no apparent distress, appears stated age    HEAD: normocephalic, atraumatic   EYES: PERRLA, EOMI    ENT: moist mucous membranes,  poor dentition   NECK: supple, symmetric   BACK: symmetric   LUNGS: clear to auscultation bilaterally   CARDIOVASCULAR: regular rate and rhythm, no murmurs, rubs or gallops   ABDOMEN: soft, non-tender, non-distended with normal active bowel sounds   NEUROLOGIC:  MAEx4, no focal sensory or motor deficits   MUSCULOSKELETAL: no clubbing, cyanosis or edema   SKIN: no rash or wounds DIFFERENTIAL DIAGNOSIS/MDM:   Abdominal Pain:  DDX: GERD, PUD, pancreatitis, cholecystitis, GB colic, cholangitis, Zqmf-Dqdg-Dizcxe, ACS/ MI, pneumonia, SBO, DKA, AAA, mesenteric ischemia, perforated viscous, acute gastroenteritis, NSAP, pyelonephritis, kidney stone, appendicitis, hernia, D-TICS, testicular torsion, ectopic, ovarian torsion, ovarian cyst, PID, Mittelschmerz, period/ fibroid, UTI, constipation, epididymitis/ orchitis  Ransons criteria: WBC>16, age >49, glucose>200, AST>80, LDH>350  Evaluate for: EtOH abuse, ACS risk factors, point tenderness, rebound, guardingm Corbin sign, GB US (stone, sono Corbin, wall thick>3mm, CBD>6mm)    DIAGNOSTIC RESULTS   RADIOLOGY:   I directly visualized the following  images and reviewed the radiologist interpretations:    CT ABDOMEN PELVIS W IV CONTRAST Additional Contrast? None    Result Date: 4/11/2022  EXAMINATION: CT OF THE ABDOMEN AND PELVIS WITH CONTRAST 4/11/2022 1:50 pm TECHNIQUE: CT of the abdomen and pelvis was performed with the administration of intravenous contrast. Multiplanar reformatted images are provided for review. Dose modulation, iterative reconstruction, and/or weight based adjustment of the mA/kV was utilized to reduce the radiation dose to as low as reasonably achievable. COMPARISON: Multiple prior examinations most recently 05/21/2020 HISTORY: ORDERING SYSTEM PROVIDED HISTORY: diffuse abdominal pain FINDINGS: Lower Chest: Normal heart size. Lung bases clear. Organs: Mild periportal edema. Otherwise liver, spleen, pancreas, adrenal glands and kidneys appear unremarkable. Gallbladder appears slightly elongated but otherwise appears unremarkable GI/Bowel: No evidence of bowel obstruction or inflammation. Normal appendix. Pelvis: Bladder and uterus appear unremarkable Peritoneum/Retroperitoneum: Normal caliber abdominal aorta with mild infrarenal atherosclerosis. Few stable borderline to mildly prominent left retroperitoneal lymph nodes. No ascites or free air. Bones/Soft Tissues: No significant osseous abnormality. *No evidence of bowel obstruction or definite inflammation including normal appendix. *Mild periportal edema which is nonspecific but may relate to hydration status, hepatocellular disease or be reactive in nature. *Few incidental/chronic findings as described. LABS:  I have reviewed and interpreted all available lab results.   Labs Reviewed   CBC WITH AUTO DIFFERENTIAL - Abnormal; Notable for the following components:       Result Value    RBC 2.58 (*)     Hemoglobin 9.5 (*)     Hematocrit 26.1 (*)     MCH 36.8 (*)     MCHC 36.4 (*)     RDW 16.4 (*)     Eosinophils % 0 (*)     Immature Granulocytes 1 (*)     All other components within normal limits   COMPREHENSIVE METABOLIC PANEL - Abnormal; Notable for the following components:    Glucose 161 (*)     CREATININE 0.94 (*)     Calcium 8.5 (*)     Sodium 146 (*)     Chloride 111 (*)     Anion Gap 8 (*)     Albumin 2.7 (*)     Albumin/Globulin Ratio 0.7 (*)     All other components within normal limits   LIPASE - Abnormal; Notable for the following components:    Lipase 10 (*)     All other components within normal limits   URINALYSIS WITH MICROSCOPIC - Abnormal; Notable for the following components:    Turbidity UA Cloudy (*)     Specific Gravity, UA 1.036 (*)     Urine Hgb TRACE (*)     Protein, UA NEGATIVE  Verified by sulfosalicylic acid test. (*)     Nitrite, Urine POSITIVE (*)     Leukocyte Esterase, Urine LARGE (*)     Bacteria, UA MANY (*)     All other components within normal limits   POC GLUCOSE FINGERSTICK - Abnormal; Notable for the following components:    POC Glucose 160 (*)     All other components within normal limits   POC GLUCOSE FINGERSTICK - Abnormal; Notable for the following components:    POC Glucose 141 (*)     All other components within normal limits   POC GLUCOSE FINGERSTICK - Abnormal; Notable for the following components:    POC Glucose 136 (*) All other components within normal limits   POC GLUCOSE FINGERSTICK - Abnormal; Notable for the following components:    POC Glucose 123 (*)     All other components within normal limits   POCT GLUCOSE - Normal   COVID-19, RAPID   MAGNESIUM   HCG, SERUM, QUALITATIVE       CDU IMPRESSION / PLAN      Tran Hung is a 27 y.o. female who presents with abdominal pain secondary to urinary tract infection    Urinary tract infection  -Patient has been receiving IV ceftriaxone  -Improvement in symptoms this morning, has been able to tolerate oral intake  -Plan on discharging home on course of Keflex    · Continue home medications and pain control  · Monitor vitals, labs, and imaging  · DISPO: Likely discharge home this afternoon    CONSULTS:    None    PROCEDURES:  Not indicated       PATIENT REFERRED TO:    No follow-up provider specified. --  Re Vernon DO   Emergency Medicine Resident     This dictation was generated by voice recognition computer software. Although all attempts are made to edit the dictation for accuracy, there may be errors in the transcription that are not intended.

## 2022-04-12 NOTE — PROGRESS NOTES
Comprehensive Nutrition Assessment    Type and Reason for Visit:  Positive Nutrition Screen (wt loss)    Nutrition Recommendations/Plan:   - Continue current diet, Regular  - Will send Diabetic ONS TID  - Monitor/encourage PO intake    Nutrition Assessment:  29yo F admitted w/ n/v and poor po x 4 days, found to have UTI. PMH significant for T1DM, HTN, neuropathy. Pt w/ 9% wt loss x 11 months (not significant). Pt not in room at visit. Labs/meds reviewed. Malnutrition Assessment:  Malnutrition Status: At risk for malnutrition (Comment)    Context:  Acute Illness     Findings of the 6 clinical characteristics of malnutrition:  Energy Intake:  7 - 50% or less of estimated energy requirements for 5 or more days  Weight Loss:  No significant weight loss     Body Fat Loss:  Unable to assess     Muscle Mass Loss:  Unable to assess    Fluid Accumulation:  No significant fluid accumulation     Strength:  Not Performed    Estimated Daily Nutrient Needs:  Energy (kcal):  1400 to 1600 kcal/day (35-40 kcal/kg); Weight Used for Energy Requirements:  Admission     Protein (g):  60 to 70 g/day (1.5-1.7 g/kg); Weight Used for Protein Requirements:  Current        Fluid (ml/day):  Per MD; Method Used for Fluid Requirements:  Other (Comment)      Nutrition Related Findings:  Labs/meds reviewed. No edema noted. No BM noted. Wounds:  None       Current Nutrition Therapies:    ADULT DIET;  Regular  ADULT ORAL NUTRITION SUPPLEMENT; Breakfast, Lunch, Dinner; Diabetic Oral Supplement    Anthropometric Measures:  · Height: 5' 5\" (165.1 cm)  · Current Body Weight: 90 lb 9.7 oz (41.1 kg) (4/12, bedscale)   · Admission Body Weight: 90 lb 9.7 oz (41.1 kg) (4/12, bedscale)    · Usual Body Weight: 100 lb (45.4 kg) (5/24/21, per chart)     · Ideal Body Weight: 125 lbs; % Ideal Body Weight 72.5 %   · BMI: 15.1  · BMI Categories: Underweight (BMI less than 18.5)       Nutrition Diagnosis:   · Inadequate oral intake related to pain,altered GI function as evidenced by vomiting,nausea,weight loss,poor intake prior to admission    Nutrition Interventions:   Food and/or Nutrient Delivery:  Continue Current Diet,Start Oral Nutrition Supplement  Nutrition Education/Counseling:  No recommendation at this time   Coordination of Nutrition Care:  Continue to monitor while inpatient    Goals:  Obtain >75% of nutrition needs via PO intake       Nutrition Monitoring and Evaluation:   Behavioral-Environmental Outcomes:  None Identified   Food/Nutrient Intake Outcomes:  Food and Nutrient Intake,Supplement Intake  Physical Signs/Symptoms Outcomes:  Biochemical Data,Nausea or Vomiting,GI Status,Nutrition Focused Physical Findings,Weight     Discharge Planning:    No discharge needs at this time     Electronically signed by Sally Coronado MS, RD, LD on 4/12/22 at 3:43 PM EDT    Contact: [phone]

## 2022-04-12 NOTE — CARE COORDINATION
Case Management Initial Discharge Plan  Bibi Saunders,             Met with:patient to discuss discharge plans. Information verified: address, contacts, phone number, , insurance Yes  Insurance Provider: [employer]/[facility]    Emergency Contact/Next of Kin name & number: brothya barrera  Who are involved in patient's support system? Family     PCP: Any Spangler MD  Date of last visit: 3/23      Discharge Planning    Living Arrangements:  Alone     Home has 1 stories  1 stairs to climb to get into front door    Patient able to perform ADL's:Independent    Current Services (outpatient & in home) none  DME equipment: none      Is patient receiving oral anticoagulation therapy? No        Does patient have any issues/concerns obtaining medications? No      Is there a preferred Pharmacy after hours or on weekends?   [facility]        Potential Assistance Needed:  F/u pcp         Evaluation: no      Patient expects to be discharged to:   home        Follow Up Appointment: Best Day/ Time:     Transportation provider:   Transportation arrangements needed for discharge: No    Readmission Risk              Risk of Unplanned Readmission:  0             Does patient have a readmission risk score greater than 14?: No  If yes, follow-up appointment must be made within 7 days of discharge.      Goals of Care: safe transition plan       Educated yes on transitional options, provided freedom of choice and are agreeable with plan      Discharge Plan: return home independently          Electronically signed by Hipolito Pate RN on 22 at 12:10 PM EDT

## 2022-04-12 NOTE — PROGRESS NOTES
Patient Discharged with belongings and  instructions given to patient, patient verbalizes understanding, patient picked up medications from pharmacy. Patient ambulated from unit to elevator and will meet with private family member to transport patient to private residence.

## 2022-04-13 ENCOUNTER — CARE COORDINATION (OUTPATIENT)
Dept: CASE MANAGEMENT | Age: 27
End: 2022-04-13

## 2022-04-13 ENCOUNTER — TELEPHONE (OUTPATIENT)
Dept: FAMILY MEDICINE CLINIC | Age: 27
End: 2022-04-13

## 2022-04-13 DIAGNOSIS — E10.42 TYPE 1 DIABETES MELLITUS WITH DIABETIC POLYNEUROPATHY (HCC): ICD-10-CM

## 2022-04-13 DIAGNOSIS — E43 PROTEIN-CALORIE MALNUTRITION, SEVERE (HCC): Primary | ICD-10-CM

## 2022-04-13 RX ORDER — IBUPROFEN 600 MG/1
TABLET ORAL
Qty: 120 TABLET | Refills: 1 | OUTPATIENT
Start: 2022-04-13

## 2022-04-13 RX ORDER — FEEDER CONTAINER WITH PUMP SET
1 EACH MISCELLANEOUS 3 TIMES DAILY
Qty: 100 EACH | Refills: 5 | Status: SHIPPED | OUTPATIENT
Start: 2022-04-13

## 2022-04-13 NOTE — DISCHARGE SUMMARY
CDU Discharge Summary        Patient:  Rosalia Martinez  YOB: 1995    MRN: [id number]   Acct: [de-identified]    Primary Care Physician: Angelita Trevizo MD    Admit date:  4/11/2022 12:07 PM  Discharge date: 4/12/2022  6:20 PM      Discharge Diagnoses:     1.)  Abdominal pain acute onset secondary to urinary tract infection, treated with IV antibiotics and antiemetics. Patient with significant improvement and discharged in good condition on orals. Follow-up:  Call today/tomorrow for a follow up appointment with Angelita Trevizo MD , or return to the Emergency Room with worsening symptoms    Stressed to patient the importance of following up with primary care doctor for further workup/management of symptoms. Pt verbalizes understanding and agrees with plan. Discharge Medication Changes:       Medication List      START taking these medications    cephALEXin 500 MG capsule  Commonly known as: KEFLEX  Take 1 capsule by mouth 2 times daily for 7 days        CHANGE how you take these medications    famotidine 20 MG tablet  Commonly known as: PEPCID  Take 1 tablet by mouth nightly  What changed:   · medication strength  · See the new instructions. ondansetron 4 MG disintegrating tablet  Commonly known as: ZOFRAN-ODT  Place 1 tablet under the tongue every 8 hours as needed for Nausea or Vomiting  What changed: how to take this     pantoprazole 40 MG tablet  Commonly known as: PROTONIX  Take 1 tablet by mouth every morning (before breakfast)  What changed: See the new instructions.         CONTINUE taking these medications    Acetaminophen Extra Strength 500 MG tablet  Generic drug: acetaminophen  TAKE 2 TABLETS BY MOUTH EVERY 6 HOURS AS NEEDED FOR PAIN     Alcohol Pads 70 % Pads  Apply 1 each topically 4 times daily     dicyclomine 10 MG capsule  Commonly known as: BENTYL     Droplet Pen Needles 31G X 8 MM Misc  Generic drug: Insulin Pen Needle  USE FOUR TIMES DAILY AS DIRECTED     Ensure High Protein Liqd  Take 1 Bottle by mouth 3 times daily      MG tablet  Generic drug: ibuprofen  TAKE 1 TABLET FOUR TIMES DAILY AS NEEDED FOR PAIN     ketoconazole 2 % cream  Commonly known as: NIZORAL  APPLY TOPICALLY DAILY. Lantus SoloStar 100 UNIT/ML injection pen  Generic drug: insulin glargine  Inject 10 Units into the skin nightly     lisinopril 2.5 MG tablet  Commonly known as: PRINIVIL;ZESTRIL  TAKE 1 TABLET EVERY DAY     NovoLOG FlexPen 100 UNIT/ML injection pen  Generic drug: insulin aspart  INJECT 2 TO 12 UNITS SUBCUTANEOUSLY THREE TIMES DAILY PER SLIDING SCALE AS DIRECTED (DISCARD PEN 28 DAYS AFTER OPENING)     rOPINIRole 0.25 MG tablet  Commonly known as: REQUIP  TAKE 1 TABLET EVERY NIGHT     True Metrix Blood Glucose Test strip  Generic drug: blood glucose test strips  CHECK BLOOD SUGAR FOUR TIMES DAILY AS DIRECTED     TRUEplus Lancets 33G Misc  TEST BLOOD SUGAR FOUR TIMES DAILY           Where to Get Your Medications      These medications were sent to [address] [place]  [address]    Phone: [phone]   · cephALEXin 500 MG capsule  · Ensure High Protein Liqd  · famotidine 20 MG tablet  · ondansetron 4 MG disintegrating tablet  · pantoprazole 40 MG tablet         Diet:  No diet orders on file, advance as tolerated     Activity:  As tolerated    Consultants: None    Procedures:  Not indicated      Diagnostic Test:   Results for orders placed or performed during the hospital encounter of 04/11/22   COVID-19, Rapid    Specimen: Nasopharyngeal Swab   Result Value Ref Range    Specimen Description . NASOPHARYNGEAL SWAB     SARS-CoV-2, Rapid Not Detected Not Detected   CBC with Auto Differential   Result Value Ref Range    WBC 6.2 3.5 - 11.3 k/uL    RBC 2.58 (L) 3.95 - 5.11 m/uL    Hemoglobin 9.5 (L) 11.9 - 15.1 g/dL    Hematocrit 26.1 (L) 36.3 - 47.1 %    .2 82.6 - 102.9 fL    MCH 36.8 (H) 25.2 - 33.5 pg    MCHC 36.4 (H) 28.4 - 34.8 g/dL    RDW 16.4 (H) 11.8 - 14.4 %    Platelets 461 063 - 927 k/uL    MPV 9.9 8.1 - 13.5 fL    NRBC Automated 0.0 0.0 per 100 WBC    Seg Neutrophils 57 36 - 65 %    Lymphocytes 34 24 - 43 %    Monocytes 8 3 - 12 %    Eosinophils % 0 (L) 1 - 4 %    Basophils 0 0 - 2 %    Immature Granulocytes 1 (H) 0 %    Segs Absolute 3.51 1.50 - 8.10 k/uL    Absolute Lymph # 2.12 1.10 - 3.70 k/uL    Absolute Mono # 0.51 0.10 - 1.20 k/uL    Absolute Eos # <0.03 0.00 - 0.44 k/uL    Basophils Absolute <0.03 0.00 - 0.20 k/uL    Absolute Immature Granulocyte 0.03 0.00 - 0.30 k/uL    RBC Morphology ANISOCYTOSIS PRESENT    Comprehensive Metabolic Panel   Result Value Ref Range    Glucose 161 (H) 70 - 99 mg/dL    BUN 8 6 - 20 mg/dL    CREATININE 0.94 (H) 0.50 - 0.90 mg/dL    Calcium 8.5 (L) 8.6 - 10.4 mg/dL    Sodium 146 (H) 135 - 144 mmol/L    Potassium 3.7 3.7 - 5.3 mmol/L    Chloride 111 (H) 98 - 107 mmol/L    CO2 27 20 - 31 mmol/L    Anion Gap 8 (L) 9 - 17 mmol/L    Alkaline Phosphatase 88 35 - 104 U/L    ALT 11 5 - 33 U/L    AST 16 <32 U/L    Total Bilirubin 0.57 0.3 - 1.2 mg/dL    Total Protein 6.5 6.4 - 8.3 g/dL    Albumin 2.7 (L) 3.5 - 5.2 g/dL    Albumin/Globulin Ratio 0.7 (L) 1.0 - 2.5    GFR Non-African American >60 >60 mL/min    GFR African American >60 >60 mL/min    GFR Comment         Magnesium   Result Value Ref Range    Magnesium 1.6 1.6 - 2.6 mg/dL   Lipase   Result Value Ref Range    Lipase 10 (L) 13 - 60 U/L   HCG Qualitative, Serum   Result Value Ref Range    hCG Qual NEGATIVE NEGATIVE   Urinalysis with Microscopic   Result Value Ref Range    Color, UA Yellow Yellow    Turbidity UA Cloudy (A) Clear    Glucose, Ur NEGATIVE NEGATIVE    Bilirubin Urine NEGATIVE NEGATIVE    Ketones, Urine NEGATIVE NEGATIVE    Specific Gravity, UA 1.036 (H) 1.005 - 1.030    Urine Hgb TRACE (A) NEGATIVE    pH, UA 8.0 5.0 - 8.0    Protein, UA NEGATIVE  Verified by sulfosalicylic acid test. (A) NEGATIVE    Urobilinogen, Urine Normal Normal    Nitrite, Urine POSITIVE (A) NEGATIVE    Leukocyte Esterase, Urine LARGE (A) NEGATIVE    -          WBC, UA TOO NUMEROUS TO COUNT 0 - 5 /HPF    RBC, UA 2 TO 5 0 - 4 /HPF    Casts UA  0 - 8 /LPF     5 TO 10 HYALINE Reference range defined for non-centrifuged specimen. Epithelial Cells UA 2 TO 5 0 - 5 /HPF    Bacteria, UA MANY (A) None   POCT Glucose   Result Value Ref Range    Glucose 160 mg/dL    QC OK? ok    POC Glucose Fingerstick   Result Value Ref Range    POC Glucose 160 (H) 65 - 105 mg/dL   POC Glucose Fingerstick   Result Value Ref Range    POC Glucose 141 (H) 65 - 105 mg/dL   POC Glucose Fingerstick   Result Value Ref Range    POC Glucose 136 (H) 65 - 105 mg/dL   POC Glucose Fingerstick   Result Value Ref Range    POC Glucose 123 (H) 65 - 105 mg/dL   POC Glucose Fingerstick   Result Value Ref Range    POC Glucose 130 (H) 65 - 105 mg/dL   POC Glucose Fingerstick   Result Value Ref Range    POC Glucose 146 (H) 65 - 105 mg/dL   POC Glucose Fingerstick   Result Value Ref Range    POC Glucose 101 65 - 105 mg/dL   POC Glucose Fingerstick   Result Value Ref Range    POC Glucose 80 65 - 105 mg/dL   EKG for indication of epigastric pain   Result Value Ref Range    Ventricular Rate 62 BPM    Atrial Rate 62 BPM    P-R Interval 96 ms    QRS Duration 74 ms    Q-T Interval 438 ms    QTc Calculation (Bazett) 444 ms    P Axis 69 degrees    R Axis 84 degrees    T Axis 62 degrees     CT ABDOMEN PELVIS W IV CONTRAST Additional Contrast? None    Result Date: 4/11/2022  EXAMINATION: CT OF THE ABDOMEN AND PELVIS WITH CONTRAST 4/11/2022 1:50 pm TECHNIQUE: CT of the abdomen and pelvis was performed with the administration of intravenous contrast. Multiplanar reformatted images are provided for review. Dose modulation, iterative reconstruction, and/or weight based adjustment of the mA/kV was utilized to reduce the radiation dose to as low as reasonably achievable.  COMPARISON: Multiple prior examinations most recently 05/21/2020 HISTORY: ORDERING SYSTEM PROVIDED HISTORY: diffuse abdominal pain FINDINGS: Lower Chest: Normal heart size. Lung bases clear. Organs: Mild periportal edema. Otherwise liver, spleen, pancreas, adrenal glands and kidneys appear unremarkable. Gallbladder appears slightly elongated but otherwise appears unremarkable GI/Bowel: No evidence of bowel obstruction or inflammation. Normal appendix. Pelvis: Bladder and uterus appear unremarkable Peritoneum/Retroperitoneum: Normal caliber abdominal aorta with mild infrarenal atherosclerosis. Few stable borderline to mildly prominent left retroperitoneal lymph nodes. No ascites or free air. Bones/Soft Tissues: No significant osseous abnormality. *No evidence of bowel obstruction or definite inflammation including normal appendix. *Mild periportal edema which is nonspecific but may relate to hydration status, hepatocellular disease or be reactive in nature. *Few incidental/chronic findings as described. Physical Exam:    General appearance - NAD, AOx 3    Lungs -CTAB, no R/R/R  Heart - RRR, no M/R/G  Abdomen - Soft, NT/ND  Neurological:  MAEx4, No focal motor deficit, sensory loss  Extremities - Cap refil <2 sec in all ext., no edema  Skin -warm, dry      Hospital Course:  Clinical course has improved, labs and imaging reviewed. Lolis Mallory originally presented to the hospital on 4/11/2022 12:07 PM with abdominal pain with inability to handle p.o. Patient was admitted for IV hydration, treatment of nausea. At that time it was determined that She required further observation and IV hydration and reevaluation's. Patient received IV antibiotics. Patient was subsequently improved with her symptoms. Patient was subsequently discharged in good condition after proving ability to handle p.o.. Labs and imaging were followed daily. Imaging results as above.   She is medically stable to be discharged. Disposition: Home    Patient stated that they will not drive themselves home from the hospital if they have gotten pain killers/ narcotics earlier that day and that they will arrange for transportation on their own or work with the  for a ride. Patient counseled NOT to drive while under the influence of narcotics/ pain killers. Condition: Good    Patient stable and ready for discharge home. I have discussed plan of care with patient and they are in understanding. They were instructed to read discharge paperwork. All of their questions and concerns were addressed. Time Spent: 0 day      --  Sonam Radford MD  Emergency Medicine Attending Physician    This dictation was generated by voice recognition computer software. Although all attempts are made to edit the dictation for accuracy, there may be errors in the transcription that are not intended.

## 2022-04-13 NOTE — TELEPHONE ENCOUNTER
Pt called stated she was discharged from the hospital yesterday 4/12/2022, states a prescription for Ensure was sent to the [facility]. 's pharmacy for her, it is not covered by insurance. Can we get pt a new prescription for the Ensure to fax to [address] [place] [address].

## 2022-04-14 ENCOUNTER — CARE COORDINATION (OUTPATIENT)
Dept: CASE MANAGEMENT | Age: 27
End: 2022-04-14

## 2022-04-14 DIAGNOSIS — N39.0 URINARY TRACT INFECTION WITHOUT HEMATURIA, SITE UNSPECIFIED: Primary | ICD-10-CM

## 2022-04-14 PROCEDURE — 1111F DSCHRG MED/CURRENT MED MERGE: CPT | Performed by: INTERNAL MEDICINE

## 2022-04-14 NOTE — CARE COORDINATION
[facility] Transitions Initial Follow Up Call    Call within 2 business days of discharge: No    Patient: Lucian Nazario Patient : 1995   MRN: [id number]  Reason for Admission: Abdominal pain, Acute cystitis with hematuria  Discharge Date: 22 RARS: No data recorded    Last Discharge [facility]       Complaint Diagnosis Description Type Department Provider    22 Abdominal Pain Acute cystitis with hematuria . .. ED to Hosp-Admission (Discharged) (ADMITTED) EVERETTE Thomson MD; Kiran Espinoza .. Spoke with: Patient    Facility: [facility]    Non-face-to-face services provided:  Obtained and reviewed discharge summary and/or continuity of care documents     Spoke with patient who said she is feeling ok today. She still has some abdominal pain to lower abdomen that she said is intermittent, will last an hour or so. She also c/o diarrhea stools multiple times a day. States after she eats, it goes right through her. She denies any f/c, n/v, blood in urine or dysuria, she has been taking her Keflex. She states the diarrhea had started prior to her admission. I offered to schedule her PCP appointment which she is agreeable to. Denies any other needs or concerns. Transitions of Care Initial Call    Was this an external facility discharge? No Discharge Facility: [facility]    Challenges to be reviewed by the provider   Additional needs identified to be addressed with provider: No  none             Method of communication with provider : none      Advance Care Planning:   Does patient have an Advance Directive: reviewed and needs to be updated. Was this a readmission? No  Patient stated reason for admission: UTI    Patients top risk factors for readmission: medical condition-DM    Care Transition Nurse (CTN) contacted the patient by telephone to perform post hospital discharge assessment. Verified name and  with patient as identifiers.  Provided introduction to self, and explanation of the CTN role. CTN reviewed discharge instructions, medical action plan and red flags with patient who verbalized understanding. Patient given an opportunity to ask questions and does not have any further questions or concerns at this time. Were discharge instructions available to patient? Yes. Reviewed appropriate site of care based on symptoms and resources available to patient including: PCP. The patient agrees to contact the PCP office for questions related to their healthcare. Medication reconciliation was performed with patient, who verbalizes understanding of administration of home medications. Advised obtaining a 90-day supply of all daily and as-needed medications. Covid Risk Education     Educated patient about risk for severe COVID-19 due to risk factors according to CDC guidelines. CTN reviewed discharge instructions, medical action plan and red flag symptoms with the patient who verbalized understanding. Discussed COVID vaccination status: Yes. Education provided on COVID-19 vaccination as appropriate. Discussed exposure protocols and quarantine with CDC Guidelines. Patient was given an opportunity to verbalize any questions and concerns and agrees to contact CTN or health care provider for questions related to their healthcare. Reviewed and educated patient on any new and changed medications related to discharge diagnosis. Was patient discharged with a pulse oximeter? No     CTN provided contact information. Plan for follow-up call in 5-7 days based on severity of symptoms and risk factors. Plan for next call: check on diarrhea, abdominal pain          Care Transitions 24 Hour Call    Do you have any ongoing symptoms?: Yes  Patient-reported symptoms: Abdominal Pain (Comment: c/o low abdominal pain, intermittent.   C/o diarrhea stools)  Do you have a copy of your discharge instructions?: Yes  Do you have all of your prescriptions and are they filled?: Yes  Have you been contacted by a [address]?: No  Have you scheduled your follow up appointment?: No  Were you discharged with any Home Care or Post Acute Services: No  Do you feel like you have everything you need to keep you well at home?: Yes  Care Transitions Interventions         Follow Up  Future Appointments   Date Time Provider Hayden Coto   4/19/2022  8:30 AM [facility]  [facility]   4/19/2022  8:30 AM Abby Donaldson MD [address]   5/2/2022  2:00 PM Isabell Huffman MD [facility] [address]   5/4/2022  8:30 AM Sherlyn Bragg DO [id number]   7/25/2022  2:15 PM Emperatriz Saunders, [address] [place] [address] [address], RN

## 2022-04-15 ENCOUNTER — OFFICE VISIT (OUTPATIENT)
Dept: FAMILY MEDICINE CLINIC | Age: 27
End: 2022-04-15
Payer: MEDICARE

## 2022-04-15 VITALS
HEART RATE: 85 BPM | TEMPERATURE: 97.2 F | DIASTOLIC BLOOD PRESSURE: 84 MMHG | WEIGHT: 98.6 LBS | BODY MASS INDEX: 16.41 KG/M2 | SYSTOLIC BLOOD PRESSURE: 136 MMHG | OXYGEN SATURATION: 98 %

## 2022-04-15 DIAGNOSIS — D50.9 IRON DEFICIENCY ANEMIA, UNSPECIFIED IRON DEFICIENCY ANEMIA TYPE: ICD-10-CM

## 2022-04-15 DIAGNOSIS — R19.7 DIARRHEA, UNSPECIFIED TYPE: ICD-10-CM

## 2022-04-15 DIAGNOSIS — R10.84 GENERALIZED ABDOMINAL PAIN: ICD-10-CM

## 2022-04-15 DIAGNOSIS — N30.01 ACUTE CYSTITIS WITH HEMATURIA: Primary | ICD-10-CM

## 2022-04-15 DIAGNOSIS — R11.2 INTRACTABLE VOMITING WITH NAUSEA: ICD-10-CM

## 2022-04-15 DIAGNOSIS — E43 PROTEIN-CALORIE MALNUTRITION, SEVERE (HCC): ICD-10-CM

## 2022-04-15 PROCEDURE — 1111F DSCHRG MED/CURRENT MED MERGE: CPT | Performed by: NURSE PRACTITIONER

## 2022-04-15 PROCEDURE — 99495 TRANSJ CARE MGMT MOD F2F 14D: CPT | Performed by: NURSE PRACTITIONER

## 2022-04-15 NOTE — PROGRESS NOTES
Post-Discharge Transitional Care Follow Up      Gina Deng   YOB: 1995    Date of Office Visit:  4/15/2022  Date of Hospital Admission: 4/11/22  Date of Hospital Discharge: 4/12/22  Readmission Risk Score (high >=14%. Medium >=10%):No data recorded    Care management risk score Rising risk (score 2-5) and Complex Care (Scores >=6): 9     Non face to face  following discharge, date last encounter closed (first attempt may have been earlier): 4/14/2022  1:18 PM     Call initiated 2 business days of discharge: No     Acute cystitis with hematuria  Comments:  Resolved. Intractable vomiting with nausea  Comments:  Stable. Has not had episodes of vomiting/nausea since returning home. Diarrhea, unspecified type  Comments: Follow up pending results. Supplies provided to complete sample at home and given instructions for return of specimen. Orders:  -     Clostridium Difficile Toxin/Antigen; Future  -     Fecal lactoferrin; Future  -     Gastrointestinal Panel, Molecular; Future  Iron deficiency anemia, unspecified iron deficiency anemia type  -     CBC; Future  -     Iron and TIBC; Future  -     Ferritin; Future  Generalized abdominal pain  Comments:  F/U with GI as scheduled, she will see if earlier appointment available. Orders:  -     SC DISCHARGE MEDS RECONCILED W/ CURRENT OUTPATIENT MED LIST  -     Basic Metabolic Panel; Future  Protein-calorie malnutrition, severe (HCC)  Comments:  Samples of glucerna high protein provided today. She will check with Harleen Osorio regarding Rx for ensure faxed over. Medical Decision Making: moderate complexity  Return if symptoms worsen or fail to improve. Subjective:   Presents for hospital follow up  Per HPI:   Lolis Mallory is a 27 y.o. female who presented to the emergency department with abdominal pain, nausea, vomiting for 4 days. She was unable to tolerate any oral intake or take any medications at home. Patient was noted to only eat potatoes and fruit and has a protein calorie malnutrition. Patient has history of marijuana use, intractable vomiting and type 1 diabetes with polyneuropathy. In the emergency department, she was initially tachypneic, this resolved once pain was treated. Her nausea and vomiting did resolve after some Zofran. She was found to be anemic as well as hyponatremic and hypochloremic secondary to dehydration and malnutrition. CT abdomen showed some mild periportal edema but there are otherwise no acute findings. Patient eventually had a urinalysis performed which was positive for UTI and patient was started on IV antibiotics. Patient was placed in the observation unit for IV antibiotics, pain control and potential GI evaluation. At time evaluation this morning, patient has had significant provement in pain, nausea, vomiting. Has been able to tolerate oral intake. Is comfortable being discharged home this afternoon. Established with Dr. Karen Sandhu has appointment 05/02/22, she will call and see if something sooner is available for hospital follow up  Has been eating since coming home, able to keep everything down. When she takes her normal GI medications like zofran things stay down   Did not realize ensure Rx was sent to [facility] high protein provided today from office stock    Right now is having diarrhea 10 times a day, every time she eats she has to run to the bathroom   Describes diarrhea as watery, some times an orange color to it   Endorses abdominal cramping, using bentyl 4x day, does help with stomach pain but not diarrhea     DM: Sugars have been 120-130 since coming home  Afraid to over treat sugars because of hypoglycemia concerns     Does have hx of BASSEM - unable to tolerate PO iron d/t Gi side effects     Not smoking cigarettes or marijuana         Inpatient course: Discharge summary reviewed- see chart.     Interval history/Current status: Stable    Patient Active Problem List   Diagnosis    Type 1 diabetes mellitus with diabetic polyneuropathy (HCC)    Intractable vomiting with nausea    Intractable abdominal pain    Microalbuminuria due to type 1 diabetes mellitus ([address]    Generalized abdominal pain    Restless leg syndrome    Protein-calorie malnutrition, severe ([address]    Marijuana use, continuous    Onychomycosis    UTI (urinary tract infection)       Medications listed as ordered at the time of discharge from hospital     Medication List          Accurate as of April 15, 2022 11:59 PM. If you have any questions, ask your nurse or doctor. CONTINUE taking these medications    Acetaminophen Extra Strength 500 MG tablet  Generic drug: acetaminophen  TAKE 2 TABLETS BY MOUTH EVERY 6 HOURS AS NEEDED FOR PAIN     Alcohol Pads 70 % Pads  Apply 1 each topically 4 times daily     cephALEXin 500 MG capsule  Commonly known as: KEFLEX  Take 1 capsule by mouth 2 times daily for 7 days     dicyclomine 10 MG capsule  Commonly known as: BENTYL     Droplet Pen Needles 31G X 8 MM Misc  Generic drug: Insulin Pen Needle  USE FOUR TIMES DAILY AS DIRECTED     Ensure High Protein Liqd  Take 1 Bottle by mouth in the morning, at noon, and at bedtime     famotidine 20 MG tablet  Commonly known as: PEPCID  Take 1 tablet by mouth nightly      MG tablet  Generic drug: ibuprofen  TAKE 1 TABLET FOUR TIMES DAILY AS NEEDED FOR PAIN     ketoconazole 2 % cream  Commonly known as: NIZORAL  APPLY TOPICALLY DAILY.      Lantus SoloStar 100 UNIT/ML injection pen  Generic drug: insulin glargine  Inject 10 Units into the skin nightly     lisinopril 2.5 MG tablet  Commonly known as: PRINIVIL;ZESTRIL  TAKE 1 TABLET EVERY DAY     NovoLOG FlexPen 100 UNIT/ML injection pen  Generic drug: insulin aspart  INJECT 2 TO 12 UNITS SUBCUTANEOUSLY THREE TIMES DAILY PER SLIDING SCALE AS DIRECTED (DISCARD PEN 28 DAYS AFTER OPENING)     pantoprazole 40 MG tablet  Commonly known as: PROTONIX  Take 1 tablet by mouth every morning (before breakfast)     rOPINIRole 0.25 MG tablet  Commonly known as: REQUIP  TAKE 1 TABLET EVERY NIGHT     True Metrix Blood Glucose Test strip  Generic drug: blood glucose test strips  CHECK BLOOD SUGAR FOUR TIMES DAILY AS DIRECTED     TRUEplus Lancets 33G Misc  TEST BLOOD SUGAR FOUR TIMES DAILY             Medications marked \"taking\" at this time  Outpatient Medications Marked as Taking for the 4/15/22 encounter (Office Visit) with ELISE Mcnulty - NP   Medication Sig Dispense Refill    cephALEXin (KEFLEX) 500 MG capsule Take 1 capsule by mouth 2 times daily for 7 days 14 capsule 0    pantoprazole (PROTONIX) 40 MG tablet Take 1 tablet by mouth every morning (before breakfast) 30 tablet 0    famotidine (PEPCID) 20 MG tablet Take 1 tablet by mouth nightly 30 tablet 0    dicyclomine (BENTYL) 10 MG capsule Take 1 capsule by mouth 4 times daily      insulin glargine (LANTUS SOLOSTAR) 100 UNIT/ML injection pen Inject 10 Units into the skin nightly 15 mL 3    lisinopril (PRINIVIL;ZESTRIL) 2.5 MG tablet TAKE 1 TABLET EVERY DAY 90 tablet 1    rOPINIRole (REQUIP) 0.25 MG tablet TAKE 1 TABLET EVERY NIGHT 90 tablet 1     MG tablet TAKE 1 TABLET FOUR TIMES DAILY AS NEEDED FOR PAIN 120 tablet 1    ketoconazole (NIZORAL) 2 % cream APPLY TOPICALLY DAILY.  60 g 1    TRUE METRIX BLOOD GLUCOSE TEST strip CHECK BLOOD SUGAR FOUR TIMES DAILY AS DIRECTED 400 strip 1    TRUEplus Lancets 33G MISC TEST BLOOD SUGAR FOUR TIMES DAILY 400 each 1    DROPLET PEN NEEDLES 31G X 8 MM MISC USE FOUR TIMES DAILY AS DIRECTED 400 each 1    NOVOLOG FLEXPEN 100 UNIT/ML injection pen INJECT 2 TO 12 UNITS SUBCUTANEOUSLY THREE TIMES DAILY PER SLIDING SCALE AS DIRECTED (DISCARD PEN 28 DAYS AFTER OPENING) 45 mL 5    ACETAMINOPHEN EXTRA STRENGTH 500 MG tablet TAKE 2 TABLETS BY MOUTH EVERY 6 HOURS AS NEEDED FOR PAIN 180 tablet 1    Alcohol Swabs (ALCOHOL PADS) 70 % PADS Apply 1 each topically 4 times daily 200 each 5        Medications patient taking as of now reconciled against medications ordered at time of hospital discharge: Yes    Review of Systems   Constitutional: Negative for activity change, chills, fatigue and unexpected weight change. HENT: Negative for hearing loss, postnasal drip, sinus pressure and sinus pain. Eyes: Negative for pain and visual disturbance. Respiratory: Negative for chest tightness and shortness of breath. Cardiovascular: Negative for chest pain and palpitations. Gastrointestinal: Positive for abdominal pain, diarrhea and nausea. Negative for constipation and vomiting. Endocrine: Negative for polydipsia, polyphagia and polyuria. Genitourinary: Negative for dysuria, flank pain, frequency and urgency. Musculoskeletal: Negative for arthralgias, back pain and myalgias. Skin: Negative for color change and rash. Neurological: Negative for dizziness, weakness, light-headedness, numbness and headaches. Psychiatric/Behavioral: Negative for confusion, hallucinations, self-injury, sleep disturbance and suicidal ideas. The patient is not nervous/anxious. Objective:    /84 (Site: Left Upper Arm, Position: Sitting, Cuff Size: Medium Adult)   Pulse 85   Temp 97.2 °F (36.2 °C)   Wt 98 lb 9.6 oz (44.7 kg)   LMP 03/28/2022   SpO2 98%   BMI 16.41 kg/m²   Physical Exam  Vitals and nursing note reviewed. Constitutional:       Appearance: Normal appearance. She is cachectic. She is not ill-appearing or toxic-appearing. HENT:      Head: Normocephalic. Comments: Trace facial edema noted during exam      Right Ear: Tympanic membrane, ear canal and external ear normal.      Left Ear: Tympanic membrane, ear canal and external ear normal.      Nose: Nose normal.      Mouth/Throat:      Mouth: Mucous membranes are moist.      Pharynx: Oropharynx is clear. Eyes:      Extraocular Movements: Extraocular movements intact. Conjunctiva/sclera: Conjunctivae normal.      Pupils: Pupils are equal, round, and reactive to light. Cardiovascular:      Rate and Rhythm: Normal rate and regular rhythm. Pulses: Normal pulses. Heart sounds: Normal heart sounds, S1 normal and S2 normal.   Pulmonary:      Effort: Pulmonary effort is normal.      Breath sounds: Normal breath sounds and air entry. Abdominal:      General: Abdomen is flat. Bowel sounds are normal.      Palpations: Abdomen is soft. Tenderness: There is no abdominal tenderness. Musculoskeletal:         General: Normal range of motion. Cervical back: Normal range of motion. Skin:     General: Skin is warm and dry. Capillary Refill: Capillary refill takes less than 2 seconds. Neurological:      General: No focal deficit present. Mental Status: She is alert and oriented to person, place, and time. Mental status is at baseline. Psychiatric:         Attention and Perception: Attention and perception normal.         Mood and Affect: Mood and affect normal.         Speech: Speech normal.         Behavior: Behavior normal. Behavior is cooperative. Thought Content: Thought content normal.         Cognition and Memory: Cognition and memory normal.         Judgment: Judgment normal.       Wt Readings from Last 3 Encounters:   04/15/22 98 lb 9.6 oz (44.7 kg)   04/12/22 90 lb 8 oz (41.1 kg)   03/23/22 94 lb 12.8 oz (43 kg)     Temp Readings from Last 3 Encounters:   04/15/22 97.2 °F (36.2 °C)   04/12/22 98.4 °F (36.9 °C) (Oral)   03/23/22 97.8 °F (36.6 °C) (Temporal)     BP Readings from Last 3 Encounters:   04/15/22 136/84   04/12/22 115/82   03/23/22 120/78     Pulse Readings from Last 3 Encounters:   04/15/22 85   04/12/22 81   03/23/22 107         An electronic signature was used to authenticate this note.   --ELISE Velasquez - ROMANA

## 2022-04-18 ASSESSMENT — ENCOUNTER SYMPTOMS
ABDOMINAL PAIN: 1
SINUS PRESSURE: 0
EYE PAIN: 0
SHORTNESS OF BREATH: 0
VOMITING: 0
CONSTIPATION: 0
DIARRHEA: 1
NAUSEA: 1
COLOR CHANGE: 0
CHEST TIGHTNESS: 0
BACK PAIN: 0
SINUS PAIN: 0

## 2022-04-21 ENCOUNTER — CARE COORDINATION (OUTPATIENT)
Dept: CASE MANAGEMENT | Age: 27
End: 2022-04-21

## 2022-04-21 NOTE — CARE COORDINATION
[facility] Transitions Follow Up Call     2022     Patient: Lolis Mallory      Patient : 1995   MRN: [id number]     Reason for Admission: Acute Cystitis with Hematuria  Discharge Date: 22       RARS: No data recorded                   Spoke with: Destaney     Care Transitions Subsequent and Final Call    Subsequent and Final Calls  Do you have any ongoing symptoms?: Yes  Onset of Patient-reported symptoms: Other  Patient-reported symptoms: Other  Interventions for patient-reported symptoms: Other  Have your medications changed?: No  Do you have any questions related to your medications?: No  Do you currently have any active services?: No  Do you have any needs or concerns that I can assist you with?: No  Care Transitions Interventions  Other Interventions:        Denies visible blood in urine. States abdominal pain and loose stools are subsiding. Continues with antibiotics, is drinking plenty of fluids. BS this am was 130, states they are \"normal\".     Care Transitions Follow Up Call          Needs to be reviewed by the provider    Additional needs identified to be addressed with provider: No  none                 Method of communication with provider : none        Care Transition Nurse (CTN) contacted the patient by telephone to follow up after admission. Verified name and  with patient as identifiers.     Addressed changes since last contact: none  Discussed follow-up appointments. If no appointment was previously scheduled, appointment scheduling offered: Yes. Is follow up appointment scheduled within 7 days of discharge?  Yes.     Advance Care Planning:   Does patient have an Advance Directive: reviewed and current, reviewed and needs to be updated, not on file; education provided, not on file, patient declined education, decision maker updated and referral to internal ACP facilitator.      CTN reviewed discharge instructions, medical action plan and red flags with patient and discussed any barriers to care and/or understanding of plan of care after discharge. Discussed appropriate site of care based on symptoms and resources available to patient including: PCP  Specialist. The patient agrees to contact the PCP office for questions related to their healthcare.      Patients top risk factors for readmission: medical condition-DM  Interventions to address risk factors: Education of patient/family/caregiver/guardian to support self-management-Fluid Intake importance-V/U        CTN provided contact information for future needs. Plan for follow-up call in 5-7 days based on severity of symptoms and risk factors.   Plan for next call: self management-assess                 Follow Up         Future Appointments   Date Time Provider Hayden Coto   5/2/2022  2:00 PM Jeni Yan MD [facility]   5/4/2022  8:30 AM Luzmaria Blake [facility]   7/21/2022  2:00 PM [facility]  [facility]   7/21/2022  2:00 PM Flaquita Bautista [facility]   7/25/2022  2:15 PM Emperatriz Dumont [address], [address] [facility]         Wilmer England RN

## 2022-04-26 RX ORDER — DICYCLOMINE HYDROCHLORIDE 10 MG/1
CAPSULE ORAL
Qty: 120 CAPSULE | Refills: 1 | Status: SHIPPED | OUTPATIENT
Start: 2022-04-26

## 2022-04-28 ENCOUNTER — CARE COORDINATION (OUTPATIENT)
Dept: CASE MANAGEMENT | Age: 27
End: 2022-04-28

## 2022-04-28 DIAGNOSIS — B35.1 ONYCHOMYCOSIS: ICD-10-CM

## 2022-04-28 NOTE — CARE COORDINATION
[facility] Transitions Follow Up Call    2022    Patient: Carlos Koenig  Patient : 1995   MRN: [id number]  Reason for Admission: Acute cystitis, hematuria  Discharge Date: 22 RARS: No data recorded       Spoke with: Patient    Spoke with patient who said she is feeling pretty good today. She has had no urinary issues, denies any dysuria or hematuria and denies any f/c or other symptoms of ongoing UTI. She states she feels about 80% better than what she did at discharge. She will see OB/GYN next week for her irregular menses and will follow up with GI in May for her gastroparesis. She denies any needs or concerns. Reviewed last PCP notes. Care Transitions Follow Up Call    Needs to be reviewed by the provider   Additional needs identified to be addressed with provider: No  none             Method of communication with provider : none      Care Transition Nurse (CTN) contacted the patient by telephone to follow up after admission on . Verified name and  with patient as identifiers. Addressed changes since last contact: none  Discussed follow-up appointments. CTN reviewed discharge instructions, medical action plan and red flags with patient and discussed any barriers to care and/or understanding of plan of care after discharge. Discussed appropriate site of care based on symptoms and resources available to patient including: PCP  Specialist. The patient agrees to contact the PCP office for questions related to their healthcare. Patients top risk factors for readmission: medical condition-DM  Interventions to address risk factors: Obtained and reviewed discharge summary and/or continuity of care documents      Non-[facility] follow up appointment(s):  with OB    CTN provided contact information for future needs. Plan for follow-up call in 5-7 days based on severity of symptoms and risk factors.   Plan for next call: Assess for further needs, check OB notes            Care Transitions Subsequent and Final Call    Schedule Follow Up Appointment with PCP: Completed  Subsequent and Final Calls  Do you have any ongoing symptoms?: No  Have your medications changed?: No  Do you have any questions related to your medications?: No  Do you currently have any active services?: No  Do you have any needs or concerns that I can assist you with?: No  Identified Barriers: Lack of Education  Care Transitions Interventions  Other Interventions:            Follow Up  Future Appointments   Date Time Provider Hayden Coto   5/4/2022  8:30 AM Sina Ellis DO [facility]   5/17/2022  3:00 [facility]   7/21/2022  2:00 PM [facility] EM[facility]  [facility] EMG [facility] [place]   7/21/2022  2:00 PM [facility] Jim[facility]nez [facility]   7/25/2022  2:15 PM Emperatriz Watt MD [facility] Claire Grimes RN

## 2022-04-29 RX ORDER — KETOCONAZOLE 20 MG/G
CREAM TOPICAL
Qty: 60 G | Refills: 1 | OUTPATIENT
Start: 2022-04-29

## 2022-05-04 ENCOUNTER — HOSPITAL ENCOUNTER (OUTPATIENT)
Age: 27
Setting detail: SPECIMEN
Discharge: HOME | End: 2022-05-04

## 2022-05-04 ENCOUNTER — OFFICE VISIT (OUTPATIENT)
Dept: OBGYN | Age: 27
End: 2022-05-04
Payer: MEDICARE

## 2022-05-04 VITALS
SYSTOLIC BLOOD PRESSURE: 140 MMHG | HEART RATE: 95 BPM | BODY MASS INDEX: 18.03 KG/M2 | WEIGHT: 98 LBS | DIASTOLIC BLOOD PRESSURE: 99 MMHG | HEIGHT: 62 IN

## 2022-05-04 DIAGNOSIS — Z01.419 WELL WOMAN EXAM WITH ROUTINE GYNECOLOGICAL EXAM: Primary | ICD-10-CM

## 2022-05-04 DIAGNOSIS — R10.84 GENERALIZED ABDOMINAL PAIN: ICD-10-CM

## 2022-05-04 DIAGNOSIS — N63.20 LEFT BREAST MASS: ICD-10-CM

## 2022-05-04 DIAGNOSIS — N92.6 IRREGULAR MENSES: ICD-10-CM

## 2022-05-04 DIAGNOSIS — Z00.00 PREVENTATIVE HEALTH CARE: ICD-10-CM

## 2022-05-04 PROCEDURE — 99385 PREV VISIT NEW AGE 18-39: CPT | Performed by: OBSTETRICS & GYNECOLOGY

## 2022-05-04 NOTE — PROGRESS NOTES
History and Physical    Tran Deng  2022              27 y.o. Chief Complaint   Patient presents with    Menstrual Problem     new patient with irregular periods x 1 year        Patient's last menstrual period was 2022 (approximate). Primary Care Physician: Alpesh Jackson MD    The patient was seen and examined. She has no chief complaint today and is here for her annual exam.  Her bowels are regular. There are no voiding complaints. She denies any bloating. She denies vaginal discharge and was counseled on STD's and the need for barrier contraception. HPI : Ross Gonzales is a 27 y.o. female     Pt here to establish care. She is complaining of irregular periods. She is only having them every 2-3 months and she is only bleeding for 2-3 days. This has been going on for about a year. Prior to this, her periods were regular, once a month. Patient educated on gardasil vaccine and its importance on preventing cervical cancer. Pt states \"as long as covid is here I am not getting any shots. \"  I tried to explain that we are trying to help her with her health and she is still not interested in shots.     ________________________________________________________________________  OB History    Para Term  AB Living   1 1 1 0 0 1   SAB IAB Ectopic Molar Multiple Live Births   0 0 0 0 0 1      # Outcome Date GA Lbr Bruce/2nd Weight Sex Delivery Anes PTL Lv   1 Term 16 40w1d  4 lb 15 oz (2.24 kg) F CS-Unspec   YANCI      Complications: Cephalopelvic Disproportion     Past Medical History:   Diagnosis Date    Diabetes mellitus [facility]     Diabetic neuropathy, type II diabetes mellitus ([facility]     Hypertension     Kidney infection     MRSA (methicillin resistant staph aureus) culture positive 2016    abdomen    Nausea & vomiting     Ovarian cyst 2019    Right side    UTI (urinary tract infection) Past Surgical History:   Procedure Laterality Date     SECTION      UPPER GASTROINTESTINAL ENDOSCOPY N/A 2021    EGD BIOPSY performed by Duncan Peck MD at [place] [id number] reviewed. No pertinent family history. Social History     Socioeconomic History    Marital status: Single     Spouse name: Not on file    Number of children: Not on file    Years of education: Not on file    Highest education level: Not on file   Occupational History    Not on file   Tobacco Use    Smoking status: Current Every Day Smoker     Packs/day: 0.50     Types: Cigarettes    Smokeless tobacco: Never Used    Tobacco comment: 5/day   Substance and Sexual Activity    Alcohol use: No    Drug use: Yes     Types: Marijuana (Weed)     Comment: 1-2x per day    Sexual activity: Never   Other Topics Concern    Not on file   Social History Narrative    Not on file     Social Determinants of Health     Financial Resource Strain: High Risk    Difficulty of Paying Living Expenses: Hard   Food Insecurity: Food Insecurity Present    Worried About Running Out of Food in the Last Year: Often true    Shantelle of Food in the Last Year: Often true   Transportation Needs:     Lack of Transportation (Medical): Not on file    Lack of Transportation (Non-Medical):  Not on file   Physical Activity:     Days of Exercise per Week: Not on file    Minutes of Exercise per Session: Not on file   Stress:     Feeling of Stress : Not on file   Social Connections:     Frequency of Communication with Friends and Family: Not on file    Frequency of Social Gatherings with Friends and Family: Not on file    Attends Anabaptist Services: Not on file    Active Member of Clubs or Organizations: Not on file    Attends Club or Organization Meetings: Not on file    Marital Status: Not on file   Intimate Partner Violence:     Fear of Current or Ex-Partner: Not on file    Emotionally Abused: Not on file   [facility] Physically Abused: Not on file    Sexually Abused: Not on file   Housing Stability:     Unable to Pay for Housing in the Last Year: Not on file    Number of Places Lived in the Last Year: Not on file    Unstable Housing in the Last Year: Not on file       MEDICATIONS:  Current Outpatient Medications   Medication Sig Dispense Refill    dicyclomine (BENTYL) 10 MG capsule TAKE 1 CAPSULE THREE TIMES DAILY AS NEEDED FOR ABDOMINAL PAIN 120 capsule 1    Nutritional Supplements (ENSURE HIGH PROTEIN) LIQD Take 1 Bottle by mouth in the morning, at noon, and at bedtime 100 each 5    pantoprazole (PROTONIX) 40 MG tablet Take 1 tablet by mouth every morning (before breakfast) 30 tablet 0    famotidine (PEPCID) 20 MG tablet Take 1 tablet by mouth nightly 30 tablet 0    insulin glargine (LANTUS SOLOSTAR) 100 UNIT/ML injection pen Inject 10 Units into the skin nightly 15 mL 3    lisinopril (PRINIVIL;ZESTRIL) 2.5 MG tablet TAKE 1 TABLET EVERY DAY 90 tablet 1    rOPINIRole (REQUIP) 0.25 MG tablet TAKE 1 TABLET EVERY NIGHT 90 tablet 1     MG tablet TAKE 1 TABLET FOUR TIMES DAILY AS NEEDED FOR PAIN 120 tablet 1    ketoconazole (NIZORAL) 2 % cream APPLY TOPICALLY DAILY. 60 g 1    TRUE METRIX BLOOD GLUCOSE TEST strip CHECK BLOOD SUGAR FOUR TIMES DAILY AS DIRECTED 400 strip 1    TRUEplus Lancets 33G MISC TEST BLOOD SUGAR FOUR TIMES DAILY 400 each 1    DROPLET PEN NEEDLES 31G X 8 MM MISC USE FOUR TIMES DAILY AS DIRECTED 400 each 1    NOVOLOG FLEXPEN 100 UNIT/ML injection pen INJECT 2 TO 12 UNITS SUBCUTANEOUSLY THREE TIMES DAILY PER SLIDING SCALE AS DIRECTED (DISCARD PEN 28 DAYS AFTER OPENING) 45 mL 5    ACETAMINOPHEN EXTRA STRENGTH 500 MG tablet TAKE 2 TABLETS BY MOUTH EVERY 6 HOURS AS NEEDED FOR PAIN 180 tablet 1    Alcohol Swabs (ALCOHOL PADS) 70 % PADS Apply 1 each topically 4 times daily 200 each 5     No current facility-administered medications for this visit.            ALLERGIES:  Allergies as of 05/04/2022 - Fully Reviewed 05/04/2022   Allergen Reaction Noted    Blueberry [vaccinium angustifolium] Anaphylaxis 05/24/2021    Shrimp flavor Anaphylaxis 09/19/2016       Symptoms of decreased mood absent  Symptoms of anhedonia absent      Immunization status: up to date and documented. Gynecologic History:  Menarche: 5 yo  Menopause: n/a     Patient's last menstrual period was 04/23/2022 (approximate). Sexually Active: No - last intercourse was a year ago    STD History: No     Permanent Sterilization: No   Reversible Birth Control: No        Hormone Replacement Exposure: No      Genetic Qualified Family History of Breast, Ovarian , Colon or Uterine Cancer: No     If YES see scanned worksheet. Preventative Health Testing:    Health Maintenance:  Health Maintenance Due   Topic Date Due    Pap smear  Never done    Lipids  05/24/2022       Date of Last Pap Smear: none  Abnormal Pap Smear History: none  Colposcopy History: none  Date of Last Mammogram: n/a  Date of Last Colonoscopy: n/a  Date of Last Bone Density: n/a      ________________________________________________________________________        REVIEW OF SYSTEMS:       A minimum of an eleven point review of systems was completed. Review Of Systems (11 point):  Constitutional: No fever, chills or malaise; No weight change or fatigue  Head and Eyes: No vision changes, Headache, Dizziness or trauma in last 12 months  ENT ROS: No hearing, Tinnitis, sinus or taste problems  Hematological and Lymphatic ROS:No Lymphoma, Von Willebrand's, Hemophillia or Bleeding History  Psych ROS: No Depression, Homicidal thoughts,suicidal thoughts, or anxiety  Breast ROS: No breast abnormalities or lumps  Respiratory ROS: No SOB, Pneumoniae,Cough, or Pulmonary Embolism   Cardiovascular ROS: No Chest Pain with Exertion, Palpitations, Syncope, Edema, Arrhythmia  Gastrointestinal ROS: No Indigestion, Heartburn, Nausea, vomiting, Diarrhea, Constipation,or Bowel Changes;  No Bloody Stools or melena  Genito-Urinary ROS:+irregular periods. No Dysuria, Hematuria or Nocturia. No Urinary Incontinence or Vaginal Discharge  Musculoskeletal ROS: No Arthralgia, Arthritis,Gout,Osteoporosis or Rheumatism  Neurological ROS: No CVA, Migraines, Epilepsy, Seizure Hx, or Limb Weakness  Dermatological ROS: No Rash, Itching, Hives, Mole Changes or Cancer                                                                                                                                                                                                                                  PHYSICAL Exam:     Constitutional:  Vitals:    05/04/22 0822   BP: (!) 140/99   Site: Right Upper Arm   Position: Sitting   Cuff Size: Child   Pulse: 95   Weight: 98 lb (44.5 kg)   Height: 5' 2\" (1.575 m)       Chaperone for Intimate Exam   Chaperone was offered and accepted as part of the rooming process.  Chaperone: Sherry Richmond MA          General Appearance: This  is a well Developed, well Nourished, well groomed female. Her BMI was reviewed. Nutritional decision making was discussed. Skin:  There was a Normal Inspection of the skin without rashes or lesions. There were no rashes. (Papular, Maculopapular, Hives, Pustular, Macular)     There were no lesions (Ulcers, Erythema, Abn. Appearing Nevi)            Lymphatic:  No Lymph Nodes were Palpable in the neck , axilla or groin.  0 # Of Lymph Nodes; Location ; Character [Normal]  [Shotty] [Tender] [Enlarged]     Neck and EENT:  The neck was supple. There were no masses   The thyroid was not enlarged and had no masses. Perrla, EOMI B/L, TMI B/L No Abnormalities. Throat inspected-No exudates or Masses, Nares Patent No Masses        Respiratory: The lungs were auscultated and found to be clear. There were no rales, rhonchi or wheezes. There was a good respiratory effort. Cardiovascular: The heart was in a regular rate and rhythm. . No S3 or S4.  There was no murmur appreciated. Location, grade, and radiation are not applicable. Extremities: The patients extremities were without calf tenderness, edema, or varicosities. There was full range of motion in all four extremities. Pulses in all four extremities were appreciated and are 2/4. Abdomen: The abdomen was soft and non-tender. There were good bowel sounds in all quadrants and there was no guarding, rebound or rigidity. On evaluation there was no evidence of hepatosplenomegaly and there was no costal vertebral jermaine tenderness bilaterally. No hernias were appreciated. Abdominal Scars: pfannenstiel incision from csection    Psych: The patient had a normal Orientation to: Time, Place, Person, and Situation  There is no Mood / Affect changes    Breast:  (Chest)  Pt has a ~1cm nodule that is freely mobile in the left breast in the lower outer quadrant - pt has not felt that before. Normal appearance, no tenderness, No nipple retraction or dimpling, No nipple discharge or bleeding, No axillary or supraclavicular adenopathy, Normal to palpation without dominant masses, Taught monthly breast self examination  Self breast exams were reviewed in detail. Literature was given. Pelvic Exam:  External genitalia: normal general appearance  Urinary system: urethral meatus normal  Vaginal: normal mucosa without prolapse or lesions, normal rugae and vaginitis probe obtained  Cervix: normal appearance, thin prep PAP obtained and GC prep obtained  Adnexa: normal bimanual exam  Uterus: normal single, nontender, anteverted and mid-position  Negative bladder sweep, no lymphadenopathy, negative CMT            Musculosk:  Normal Gait and station was noted. Digits were evaluated without abnormal findings. Range of motion, stability and strength were evaluated and found to be appropriate for the patients age. ASSESSMENT:      27 y.o. Annual   Diagnosis Orders   1.  Well woman exam with routine gynecological exam PAP SMEAR   2. Preventative health care  PAP SMEAR   3. Irregular menses  Chlamydia Trachomatis & Neisseria gonorrhoeae (GC) by amplified detection    Vaginitis DNA Probe    US PELVIS COMPLETE    US NON OB TRANSVAGINAL    CBC with Auto Differential    TSH with Reflex    Prolactin    hCG, Quantitative, Pregnancy          Chief Complaint   Patient presents with    Menstrual Problem     new patient with irregular periods x 1 year           Past Medical History:   Diagnosis Date    Diabetes mellitus ([address]     Diabetic neuropathy, type II diabetes mellitus ([address]     Hypertension     Kidney infection     MRSA (methicillin resistant staph aureus) culture positive 12/21/2016    abdomen    Nausea & vomiting     Ovarian cyst 2019    Right side    UTI (urinary tract infection)          Patient Active Problem List    Diagnosis Date Noted    Type 1 diabetes mellitus with diabetic polyneuropathy ([address] 09/20/2016     Priority: Medium    UTI (urinary tract infection) 04/11/2022    Microalbuminuria due to type 1 diabetes mellitus ([address] 09/22/2021    Generalized abdominal pain 09/22/2021    Restless leg syndrome 09/22/2021    Protein-calorie malnutrition, severe ([address] 09/22/2021    Marijuana use, continuous 09/22/2021    Onychomycosis 09/22/2021    Intractable vomiting with nausea 05/21/2020    Intractable abdominal pain 05/21/2020          Hereditary Breast, Ovarian, Colon and Uterine Cancer screening Done. Tobacco & Secondary smoke risks reviewed; instructed on cessation and avoidance      Counseling Completed:  Preventative Health Recommendations and Follow up. The patient was informed of the recommended preventative health recommendations. 1. Annuals every year; Cytology collections per prevailing guidelines. 2. Mammograms begin every year at 35 yo if no abnormalities are found and no family history. 3. Bone density studies every 2-3 years. Begin at 71 yo.  If no fracture history or osteoporosis family history. (significant). 4. Colonoscopy begin at 38 yo. Repeat every ten years if negative and no family history. 5. Calcium of 8280-2012 mg/day in split dosing  6. Vitamin D 400-800 IU/day  7. All other preventative health recommendations will be managed by the patients Primary care physician. The patient was instructed to stop smoking. Morbidity, mortality, and cessation programs were reviewed. Worsening of long and short term medical health risks were discussed with the addition of tobacco. Secondary smoke risks were reviewed with respect to children and newborns. Increases in Sudden Infant Death Syndrome, asthma, respiratory problems, and cancers were reviewed. PLAN:  Pap smear collected - will follow ASCCP guidelines  Vaginal cultures collected - will treat if appropriate  Pt refused gardasil  Ordered ultrasound  Ordered TSH, CBC, prolactin and HCG for irregular period workup  Ordered diagnostic mammogram of left breast  Return in about 4 weeks (around 6/1/2022) for follow up on irregular periods and labs/ultrasound. Repeat Annual every 1 year  Cervical Cytology Evaluation begins at 24years old. If Negative Cytology, Follow-up screening per current guidelines. Mammograms every 1 year. If 37 yo and last mammogram was negative. Calcium and Vitamin D dosing reviewed. Colonoscopy screening reviewed as well as onset for bone density testing. Birth control and barrier recommendations discussed. STD counseling and prevention reviewed. Gardisil counseling completed for all patients 10-37 yo. Routine health maintenance per patients PCP. The patient, Trinity Desir is a 27 y.o. female, was seen with a total time spent of 30 minutes for the visit on this date of service by the E/M provider. The time component had both face to face and non face to face time spent in determining the total time component.   Counseling and education regarding her diagnosis listed below and her options regarding those diagnoses were also included in determining her time component. Diagnosis Orders   1. Well woman exam with routine gynecological exam  PAP SMEAR   2. Preventative health care  PAP SMEAR   3. Irregular menses  Chlamydia Trachomatis & Neisseria gonorrhoeae (GC) by amplified detection    Vaginitis DNA Probe    US PELVIS COMPLETE    US NON OB TRANSVAGINAL    CBC with Auto Differential    TSH with Reflex    Prolactin    hCG, Quantitative, Pregnancy        The patient had her preventative health maintenance recommendations and follow-up reviewed with her at the completion of her visit.     Angel Trevizo DO

## 2022-05-05 ENCOUNTER — CARE COORDINATION (OUTPATIENT)
Dept: CASE MANAGEMENT | Age: 27
End: 2022-05-05

## 2022-05-05 LAB
C TRACH DNA GENITAL QL NAA+PROBE: NEGATIVE
CANDIDA SPECIES, DNA PROBE: NEGATIVE
GARDNERELLA VAGINALIS, DNA PROBE: POSITIVE
N. GONORRHOEAE DNA: NEGATIVE
SOURCE: ABNORMAL
SPECIMEN DESCRIPTION: NORMAL
TRICHOMONAS VAGINALIS DNA: NEGATIVE

## 2022-05-05 RX ORDER — IBUPROFEN 600 MG/1
TABLET ORAL
Qty: 120 TABLET | Refills: 1 | Status: SHIPPED | OUTPATIENT
Start: 2022-05-05 | End: 2022-07-11 | Stop reason: SDUPTHER

## 2022-05-05 RX ORDER — METRONIDAZOLE 500 MG/1
500 TABLET ORAL 2 TIMES DAILY
Qty: 14 TABLET | Refills: 0 | Status: SHIPPED | OUTPATIENT
Start: 2022-05-05 | End: 2022-05-12

## 2022-05-05 NOTE — TELEPHONE ENCOUNTER
Last visit: 04/15/2022  Last Med refill: 04/12/2022  Does patient have enough medication for 72 hours: Yes    Next Visit Date:  Future Appointments   Date Time Provider Hayden Chica   5/10/2022 10:00 AM SCHEDULE, [facility] [address] OB/Gyn [facility]   5/17/2022  3:00 PM Enzo Vincent MD [facility]   5/25/2022 11:30 AM Ilsa Travis DO [address] OB/Gyn [facility]   7/21/2022  2:00 PM [facility] EM[facility]  [facility]   7/21/2022  2:00 PM Yovanny Tang MD [facility]   7/25/2022  2:15 PM Emperatriz Silverio  [address] Maintenance   Topic Date Due    Pap smear  Never done    Lipids  05/24/2022    Diabetic retinal exam  05/23/2022 (Originally 1/21/2020)    Hepatitis B vaccine (3 of 3 - Risk 3-dose series) 09/22/2022 (Originally 2/14/1998)    Flu vaccine (Season Ended) 09/22/2022 (Originally 9/1/2022)    COVID-19 Vaccine (1) 03/23/2025 (Originally 12/5/1998)    Varicella vaccine (2 of 2 - 2-dose childhood series) 09/22/2026 (Originally 8/9/2000)    Pneumococcal 0-64 years Vaccine (1 - PCV) 09/22/2026 (Originally 12/5/1999)    Diabetic foot exam  03/23/2023    A1C test (Diabetic or Prediabetic)  03/23/2023    Depression Screen  03/23/2023    Potassium  04/11/2023    Creatinine  04/11/2023    DTaP/Tdap/Td vaccine (6 - Td or Tdap) 08/27/2030    Hib vaccine  Completed    Hepatitis C screen  Completed    HIV screen  Completed    Hepatitis A vaccine  Aged Out    Meningococcal (ACWY) vaccine  Aged Out       Hemoglobin A1C (%)   Date Value   03/23/2022 6.3   12/22/2021 5.9   09/22/2021 5.5             ( goal A1C is < 7)   Microalb/Crt.  Ratio (mcg/mg creat)   Date Value   02/11/2020 511 (H)     LDL Cholesterol (mg/dL)   Date Value   05/24/2021 45   02/11/2020 84       (goal LDL is <100)   AST (U/L)   Date Value   04/11/2022 16     ALT (U/L)   Date Value   04/11/2022 11     BUN (mg/dL)   Date Value   04/11/2022 8     BP Readings from Last 3 Encounters: 05/04/22 (!) 140/99   04/15/22 136/84   04/12/22 115/82          (goal 120/80)    All Future Testing planned in [facility]  Lab Frequency Next Occurrence   Alkaline Phosphatase, Isoenzymes Once 06/14/2021   CBC With Auto Differential Once 06/14/2021   COVID-19 Once 09/14/2021   H.  Pylori Breath Test Once 11/18/2021   EMG Once 05/01/2022   Clostridium Difficile Toxin/Antigen Once 04/15/2022   Fecal lactoferrin Once 04/15/2022   Gastrointestinal Panel, Molecular Once 04/15/2022   CBC Once 14/29/0428   Basic Metabolic Panel Once 17/55/6928   Iron and TIBC Once 04/15/2022   Ferritin Once 04/15/2022   PAP SMEAR Once 06/04/2022   US PELVIS COMPLETE Once 05/18/2022   US NON OB TRANSVAGINAL Once 05/11/2022   CBC with Auto Differential Once 05/04/2022   TSH with Reflex Once 05/18/2022   Prolactin Once 05/04/2022   hCG, Quantitative, Pregnancy Once 05/04/2022   BO DIGITAL DIAGNOSTIC W OR WO CAD LEFT Once 05/04/2022               Patient Active Problem List:     Type 1 diabetes mellitus with diabetic polyneuropathy (HCC)     Intractable vomiting with nausea     Intractable abdominal pain     Microalbuminuria due to type 1 diabetes mellitus (HCC)     Generalized abdominal pain     Restless leg syndrome     Protein-calorie malnutrition, severe ([address]     Marijuana use, continuous     Onychomycosis     UTI (urinary tract infection)

## 2022-05-05 NOTE — CARE COORDINATION
[facility] Transitions Follow Up Call    2022    Patient: Lionel Berg  Patient : 1995   MRN: [id number]  Reason for Admission: UTI  Discharge Date: 22 RARS: No data recorded       Spoke with: Patient    Spoke with patient who said she was feeling pretty good today. She denies any hematuria, dysuria, f/c or other s/s of UTI. She states her symptoms have resolved. She was seen by her OB/GYN yesterday for irregular menses and they wanted to start Gardisil but she declined. She denies having any other issues other than financial, is asking if they have programs to help purchase household supplies such as soap, toilet paper, etc.  I expressed I was not aware of any programs but will reach out to SW to see if they can assist. She denies any other needs, she is able to afford food but wanting assistance for basic essentials. Care Transitions Follow Up Call    Needs to be reviewed by the provider   Additional needs identified to be addressed with provider: No  none             Method of communication with provider : none      Care Transition Nurse (CTN) contacted the patient by telephone to follow up after admission on . Verified name and  with patient as identifiers. Addressed changes since last contact: none  Discussed follow-up appointments. If no appointment was previously scheduled, appointment scheduling offered: No.   Is follow up appointment scheduled within 7 days of discharge? No.    Advance Care Planning:   Does patient have an Advance Directive: not on file; education provided. CTN reviewed discharge instructions, medical action plan and red flags with patient and discussed any barriers to care and/or understanding of plan of care after discharge. Discussed appropriate site of care based on symptoms and resources available to patient including: PCP  Specialist. The patient agrees to contact the PCP office for questions related to their healthcare.      Patients top risk factors for readmission: medical condition-UTI  Interventions to address risk factors: Obtained and reviewed discharge summary and/or continuity of care documents      Non-[facility] follow up appointment(s): 5/10 for       CTN provided contact information for future needs. No further follow-up call indicated based on severity of symptoms and risk factors. Plan for next call: referrals- referral to see about assisstance with toiletries            Care Transitions Subsequent and Final Call    Schedule Follow Up Appointment with PCP: Completed  Subsequent and Final Calls  Do you have any ongoing symptoms?: No  Have your medications changed?: No  Do you have any questions related to your medications?: No  Do you currently have any active services?: No  Do you have any needs or concerns that I can assist you with?: Yes  Patient-reported Needs or Concerns: wanting to know if there is assistance to purchase household items  Identified Barriers: Lack of Education, Financial  Care Transitions Interventions    Social Work: Completed    Other Interventions:            Follow Up  Future Appointments   Date Time Provider Hayden Coto   5/10/2022 10:00 AM SCHEDULE, [facility] [address] [facility] OB/Gyn [facility]   5/17/2022  3:00 PM Isabell Huffman MD [facility]   5/25/2022 11:30 AM Sherlyn Bragg DO [facility] OB/Gyn [facility]   7/21/2022  2:00 PM [facility]  [facility]   7/21/2022  2:00 PM Abby Donaldson MD [facility]   7/25/2022  2:15 PM Emperatriz Saunders MD [facility] ROCK Blue RN

## 2022-05-06 ENCOUNTER — CARE COORDINATION (OUTPATIENT)
Dept: CARE COORDINATION | Age: 27
End: 2022-05-06

## 2022-05-06 NOTE — CARE COORDINATION
Initial Contact Social Work Note - Ambulatory  5/6/2022      Date of referral: 5/5/2022  Referral received from: Cam Walter  Reason for referral: Food and personal hygiene products     Previous SW referral: No  If yes, brief summary of outcome: no    Two Identifiers Verified: Yes    Insurance Provider: [place] Medicare/Medicaid     Support System:  sister    [place] Status: no    Community Providers: SNAP/Food [place]    ADL Assistance Needed: N/A    Housing/Living Concerns or Home Modification Needs: no     Transportation Concern: no    Medication Cost Concern: no    Medication Adherence Concern: no    Financial Concern(s): yes        Ability to Read/Write: Yes    Advance Care Plan:  N/A    Other: personal hygiene products and food. Identified Needs:   Food pantries   Personal hygiene pantries     spoke with Kiara Oneil who reports that she could use extra help with food and toiletries. Kiara Oneil reports that she only receives $91 dollars in food stamps due to only her and her child in the home. Kiara Oneil reports that she used to have custody of her sisters children and received additional assistance. Vick Schultz confirmed she has transportation to  food.  text [employer] food and personal toiletry pantries.  will speak with Kiara Oneil regarding other social needs and possible resources to help.

## 2022-05-09 DIAGNOSIS — N63.20 LEFT BREAST MASS: Primary | ICD-10-CM

## 2022-05-10 ENCOUNTER — ANCILLARY PROCEDURE (OUTPATIENT)
Dept: OBGYN | Age: 27
End: 2022-05-10
Payer: MEDICARE

## 2022-05-10 DIAGNOSIS — N92.6 IRREGULAR MENSES: ICD-10-CM

## 2022-05-10 LAB — CYTOLOGY REPORT: NORMAL

## 2022-05-10 PROCEDURE — 76830 TRANSVAGINAL US NON-OB: CPT | Performed by: RADIOLOGY

## 2022-05-10 PROCEDURE — 76856 US EXAM PELVIC COMPLETE: CPT | Performed by: RADIOLOGY

## 2022-05-11 ENCOUNTER — CARE COORDINATION (OUTPATIENT)
Dept: CARE COORDINATION | Age: 27
End: 2022-05-11

## 2022-05-11 DIAGNOSIS — E10.42 TYPE 1 DIABETES MELLITUS WITH DIABETIC POLYNEUROPATHY (HCC): ICD-10-CM

## 2022-05-11 PROBLEM — N39.0 UTI (URINARY TRACT INFECTION): Status: RESOLVED | Noted: 2022-04-11 | Resolved: 2022-05-11

## 2022-05-12 RX ORDER — ISOPROPYL ALCOHOL 0.75 G/1
SWAB TOPICAL
Qty: 100 EACH | Refills: 5 | Status: SHIPPED | OUTPATIENT
Start: 2022-05-12 | End: 2022-08-15 | Stop reason: SDUPTHER

## 2022-05-12 NOTE — TELEPHONE ENCOUNTER
Last visit: 05/04/2022  Last Med refill: 09/22/2021  Does patient have enough medication for 72 hours: No:     Next Visit Date:  Future Appointments   Date Time Provider Hayden Coto   5/17/2022  3:00 PM Ab Thomas MD [facility]   5/25/2022 11:30 AM Mansoor Rae DO [facility] OB/Gyn [facility]   7/21/2022  2:00 PM STC EMG  STCZ EMG [facility]   7/21/2022  2:00 PM Ian Rivera MD [facility]ha [facility]   7/25/2022  2:15 PM Emperatriz Murillo  [address] [facility]   Topic Date Due    Lipids  05/24/2022    Diabetic retinal exam  05/23/2022 (Originally 1/21/2020)    Hepatitis B vaccine (3 of 3 - Risk 3-dose series) 09/22/2022 (Originally 2/14/1998)    Flu vaccine (Season Ended) 09/22/2022 (Originally 9/1/2022)    COVID-19 Vaccine (1) 03/23/2025 (Originally 12/5/1998)    Varicella vaccine (2 of 2 - 2-dose childhood series) 09/22/2026 (Originally 8/9/2000)    Pneumococcal 0-64 years Vaccine (1 - PCV) 09/22/2026 (Originally 12/5/1999)    Diabetic foot exam  03/23/2023    A1C test (Diabetic or Prediabetic)  03/23/2023    Depression Screen  03/23/2023    Pap smear  05/04/2025    DTaP/Tdap/Td vaccine (6 - Td or Tdap) 08/27/2030    Hib vaccine  Completed    Hepatitis C screen  Completed    HIV screen  Completed    Hepatitis A vaccine  Aged Out    Meningococcal (ACWY) vaccine  Aged Out       Hemoglobin A1C (%)   Date Value   03/23/2022 6.3   12/22/2021 5.9   09/22/2021 5.5             ( goal A1C is < 7)   Microalb/Crt.  Ratio (mcg/mg creat)   Date Value   02/11/2020 511 (H)     LDL Cholesterol (mg/dL)   Date Value   05/24/2021 45   02/11/2020 84       (goal LDL is <100)   AST (U/L)   Date Value   04/11/2022 16     ALT (U/L)   Date Value   04/11/2022 11     BUN (mg/dL)   Date Value   04/11/2022 8     BP Readings from Last 3 Encounters:   05/04/22 (!) 140/99   04/15/22 136/84   04/12/22 115/82          (goal 120/80)    All Future Testing planned in [facility]  Lab Frequency Next Occurrence   Alkaline Phosphatase, Isoenzymes Once 06/14/2021   CBC With Auto Differential Once 06/14/2021   COVID-19 Once 09/14/2021   H.  Pylori Breath Test Once 11/18/2021   EMG Once 05/01/2022   Clostridium Difficile Toxin/Antigen Once 04/15/2022   Fecal lactoferrin Once 04/15/2022   Gastrointestinal Panel, Molecular Once 04/15/2022   CBC Once 98/72/2157   Basic Metabolic Panel Once 07/37/6572   Iron and TIBC Once 04/15/2022   Ferritin Once 04/15/2022   PAP SMEAR Once 06/04/2022   CBC with Auto Differential Once 05/04/2022   TSH with Reflex Once 05/18/2022   Prolactin Once 05/04/2022   hCG, Quantitative, Pregnancy Once 05/04/2022   BO DIGITAL DIAGNOSTIC W OR WO CAD LEFT Once 05/04/2022   US BREAST COMPLETE LEFT Once 05/09/2022               Patient Active Problem List:     Type 1 diabetes mellitus with diabetic polyneuropathy (HCC)     Intractable vomiting with nausea     Intractable abdominal pain     Microalbuminuria due to type 1 diabetes mellitus (HCC)     Generalized abdominal pain     Restless leg syndrome     Protein-calorie malnutrition, severe (HCC)     Marijuana use, continuous     Onychomycosis

## 2022-05-18 ENCOUNTER — CARE COORDINATION (OUTPATIENT)
Dept: CARE COORDINATION | Age: 27
End: 2022-05-18

## 2022-05-18 DIAGNOSIS — B35.1 ONYCHOMYCOSIS: ICD-10-CM

## 2022-05-18 NOTE — CARE COORDINATION
attempted to contact Vishnu Jones.  left message inquiring if Vishnu Jones received information  mailed to her.  requested a call back to [phone]. Plan:    will attempt to follow up with Vishnu Jones regarding resources.

## 2022-05-19 RX ORDER — KETOCONAZOLE 20 MG/G
CREAM TOPICAL
Qty: 60 G | Refills: 1 | Status: SHIPPED | OUTPATIENT
Start: 2022-05-19

## 2022-05-19 NOTE — TELEPHONE ENCOUNTER
Last visit: 04/15/2022  Last Med refill: 04/06/2022  Does patient have enough medication for 72 hours: No:     Next Visit Date:  Future Appointments   Date Time Provider Hayden Coto   5/25/2022 11:30 AM Sherlyn Bragg DO [facility] OB/[facility]yn [facility]   7/21/2022  2:00 PM STC EMG  STCZ EMG [facility]   7/21/2022  2:00 PM [facility]onaldson [facility]   7/25/2022  2:15 PM Emperatriz Saunders  [address] Maintenance   Topic Date Due    Lipids  05/24/2022    Diabetic retinal exam  05/23/2022 (Originally 1/21/2020)    Hepatitis B vaccine (3 of 3 - Risk 3-dose series) 09/22/2022 (Originally 2/14/1998)    Flu vaccine (Season Ended) 09/22/2022 (Originally 9/1/2022)    COVID-19 Vaccine (1) 03/23/2025 (Originally 12/5/1998)    Varicella vaccine (2 of 2 - 2-dose childhood series) 09/22/2026 (Originally 8/9/2000)    Pneumococcal 0-64 years Vaccine (1 - PCV) 09/22/2026 (Originally 12/5/1999)    Diabetic foot exam  03/23/2023    A1C test (Diabetic or Prediabetic)  03/23/2023    Depression Screen  03/23/2023    Pap smear  05/04/2025    DTaP/Tdap/Td vaccine (6 - Td or Tdap) 08/27/2030    Hib vaccine  Completed    Hepatitis C screen  Completed    HIV screen  Completed    Hepatitis A vaccine  Aged Out    Meningococcal (ACWY) vaccine  Aged Out       Hemoglobin A1C (%)   Date Value   03/23/2022 6.3   12/22/2021 5.9   09/22/2021 5.5             ( goal A1C is < 7)   Microalb/Crt.  Ratio (mcg/mg creat)   Date Value   02/11/2020 511 (H)     LDL Cholesterol (mg/dL)   Date Value   05/24/2021 45   02/11/2020 84       (goal LDL is <100)   AST (U/L)   Date Value   04/11/2022 16     ALT (U/L)   Date Value   04/11/2022 11     BUN (mg/dL)   Date Value   04/11/2022 8     BP Readings from Last 3 Encounters:   05/04/22 (!) 140/99   04/15/22 136/84   04/12/22 115/82          (goal 120/80)    All Future Testing planned in [facility]  Lab Frequency Next Occurrence   Alkaline Phosphatase, Isoenzymes Once 06/14/2021   CBC With Auto Differential Once 06/14/2021   COVID-19 Once 09/14/2021   H.  Pylori Breath Test Once 11/18/2021   EMG Once 05/01/2022   Clostridium Difficile Toxin/Antigen Once 04/15/2022   Fecal lactoferrin Once 04/15/2022   Gastrointestinal Panel, Molecular Once 04/15/2022   CBC Once 60/54/3978   Basic Metabolic Panel Once 33/07/6228   Iron and TIBC Once 04/15/2022   Ferritin Once 04/15/2022   PAP SMEAR Once 06/04/2022   TSH with Reflex Once 05/18/2022   Prolactin Once 05/04/2022   hCG, Quantitative, Pregnancy Once 05/04/2022   BO DIGITAL DIAGNOSTIC W OR WO CAD LEFT Once 05/04/2022   US BREAST COMPLETE LEFT Once 05/09/2022               Patient Active Problem List:     Type 1 diabetes mellitus with diabetic polyneuropathy (HCC)     Intractable vomiting with nausea     Intractable abdominal pain     Microalbuminuria due to type 1 diabetes mellitus (HCC)     Generalized abdominal pain     Restless leg syndrome     Protein-calorie malnutrition, severe (HCC)     Marijuana use, continuous     Onychomycosis

## 2022-06-01 DIAGNOSIS — E10.42 TYPE 1 DIABETES MELLITUS WITH DIABETIC POLYNEUROPATHY (HCC): ICD-10-CM

## 2022-06-02 RX ORDER — PEN NEEDLE, DIABETIC 31 GX5/16"
NEEDLE, DISPOSABLE MISCELLANEOUS
Qty: 400 EACH | Refills: 1 | Status: SHIPPED | OUTPATIENT
Start: 2022-06-02

## 2022-06-02 RX ORDER — CALCIUM CITRATE/VITAMIN D3 200MG-6.25
TABLET ORAL
Qty: 400 STRIP | Refills: 1 | Status: SHIPPED | OUTPATIENT
Start: 2022-06-02

## 2022-06-02 RX ORDER — GLUCOSAM/CHON-MSM1/C/MANG/BOSW 500-416.6
TABLET ORAL
Qty: 400 EACH | Refills: 1 | Status: SHIPPED | OUTPATIENT
Start: 2022-06-02

## 2022-06-02 NOTE — TELEPHONE ENCOUNTER
Last visit: 4/15/22  Last Med refill: 1/18/22  Does patient have enough medication for 72 hours: Yes    Next Visit Date:  Future Appointments   Date Time Provider Hayden Coto   6/14/2022 11:15 AM Dania Barajas DO [facility] OB/Gyn [facility]   7/21/2022  2:00 PM STC EMG  STCZ EMG [facility]   7/21/2022  2:00 PM Lisa Zarco MD [facility]reha [facility]   7/25/2022  2:15 PM Emperatriz Slater  [address] Maintenance   Topic Date Due    Diabetic retinal exam  01/21/2020    Lipids  05/24/2022    Hepatitis B vaccine (3 of 3 - Risk 3-dose series) 09/22/2022 (Originally 2/14/1998)    Flu vaccine (Season Ended) 09/22/2022 (Originally 9/1/2022)    COVID-19 Vaccine (1) 03/23/2025 (Originally 12/5/1998)    Varicella vaccine (2 of 2 - 2-dose childhood series) 09/22/2026 (Originally 8/9/2000)    Pneumococcal 0-64 years Vaccine (1 - PCV) 09/22/2026 (Originally 12/5/1999)    Diabetic foot exam  03/23/2023    A1C test (Diabetic or Prediabetic)  03/23/2023    Depression Screen  03/23/2023    Pap smear  05/04/2025    DTaP/Tdap/Td vaccine (6 - Td or Tdap) 08/27/2030    Hib vaccine  Completed    Hepatitis C screen  Completed    HIV screen  Completed    Hepatitis A vaccine  Aged Out    Meningococcal (ACWY) vaccine  Aged Out       Hemoglobin A1C (%)   Date Value   03/23/2022 6.3   12/22/2021 5.9   09/22/2021 5.5             ( goal A1C is < 7)   Microalb/Crt.  Ratio (mcg/mg creat)   Date Value   02/11/2020 511 (H)     LDL Cholesterol (mg/dL)   Date Value   05/24/2021 45   02/11/2020 84       (goal LDL is <100)   AST (U/L)   Date Value   04/11/2022 16     ALT (U/L)   Date Value   04/11/2022 11     BUN (mg/dL)   Date Value   04/11/2022 8     BP Readings from Last 3 Encounters:   05/04/22 (!) 140/99   04/15/22 136/84   04/12/22 115/82          (goal 120/80)    All Future Testing planned in [facility]  Lab Frequency Next Occurrence   Alkaline Phosphatase, Isoenzymes Once 06/14/2021   CBC With Auto Differential Once 06/14/2021   COVID-19 Once 09/14/2021   H.  Pylori Breath Test Once 11/18/2021   EMG Once 05/01/2022   Clostridium Difficile Toxin/Antigen Once 04/15/2022   Fecal lactoferrin Once 04/15/2022   Gastrointestinal Panel, Molecular Once 04/15/2022   CBC Once 20/70/5786   Basic Metabolic Panel Once 94/54/9251   Iron and TIBC Once 04/15/2022   Ferritin Once 04/15/2022   PAP SMEAR Once 06/04/2022   TSH with Reflex Once 05/18/2022   Prolactin Once 05/04/2022   hCG, Quantitative, Pregnancy Once 05/04/2022   BO DIGITAL DIAGNOSTIC W OR WO CAD LEFT Once 05/04/2022   US BREAST COMPLETE LEFT Once 05/09/2022               Patient Active Problem List:     Type 1 diabetes mellitus with diabetic polyneuropathy (HCC)     Intractable vomiting with nausea     Intractable abdominal pain     Microalbuminuria due to type 1 diabetes mellitus (HCC)     Generalized abdominal pain     Restless leg syndrome     Protein-calorie malnutrition, severe (HCC)     Marijuana use, continuous     Onychomycosis

## 2022-06-09 ENCOUNTER — CARE COORDINATION (OUTPATIENT)
Dept: CARE COORDINATION | Age: 27
End: 2022-06-09

## 2022-06-10 NOTE — CARE COORDINATION
followed up with Kaylee regarding social needs. Kaylee reports that she is in need of resources for her bills. Kaylee report that she does not have shut off notices but is trying to make small payments to prevent shut. Kaylee reports that she applied for PIPP online 2 days ago.  encouraged [employer] to follow up on application next week.  provided Kaylee with [facility] and [facility] program number to call and request help.  provided Kaylee with [address] [place] [address] number to call and request a box drop off.  provide [address] name and number to receive free meals between 1-1:45. Plan:   Kaylee and her daughter will eat at the [employer]. Kaylee will follow up on PIPP application.

## 2022-06-17 ENCOUNTER — CARE COORDINATION (OUTPATIENT)
Dept: CARE COORDINATION | Age: 27
End: 2022-06-17

## 2022-06-17 NOTE — CARE COORDINATION
called Yusra Fajardo. Yusra Fajardo reports that she has been utilizing [email or web address] for lunches for her and her daughter. Yusra Fajardo reports that she is behind on rent and utilities. Yusra Fajardo reports that she applied for help with [employer] and is waiting to be assigned as . Yusra Fajardo reports that she has already used all her food stamps for the month and has little to no money left after paying bills.  and Yusra Fajardo completed [facility] application for food delivery.  inquired about child support. Yusra Fajardo reports that she was told she cannot obtain child support since she does not know where the father lives.  will follow up with children services regarding child support.  met with Yusra Fajardo to review [employer] information and brought [employer] some food. Yusra Fajardo was very appreciative. Plan:   Yusra Tana will continue to work with [employer] to obtain rent assistance.

## 2022-06-21 ENCOUNTER — CARE COORDINATION (OUTPATIENT)
Dept: CARE COORDINATION | Age: 27
End: 2022-06-21

## 2022-06-23 NOTE — CARE COORDINATION
called and spoke with Arnaldo Ledesma. Madalyn Aries reports that things are going \"ok\". Maggy reports that she is still waiting to be assigned a  at [facility]. Tran confirmed that she did receive the box of food from [address]. Arnaldo Ledesma reports that she has a good supply of food with donations. Arnaldo Ledesma denied questions or concerns. Arnaldo Ledesma will continue to try and find information on the father of her child in order to obtain child support.  called [employer] [place] [facility] who report since out of state they will need an address or SS in order to file for child support. Plan:   Arnaldo Ledesma will continue to look for information on the father of her child.  will speak to someone at [facility] regarding rent assistance.

## 2022-06-29 ENCOUNTER — CARE COORDINATION (OUTPATIENT)
Dept: CARE COORDINATION | Age: 27
End: 2022-06-29

## 2022-06-29 NOTE — CARE COORDINATION
called and spoke with Bartolome Trujillo. Bartolome Trujillo reports that she has not heard anything from [employer] since over a week ago when they stated she needed a  assigned to her. Bartolome Trujillo reports that she has plenty of food and paid most of her bills. Bartolome Trujillo was currently at the store.  will contact Bartolome Trujillo next week and will 3 way call [employer] to follow up on application. Plan:   Bartolome Trujillo and  will contact [employer].

## 2022-07-07 ENCOUNTER — OFFICE VISIT (OUTPATIENT)
Dept: OBGYN | Age: 27
End: 2022-07-07
Payer: MEDICARE

## 2022-07-07 VITALS
DIASTOLIC BLOOD PRESSURE: 94 MMHG | HEART RATE: 94 BPM | WEIGHT: 92 LBS | HEIGHT: 65 IN | BODY MASS INDEX: 15.33 KG/M2 | SYSTOLIC BLOOD PRESSURE: 140 MMHG

## 2022-07-07 DIAGNOSIS — Z11.3 ROUTINE SCREENING FOR STI (SEXUALLY TRANSMITTED INFECTION): Primary | ICD-10-CM

## 2022-07-07 DIAGNOSIS — R64 CACHEXIA (HCC): ICD-10-CM

## 2022-07-07 DIAGNOSIS — R63.4 ABNORMAL WEIGHT LOSS: ICD-10-CM

## 2022-07-07 DIAGNOSIS — E10.42 TYPE 1 DIABETES MELLITUS WITH DIABETIC POLYNEUROPATHY (HCC): ICD-10-CM

## 2022-07-07 PROCEDURE — G8427 DOCREV CUR MEDS BY ELIG CLIN: HCPCS | Performed by: STUDENT IN AN ORGANIZED HEALTH CARE EDUCATION/TRAINING PROGRAM

## 2022-07-07 PROCEDURE — 4004F PT TOBACCO SCREEN RCVD TLK: CPT | Performed by: STUDENT IN AN ORGANIZED HEALTH CARE EDUCATION/TRAINING PROGRAM

## 2022-07-07 PROCEDURE — 2022F DILAT RTA XM EVC RTNOPTHY: CPT | Performed by: STUDENT IN AN ORGANIZED HEALTH CARE EDUCATION/TRAINING PROGRAM

## 2022-07-07 PROCEDURE — 3044F HG A1C LEVEL LT 7.0%: CPT | Performed by: STUDENT IN AN ORGANIZED HEALTH CARE EDUCATION/TRAINING PROGRAM

## 2022-07-07 PROCEDURE — G8419 CALC BMI OUT NRM PARAM NOF/U: HCPCS | Performed by: STUDENT IN AN ORGANIZED HEALTH CARE EDUCATION/TRAINING PROGRAM

## 2022-07-07 PROCEDURE — 99213 OFFICE O/P EST LOW 20 MIN: CPT | Performed by: STUDENT IN AN ORGANIZED HEALTH CARE EDUCATION/TRAINING PROGRAM

## 2022-07-07 NOTE — PROGRESS NOTES
OB/GYN Problem Visit    Kolton Head  2022                       Primary Care Physician: Del Hung MD    CC:   Chief Complaint   Patient presents with    Follow-up     lab and US results         HPI: Kolton Head is a 27 y.o. female     The patient was seen and examined. She is here for for follow up on recently performed pelvic ultrasound and lab orders. The patient did complete the ultrasound but has not copmleted her blood work. The patient initially presented with irregular periods. She also reports weight loss which is unintentional. She typically weighs 145-155lb and currently weighs 92lb for a BMI of 15.31. She does report some episodes of nausea and vomiting a couple of times a week. She eats bland foods like peanut butter and potatoes. She has previously been given an Protein supplementation drinks. Suspect her bleeding pattern is due to her severe weight loss. She denies any fevers or chills. She denies headache, chest pain, palpitations, abdominal pain, change in urinary symptoms or discharge. She reports normal bowel movements without constipation or diarrhea. She denies other sick symptoms. She does report she had previously been treated for a GI but and diarrhea. Stool testing was ordered but not completed. The patient has had multiple labs ordered for workup of her abnormal uterine bleeding and simon loss but has not completed these. Encouraged her to complete these ASAP. She does report her hair is more dry and brittle and she gets itchy scalp lesions which appear to be small dry patches. She does have some bruising on her legs and dark circles under her eyes. The patient was noted to have a small breast lump on her previous visit. Mammogram and breast ultrasound were ordered but not completed. Discussed results of patient's pelvic ultrasound which is overall unremarkable other than a heterogeneous appearing uterus.  She is not sexually active and does not desires to become pregnant. She is requesting assistance in helping to gain weight. She is a daily cigarette smoker.     REVIEW OF SYSTEMS:   Constitutional: negative fever, negative chills, positive weight loss  HEENT: negative visual disturbances, negative headaches, negative dizziness, negative hearing loss  Breast: Negative breast abnormalities, negative breast lumps, negative nipple discharge  Respiratory: negative dyspnea, negative cough, negative SOB  Cardiovascular: negative chest pain,  negative palpitations, negative arrhythmia, negative syncope   Gastrointestinal: negative abdominal pain, negative RUQ pain, positive N/V, negative diarrhea, negative constipation, negative bowel changes, negative heartburn   Genitourinary: negative dysuria, negative hematuria, negative urinary incontinence, negative vaginal discharge, negative vaginal bleeding or spotting  Dermatological: negative rash, negative pruritis, negative mole or other skin changes  Hematologic: negative bruising  Immunologic/Lymphatic: negative recent illness, negative recent sick contact  Musculoskeletal: negative back pain, negative myalgias, negative arthralgias  Neurological:  negative dizziness, negative migraines, negative seizures, negative weakness  Behavior/Psych: negative depression, negative anxiety, negative SI, negative HI   ________________________________________________________________________    GYNECOLOGICAL HISTORY:  Age of Menarche: 13  Age of Menopause: N/A     Sexually Active: not sexually active  STD History: denies recent history    Pap History: pap negative 22  Colposcopy History: denies    Permanent Sterilization: no  Reversible Birth Control: no  Hormone Replacement Exposure: no    OBSTETRICAL HISTORY:  OB History    Para Term  AB Living   1  1     1   SAB IAB Ectopic Molar Multiple Live Births             1      # Outcome Date GA Lbr Bruce/2nd Weight Sex Delivery Anes PTL Lv   1 Term 16 MG tablet TAKE 1 TABLET EVERY NIGHT 90 tablet 1    NOVOLOG FLEXPEN 100 UNIT/ML injection pen INJECT 2 TO 12 UNITS SUBCUTANEOUSLY THREE TIMES DAILY PER SLIDING SCALE AS DIRECTED (DISCARD PEN 28 DAYS AFTER OPENING) 45 mL 5    ACETAMINOPHEN EXTRA STRENGTH 500 MG tablet TAKE 2 TABLETS BY MOUTH EVERY 6 HOURS AS NEEDED FOR PAIN 180 tablet 1     No current facility-administered medications for this visit. ALLERGIES:  Allergies as of 2022 - Fully Reviewed 2022   Allergen Reaction Noted    Blueberry [vaccinium angustifolium] Anaphylaxis 2021    Shrimp flavor Anaphylaxis 2016                                   VITALS:  Vitals:    22 1525   BP: (!) 140/94   Pulse: 94   Weight: 92 lb (41.7 kg)   Height: 5' 5\" (1.651 m)                                                                                                                                                                         PHYSICAL EXAM:   General Appearance: Appears cachectic. Alert; in no acute distress. Pleasant. Skin: Diffuse bruising noted on lower extremities. HEENT: normocephalic and atraumatic, some small dry patches noted on scalp, patient reports are itchy, hair is dry and thin  Respiratory: clear to auscultation, no wheezes, rales or rhonchi, symmetric air entry  Cardiovascular: normal, regular rate and rhythm  Breast: deferred  Abdomen: soft, non-tender, non-distended, no right upper quadrant tenderness and no CVA tenderness, no organomegaly. Small scratch across upper abdomen. Well healed pfannensteil incision noted. Pelvic Exam: deferred  Extremities: non-tender BLE and non-edematous  Musculoskeletal: no gross abnormalities  Psych: Normal. and Alert and oriented, appropriate affect. DATA:  No results found for this visit on 22.     ASSESSMENT & PLAN:    Noam Morales is a 27 y.o. female  with oligomenorrhea and weight loss   - Pt elevated BP, otherwise VSS, afebrile   - Suspect DO  Ob/Gyn Resident  [facility], [address]   7/8/2022, 1:21 PM         Attending Physician Statement  I have discussed the care of Fatou Cullen, including pertinent history and exam findings,  with the resident. I have seen and examined the patient and the key elements of all parts of the encounter have been performed by me. I agree with the assessment, plan and orders as documented by the resident. Pt encouraged to complete labs ordered by PCP to evaluate weight loss and follow up with their office. Menstrual irregularity most likely hypothalamic in origin. She may benefit from [address] for cycle control and t protect bone health.     (GC Modifier)

## 2022-07-08 ENCOUNTER — CARE COORDINATION (OUTPATIENT)
Dept: CARE COORDINATION | Age: 27
End: 2022-07-08

## 2022-07-11 ENCOUNTER — HOSPITAL ENCOUNTER (EMERGENCY)
Age: 27
Discharge: HOME | End: 2022-07-11
Attending: EMERGENCY MEDICINE
Payer: MEDICARE

## 2022-07-11 VITALS
HEIGHT: 66 IN | RESPIRATION RATE: 20 BRPM | DIASTOLIC BLOOD PRESSURE: 84 MMHG | WEIGHT: 92 LBS | SYSTOLIC BLOOD PRESSURE: 129 MMHG | OXYGEN SATURATION: 100 % | TEMPERATURE: 98 F | BODY MASS INDEX: 14.79 KG/M2 | HEART RATE: 82 BPM

## 2022-07-11 VITALS
RESPIRATION RATE: 17 BRPM | DIASTOLIC BLOOD PRESSURE: 101 MMHG | OXYGEN SATURATION: 96 % | TEMPERATURE: 100.2 F | HEART RATE: 90 BPM | SYSTOLIC BLOOD PRESSURE: 149 MMHG

## 2022-07-11 DIAGNOSIS — R10.84 GENERALIZED ABDOMINAL PAIN: ICD-10-CM

## 2022-07-11 DIAGNOSIS — T78.40XA ALLERGIC REACTION, INITIAL ENCOUNTER: Primary | ICD-10-CM

## 2022-07-11 DIAGNOSIS — R11.2 NON-INTRACTABLE VOMITING WITH NAUSEA, UNSPECIFIED VOMITING TYPE: Primary | ICD-10-CM

## 2022-07-11 LAB
ABSOLUTE EOS #: 0.14 K/UL (ref 0–0.44)
ABSOLUTE IMMATURE GRANULOCYTE: <0.03 K/UL (ref 0–0.3)
ABSOLUTE LYMPH #: 1.17 K/UL (ref 1.1–3.7)
ABSOLUTE MONO #: 0.62 K/UL (ref 0.1–1.2)
ALBUMIN SERPL-MCNC: 2 G/DL (ref 3.5–5.2)
ALBUMIN/GLOBULIN RATIO: 0.5 (ref 1–2.5)
ALP BLD-CCNC: 101 U/L (ref 35–104)
ALT SERPL-CCNC: 16 U/L (ref 5–33)
ANION GAP SERPL CALCULATED.3IONS-SCNC: 5 MMOL/L (ref 9–17)
AST SERPL-CCNC: 72 U/L
BASOPHILS # BLD: 1 % (ref 0–2)
BASOPHILS ABSOLUTE: 0.04 K/UL (ref 0–0.2)
BILIRUB SERPL-MCNC: 0.43 MG/DL (ref 0.3–1.2)
BUN BLDV-MCNC: 3 MG/DL (ref 6–20)
CALCIUM SERPL-MCNC: 7.7 MG/DL (ref 8.6–10.4)
CHLORIDE BLD-SCNC: 107 MMOL/L (ref 98–107)
CHP ED QC CHECK: NORMAL
CO2: 26 MMOL/L (ref 20–31)
CREAT SERPL-MCNC: 0.79 MG/DL (ref 0.5–0.9)
EOSINOPHILS RELATIVE PERCENT: 2 % (ref 1–4)
GFR AFRICAN AMERICAN: >60 ML/MIN
GFR NON-AFRICAN AMERICAN: >60 ML/MIN
GFR SERPL CREATININE-BSD FRML MDRD: ABNORMAL ML/MIN/{1.73_M2}
GLUCOSE BLD-MCNC: 110 MG/DL
GLUCOSE BLD-MCNC: 110 MG/DL (ref 65–105)
GLUCOSE BLD-MCNC: 93 MG/DL (ref 70–99)
HCG QUALITATIVE: NEGATIVE
HCT VFR BLD CALC: 27.1 % (ref 36.3–47.1)
HEMOGLOBIN: 9.4 G/DL (ref 11.9–15.1)
IMMATURE GRANULOCYTES: 0 %
LIPASE: 9 U/L (ref 13–60)
LYMPHOCYTES # BLD: 20 % (ref 24–43)
MAGNESIUM: 1.4 MG/DL (ref 1.6–2.6)
MCH RBC QN AUTO: 36.3 PG (ref 25.2–33.5)
MCHC RBC AUTO-ENTMCNC: 34.7 G/DL (ref 28.4–34.8)
MCV RBC AUTO: 104.6 FL (ref 82.6–102.9)
MONOCYTES # BLD: 10 % (ref 3–12)
NRBC AUTOMATED: 0 PER 100 WBC
PDW BLD-RTO: 13.6 % (ref 11.8–14.4)
PLATELET # BLD: ABNORMAL K/UL (ref 138–453)
PLATELET, FLUORESCENCE: NORMAL K/UL (ref 138–453)
POTASSIUM SERPL-SCNC: 4.6 MMOL/L (ref 3.7–5.3)
RBC # BLD: 2.59 M/UL (ref 3.95–5.11)
RBC # BLD: ABNORMAL 10*6/UL
REASON FOR REJECTION: NORMAL
SEG NEUTROPHILS: 67 % (ref 36–65)
SEGMENTED NEUTROPHILS ABSOLUTE COUNT: 3.97 K/UL (ref 1.5–8.1)
SODIUM BLD-SCNC: 138 MMOL/L (ref 135–144)
TOTAL PROTEIN: 5.9 G/DL (ref 6.4–8.3)
WBC # BLD: 6 K/UL (ref 3.5–11.3)
ZZ NTE CLEAN UP: ORDERED TEST: NORMAL
ZZ NTE WITH NAME CLEAN UP: SPECIMEN SOURCE: NORMAL

## 2022-07-11 PROCEDURE — 82947 ASSAY GLUCOSE BLOOD QUANT: CPT

## 2022-07-11 PROCEDURE — 96375 TX/PRO/DX INJ NEW DRUG ADDON: CPT

## 2022-07-11 PROCEDURE — 6360000002 HC RX W HCPCS: Performed by: STUDENT IN AN ORGANIZED HEALTH CARE EDUCATION/TRAINING PROGRAM

## 2022-07-11 PROCEDURE — 96365 THER/PROPH/DIAG IV INF INIT: CPT

## 2022-07-11 PROCEDURE — 99284 EMERGENCY DEPT VISIT MOD MDM: CPT

## 2022-07-11 PROCEDURE — 83690 ASSAY OF LIPASE: CPT

## 2022-07-11 PROCEDURE — 83735 ASSAY OF MAGNESIUM: CPT

## 2022-07-11 PROCEDURE — 85055 RETICULATED PLATELET ASSAY: CPT

## 2022-07-11 PROCEDURE — 85025 COMPLETE CBC W/AUTO DIFF WBC: CPT

## 2022-07-11 PROCEDURE — 84703 CHORIONIC GONADOTROPIN ASSAY: CPT

## 2022-07-11 PROCEDURE — 6370000000 HC RX 637 (ALT 250 FOR IP): Performed by: STUDENT IN AN ORGANIZED HEALTH CARE EDUCATION/TRAINING PROGRAM

## 2022-07-11 PROCEDURE — 80053 COMPREHEN METABOLIC PANEL: CPT

## 2022-07-11 PROCEDURE — 2580000003 HC RX 258: Performed by: STUDENT IN AN ORGANIZED HEALTH CARE EDUCATION/TRAINING PROGRAM

## 2022-07-11 PROCEDURE — 96361 HYDRATE IV INFUSION ADD-ON: CPT

## 2022-07-11 RX ORDER — ONDANSETRON 2 MG/ML
4 INJECTION INTRAMUSCULAR; INTRAVENOUS ONCE
Status: COMPLETED | OUTPATIENT
Start: 2022-07-11 | End: 2022-07-11

## 2022-07-11 RX ORDER — METHYLPREDNISOLONE SODIUM SUCCINATE 125 MG/2ML
125 INJECTION, POWDER, LYOPHILIZED, FOR SOLUTION INTRAMUSCULAR; INTRAVENOUS ONCE
Status: COMPLETED | OUTPATIENT
Start: 2022-07-11 | End: 2022-07-11

## 2022-07-11 RX ORDER — SODIUM CHLORIDE 0.9 % (FLUSH) 0.9 %
3 SYRINGE (ML) INJECTION EVERY 8 HOURS
Status: DISCONTINUED | OUTPATIENT
Start: 2022-07-11 | End: 2022-07-11 | Stop reason: HOSPADM

## 2022-07-11 RX ORDER — IBUPROFEN 600 MG/1
TABLET ORAL
Qty: 120 TABLET | Refills: 1 | Status: SHIPPED | OUTPATIENT
Start: 2022-07-11

## 2022-07-11 RX ORDER — MAGNESIUM SULFATE IN WATER 40 MG/ML
2000 INJECTION, SOLUTION INTRAVENOUS ONCE
Status: COMPLETED | OUTPATIENT
Start: 2022-07-11 | End: 2022-07-11

## 2022-07-11 RX ORDER — 0.9 % SODIUM CHLORIDE 0.9 %
1000 INTRAVENOUS SOLUTION INTRAVENOUS ONCE
Status: COMPLETED | OUTPATIENT
Start: 2022-07-11 | End: 2022-07-11

## 2022-07-11 RX ORDER — DIPHENHYDRAMINE HYDROCHLORIDE 50 MG/ML
50 INJECTION INTRAMUSCULAR; INTRAVENOUS ONCE
Status: COMPLETED | OUTPATIENT
Start: 2022-07-11 | End: 2022-07-11

## 2022-07-11 RX ORDER — FENTANYL CITRATE 50 UG/ML
50 INJECTION, SOLUTION INTRAMUSCULAR; INTRAVENOUS ONCE
Status: COMPLETED | OUTPATIENT
Start: 2022-07-11 | End: 2022-07-11

## 2022-07-11 RX ORDER — CETIRIZINE HYDROCHLORIDE 10 MG/1
10 TABLET ORAL ONCE
Status: COMPLETED | OUTPATIENT
Start: 2022-07-11 | End: 2022-07-11

## 2022-07-11 RX ORDER — DIPHENHYDRAMINE HCL 25 MG
25 CAPSULE ORAL EVERY 4 HOURS PRN
Qty: 1 CAPSULE | Refills: 0 | Status: SHIPPED | OUTPATIENT
Start: 2022-07-11 | End: 2022-07-21

## 2022-07-11 RX ORDER — ONDANSETRON 4 MG/1
4 TABLET, ORALLY DISINTEGRATING ORAL EVERY 8 HOURS PRN
Qty: 10 TABLET | Refills: 0 | Status: SHIPPED | OUTPATIENT
Start: 2022-07-11 | End: 2022-11-04

## 2022-07-11 RX ADMIN — SODIUM CHLORIDE 1000 ML: 9 INJECTION, SOLUTION INTRAVENOUS at 11:42

## 2022-07-11 RX ADMIN — METHYLPREDNISOLONE SODIUM SUCCINATE 125 MG: 125 INJECTION, POWDER, FOR SOLUTION INTRAMUSCULAR; INTRAVENOUS at 22:51

## 2022-07-11 RX ADMIN — CETIRIZINE HYDROCHLORIDE 10 MG: 10 TABLET ORAL at 14:49

## 2022-07-11 RX ADMIN — Medication 3 ML: at 11:46

## 2022-07-11 RX ADMIN — DIPHENHYDRAMINE HYDROCHLORIDE 50 MG: 50 INJECTION, SOLUTION INTRAMUSCULAR; INTRAVENOUS at 22:51

## 2022-07-11 RX ADMIN — ONDANSETRON 4 MG: 2 INJECTION INTRAMUSCULAR; INTRAVENOUS at 11:44

## 2022-07-11 RX ADMIN — FENTANYL CITRATE 50 MCG: 50 INJECTION, SOLUTION INTRAMUSCULAR; INTRAVENOUS at 12:20

## 2022-07-11 RX ADMIN — MAGNESIUM SULFATE HEPTAHYDRATE 2000 MG: 40 INJECTION, SOLUTION INTRAVENOUS at 12:55

## 2022-07-11 ASSESSMENT — ENCOUNTER SYMPTOMS
COUGH: 0
NAUSEA: 1
FACIAL SWELLING: 1
BACK PAIN: 0
ABDOMINAL DISTENTION: 0
BACK PAIN: 0
VOICE CHANGE: 0
TROUBLE SWALLOWING: 0
EYE ITCHING: 0
SHORTNESS OF BREATH: 1
ABDOMINAL DISTENTION: 0
CONSTIPATION: 0
COUGH: 0
VOMITING: 1
VOMITING: 0
DIARRHEA: 0
EYE DISCHARGE: 0
SHORTNESS OF BREATH: 0
APNEA: 0
NAUSEA: 0

## 2022-07-11 ASSESSMENT — PAIN - FUNCTIONAL ASSESSMENT: PAIN_FUNCTIONAL_ASSESSMENT: 0-10

## 2022-07-11 ASSESSMENT — PAIN SCALES - GENERAL
PAINLEVEL_OUTOF10: 7
PAINLEVEL_OUTOF10: 9
PAINLEVEL_OUTOF10: 10

## 2022-07-11 ASSESSMENT — PAIN DESCRIPTION - LOCATION
LOCATION: FACE
LOCATION: HEAD

## 2022-07-11 NOTE — ED TRIAGE NOTES
Pt arrived to ED 01 via triage. Pt co vomiting, emesis, headache and cough x 1 day. Pt denies any CP or SOB. Pt is resting on stretcher with call light within reach. Breathing is non labored and no acute distress is noted.    Will continue to follow plan of care

## 2022-07-11 NOTE — ED PROVIDER NOTES
[facility]  ED  Emergency Department Encounter  Emergency Medicine Resident     Pt Name: Danilo Ramsey  MRN: [id number]  [place] 1995  Date of evaluation: 22  PCP:  Kimberley Cole MD    [address] [place] [address]       Chief Complaint   Patient presents with    Headache    Cough    Abdominal Pain    Emesis       HISTORY OFPRESENT ILLNESS  (Location/Symptom, Timing/Onset, Context/Setting, Quality, Duration, Modifying Factors,Severity.)      Danilo Ramsey is a 27 y. o.yo female who presents with history of insulin-dependent diabetes, type I who presents with 2 days of nausea and vomiting, patient reports he has not been able to tolerate any orals. Patient states that she is not on any insulin pump. She denies any abdominal pain, urinary symptoms or vaginal discharge. Patient denies any chest pain, shortness of breath, fever, chills. PAST MEDICAL / SURGICAL / SOCIAL / FAMILY HISTORY      has a past medical history of Diabetes mellitus ([address] Diabetic neuropathy, type II diabetes mellitus ([address] Hypertension, Kidney infection, MRSA (methicillin resistant staph aureus) culture positive, Nausea & vomiting, Ovarian cyst, and UTI (urinary tract infection). has a past surgical history that includes  section and Upper gastrointestinal endoscopy (N/A, 2021).      Social History     Socioeconomic History    Marital status: Single     Spouse name: Not on file    Number of children: Not on file    Years of education: Not on file    Highest education level: Not on file   Occupational History    Not on file   Tobacco Use    Smoking status: Current Every Day Smoker     Packs/day: 0.50     Types: Cigarettes    Smokeless tobacco: Never Used    Tobacco comment: 5/day   Substance and Sexual Activity    Alcohol use: No    Drug use: Yes     Types: Marijuana (Weed)     Comment: 1-2x per day    Sexual activity: Never   Other Topics Concern    Not on file   Social History Narrative    Not on file     Social Determinants of Health     Financial Resource Strain: High Risk    Difficulty of Paying Living Expenses: Hard   Food Insecurity: Food Insecurity Present    Worried About Running Out of Food in the Last Year: Often true    Shantelle of Food in the Last Year: Often true   Transportation Needs:     Lack of Transportation (Medical): Not on file    Lack of Transportation (Non-Medical): Not on file   Physical Activity:     Days of Exercise per Week: Not on file    Minutes of Exercise per Session: Not on file   Stress:     Feeling of Stress : Not on file   Social Connections:     Frequency of Communication with Friends and Family: Not on file    Frequency of Social Gatherings with Friends and Family: Not on file    Attends Hinduism Services: Not on file    Active Member of [employer] or Organizations: Not on file    Attends Club or Organization Meetings: Not on file    Marital Status: Not on file   Intimate Partner Violence:     Fear of Current or Ex-Partner: Not on file    Emotionally Abused: Not on file    Physically Abused: Not on file    Sexually Abused: Not on file   Housing Stability:     Unable to Pay for Housing in the Last Year: Not on file    Number of [place] in the Last Year: Not on file    Unstable Housing in the Last Year: Not on file       History reviewed. No pertinent family history. Allergies:  Blueberry [vaccinium angustifolium] and Shrimp flavor    Home Medications:  Prior to Admission medications    Medication Sig Start Date End Date Taking?  Authorizing Provider   ondansetron (ZOFRAN ODT) 4 MG disintegrating tablet Take 1 tablet by mouth every 8 hours as needed for Nausea 7/11/22  Yes Brant Lombardi MD   TRUE METRIX BLOOD GLUCOSE TEST strip CHECK BLOOD SUGAR FOUR TIMES DAILY AS DIRECTED 6/2/22   Emperatriz Patel MD   TRUEplus Lancets 33G MISC TEST BLOOD SUGAR FOUR TIMES DAILY 6/2/22   Emperatriz Patel MD   DROPLET PEN NEEDLES 31G X 8 MM MISC USE FOUR TIMES DAILY AS DIRECTED 6/2/22   Emperatriz Ruano MD   ketoconazole (NIZORAL) 2 % cream APPLY TOPICALLY DAILY 5/19/22   Emperatriz Ruano MD   Alcohol Swabs (B-D SINGLE USE SWABS REGULAR) PADS USE AS DIRECTED FOUR TIMES DAILY 5/12/22   Emperatriz Ruano MD    MG tablet TAKE 1 TABLET FOUR TIMES DAILY AS NEEDED FOR PAIN 5/5/22   Emperatriz Ruano MD   dicyclomine (BENTYL) 10 MG capsule TAKE 1 CAPSULE THREE TIMES DAILY AS NEEDED FOR ABDOMINAL PAIN 4/26/22   Tate Elias MD   Nutritional Supplements (ENSURE HIGH PROTEIN) LIQD Take 1 Bottle by mouth in the morning, at noon, and at bedtime 4/13/22   Emperatriz Ruano MD   pantoprazole (PROTONIX) 40 MG tablet Take 1 tablet by mouth every morning (before breakfast) 4/12/22   Sonya Prim, DO   famotidine (PEPCID) 20 MG tablet Take 1 tablet by mouth nightly 4/12/22   Sonya Prim, DO   insulin glargine (LANTUS SOLOSTAR) 100 UNIT/ML injection pen Inject 10 Units into the skin nightly 3/23/22   Emperatriz Ruano MD   lisinopril (PRINIVIL;ZESTRIL) 2.5 MG tablet TAKE 1 TABLET EVERY DAY 3/18/22   Emperatriz Ruano MD   rOPINIRole (REQUIP) 0.25 MG tablet TAKE 1 TABLET EVERY NIGHT 3/18/22   Emperatriz Ruano MD   NOVOLOG FLEXPEN 100 UNIT/ML injection pen INJECT 2 TO 12 UNITS SUBCUTANEOUSLY THREE TIMES DAILY PER SLIDING SCALE AS DIRECTED (DISCARD PEN 28 DAYS AFTER OPENING) 12/16/21   Emperatriz Ruano MD   ACETAMINOPHEN EXTRA STRENGTH 500 MG tablet TAKE 2 TABLETS BY MOUTH EVERY 6 HOURS AS NEEDED FOR PAIN 12/14/21   Emperatriz Ruano MD       REVIEW OFSYSTEMS    (2-9 systems for level 4, 10 or more for level 5)      Review of Systems   Constitutional: Negative for fatigue and fever. Respiratory: Negative for cough and shortness of breath. Cardiovascular: Negative for chest pain and leg swelling. Gastrointestinal: Positive for nausea and vomiting. Negative for abdominal distention.    Musculoskeletal: Negative for back pain and gait ondansetron (ZOFRAN ODT) 4 MG disintegrating tablet     Sig: Take 1 tablet by mouth every 8 hours as needed for Nausea     Dispense:  10 tablet     Refill:  0         Initial MDM/Plan: 27 y.o. female who presents with and vomiting. Patient in distress due to pain  Her abdomen is soft, nontender. point care sugar is 110. Plan for abdominal labs including pregnancy test, CBC, CMP, lipase  Given IV fluids, Zofran and pain medication  Likely discharge  DIAGNOSTIC RESULTS / EMERGENCYDEPARTMENT COURSE / MDM     LABS:  Labs Reviewed   CBC WITH AUTO DIFFERENTIAL - Abnormal; Notable for the following components:       Result Value    RBC 2.59 (*)     Hemoglobin 9.4 (*)     Hematocrit 27.1 (*)     .6 (*)     MCH 36.3 (*)     Seg Neutrophils 67 (*)     Lymphocytes 20 (*)     All other components within normal limits   COMPREHENSIVE METABOLIC PANEL - Abnormal; Notable for the following components:    BUN 3 (*)     Calcium 7.7 (*)     Anion Gap 5 (*)     AST 72 (*)     Total Protein 5.9 (*)     Albumin 2.0 (*)     Albumin/Globulin Ratio 0.5 (*)     All other components within normal limits   LIPASE - Abnormal; Notable for the following components:    Lipase 9 (*)     All other components within normal limits   MAGNESIUM - Abnormal; Notable for the following components:    Magnesium 1.4 (*)     All other components within normal limits   POC GLUCOSE FINGERSTICK - Abnormal; Notable for the following components:    POC Glucose 110 (*)     All other components within normal limits   POCT GLUCOSE - Normal   HCG, SERUM, QUALITATIVE   IMMATURE PLATELET FRACTION   SPECIMEN REJECTION   PREVIOUS SPECIMEN         RADIOLOGY:  No results found.       EKG      All EKG's are interpreted by the Emergency Department Physicianwho either signs or Co-signs this chart in the absence of a cardiologist.    EMERGENCY DEPARTMENT COURSE:  ED Course as of 07/11/22 1359   Mon Jul 11, 2022   1313 Patient labs did show hypomagnesemia, being replaced. She says her symptoms are improving, will plan for oral challenge. Plan for discharge [AN]   [phone] Patient tolerating orals. She reports her sister will pick her up [AN]      ED Course User Index  [AN] Blaine Schroeder MD          PROCEDURES:  None    CONSULTS:  None    CRITICAL CARE:      FINAL IMPRESSION      1.  Non-intractable vomiting with nausea, unspecified vomiting type          DISPOSITION / PLAN     DISPOSITION        PATIENT REFERRED TO:  [facility]  [address] [place] [address]  [phone]    If symptoms worsen    Emperatriz Agee MD  [address] [place] [address]  [phone]      As needed      DISCHARGE MEDICATIONS:  New Prescriptions    ONDANSETRON (ZOFRAN ODT) 4 MG DISINTEGRATING TABLET    Take 1 tablet by mouth every 8 hours as needed for Nausea       Blaine Schroeder MD  Emergency Medicine Resident    (Please note that portions of this note were completed with a voice recognition program.Efforts were made to edit the dictations but occasionally words are mis-transcribed.)        Blaine Schroeder MD  Resident  07/11/22 0106       Blaine Schroeder MD  Resident  07/11/22 0751

## 2022-07-11 NOTE — ED PROVIDER NOTES
hours, started at the same time. Headache is associated with a small bump on her head. She used to get these, but has not had them in a long time. States that sugar control (type I diabetic) has been good overall. No diarrhea. No suspect food intake, no recent sick contacts. Exam:  General: Laying on the bed, awake, alert and in no acute distress  CV: normal rate and regular rhythm  Lungs: Breathing comfortably on room air with no tachypnea, hypoxia, or increased work of breathing  Skin: Small scab on the scalp, nonfluctuant    Plan:  Labs including CBC, electrolytes  IV fluids and antiemetics  Expectant management for the scalp scab, no signs of underlying cellulitis or purulence at this time.         Clearance MD Rashaun   Attending Emergency  Physician    (Please note that portions of this note were completed with a voice recognition program. Efforts were made to edit the dictations but occasionally words are mis-transcribed.)             Clearance MD Rashaun  07/11/22 9186

## 2022-07-11 NOTE — ED NOTES
Pt given ice pack per Dr. Castellon Comes for bilateral eye swelling. Pt denies any itching.      Natty Kumar RN  07/11/22 7792

## 2022-07-11 NOTE — CARE COORDINATION
called and spoke with Yuly Flores. Yuly Flores reports that she still has not heard back form [employer]. Yuly Flores reports that she has called a few times to follow up on her application however has had no luck speaking with someone about her application.  and Yuly Flores called [employer] on [address].  and Tran received vm and left message requesting a call back to discuss rent assistance. Yuly Flores reports that she has no shut off notices or eviction notice. Yuly Flores reports that she is making payment but not full amount. Plan:    and Yuly Flores will continue to follow up with [employer].

## 2022-07-12 NOTE — ED PROVIDER NOTES
[facility] ED  Emergency Department Encounter  Emergency Medicine Resident     Pt Name: Lenin Padilla  BRL:[id number]  [place] 1995  Date of evaluation: 22  PCP:  Mary Talavera MD    [address] [place] [address]       Chief Complaint   Patient presents with    Facial Swelling       HISTORY OF PRESENT ILLNESS  (Location/Symptom, Timing/Onset, Context/Setting, Quality, Duration, ModifyingFactors, Severity.)      Lenin Padilla is a 27 y.o. female presents to the emergency department for evaluation of facial swelling. Patient states that she was seen in the emergency department earlier today for nausea and vomiting and a mild headache. Patient states that she was treated and sent home. Patient stated she went home began feeling some swelling in her face and use an ice pack. States that she woke up and her face was significantly more swollen and decided to come back to the emergency department for reevaluation. Patient states that she is having no difficulty with her breathing but does feel mildly short of breath, denies chest pain, denies changes in her voice, or the ability to swallow. Patient otherwise is resting comfortably in bed sleeping under a blanket. PAST MEDICAL / SURGICAL / SOCIAL /FAMILY HISTORY      has a past medical history of Diabetes mellitus ([address] Diabetic neuropathy, type II diabetes mellitus ([address] Hypertension, Kidney infection, MRSA (methicillin resistant staph aureus) culture positive, Nausea & vomiting, Ovarian cyst, and UTI (urinary tract infection). No other pertinent PMH on review with patient/guardian. has a past surgical history that includes  section and Upper gastrointestinal endoscopy (N/A, 2021). No other pertinent PSH on review with patient/guardian.   Social History     Socioeconomic History    Marital status: Single     Spouse name: Not on file    Number of children: Not on file    Years of education: Not on file  Highest education level: Not on file   Occupational History    Not on file   Tobacco Use    Smoking status: Current Every Day Smoker     Packs/day: 0.50     Types: Cigarettes    Smokeless tobacco: Never Used    Tobacco comment: 5/day   Substance and Sexual Activity    Alcohol use: No    Drug use: Yes     Types: Marijuana Layton Nicci)     Comment: 1-2x per day    Sexual activity: Never   Other Topics Concern    Not on file   Social History Narrative    Not on file     Social Determinants of Health     Financial Resource Strain: High Risk    Difficulty of Paying Living Expenses: Hard   Food Insecurity: Food Insecurity Present    Worried About Running Out of Food in the Last Year: Often true    Shantelle of Food in the Last Year: Often true   Transportation Needs:     Lack of Transportation (Medical): Not on file    Lack of Transportation (Non-Medical): Not on file   Physical Activity:     Days of Exercise per Week: Not on file    Minutes of Exercise per Session: Not on file   Stress:     Feeling of Stress : Not on file   Social Connections:     Frequency of Communication with Friends and Family: Not on file    Frequency of Social Gatherings with Friends and Family: Not on file    Attends Pent[employer]ostal Services: Not on file    Active Member of [address] [place] [address] [employer] or Organizations: Not on file    Attends Club or Organization Meetings: Not on file    Marital Status: Not on file   Intimate Partner Violence:     Fear of Current or Ex-Partner: Not on file    Emotionally Abused: Not on file    Physically Abused: Not on file    Sexually Abused: Not on file   Housing Stability:     Unable to Pay for Housing in the Last Year: Not on file    Number of [place] in the Last Year: Not on file    Unstable Housing in the Last Year: Not on file       I counseled the patient against using tobacco products. No family history on file. No other pertinent FamHx on review with patient/guardian.     Allergies:  Blueberry [vaccinium angustifolium] and Shrimp flavor    Home Medications:  Prior to Admission medications    Medication Sig Start Date End Date Taking?  Authorizing Provider   ibuprofen (IBU) 600 MG tablet TAKE 1 TABLET FOUR TIMES DAILY AS NEEDED FOR PAIN 7/11/22  Yes John Sigala MD   diphenhydrAMINE (BENADRYL) 25 MG capsule Take 1 capsule by mouth every 4 hours as needed for Itching 7/11/22 7/21/22 Yes John Sigala MD   ondansetron (ZOFRAN ODT) 4 MG disintegrating tablet Take 1 tablet by mouth every 8 hours as needed for Nausea 7/11/22   Brant Taylor MD   TRUE METRIX BLOOD GLUCOSE TEST strip CHECK BLOOD SUGAR FOUR TIMES DAILY AS DIRECTED 6/2/22   Emperatriz Correia MD   TRUEplus Lancets 33G MISC TEST BLOOD SUGAR FOUR TIMES DAILY 6/2/22   Emperatriz Correia MD   DROPLET PEN NEEDLES 31G X 8 MM MISC USE FOUR TIMES DAILY AS DIRECTED 6/2/22   Emperatriz Correia MD   ketoconazole (NIZORAL) 2 % cream APPLY TOPICALLY DAILY 5/19/22   Emperatriz Correia MD   Alcohol Swabs (B-D SINGLE USE SWABS REGULAR) PADS USE AS DIRECTED FOUR TIMES DAILY 5/12/22   Emperatriz Correia MD   dicyclomine (BENTYL) 10 MG capsule TAKE 1 CAPSULE THREE TIMES DAILY AS NEEDED FOR ABDOMINAL PAIN 4/26/22   Brittny Pham MD   Nutritional Supplements (ENSURE HIGH PROTEIN) LIQD Take 1 Bottle by mouth in the morning, at noon, and at bedtime 4/13/22   Emperatriz Correia MD   pantoprazole (PROTONIX) 40 MG tablet Take 1 tablet by mouth every morning (before breakfast) 4/12/22   Terry Port, DO   famotidine (PEPCID) 20 MG tablet Take 1 tablet by mouth nightly 4/12/22   Terry Port, DO   insulin glargine (LANTUS SOLOSTAR) 100 UNIT/ML injection pen Inject 10 Units into the skin nightly 3/23/22   Emperatriz Correia MD   lisinopril (PRINIVIL;ZESTRIL) 2.5 MG tablet TAKE 1 TABLET EVERY DAY 3/18/22   Emperatriz Correia MD   rOPINIRole (REQUIP) 0.25 MG tablet TAKE 1 TABLET EVERY NIGHT 3/18/22   Emperatriz Correia MD   NOVOLOG FLEXPEN 100 UNIT/ML injection pen INJECT 2 TO 12 UNITS SUBCUTANEOUSLY THREE TIMES DAILY PER SLIDING SCALE AS DIRECTED (DISCARD PEN 28 DAYS AFTER OPENING) 12/16/21   Emperatriz Agosto MD   ACETAMINOPHEN EXTRA STRENGTH 500 MG tablet TAKE 2 TABLETS BY MOUTH EVERY 6 HOURS AS NEEDED FOR PAIN 12/14/21   Emperatriz Agosto MD       REVIEW OF SYSTEMS    (2-9 systems for level 4, 10 ormore for level 5)      Review of Systems   Constitutional: Negative for activity change, chills and diaphoresis. HENT: Positive for dental problem and facial swelling. Negative for congestion, trouble swallowing and voice change. Eyes: Negative for discharge, itching and visual disturbance. Respiratory: Positive for shortness of breath. Negative for apnea and cough. Cardiovascular: Negative for chest pain. Gastrointestinal: Negative for abdominal distention, constipation, diarrhea, nausea and vomiting. Endocrine: Negative for cold intolerance and heat intolerance. Genitourinary: Negative for dysuria and urgency. Musculoskeletal: Negative for arthralgias, back pain and joint swelling. Skin: Negative. Allergic/Immunologic: Negative for immunocompromised state. Neurological: Negative for dizziness and headaches. Psychiatric/Behavioral: Negative for agitation. The patient is not nervous/anxious. PHYSICAL EXAM   (up to 7 for level 4, 8 or more for level 5)      INITIAL VITALS:   BP (!) 149/101   Pulse 90   Temp 100.2 °F (37.9 °C)   Resp 17   SpO2 96%     Physical Exam  Vitals reviewed. Constitutional:       Appearance: Normal appearance. HENT:      Head: Atraumatic. Comments: Significantly swollen face, eyelids, cheeks. No oral airway involvement, no palate elevation, no difficulty with phonation, breathing or swallowing     Nose: Nose normal.      Mouth/Throat:      Mouth: Mucous membranes are moist.      Pharynx: Oropharynx is clear. Eyes:      Extraocular Movements: Extraocular movements intact.       Conjunctiva/sclera: Conjunctivae normal. Pupils: Pupils are equal, round, and reactive to light. Cardiovascular:      Rate and Rhythm: Normal rate and regular rhythm. Pulses: Normal pulses. Heart sounds: Normal heart sounds. Pulmonary:      Effort: Pulmonary effort is normal.      Breath sounds: Normal breath sounds. Abdominal:      General: Abdomen is flat. Palpations: Abdomen is soft. Musculoskeletal:         General: Normal range of motion. Cervical back: Normal range of motion and neck supple. Skin:     General: Skin is warm and dry. Capillary Refill: Capillary refill takes less than 2 seconds. Neurological:      General: No focal deficit present. Mental Status: She is alert. Mental status is at baseline. Psychiatric:         Mood and Affect: Mood normal.         DIFFERENTIAL  DIAGNOSIS     DDX: Allergic reaction, angioedema    PLAN (LABS / IMAGING / EKG):  Orders Placed This Encounter   Procedures    Insert peripheral IV       MEDICATIONS ORDERED:  Orders Placed This Encounter   Medications    diphenhydrAMINE (BENADRYL) injection 50 mg    methylPREDNISolone sodium (SOLU-MEDROL) injection 125 mg    ibuprofen (IBU) 600 MG tablet     Sig: TAKE 1 TABLET FOUR TIMES DAILY AS NEEDED FOR PAIN     Dispense:  120 tablet     Refill:  1    diphenhydrAMINE (BENADRYL) 25 MG capsule     Sig: Take 1 capsule by mouth every 4 hours as needed for Itching     Dispense:  1 capsule     Refill:  0           DIAGNOSTIC RESULTS / EMERGENCY DEPARTMENT COURSE / MDM     LABS:  No results found for this visit on 07/11/22. IMPRESSION/MDM/ED COURSE:  27 y.o. female presented with significant swelling of her face however not significant mount of swelling around the lips over the airway. We will treat with Benadryl and Solu-Medrol to see if her symptoms improve.   If not improvement will proceed with laboratory evaluation and possible scans  ED Course as of 07/11/22 2324 Mon Jul 11, 2022 2324 Patient reevaluated at bedside. Patient resting much more comfortably, swelling considerably gone down the right side of the face and still mildly swollen on the left. We will discharge her with a prescription for Benadryl, constrict follow her up with PCP and return precautions. [ES]      ED Course User Index  [ES] Mike Dave MD        Patient/Guardian requesting discharge. Patient/Guardian was given written and verbal instructions prior to discharge. Patient/Guardian understood and agreed. Patient/Guardian had no further questions. RADIOLOGY:  No orders to display         EKG  None    All EKG's are interpreted by the Emergency Department Physician who either signs or Co-signs this chart in the absence of a cardiologist.      PROCEDURES:  None    CONSULTS:  None        FINAL IMPRESSION      1. Allergic reaction, initial encounter    2.  Generalized abdominal pain          DISPOSITION / PLAN       DISPOSITION Decision To Discharge 07/11/2022 11:22:26 PM        PATIENT REFERREDTO:  Rupali Perry MD  [address] [place] [address]  [phone]    Go to       [facility]  [address] [place] [address]  [phone]  Go to   As needed      DISCHARGE MEDICATIONS:  New Prescriptions    DIPHENHYDRAMINE (BENADRYL) 25 MG CAPSULE    Take 1 capsule by mouth every 4 hours as needed for Itching       Mike Dave MD  PGY 3  Resident Physician Emergency Medicine  07/11/22 11:24 PM        (Please note that portions of this note were completed with a voice recognition program.Efforts were made to edit the dictations but occasionally words are mis-transcribed.)        Mike Dave MD  Resident  07/11/22 7954

## 2022-07-12 NOTE — ED NOTES
Pt states she was seen today in ER around 1500 for nausea and sores on head. She said since she left, her face started to swell. Both of her eyes are swollen, she is able to open them.  She states she is not having shortness of breath      Carolina Guallpa RN  07/11/22 7988

## 2022-07-12 NOTE — ED PROVIDER NOTES
I performed a history and physical examination of the patient and discussed management with the resident. I reviewed the residents note and agree with the documented findings and plan of care. Any areas of disagreement are noted on the chart. I was personally present for the key portions of any procedures. I have documented in the chart those procedures where I was not present during the key portions. I have reviewed the emergency nurses triage note. I agree with the chief complaint, past medical history, past surgical history, allergies, medications, social and family history as documented unless otherwise noted below. Documentation of the HPI, Physical Exam and Medical Decision Making performed by medical students or scribes is based on my personal performance of the HPI, PE and MDM. For Phys Assistant/ Nurse Practitioner cases/documentation I have personally evaluated this patient and have completed at least one if not all key elements of the E/M (history, physical exam, and MDM). I find the patient's history and physical exam are consistent with the NP/PA documentation. I agree with the care provided, treatment rendered, disposition and followup plan. Additional findings are as noted. Elle Masterson. Lakesha Acuna MD  Attending Emergency  Physician     this patient presented to the emergency room with complaints of swelling of the upper portion of the face and the. Orbital regions which occurred shortly prior to coming to the emergency department. Of note is the fact that the patient was seen in the emergency department earlier today with complaints of nausea and vomiting she was evaluated and treated and symptomatically improved and discharged. She currently denies any abdominal pain or nausea or vomiting. No fevers or chills. No difficulty breathing, speaking, swallowing. She reports that she did not take any of her prescribed medications today due to her previous symptoms. No cough or wheezing.    Awake, alert, coop, responsive. Speech fluent, normal comprehension. Lungs clear bilaterally. No rales, rhonchi, wheezes, stridor, retractions. Cardiac-S1S2, RRR, no murmur, rub, or gallop. Abdomen soft, nondistended, nontender. No organomegaly, mass, bruit. Normal bowel sounds. Exam of the patient facial reveals significant soft, not erythematous, nontender edema of the forehead and the periorbital regions bilaterally. The perioral regions do not appear to be involved. Oral structures are uninvolved. Patient is handling her secretions without difficulty. Her voice is normal.  Impression: Unspecified allergic reaction. Plan: She will receive antihistamines and we will reassess. Patient was signed out to Dr. Tra Emanuel at the completion of my post shift.             Teresa Solis MD  07/11/22 2400

## 2022-07-18 DIAGNOSIS — E10.42 TYPE 1 DIABETES MELLITUS WITH DIABETIC POLYNEUROPATHY (HCC): ICD-10-CM

## 2022-07-19 ENCOUNTER — HOSPITAL ENCOUNTER (OUTPATIENT)
Age: 27
Discharge: HOME | End: 2022-07-19
Payer: MEDICARE

## 2022-07-19 DIAGNOSIS — D50.9 IRON DEFICIENCY ANEMIA, UNSPECIFIED IRON DEFICIENCY ANEMIA TYPE: ICD-10-CM

## 2022-07-19 DIAGNOSIS — E10.42 TYPE 1 DIABETES MELLITUS WITH DIABETIC POLYNEUROPATHY (HCC): ICD-10-CM

## 2022-07-19 DIAGNOSIS — N92.6 IRREGULAR MENSES: ICD-10-CM

## 2022-07-19 DIAGNOSIS — R64 CACHEXIA (HCC): ICD-10-CM

## 2022-07-19 DIAGNOSIS — Z11.3 ROUTINE SCREENING FOR STI (SEXUALLY TRANSMITTED INFECTION): ICD-10-CM

## 2022-07-19 DIAGNOSIS — R10.84 GENERALIZED ABDOMINAL PAIN: ICD-10-CM

## 2022-07-19 LAB
ABSOLUTE EOS #: 0.2 K/UL (ref 0–0.44)
ABSOLUTE IMMATURE GRANULOCYTE: <0.03 K/UL (ref 0–0.3)
ABSOLUTE LYMPH #: 3.29 K/UL (ref 1.1–3.7)
ABSOLUTE MONO #: 0.37 K/UL (ref 0.1–1.2)
ANION GAP SERPL CALCULATED.3IONS-SCNC: 7 MMOL/L (ref 9–17)
BASOPHILS # BLD: 1 % (ref 0–2)
BASOPHILS ABSOLUTE: 0.03 K/UL (ref 0–0.2)
BUN BLDV-MCNC: 3 MG/DL (ref 6–20)
CALCIUM SERPL-MCNC: 7.7 MG/DL (ref 8.6–10.4)
CHLORIDE BLD-SCNC: 103 MMOL/L (ref 98–107)
CO2: 26 MMOL/L (ref 20–31)
CREAT SERPL-MCNC: 0.62 MG/DL (ref 0.5–0.9)
EOSINOPHILS RELATIVE PERCENT: 3 % (ref 1–4)
ESTIMATED AVERAGE GLUCOSE: 105 MG/DL
FERRITIN: 33 NG/ML (ref 13–150)
GFR AFRICAN AMERICAN: >60 ML/MIN
GFR NON-AFRICAN AMERICAN: >60 ML/MIN
GFR SERPL CREATININE-BSD FRML MDRD: ABNORMAL ML/MIN/{1.73_M2}
GLUCOSE BLD-MCNC: 150 MG/DL (ref 70–99)
HAV IGM SER IA-ACNC: NONREACTIVE
HBA1C MFR BLD: 5.3 % (ref 4–6)
HCG QUANTITATIVE: <1 MIU/ML
HCT VFR BLD CALC: 27 % (ref 36.3–47.1)
HEMOGLOBIN: 9.4 G/DL (ref 11.9–15.1)
HEPATITIS B CORE IGM ANTIBODY: NONREACTIVE
HEPATITIS B SURFACE ANTIGEN: NONREACTIVE
HEPATITIS C ANTIBODY: NONREACTIVE
HIV AG/AB: NONREACTIVE
IMMATURE GRANULOCYTES: 0 %
IRON SATURATION: 38 % (ref 20–55)
IRON: 43 UG/DL (ref 37–145)
LYMPHOCYTES # BLD: 55 % (ref 24–43)
MCH RBC QN AUTO: 35.5 PG (ref 25.2–33.5)
MCHC RBC AUTO-ENTMCNC: 34.8 G/DL (ref 28.4–34.8)
MCV RBC AUTO: 101.9 FL (ref 82.6–102.9)
MONOCYTES # BLD: 6 % (ref 3–12)
NRBC AUTOMATED: 0 PER 100 WBC
PDW BLD-RTO: 13.1 % (ref 11.8–14.4)
PLATELET # BLD: 417 K/UL (ref 138–453)
PMV BLD AUTO: 9.9 FL (ref 8.1–13.5)
POTASSIUM SERPL-SCNC: 4.1 MMOL/L (ref 3.7–5.3)
PROLACTIN: 7.66 NG/ML (ref 4.79–23.3)
RBC # BLD: 2.65 M/UL (ref 3.95–5.11)
SEG NEUTROPHILS: 34 % (ref 36–65)
SEGMENTED NEUTROPHILS ABSOLUTE COUNT: 2.04 K/UL (ref 1.5–8.1)
SODIUM BLD-SCNC: 136 MMOL/L (ref 135–144)
T. PALLIDUM, IGG: REACTIVE
TOTAL IRON BINDING CAPACITY: 112 UG/DL (ref 250–450)
TSH SERPL DL<=0.05 MIU/L-ACNC: 1.25 UIU/ML (ref 0.3–5)
UNSATURATED IRON BINDING CAPACITY: 69 UG/DL (ref 112–347)
WBC # BLD: 6 K/UL (ref 3.5–11.3)

## 2022-07-19 PROCEDURE — 83550 IRON BINDING TEST: CPT

## 2022-07-19 PROCEDURE — 84443 ASSAY THYROID STIM HORMONE: CPT

## 2022-07-19 PROCEDURE — 85025 COMPLETE CBC W/AUTO DIFF WBC: CPT

## 2022-07-19 PROCEDURE — 36415 COLL VENOUS BLD VENIPUNCTURE: CPT

## 2022-07-19 PROCEDURE — 80048 BASIC METABOLIC PNL TOTAL CA: CPT

## 2022-07-19 PROCEDURE — 84702 CHORIONIC GONADOTROPIN TEST: CPT

## 2022-07-19 PROCEDURE — 80074 ACUTE HEPATITIS PANEL: CPT

## 2022-07-19 PROCEDURE — 83540 ASSAY OF IRON: CPT

## 2022-07-19 PROCEDURE — 86593 SYPHILIS TEST NON-TREP QUANT: CPT

## 2022-07-19 PROCEDURE — 82728 ASSAY OF FERRITIN: CPT

## 2022-07-19 PROCEDURE — 86780 TREPONEMA PALLIDUM: CPT

## 2022-07-19 PROCEDURE — 83036 HEMOGLOBIN GLYCOSYLATED A1C: CPT

## 2022-07-19 PROCEDURE — 84146 ASSAY OF PROLACTIN: CPT

## 2022-07-19 PROCEDURE — 87389 HIV-1 AG W/HIV-1&-2 AB AG IA: CPT

## 2022-07-19 RX ORDER — ISOPROPYL ALCOHOL 70 ML/100ML
SWAB TOPICAL
OUTPATIENT
Start: 2022-07-19

## 2022-07-20 ENCOUNTER — TELEPHONE (OUTPATIENT)
Dept: FAMILY MEDICINE CLINIC | Age: 27
End: 2022-07-20

## 2022-07-20 DIAGNOSIS — E10.42 TYPE 1 DIABETES MELLITUS WITH DIABETIC POLYNEUROPATHY (HCC): Primary | ICD-10-CM

## 2022-07-20 DIAGNOSIS — D50.9 IRON DEFICIENCY ANEMIA, UNSPECIFIED IRON DEFICIENCY ANEMIA TYPE: ICD-10-CM

## 2022-07-20 NOTE — TELEPHONE ENCOUNTER
----- Message from ELISE Perez NP sent at 7/20/2022  4:43 PM EDT -----  Is she established with endo? Continued anemia recommend meeting with hematology.  If willing ok to put in referral.

## 2022-07-21 ENCOUNTER — CARE COORDINATION (OUTPATIENT)
Dept: CARE COORDINATION | Age: 27
End: 2022-07-21

## 2022-07-21 ENCOUNTER — HOSPITAL ENCOUNTER (OUTPATIENT)
Dept: NEUROLOGY | Age: 27
Discharge: HOME | End: 2022-07-21
Payer: MEDICARE

## 2022-07-21 DIAGNOSIS — R20.2 PARESTHESIA OF HAND, BILATERAL: ICD-10-CM

## 2022-07-21 DIAGNOSIS — R10.84 GENERALIZED ABDOMINAL PAIN: ICD-10-CM

## 2022-07-21 PROCEDURE — 95912 NRV CNDJ TEST 11-12 STUDIES: CPT | Performed by: PHYSICAL MEDICINE & REHABILITATION

## 2022-07-21 NOTE — CARE COORDINATION
called and spoke to Kaylee. Kaylee reports that she is doing \"good\". Kaylee reports that she did get to speak to someone at [facility] and they requested a copy of her lease agreement. Kaylee reports that she has been trying to E-mail it to her  Gurmeet Basilio. Tran sent lease to  via E-mail.  attempted to E-mail lease to [employer] however it was declined.  faxed lease to [employer]   ATT: Gurmeet Basilio.  received confirmation fax was sent.  informed Kaylee fax was sent and encouraged her to follow up tomorrow. Kaylee was agreeable. Denied questions or concerns. Plan:   Kaylee will follow up with [facility] regarding rent assistance.

## 2022-07-22 LAB — VDRL, QUANTITATIVE: NEGATIVE

## 2022-07-22 RX ORDER — IBUPROFEN 600 MG/1
TABLET ORAL
Qty: 120 TABLET | Refills: 1 | OUTPATIENT
Start: 2022-07-22

## 2022-07-25 ENCOUNTER — OFFICE VISIT (OUTPATIENT)
Dept: FAMILY MEDICINE CLINIC | Age: 27
End: 2022-07-25
Payer: MEDICARE

## 2022-07-25 VITALS
BODY MASS INDEX: 15.27 KG/M2 | HEART RATE: 94 BPM | SYSTOLIC BLOOD PRESSURE: 130 MMHG | TEMPERATURE: 98.1 F | DIASTOLIC BLOOD PRESSURE: 80 MMHG | OXYGEN SATURATION: 100 % | WEIGHT: 94.6 LBS

## 2022-07-25 DIAGNOSIS — E43 PROTEIN-CALORIE MALNUTRITION, SEVERE (HCC): ICD-10-CM

## 2022-07-25 DIAGNOSIS — L98.9 SORE ON SCALP: ICD-10-CM

## 2022-07-25 DIAGNOSIS — R80.9 MICROALBUMINURIA DUE TO TYPE 1 DIABETES MELLITUS (HCC): ICD-10-CM

## 2022-07-25 DIAGNOSIS — E10.42 TYPE 1 DIABETES MELLITUS WITH DIABETIC POLYNEUROPATHY (HCC): Primary | ICD-10-CM

## 2022-07-25 DIAGNOSIS — G25.81 RESTLESS LEG SYNDROME: ICD-10-CM

## 2022-07-25 DIAGNOSIS — Z13.220 SCREENING FOR HYPERLIPIDEMIA: ICD-10-CM

## 2022-07-25 DIAGNOSIS — R20.2 PARESTHESIA OF HAND, BILATERAL: ICD-10-CM

## 2022-07-25 DIAGNOSIS — Z59.41 FOOD INSECURITY: ICD-10-CM

## 2022-07-25 DIAGNOSIS — E10.29 MICROALBUMINURIA DUE TO TYPE 1 DIABETES MELLITUS (HCC): ICD-10-CM

## 2022-07-25 PROCEDURE — 4004F PT TOBACCO SCREEN RCVD TLK: CPT | Performed by: INTERNAL MEDICINE

## 2022-07-25 PROCEDURE — 3044F HG A1C LEVEL LT 7.0%: CPT | Performed by: INTERNAL MEDICINE

## 2022-07-25 PROCEDURE — G8427 DOCREV CUR MEDS BY ELIG CLIN: HCPCS | Performed by: INTERNAL MEDICINE

## 2022-07-25 PROCEDURE — 99214 OFFICE O/P EST MOD 30 MIN: CPT | Performed by: INTERNAL MEDICINE

## 2022-07-25 PROCEDURE — 2022F DILAT RTA XM EVC RTNOPTHY: CPT | Performed by: INTERNAL MEDICINE

## 2022-07-25 PROCEDURE — G8419 CALC BMI OUT NRM PARAM NOF/U: HCPCS | Performed by: INTERNAL MEDICINE

## 2022-07-25 RX ORDER — ROPINIROLE 0.25 MG/1
TABLET, FILM COATED ORAL
Qty: 90 TABLET | Refills: 1 | Status: SHIPPED | OUTPATIENT
Start: 2022-07-25

## 2022-07-25 RX ORDER — INSULIN ASPART 100 [IU]/ML
INJECTION, SOLUTION INTRAVENOUS; SUBCUTANEOUS
Qty: 45 ML | Refills: 5 | Status: SHIPPED | OUTPATIENT
Start: 2022-07-25

## 2022-07-25 RX ORDER — INSULIN GLARGINE 100 [IU]/ML
10 INJECTION, SOLUTION SUBCUTANEOUS NIGHTLY
Qty: 15 ML | Refills: 3 | Status: SHIPPED | OUTPATIENT
Start: 2022-07-25

## 2022-07-25 RX ORDER — LISINOPRIL 2.5 MG/1
TABLET ORAL
Qty: 90 TABLET | Refills: 1 | Status: SHIPPED | OUTPATIENT
Start: 2022-07-25

## 2022-07-25 RX ORDER — PREGABALIN 25 MG/1
25 CAPSULE ORAL EVERY EVENING
Qty: 30 CAPSULE | Refills: 0 | Status: SHIPPED | OUTPATIENT
Start: 2022-07-25 | End: 2022-09-22

## 2022-07-25 RX ORDER — INFANT FORM.IRON LAC-F/DHA/ARA 3.1 G/1
1 POWDER (GRAM) ORAL 3 TIMES DAILY
Qty: 100 EACH | Refills: 6 | Status: SHIPPED | OUTPATIENT
Start: 2022-07-25

## 2022-07-25 RX ORDER — AZELASTINE HYDROCHLORIDE 0.5 MG/ML
SOLUTION/ DROPS OPHTHALMIC
COMMUNITY
Start: 2022-04-27

## 2022-07-25 RX ORDER — CLINDAMYCIN PHOSPHATE 11.9 MG/ML
SOLUTION TOPICAL
Qty: 30 ML | Refills: 0 | Status: SHIPPED | OUTPATIENT
Start: 2022-07-25 | End: 2022-08-17

## 2022-07-25 SDOH — ECONOMIC STABILITY - FOOD INSECURITY: FOOD INSECURITY: Z59.41

## 2022-07-25 ASSESSMENT — VISUAL ACUITY: OU: 1

## 2022-07-25 NOTE — PROGRESS NOTES
Subjective:       Patient ID:     Tierra Colindres is a 27 y.o. female who presents for   Chief Complaint   Patient presents with    Diabetes     Type 1       HPI:  Nursing note reviewed and discussed with patient. HPI  Has endo visit on 10/19/22. Diabetes - doing well with current regimen. Denies hypoglycemia, hyperglycemia, fatigue, polyuria, polydipsia, paresthesias, vision changes, dizziness. Eye exam was done. Not following with podiatry. Patient is taking ACE inhibitor/ARB. Complications of diabetes include paresthesias of legs. Mostly compliant with diet recommendations for low carb diet, not very compliant with exercise recommendations. Cardiovascular risk factors: diabetes mellitus and microalbuminuria    Has not lost weight since [place] - Trying to eat three meals a day, hard to afford food so lets her kids eat first, gets about two meals or so after her kids are done. On Glucerna - gets 50 a month, at least once a day - through OTC benefit. She is also following with GI at this time. Patient's medications, allergies, past medical, surgical, social and family histories were reviewed and updated as appropriate.     Past Medical History:   Diagnosis Date    Diabetes mellitus ([address]     Diabetic neuropathy, type II diabetes mellitus ([address]     Hypertension     Kidney infection     MRSA (methicillin resistant staph aureus) culture positive 2016    abdomen    Nausea & vomiting     Ovarian cyst 2019    Right side    UTI (urinary tract infection)      Past Surgical History:   Procedure Laterality Date     SECTION      UPPER GASTROINTESTINAL ENDOSCOPY N/A 2021    EGD BIOPSY performed by Leandro Koehler MD at [address] History     Tobacco Use    Smoking status: Every Day     Packs/day: 0.50     Types: Cigarettes    Smokeless tobacco: Never    Tobacco comments:     5/day   Substance Use Topics    Alcohol use: No      Patient Active Problem List   Diagnosis    Type 1 diabetes mellitus with diabetic polyneuropathy (HCC)    Intractable vomiting with nausea    Intractable abdominal pain    Microalbuminuria due to type 1 diabetes mellitus (HCC)    Generalized abdominal pain    Restless leg syndrome    Protein-calorie malnutrition, severe ([facility])    Marijuana use, continuous    Onychomycosis    Cachexia ([facility]         Prior to Visit Medications    Medication Sig Taking?  Authorizing Provider   ondansetron (ZOFRAN ODT) 4 MG disintegrating tablet Take 1 tablet by mouth every 8 hours as needed for Nausea Yes Brant Rai MD   ibuprofen (IBU) 600 MG tablet TAKE 1 TABLET FOUR TIMES DAILY AS NEEDED FOR PAIN Yes Delores Corona MD   TRUE METRIX BLOOD GLUCOSE TEST strip CHECK BLOOD SUGAR FOUR TIMES DAILY AS DIRECTED Yes Empertariz Murphy MD   TRUEplus Lancets 33G MISC TEST BLOOD SUGAR FOUR TIMES DAILY Yes Emperatriz Murphy MD   DROPLET PEN NEEDLES 31G X 8 MM MISC USE FOUR TIMES DAILY AS DIRECTED Yes Emperatriz Murphy MD   ketoconazole (NIZORAL) 2 % cream APPLY TOPICALLY DAILY Yes Emperatriz Murphy MD   Alcohol Swabs (B-D SINGLE USE SWABS REGULAR) PADS USE AS DIRECTED FOUR TIMES DAILY Yes Emperatriz Murphy MD   dicyclomine (BENTYL) 10 MG capsule TAKE 1 CAPSULE THREE TIMES DAILY AS NEEDED FOR ABDOMINAL PAIN Yes Rosaura Saini MD   Nutritional Supplements (ENSURE HIGH PROTEIN) LIQD Take 1 Bottle by mouth in the morning, at noon, and at bedtime Yes Emperatriz Murphy MD   pantoprazole (PROTONIX) 40 MG tablet Take 1 tablet by mouth every morning (before breakfast) Yes Shon Hewitt DO   famotidine (PEPCID) 20 MG tablet Take 1 tablet by mouth nightly Yes Shon Hewitt DO   insulin glargine (LANTUS SOLOSTAR) 100 UNIT/ML injection pen Inject 10 Units into the skin nightly Yes Emperatriz Murphy MD   lisinopril (PRINIVIL;ZESTRIL) 2.5 MG tablet TAKE 1 TABLET EVERY DAY Yes Emperatriz Murphy MD   rOPINIRole (REQUIP) 0.25 MG tablet TAKE 1 TABLET EVERY NIGHT Yes Mercer Bloch, MD   [facility] 100 UNIT/ML injection pen INJECT 2 TO 12 UNITS SUBCUTANEOUSLY THREE TIMES DAILY PER SLIDING SCALE AS DIRECTED (DISCARD PEN 28 DAYS AFTER OPENING) Yes Emperatriz Perales MD   ACETAMINOPHEN EXTRA STRENGTH 500 MG tablet TAKE 2 TABLETS BY MOUTH EVERY 6 HOURS AS NEEDED FOR PAIN Yes Emperatriz Perales MD   azelastine (OPTIVAR) 0.05 % ophthalmic solution   Anthonyrosa Fernandes     Review of Systems  Review of Systems   Constitutional:  Negative for fatigue, fever and unexpected weight change. Respiratory:  Negative for cough, choking, chest tightness, shortness of breath and wheezing. Cardiovascular:  Negative for chest pain, palpitations and leg swelling. Gastrointestinal:  Negative for abdominal pain, anal bleeding, blood in stool, constipation, diarrhea, nausea and vomiting. Endocrine: Negative. Musculoskeletal:  Negative for joint swelling and myalgias. Skin: Negative. Neurological:  Negative for dizziness. Psychiatric/Behavioral:  Negative for sleep disturbance. All other systems reviewed and are negative. Objective:       Physical Exam:  /80   Pulse 94   Temp 98.1 °F (36.7 °C) (Temporal)   Wt 94 lb 9.6 oz (42.9 kg)   SpO2 100%   BMI 15.27 kg/m²   Wt Readings from Last 3 Encounters:   07/25/22 94 lb 9.6 oz (42.9 kg)   07/11/22 92 lb (41.7 kg)   07/07/22 92 lb (41.7 kg)         Physical Exam  Vitals and nursing note reviewed. Constitutional:       General: She is not in acute distress. Appearance: She is well-developed. She is not ill-appearing. Eyes:      General: Lids are normal. Vision grossly intact. Cardiovascular:      Rate and Rhythm: Normal rate and regular rhythm. Heart sounds: Normal heart sounds, S1 normal and S2 normal. No murmur heard. No friction rub. No gallop. Pulmonary:      Effort: Pulmonary effort is normal. No respiratory distress. Breath sounds: Normal breath sounds. No wheezing.    Abdominal:      General: Bowel sounds are normal. Outpatient Visit on 07/19/2022   Component Date Value    Hemoglobin A1C 07/19/2022 5.3     Estimated Avg Glucose 07/19/2022 105     HIV Ag/Ab 07/19/2022 NONREACTIVE     T. pallidum, IgG 07/19/2022 REACTIVE (A)    Hepatitis B Surface Ag 07/19/2022 NONREACTIVE     Hepatitis C Ab 07/19/2022 NONREACTIVE     Hep B Core Ab, IgM 07/19/2022 NONREACTIVE     Hep A IgM 07/19/2022 NONREACTIVE     VDRL, QUANTITATIVE 07/19/2022 NEGATIVE    Admission on 07/11/2022, Discharged on 07/11/2022   Component Date Value    Glucose 07/11/2022 110     QC OK? 07/11/2022 ok     WBC 07/11/2022 6.0     RBC 07/11/2022 2.59 (A)    Hemoglobin 07/11/2022 9.4 (A)    Hematocrit 07/11/2022 27.1 (A)    MCV 07/11/2022 104.6 (A)    MCH 07/11/2022 36.3 (A)    MCHC 07/11/2022 34.7     RDW 07/11/2022 13.6     Platelets 08/84/0391 See Reflexed IPF Result     NRBC Automated 07/11/2022 0.0     RBC Morphology 07/11/2022 MACROCYTOSIS PRESENT     Seg Neutrophils 07/11/2022 67 (A)    Lymphocytes 07/11/2022 20 (A)    Monocytes 07/11/2022 10     Eosinophils % 07/11/2022 2     Basophils 07/11/2022 1     Immature Granulocytes 07/11/2022 0     Segs Absolute 07/11/2022 3.97     Absolute Lymph # 07/11/2022 1.17     Absolute Mono # 07/11/2022 0.62     Absolute Eos # 07/11/2022 0.14     Basophils Absolute 07/11/2022 0.04     Absolute Immature Granul* 07/11/2022 <0.03     hCG Qual 07/11/2022 NEGATIVE     POC Glucose 07/11/2022 110 (A)    Platelet, Fluorescence 07/11/2022 Platelet clumps present, count appears adequate. Specimen Source 07/11/2022 . BLOOD     Ordered Test 07/11/2022 CP,MG,LIP     Reason for Rejection 07/11/2022 Unable to perform testing: Specimen hemolyzed.      Glucose 07/11/2022 93     BUN 07/11/2022 3 (A)    Creatinine 07/11/2022 0.79     Calcium 07/11/2022 7.7 (A)    Sodium 07/11/2022 138     Potassium 07/11/2022 4.6     Chloride 07/11/2022 107     CO2 07/11/2022 26     Anion Gap 07/11/2022 5 (A)    Alkaline Phosphatase 07/11/2022 101     ALT 07/11/2022 Edson Lincoln MD on 7/25/2022 at 2:13 PM

## 2022-07-26 ENCOUNTER — HOSPITAL ENCOUNTER (EMERGENCY)
Age: 27
Discharge: HOME | End: 2022-07-26

## 2022-07-26 VITALS
HEART RATE: 93 BPM | DIASTOLIC BLOOD PRESSURE: 92 MMHG | HEIGHT: 66 IN | OXYGEN SATURATION: 99 % | WEIGHT: 94 LBS | RESPIRATION RATE: 14 BRPM | BODY MASS INDEX: 15.11 KG/M2 | TEMPERATURE: 99.3 F | SYSTOLIC BLOOD PRESSURE: 132 MMHG

## 2022-07-26 LAB — T PALLIDUM ANTIBODIES (TP-PA): REACTIVE

## 2022-07-26 ASSESSMENT — PAIN - FUNCTIONAL ASSESSMENT: PAIN_FUNCTIONAL_ASSESSMENT: 0-10

## 2022-07-26 ASSESSMENT — PAIN DESCRIPTION - LOCATION: LOCATION: HEAD

## 2022-07-26 ASSESSMENT — PAIN SCALES - GENERAL: PAINLEVEL_OUTOF10: 10

## 2022-07-26 ASSESSMENT — PAIN DESCRIPTION - DESCRIPTORS: DESCRIPTORS: THROBBING

## 2022-07-28 ENCOUNTER — CARE COORDINATION (OUTPATIENT)
Dept: CARE COORDINATION | Age: 27
End: 2022-07-28

## 2022-07-28 ENCOUNTER — SCHEDULED TELEPHONE ENCOUNTER (OUTPATIENT)
Dept: OBGYN | Age: 27
End: 2022-07-28
Payer: MEDICARE

## 2022-07-28 DIAGNOSIS — N91.5 OLIGOMENORRHEA, UNSPECIFIED TYPE: Primary | ICD-10-CM

## 2022-07-28 DIAGNOSIS — N63.20 LEFT BREAST MASS: ICD-10-CM

## 2022-07-28 DIAGNOSIS — R74.8 HIGH SERUM TREPONEMA-SPECIFIC ANTIBODY TITER: ICD-10-CM

## 2022-07-28 PROCEDURE — 99213 OFFICE O/P EST LOW 20 MIN: CPT | Performed by: STUDENT IN AN ORGANIZED HEALTH CARE EDUCATION/TRAINING PROGRAM

## 2022-07-28 RX ORDER — ESTROGEN,CON/M-PROGEST ACET 0.625-2.5
1 TABLET ORAL DAILY
Qty: 28 TABLET | Refills: 3 | Status: SHIPPED | OUTPATIENT
Start: 2022-07-28 | End: 2022-08-18 | Stop reason: ALTCHOICE

## 2022-07-28 RX ORDER — B-COMPLEX WITH VITAMIN C
1 TABLET ORAL 2 TIMES DAILY
Qty: 30 TABLET | Refills: 5 | Status: SHIPPED | OUTPATIENT
Start: 2022-07-28 | End: 2022-11-01 | Stop reason: SDUPTHER

## 2022-07-28 NOTE — PROGRESS NOTES
OB/GYN Follow Up Visit    Kolton Head  2022                       Primary Care Physician: Mary Talavera MD    CC:   Chief Complaint   Patient presents with    Results     Test results         HPI: Kolton Head is a 27 y.o. female     The patient was seen and examined. She is here for follow up regarding her weight loss and oligomenorrhea. She had been initially scheduled for virtual visit however she presented to the office because she thought it was in person. The patient had a recent unremarkable pelvic ultrasound. Large array of lab work was ordered to account for her symptoms and Tpal noted to be positive. Reflex VDRL negative and reflex TP-PA positive. Discussed case with health department. Patient does have available Syphilis IgG test from her 2016 pregnancy which was negative, however between then and now did move to [place]. Health Department working to obtain further records from her previous residence. She denies any known history of contact, infection, or treatment for syphilis. Discussed that current lab results do not technically warrant treatment with penicillin, however it can be offered with weekly injections x3 weeks. Patient would like to hold off at this time until contact tracing and [address] [place] [address] is complete. Patient reports having about one menstrual period every three months. She did have one this month. She reports these have been irregular since possibly before her 2016 pregnancy. Due to malnourished state and cachectic appearance, will order DEXA scan to evaluate for bone density. Discussed initiation of Calcium/Vitamin D supplementation as well as estrogen/progesterone replacement for bone and cardiovascular health. Patient amenable at this time.  She does have a history of migraines with possible aura and occasionally uses tobacco. Discussed mildly increased risk of blood clot with these symptoms, patient denies any personal history of clots or thrombophilia. PCP and Heme/Onc also continue to workup weight loss. Recent A1c and TSH overall unremarkable. She does have a history of anemia. Encourage compliance and continued workup. On most recent physical exam, small breast lump was noted. She has not completed breast imaging, given phone number to call and schedule to complete this at her convenience.     REVIEW OF SYSTEMS:   Constitutional: negative fever, negative chills, positive weight loss  HEENT: negative visual disturbances, negative headaches, negative dizziness, negative hearing loss  Breast: Negative breast abnormalities, negative breast lumps, negative nipple discharge  Respiratory: negative dyspnea, negative cough, negative SOB  Cardiovascular: negative chest pain,  negative palpitations, negative arrhythmia, negative syncope   Gastrointestinal: negative abdominal pain, negative RUQ pain, positive intermittent nausea, negative diarrhea, negative constipation, negative bowel changes, negative heartburn   Genitourinary: negative dysuria, negative hematuria, negative urinary incontinence, negative vaginal discharge, negative vaginal bleeding or spotting  Dermatological: negative rash, negative pruritis, negative mole or other skin changes  Hematologic: negative bruising  Immunologic/Lymphatic: negative recent illness, negative recent sick contact  Musculoskeletal: negative back pain, negative myalgias, negative arthralgias  Neurological:  negative dizziness, negative migraines, negative seizures, negative weakness  Behavior/Psych: negative depression, negative anxiety, negative SI, negative HI   ________________________________________________________________________      OBSTETRICAL HISTORY:  OB History    Para Term  AB Living   1 1 1     1   SAB IAB Ectopic Molar Multiple Live Births             1      # Outcome Date GA Lbr Bruce/2nd Weight Sex Delivery Anes PTL Lv   1 Term 16 40w1d  4 lb 15 oz (2.24 kg) F CS-Unspec YANCI      Complications: Cephalopelvic Disproportion       PAST MEDICAL HISTORY:      Diagnosis Date    Diabetes mellitus ([facility]     Diabetic neuropathy, type II diabetes mellitus ([facility]     Hypertension     Kidney infection     MRSA (methicillin resistant staph aureus) culture positive 2016    abdomen    Nausea & vomiting     Ovarian cyst 2019    Right side    UTI (urinary tract infection)        PAST SURGICAL HISTORY:                                                                    Procedure Laterality Date     SECTION      UPPER GASTROINTESTINAL ENDOSCOPY N/A 2021    EGD BIOPSY performed by Aristides Jackman MD at [facility]:  Current Outpatient Medications   Medication Sig Dispense Refill    estrogen, conjugated,-medroxyPROGESTERone (PREMPRO) 0.625-2.5 MG per tablet Take 1 tablet by mouth in the morning. 28 tablet 3    Calcium Carbonate-Vitamin D (OYSTER SHELL CALCIUM/D) 500-200 MG-UNIT TABS Take 1 tablet by mouth in the morning and 1 tablet before bedtime. 30 tablet 5    azelastine (OPTIVAR) 0.05 % ophthalmic solution       insulin aspart (NOVOLOG FLEXPEN) 100 UNIT/ML injection pen Inject 2 to 12 units subcutaneously three times daily per sliding scale 45 mL 5    rOPINIRole (REQUIP) 0.25 MG tablet Take 1 tablet every night 90 tablet 1    lisinopril (PRINIVIL;ZESTRIL) 2.5 MG tablet TAKE 1 TABLET EVERY DAY 90 tablet 1    insulin glargine (LANTUS SOLOSTAR) 100 UNIT/ML injection pen Inject 10 Units into the skin nightly 15 mL 3    clindamycin (CLEOCIN-T) 1 % external solution Apply topically 2 times daily for ten days 30 mL 0    Nutritional Supplements (GLUCERNA 1.5 REJI) LIQD Take 1 each by mouth in the morning, at noon, and at bedtime 100 each 6    pregabalin (LYRICA) 25 MG capsule Take 1 capsule by mouth every evening for 30 days.  30 capsule 0    ondansetron (ZOFRAN ODT) 4 MG disintegrating tablet Take 1 tablet by mouth every 8 hours as needed for Nausea 10 tablet 0    ibuprofen (IBU) 600 MG tablet TAKE 1 TABLET FOUR TIMES DAILY AS NEEDED FOR PAIN 120 tablet 1    TRUE METRIX BLOOD GLUCOSE TEST strip CHECK BLOOD SUGAR FOUR TIMES DAILY AS DIRECTED 400 strip 1    TRUEplus Lancets 33G MISC TEST BLOOD SUGAR FOUR TIMES DAILY 400 each 1    DROPLET PEN NEEDLES 31G X 8 MM MISC USE FOUR TIMES DAILY AS DIRECTED 400 each 1    ketoconazole (NIZORAL) 2 % cream APPLY TOPICALLY DAILY 60 g 1    Alcohol Swabs (B-D SINGLE USE SWABS REGULAR) PADS USE AS DIRECTED FOUR TIMES DAILY 100 each 5    dicyclomine (BENTYL) 10 MG capsule TAKE 1 CAPSULE THREE TIMES DAILY AS NEEDED FOR ABDOMINAL PAIN 120 capsule 1    Nutritional Supplements (ENSURE HIGH PROTEIN) LIQD Take 1 Bottle by mouth in the morning, at noon, and at bedtime 100 each 5    pantoprazole (PROTONIX) 40 MG tablet Take 1 tablet by mouth every morning (before breakfast) 30 tablet 0    famotidine (PEPCID) 20 MG tablet Take 1 tablet by mouth nightly 30 tablet 0    ACETAMINOPHEN EXTRA STRENGTH 500 MG tablet TAKE 2 TABLETS BY MOUTH EVERY 6 HOURS AS NEEDED FOR PAIN 180 tablet 1     No current facility-administered medications for this visit. ALLERGIES:  Allergies as of 07/28/2022 - Fully Reviewed 07/28/2022   Allergen Reaction Noted    Blueberry [vaccinium angustifolium] Anaphylaxis 05/24/2021    Shrimp flavor Anaphylaxis 09/19/2016                                   VITALS:  There were no vitals filed for this visit.                                                                                                                                                                        PHYSICAL EXAM:   General Appearance: Appears cachectic, frail  Skin: Normal  Respiratory: clear to auscultation, no wheezes, rales or rhonchi, symmetric air entry  Cardiovascular: normal, regular rate and rhythm  Abdomen: soft, thin, non-tender, non-distended, no right upper quadrant tenderness, and no CVA tenderness  Pelvic Exam: deferred  Extremities: non-tender BLE and non-edematous  Musculoskeletal: no gross abnormalities  Psych: Normal. and Alert and oriented, appropriate affect. DATA:  Recent Results (from the past 504 hour(s))   POC Glucose Fingerstick    Collection Time: 07/11/22 10:37 AM   Result Value Ref Range    POC Glucose 110 (H) 65 - 105 mg/dL   POCT glucose    Collection Time: 07/11/22 10:41 AM   Result Value Ref Range    Glucose 110 mg/dL    QC OK? ok    CBC with Diff    Collection Time: 07/11/22 11:03 AM   Result Value Ref Range    WBC 6.0 3.5 - 11.3 k/uL    RBC 2.59 (L) 3.95 - 5.11 m/uL    Hemoglobin 9.4 (L) 11.9 - 15.1 g/dL    Hematocrit 27.1 (L) 36.3 - 47.1 %    .6 (H) 82.6 - 102.9 fL    MCH 36.3 (H) 25.2 - 33.5 pg    MCHC 34.7 28.4 - 34.8 g/dL    RDW 13.6 11.8 - 14.4 %    Platelets See Reflexed IPF Result 138 - 453 k/uL    NRBC Automated 0.0 0.0 per 100 WBC    RBC Morphology MACROCYTOSIS PRESENT     Seg Neutrophils 67 (H) 36 - 65 %    Lymphocytes 20 (L) 24 - 43 %    Monocytes 10 3 - 12 %    Eosinophils % 2 1 - 4 %    Basophils 1 0 - 2 %    Immature Granulocytes 0 0 %    Segs Absolute 3.97 1.50 - 8.10 k/uL    Absolute Lymph # 1.17 1.10 - 3.70 k/uL    Absolute Mono # 0.62 0.10 - 1.20 k/uL    Absolute Eos # 0.14 0.00 - 0.44 k/uL    Basophils Absolute 0.04 0.00 - 0.20 k/uL    Absolute Immature Granulocyte <0.03 0.00 - 0.30 k/uL   HCG Qualitative, Serum    Collection Time: 07/11/22 11:03 AM   Result Value Ref Range    hCG Qual NEGATIVE NEGATIVE   Immature Platelet Fraction    Collection Time: 07/11/22 11:03 AM   Result Value Ref Range    Platelet, Fluorescence Platelet clumps present, count appears adequate. 138 - 453 k/uL   SPECIMEN REJECTION    Collection Time: 07/11/22 11:03 AM   Result Value Ref Range    Specimen Source . BLOOD     Ordered Test CP,MG,LIP     Reason for Rejection Unable to perform testing: Specimen hemolyzed.     Comprehensive Metabolic Panel    Collection Time: 07/11/22 11:54 AM   Result Value Ref Range    Glucose 93 70 - 99 mg/dL    BUN 3 (L) 6 - 20 mg/dL    Creatinine 0.79 0.50 - 0.90 mg/dL    Calcium 7.7 (L) 8.6 - 10.4 mg/dL    Sodium 138 135 - 144 mmol/L    Potassium 4.6 3.7 - 5.3 mmol/L    Chloride 107 98 - 107 mmol/L    CO2 26 20 - 31 mmol/L    Anion Gap 5 (L) 9 - 17 mmol/L    Alkaline Phosphatase 101 35 - 104 U/L    ALT 16 5 - 33 U/L    AST 72 (H) <32 U/L    Total Bilirubin 0.43 0.3 - 1.2 mg/dL    Total Protein 5.9 (L) 6.4 - 8.3 g/dL    Albumin 2.0 (L) 3.5 - 5.2 g/dL    Albumin/Globulin Ratio 0.5 (L) 1.0 - 2.5    GFR Non-African American >60 >60 mL/min    GFR African American >60 >60 mL/min    GFR Comment         Lipase    Collection Time: 07/11/22 11:54 AM   Result Value Ref Range    Lipase 9 (L) 13 - 60 U/L   Magnesium    Collection Time: 07/11/22 11:54 AM   Result Value Ref Range    Magnesium 1.4 (L) 1.6 - 2.6 mg/dL   Hemoglobin A1C    Collection Time: 07/19/22 12:00 PM   Result Value Ref Range    Hemoglobin A1C 5.3 4.0 - 6.0 %    Estimated Avg Glucose 105 mg/dL   HIV Screen    Collection Time: 07/19/22 12:00 PM   Result Value Ref Range    HIV Ag/Ab NONREACTIVE NONREACTIVE   T.  Pallidum Ab    Collection Time: 07/19/22 12:00 PM   Result Value Ref Range    T. pallidum, IgG REACTIVE (A) NONREACTIVE   Hepatitis Panel, Acute    Collection Time: 07/19/22 12:00 PM   Result Value Ref Range    Hepatitis B Surface Ag NONREACTIVE NONREACTIVE    Hepatitis C Ab NONREACTIVE NONREACTIVE    Hep B Core Ab, IgM NONREACTIVE NONREACTIVE    Hep A IgM NONREACTIVE NONREACTIVE   Basic Metabolic Panel    Collection Time: 07/19/22 12:00 PM   Result Value Ref Range    Glucose 150 (H) 70 - 99 mg/dL    BUN 3 (L) 6 - 20 mg/dL    Creatinine 0.62 0.50 - 0.90 mg/dL    Calcium 7.7 (L) 8.6 - 10.4 mg/dL    Sodium 136 135 - 144 mmol/L    Potassium 4.1 3.7 - 5.3 mmol/L    Chloride 103 98 - 107 mmol/L    CO2 26 20 - 31 mmol/L    Anion Gap 7 (L) 9 - 17 mmol/L    GFR Non-African American >60 >60 mL/min    GFR African American >60 >60 mL/min    GFR Comment         Iron and TIBC Recent labs reviewed, remarkable for positive Tpal, negative VDRL, positive TP-PA. Results discussed with health department, attempting to obtain further records from previous residence in [place]. Patient does not qualify for treatment with Penicillin at this time, however can be offered. Patient would like to hold off until records completely reviewed   - Denies known history of previous syphilis contact, infection, or treatment   - Pt reports oligomenorrhea with periods about every 3 months since before her child was born   - DEXA scan ordered due long standing oligomenorrhea, suspect due to extremely low weight and cachectic state   - Calcium/Vitamin D supplementation provided   - Will initiate low dose hormone therapy to help bone and cardiovascular health. Rx for PremPro provided today. Patient does report intermittent tobacco use as well as history of migraines with aura. Discussed small association with increased blood clot with these comorbidities. Given precautions. - Encourage follow up with PCP and complaince with further testing   - Return to office in 3 months    Breast Mass   - Noted on most recent exam   - Encourage completion of ultrasound and mammogram, previously ordered   - Given contact information to schedule these tests    Patient Active Problem List    Diagnosis Date Noted    Left breast mass 07/29/2022     Priority: Medium    Oligomenorrhea 07/28/2022     Priority: Medium    Positive Tpal 07/28/2022     Priority: Medium     7/19/22 Positive Tpal, negative VDRL, positive TP-PA  Patient denies any history of infection, treatment, or exposure. Available records reviewed, negative screen 11/2016  [facility] updated, attempting to obtain outside records from [place] (previous residence). Case definition does not meet criteria for treatment per health department. 7/28/22: Discuss option for \"late treatment\" versus waiting for records. Pt desires to wait for records.       Cachexia ([facility] 07/07/2022     Priority: Medium    Type 1 diabetes mellitus with diabetic polyneuropathy ([facility] 09/20/2016     Priority: Medium    Microalbuminuria due to type 1 diabetes mellitus ([facility] 09/22/2021    Generalized abdominal pain 09/22/2021    Restless leg syndrome 09/22/2021    Protein-calorie malnutrition, severe ([facility] 09/22/2021    Marijuana use, continuous 09/22/2021    Onychomycosis 09/22/2021    Intractable vomiting with nausea 05/21/2020    Intractable abdominal pain 05/21/2020       Return in about 3 months (around 10/28/2022) for FollowUp Oligomenorrhea. Counseling Completed:      Patient was seen with total face to face time of 20 minutes. More than 50% of this visit was on counseling and education regarding her diagnose(s) as listed below and options. She was also counseled on her preventative health maintenance recommendations and follow-up. Diagnosis Orders   1. Oligomenorrhea, unspecified type  DEXA VERTEBRAL FRACTURE ASSESSMENT    estrogen, conjugated,-medroxyPROGESTERone (PREMPRO) 0.625-2.5 MG per tablet    Calcium Carbonate-Vitamin D (OYSTER SHELL CALCIUM/D) 500-200 MG-UNIT TABS      2. Positive Tpal        3.  Left breast mass            González Scott DO  Ob/Gyn Resident  [facility], [address]  7/29/2022, 2:08 AM

## 2022-07-28 NOTE — CARE COORDINATION
called and spoke with Amaris Noel. Amaris Noel reports that Mr. Amanda Galvan requested her birth certificate. Amaris Noel reports that she emailed it to him however she has not heard back from him.  attempted to follow up with Mr. Amanda Galvan via e-mail with Amaris Noel CC one-mail.  received a return to send and the E-mail address was not an active account.  informed Amaris Noel.  informed Amaris Noel she could fax it for her if she would like it to be sent via fax. Plan:   Amaris Noel will follow up with Mr. Amanda Galvan regarding application and confirming he has all the documents needed.

## 2022-07-29 ENCOUNTER — HOSPITAL ENCOUNTER (OUTPATIENT)
Facility: MEDICAL CENTER | Age: 27
Discharge: HOME | End: 2022-07-29
Payer: MEDICARE

## 2022-07-29 ENCOUNTER — INITIAL CONSULT (OUTPATIENT)
Dept: ONCOLOGY | Age: 27
End: 2022-07-29
Payer: MEDICARE

## 2022-07-29 ENCOUNTER — TELEPHONE (OUTPATIENT)
Dept: OBGYN | Age: 27
End: 2022-07-29

## 2022-07-29 ENCOUNTER — TELEPHONE (OUTPATIENT)
Dept: ONCOLOGY | Age: 27
End: 2022-07-29

## 2022-07-29 VITALS
DIASTOLIC BLOOD PRESSURE: 88 MMHG | TEMPERATURE: 98.3 F | HEART RATE: 90 BPM | WEIGHT: 86.7 LBS | RESPIRATION RATE: 16 BRPM | SYSTOLIC BLOOD PRESSURE: 130 MMHG | BODY MASS INDEX: 13.99 KG/M2

## 2022-07-29 DIAGNOSIS — D64.9 ANEMIA, UNSPECIFIED TYPE: Primary | ICD-10-CM

## 2022-07-29 DIAGNOSIS — D64.9 ANEMIA, UNSPECIFIED TYPE: ICD-10-CM

## 2022-07-29 PROBLEM — N63.20 LEFT BREAST MASS: Status: ACTIVE | Noted: 2022-07-29

## 2022-07-29 LAB
ABSOLUTE EOS #: 0.1 K/UL (ref 0–0.44)
ABSOLUTE IMMATURE GRANULOCYTE: 0.05 K/UL (ref 0–0.3)
ABSOLUTE LYMPH #: 3.42 K/UL (ref 1.1–3.7)
ABSOLUTE MONO #: 1.08 K/UL (ref 0.1–1.2)
ABSOLUTE RETIC #: 0.05 M/UL (ref 0.03–0.08)
BASOPHILS # BLD: 0 % (ref 0–2)
BASOPHILS ABSOLUTE: 0.03 K/UL (ref 0–0.2)
EOSINOPHILS RELATIVE PERCENT: 1 % (ref 1–4)
FOLATE: 9.9 NG/ML
HAPTOGLOBIN: 169 MG/DL (ref 30–200)
HCT VFR BLD CALC: 25.7 % (ref 36.3–47.1)
HEMOGLOBIN: 9.2 G/DL (ref 11.9–15.1)
IMMATURE GRANULOCYTES: 0 %
IMMATURE RETIC FRACT: 23.2 % (ref 2.7–18.3)
LACTATE DEHYDROGENASE: 206 U/L (ref 135–214)
LYMPHOCYTES # BLD: 31 % (ref 24–43)
MCH RBC QN AUTO: 35.9 PG (ref 25.2–33.5)
MCHC RBC AUTO-ENTMCNC: 35.8 G/DL (ref 28.4–34.8)
MCV RBC AUTO: 100.4 FL (ref 82.6–102.9)
MONOCYTES # BLD: 10 % (ref 3–12)
NRBC AUTOMATED: 0 PER 100 WBC
PDW BLD-RTO: 13.1 % (ref 11.8–14.4)
PLATELET # BLD: 370 K/UL (ref 138–453)
PMV BLD AUTO: 10.3 FL (ref 8.1–13.5)
RBC # BLD: 2.56 M/UL (ref 3.95–5.11)
RETIC %: 1.8 % (ref 0.5–1.9)
RETIC HEMOGLOBIN: 35.8 PG (ref 28.2–35.7)
SEG NEUTROPHILS: 58 % (ref 36–65)
SEGMENTED NEUTROPHILS ABSOLUTE COUNT: 6.48 K/UL (ref 1.5–8.1)
VITAMIN B-12: 429 PG/ML (ref 232–1245)
WBC # BLD: 11.2 K/UL (ref 3.5–11.3)

## 2022-07-29 PROCEDURE — G8419 CALC BMI OUT NRM PARAM NOF/U: HCPCS | Performed by: INTERNAL MEDICINE

## 2022-07-29 PROCEDURE — 82607 VITAMIN B-12: CPT

## 2022-07-29 PROCEDURE — G8427 DOCREV CUR MEDS BY ELIG CLIN: HCPCS | Performed by: INTERNAL MEDICINE

## 2022-07-29 PROCEDURE — 99204 OFFICE O/P NEW MOD 45 MIN: CPT | Performed by: INTERNAL MEDICINE

## 2022-07-29 PROCEDURE — 85045 AUTOMATED RETICULOCYTE COUNT: CPT

## 2022-07-29 PROCEDURE — 82525 ASSAY OF COPPER: CPT

## 2022-07-29 PROCEDURE — 83010 ASSAY OF HAPTOGLOBIN QUANT: CPT

## 2022-07-29 PROCEDURE — 85025 COMPLETE CBC W/AUTO DIFF WBC: CPT

## 2022-07-29 PROCEDURE — 36415 COLL VENOUS BLD VENIPUNCTURE: CPT

## 2022-07-29 PROCEDURE — 82746 ASSAY OF FOLIC ACID SERUM: CPT

## 2022-07-29 PROCEDURE — 83020 HEMOGLOBIN ELECTROPHORESIS: CPT

## 2022-07-29 PROCEDURE — 99202 OFFICE O/P NEW SF 15 MIN: CPT

## 2022-07-29 PROCEDURE — 83615 LACTATE (LD) (LDH) ENZYME: CPT

## 2022-07-29 PROCEDURE — 82664 ELECTROPHORETIC TEST: CPT

## 2022-07-29 NOTE — PROGRESS NOTES
Patient ID: Mary Jo Emanuel, 1995, [id number], 27 y.o. Referred by : Juan Anglin MD  Diagnosis:   Anemia  Malnutrition  HISTORY OF PRESENT ILLNESS:    Oncologic History:  Mary Jo Emanuel is a 27 y.o. female with history of diabetes, hypertension, malnutrition, was seen there initial consultation visit for anemia. Patient has recent lab work which showed hemoglobin around 9.4,  and normal iron studies therefore was referred to hematology for further evaluation. Patient has history of significant malnutrition and has lost more than 50 pounds over past 2 years. She is very thinly built and cachectic. She has been following with gastroenterologist and has been diagnosed with malnutrition and H. pylori gastritis. She is taking Ensure 3 times daily. She has a history of heavy alcohol use in the past but has been sober for past 2 years. She smokes 1/3 pack daily. Her recent lab work showed hemoglobin 9.4, iron 43, iron saturation 38 and ferritin 33. She denies any heavy menstrual periods or blood in stool. She gets her menstrual period every 3 months. She has been seen by her PCP recently and DEXA scan as well as left mammogram and ultrasound was ordered.     Past Medical History:   Diagnosis Date    Diabetes mellitus ([address]     Diabetic neuropathy, type II diabetes mellitus ([address]     Hypertension     Kidney infection     MRSA (methicillin resistant staph aureus) culture positive 2016    abdomen    Nausea & vomiting     Ovarian cyst 2019    Right side    UTI (urinary tract infection)        Past Surgical History:   Procedure Laterality Date     SECTION      UPPER GASTROINTESTINAL ENDOSCOPY N/A 2021    EGD BIOPSY performed by Kb Arisa MD at [facility]   Allergen Reactions    Blueberry [Vaccinium Angustifolium] Anaphylaxis     Tongue swells up, needs epipen    Shrimp Flavor Anaphylaxis     OK with fish, allergic to shrimp and lobster  OK with IV contrast       Current Outpatient Medications   Medication Sig Dispense Refill    estrogen, conjugated,-medroxyPROGESTERone (PREMPRO) 0.625-2.5 MG per tablet Take 1 tablet by mouth in the morning. 28 tablet 3    Calcium Carbonate-Vitamin D (OYSTER SHELL CALCIUM/D) 500-200 MG-UNIT TABS Take 1 tablet by mouth in the morning and 1 tablet before bedtime. 30 tablet 5    azelastine (OPTIVAR) 0.05 % ophthalmic solution       insulin aspart (NOVOLOG FLEXPEN) 100 UNIT/ML injection pen Inject 2 to 12 units subcutaneously three times daily per sliding scale 45 mL 5    rOPINIRole (REQUIP) 0.25 MG tablet Take 1 tablet every night 90 tablet 1    lisinopril (PRINIVIL;ZESTRIL) 2.5 MG tablet TAKE 1 TABLET EVERY DAY 90 tablet 1    insulin glargine (LANTUS SOLOSTAR) 100 UNIT/ML injection pen Inject 10 Units into the skin nightly 15 mL 3    clindamycin (CLEOCIN-T) 1 % external solution Apply topically 2 times daily for ten days 30 mL 0    Nutritional Supplements (GLUCERNA 1.5 REJI) LIQD Take 1 each by mouth in the morning, at noon, and at bedtime 100 each 6    pregabalin (LYRICA) 25 MG capsule Take 1 capsule by mouth every evening for 30 days.  30 capsule 0    ondansetron (ZOFRAN ODT) 4 MG disintegrating tablet Take 1 tablet by mouth every 8 hours as needed for Nausea 10 tablet 0    ibuprofen (IBU) 600 MG tablet TAKE 1 TABLET FOUR TIMES DAILY AS NEEDED FOR PAIN 120 tablet 1    TRUE METRIX BLOOD GLUCOSE TEST strip CHECK BLOOD SUGAR FOUR TIMES DAILY AS DIRECTED 400 strip 1    TRUEplus Lancets 33G MISC TEST BLOOD SUGAR FOUR TIMES DAILY 400 each 1    DROPLET PEN NEEDLES 31G X 8 MM MISC USE FOUR TIMES DAILY AS DIRECTED 400 each 1    ketoconazole (NIZORAL) 2 % cream APPLY TOPICALLY DAILY 60 g 1    Alcohol Swabs (B-D SINGLE USE SWABS REGULAR) PADS USE AS DIRECTED FOUR TIMES DAILY 100 each 5    dicyclomine (BENTYL) 10 MG capsule TAKE 1 CAPSULE THREE TIMES DAILY AS NEEDED FOR ABDOMINAL PAIN 120 capsule 1    Nutritional Supplements (ENSURE HIGH PROTEIN) LIQD Take 1 Bottle by mouth in the morning, at noon, and at bedtime 100 each 5    pantoprazole (PROTONIX) 40 MG tablet Take 1 tablet by mouth every morning (before breakfast) 30 tablet 0    famotidine (PEPCID) 20 MG tablet Take 1 tablet by mouth nightly 30 tablet 0    ACETAMINOPHEN EXTRA STRENGTH 500 MG tablet TAKE 2 TABLETS BY MOUTH EVERY 6 HOURS AS NEEDED FOR PAIN 180 tablet 1     No current facility-administered medications for this visit. Social History     Socioeconomic History    Marital status: Single     Spouse name: Not on file    Number of children: Not on file    Years of education: Not on file    Highest education level: Not on file   Occupational History    Not on file   Tobacco Use    Smoking status: Every Day     Packs/day: 0.50     Types: Cigarettes    Smokeless tobacco: Never    Tobacco comments:     5/day   Substance and Sexual Activity    Alcohol use: No    Drug use: Yes     Types: Marijuana (Weed)     Comment: 1-2x per day    Sexual activity: Never   Other Topics Concern    Not on file   Social History Narrative    Not on file     Social Determinants of Health     Financial Resource Strain: High Risk    Difficulty of Paying Living Expenses: Hard   Food Insecurity: Food Insecurity Present    Worried About Running Out of Food in the Last Year: Often true    Ran Out of Food in the Last Year: Often true   Transportation Needs: Not on file   Physical Activity: Not on file   Stress: Not on file   Social Connections: Not on file   Intimate Partner Violence: Not on file   Housing Stability: Not on file       No family history on file. REVIEW OF SYSTEM:     Constitutional: No fever or chills.  No night sweats, no weight loss   Eyes: No eye discharge, double vision, or eye pain   HEENT: negative for sore mouth, sore throat, hoarseness and voice change   Respiratory: negative for cough , sputum, dyspnea, wheezing, hemoptysis, chest pain   Cardiovascular: negative for chest pain, dyspnea, palpitations, orthopnea, PND   Gastrointestinal: negative for nausea, vomiting, diarrhea, constipation, abdominal pain, Dysphagia, hematemesis and hematochezia   Genitourinary: negative for frequency, dysuria, nocturia, urinary incontinence, and hematuria   Integument: negative for rash, skin lesions, bruises.    Hematologic/Lymphatic: negative for easy bruising, bleeding, lymphadenopathy, petechiae and swelling/edema   Endocrine: negative for heat or cold intolerance, tremor, weight changes, change in bowel habits and hair loss   Musculoskeletal: negative for myalgias, arthralgias, pain, joint swelling,and bone pain   Neurological: negative for headaches, dizziness, seizures, weakness, numbness       OBJECTIVE:         Vitals:    07/29/22 1123   BP: 130/88   Pulse: 90   Resp: 16   Temp: 98.3 °F (36.8 °C)       PHYSICAL EXAM:   General appearance -thinly built and cachectic  Mental status - alert and cooperative   Eyes - pupils equal and reactive, extraocular eye movements intact   Ears - bilateral TM's and external ear canals normal   Mouth - mucous membranes moist, pharynx normal without lesions   Neck - supple, no significant adenopathy   Lymphatics - no palpable lymphadenopathy, no hepatosplenomegaly   Chest - clear to auscultation, no wheezes, rales or rhonchi, symmetric air entry   Heart - normal rate, regular rhythm, normal S1, S2, no murmurs, rubs, clicks or gallops   Abdomen - soft, nontender, nondistended, no masses or organomegaly   Neurological - alert, oriented, normal speech, no focal findings or movement disorder noted   Musculoskeletal - no joint tenderness, deformity or swelling   Extremities - peripheral pulses normal, no pedal edema, no clubbing or cyanosis   Skin - normal coloration and turgor, no rashes, no suspicious skin lesions noted ,      LABORATORY DATA:     Lab Results   Component Value Date    WBC 6.0 07/19/2022    HGB 9.4 (L) 07/19/2022    HCT 27.0 (L) 07/19/2022 .9 07/19/2022     07/19/2022    LYMPHOPCT 55 (H) 07/19/2022    RBC 2.65 (L) 07/19/2022    MCH 35.5 (H) 07/19/2022    MCHC 34.8 07/19/2022    RDW 13.1 07/19/2022    MONOPCT 6 07/19/2022    BASOPCT 1 07/19/2022    NEUTROABS 2.04 07/19/2022    LYMPHSABS 3.29 07/19/2022    MONOSABS 0.37 07/19/2022    EOSABS 0.20 07/19/2022    BASOSABS 0.03 07/19/2022         Chemistry        Component Value Date/Time     07/19/2022 1200    K 4.1 07/19/2022 1200     07/19/2022 1200    CO2 26 07/19/2022 1200    BUN 3 (L) 07/19/2022 1200    CREATININE 0.62 07/19/2022 1200        Component Value Date/Time    CALCIUM 7.7 (L) 07/19/2022 1200    ALKPHOS 101 07/11/2022 1154    AST 72 (H) 07/11/2022 1154    ALT 16 07/11/2022 1154    BILITOT 0.43 07/11/2022 1154            PATHOLOGY DATA:   Reviewed  IMAGING DATA:      US NON OB TRANSVAGINAL  Narrative: EXAMINATION:  PELVIC ULTRASOUND    5/10/2022    TECHNIQUE:  Transabdominal and transvaginal pelvic duplex ultrasound using B-mode/gray  scaled imaging, Doppler spectral analysis and color flow Doppler was obtained. COMPARISON:  CT abdomen pelvis from 04/11/2022    HISTORY:  ORDERING SYSTEM PROVIDED HISTORY: Irregular menses  TECHNOLOGIST PROVIDED HISTORY:  This procedure can be scheduled via [employer]. Access your [employer] account by  visiting [employer]. AUB  Reason for Exam: oligomenorrhea,    25-year-old female with irregular menses; abnormal uterine bleeding    FINDINGS:    Measurements:    Uterus: 6.0 x 5.1 x 3.0 cm. Endometrial stripe: 4 mm. Right Ovary:2.2 x 1.8 x 2.7 cm. Left Ovary: 1.9 x 1.6 x 1.9 cm    Ultrasound Findings:    Uterus: Heterogeneous echogenicity of the uterus. No uterine mass or fibroid. Endometrial stripe: Endometrial stripe is within normal limits. Right Ovary: Right ovary is within normal limits. Left Ovary:  Left ovary is within normal limits. Free Fluid: No evidence of free fluid. Impression: 1.  Heterogeneous were sought and answered to her satisfaction and she agreed to proceed with the plan   PLAN:   Lab work today  Return to clinic 2 weeks      Osman Quijano MD  Hematologist/Medical Oncologist    On this date 7/29/22  I have spent 60 minutes reviewing previous notes, test results and face to face with the patient discussing the diagnosis and importance of compliance with the treatment plan. Greater than 50% of that time was spent face-to-face with the patient in counseling and coordinating her care. This note is created with the assistance of a speech recognition program.  While intending to generate a document that actually reflects the content of the visit, the document can still have some errors including those of syntax and sound a like substitutions which may escape proof reading. It such instances, actual meaning can be extrapolated by contextual diversion.

## 2022-07-29 NOTE — TELEPHONE ENCOUNTER
DALIA HERE FOR CONSULTATION  Labs today   RTC 2 weeks  LABS VITAMIN B 12 & FOLATE, HEMOGLOBINOPATHY, PATH REVIEW SMEAR, COPPER SERUM, LD, RETICULOCYTES, HAPTOGLOBIN, DONE ON EXIT  MD VISIT 8/12/22 @ 10:15AM  AVS PRINTED W/ INSTRUCTIONS AND GIVEN TO PT ON EXIT

## 2022-07-29 NOTE — TELEPHONE ENCOUNTER
I called Mary Clemente at the [facility] Dept regarding this patients desire to wait on late treatment for Syphilis until the information search is received from [place]. Voice Mail was left with my direct contact information for the health dept to call me.

## 2022-07-31 ASSESSMENT — ENCOUNTER SYMPTOMS
CHEST TIGHTNESS: 0
ANAL BLEEDING: 0
VOMITING: 0
CHOKING: 0
COUGH: 0
CONSTIPATION: 0
WHEEZING: 0
NAUSEA: 0
DIARRHEA: 0
BLOOD IN STOOL: 0
ABDOMINAL PAIN: 0
SHORTNESS OF BREATH: 0

## 2022-08-01 LAB — SURGICAL PATHOLOGY REPORT: NORMAL

## 2022-08-02 ENCOUNTER — TELEPHONE (OUTPATIENT)
Dept: OBGYN | Age: 27
End: 2022-08-02

## 2022-08-02 LAB
COPPER: 83.4 UG/DL (ref 80–155)
HGB ELECTROPHORESIS INTERP: NORMAL
PATHOLOGIST REVIEW: NORMAL
PATHOLOGIST: NORMAL

## 2022-08-02 NOTE — TELEPHONE ENCOUNTER
I received a call back from  Clarita Hamman at the [address] [place], [place] [address] regarding this patients records from [place]. Records did not reveal any previous exposure or treatment for syphilis. It was noted that in 2016 this patient had a non reactive [facility] Syphilis lab result. Staff message sent to Dr. Altagracia Brandon regarding the recommendations based on this information.

## 2022-08-04 ENCOUNTER — HOSPITAL ENCOUNTER (OUTPATIENT)
Dept: MAMMOGRAPHY | Age: 27
Discharge: HOME | End: 2022-08-06
Payer: MEDICARE

## 2022-08-04 DIAGNOSIS — N91.5 OLIGOMENORRHEA, UNSPECIFIED TYPE: ICD-10-CM

## 2022-08-04 PROCEDURE — 77080 DXA BONE DENSITY AXIAL: CPT

## 2022-08-05 ENCOUNTER — CARE COORDINATION (OUTPATIENT)
Dept: CARE COORDINATION | Age: 27
End: 2022-08-05

## 2022-08-05 NOTE — CARE COORDINATION
called and spoke with [address] regarding [employer]. [address] reports that she has not heard from them since they stated she was approved. [address] reports they have not assisted that her yet with rent. [address] reports that her landlord has not heard back [facility]. [address] reports that has attempted to make contact with  Gurmeet Basilio however has been unsuccessful making contact with him.  sent E-mail to [facility] general E-mail requesting it to be forwarded to him. Vishnu Jones reports that she does not have enough money to purchase clothes and supplies for daughter to start school.  provided Vishnu Jones with [facility] number [phone].  informed Vishnu Jones they help provide gently used uniforms to families. Plan:   [address] or  will hear from Mr. Mahad Izaguirre regarding rent assistance. [address] will call [facility] and call [facility].

## 2022-08-08 DIAGNOSIS — N91.4 SECONDARY OLIGOMENORRHEA: Primary | ICD-10-CM

## 2022-08-09 DIAGNOSIS — R10.84 GENERALIZED ABDOMINAL PAIN: ICD-10-CM

## 2022-08-09 RX ORDER — IBUPROFEN 600 MG/1
TABLET ORAL
Qty: 120 TABLET | Refills: 1 | OUTPATIENT
Start: 2022-08-09

## 2022-08-10 ENCOUNTER — HOSPITAL ENCOUNTER (OUTPATIENT)
Facility: MEDICAL CENTER | Age: 27
End: 2022-08-10

## 2022-08-11 DIAGNOSIS — A53.0 LATENT SYPHILIS: Primary | ICD-10-CM

## 2022-08-11 DIAGNOSIS — E10.42 TYPE 1 DIABETES MELLITUS WITH DIABETIC POLYNEUROPATHY (HCC): ICD-10-CM

## 2022-08-11 PROBLEM — M81.8: Status: ACTIVE | Noted: 2022-08-11

## 2022-08-11 NOTE — RESULT ENCOUNTER NOTE
I discussed result of DEXA, positive for osteoporosis. Recommend checking FSH, Estradiol to rule out primary ovarian insufficiency. I also discussed results of syphilis testing. Suspect latent syphilis as there is no history treatment. Plan repeat quant VDRL. Anticipate need to treat for latent syphilis.

## 2022-08-12 RX ORDER — ISOPROPYL ALCOHOL 70 ML/100ML
SWAB TOPICAL
OUTPATIENT
Start: 2022-08-12

## 2022-08-15 DIAGNOSIS — E10.42 TYPE 1 DIABETES MELLITUS WITH DIABETIC POLYNEUROPATHY (HCC): ICD-10-CM

## 2022-08-15 RX ORDER — ISOPROPYL ALCOHOL 0.75 G/1
SWAB TOPICAL
Qty: 100 EACH | Refills: 5 | Status: SHIPPED | OUTPATIENT
Start: 2022-08-15

## 2022-08-17 ENCOUNTER — TELEPHONE (OUTPATIENT)
Dept: OBGYN | Age: 27
End: 2022-08-17

## 2022-08-17 ENCOUNTER — HOSPITAL ENCOUNTER (OUTPATIENT)
Facility: MEDICAL CENTER | Age: 27
Discharge: HOME | End: 2022-08-17

## 2022-08-17 ENCOUNTER — HOSPITAL ENCOUNTER (OUTPATIENT)
Age: 27
Setting detail: SPECIMEN
Discharge: HOME | End: 2022-08-17
Payer: MEDICARE

## 2022-08-17 ENCOUNTER — OFFICE VISIT (OUTPATIENT)
Dept: ONCOLOGY | Age: 27
End: 2022-08-17
Payer: MEDICARE

## 2022-08-17 ENCOUNTER — TELEPHONE (OUTPATIENT)
Dept: ONCOLOGY | Age: 27
End: 2022-08-17

## 2022-08-17 VITALS
HEART RATE: 90 BPM | TEMPERATURE: 97.3 F | DIASTOLIC BLOOD PRESSURE: 94 MMHG | RESPIRATION RATE: 16 BRPM | BODY MASS INDEX: 14.82 KG/M2 | SYSTOLIC BLOOD PRESSURE: 130 MMHG | WEIGHT: 91.8 LBS

## 2022-08-17 DIAGNOSIS — D58.2 HEMOGLOBIN C TRAIT (HCC): ICD-10-CM

## 2022-08-17 DIAGNOSIS — N91.4 SECONDARY OLIGOMENORRHEA: ICD-10-CM

## 2022-08-17 DIAGNOSIS — L98.9 SORE ON SCALP: ICD-10-CM

## 2022-08-17 DIAGNOSIS — A53.0 LATENT SYPHILIS: ICD-10-CM

## 2022-08-17 DIAGNOSIS — D64.9 ANEMIA, UNSPECIFIED TYPE: Primary | ICD-10-CM

## 2022-08-17 DIAGNOSIS — R74.8 HIGH SERUM TREPONEMA-SPECIFIC ANTIBODY TITER: ICD-10-CM

## 2022-08-17 DIAGNOSIS — A53.0 LATENT SYPHILIS: Primary | ICD-10-CM

## 2022-08-17 DIAGNOSIS — Z13.220 SCREENING FOR HYPERLIPIDEMIA: ICD-10-CM

## 2022-08-17 DIAGNOSIS — M81.0 OSTEOPOROSIS, UNSPECIFIED OSTEOPOROSIS TYPE, UNSPECIFIED PATHOLOGICAL FRACTURE PRESENCE: ICD-10-CM

## 2022-08-17 LAB
CHOLESTEROL/HDL RATIO: 2
CHOLESTEROL: 149 MG/DL
ESTRADIOL LEVEL: 89.2 PG/ML (ref 27–314)
FOLLICLE STIMULATING HORMONE: 6.5 MIU/ML (ref 1.7–21.5)
HDLC SERPL-MCNC: 75 MG/DL
LDL CHOLESTEROL: 55 MG/DL (ref 0–130)
TRIGL SERPL-MCNC: 94 MG/DL

## 2022-08-17 PROCEDURE — 86780 TREPONEMA PALLIDUM: CPT

## 2022-08-17 PROCEDURE — 99211 OFF/OP EST MAY X REQ PHY/QHP: CPT

## 2022-08-17 PROCEDURE — 36415 COLL VENOUS BLD VENIPUNCTURE: CPT

## 2022-08-17 PROCEDURE — G8427 DOCREV CUR MEDS BY ELIG CLIN: HCPCS | Performed by: INTERNAL MEDICINE

## 2022-08-17 PROCEDURE — 99214 OFFICE O/P EST MOD 30 MIN: CPT | Performed by: INTERNAL MEDICINE

## 2022-08-17 PROCEDURE — 80061 LIPID PANEL: CPT

## 2022-08-17 PROCEDURE — 4004F PT TOBACCO SCREEN RCVD TLK: CPT | Performed by: INTERNAL MEDICINE

## 2022-08-17 PROCEDURE — 83001 ASSAY OF GONADOTROPIN (FSH): CPT

## 2022-08-17 PROCEDURE — 86593 SYPHILIS TEST NON-TREP QUANT: CPT

## 2022-08-17 PROCEDURE — 82670 ASSAY OF TOTAL ESTRADIOL: CPT

## 2022-08-17 PROCEDURE — G8419 CALC BMI OUT NRM PARAM NOF/U: HCPCS | Performed by: INTERNAL MEDICINE

## 2022-08-17 RX ORDER — DOXYCYCLINE HYCLATE 100 MG
100 TABLET ORAL 2 TIMES DAILY
Qty: 56 TABLET | Refills: 0 | Status: SHIPPED | OUTPATIENT
Start: 2022-08-17 | End: 2022-09-14

## 2022-08-17 NOTE — TELEPHONE ENCOUNTER
Obstetric/Gynecology Resident Telephone Note    The patient has been followed by our clinic due to oligomenorrhea with cachexia, weight loss. During workup, Tpal noted to be positive. VDRL negative but TP-PA. Extensive chart review and communication with health department in [address] [place] [address]  and [place] (previous residence) show patient has never been treated. Patient called and informed of findings. Discussed recommendation for treatment with IM penicillin versus PO Doxycycline 100mg BID x28d. Patient desires treatment with PO doxycycline. Patient also completed recent DEXA scan which reveals osteoporosis. [address] and Estradiol ordered, encourage patient to get labs done. Encourage patient to follow up, will call with results of these labs. All questions answered.     Mirtha Briceno DO  OB/GYN Resident, [address]   8/17/2022, 10:04 AM

## 2022-08-17 NOTE — PROGRESS NOTES
Patient ID: Mary Jo Emanuel, 1995, [id number], 27 y.o. Referred by : Juan Anglin MD  Diagnosis:   Anemia, mild chronic anemia and hemoglobin metaphysis showing HBc trait  Malnutrition  HISTORY OF PRESENT ILLNESS:    Oncologic History:  Mary Jo Emanuel is a 27 y.o. female with history of diabetes, hypertension, malnutrition, was seen there initial consultation visit for anemia. Patient has recent lab work which showed hemoglobin around 9.4,  and normal iron studies therefore was referred to hematology for further evaluation. Patient has history of significant malnutrition and has lost more than 50 pounds over past 2 years. She is very thinly built and cachectic. She has been following with gastroenterologist and has been diagnosed with malnutrition and H. pylori gastritis. She is taking Ensure 3 times daily. She has a history of heavy alcohol use in the past but has been sober for past 2 years. She smokes 1/3 pack daily. Her recent lab work showed hemoglobin 9.4, iron 43, iron saturation 38 and ferritin 33. She denies any heavy menstrual periods or blood in stool. She gets her menstrual period every 3 months. She has been seen by her PCP recently and DEXA scan as well as left mammogram and ultrasound was ordered. Interval history:  Patient is return for follow-up Sadan to discuss lab results and further recommendations. Her iron and B12 studies were within normal limit her peripheral blood smear did not any morphologic abnormalities. Her hemoglobin electrophoresis showed HBc trait. She denies any chest pain shortness of breath. She report that she has started drinking Ensure and now gaining few pounds. During this visit patient's allergy, social, medical, surgical history and medications were reviewed and updated.     Past Medical History:   Diagnosis Date    Diabetes mellitus ([facility]     Diabetic neuropathy, type II diabetes mellitus ([facility] [address]     Hypertension Kidney infection     MRSA (methicillin resistant staph aureus) culture positive 2016    abdomen    Nausea & vomiting     Ovarian cyst 2019    Right side    UTI (urinary tract infection)        Past Surgical History:   Procedure Laterality Date     SECTION      UPPER GASTROINTESTINAL ENDOSCOPY N/A 2021    EGD BIOPSY performed by Leandro Koehler MD at [address]   Allergen Reactions    Blueberry [Vaccinium Angustifolium] Anaphylaxis     Tongue swells up, needs epipen    Shrimp Flavor Anaphylaxis     OK with fish, allergic to shrimp and lobster  OK with IV contrast       Current Outpatient Medications   Medication Sig Dispense Refill    Alcohol Swabs (B-D SINGLE USE SWABS REGULAR) PADS USE AS DIRECTED FOUR TIMES DAILY 100 each 5    estrogen, conjugated,-medroxyPROGESTERone (PREMPRO) 0.625-2.5 MG per tablet Take 1 tablet by mouth in the morning. 28 tablet 3    Calcium Carbonate-Vitamin D (OYSTER SHELL CALCIUM/D) 500-200 MG-UNIT TABS Take 1 tablet by mouth in the morning and 1 tablet before bedtime. 30 tablet 5    azelastine (OPTIVAR) 0.05 % ophthalmic solution       insulin aspart (NOVOLOG FLEXPEN) 100 UNIT/ML injection pen Inject 2 to 12 units subcutaneously three times daily per sliding scale 45 mL 5    rOPINIRole (REQUIP) 0.25 MG tablet Take 1 tablet every night 90 tablet 1    lisinopril (PRINIVIL;ZESTRIL) 2.5 MG tablet TAKE 1 TABLET EVERY DAY 90 tablet 1    insulin glargine (LANTUS SOLOSTAR) 100 UNIT/ML injection pen Inject 10 Units into the skin nightly 15 mL 3    pregabalin (LYRICA) 25 MG capsule Take 1 capsule by mouth every evening for 30 days.  30 capsule 0    ondansetron (ZOFRAN ODT) 4 MG disintegrating tablet Take 1 tablet by mouth every 8 hours as needed for Nausea 10 tablet 0    ibuprofen (IBU) 600 MG tablet TAKE 1 TABLET FOUR TIMES DAILY AS NEEDED FOR PAIN 120 tablet 1    TRUE METRIX BLOOD GLUCOSE TEST strip CHECK BLOOD SUGAR FOUR TIMES DAILY AS DIRECTED 400 strip 1 TRUEplus Lancets 33G MISC TEST BLOOD SUGAR FOUR TIMES DAILY 400 each 1    ketoconazole (NIZORAL) 2 % cream APPLY TOPICALLY DAILY 60 g 1    dicyclomine (BENTYL) 10 MG capsule TAKE 1 CAPSULE THREE TIMES DAILY AS NEEDED FOR ABDOMINAL PAIN 120 capsule 1    Nutritional Supplements (ENSURE HIGH PROTEIN) LIQD Take 1 Bottle by mouth in the morning, at noon, and at bedtime 100 each 5    pantoprazole (PROTONIX) 40 MG tablet Take 1 tablet by mouth every morning (before breakfast) 30 tablet 0    famotidine (PEPCID) 20 MG tablet Take 1 tablet by mouth nightly 30 tablet 0    ACETAMINOPHEN EXTRA STRENGTH 500 MG tablet TAKE 2 TABLETS BY MOUTH EVERY 6 HOURS AS NEEDED FOR PAIN 180 tablet 1    doxycycline hyclate (VIBRA-TABS) 100 MG tablet Take 1 tablet by mouth 2 times daily for 28 days (Patient not taking: Reported on 8/17/2022) 56 tablet 0    Nutritional Supplements (GLUCERNA 1.5 REJI) LIQD Take 1 each by mouth in the morning, at noon, and at bedtime (Patient not taking: Reported on 8/17/2022) 100 each 6    DROPLET PEN NEEDLES 31G X 8 MM MISC USE FOUR TIMES DAILY AS DIRECTED 400 each 1     No current facility-administered medications for this visit.        Social History     Socioeconomic History    Marital status: Single     Spouse name: Not on file    Number of children: Not on file    Years of education: Not on file    Highest education level: Not on file   Occupational History    Not on file   Tobacco Use    Smoking status: Every Day     Packs/day: 0.50     Types: Cigarettes    Smokeless tobacco: Never    Tobacco comments:     5/day   Substance and Sexual Activity    Alcohol use: No    Drug use: Yes     Types: Marijuana (Weed)     Comment: 1-2x per day    Sexual activity: Never   Other Topics Concern    Not on file   Social History Narrative    Not on file     Social Determinants of Health     Financial Resource Strain: High Risk    Difficulty of Paying Living Expenses: Hard   Food Insecurity: Food Insecurity Present    Worried About Running Out of Food in the Last Year: Often true    Ran Out of Food in the Last Year: Often true   Transportation Needs: Not on file   Physical Activity: Not on file   Stress: Not on file   Social Connections: Not on file   Intimate Partner Violence: Not on file   Housing Stability: Not on file       No family history on file. REVIEW OF SYSTEM:     Constitutional: No fever or chills. No night sweats, no weight loss   Eyes: No eye discharge, double vision, or eye pain   HEENT: negative for sore mouth, sore throat, hoarseness and voice change   Respiratory: negative for cough , sputum, dyspnea, wheezing, hemoptysis, chest pain   Cardiovascular: negative for chest pain, dyspnea, palpitations, orthopnea, PND   Gastrointestinal: negative for nausea, vomiting, diarrhea, constipation, abdominal pain, Dysphagia, hematemesis and hematochezia   Genitourinary: negative for frequency, dysuria, nocturia, urinary incontinence, and hematuria   Integument: negative for rash, skin lesions, bruises.    Hematologic/Lymphatic: negative for easy bruising, bleeding, lymphadenopathy, petechiae and swelling/edema   Endocrine: negative for heat or cold intolerance, tremor, weight changes, change in bowel habits and hair loss   Musculoskeletal: negative for myalgias, arthralgias, pain, joint swelling,and bone pain   Neurological: negative for headaches, dizziness, seizures, weakness, numbness       OBJECTIVE:         Vitals:    08/17/22 1338   BP: (!) 130/94   Pulse: 90   Resp:    Temp:        PHYSICAL EXAM:   General appearance -thinly built and cachectic  Mental status - alert and cooperative   Eyes - pupils equal and reactive, extraocular eye movements intact   Ears - bilateral TM's and external ear canals normal   Mouth - mucous membranes moist, pharynx normal without lesions   Neck - supple, no significant adenopathy   Lymphatics - no palpable lymphadenopathy, no hepatosplenomegaly   Chest - clear to auscultation, no wheezes, rales or rhonchi, symmetric air entry   Heart - normal rate, regular rhythm, normal S1, S2, no murmurs, rubs, clicks or gallops   Abdomen - soft, nontender, nondistended, no masses or organomegaly   Neurological - alert, oriented, normal speech, no focal findings or movement disorder noted   Musculoskeletal - no joint tenderness, deformity or swelling   Extremities - peripheral pulses normal, no pedal edema, no clubbing or cyanosis   Skin - normal coloration and turgor, no rashes, no suspicious skin lesions noted ,      LABORATORY DATA:     Lab Results   Component Value Date    WBC 11.2 07/29/2022    HGB 9.2 (L) 07/29/2022    HCT 25.7 (L) 07/29/2022    .4 07/29/2022     07/29/2022    LYMPHOPCT 31 07/29/2022    RBC 2.56 (L) 07/29/2022    MCH 35.9 (H) 07/29/2022    MCHC 35.8 (H) 07/29/2022    RDW 13.1 07/29/2022    MONOPCT 10 07/29/2022    BASOPCT 0 07/29/2022    NEUTROABS 6.48 07/29/2022    LYMPHSABS 3.42 07/29/2022    MONOSABS 1.08 07/29/2022    EOSABS 0.10 07/29/2022    BASOSABS 0.03 07/29/2022         Chemistry        Component Value Date/Time     07/19/2022 1200    K 4.1 07/19/2022 1200     07/19/2022 1200    CO2 26 07/19/2022 1200    BUN 3 (L) 07/19/2022 1200    CREATININE 0.62 07/19/2022 1200        Component Value Date/Time    CALCIUM 7.7 (L) 07/19/2022 1200    ALKPHOS 101 07/11/2022 1154    AST 72 (H) 07/11/2022 1154    ALT 16 07/11/2022 1154    BILITOT 0.43 07/11/2022 1154            PATHOLOGY DATA:   Reviewed  IMAGING DATA:      DEXA BONE DENSITY AXIAL SKELETON  Narrative: EXAMINATION:  BONE DENSITOMETRY    8/4/2022 1:44 pm    TECHNIQUE:  A bone density dual x-ray absorptiometry (DXA) scan was performed of the  lumbar spine and left hip on a [employer] system. COMPARISON:  None.     HISTORY:  ORDERING SYSTEM PROVIDED HISTORY: Oligomenorrhea, unspecified type  TECHNOLOGIST PROVIDED HISTORY:  Is the patient pregnant?->No    Gender: F    Age: 26 y/o    FINDINGS:  LUMBAR SPINE: L1-L4    BMD: 1.065 g/cm2    T-score: -0.9    Z-score: -0.8    LEFT TOTAL HIP:    BMD: 0.684 g/cm2    T-score: -2.6    Z-score: -3.0    LEFT FEMORAL NECK:  BMD: 0.756 g/cm2  T-score: -2.0    Z-score: -2.4    FRAX:    Not Indicated. Impression: Osteoporosis by WHO criteria. RECOMMENDATIONS:  1. All patients should optimize their calcium and vitamin D intake. 2. Consider FDA-approved medical therapies in postmenopausal women and men  aged 48 years and older, based on the following:    - A hip or vertebral (clinical or morphometric) fracture    - T-score less than or equal to -2.5 at the femoral neck or spine after  appropriate evaluation to exclude secondary causes    - Low bone density (T-score between -1.0 and -2.5 at the femoral neck or  spine) and a 10-year probability of a hip fracture greater than or equal to  3% or a 10-year probability of a major osteoporosis-related fracture greater  than or equal to 20% based on FRAX calculation.    - Clinician judgment and/or patient preferences may indicate treatment for  people with 10-year fracture probabilities above or below these levels    - Further guidance on treatment can be found at the National Osteoporosis  Foundation's website 866-068-6927.    3. Patients with diagnosis of osteoporosis or at high risk for fracture  should have regular bone mineral density tests. For patients eligible for  Medicare, routine testing is allowed once every 2 years. The testing  frequency can be increased to one year for patients who have rapidly  progressing disease, those who are receiving or discontinuing medical therapy  to restore bone mass or have additional risk factors. Template code:  [id number]     ASSESSMENT:    Suzanne Leigh is a 27 y.o. female with significant malnutrition with normocytic anemia. I reviewed the lab work, discussed imaging studies diagnosis, prognosis and treatment recommendations.   Her lab work suggesting normal iron studies and her TSH has been normal as well. She denies any bleeding symptoms including heavy periods or GI bleed. Her hemoglobin is consistent with HBc trait and her mild anemia is most likely secondary to that    Follow-up with PCP for regular health checkups. Patient will need close follow-up with dietitian and nutritionist for her malnutrition. Patient's questions were sought and answered to her satisfaction and she agreed to proceed with the plan   PLAN:   Lab work consistent with HBc trait  No additional recommendation for anemia  Return to clinic in 6 months with labs prior        Osman Amador MD  Hematologist/Medical Oncologist    On this date 8/17/22  I have spent 40 minutes reviewing previous notes, test results and face to face with the patient discussing the diagnosis and importance of compliance with the treatment plan. Greater than 50% of that time was spent face-to-face with the patient in counseling and coordinating her care. This note is created with the assistance of a speech recognition program.  While intending to generate a document that actually reflects the content of the visit, the document can still have some errors including those of syntax and sound a like substitutions which may escape proof reading. It such instances, actual meaning can be extrapolated by contextual diversion.

## 2022-08-18 LAB — T. PALLIDUM, IGG: REACTIVE

## 2022-08-18 RX ORDER — CLINDAMYCIN PHOSPHATE 11.9 MG/ML
SOLUTION TOPICAL
Qty: 30 ML | Refills: 0 | OUTPATIENT
Start: 2022-08-18

## 2022-08-19 ENCOUNTER — CARE COORDINATION (OUTPATIENT)
Dept: CARE COORDINATION | Age: 27
End: 2022-08-19

## 2022-08-19 ENCOUNTER — TELEPHONE (OUTPATIENT)
Dept: OBGYN | Age: 27
End: 2022-08-19

## 2022-08-19 LAB
T PALLIDUM ANTIBODIES (TP-PA): REACTIVE
VDRL, QUANTITATIVE: NEGATIVE

## 2022-08-22 ENCOUNTER — TELEPHONE (OUTPATIENT)
Dept: OBGYN | Age: 27
End: 2022-08-22

## 2022-08-22 NOTE — TELEPHONE ENCOUNTER
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 Recent labs completed as requested. Pt has not received her medication at this time and awaiting delivery from mail order pharmacy. She is agreeable to starting taking once received. Pt schedule for f/u appt on 9/12/22 to discuss her abnormal dexa scan results. Health dept contacted today Quan Fajardo RN ) with updated information on our plan to treat this patient with oral Doxycycline per protocol.    This is will be documented in their system as patient treated for late latent syphilis

## 2022-08-23 NOTE — RESULT ENCOUNTER NOTE
Abnormal results. Patient aware. Doxycycline prescribed for presumed Late Latent Syphilis. Office follow up planned for 9/12/22. Results of [address] and Dexascan can be further discussed at next visit. No further action.

## 2022-08-31 NOTE — RESULT ENCOUNTER NOTE
Notified via [employer] -     Your lab results were stable, we will repeat them next year or as needed. Please call the office with any questions.

## 2022-09-01 ENCOUNTER — CARE COORDINATION (OUTPATIENT)
Dept: CARE COORDINATION | Age: 27
End: 2022-09-01

## 2022-09-01 NOTE — CARE COORDINATION
attempted to contact [address]. Phone continued to ring with no answer.  will attempt to follow up with [address] regarding rent and utility assistance.

## 2022-09-08 ENCOUNTER — CARE COORDINATION (OUTPATIENT)
Dept: CARE COORDINATION | Age: 27
End: 2022-09-08

## 2022-09-09 NOTE — CARE COORDINATION
attempted to contact Vishnu Jones.  left message with name and number.  inquired if Vishnu Jones had spoken to  at [employer] regarding rent assistance.  requested a call back to [phone]. Plan:    will attempt to follow up with Vishnu Jones regarding rent assistance.

## 2022-09-12 ENCOUNTER — OFFICE VISIT (OUTPATIENT)
Dept: OBGYN | Age: 27
End: 2022-09-12
Payer: MEDICARE

## 2022-09-12 VITALS
BODY MASS INDEX: 15.69 KG/M2 | SYSTOLIC BLOOD PRESSURE: 142 MMHG | HEART RATE: 80 BPM | WEIGHT: 97.2 LBS | DIASTOLIC BLOOD PRESSURE: 97 MMHG

## 2022-09-12 DIAGNOSIS — A53.0 LATENT SYPHILIS: ICD-10-CM

## 2022-09-12 DIAGNOSIS — M81.0 OSTEOPOROSIS WITHOUT CURRENT PATHOLOGICAL FRACTURE, UNSPECIFIED OSTEOPOROSIS TYPE: Primary | ICD-10-CM

## 2022-09-12 DIAGNOSIS — N91.5 OLIGOMENORRHEA, UNSPECIFIED TYPE: ICD-10-CM

## 2022-09-12 PROCEDURE — 99214 OFFICE O/P EST MOD 30 MIN: CPT | Performed by: OBSTETRICS & GYNECOLOGY

## 2022-09-12 NOTE — PROGRESS NOTES
SECTION      UPPER GASTROINTESTINAL ENDOSCOPY N/A 9/20/2021    EGD BIOPSY performed by Shadi Baker MD at [address] [place] [address]       No family history on file. Social History     Socioeconomic History    Marital status: Single     Spouse name: Not on file    Number of children: Not on file    Years of education: Not on file    Highest education level: Not on file   Occupational History    Not on file   Tobacco Use    Smoking status: Every Day     Packs/day: 0.50     Types: Cigarettes    Smokeless tobacco: Never    Tobacco comments:     5/day   Substance and Sexual Activity    Alcohol use: No    Drug use: Yes     Types: Marijuana (Weed)     Comment: 1-2x per day    Sexual activity: Never   Other Topics Concern    Not on file   Social History Narrative    Not on file     Social Determinants of Health     Financial Resource Strain: High Risk    Difficulty of Paying Living Expenses: Hard   Food Insecurity: Food Insecurity Present    Worried About Running Out of Food in the Last Year: Often true    Ran Out of Food in the Last Year: Often true   Transportation Needs: Not on file   Physical Activity: Not on file   Stress: Not on file   Social Connections: Not on file   Intimate Partner Violence: Not on file   Housing Stability: Not on file         MEDICATIONS:  Current Outpatient Medications on File Prior to Visit   Medication Sig Dispense Refill    doxycycline hyclate (VIBRA-TABS) 100 MG tablet Take 1 tablet by mouth 2 times daily for 28 days 56 tablet 0    Alcohol Swabs (B-D SINGLE USE SWABS REGULAR) PADS USE AS DIRECTED FOUR TIMES DAILY 100 each 5    Calcium Carbonate-Vitamin D (OYSTER SHELL CALCIUM/D) 500-200 MG-UNIT TABS Take 1 tablet by mouth in the morning and 1 tablet before bedtime.  30 tablet 5    azelastine (OPTIVAR) 0.05 % ophthalmic solution       insulin aspart (NOVOLOG FLEXPEN) 100 UNIT/ML injection pen Inject 2 to 12 units subcutaneously three times daily per sliding scale 45 mL 5    rOPINIRole (REQUIP) 0.25 MG tablet Take 1 tablet every night 90 tablet 1    lisinopril (PRINIVIL;ZESTRIL) 2.5 MG tablet TAKE 1 TABLET EVERY DAY 90 tablet 1    insulin glargine (LANTUS SOLOSTAR) 100 UNIT/ML injection pen Inject 10 Units into the skin nightly 15 mL 3    Nutritional Supplements (GLUCERNA 1.5 REJI) LIQD Take 1 each by mouth in the morning, at noon, and at bedtime 100 each 6    ondansetron (ZOFRAN ODT) 4 MG disintegrating tablet Take 1 tablet by mouth every 8 hours as needed for Nausea 10 tablet 0    ibuprofen (IBU) 600 MG tablet TAKE 1 TABLET FOUR TIMES DAILY AS NEEDED FOR PAIN 120 tablet 1    TRUE METRIX BLOOD GLUCOSE TEST strip CHECK BLOOD SUGAR FOUR TIMES DAILY AS DIRECTED 400 strip 1    TRUEplus Lancets 33G MISC TEST BLOOD SUGAR FOUR TIMES DAILY 400 each 1    DROPLET PEN NEEDLES 31G X 8 MM MISC USE FOUR TIMES DAILY AS DIRECTED 400 each 1    ketoconazole (NIZORAL) 2 % cream APPLY TOPICALLY DAILY 60 g 1    dicyclomine (BENTYL) 10 MG capsule TAKE 1 CAPSULE THREE TIMES DAILY AS NEEDED FOR ABDOMINAL PAIN 120 capsule 1    Nutritional Supplements (ENSURE HIGH PROTEIN) LIQD Take 1 Bottle by mouth in the morning, at noon, and at bedtime 100 each 5    pantoprazole (PROTONIX) 40 MG tablet Take 1 tablet by mouth every morning (before breakfast) 30 tablet 0    famotidine (PEPCID) 20 MG tablet Take 1 tablet by mouth nightly 30 tablet 0    ACETAMINOPHEN EXTRA STRENGTH 500 MG tablet TAKE 2 TABLETS BY MOUTH EVERY 6 HOURS AS NEEDED FOR PAIN 180 tablet 1    pregabalin (LYRICA) 25 MG capsule Take 1 capsule by mouth every evening for 30 days. 30 capsule 0     No current facility-administered medications on file prior to visit. ALLERGIES:  Allergies as of 09/12/2022 - Fully Reviewed 09/12/2022   Allergen Reaction Noted    Blueberry [vaccinium angustifolium] Anaphylaxis 05/24/2021    Shrimp flavor Anaphylaxis 09/19/2016       Blood pressure (!) 142/97, pulse 80, weight 97 lb 3.2 oz (44.1 kg), not currently breastfeeding.       Lab Results:  Results for orders placed or performed during the hospital encounter of 08/17/22   Lipid Panel   Result Value Ref Range    Cholesterol 149 <200 mg/dL    HDL 75 >40 mg/dL    LDL Cholesterol 55 0 - 130 mg/dL    Chol/HDL Ratio 2.0 <5    Triglycerides 94 <150 mg/dL         Assessment:   Diagnosis Orders   1. Osteoporosis without current pathological fracture, unspecified osteoporosis type        2. Latent syphilis        3. Oligomenorrhea, unspecified type                  PLAN:    1. Osteoporosis without current pathological fracture, unspecified osteoporosis type, patient is premenopausal and young. Contributing factors include DM, malnutrition, smoking , Vit D deficiency      - Check Vit D level and calcium prior to starting replacement  - since guidelines for treatment for postmenopausal patient do not apply to young premenopausal patients such as Destdorys I have asked her to see Medical Endocrinology for consultation. - referral placed to Dr. Basia Burns    2. Latent syphilis, late latent syphilis. Pt is compliant with oral doxycycline  - complete 4 weeks of doxycycline  - [facility] is already aware of this patient    3. Oligomenorrhea, unspecified type, patient has normal gonadotropins and is not hypo estrogenic. HRT not indicated  - continue to monitor  - avoid AKBAR, or Depo provera as these would contribute of bone loss    4. Lower back pain   - follow up with PCP    5. HTN  - follow up with PCP    Return to the office in 1 weeks phone visit . Counseled on preventative health maintenance follow-up. Patient was seen with total face to face time  and medical decision making including review of Dexa scan results, labs of 30 minutes.

## 2022-09-16 ENCOUNTER — TELEPHONE (OUTPATIENT)
Dept: OBGYN | Age: 27
End: 2022-09-16

## 2022-09-16 NOTE — TELEPHONE ENCOUNTER
----- Message from Amy Sebastian MD sent at 9/15/2022 10:56 AM EDT -----  Regarding: Labs and referral to Endocrine  Pt did not complete Vit D , Ca labs. Referral was placed for endocrinology. Please call pt, encourage labs. Check to see where things stand with endocrine appointment, pt aware it may take awhile to get in.

## 2022-09-16 NOTE — TELEPHONE ENCOUNTER
I spoke to pt and she is aware of her labs, she said she'll come next week to do those. I also gave her information for her Endocrinologist referral to make an appt. She said she will call today.

## 2022-09-19 ENCOUNTER — HOSPITAL ENCOUNTER (OUTPATIENT)
Age: 27
Setting detail: SPECIMEN
Discharge: HOME | End: 2022-09-19

## 2022-09-19 DIAGNOSIS — M81.0 OSTEOPOROSIS WITHOUT CURRENT PATHOLOGICAL FRACTURE, UNSPECIFIED OSTEOPOROSIS TYPE: ICD-10-CM

## 2022-09-19 LAB
CALCIUM SERPL-MCNC: 8.2 MG/DL (ref 8.6–10.4)
VITAMIN D 25-HYDROXY: 32.2 NG/ML

## 2022-09-20 ENCOUNTER — CARE COORDINATION (OUTPATIENT)
Dept: CARE COORDINATION | Age: 27
End: 2022-09-20

## 2022-09-20 NOTE — CARE COORDINATION
[address] worker called and spoke with Kaylee. Kaylee reports that [employer] was able to assist with rent. Kaylee reports that they will pay September and Brittaney singer rent. Kaylee reports that she is still behind on utilities. Kaylee pulled her bills up on her phone and stated that she owes [facility] $235 and [employer] $200. Kaylee denied shut off notice.  called [facility] and discussed assistance programs.  and Kaylee will complete [facility] application and request assistance with utilities. Plan:    will print off application for [facility] and complete application.

## 2022-09-22 ENCOUNTER — CARE COORDINATION (OUTPATIENT)
Dept: CARE COORDINATION | Age: 27
End: 2022-09-22

## 2022-09-22 ENCOUNTER — SCHEDULED TELEPHONE ENCOUNTER (OUTPATIENT)
Dept: OBGYN | Age: 27
End: 2022-09-22
Payer: MEDICARE

## 2022-09-22 DIAGNOSIS — A53.0 LATENT SYPHILIS: ICD-10-CM

## 2022-09-22 DIAGNOSIS — M81.0 OSTEOPOROSIS, UNSPECIFIED OSTEOPOROSIS TYPE, UNSPECIFIED PATHOLOGICAL FRACTURE PRESENCE: Primary | ICD-10-CM

## 2022-09-22 DIAGNOSIS — E10.42 TYPE 1 DIABETES MELLITUS WITH DIABETIC POLYNEUROPATHY (HCC): ICD-10-CM

## 2022-09-22 PROCEDURE — 3044F HG A1C LEVEL LT 7.0%: CPT | Performed by: STUDENT IN AN ORGANIZED HEALTH CARE EDUCATION/TRAINING PROGRAM

## 2022-09-22 PROCEDURE — 99213 OFFICE O/P EST LOW 20 MIN: CPT | Performed by: STUDENT IN AN ORGANIZED HEALTH CARE EDUCATION/TRAINING PROGRAM

## 2022-09-22 NOTE — PROGRESS NOTES
Arlene Bejarano is a 27 y.o. female evaluated via telephone on 9/22/2022 for Follow-up (Patient states she is scheduled for endocrinologist on 10/19/22 at 1:00ish)    Documentation:  I communicated with the patient and/or health care decision maker about diagnosis of osteoporosis, latent syphilis, and Type 1 Diabetes. Details of this discussion including any medical advice provided    Today she has no complaints and understands the importance of following up with endocrinology for her osteoporosis. Her only question today is in regards to help with transportation to appointment. Chart routed to . Discussed follow up at [facility] on 11/1/2022    Total Time: minutes: 5-10 minutes    Arlene Bejarano was evaluated through a synchronous (real-time) audio encounter. Patient identification was verified at the start of the visit. She (or guardian if applicable) is aware that this is a billable service, which includes applicable co-pays. This visit was conducted with the patient's (and/or legal guardian's) verbal consent. She has not had a related appointment within my department in the past 7 days or scheduled within the next 24 hours. The patient was located at Home: [place] [address]. The provider was located at [place] [address] (Appt Dept): [address],  [address] [place] [address].     Note: not billable if this call serves to triage the patient into an appointment for the relevant concern    Jade Molina DO

## 2022-09-23 NOTE — CARE COORDINATION
called and spoke to Kaylee.  and Tran completed PCR application. Kaylee will send  ID, SS card, birth certificate, [place] letter, and utility bills. Plan:   Kaylee will send  information to fax in complete application.

## 2022-09-27 ENCOUNTER — CARE COORDINATION (OUTPATIENT)
Dept: CARE COORDINATION | Age: 27
End: 2022-09-27

## 2022-09-28 NOTE — CARE COORDINATION
spoke with Kaylee. Kaylee reports that she is doing \"ok\". Kaylee reports that she is behind on utilities. [employer] declined shut off notices however reports that she is behind on gas, electric, and water.  and Kaylee completed application. Tran sent  of documentation needed to apply for help through [facility].  faxed application to [address] [place] [address] requesting assisting through [facility]. Plan:     will follow up with Kaylee in 4-5 days to inquire if she has heard from [address] [place] [address].

## 2022-10-05 ENCOUNTER — CARE COORDINATION (OUTPATIENT)
Dept: CARE COORDINATION | Age: 27
End: 2022-10-05

## 2022-10-05 NOTE — CARE COORDINATION
called Tono Graves who reports she received a letter back from [employer] who stated the Utility Ashanti Mendoza is too blurry to read. Tono Graves reports that she has no transportation to drop off bills to [employer].  was agreeable to  bills and fax to [employer]. Plan:    will  documents needed from [employer] on 10/6 and drop off to [employer].

## 2022-10-06 ENCOUNTER — CARE COORDINATION (OUTPATIENT)
Dept: CARE COORDINATION | Age: 27
End: 2022-10-06

## 2022-10-17 ENCOUNTER — CARE COORDINATION (OUTPATIENT)
Dept: CARE COORDINATION | Age: 27
End: 2022-10-17

## 2022-10-17 NOTE — CARE COORDINATION
called and spoke with Caleb Wilson. Tran requested help completing [facility] application for [employer].  went online and completed application with Tran present on the phone.  took picture of submission of application and confirmation ID # [id number]. Plan:    and Caleb Wilson will continue to work on social needs.

## 2022-10-19 LAB
AVERAGE GLUCOSE: NORMAL
HBA1C MFR BLD: 6.3 %

## 2022-10-27 ENCOUNTER — CARE COORDINATION (OUTPATIENT)
Dept: CARE COORDINATION | Age: 27
End: 2022-10-27

## 2022-11-01 ENCOUNTER — OFFICE VISIT (OUTPATIENT)
Dept: OBGYN | Age: 27
End: 2022-11-01
Payer: MEDICARE

## 2022-11-01 VITALS
DIASTOLIC BLOOD PRESSURE: 102 MMHG | SYSTOLIC BLOOD PRESSURE: 158 MMHG | WEIGHT: 102 LBS | HEART RATE: 81 BPM | BODY MASS INDEX: 16.46 KG/M2

## 2022-11-01 DIAGNOSIS — I10 HYPERTENSION, ESSENTIAL: ICD-10-CM

## 2022-11-01 DIAGNOSIS — A53.0 LATENT SYPHILIS: ICD-10-CM

## 2022-11-01 DIAGNOSIS — N63.20 MASS OF LEFT BREAST, UNSPECIFIED QUADRANT: ICD-10-CM

## 2022-11-01 DIAGNOSIS — M81.8 SECONDARY OSTEOPOROSIS: Primary | ICD-10-CM

## 2022-11-01 PROCEDURE — 99214 OFFICE O/P EST MOD 30 MIN: CPT | Performed by: OBSTETRICS & GYNECOLOGY

## 2022-11-01 PROCEDURE — 3078F DIAST BP <80 MM HG: CPT | Performed by: OBSTETRICS & GYNECOLOGY

## 2022-11-01 PROCEDURE — 3074F SYST BP LT 130 MM HG: CPT | Performed by: OBSTETRICS & GYNECOLOGY

## 2022-11-01 NOTE — LETTER
Coletta Osler Lofties  1995 11/1/ 2022    Dear Dr Rosalina Motley,    I saw our mutual patient Tracey Andre today for gynecologic care. Office notes for that visit are attached for your reference. There was concern for a premature menopause a few months ago due to a period of amenorrhea but gonadotropins were normal. A Dexascan was completed and did show osteoporosis of her left hip. I started Ms. Deng on supplemental calcium, vitamin D and encouraged weight bearing activity for bone health. Your in site  into the management of this patient's secondary osteoporosis likely due to her type 1 DM will be greatly appreciated.      Sincerely,    Rosy Nash MD

## 2022-11-01 NOTE — PROGRESS NOTES
Shelia Deng  2022    YOB: 1995          The patient was seen today. She is here regarding for follow up regarding irregular menses and post treatment for suspected latent syphilis . Her bowels are regular and she is voiding without difficulty. HPI:  Nicholas Dey is a 27 y.o. female  LMP 10/26/22, pt reports normal menstrual period in September as well. She completed a 4 week course of Doxycycline 100 mg BID due to presumed latent syphilis. She was not a candidate for IM PCN due to her low BMI. She has no complaints today. She is not sexually active. There was an issue regarding left breast mass noted a few months ago. She has not completed the ordered  breast imaging. Pt reports no longer noting the lump. Breast exam repeated today was negative for any dominant masses. Hazle Mealing was completed 22 due to amenorrhea, cachexia , concern this might be due to an early menopause. Her gonadotropins were not in the menopausal range and her menstrual irregularity did not appear to be due to ovarian insufficiency. DEXA scan did show osteoporosis of the left hip and low bone density of the right hip. This premenopausal osteoporosis is likely due to her type 1 DM. She was recently seen by her medical endocrinologist Dr Lucie Maradiaga on 10/19/22 for continued management of her type 1 DM. The patient reports she will be seeing Dr Tammy Moreira in about 2 weeks. She is followed by Dr. Ganesh Gallego for her general medical care.         OB History    Para Term  AB Living   1 1 1 0 0 1   SAB IAB Ectopic Molar Multiple Live Births   0 0 0 0 0 1      # Outcome Date GA Lbr Bruce/2nd Weight Sex Delivery Anes PTL Lv   1 Term 16 40w1d  4 lb 15 oz (2.24 kg) F CS-Unspec   YANCI      Complications: Cephalopelvic Disproportion       Past Medical History:   Diagnosis Date    Diabetes mellitus ([address]     Diabetic neuropathy, type II diabetes mellitus ([address] Hypertension     Kidney infection     MRSA (methicillin resistant staph aureus) culture positive 2016    abdomen    Nausea & vomiting     Ovarian cyst 2019    Right side    UTI (urinary tract infection)        Past Surgical History:   Procedure Laterality Date     SECTION      UPPER GASTROINTESTINAL ENDOSCOPY N/A 2021    EGD BIOPSY performed by Reji Springer MD at [address] [place] [address]       No family history on file.     Social History     Socioeconomic History    Marital status: Single     Spouse name: Not on file    Number of children: Not on file    Years of education: Not on file    Highest education level: Not on file   Occupational History    Not on file   Tobacco Use    Smoking status: Every Day     Packs/day: 0.50     Types: Cigarettes    Smokeless tobacco: Never    Tobacco comments:     5/day   Substance and Sexual Activity    Alcohol use: No    Drug use: Yes     Types: Marijuana (Weed)     Comment: 1-2x per day    Sexual activity: Never   Other Topics Concern    Not on file   Social History Narrative    Not on file     Social Determinants of Health     Financial Resource Strain: High Risk    Difficulty of Paying Living Expenses: Hard   Food Insecurity: Food Insecurity Present    Worried About Running Out of Food in the Last Year: Often true    Ran Out of Food in the Last Year: Often true   Transportation Needs: Not on file   Physical Activity: Not on file   Stress: Not on file   Social Connections: Not on file   Intimate Partner Violence: Not on file   Housing Stability: Not on file         MEDICATIONS:  Current Outpatient Medications on File Prior to Visit   Medication Sig Dispense Refill    azelastine (OPTIVAR) 0.05 % ophthalmic solution       insulin aspart (NOVOLOG FLEXPEN) 100 UNIT/ML injection pen Inject 2 to 12 units subcutaneously three times daily per sliding scale 45 mL 5    rOPINIRole (REQUIP) 0.25 MG tablet Take 1 tablet every night 90 tablet 1    lisinopril (PRINIVIL;ZESTRIL) 2.5 MG tablet TAKE 1 TABLET EVERY DAY 90 tablet 1    insulin glargine (LANTUS SOLOSTAR) 100 UNIT/ML injection pen Inject 10 Units into the skin nightly 15 mL 3    ondansetron (ZOFRAN ODT) 4 MG disintegrating tablet Take 1 tablet by mouth every 8 hours as needed for Nausea 10 tablet 0    ibuprofen (IBU) 600 MG tablet TAKE 1 TABLET FOUR TIMES DAILY AS NEEDED FOR PAIN 120 tablet 1    ketoconazole (NIZORAL) 2 % cream APPLY TOPICALLY DAILY 60 g 1    Nutritional Supplements (ENSURE HIGH PROTEIN) LIQD Take 1 Bottle by mouth in the morning, at noon, and at bedtime 100 each 5    pantoprazole (PROTONIX) 40 MG tablet Take 1 tablet by mouth every morning (before breakfast) 30 tablet 0    ACETAMINOPHEN EXTRA STRENGTH 500 MG tablet TAKE 2 TABLETS BY MOUTH EVERY 6 HOURS AS NEEDED FOR PAIN 180 tablet 1    Alcohol Swabs (B-D SINGLE USE SWABS REGULAR) PADS USE AS DIRECTED FOUR TIMES DAILY (Patient not taking: No sig reported) 100 each 5    Nutritional Supplements (GLUCERNA 1.5 REJI) LIQD Take 1 each by mouth in the morning, at noon, and at bedtime (Patient not taking: No sig reported) 100 each 6    pregabalin (LYRICA) 25 MG capsule Take 1 capsule by mouth every evening for 30 days. 30 capsule 0    TRUE METRIX BLOOD GLUCOSE TEST strip CHECK BLOOD SUGAR FOUR TIMES DAILY AS DIRECTED (Patient not taking: No sig reported) 400 strip 1    TRUEplus Lancets 33G MISC TEST BLOOD SUGAR FOUR TIMES DAILY (Patient not taking: No sig reported) 400 each 1    DROPLET PEN NEEDLES 31G X 8 MM MISC USE FOUR TIMES DAILY AS DIRECTED (Patient not taking: No sig reported) 400 each 1    dicyclomine (BENTYL) 10 MG capsule TAKE 1 CAPSULE THREE TIMES DAILY AS NEEDED FOR ABDOMINAL PAIN (Patient not taking: No sig reported) 120 capsule 1    famotidine (PEPCID) 20 MG tablet Take 1 tablet by mouth nightly (Patient not taking: No sig reported) 30 tablet 0     No current facility-administered medications on file prior to visit.            ALLERGIES:  Allergies as of 11/01/2022 - Fully Reviewed 11/01/2022   Allergen Reaction Noted    Blueberry [vaccinium angustifolium] Anaphylaxis 05/24/2021    Shrimp flavor Anaphylaxis 09/19/2016       Blood pressure (!) 158/102, pulse 81, weight 102 lb (46.3 kg), not currently breastfeeding. Breasts: breasts appear normal, no suspicious masses, no skin or nipple changes or axillary nodes. Abdomen: Soft non-tender; good bowel sounds. No guarding, rebound or rigidity. No CVA tenderness bilaterally. Extremities: No calf tenderness, DTR 2/4, and No edema bilaterally    Pelvic: Exam deferred. Lab Results:  Results for orders placed or performed in visit on 10/20/22   Hemoglobin A1C   Result Value Ref Range    Hemoglobin A1C 6.3 %    AVERAGE GLUCOSE       EXAMINATION:   BONE DENSITOMETRY       8/4/2022 1:44 pm       TECHNIQUE:   A bone density dual x-ray absorptiometry (DXA) scan was performed of the   lumbar spine and left hip on a [employer] system. COMPARISON:   None. HISTORY:   ORDERING SYSTEM PROVIDED HISTORY: Oligomenorrhea, unspecified type   TECHNOLOGIST PROVIDED HISTORY:   Is the patient pregnant?->No       Gender: F       Age: 28 y/o       FINDINGS:   LUMBAR SPINE: L1-L4       BMD: 1.065 g/cm2       T-score: -0.9       Z-score: -0.8       LEFT TOTAL HIP:       BMD: 0.684 g/cm2       T-score: -2.6       Z-score: -3.0       LEFT FEMORAL NECK:   BMD: 0.756 g/cm2   T-score: -2.0       Z-score: -2.4       FRAX:       Not Indicated. Impression   Osteoporosis by WHO criteria. Assessment / Plan :       1. Secondary osteoporosis, this is most likely due to type 1 DM  - defer management of osteoporosis in this premenopausal patient medical endocrine   - Calcium Carbonate-Vitamin D (OYSTER SHELL CALCIUM/D) 500-200 MG-UNIT TABS; Take 1 tablet by mouth daily  Dispense: 60 tablet; Refill: 5    2. Latent syphilis, pt recently completed 4 weeks course of treatment with PO doxycycline . 3.  Hypertension, essential, BP elevation today. Patient is asymptomatic  - encouraged compliance with BP meds  - follow up with PCP as scheduled    4. Mass of left breast, unspecified quadrant, breast exam today negative for any dominant masses. Previously noted mass most likely a benign cystic lesion that resolved. No further intervention indicated at this time. - breast imaging cancelled          Patient was seen with total face to face time of 30 minutes. More than 50% of this visit was counseling and education regarding The primary encounter diagnosis was Secondary osteoporosis. A diagnosis of Latent syphilis was also pertinent to this visit. and Follow-up (Oligomenorrhea)   as well as  counseling on preventative health maintenance follow-up.

## 2022-11-01 NOTE — Clinical Note
I completed a letter to the patient's medical endocrinologist Dr Damien Rodgers, Endocrine and [address], [address]and attached progress note from 11/1/22. Please check and see if that note and letter did in fact get to Dr Richter Head.

## 2022-11-03 ENCOUNTER — CARE COORDINATION (OUTPATIENT)
Dept: CARE COORDINATION | Age: 27
End: 2022-11-03

## 2022-11-03 NOTE — CARE COORDINATION
called and spoke to Kaylee. Kaylee reports that things are going well. Kaylee confirmed that she is good on food. Kaylee reports that she still is trying to catch up on utilities and denies shut off notices. Kaylee denied receiving text regarding coat for kids.  recent information. Tran confirmed she received it.  informed Kaylee about her  time regarding [employer].  text Alita Oppenheim information and she confirmed she received it. Plan:    call Kaylee day before to remind her of  date and time.

## 2022-11-04 ENCOUNTER — HOSPITAL ENCOUNTER (EMERGENCY)
Age: 27
Discharge: HOME | End: 2022-11-04
Attending: EMERGENCY MEDICINE
Payer: MEDICARE

## 2022-11-04 VITALS
SYSTOLIC BLOOD PRESSURE: 156 MMHG | HEART RATE: 82 BPM | TEMPERATURE: 97.9 F | DIASTOLIC BLOOD PRESSURE: 91 MMHG | OXYGEN SATURATION: 100 % | RESPIRATION RATE: 17 BRPM

## 2022-11-04 DIAGNOSIS — N30.00 ACUTE CYSTITIS WITHOUT HEMATURIA: Primary | ICD-10-CM

## 2022-11-04 LAB
ABSOLUTE EOS #: 0.16 K/UL (ref 0–0.44)
ABSOLUTE IMMATURE GRANULOCYTE: <0.03 K/UL (ref 0–0.3)
ABSOLUTE LYMPH #: 1.53 K/UL (ref 1.1–3.7)
ABSOLUTE MONO #: 0.71 K/UL (ref 0.1–1.2)
ALBUMIN SERPL-MCNC: 2.9 G/DL (ref 3.5–5.2)
ALBUMIN/GLOBULIN RATIO: 0.7 (ref 1–2.5)
ALP BLD-CCNC: 100 U/L (ref 35–104)
ALT SERPL-CCNC: 9 U/L (ref 5–33)
ANION GAP SERPL CALCULATED.3IONS-SCNC: 9 MMOL/L (ref 9–17)
AST SERPL-CCNC: 16 U/L
BACTERIA: ABNORMAL
BASOPHILS # BLD: 1 % (ref 0–2)
BASOPHILS ABSOLUTE: 0.04 K/UL (ref 0–0.2)
BILIRUB SERPL-MCNC: 0.4 MG/DL (ref 0.3–1.2)
BILIRUBIN URINE: NEGATIVE
BUN BLDV-MCNC: 12 MG/DL (ref 6–20)
CALCIUM SERPL-MCNC: 8.7 MG/DL (ref 8.6–10.4)
CASTS UA: ABNORMAL /LPF (ref 0–8)
CHLORIDE BLD-SCNC: 103 MMOL/L (ref 98–107)
CO2: 25 MMOL/L (ref 20–31)
COLOR: YELLOW
CREAT SERPL-MCNC: 0.82 MG/DL (ref 0.5–0.9)
EOSINOPHILS RELATIVE PERCENT: 3 % (ref 1–4)
EPITHELIAL CELLS UA: ABNORMAL /HPF (ref 0–5)
GFR SERPL CREATININE-BSD FRML MDRD: >60 ML/MIN/1.73M2
GLUCOSE BLD-MCNC: 187 MG/DL (ref 70–99)
GLUCOSE URINE: NEGATIVE
HCG QUALITATIVE: NEGATIVE
HCT VFR BLD CALC: 29.4 % (ref 36.3–47.1)
HEMOGLOBIN: 10.4 G/DL (ref 11.9–15.1)
IMMATURE GRANULOCYTES: 0 %
KETONES, URINE: NEGATIVE
LACTIC ACID, WHOLE BLOOD: 1.6 MMOL/L (ref 0.7–2.1)
LEUKOCYTE ESTERASE, URINE: ABNORMAL
LIPASE: 20 U/L (ref 13–60)
LYMPHOCYTES # BLD: 24 % (ref 24–43)
MAGNESIUM: 1.5 MG/DL (ref 1.6–2.6)
MCH RBC QN AUTO: 30 PG (ref 25.2–33.5)
MCHC RBC AUTO-ENTMCNC: 35.4 G/DL (ref 28.4–34.8)
MCV RBC AUTO: 84.7 FL (ref 82.6–102.9)
MONOCYTES # BLD: 11 % (ref 3–12)
NITRITE, URINE: POSITIVE
NRBC AUTOMATED: 0 PER 100 WBC
PDW BLD-RTO: 15.7 % (ref 11.8–14.4)
PH UA: 8.5 (ref 5–8)
PLATELET # BLD: 451 K/UL (ref 138–453)
PMV BLD AUTO: 10.1 FL (ref 8.1–13.5)
POTASSIUM SERPL-SCNC: 4.2 MMOL/L (ref 3.7–5.3)
PROTEIN UA: ABNORMAL
RBC # BLD: 3.47 M/UL (ref 3.95–5.11)
RBC # BLD: ABNORMAL 10*6/UL
RBC UA: ABNORMAL /HPF (ref 0–4)
SEG NEUTROPHILS: 61 % (ref 36–65)
SEGMENTED NEUTROPHILS ABSOLUTE COUNT: 3.81 K/UL (ref 1.5–8.1)
SODIUM BLD-SCNC: 137 MMOL/L (ref 135–144)
SPECIFIC GRAVITY UA: 1.01 (ref 1–1.03)
TOTAL PROTEIN: 6.8 G/DL (ref 6.4–8.3)
TURBIDITY: CLEAR
URINE HGB: NEGATIVE
UROBILINOGEN, URINE: NORMAL
WBC # BLD: 6.3 K/UL (ref 3.5–11.3)
WBC UA: ABNORMAL /HPF (ref 0–5)

## 2022-11-04 PROCEDURE — 2580000003 HC RX 258: Performed by: EMERGENCY MEDICINE

## 2022-11-04 PROCEDURE — 81001 URINALYSIS AUTO W/SCOPE: CPT

## 2022-11-04 PROCEDURE — 87088 URINE BACTERIA CULTURE: CPT

## 2022-11-04 PROCEDURE — 85025 COMPLETE CBC W/AUTO DIFF WBC: CPT

## 2022-11-04 PROCEDURE — 84703 CHORIONIC GONADOTROPIN ASSAY: CPT

## 2022-11-04 PROCEDURE — 83605 ASSAY OF LACTIC ACID: CPT

## 2022-11-04 PROCEDURE — 87086 URINE CULTURE/COLONY COUNT: CPT

## 2022-11-04 PROCEDURE — 83735 ASSAY OF MAGNESIUM: CPT

## 2022-11-04 PROCEDURE — 87186 SC STD MICRODIL/AGAR DIL: CPT

## 2022-11-04 PROCEDURE — 83690 ASSAY OF LIPASE: CPT

## 2022-11-04 PROCEDURE — 6360000002 HC RX W HCPCS: Performed by: EMERGENCY MEDICINE

## 2022-11-04 PROCEDURE — 96366 THER/PROPH/DIAG IV INF ADDON: CPT

## 2022-11-04 PROCEDURE — 99284 EMERGENCY DEPT VISIT MOD MDM: CPT

## 2022-11-04 PROCEDURE — 96375 TX/PRO/DX INJ NEW DRUG ADDON: CPT

## 2022-11-04 PROCEDURE — 96365 THER/PROPH/DIAG IV INF INIT: CPT

## 2022-11-04 PROCEDURE — 80053 COMPREHEN METABOLIC PANEL: CPT

## 2022-11-04 RX ORDER — ONDANSETRON 4 MG/1
4 TABLET, ORALLY DISINTEGRATING ORAL EVERY 8 HOURS PRN
Qty: 10 TABLET | Refills: 0 | Status: SHIPPED | OUTPATIENT
Start: 2022-11-04

## 2022-11-04 RX ORDER — CEPHALEXIN 500 MG/1
500 CAPSULE ORAL 2 TIMES DAILY
Qty: 20 CAPSULE | Refills: 0 | Status: SHIPPED | OUTPATIENT
Start: 2022-11-04 | End: 2022-11-14

## 2022-11-04 RX ORDER — METOCLOPRAMIDE HYDROCHLORIDE 5 MG/ML
10 INJECTION INTRAMUSCULAR; INTRAVENOUS ONCE
Status: COMPLETED | OUTPATIENT
Start: 2022-11-04 | End: 2022-11-04

## 2022-11-04 RX ORDER — CEPHALEXIN 500 MG/1
500 CAPSULE ORAL 2 TIMES DAILY
Qty: 21 CAPSULE | Refills: 0 | Status: SHIPPED | OUTPATIENT
Start: 2022-11-04 | End: 2022-11-04 | Stop reason: SDUPTHER

## 2022-11-04 RX ORDER — ONDANSETRON 2 MG/ML
4 INJECTION INTRAMUSCULAR; INTRAVENOUS ONCE
Status: COMPLETED | OUTPATIENT
Start: 2022-11-04 | End: 2022-11-04

## 2022-11-04 RX ORDER — KETOROLAC TROMETHAMINE 15 MG/ML
15 INJECTION, SOLUTION INTRAMUSCULAR; INTRAVENOUS ONCE
Status: COMPLETED | OUTPATIENT
Start: 2022-11-04 | End: 2022-11-04

## 2022-11-04 RX ORDER — 0.9 % SODIUM CHLORIDE 0.9 %
1000 INTRAVENOUS SOLUTION INTRAVENOUS ONCE
Status: COMPLETED | OUTPATIENT
Start: 2022-11-04 | End: 2022-11-04

## 2022-11-04 RX ADMIN — KETOROLAC TROMETHAMINE 15 MG: 15 INJECTION, SOLUTION INTRAMUSCULAR; INTRAVENOUS at 14:12

## 2022-11-04 RX ADMIN — SODIUM CHLORIDE 1000 ML: 9 INJECTION, SOLUTION INTRAVENOUS at 13:48

## 2022-11-04 RX ADMIN — CEFTRIAXONE SODIUM 1000 MG: 1 INJECTION, POWDER, FOR SOLUTION INTRAMUSCULAR; INTRAVENOUS at 15:19

## 2022-11-04 RX ADMIN — ONDANSETRON 4 MG: 2 INJECTION INTRAMUSCULAR; INTRAVENOUS at 14:12

## 2022-11-04 RX ADMIN — METOCLOPRAMIDE 10 MG: 5 INJECTION, SOLUTION INTRAMUSCULAR; INTRAVENOUS at 15:58

## 2022-11-04 ASSESSMENT — PAIN - FUNCTIONAL ASSESSMENT: PAIN_FUNCTIONAL_ASSESSMENT: 0-10

## 2022-11-04 ASSESSMENT — PAIN SCALES - GENERAL
PAINLEVEL_OUTOF10: 10
PAINLEVEL_OUTOF10: 10
PAINLEVEL_OUTOF10: 7

## 2022-11-04 NOTE — ED PROVIDER NOTES
[facility] ED  eMERGENCY dEPARTMENT eNCOUnter   Attending Attestation     Pt Name: Angela Quach  MRN: [id number]  [place] 1995  Date of evaluation: 11/4/22       Angela Quach is a 27 y.o. female who presents with Emesis and Abdominal Pain (Started around 0400; feels similar to previous stomach infection)      History: Pt presents with vomiting and diarrhea. Pt states she is having abdominal discomfort. Exam: HRRR, lungs CTABL, abdomen soft and diffusely tender. Plan for labs and symptomatic control. If improved plan for discharge. I performed a history and physical examination of the patient and discussed management with the resident. I reviewed the residents note and agree with the documented findings and plan of care. Any areas of disagreement are noted on the chart. I was personally present for the key portions of any procedures. I have documented in the chart those procedures where I was not present during the key portions. I have personally reviewed all images and agree with the resident's interpretation. I have reviewed the emergency nurses triage note. I agree with the chief complaint, past medical history, past surgical history, allergies, medications, social and family history as documented unless otherwise noted below. Documentation of the HPI, Physical Exam and Medical Decision Making performed by medical students or scribes is based on my personal performance of the HPI, PE and MDM. For Phys Assistant/ Nurse Practitioner cases/documentation I have had a face to face evaluation of this patient and have completed at least one if not all key elements of the E/M (history, physical exam, and MDM). Additional findings are as noted. For APC cases I have personally evaluated and examined the patient in conjunction with the APC and agree with the treatment plan and disposition of the patient as recorded by the APC.     Cyndie Hernandezr, MD  Attending Emergency Physician       Jose M Wade MD  11/04/22 [id number]

## 2022-11-04 NOTE — ED NOTES
Pt stated she is feeling better and requesting something to drink. Ok to PO challenge per dr causey.       Sue Rodriguez RN  11/04/22 [id number]

## 2022-11-04 NOTE — ED PROVIDER NOTES
[facility]  ED  Emergency Department Encounter  EmergencyMedicine Resident     Pt Gadiel Rdz  MRN: [id number]  Yosephgfshawn 1995  Date of evaluation: 22  PCP:  Yvonne Rosario MD    [address]       Chief Complaint   Patient presents with    Emesis    Abdominal Pain     Started around 0400; feels similar to previous stomach infection       HISTORY OF PRESENT ILLNESS  (Location/Symptom, Timing/Onset, Context/Setting, Quality, Duration, Modifying Factors, Severity.)      Kumar Ji is a 27 y.o. female who presents with severe abdominal pain, patient unable to identify where the abdominal pain and points all over her abdomen. Patient states she has had some associated nausea and vomiting. Patient denies any chest pain or shortness of breath. Patient denies any fevers but does states she has chills. Patient denies any diarrhea. Patient denies any vaginal bleeding or discharge. Patient's biggest concern is her nausea or vomiting. Patient has a history of intractable nausea and vomiting. Patient rates her pain a 7 out of 10, unable to localize it, states it is diffuse radiating throughout abdomen, nothing seems to make it better, palpation made it worse, patient unable to describe anything else that makes it worse. PAST MEDICAL / SURGICAL / SOCIAL / FAMILY HISTORY      has a past medical history of Hypertension, Kidney infection, MRSA (methicillin resistant staph aureus) culture positive, Nausea & vomiting, Ovarian cyst, Secondary osteoporosis, Type 1 diabetes mellitus ([address] and UTI (urinary tract infection). No additional pertinent     has a past surgical history that includes  section and Upper gastrointestinal endoscopy (N/A, 2021).   No additional pertinent    Social History     Socioeconomic History    Marital status: Single     Spouse name: Not on file    Number of children: Not on file    Years of education: Not on file    Highest education level: Not on file   Occupational History    Not on file   Tobacco Use    Smoking status: Every Day     Packs/day: 0.50     Types: Cigarettes    Smokeless tobacco: Never    Tobacco comments:     5/day   Substance and Sexual Activity    Alcohol use: No    Drug use: Yes     Types: Marijuana [place])     Comment: 1-2x per day    Sexual activity: Never   Other Topics Concern    Not on file   Social History Narrative    Not on file     Social Determinants of Health     Financial Resource Strain: High Risk    Difficulty of Paying Living Expenses: Hard   Food Insecurity: Food Insecurity Present    Worried About Running Out of Food in the Last Year: Often true    Ran Out of Food in the Last Year: Often true   Transportation Needs: Not on file   Physical Activity: Not on file   Stress: Not on file   Social Connections: Not on file   Intimate Partner Violence: Not on file   Housing Stability: Not on file       No family history on file. Allergies:  Blueberry [vaccinium angustifolium] and Shrimp flavor    Home Medications:  Prior to Admission medications    Medication Sig Start Date End Date Taking?  Authorizing Provider   cephALEXin (KEFLEX) 500 MG capsule Take 1 capsule by mouth 2 times daily for 10 days 11/4/22 11/14/22 Yes Juno San, DO   ondansetron (ZOFRAN ODT) 4 MG disintegrating tablet Take 1 tablet by mouth every 8 hours as needed for Nausea or Vomiting 11/4/22  Yes Juno San, DO   Calcium Carbonate-Vitamin D (OYSTER SHELL CALCIUM/D) 500-200 MG-UNIT TABS Take 1 tablet by mouth daily 11/1/22 11/1/23  Rosy Nash MD   Alcohol Swabs (B-D SINGLE USE SWABS REGULAR) PADS USE AS DIRECTED FOUR TIMES DAILY  Patient not taking: No sig reported 8/15/22   Emperatriz Lacy MD   azelastine (OPTIVAR) 0.05 % ophthalmic solution  4/27/22   Gerardo Humphrey   insulin aspart (NOVOLOG FLEXPEN) 100 UNIT/ML injection pen Inject 2 to 12 units subcutaneously three times daily per sliding scale 7/25/22 Emperatriz Thomas MD   rOPINIRole (REQUIP) 0.25 MG tablet Take 1 tablet every night 7/25/22   Emperatriz Thomas MD   lisinopril (PRINIVIL;ZESTRIL) 2.5 MG tablet TAKE 1 TABLET EVERY DAY 7/25/22   Emperatriz Thomas MD   insulin glargine (LANTUS SOLOSTAR) 100 UNIT/ML injection pen Inject 10 Units into the skin nightly 7/25/22   Emperatriz Thomas MD   Nutritional Supplements (GLUCERNA 1.5 REJI) LIQD Take 1 each by mouth in the morning, at noon, and at bedtime  Patient not taking: No sig reported 7/25/22   Emperatriz Thomas MD   pregabalin (LYRICA) 25 MG capsule Take 1 capsule by mouth every evening for 30 days.  7/25/22 9/22/22  Emperatriz Thomas MD   ibuprofen (IBU) 600 MG tablet TAKE 1 TABLET FOUR TIMES DAILY AS NEEDED FOR PAIN 7/11/22   Frida Briscoe MD   TRUE METRIX BLOOD GLUCOSE TEST strip CHECK BLOOD SUGAR FOUR TIMES DAILY AS DIRECTED  Patient not taking: No sig reported 6/2/22   Emperatriz Thomas MD   TRUEplus Lancets 33G MISC TEST BLOOD SUGAR FOUR TIMES DAILY  Patient not taking: No sig reported 6/2/22   Emperatriz Thomas MD   DROPLET PEN NEEDLES 31G X 8 MM MISC USE FOUR TIMES DAILY AS DIRECTED  Patient not taking: No sig reported 6/2/22   Emperatriz Thomas MD   ketoconazole (NIZORAL) 2 % cream APPLY TOPICALLY DAILY 5/19/22   Emperatriz Thomas MD   dicyclomine (BENTYL) 10 MG capsule TAKE 1 CAPSULE THREE TIMES DAILY AS NEEDED FOR ABDOMINAL PAIN  Patient not taking: No sig reported 4/26/22   Malik Hooker MD   Nutritional Supplements (ENSURE HIGH PROTEIN) LIQD Take 1 Bottle by mouth in the morning, at noon, and at bedtime 4/13/22   Emperatriz Thomas MD   pantoprazole (PROTONIX) 40 MG tablet Take 1 tablet by mouth every morning (before breakfast) 4/12/22   Alix Varghese DO   famotidine (PEPCID) 20 MG tablet Take 1 tablet by mouth nightly  Patient not taking: No sig reported 4/12/22   Alven[place], DO   ACETAMINOPHEN EXTRA STRENGTH 500 MG tablet TAKE 2 TABLETS BY MOUTH EVERY 6 HOURS AS NEEDED FOR PAIN 12/14/21 Mood and Affect: Mood normal.         Behavior: Behavior normal.       DIFFERENTIAL  DIAGNOSIS     PLAN (LABS / IMAGING / EKG):  Orders Placed This Encounter   Procedures    Culture, Urine    CMP    CBC with Auto Differential    Magnesium    Lipase    Lactic Acid    HCG Qualitative, Serum    Urinalysis with Reflex to Culture    Microscopic Urinalysis       MEDICATIONS ORDERED:  Orders Placed This Encounter   Medications    0.9 % sodium chloride bolus    ondansetron (ZOFRAN) injection 4 mg    ketorolac (TORADOL) injection 15 mg    cefTRIAXone (ROCEPHIN) 1,000 mg in sodium chloride 0.9 % 50 mL IVPB mini-bag     Order Specific Question:   Antimicrobial Indications     Answer:   Urinary Tract Infection    metoclopramide (REGLAN) injection 10 mg    DISCONTD: cephALEXin (KEFLEX) 500 MG capsule     Sig: Take 1 capsule by mouth 2 times daily     Dispense:  21 capsule     Refill:  0    cephALEXin (KEFLEX) 500 MG capsule     Sig: Take 1 capsule by mouth 2 times daily for 10 days     Dispense:  20 capsule     Refill:  0    ondansetron (ZOFRAN ODT) 4 MG disintegrating tablet     Sig: Take 1 tablet by mouth every 8 hours as needed for Nausea or Vomiting     Dispense:  10 tablet     Refill:  0       DIAGNOSTIC RESULTS / EMERGENCY DEPARTMENT COURSE / MDM   LAB RESULTS:  Results for orders placed or performed during the hospital encounter of 11/04/22   CMP   Result Value Ref Range    Glucose 187 (H) 70 - 99 mg/dL    BUN 12 6 - 20 mg/dL    Creatinine 0.82 0.50 - 0.90 mg/dL    Est, Glom Filt Rate >60 >60 mL/min/1.73m2    Calcium 8.7 8.6 - 10.4 mg/dL    Sodium 137 135 - 144 mmol/L    Potassium 4.2 3.7 - 5.3 mmol/L    Chloride 103 98 - 107 mmol/L    CO2 25 20 - 31 mmol/L    Anion Gap 9 9 - 17 mmol/L    Alkaline Phosphatase 100 35 - 104 U/L    ALT 9 5 - 33 U/L    AST 16 <32 U/L    Total Bilirubin 0.4 0.3 - 1.2 mg/dL    Total Protein 6.8 6.4 - 8.3 g/dL    Albumin 2.9 (L) 3.5 - 5.2 g/dL    Albumin/Globulin Ratio 0.7 (L) 1.0 - 2.5 CBC with Auto Differential   Result Value Ref Range    WBC 6.3 3.5 - 11.3 k/uL    RBC 3.47 (L) 3.95 - 5.11 m/uL    Hemoglobin 10.4 (L) 11.9 - 15.1 g/dL    Hematocrit 29.4 (L) 36.3 - 47.1 %    MCV 84.7 82.6 - 102.9 fL    MCH 30.0 25.2 - 33.5 pg    MCHC 35.4 (H) 28.4 - 34.8 g/dL    RDW 15.7 (H) 11.8 - 14.4 %    Platelets 304 941 - 345 k/uL    MPV 10.1 8.1 - 13.5 fL    NRBC Automated 0.0 0.0 per 100 WBC    Seg Neutrophils 61 36 - 65 %    Lymphocytes 24 24 - 43 %    Monocytes 11 3 - 12 %    Eosinophils % 3 1 - 4 %    Basophils 1 0 - 2 %    Immature Granulocytes 0 0 %    Segs Absolute 3.81 1.50 - 8.10 k/uL    Absolute Lymph # 1.53 1.10 - 3.70 k/uL    Absolute Mono # 0.71 0.10 - 1.20 k/uL    Absolute Eos # 0.16 0.00 - 0.44 k/uL    Basophils Absolute 0.04 0.00 - 0.20 k/uL    Absolute Immature Granulocyte <0.03 0.00 - 0.30 k/uL    RBC Morphology ANISOCYTOSIS PRESENT    Magnesium   Result Value Ref Range    Magnesium 1.5 (L) 1.6 - 2.6 mg/dL   Lipase   Result Value Ref Range    Lipase 20 13 - 60 U/L   Lactic Acid   Result Value Ref Range    Lactic Acid, Whole Blood 1.6 0.7 - 2.1 mmol/L   HCG Qualitative, Serum   Result Value Ref Range    hCG Qual NEGATIVE NEGATIVE   Urinalysis with Reflex to Culture    Specimen: Urine   Result Value Ref Range    Color, UA Yellow Yellow    Turbidity UA Clear Clear    Glucose, Ur NEGATIVE NEGATIVE    Bilirubin Urine NEGATIVE NEGATIVE    Ketones, Urine NEGATIVE NEGATIVE    Specific Gravity, UA 1.014 1.005 - 1.030    Urine Hgb NEGATIVE NEGATIVE    pH, UA 8.5 (H) 5.0 - 8.0    Protein, UA 3+ (A) NEGATIVE    Urobilinogen, Urine Normal Normal    Nitrite, Urine POSITIVE (A) NEGATIVE    Leukocyte Esterase, Urine MODERATE (A) NEGATIVE   Microscopic Urinalysis   Result Value Ref Range    WBC, UA 20 TO 50 0 - 5 /HPF    RBC, UA 2 TO 5 0 - 4 /HPF    Casts UA  0 - 8 /LPF     2 TO 5 HYALINE Reference range defined for non-centrifuged specimen.     Epithelial Cells UA 10 TO 20 0 - 5 /HPF    Bacteria, UA MANY (A) None       RADIOLOGY:  No orders to display          EMERGENCY DEPARTMENT COURSE:  ED Course as of 11/05/22 0946   Fri Nov 04, 2022   1456 Patient notable urine that demonstrates nitrites and concern for infection, will treat with a gram of Rocephin and start patient on antibiotics. [CR]      ED Course User Index  [CR] Norma Mathews DO Jaswinder          PROCEDURES:  None    CONSULTS:  None    MEDICAL DECISION MAKING:  Patient presenting with severe abdominal pain with associated nausea and vomiting. Patient is presenting multiple times for non-intractable and intractable nausea and vomiting. Patient's had multiple CT abdomen pelvis. Last CT abdomen pelvis was performed in April 2022. Patient is noted to have a low BMI, concern for anorexia on evaluation. Patient's abdomen was tender diffusely throughout. Plan to obtain laboratory evaluation and forego imaging at this time to prevent radiation exposure to patient and to help narrow in on what to evaluate. Laboratory evaluation demonstrated slight hypomagnesia him, most likely secondary to malnutrition. Patient did have elevated glucose. No concern for DKA in this patient at this time. Patient did have some low albumin most likely again secondary to malnutrition. Beta-hCG was negative and patient not pregnant. Patient had stable cell count compared to historical values, patient is noted to be chronically anemic. With no leukocytosis, patient afebrile and history of abdominal pain intractable nausea or vomiting I have no concerns for appendicitis or intra-abdominal infectious process at this time. Patient did have a positive urine demonstrated concerns for nitrates and leukocytosis. Patient was given a gram of Rocephin here in the emergency department and discharged home with Keflex. Patient's abdominal pain improved here in the emergency department with pain control, IV fluids, and antibiotics.   Patient was able to ambulate with a steady gait, had improved abdominal pain on reevaluation, soft nontender abdomen, still no concern for intra-abdominal pathology causing patient's abdominal pain and feel the pain is secondary to her urinary tract infection. Patient was instructed return though if she has any worsening abdominal pain, worsening nausea vomiting or any other concerns. Patient was encouraged to tolerate p.o. diet as her electrolytes and albumin demonstrated some abnormalities. Patient was discharged home in stable condition for further work-up by primary care doctor. Patient agreeable to plan. CRITICAL CARE:  Please see attending note    FINAL IMPRESSION      1.  Acute cystitis without hematuria          DISPOSITION / PLAN     DISPOSITION Decision To Discharge 11/04/2022 05:25:51 PM      PATIENT REFERRED TO:  Emperatriz Jackson, [address] [facility] [id number] [phone]    Schedule an appointment as soon as possible for a visit       DISCHARGE MEDICATIONS:  Discharge Medication List as of 11/4/2022  5:27 PM          Therese Pickett DO  Emergency Medicine Resident    (Please note that portions of thisnote were completed with a voice recognition program.  Efforts were made to edit the dictations but occasionally words are mis-transcribed.)        Therese Pickett DO  Resident  11/05/22 3061

## 2022-11-05 LAB
CULTURE: ABNORMAL
SPECIMEN DESCRIPTION: ABNORMAL

## 2022-11-05 ASSESSMENT — ENCOUNTER SYMPTOMS
DIARRHEA: 0
SHORTNESS OF BREATH: 0
ABDOMINAL PAIN: 1
NAUSEA: 1
BACK PAIN: 0
VOMITING: 1

## 2022-11-07 NOTE — PROGRESS NOTES
Reviewed patient's urine culture - culture positive for E.coli. Patient was discharged on cephalexin, and culture is sensitive to prescribed medication. Antibiotic prescribed at discharge is appropriate - no changes made to antibiotic regimen.      Signed Karina IrsaelD candidate [id number]

## 2022-11-08 DIAGNOSIS — G25.81 RESTLESS LEG SYNDROME: ICD-10-CM

## 2022-11-08 DIAGNOSIS — R80.9 MICROALBUMINURIA DUE TO TYPE 1 DIABETES MELLITUS (HCC): ICD-10-CM

## 2022-11-08 DIAGNOSIS — E10.29 MICROALBUMINURIA DUE TO TYPE 1 DIABETES MELLITUS (HCC): ICD-10-CM

## 2022-11-08 DIAGNOSIS — R20.2 PARESTHESIA OF HAND, BILATERAL: ICD-10-CM

## 2022-11-08 DIAGNOSIS — E10.42 TYPE 1 DIABETES MELLITUS WITH DIABETIC POLYNEUROPATHY (HCC): ICD-10-CM

## 2022-11-08 DIAGNOSIS — E43 PROTEIN-CALORIE MALNUTRITION, SEVERE (HCC): ICD-10-CM

## 2022-11-08 RX ORDER — PREGABALIN 25 MG/1
25 CAPSULE ORAL EVERY EVENING
Qty: 90 CAPSULE | Refills: 1 | Status: SHIPPED | OUTPATIENT
Start: 2022-11-08 | End: 2022-12-08

## 2022-11-08 RX ORDER — CALCIUM CITRATE/VITAMIN D3 200MG-6.25
TABLET ORAL
Qty: 400 STRIP | Refills: 1 | Status: SHIPPED | OUTPATIENT
Start: 2022-11-08

## 2022-11-08 RX ORDER — PEN NEEDLE, DIABETIC 31 GX5/16"
NEEDLE, DISPOSABLE MISCELLANEOUS
Qty: 400 EACH | Refills: 1 | Status: SHIPPED | OUTPATIENT
Start: 2022-11-08

## 2022-11-08 RX ORDER — GLUCOSAM/CHON-MSM1/C/MANG/BOSW 500-416.6
TABLET ORAL
Qty: 400 EACH | Refills: 1 | Status: SHIPPED | OUTPATIENT
Start: 2022-11-08

## 2022-11-08 RX ORDER — INSULIN ASPART 100 [IU]/ML
INJECTION, SOLUTION INTRAVENOUS; SUBCUTANEOUS
Qty: 45 ML | Refills: 5 | Status: SHIPPED | OUTPATIENT
Start: 2022-11-08

## 2022-11-08 RX ORDER — DICYCLOMINE HYDROCHLORIDE 10 MG/1
CAPSULE ORAL
Qty: 270 CAPSULE | Refills: 1 | Status: SHIPPED | OUTPATIENT
Start: 2022-11-08

## 2022-11-08 RX ORDER — LISINOPRIL 2.5 MG/1
TABLET ORAL
Qty: 90 TABLET | Refills: 1 | Status: SHIPPED | OUTPATIENT
Start: 2022-11-08

## 2022-11-08 RX ORDER — PANTOPRAZOLE SODIUM 40 MG/1
40 TABLET, DELAYED RELEASE ORAL
Qty: 90 TABLET | Refills: 1 | Status: SHIPPED | OUTPATIENT
Start: 2022-11-08

## 2022-11-08 RX ORDER — INFANT FORM.IRON LAC-F/DHA/ARA 3.1 G/1
1 POWDER (GRAM) ORAL 3 TIMES DAILY
Qty: 100 EACH | Refills: 6 | Status: SHIPPED | OUTPATIENT
Start: 2022-11-08

## 2022-11-08 RX ORDER — INSULIN GLARGINE 100 [IU]/ML
10 INJECTION, SOLUTION SUBCUTANEOUS NIGHTLY
Qty: 15 ML | Refills: 3 | Status: SHIPPED | OUTPATIENT
Start: 2022-11-08

## 2022-11-08 RX ORDER — ROPINIROLE 0.25 MG/1
TABLET, FILM COATED ORAL
Qty: 90 TABLET | Refills: 1 | Status: SHIPPED | OUTPATIENT
Start: 2022-11-08

## 2022-11-08 NOTE — TELEPHONE ENCOUNTER
CHANGE PHARMACY  Last visit: 7/25/22  Last Med refill: 7/25/22  went to different pharmacy  Does patient have enough medication for 72 hours: No: she states is out of some med's    Next Visit Date:  Future Appointments   Date Time Provider Hayden Coto   11/29/2022  1:15 PM Emperatriz Calderón MD [facility]   2/15/2023 10:15 AM Marcelo Dumont MD [facility] [address] Maintenance   Topic Date Due    Hepatitis B vaccine (3 of 3 - Risk 3-dose series) 02/14/1998    Diabetic retinal exam  01/21/2020    Flu vaccine (1) Never done    COVID-19 Vaccine (1) 03/23/2025 (Originally 6/5/1994)    Varicella vaccine (2 of 2 - 2-dose childhood series) 09/22/2026 (Originally 8/9/2000)    Pneumococcal 0-64 years Vaccine (1 - PCV) 09/22/2026 (Originally 12/5/1999)    Diabetic foot exam  03/23/2023    Depression Screen  03/23/2023    Lipids  08/17/2023    A1C test (Diabetic or Prediabetic)  10/19/2023    Pap smear  05/04/2025    DTaP/Tdap/Td vaccine (6 - Td or Tdap) 08/27/2030    Hib vaccine  Completed    Hepatitis C screen  Completed    HIV screen  Completed    Hepatitis A vaccine  Aged Out    Meningococcal (ACWY) vaccine  Aged Out       Hemoglobin A1C (%)   Date Value   10/19/2022 6.3   07/19/2022 5.3   03/23/2022 6.3             ( goal A1C is < 7)   Microalb/Crt.  Ratio (mcg/mg creat)   Date Value   02/11/2020 511 (H)     LDL Cholesterol (mg/dL)   Date Value   08/17/2022 55   05/24/2021 45       (goal LDL is <100)   AST (U/L)   Date Value   11/04/2022 16     ALT (U/L)   Date Value   11/04/2022 9     BUN (mg/dL)   Date Value   11/04/2022 12     BP Readings from Last 3 Encounters:   11/04/22 (!) 156/91   11/01/22 (!) 158/102   09/12/22 (!) 142/97          (goal 120/80)    All Future Testing planned in [facility]  Lab Frequency Next Occurrence   Clostridium Difficile Toxin/Antigen Once 04/15/2022   Fecal lactoferrin Once 04/15/2022   Gastrointestinal Panel, Molecular Once 04/15/2022   CBC Once 04/15/2022   PAP SMEAR Once 06/04/2022   BO DIGITAL DIAGNOSTIC W OR WO CAD LEFT Once 05/04/2022   US BREAST COMPLETE LEFT Once 05/09/2022               Patient Active Problem List:     Type 1 diabetes mellitus with diabetic polyneuropathy (HCC)     Intractable vomiting with nausea     Intractable abdominal pain     Microalbuminuria due to type 1 diabetes mellitus (HCC)     Generalized abdominal pain     Restless leg syndrome     Protein-calorie malnutrition, severe (HCC)     Marijuana use, continuous     Onychomycosis     Cachexia ([address]     Oligomenorrhea     Positive Tpal     Left breast mass     Latent syphilis     Premenopausal idiopathic osteoporosis     Osteoporosis

## 2022-11-15 ENCOUNTER — TELEPHONE (OUTPATIENT)
Dept: FAMILY MEDICINE CLINIC | Age: 27
End: 2022-11-15

## 2022-11-15 DIAGNOSIS — E10.42 TYPE 1 DIABETES MELLITUS WITH DIABETIC POLYNEUROPATHY (HCC): Primary | ICD-10-CM

## 2022-11-15 RX ORDER — BLOOD-GLUCOSE METER
1 EACH MISCELLANEOUS 4 TIMES DAILY
Qty: 1 KIT | Refills: 0 | Status: SHIPPED | OUTPATIENT
Start: 2022-11-15

## 2022-11-15 NOTE — TELEPHONE ENCOUNTER
Pt called stating [facility] reached out to her informing her that her insurance would not cover True Metrix test strips for quantity of 400,  but may for 300, received fax stating to consider One Touch brand test strips, spoke with Mae Beavers at [facility], did a PA for the [place], received fax this morning stating True Metrix test strips PA was denied.      Pt stated she will also need a new order for a glucometer that fits the One Touch brand test strips     Faxes attached

## 2022-12-12 ENCOUNTER — CARE COORDINATION (OUTPATIENT)
Dept: CARE COORDINATION | Age: 27
End: 2022-12-12

## 2022-12-13 NOTE — CARE COORDINATION
spoke with Kaylee regarding [employer] assistance. Kaylee confirmed she had transportation. Kaylee reports that she is doing \"ok\" on paying bills. Denied questions or concerns.  met at [facility] to review items needed for toy  through the [employer]. Plan:    will follow up with Kaylee in 1 week to discuss social needs.

## 2022-12-22 ENCOUNTER — CARE COORDINATION (OUTPATIENT)
Dept: CARE COORDINATION | Age: 27
End: 2022-12-22

## 2022-12-23 NOTE — CARE COORDINATION
called and spoke with Lara Santashani. Lara Macdonald requested information on bus passes and if  knew how she could get it cheaper or free. Lara Macdonald reports that she never has rides to the grocery store or bank.  contacted Shira however was unable to find an answer regarding reduced prices for bus passes.  attempted to contact [facility] to inquire if they can assist a young mom with a bus pass to help with transportation.  left message with name and number.  called [employer] and was informed that Rose Go[employer] does cover a few trips a month to grocery store and food bank.  attempted to contact Rose Goetz however due to Lara Macdonald not being on the phone, they did not want to provide a lot of information to .  encouraged Destaney to contact Rose Goetz and inquire if they will provide transportation to store.  will continue to try and reach someone at [facility]. Plan:   Lara Macdonald will contact Rose Goetz.  will try to contact [employer] again.

## 2022-12-27 ENCOUNTER — HOSPITAL ENCOUNTER (EMERGENCY)
Age: 27
Discharge: LEFT BEFORE DISCHARGE | End: 2022-12-27

## 2022-12-27 ENCOUNTER — HOSPITAL ENCOUNTER (EMERGENCY)
Age: 27
Discharge: HOME | End: 2022-12-27

## 2022-12-27 VITALS
SYSTOLIC BLOOD PRESSURE: 203 MMHG | OXYGEN SATURATION: 98 % | TEMPERATURE: 99.2 F | RESPIRATION RATE: 26 BRPM | HEART RATE: 77 BPM | DIASTOLIC BLOOD PRESSURE: 112 MMHG

## 2022-12-27 ASSESSMENT — PAIN SCALES - GENERAL: PAINLEVEL_OUTOF10: 10

## 2022-12-27 ASSESSMENT — PAIN - FUNCTIONAL ASSESSMENT: PAIN_FUNCTIONAL_ASSESSMENT: 0-10

## 2022-12-28 ENCOUNTER — HOSPITAL ENCOUNTER (EMERGENCY)
Age: 27
Discharge: LEFT BEFORE DISCHARGE | End: 2022-12-28

## 2022-12-28 VITALS
DIASTOLIC BLOOD PRESSURE: 59 MMHG | OXYGEN SATURATION: 99 % | RESPIRATION RATE: 18 BRPM | TEMPERATURE: 98.2 F | BODY MASS INDEX: 18.33 KG/M2 | HEIGHT: 65 IN | HEART RATE: 92 BPM | SYSTOLIC BLOOD PRESSURE: 100 MMHG | WEIGHT: 110 LBS

## 2022-12-28 ASSESSMENT — LIFESTYLE VARIABLES: HOW OFTEN DO YOU HAVE A DRINK CONTAINING ALCOHOL: NEVER

## 2022-12-30 ENCOUNTER — HOSPITAL ENCOUNTER (OUTPATIENT)
Age: 27
Setting detail: OBSERVATION
Discharge: HOME | End: 2022-12-31
Attending: EMERGENCY MEDICINE | Admitting: EMERGENCY MEDICINE
Payer: MEDICARE

## 2022-12-30 ENCOUNTER — APPOINTMENT (OUTPATIENT)
Dept: GENERAL RADIOLOGY | Age: 27
End: 2022-12-30
Payer: MEDICARE

## 2022-12-30 ENCOUNTER — APPOINTMENT (OUTPATIENT)
Dept: CT IMAGING | Age: 27
End: 2022-12-30
Payer: MEDICARE

## 2022-12-30 DIAGNOSIS — R10.9 INTRACTABLE ABDOMINAL PAIN: ICD-10-CM

## 2022-12-30 DIAGNOSIS — R11.2 NAUSEA AND VOMITING, UNSPECIFIED VOMITING TYPE: Primary | ICD-10-CM

## 2022-12-30 DIAGNOSIS — N17.9 ACUTE KIDNEY INJURY (HCC): ICD-10-CM

## 2022-12-30 LAB
ABSOLUTE EOS #: <0.03 K/UL (ref 0–0.44)
ABSOLUTE IMMATURE GRANULOCYTE: 0.05 K/UL (ref 0–0.3)
ABSOLUTE LYMPH #: 1.93 K/UL (ref 1.1–3.7)
ABSOLUTE MONO #: 0.81 K/UL (ref 0.1–1.2)
ALBUMIN SERPL-MCNC: 3.2 G/DL (ref 3.5–5.2)
ALBUMIN/GLOBULIN RATIO: 0.7 (ref 1–2.5)
ALP BLD-CCNC: 89 U/L (ref 35–104)
ALT SERPL-CCNC: 9 U/L (ref 5–33)
ANION GAP SERPL CALCULATED.3IONS-SCNC: 12 MMOL/L (ref 9–17)
AST SERPL-CCNC: 23 U/L
BASOPHILS # BLD: 0 % (ref 0–2)
BASOPHILS ABSOLUTE: <0.03 K/UL (ref 0–0.2)
BILIRUB SERPL-MCNC: 0.3 MG/DL (ref 0.3–1.2)
BILIRUBIN DIRECT: <0.1 MG/DL
BILIRUBIN, INDIRECT: ABNORMAL MG/DL (ref 0–1)
BUN BLDV-MCNC: 14 MG/DL (ref 6–20)
CALCIUM SERPL-MCNC: 8.1 MG/DL (ref 8.6–10.4)
CARBOXYHEMOGLOBIN: 2.8 % (ref 0–5)
CHLORIDE BLD-SCNC: 116 MMOL/L (ref 98–107)
CO2: 23 MMOL/L (ref 20–31)
CREAT SERPL-MCNC: 1.59 MG/DL (ref 0.5–0.9)
EOSINOPHILS RELATIVE PERCENT: 0 % (ref 1–4)
FIO2: ABNORMAL
GFR SERPL CREATININE-BSD FRML MDRD: 45 ML/MIN/1.73M2
GLUCOSE BLD-MCNC: 201 MG/DL (ref 65–105)
GLUCOSE BLD-MCNC: 203 MG/DL (ref 70–99)
GLUCOSE BLD-MCNC: 211 MG/DL (ref 65–105)
HCG QUALITATIVE: NEGATIVE
HCO3 VENOUS: 24.1 MMOL/L (ref 24–30)
HCT VFR BLD CALC: 31.3 % (ref 36.3–47.1)
HEMOGLOBIN: 10.3 G/DL (ref 11.9–15.1)
IMMATURE GRANULOCYTES: 1 %
LACTIC ACID, WHOLE BLOOD: 1.5 MMOL/L (ref 0.7–2.1)
LACTIC ACID, WHOLE BLOOD: 2.2 MMOL/L (ref 0.7–2.1)
LIPASE: 21 U/L (ref 13–60)
LYMPHOCYTES # BLD: 34 % (ref 24–43)
MAGNESIUM: 1.9 MG/DL (ref 1.6–2.6)
MCH RBC QN AUTO: 27.6 PG (ref 25.2–33.5)
MCHC RBC AUTO-ENTMCNC: 32.9 G/DL (ref 28.4–34.8)
MCV RBC AUTO: 83.9 FL (ref 82.6–102.9)
MONOCYTES # BLD: 14 % (ref 3–12)
NRBC AUTOMATED: 0 PER 100 WBC
O2 SAT, VEN: 64.1 % (ref 60–85)
PATIENT TEMP: 37
PCO2, VEN: 34.3 MM HG (ref 39–55)
PDW BLD-RTO: 19.8 % (ref 11.8–14.4)
PH VENOUS: 7.46 (ref 7.32–7.42)
PLATELET # BLD: 429 K/UL (ref 138–453)
PMV BLD AUTO: 10.4 FL (ref 8.1–13.5)
PO2, VEN: 33.9 MM HG (ref 30–50)
POSITIVE BASE EXCESS, VEN: 1.1 MMOL/L (ref 0–2)
POTASSIUM SERPL-SCNC: 3.8 MMOL/L (ref 3.7–5.3)
RBC # BLD: 3.73 M/UL (ref 3.95–5.11)
RBC # BLD: ABNORMAL 10*6/UL
REASON FOR REJECTION: NORMAL
SARS-COV-2, RAPID: NOT DETECTED
SEG NEUTROPHILS: 51 % (ref 36–65)
SEGMENTED NEUTROPHILS ABSOLUTE COUNT: 2.91 K/UL (ref 1.5–8.1)
SODIUM BLD-SCNC: 151 MMOL/L (ref 135–144)
SPECIMEN DESCRIPTION: NORMAL
TOTAL PROTEIN: 8.1 G/DL (ref 6.4–8.3)
WBC # BLD: 5.7 K/UL (ref 3.5–11.3)
ZZ NTE CLEAN UP: ORDERED TEST: NORMAL
ZZ NTE WITH NAME CLEAN UP: SPECIMEN SOURCE: NORMAL

## 2022-12-30 PROCEDURE — 6370000000 HC RX 637 (ALT 250 FOR IP): Performed by: EMERGENCY MEDICINE

## 2022-12-30 PROCEDURE — 2500000003 HC RX 250 WO HCPCS: Performed by: STUDENT IN AN ORGANIZED HEALTH CARE EDUCATION/TRAINING PROGRAM

## 2022-12-30 PROCEDURE — 82947 ASSAY GLUCOSE BLOOD QUANT: CPT

## 2022-12-30 PROCEDURE — G0378 HOSPITAL OBSERVATION PER HR: HCPCS

## 2022-12-30 PROCEDURE — 85025 COMPLETE CBC W/AUTO DIFF WBC: CPT

## 2022-12-30 PROCEDURE — 80076 HEPATIC FUNCTION PANEL: CPT

## 2022-12-30 PROCEDURE — 84703 CHORIONIC GONADOTROPIN ASSAY: CPT

## 2022-12-30 PROCEDURE — 96372 THER/PROPH/DIAG INJ SC/IM: CPT

## 2022-12-30 PROCEDURE — 2580000003 HC RX 258: Performed by: STUDENT IN AN ORGANIZED HEALTH CARE EDUCATION/TRAINING PROGRAM

## 2022-12-30 PROCEDURE — 6360000002 HC RX W HCPCS: Performed by: EMERGENCY MEDICINE

## 2022-12-30 PROCEDURE — 6360000002 HC RX W HCPCS: Performed by: STUDENT IN AN ORGANIZED HEALTH CARE EDUCATION/TRAINING PROGRAM

## 2022-12-30 PROCEDURE — 96374 THER/PROPH/DIAG INJ IV PUSH: CPT

## 2022-12-30 PROCEDURE — 96376 TX/PRO/DX INJ SAME DRUG ADON: CPT

## 2022-12-30 PROCEDURE — 96375 TX/PRO/DX INJ NEW DRUG ADDON: CPT

## 2022-12-30 PROCEDURE — 36415 COLL VENOUS BLD VENIPUNCTURE: CPT

## 2022-12-30 PROCEDURE — 2580000003 HC RX 258: Performed by: EMERGENCY MEDICINE

## 2022-12-30 PROCEDURE — 6370000000 HC RX 637 (ALT 250 FOR IP): Performed by: STUDENT IN AN ORGANIZED HEALTH CARE EDUCATION/TRAINING PROGRAM

## 2022-12-30 PROCEDURE — 82805 BLOOD GASES W/O2 SATURATION: CPT

## 2022-12-30 PROCEDURE — 83605 ASSAY OF LACTIC ACID: CPT

## 2022-12-30 PROCEDURE — 99285 EMERGENCY DEPT VISIT HI MDM: CPT

## 2022-12-30 PROCEDURE — 96361 HYDRATE IV INFUSION ADD-ON: CPT

## 2022-12-30 PROCEDURE — 74176 CT ABD & PELVIS W/O CONTRAST: CPT

## 2022-12-30 PROCEDURE — 83690 ASSAY OF LIPASE: CPT

## 2022-12-30 PROCEDURE — 80048 BASIC METABOLIC PNL TOTAL CA: CPT

## 2022-12-30 PROCEDURE — 83735 ASSAY OF MAGNESIUM: CPT

## 2022-12-30 PROCEDURE — 87635 SARS-COV-2 COVID-19 AMP PRB: CPT

## 2022-12-30 PROCEDURE — 74022 RADEX COMPL AQT ABD SERIES: CPT

## 2022-12-30 RX ORDER — INSULIN LISPRO 100 [IU]/ML
0-8 INJECTION, SOLUTION INTRAVENOUS; SUBCUTANEOUS
Status: DISCONTINUED | OUTPATIENT
Start: 2022-12-30 | End: 2022-12-31 | Stop reason: HOSPADM

## 2022-12-30 RX ORDER — ROPINIROLE 0.25 MG/1
0.25 TABLET, FILM COATED ORAL NIGHTLY
Status: DISCONTINUED | OUTPATIENT
Start: 2022-12-30 | End: 2022-12-31 | Stop reason: HOSPADM

## 2022-12-30 RX ORDER — INSULIN LISPRO 100 [IU]/ML
0-4 INJECTION, SOLUTION INTRAVENOUS; SUBCUTANEOUS NIGHTLY
Status: DISCONTINUED | OUTPATIENT
Start: 2022-12-30 | End: 2022-12-31 | Stop reason: HOSPADM

## 2022-12-30 RX ORDER — ACETAMINOPHEN 325 MG/1
650 TABLET ORAL EVERY 4 HOURS PRN
Status: DISCONTINUED | OUTPATIENT
Start: 2022-12-30 | End: 2022-12-31 | Stop reason: HOSPADM

## 2022-12-30 RX ORDER — DICYCLOMINE HYDROCHLORIDE 10 MG/ML
20 INJECTION INTRAMUSCULAR ONCE
Status: COMPLETED | OUTPATIENT
Start: 2022-12-30 | End: 2022-12-30

## 2022-12-30 RX ORDER — FAMOTIDINE 20 MG/1
20 TABLET, FILM COATED ORAL NIGHTLY
Status: DISCONTINUED | OUTPATIENT
Start: 2022-12-30 | End: 2022-12-31 | Stop reason: HOSPADM

## 2022-12-30 RX ORDER — SODIUM CHLORIDE 9 MG/ML
INJECTION, SOLUTION INTRAVENOUS PRN
Status: DISCONTINUED | OUTPATIENT
Start: 2022-12-30 | End: 2022-12-31 | Stop reason: HOSPADM

## 2022-12-30 RX ORDER — 0.9 % SODIUM CHLORIDE 0.9 %
1000 INTRAVENOUS SOLUTION INTRAVENOUS ONCE
Status: COMPLETED | OUTPATIENT
Start: 2022-12-30 | End: 2022-12-30

## 2022-12-30 RX ORDER — PROMETHAZINE HYDROCHLORIDE 25 MG/ML
6.25 INJECTION, SOLUTION INTRAMUSCULAR; INTRAVENOUS ONCE
Status: COMPLETED | OUTPATIENT
Start: 2022-12-30 | End: 2022-12-30

## 2022-12-30 RX ORDER — HALOPERIDOL 5 MG/ML
5 INJECTION INTRAMUSCULAR ONCE
Status: COMPLETED | OUTPATIENT
Start: 2022-12-30 | End: 2022-12-30

## 2022-12-30 RX ORDER — ONDANSETRON 2 MG/ML
4 INJECTION INTRAMUSCULAR; INTRAVENOUS ONCE
Status: COMPLETED | OUTPATIENT
Start: 2022-12-30 | End: 2022-12-30

## 2022-12-30 RX ORDER — PANTOPRAZOLE SODIUM 40 MG/1
40 TABLET, DELAYED RELEASE ORAL
Status: DISCONTINUED | OUTPATIENT
Start: 2022-12-31 | End: 2022-12-31 | Stop reason: HOSPADM

## 2022-12-30 RX ORDER — ONDANSETRON 4 MG/1
4 TABLET, ORALLY DISINTEGRATING ORAL EVERY 8 HOURS PRN
Status: DISCONTINUED | OUTPATIENT
Start: 2022-12-30 | End: 2022-12-31 | Stop reason: HOSPADM

## 2022-12-30 RX ORDER — DICYCLOMINE HYDROCHLORIDE 10 MG/1
20 CAPSULE ORAL
Status: DISCONTINUED | OUTPATIENT
Start: 2022-12-30 | End: 2022-12-31 | Stop reason: HOSPADM

## 2022-12-30 RX ORDER — ONDANSETRON 2 MG/ML
4 INJECTION INTRAMUSCULAR; INTRAVENOUS EVERY 6 HOURS PRN
Status: DISCONTINUED | OUTPATIENT
Start: 2022-12-30 | End: 2022-12-31 | Stop reason: HOSPADM

## 2022-12-30 RX ORDER — MORPHINE SULFATE 4 MG/ML
4 INJECTION, SOLUTION INTRAMUSCULAR; INTRAVENOUS ONCE
Status: COMPLETED | OUTPATIENT
Start: 2022-12-30 | End: 2022-12-30

## 2022-12-30 RX ORDER — FAMOTIDINE 10 MG/ML
20 INJECTION, SOLUTION INTRAVENOUS ONCE
Status: COMPLETED | OUTPATIENT
Start: 2022-12-30 | End: 2022-12-30

## 2022-12-30 RX ORDER — MORPHINE SULFATE 2 MG/ML
2 INJECTION, SOLUTION INTRAMUSCULAR; INTRAVENOUS
Status: DISCONTINUED | OUTPATIENT
Start: 2022-12-30 | End: 2022-12-31 | Stop reason: HOSPADM

## 2022-12-30 RX ORDER — ENOXAPARIN SODIUM 100 MG/ML
30 INJECTION SUBCUTANEOUS DAILY
Status: DISCONTINUED | OUTPATIENT
Start: 2022-12-30 | End: 2022-12-31 | Stop reason: HOSPADM

## 2022-12-30 RX ORDER — SODIUM CHLORIDE 0.9 % (FLUSH) 0.9 %
5-40 SYRINGE (ML) INJECTION EVERY 12 HOURS SCHEDULED
Status: DISCONTINUED | OUTPATIENT
Start: 2022-12-30 | End: 2022-12-31 | Stop reason: HOSPADM

## 2022-12-30 RX ORDER — SODIUM CHLORIDE 0.9 % (FLUSH) 0.9 %
5-40 SYRINGE (ML) INJECTION PRN
Status: DISCONTINUED | OUTPATIENT
Start: 2022-12-30 | End: 2022-12-31 | Stop reason: HOSPADM

## 2022-12-30 RX ORDER — DEXTROSE MONOHYDRATE 100 MG/ML
INJECTION, SOLUTION INTRAVENOUS CONTINUOUS PRN
Status: DISCONTINUED | OUTPATIENT
Start: 2022-12-30 | End: 2022-12-31 | Stop reason: HOSPADM

## 2022-12-30 RX ORDER — LISINOPRIL 2.5 MG/1
2.5 TABLET ORAL DAILY
Status: DISCONTINUED | OUTPATIENT
Start: 2022-12-30 | End: 2022-12-31 | Stop reason: HOSPADM

## 2022-12-30 RX ORDER — OXYCODONE HYDROCHLORIDE AND ACETAMINOPHEN 5; 325 MG/1; MG/1
1 TABLET ORAL EVERY 6 HOURS PRN
Status: DISCONTINUED | OUTPATIENT
Start: 2022-12-30 | End: 2022-12-31 | Stop reason: HOSPADM

## 2022-12-30 RX ADMIN — PROMETHAZINE HYDROCHLORIDE 6.25 MG: 25 INJECTION INTRAMUSCULAR; INTRAVENOUS at 12:48

## 2022-12-30 RX ADMIN — FAMOTIDINE 20 MG: 10 INJECTION INTRAVENOUS at 08:02

## 2022-12-30 RX ADMIN — OXYCODONE HYDROCHLORIDE AND ACETAMINOPHEN 1 TABLET: 5; 325 TABLET ORAL at 17:10

## 2022-12-30 RX ADMIN — MORPHINE SULFATE 4 MG: 4 INJECTION INTRAVENOUS at 09:37

## 2022-12-30 RX ADMIN — HALOPERIDOL LACTATE 5 MG: 5 INJECTION, SOLUTION INTRAMUSCULAR at 07:49

## 2022-12-30 RX ADMIN — ONDANSETRON 4 MG: 4 TABLET, ORALLY DISINTEGRATING ORAL at 17:16

## 2022-12-30 RX ADMIN — ROPINIROLE HYDROCHLORIDE 0.25 MG: 0.25 TABLET, FILM COATED ORAL at 21:05

## 2022-12-30 RX ADMIN — DICYCLOMINE HYDROCHLORIDE 20 MG: 10 CAPSULE ORAL at 20:38

## 2022-12-30 RX ADMIN — SODIUM CHLORIDE 1000 ML: 9 INJECTION, SOLUTION INTRAVENOUS at 09:38

## 2022-12-30 RX ADMIN — SODIUM CHLORIDE 1000 ML: 9 INJECTION, SOLUTION INTRAVENOUS at 07:57

## 2022-12-30 RX ADMIN — SODIUM CHLORIDE, PRESERVATIVE FREE 10 ML: 5 INJECTION INTRAVENOUS at 20:39

## 2022-12-30 RX ADMIN — LISINOPRIL 2.5 MG: 2.5 TABLET ORAL at 21:05

## 2022-12-30 RX ADMIN — ENOXAPARIN SODIUM 30 MG: 100 INJECTION SUBCUTANEOUS at 21:06

## 2022-12-30 RX ADMIN — ONDANSETRON 4 MG: 2 INJECTION INTRAMUSCULAR; INTRAVENOUS at 07:52

## 2022-12-30 RX ADMIN — DICYCLOMINE HYDROCHLORIDE 20 MG: 10 INJECTION, SOLUTION INTRAMUSCULAR at 07:49

## 2022-12-30 RX ADMIN — MORPHINE SULFATE 2 MG: 2 INJECTION, SOLUTION INTRAMUSCULAR; INTRAVENOUS at 22:17

## 2022-12-30 RX ADMIN — FAMOTIDINE 20 MG: 20 TABLET, FILM COATED ORAL at 20:37

## 2022-12-30 ASSESSMENT — ENCOUNTER SYMPTOMS
COLOR CHANGE: 0
CHEST TIGHTNESS: 0
EYE DISCHARGE: 0
SHORTNESS OF BREATH: 0
ABDOMINAL DISTENTION: 1
TROUBLE SWALLOWING: 0
VOICE CHANGE: 0
VOMITING: 1
APNEA: 0
EYE ITCHING: 0
NAUSEA: 1

## 2022-12-30 ASSESSMENT — PAIN DESCRIPTION - FREQUENCY
FREQUENCY: CONTINUOUS

## 2022-12-30 ASSESSMENT — PAIN SCALES - GENERAL
PAINLEVEL_OUTOF10: 7
PAINLEVEL_OUTOF10: 8
PAINLEVEL_OUTOF10: 7
PAINLEVEL_OUTOF10: 10
PAINLEVEL_OUTOF10: 10
PAINLEVEL_OUTOF10: 0
PAINLEVEL_OUTOF10: 8
PAINLEVEL_OUTOF10: 6

## 2022-12-30 ASSESSMENT — PAIN DESCRIPTION - PAIN TYPE
TYPE: ACUTE PAIN

## 2022-12-30 ASSESSMENT — PAIN SCALES - WONG BAKER
WONGBAKER_NUMERICALRESPONSE: 4
WONGBAKER_NUMERICALRESPONSE: 0
WONGBAKER_NUMERICALRESPONSE: 0

## 2022-12-30 ASSESSMENT — PAIN DESCRIPTION - ORIENTATION
ORIENTATION: RIGHT;LEFT

## 2022-12-30 ASSESSMENT — PAIN DESCRIPTION - LOCATION
LOCATION: FLANK
LOCATION: ABDOMEN;FLANK
LOCATION: ABDOMEN;FLANK

## 2022-12-30 ASSESSMENT — PAIN - FUNCTIONAL ASSESSMENT
PAIN_FUNCTIONAL_ASSESSMENT: 0-10
PAIN_FUNCTIONAL_ASSESSMENT: PREVENTS OR INTERFERES SOME ACTIVE ACTIVITIES AND ADLS

## 2022-12-30 ASSESSMENT — PAIN DESCRIPTION - ONSET
ONSET: ON-GOING

## 2022-12-30 ASSESSMENT — PAIN DESCRIPTION - DESCRIPTORS
DESCRIPTORS: SHARP;STABBING
DESCRIPTORS: SHARP;THROBBING
DESCRIPTORS: SHARP;STABBING;THROBBING

## 2022-12-30 NOTE — ED PROVIDER NOTES
[facility]     Emergency Department     Faculty Note/ Attestation      Pt Name: Tran Deng                                       MRN: [id number]  Birthdate 1995  Date of evaluation: 12/30/2022    Patients PCP:    JADYN SPENCER MD    Attestation  I performed a history and physical examination of the patient and discussed management with the resident. I reviewed the resident’s note and agree with the documented findings and plan of care. Any areas of disagreement are noted on the chart. I was personally present for the key portions of any procedures. I have documented in the chart those procedures where I was not present during the key portions. I have reviewed the emergency nurses triage note. I agree with the chief complaint, past medical history, past surgical history, allergies, medications, social and family history as documented unless otherwise noted below.    For Physician Assistant/ Nurse Practitioner cases/documentation I have personally evaluated this patient and have completed at least one if not all key elements of the E/M (history, physical exam, and MDM). Additional findings are as noted.    Initial Screens:        [place] Coma Scale  Eye Opening: Spontaneous  Best Verbal Response: Oriented  Best Motor Response: Obeys commands  [place] Coma Scale Score: 15    Vitals:    Vitals:    12/30/22 0648 12/30/22 0710   BP: (!) 211/134    Pulse: 99    Resp: 22    Temp:  99.7 °F (37.6 °C)   TempSrc:  Oral   SpO2: 95%    Weight: 110 lb (49.9 kg)    Height: 5' 5\" (1.651 m)        CHIEF COMPLAINT       Chief Complaint   Patient presents with    Emesis       Patient is a 27-year-old female with history of diabetes type 1 on insulin and hx of THC use.  THe pt arrives in vomiting writhing around in bed with abdominal discomfort.  The pt unable to answer questions just writhing noting the constant abdominal pain        EMERGENCY DEPARTMENT COURSE:  -------------------------  BP: (!) 211/134, Temp: 99.7 °F (37.6 °C), Heart Rate: 99, Resp: 22  Physical Exam  Constitutional:       General: She is in acute distress (discomfort present). Appearance: She is not ill-appearing or diaphoretic. HENT:      Right Ear: External ear normal.      Left Ear: External ear normal.      Nose: No congestion or rhinorrhea. Pulmonary:      Effort: Pulmonary effort is normal.         Comments  The patient arrives complaining of diffuse abdominal pain. Based on exam the patient concerns are unlikely for dissection or aneurysm patient has no signs of palpable deficit no history of aneurysm. Patient could be suffering from more serious condition such as perforated bowel CT pending at this time given the writhing in pain however given the patient is a female will wait on a pregnancy test and immediately obtain an acute abdominal series for perforation/obvious bowel obstruction patient will also have labs for possible DKA. Patient will have LFTs and assessment for possible biliary obstruction as well as lipase for pancreatitis. At this time no signs of sepsis or infection at this time awaiting labs and results. ED Course as of 12/30/22 1553   Fri Dec 30, 2022   0859 pH normal not DKA mild  [WK]   0859 H and H stable no WBC elevation normal plt [WK]   0900 Medication given for pain and nausea [WK]   1052 Lactate improvement [WK]   1052 New KAIT [WK]   1243 Patient still feeling unwell, have the patient placed in the observation unit fluid hydration analgesia. [ES]      ED Course User Index  [ES] Abelardo Mcnair MD  [WK] DO Vel Caputo DO,, DO, RDMS.   Attending Emergency Physician         Vel Thomas DO  12/30/22 0874

## 2022-12-30 NOTE — ED NOTES
Patient returned from [address] via [facility] [address] [place] [address]  12/30/22 1105

## 2022-12-30 NOTE — ED NOTES
Pt resting comfortably with eyes closed, even and nonlabored RR, patient denies any needs at this time. Bed in lowest position. Call light within reach, will continue to monitor.        Valdo Willoughby RN  12/30/22 0901

## 2022-12-30 NOTE — ED NOTES
The following labs were labeled with appropriate pt sticker and tubed to lab:     [] Blue     [] Lavender   [] on ice  [x] Green/yellow  REPEAT DUE TO HEMOLYZED LAB  [] Green/black [] on ice  [] Lovenia   [] on ice  [] Yellow  [] Red  [] Type/ Screen  [] ABG  [] VBG    [] COVID-19 swab    [] Rapid  [] PCR  [] Flu swab  [] Peds Viral Panel     [] Urine Sample  [] Fecal Sample  [] Pelvic Cultures  [] Blood Cultures  [] X 2  [] STREP Cultures         Valdo Willoughby RN  12/30/22 3727

## 2022-12-30 NOTE — ED NOTES
Pt resting comfortably with eyes closed, even and nonlabored RR, patient denies any needs at this time. Bed in lowest position. Call light within reach, will continue to monitor.        Fei Everett RN  12/30/22 5933

## 2022-12-30 NOTE — ED NOTES
The following labs were labeled with appropriate pt sticker and tubed to lab:     [x] Blue     [x] Lavender   [] on ice  [x] Green/yellow  [] Green/black [] on ice  [] Adalgisa Lucks  [] on ice  [] Yellow  [x] Red  [] Type/ Screen  [] ABG  [] VBG    [] COVID-19 swab    [] Rapid  [] PCR  [] Flu swab  [] Peds Viral Panel     [] Urine Sample  [] Fecal Sample  [] Pelvic Cultures  [] Blood Cultures  [] X 2  [] STREP Cultures     Honey Abad RN  12/30/22 1151

## 2022-12-30 NOTE — ED NOTES
The following labs were labeled with appropriate pt sticker and tubed to lab:     [] Blue     [] Lavender   [] on ice  [] Green/yellow  [x] Green/black [] on ice  [] Andres Konrad  [] on ice  [] Yellow  [] Red  [] Type/ Screen  [] ABG  [] VBG    [] COVID-19 swab    [] Rapid  [] PCR  [] Flu swab  [] Peds Viral Panel     [] Urine Sample  [] Fecal Sample  [] Pelvic Cultures  [] Blood Cultures  [] X 2  [] STREP Cultures         Syd Coronel RN  12/30/22 2743

## 2022-12-30 NOTE — CARE COORDINATION
12/30/22 1809   Service Assessment   Patient Orientation Alert and Oriented   Cognition Alert   History Provided By Patient   Primary Caregiver Self   Support Systems Family Members  (pts sister lives with her)   PCP Verified by CM Yes   Last Visit to PCP Within last 6 months   Prior Functional Level Independent in ADLs/IADLs   Current Functional Level Independent in ADLs/IADLs   Can patient return to prior living arrangement Yes   Ability to make needs known: Good   Family able to assist with home care needs: Yes   Would you like for me to discuss the discharge plan with any other family members/significant others, and if so, who? No   Financial Resources Medicare   Social/Functional History   Lives With Family   Type of [address] Two level   [address] to enter with rails   Entrance Stairs - Number of Steps 10 with one rail   Bathroom Shower/Tub Tub/Shower unit   [address]   (blood pressure machine, relates glucometer is broke)   Receives Help From [address] Responsibilities Yes   Ambulation Assistance Independent   Transfer Assistance Independent   Active  No   Patient's  Info sister provides transportation   Occupation On disability   Type of Occupation none   Discharge Planning   Type of [facility] Family Members   Current Services Prior To Admission None   Potential Assistance Needed N/A   DME Ordered? No   Potential Assistance Purchasing Medications No   Type of Home Care Services None   Patient expects to be discharged to: House   One/Two Story Residence Two story   # of Interior Steps   (flight up and her bed/bath is up)   Lift Chair Available No   History of falls?  0   Services At/After Discharge   Transition of Care Consult (CM Consult) N/A   Services At/After Discharge None   Mode of Transport at Discharge   (sister)   Confirm Follow Up Transport Family   Condition of Participation: Discharge Planning   The Plan for Transition of Care is related to the following treatment goals: comfort   The Patient and/or Patient Representative was provided with a Choice of Provider? (NA)   Freedom of Choice list was provided with basic dialogue that supports the patient's individualized plan of care/goals, treatment preferences, and shares the quality data associated with the providers? No   Case Management Assessment  Initial Evaluation    Date/Time of Evaluation: 12/30/2022 6:13 PM  Assessment Completed by: Gonzales Goel RN    If patient is discharged prior to next notation, then this note serves as note for discharge by case management. Patient Name: Sin Kwong                   YOB: 1995  Diagnosis: Acute kidney injury ([facility] [address] [N17.9]  Nausea and vomiting, unspecified vomiting type [R11.2]                   Date / Time: 12/30/2022  7:02 AM    Patient Admission Status: Observation   Readmission Risk (Low < 19, Mod (19-27), High > 27): No data recorded  Current PCP: JADYN SPENCER MD  PCP verified by CM? (P) Yes    Chart Reviewed: Yes      History Provided by: (P) Patient  Patient Orientation: (P) Alert and Oriented    Patient Cognition: (P) Alert    Hospitalization in the last 30 days (Readmission):  No    If yes, Readmission Assessment in CM Navigator will be completed.     Advance Directives:      Code Status: Full Code   Patient's Primary Decision Maker is:        Discharge Planning:    Patient lives with: (P) Family Members Type of Home: (P) House  Primary Care Giver: (P) Self  Patient Support Systems include: (P) Family Members (pts sister lives with her)   Current Financial resources: (P) Medicare  Current community resources:    Current services prior to admission: (P) None            Current DME:              Type of Home Care services:  (P) None    ADLS  Prior functional level: (P) Independent in ADLs/IADLs  Current functional level: (P) Independent in ADLs/IADLs    PT AM-PAC:   /24  OT AM-PAC:   /24    Family can provide assistance at DC: (P) Yes  Would you like Case Management to discuss the discharge plan with any other family members/significant others, and if so, who? (P) No  Plans to Return to Present Housing: (P) Yes  Other Identified Issues/Barriers to RETURNING to current housing: yes  Potential Assistance needed at discharge: (P) N/A            Potential DME:    Patient expects to discharge to: (P) [address] [place] [address] for transportation at discharge: (P) Family    Financial    Payor: Tulio Mackey / Plan: ADVANTAGE [address] / Product Type: *No Product type* /     Does insurance require precert for SNF: Yes    Potential assistance Purchasing Medications: (P) No  Meds-to-Beds request:        [address] [phone] Adam Sher [address] [place] [address]  Phone: [phone] Fax: [phone]      Notes:    Factors facilitating achievement of predicted outcomes: Cooperative and Pleasant    Barriers to discharge: none    Additional Case Management Notes: lives with sister goal is home    The Plan for Transition of Care is related to the following treatment goals of Acute kidney injury ([facility] [address] [N17.9]  Nausea and vomiting, unspecified vomiting type [Z59.0]    IF APPLICABLE: The Patient and/or patient representative Giselle Johnston and her family were provided with a choice of provider and agrees with the discharge plan. Freedom of choice list with basic dialogue that supports the patient's individualized plan of care/goals and shares the quality data associated with the providers was provided to: (P)  (NA)   Patient Representative Name:       The Patient and/or Patient Representative Agree with the Discharge Plan?       Kelby House RN  Case Management Department  Ph: [phone]

## 2022-12-30 NOTE — ED NOTES
Pt arrives to ED for n/v/d for 2 days. Pt states it started 2 days ago, she was seen yesterday and left AMA. Pt states she is unable to eat/drink and has diarrhea. Patient had come in yesterday but left due to the wait. Patient states she has been treating at home with nyquil. Pt denies taking anything over the counter, pt denies other symptoms. Pt A&Ox4, dry heaving on bed.       Antonella Montelongo RN  12/30/22 Nu Santos RN  12/30/22 3253

## 2022-12-30 NOTE — ED PROVIDER NOTES
[facility]  ED  Emergency Department Encounter  Emergency Medicine Resident     Pt Name: Argelia Doyle  GKV:[id number]  [place] 1995  Date of evaluation: 22  PCP:  Blake Witt MD    CHIEF COMPLAINT       Chief Complaint   Patient presents with    Emesis       HISTORY OF PRESENT ILLNESS  (Location/Symptom, Timing/Onset, Context/Setting, Quality, Duration, ModifyingFactors, Severity.)      Argelia Doyle is a 32 y.o. female with PMH of Intractable nausea and vomiting presents to the emergency department for evaluation of severe abdominal pain with multiple rounds of emesis. Patient actively vomiting in the room, appears uncomfortable, holding her abdomen and wincing in pain. Patient states that she has been around anyone sick recently, denies new foods, states that she overall feels generalized unwell but has for several days. PAST MEDICAL / SURGICAL / SOCIAL /FAMILY HISTORY      has a past medical history of Hypertension, Kidney infection, MRSA (methicillin resistant staph aureus) culture positive, Nausea & vomiting, Ovarian cyst, Secondary osteoporosis, Type 1 diabetes mellitus ([address] and UTI (urinary tract infection). No other pertinent PMH on review with patient/guardian. has a past surgical history that includes  section and Upper gastrointestinal endoscopy (N/A, 2021). No other pertinent PSH on review with patient/guardian.   Social History     Socioeconomic History    Marital status: Single     Spouse name: Not on file    Number of children: Not on file    Years of education: Not on file    Highest education level: Not on file   Occupational History    Not on file   Tobacco Use    Smoking status: Every Day     Packs/day: 0.50     Types: Cigarettes    Smokeless tobacco: Never    Tobacco comments:     5/day   Substance and Sexual Activity    Alcohol use: No    Drug use: Yes     Types: Marijuana (Weed)     Comment: 1-2x per day Sexual activity: Never   Other Topics Concern    Not on file   Social History Narrative    Not on file     Social Determinants of Health     Financial Resource Strain: High Risk    Difficulty of Paying Living Expenses: Hard   Food Insecurity: Food Insecurity Present    Worried About Running Out of Food in the Last Year: Often true    Ran Out of Food in the Last Year: Often true   Transportation Needs: Not on file   Physical Activity: Not on file   Stress: Not on file   Social Connections: Not on file   Intimate Partner Violence: Not on file   Housing Stability: Not on file       I counseled the patient against using tobacco products. No family history on file. No other pertinent FamHx on review with patient/guardian. Allergies:  Blueberry [vaccinium angustifolium] and Shrimp flavor    Home Medications:  Prior to Admission medications    Medication Sig Start Date End Date Taking? Authorizing Provider   Blood Glucose Monitoring Suppl (ONE TOUCH ULTRA 2) w/Device KIT 1 kit by Does not apply route in the morning, at noon, in the evening, and at bedtime 11/15/22   Emperatriz Correia MD   pantoprazole (PROTONIX) 40 MG tablet Take 1 tablet by mouth every morning (before breakfast) 11/8/22   Emperatriz Croreia MD   dicyclomine (BENTYL) 10 MG capsule TAKE 1 CAPSULE THREE TIMES DAILY AS NEEDED FOR ABDOMINAL PAIN 11/8/22   Emperatriz Correia MD   Insulin Pen Needle (DROPLET PEN NEEDLES) 31G X 8 MM MISC Use 4 times daily as directed 11/8/22   Emperatriz Correia MD   TRUEplus Lancets 33G MISC Use 4 times daily as directed 11/8/22   Emperatriz Correia MD   blood glucose test strips (TRUE METRIX BLOOD GLUCOSE TEST) strip As needed. 11/8/22   Emperatriz Correia MD   pregabalin (LYRICA) 25 MG capsule Take 1 capsule by mouth every evening for 30 days.  11/8/22 12/8/22  Emperatriz Correia MD   insulin glargine (LANTUS SOLOSTAR) 100 UNIT/ML injection pen Inject 10 Units into the skin nightly 11/8/22   Emperatriz Correia MD   lisinopril (PRINIVIL;ZESTRIL) 2.5 MG tablet TAKE 1 TABLET EVERY DAY 11/8/22   Arti Verner Kil, MD   rOPINIRole (REQUIP) 0.25 MG tablet Take 1 tablet every night 11/8/22   Arti Verner Kil, MD   insulin aspart (NOVOLOG FLEXPEN) 100 UNIT/ML injection pen Inject 2 to 12 units subcutaneously three times daily per sliding scale 11/8/22   Arti Verner Kil, MD   Nutritional Supplements (GLUCERNA 1.5 REJI) LIQD Take 1 each by mouth in the morning, at noon, and at bedtime 11/8/22   Arti Verner Kil, MD   ondansetron (ZOFRAN ODT) 4 MG disintegrating tablet Take 1 tablet by mouth every 8 hours as needed for Nausea or Vomiting 11/4/22   Sam San,    Calcium Carbonate-Vitamin D (OYSTER SHELL CALCIUM/D) 500-200 MG-UNIT TABS Take 1 tablet by mouth daily 11/1/22 11/1/23  Verena Mayo MD   Alcohol Swabs (B-D SINGLE USE SWABS REGULAR) PADS USE AS DIRECTED FOUR TIMES DAILY  Patient not taking: No sig reported 8/15/22   Arti Verner Kil, MD   azelastine (OPTIVAR) 0.05 % ophthalmic solution  4/27/22   Mor Keen   ibuprofen (IBU) 600 MG tablet TAKE 1 TABLET FOUR TIMES DAILY AS NEEDED FOR PAIN 7/11/22   Karen Selby MD   ketoconazole (NIZORAL) 2 % cream APPLY TOPICALLY DAILY 5/19/22   Arti Verner Kil, MD   Nutritional Supplements (ENSURE HIGH PROTEIN) LIQD Take 1 Bottle by mouth in the morning, at noon, and at bedtime 4/13/22   Arti Verner Kil, MD   famotidine (PEPCID) 20 MG tablet Take 1 tablet by mouth nightly  Patient not taking: No sig reported 4/12/22   Bishop Aparicio,    ACETAMINOPHEN EXTRA STRENGTH 500 MG tablet TAKE 2 TABLETS BY MOUTH EVERY 6 HOURS AS NEEDED FOR PAIN 12/14/21   Arti Verner Kil, MD       REVIEW OF SYSTEMS    (2-9 systems for level 4, 10 ormore for level 5)      Review of Systems   Constitutional:  Positive for activity change, appetite change and fatigue. Negative for fever. HENT:  Negative for congestion, tinnitus, trouble swallowing and voice change.     Eyes:  Negative for discharge and itching. Respiratory:  Negative for apnea, chest tightness and shortness of breath. Cardiovascular:  Negative for chest pain. Gastrointestinal:  Positive for abdominal distention, nausea and vomiting. Endocrine: Negative for cold intolerance and heat intolerance. Genitourinary:  Negative for flank pain, frequency and urgency. Musculoskeletal: Negative. Skin:  Negative for color change. Allergic/Immunologic: Negative for immunocompromised state. Neurological:  Negative for dizziness, seizures and syncope. Psychiatric/Behavioral:  Negative for agitation, behavioral problems and confusion. PHYSICAL EXAM   (up to 7 for level 4, 8 or more for level 5)      INITIAL VITALS:   BP (!) 158/98   Pulse 54   Temp 99.7 °F (37.6 °C) (Oral)   Resp 12   Ht 5' 5\" (1.651 m)   Wt 110 lb (49.9 kg)   LMP 12/19/2022 (Approximate)   SpO2 95%   BMI 18.30 kg/m²     Physical Exam  Vitals reviewed. Constitutional:       General: She is not in acute distress. Appearance: She is ill-appearing and diaphoretic. HENT:      Head: Normocephalic and atraumatic. Nose: Nose normal.      Mouth/Throat:      Mouth: Mucous membranes are moist.      Pharynx: Oropharynx is clear. Eyes:      Extraocular Movements: Extraocular movements intact. Conjunctiva/sclera: Conjunctivae normal.      Pupils: Pupils are equal, round, and reactive to light. Cardiovascular:      Rate and Rhythm: Normal rate and regular rhythm. Pulses: Normal pulses. Heart sounds: Normal heart sounds. Pulmonary:      Effort: Pulmonary effort is normal.      Breath sounds: Normal breath sounds. Abdominal:      General: Abdomen is flat. There is no distension. Palpations: Abdomen is soft. There is no mass. Tenderness: There is abdominal tenderness. There is guarding. There is no rebound. Hernia: No hernia is present. Musculoskeletal:         General: Normal range of motion.       Cervical back: Normal range of motion and neck supple. Skin:     General: Skin is warm. Capillary Refill: Capillary refill takes less than 2 seconds. Neurological:      General: No focal deficit present. Mental Status: Mental status is at baseline. Psychiatric:         Mood and Affect: Mood normal.       DIFFERENTIAL  DIAGNOSIS     DDX: Cannabinoid hyperemesis, gastritis, enteritis, pancreatitis    PLAN (LABS / IMAGING / EKG):  Orders Placed This Encounter   Procedures    COVID-19, Rapid    XR ACUTE ABD SERIES CHEST 1 VW    CT ABDOMEN PELVIS WO CONTRAST Additional Contrast? None    CBC with Auto Differential    HCG Qualitative, Serum    Lactic Acid    Blood Gas, Venous    SPECIMEN REJECTION    Basic Metabolic Panel    PREVIOUS SPECIMEN    Lipase    Hepatic Function Panel    Magnesium    Lactic Acid    Inpatient consult to Internal Medicine    POC Glucose Fingerstick    Place in Observation Service       MEDICATIONS ORDERED:  Orders Placed This Encounter   Medications    ondansetron (ZOFRAN) injection 4 mg    famotidine (PEPCID) injection 20 mg    dicyclomine (BENTYL) injection 20 mg    0.9 % sodium chloride bolus    haloperidol lactate (HALDOL) injection 5 mg    0.9 % sodium chloride bolus    promethazine (PHENERGAN) injection 6.25 mg    morphine injection 4 mg           DIAGNOSTIC RESULTS / EMERGENCY DEPARTMENT COURSE / MDM     LABS:  Results for orders placed or performed during the hospital encounter of 12/30/22   COVID-19, Rapid    Specimen: Nasopharyngeal Swab   Result Value Ref Range    Specimen Description . NASOPHARYNGEAL SWAB     SARS-CoV-2, Rapid Not Detected Not Detected   CBC with Auto Differential   Result Value Ref Range    WBC 5.7 3.5 - 11.3 k/uL    RBC 3.73 (L) 3.95 - 5.11 m/uL    Hemoglobin 10.3 (L) 11.9 - 15.1 g/dL    Hematocrit 31.3 (L) 36.3 - 47.1 %    MCV 83.9 82.6 - 102.9 fL    MCH 27.6 25.2 - 33.5 pg    MCHC 32.9 28.4 - 34.8 g/dL    RDW 19.8 (H) 11.8 - 14.4 %    Platelets 641 785 - 044 k/uL    MPV 10.4 8.1 - 13.5 fL    NRBC Automated 0.0 0.0 per 100 WBC    Seg Neutrophils 51 36 - 65 %    Lymphocytes 34 24 - 43 %    Monocytes 14 (H) 3 - 12 %    Eosinophils % 0 (L) 1 - 4 %    Basophils 0 0 - 2 %    Immature Granulocytes 1 (H) 0 %    Segs Absolute 2.91 1.50 - 8.10 k/uL    Absolute Lymph # 1.93 1.10 - 3.70 k/uL    Absolute Mono # 0.81 0.10 - 1.20 k/uL    Absolute Eos # <0.03 0.00 - 0.44 k/uL    Basophils Absolute <0.03 0.00 - 0.20 k/uL    Absolute Immature Granulocyte 0.05 0.00 - 0.30 k/uL    RBC Morphology ANISOCYTOSIS PRESENT    HCG Qualitative, Serum   Result Value Ref Range    hCG Qual NEGATIVE NEGATIVE   Lactic Acid   Result Value Ref Range    Lactic Acid, Whole Blood 2.2 (H) 0.7 - 2.1 mmol/L   Blood Gas, Venous   Result Value Ref Range    pH, Magen 7.461 (H) 7.320 - 7.420    pCO2, Magen 34.3 (L) 39 - 55 mm Hg    pO2, Magen 33.9 30 - 50 mm Hg    HCO3, Venous 24.1 24 - 30 mmol/L    Positive Base Excess, Magen 1.1 0.0 - 2.0 mmol/L    O2 Sat, Magen 64.1 60.0 - 85.0 %    Carboxyhemoglobin 2.8 0 - 5 %    Pt Temp 37.0     FIO2 INFORMATION NOT PROVIDED    SPECIMEN REJECTION   Result Value Ref Range    Specimen Source . BLOOD     Ordered Test BMP,LIVP,LIPASE,MG     Reason for Rejection Unable to perform testing: Specimen hemolyzed.     Basic Metabolic Panel   Result Value Ref Range    Glucose 203 (H) 70 - 99 mg/dL    BUN 14 6 - 20 mg/dL    Creatinine 1.59 (H) 0.50 - 0.90 mg/dL    Est, Glom Filt Rate 45 (L) >60 mL/min/1.73m2    Calcium 8.1 (L) 8.6 - 10.4 mg/dL    Sodium 151 (H) 135 - 144 mmol/L    Potassium 3.8 3.7 - 5.3 mmol/L    Chloride 116 (H) 98 - 107 mmol/L    CO2 23 20 - 31 mmol/L    Anion Gap 12 9 - 17 mmol/L   Lipase   Result Value Ref Range    Lipase 21 13 - 60 U/L   Hepatic Function Panel   Result Value Ref Range    Albumin 3.2 (L) 3.5 - 5.2 g/dL    Alkaline Phosphatase 89 35 - 104 U/L    ALT 9 5 - 33 U/L    AST 23 <32 U/L    Total Bilirubin 0.3 0.3 - 1.2 mg/dL    Bilirubin, Direct <0.1 <0.3 mg/dL    Bilirubin, Indirect Can not be calculated 0.0 - 1.0 mg/dL    Total Protein 8.1 6.4 - 8.3 g/dL    Albumin/Globulin Ratio 0.7 (L) 1.0 - 2.5   Magnesium   Result Value Ref Range    Magnesium 1.9 1.6 - 2.6 mg/dL   Lactic Acid   Result Value Ref Range    Lactic Acid, Whole Blood 1.5 0.7 - 2.1 mmol/L   POC Glucose Fingerstick   Result Value Ref Range    POC Glucose 211 (H) 65 - 105 mg/dL         IMPRESSION/MDM/ED COURSE:  32 y.o. female presented with severe abdominal pain and multiple rounds of nausea and emesis for over a day. Patient has history of cannabinoid hyperemesis, will symptomatically treat, get an upright abdominal x-ray for evaluation of possible free air, low suspicion at this time for actual perforation, will fluid hydrate and reevaluate after labs and scans are returned. ED Course as of 12/30/22 1526   Fri Dec 30, 2022   0859 pH normal not DKA mild  [WK]   0859 H and H stable no WBC elevation normal plt [WK]   0900 Medication given for pain and nausea [WK]   1052 Lactate improvement [WK]   1052 New KAIT [WK]   1243 Patient still feeling unwell, have the patient placed in the observation unit fluid hydration analgesia. [ES]      ED Course User Index  [ES] Kerrie Pires MD  [WK] Negro Primrebecca, DO             RADIOLOGY:  CT ABDOMEN PELVIS WO CONTRAST Additional Contrast? None   Final Result   1. Bilateral 2 mm size calculi at the renal hilar most suggestive of vascular   calcifications rather than nephrolithiasis. No hydronephrosis. No ureteric   or bladder calculus. 2. No acute infective or inflammatory process. XR ACUTE ABD SERIES CHEST 1 VW   Final Result   No acute cardiopulmonary process. Nonobstructive bowel gas pattern. EKG  None    All EKG's are interpreted by the Emergency Department Physician who either signs or Co-signs this chart in the absence of a cardiologist.      PROCEDURES:  None    CONSULTS:  IP CONSULT TO INTERNAL MEDICINE        FINAL IMPRESSION      1.  Nausea and vomiting, unspecified vomiting type    2. Acute kidney injury ([facility] [address]          DISPOSITION / PLAN       DISPOSITION Admitted 12/30/2022 03:19:40 PM        PATIENT REFERREDTO:  No follow-up provider specified.     DISCHARGE MEDICATIONS:  New Prescriptions    No medications on file       Jake Almendarez MD  PGY 3  Resident Physician Emergency Medicine  12/30/22 3:26 PM        (Please note that portions of this note were completed with a voice recognition program.Efforts were made to edit the dictations but occasionally words are mis-transcribed.)       Jake Almendarez MD  Resident  12/30/22 7782       Jake Almendarez MD  Resident  12/30/22 4782

## 2022-12-30 NOTE — ED NOTES
Report given to observation nurse. Consulted Dr. Fadumo Boone OBS resident to get PRN pain med order.       Inés Varela RN  12/30/22 4035

## 2022-12-30 NOTE — ED NOTES
The following labs were labeled with appropriate pt sticker and tubed to lab:     [] Blue     [] Lavender   [] on ice  [] Green/yellow  [] Green/black [] on ice  [] Ange Sink  [] on ice  [] Yellow  [] Red  [] Type/ Screen  [] ABG  [] VBG    [x] COVID-19 swab    [] Rapid  [] PCR  [] Flu swab  [] Peds Viral Panel     [] Urine Sample  [] Fecal Sample  [] Pelvic Cultures  [] Blood Cultures  [] X 2  [] STREP Cultures         Kai Wang RN  12/30/22 8499

## 2022-12-30 NOTE — ED NOTES
Pt resting comfortably with eyes closed, even and nonlabored RR, patient denies any needs at this time. Bed in lowest position. Call light within reach, will continue to monitor.        Marianne Price RN  12/30/22 1778

## 2022-12-30 NOTE — ED NOTES
Pt resting comfortably with eyes closed, even and nonlabored RR, patient denies any needs at this time. Bed in lowest position. Call light within reach, will continue to monitor.        Petey Burnett RN  12/30/22 5339

## 2022-12-31 VITALS
DIASTOLIC BLOOD PRESSURE: 99 MMHG | HEART RATE: 64 BPM | WEIGHT: 110 LBS | TEMPERATURE: 98.3 F | SYSTOLIC BLOOD PRESSURE: 132 MMHG | HEIGHT: 65 IN | OXYGEN SATURATION: 98 % | RESPIRATION RATE: 18 BRPM | BODY MASS INDEX: 18.33 KG/M2

## 2022-12-31 LAB
ANION GAP SERPL CALCULATED.3IONS-SCNC: 11 MMOL/L (ref 9–17)
BUN BLDV-MCNC: 9 MG/DL (ref 6–20)
CALCIUM SERPL-MCNC: 7.6 MG/DL (ref 8.6–10.4)
CHLORIDE BLD-SCNC: 109 MMOL/L (ref 98–107)
CO2: 20 MMOL/L (ref 20–31)
CREAT SERPL-MCNC: 1.41 MG/DL (ref 0.5–0.9)
GFR SERPL CREATININE-BSD FRML MDRD: 52 ML/MIN/1.73M2
GLUCOSE BLD-MCNC: 135 MG/DL (ref 65–105)
GLUCOSE BLD-MCNC: 147 MG/DL (ref 70–99)
GLUCOSE BLD-MCNC: 188 MG/DL (ref 65–105)
GLUCOSE BLD-MCNC: 91 MG/DL (ref 65–105)
POTASSIUM SERPL-SCNC: 3.6 MMOL/L (ref 3.7–5.3)
SODIUM BLD-SCNC: 140 MMOL/L (ref 135–144)

## 2022-12-31 PROCEDURE — 2580000003 HC RX 258: Performed by: EMERGENCY MEDICINE

## 2022-12-31 PROCEDURE — 80048 BASIC METABOLIC PNL TOTAL CA: CPT

## 2022-12-31 PROCEDURE — G0378 HOSPITAL OBSERVATION PER HR: HCPCS

## 2022-12-31 PROCEDURE — 36415 COLL VENOUS BLD VENIPUNCTURE: CPT

## 2022-12-31 PROCEDURE — 6370000000 HC RX 637 (ALT 250 FOR IP): Performed by: STUDENT IN AN ORGANIZED HEALTH CARE EDUCATION/TRAINING PROGRAM

## 2022-12-31 PROCEDURE — 6370000000 HC RX 637 (ALT 250 FOR IP): Performed by: EMERGENCY MEDICINE

## 2022-12-31 RX ORDER — OXYCODONE HYDROCHLORIDE AND ACETAMINOPHEN 5; 325 MG/1; MG/1
1 TABLET ORAL EVERY 6 HOURS PRN
Qty: 8 TABLET | Refills: 0 | Status: SHIPPED | OUTPATIENT
Start: 2022-12-31 | End: 2023-01-02

## 2022-12-31 RX ORDER — ONDANSETRON 4 MG/1
4 TABLET, FILM COATED ORAL EVERY 6 HOURS PRN
Qty: 8 TABLET | Refills: 0 | Status: SHIPPED | OUTPATIENT
Start: 2022-12-31 | End: 2023-01-02

## 2022-12-31 RX ADMIN — PANTOPRAZOLE SODIUM 40 MG: 40 TABLET, DELAYED RELEASE ORAL at 08:40

## 2022-12-31 RX ADMIN — DICYCLOMINE HYDROCHLORIDE 20 MG: 10 CAPSULE ORAL at 08:40

## 2022-12-31 RX ADMIN — OXYCODONE HYDROCHLORIDE AND ACETAMINOPHEN 1 TABLET: 5; 325 TABLET ORAL at 01:06

## 2022-12-31 RX ADMIN — SODIUM CHLORIDE, PRESERVATIVE FREE 10 ML: 5 INJECTION INTRAVENOUS at 08:41

## 2022-12-31 RX ADMIN — DICYCLOMINE HYDROCHLORIDE 20 MG: 10 CAPSULE ORAL at 11:53

## 2022-12-31 RX ADMIN — LISINOPRIL 2.5 MG: 2.5 TABLET ORAL at 08:40

## 2022-12-31 ASSESSMENT — PAIN SCALES - WONG BAKER
WONGBAKER_NUMERICALRESPONSE: 0

## 2022-12-31 ASSESSMENT — PAIN DESCRIPTION - ORIENTATION: ORIENTATION: RIGHT;LEFT

## 2022-12-31 ASSESSMENT — PAIN SCALES - GENERAL
PAINLEVEL_OUTOF10: 8
PAINLEVEL_OUTOF10: 0

## 2022-12-31 ASSESSMENT — PAIN DESCRIPTION - PAIN TYPE: TYPE: ACUTE PAIN

## 2022-12-31 ASSESSMENT — PAIN DESCRIPTION - DESCRIPTORS: DESCRIPTORS: SHARP;STABBING;THROBBING

## 2022-12-31 ASSESSMENT — PAIN DESCRIPTION - ONSET: ONSET: ON-GOING

## 2022-12-31 ASSESSMENT — PAIN - FUNCTIONAL ASSESSMENT: PAIN_FUNCTIONAL_ASSESSMENT: ACTIVITIES ARE NOT PREVENTED

## 2022-12-31 ASSESSMENT — PAIN DESCRIPTION - LOCATION: LOCATION: ABDOMEN;FLANK

## 2022-12-31 ASSESSMENT — PAIN DESCRIPTION - FREQUENCY: FREQUENCY: CONTINUOUS

## 2022-12-31 NOTE — DISCHARGE SUMMARY
CDU Discharge Summary        Patient:  Billie Burrows  YOB: 1995    MRN: [id number]   Acct: [de-identified]    Primary Care Physician: Taylor Bryson MD    Admit date:  12/30/2022  7:02 AM  Discharge date: 12/31/2022    Discharge Diagnoses:     Acute abdominal pain, vomiting, KAIT due to unknown etiology  Improved with antiemetics, analgesia, IV fluids    Follow-up:  Call today/tomorrow for a follow up appointment with Taylor Bryson MD , or return to the Emergency Room with worsening symptoms    Stressed to patient the importance of following up with primary care doctor for further workup/management of symptoms. Pt verbalizes understanding and agrees with plan. Discharge Medications:  Changes to medications            Medication List        START taking these medications      ondansetron 4 MG tablet  Commonly known as: Zofran  Take 1 tablet by mouth every 6 hours as needed for Nausea     oxyCODONE-acetaminophen 5-325 MG per tablet  Commonly known as: PERCOCET  Take 1 tablet by mouth every 6 hours as needed for Pain for up to 2 days.  Max Daily Amount: 4 tablets            CONTINUE taking these medications      Acetaminophen Extra Strength 500 MG tablet  Generic drug: acetaminophen  TAKE 2 TABLETS BY MOUTH EVERY 6 HOURS AS NEEDED FOR PAIN     azelastine 0.05 % ophthalmic solution  Commonly known as: OPTIVAR     B-D SINGLE USE SWABS REGULAR Pads  USE AS DIRECTED FOUR TIMES DAILY     dicyclomine 10 MG capsule  Commonly known as: BENTYL  TAKE 1 CAPSULE THREE TIMES DAILY AS NEEDED FOR ABDOMINAL PAIN     Droplet Pen Needles 31G X 8 MM Misc  Generic drug: Insulin Pen Needle  Use 4 times daily as directed     * Ensure High Protein Liqd  Take 1 Bottle by mouth in the morning, at noon, and at bedtime     * Glucerna 1.5 Abel Liqd  Take 1 each by mouth in the morning, at noon, and at bedtime     ibuprofen 600 MG tablet  Commonly known as: IBU  TAKE 1 TABLET FOUR TIMES DAILY AS NEEDED FOR PAIN ketoconazole 2 % cream  Commonly known as: NIZORAL  APPLY TOPICALLY DAILY     Lantus SoloStar 100 UNIT/ML injection pen  Generic drug: insulin glargine  Inject 10 Units into the skin nightly     lisinopril 2.5 MG tablet  Commonly known as: PRINIVIL;ZESTRIL  TAKE 1 TABLET EVERY DAY     NovoLOG FlexPen 100 UNIT/ML injection pen  Generic drug: insulin aspart  Inject 2 to 12 units subcutaneously three times daily per sliding scale     ONE TOUCH ULTRA 2 w/Device Kit  1 kit by Does not apply route in the morning, at noon, in the evening, and at bedtime     [employer] Calcium/D 500-200 MG-UNIT Tabs  Take 1 tablet by mouth daily     pantoprazole 40 MG tablet  Commonly known as: PROTONIX  Take 1 tablet by mouth every morning (before breakfast)     pregabalin 25 MG capsule  Commonly known as: Lyrica  Take 1 capsule by mouth every evening for 30 days. rOPINIRole 0.25 MG tablet  Commonly known as: REQUIP  Take 1 tablet every night     True Metrix Blood Glucose Test strip  Generic drug: blood glucose test strips  As needed. TRUEplus Lancets 33G Misc  Use 4 times daily as directed           * This list has 2 medication(s) that are the same as other medications prescribed for you. Read the directions carefully, and ask your doctor or other care provider to review them with you. STOP taking these medications      famotidine 20 MG tablet  Commonly known as: PEPCID     ondansetron 4 MG disintegrating tablet  Commonly known as: Zofran ODT               Where to Get Your Medications        You can get these medications from any pharmacy    Bring a paper prescription for each of these medications  ondansetron 4 MG tablet  oxyCODONE-acetaminophen 5-325 MG per tablet         Diet:  ADULT DIET;  Regular , Advance as tolerated     Activity:  As tolerated    Consultants: IP CONSULT TO INTERNAL MEDICINE    Procedures:  Not indicated     Diagnostic Test:   Results for orders placed or performed during the hospital encounter of 12/30/22   COVID-19, Rapid    Specimen: Nasopharyngeal Swab   Result Value Ref Range    Specimen Description . NASOPHARYNGEAL SWAB     SARS-CoV-2, Rapid Not Detected Not Detected   CBC with Auto Differential   Result Value Ref Range    WBC 5.7 3.5 - 11.3 k/uL    RBC 3.73 (L) 3.95 - 5.11 m/uL    Hemoglobin 10.3 (L) 11.9 - 15.1 g/dL    Hematocrit 31.3 (L) 36.3 - 47.1 %    MCV 83.9 82.6 - 102.9 fL    MCH 27.6 25.2 - 33.5 pg    MCHC 32.9 28.4 - 34.8 g/dL    RDW 19.8 (H) 11.8 - 14.4 %    Platelets 341 765 - 773 k/uL    MPV 10.4 8.1 - 13.5 fL    NRBC Automated 0.0 0.0 per 100 WBC    Seg Neutrophils 51 36 - 65 %    Lymphocytes 34 24 - 43 %    Monocytes 14 (H) 3 - 12 %    Eosinophils % 0 (L) 1 - 4 %    Basophils 0 0 - 2 %    Immature Granulocytes 1 (H) 0 %    Segs Absolute 2.91 1.50 - 8.10 k/uL    Absolute Lymph # 1.93 1.10 - 3.70 k/uL    Absolute Mono # 0.81 0.10 - 1.20 k/uL    Absolute Eos # <0.03 0.00 - 0.44 k/uL    Basophils Absolute <0.03 0.00 - 0.20 k/uL    Absolute Immature Granulocyte 0.05 0.00 - 0.30 k/uL    RBC Morphology ANISOCYTOSIS PRESENT    HCG Qualitative, Serum   Result Value Ref Range    hCG Qual NEGATIVE NEGATIVE   Lactic Acid   Result Value Ref Range    Lactic Acid, Whole Blood 2.2 (H) 0.7 - 2.1 mmol/L   Blood Gas, Venous   Result Value Ref Range    pH, Magen 7.461 (H) 7.320 - 7.420    pCO2, Magen 34.3 (L) 39 - 55 mm Hg    pO2, Magen 33.9 30 - 50 mm Hg    HCO3, Venous 24.1 24 - 30 mmol/L    Positive Base Excess, Magen 1.1 0.0 - 2.0 mmol/L    O2 Sat, Magen 64.1 60.0 - 85.0 %    Carboxyhemoglobin 2.8 0 - 5 %    Pt Temp 37.0     FIO2 INFORMATION NOT PROVIDED    SPECIMEN REJECTION   Result Value Ref Range    Specimen Source . BLOOD     Ordered Test BMP,LIVP,LIPASE,MG     Reason for Rejection Unable to perform testing: Specimen hemolyzed.     Basic Metabolic Panel   Result Value Ref Range    Glucose 203 (H) 70 - 99 mg/dL    BUN 14 6 - 20 mg/dL    Creatinine 1.59 (H) 0.50 - 0.90 mg/dL    Est, Glom Filt Rate 45 (L) >60 mL/min/1.73m2    Calcium 8.1 (L) 8.6 - 10.4 mg/dL    Sodium 151 (H) 135 - 144 mmol/L    Potassium 3.8 3.7 - 5.3 mmol/L    Chloride 116 (H) 98 - 107 mmol/L    CO2 23 20 - 31 mmol/L    Anion Gap 12 9 - 17 mmol/L   Lipase   Result Value Ref Range    Lipase 21 13 - 60 U/L   Hepatic Function Panel   Result Value Ref Range    Albumin 3.2 (L) 3.5 - 5.2 g/dL    Alkaline Phosphatase 89 35 - 104 U/L    ALT 9 5 - 33 U/L    AST 23 <32 U/L    Total Bilirubin 0.3 0.3 - 1.2 mg/dL    Bilirubin, Direct <0.1 <0.3 mg/dL    Bilirubin, Indirect Can not be calculated 0.0 - 1.0 mg/dL    Total Protein 8.1 6.4 - 8.3 g/dL    Albumin/Globulin Ratio 0.7 (L) 1.0 - 2.5   Magnesium   Result Value Ref Range    Magnesium 1.9 1.6 - 2.6 mg/dL   Lactic Acid   Result Value Ref Range    Lactic Acid, Whole Blood 1.5 0.7 - 2.1 mmol/L   Basic Metabolic Panel w/ Reflex to MG   Result Value Ref Range    Glucose 147 (H) 70 - 99 mg/dL    BUN 9 6 - 20 mg/dL    Creatinine 1.41 (H) 0.50 - 0.90 mg/dL    Est, Glom Filt Rate 52 (L) >60 mL/min/1.73m2    Calcium 7.6 (L) 8.6 - 10.4 mg/dL    Sodium 140 135 - 144 mmol/L    Potassium 3.6 (L) 3.7 - 5.3 mmol/L    Chloride 109 (H) 98 - 107 mmol/L    CO2 20 20 - 31 mmol/L    Anion Gap 11 9 - 17 mmol/L   POC Glucose Fingerstick   Result Value Ref Range    POC Glucose 211 (H) 65 - 105 mg/dL   POC Glucose Fingerstick   Result Value Ref Range    POC Glucose 201 (H) 65 - 105 mg/dL   POC Glucose Fingerstick   Result Value Ref Range    POC Glucose 188 (H) 65 - 105 mg/dL   POC Glucose Fingerstick   Result Value Ref Range    POC Glucose 91 65 - 105 mg/dL   POC Glucose Fingerstick   Result Value Ref Range    POC Glucose 135 (H) 65 - 105 mg/dL     CT ABDOMEN PELVIS WO CONTRAST Additional Contrast? None    Result Date: 12/30/2022  EXAMINATION: CT OF THE ABDOMEN AND PELVIS WITHOUT CONTRAST 12/30/2022 10:49 am TECHNIQUE: CT of the abdomen and pelvis was performed without the administration of intravenous contrast. Multiplanar reformatted images are provided for review. Automated exposure control, iterative reconstruction, and/or weight based adjustment of the mA/kV was utilized to reduce the radiation dose to as low as reasonably achievable. COMPARISON: 04/11/2022 HISTORY: ORDERING SYSTEM PROVIDED HISTORY: severe abd pain and emesis TECHNOLOGIST PROVIDED HISTORY: severe abd pain and emesis Decision Support Exception - unselect if not a suspected or confirmed emergency medical condition->Emergency Medical Condition (MA) Is the patient pregnant?->No FINDINGS: Lower Chest: The visualized heart and lungs show no acute abnormalities. Organs: Liver, spleen, pancreas, adrenal glands show no significant abnormalities. Gallbladder unremarkable. A few scattered bilateral calcifications averaging 2 mm in size at the renal hilum. On coronal imaging these appear to be vascular calcifications rather nephrolithiasis. No ureteric calculus. GI/Bowel: There is limited evaluation due to absence of oral contrast. The stomach shows no focal lesions. Small bowel loops normal in caliber showing no focal abnormalities. Normal appendix. Evaluation of the colon shows no acute process. Pelvis: No bladder calculus. Unremarkable uterus. No suspicious pelvic mass. Peritoneum/Retroperitoneum: No free intraperitoneal fluid or significant lymphadenopathy. Mild atherosclerotic disease. Bones/Soft Tissues: No acute abnormality of the bones. The superficial soft tissues show no significant abnormalities. 1. Bilateral 2 mm size calculi at the renal hilar most suggestive of vascular calcifications rather than nephrolithiasis. No hydronephrosis. No ureteric or bladder calculus. 2. No acute infective or inflammatory process. XR ACUTE ABD SERIES CHEST 1 VW    Result Date: 12/30/2022  EXAMINATION: TWO XRAY VIEWS OF THE ABDOMEN AND SINGLE  XRAY VIEW OF THE CHEST 12/30/2022 8:37 am COMPARISON: None.  HISTORY: ORDERING SYSTEM PROVIDED HISTORY: looking for free air TECHNOLOGIST PROVIDED HISTORY: Upright sitting looking for free air FINDINGS: The lungs are without consolidation or effusion. There is no pneumothorax. The mediastinal structures are unremarkable. The extrathoracic soft tissues are unremarkable. There is a nonobstructive bowel gas pattern. There is no free air. There are no suspicious calcifications. There is no acute osseous abnormality. The surrounding soft tissues are unremarkable. No acute cardiopulmonary process. Nonobstructive bowel gas pattern. Physical Exam:    General appearance - NAD, AOx 3   Lungs -CTAB, no R/R/R  Heart - RRR, no M/R/G  Abdomen - Soft, ND, mild generalized abdominal pain  Neurological:  MAEx4, No focal motor deficit, sensory loss  Extremities - Cap refil <2 sec in all ext., no edema  Skin -warm, dry      Hospital Course:  Clinical course has improved, labs and imaging reviewed. Sabrina Silva originally presented to the hospital on 12/30/2022  7:02 AM. with KAIT, abdominal pain, vomiting. At that time it was determined that She required further observation and symptom management. She was admitted and labs and imaging were followed daily. Imaging results as above. She is medically stable to be discharged. Disposition: Home    Patient stated that they will not drive themselves home from the hospital if they have gotten pain killers/ narcotics earlier that day and that they will arrange for transportation on their own or work with the  for a ride. Patient counseled NOT to drive while under the influence of narcotics/ pain killers. Condition: Good    Patient stable and ready for discharge home. I have discussed plan of care with patient and they are in understanding. They were instructed to read discharge paperwork. All of their questions and concerns were addressed.      Time Spent: 1 day      --  Coby Tran MD  Emergency Medicine Resident Physician    This dictation was generated by voice recognition computer software. Although all attempts are made to edit the dictation for accuracy, there may be errors in the transcription that are not intended.

## 2022-12-31 NOTE — PROGRESS NOTES
[facility]  CDU / OBSERVATION ENCOUNTER  ATTENDING NOTE       I performed a history and physical examination of the patient and discussed management with the resident or midlevel provider. I reviewed the resident or midlevel provider's note and agree with the documented findings and plan of care. Any areas of disagreement are noted on the chart. I was personally present for the key portions of any procedures. I have documented in the chart those procedures where I was not present during the key portions. I have reviewed the nurses notes. I agree with the chief complaint, past medical history, past surgical history, allergies, medications, social and family history as documented unless otherwise noted below. The Family history, social history, and ROS are effectively unchanged since admission unless noted elsewhere in the chart. Patient with nausea and vomiting for several days. Patient was seen yesterday and left prior to completion of treatment. Patient was unable to eat and drink and had having diarrhea and came in for ongoing symptoms. Patient had inability to handle any p.o. Patient has not been taking anything over-the-counter. Patient actively vomiting during ED evaluation. Patient with ED work-up without specific cause. History of possible hyperemesis secondary to cannabinoid Lonne Blunt. Patient treated symptomatically with effectively normal labs. Lactate improved. Patient therefore admitted to observation unit after initial treatment in ED. Much improved.   Patient acceptable for discharge    Hayley Reis MD  Attending Emergency  Physician

## 2022-12-31 NOTE — H&P
[facility]  CDU / OBSERVATION eNCOUnter  Resident Note     Pt Name: Mary Jo Emanuel  MRN: [id number]  [place] 1995  Date of evaluation: 12/31/22  Patient's PCP is :  Juan Anglin MD    [address] [place] [address]       Chief Complaint   Patient presents with    Emesis         HISTORY OF PRESENT ILLNESS    Mary Jo Emanuel is a 32 y.o. female who presents intractable nausea and vomiting for the past several days. Patient also complains of generalized abdominal pain. Patient has a history of cannabinoid use as well has type 1 diabetes. Patient currently denies fever, chest pain, shortness of breath, constipation, dysuria, urinary urgency or frequency. Location/Symptom: Abdominal pain  Timing/Onset: Several days  Provocation: No  Quality: Sharp  Radiation: No  Severity: 10/10  Timing/Duration: Several days  Modifying Factors: Fluids, antiemetics    REVIEW OF SYSTEMS     General ROS - No fevers, No malaise   Ophthalmic ROS - No discharge, No changes in vision  ENT ROS -  No sore throat, No rhinorrhea,   Respiratory ROS - no shortness of breath, no cough, no  wheezing  Cardiovascular ROS - No chest pain, no dyspnea on exertion  Gastrointestinal ROS - abdominal pain, nausea and vomiting, no change in bowel habits, no black or bloody stools  Genito-Urinary ROS - No dysuria, trouble voiding, or hematuria  Musculoskeletal ROS - No myalgias, No arthalgias  Neurological ROS - No headache, no dizziness/lightheadedness, No focal weakness, no loss of sensation  Dermatological ROS - No lesions, No rash     (PQRS) Advance directives on face sheet per hospital policy. No change unless specifically mentioned in chart    PAST MEDICAL HISTORY    has a past medical history of Hypertension, Kidney infection, MRSA (methicillin resistant staph aureus) culture positive, Nausea & vomiting, Ovarian cyst, Secondary osteoporosis, Type 1 diabetes mellitus ([facility] [address] and UTI (urinary tract infection).     I have reviewed the past medical history with the patient and it is pertinent to this complaint. SURGICAL HISTORY      has a past surgical history that includes  section and Upper gastrointestinal endoscopy (N/A, 2021). I have reviewed and agree with Surgical History entered and it is not pertinent to this complaint. CURRENT MEDICATIONS     dicyclomine (BENTYL) capsule 20 mg, TID AC  famotidine (PEPCID) tablet 20 mg, Nightly  lisinopril (PRINIVIL;ZESTRIL) tablet 2.5 mg, Daily  ondansetron (ZOFRAN-ODT) disintegrating tablet 4 mg, Q8H PRN  pantoprazole (PROTONIX) tablet 40 mg, QAM AC  rOPINIRole (REQUIP) tablet 0.25 mg, Nightly  sodium chloride flush 0.9 % injection 5-40 mL, 2 times per day  sodium chloride flush 0.9 % injection 5-40 mL, PRN  0.9 % sodium chloride infusion, PRN  enoxaparin Sodium (LOVENOX) injection 30 mg, Daily  acetaminophen (TYLENOL) tablet 650 mg, Q4H PRN  ondansetron (ZOFRAN-ODT) disintegrating tablet 4 mg, Q8H PRN   Or  ondansetron (ZOFRAN) injection 4 mg, Q6H PRN  oxyCODONE-acetaminophen (PERCOCET) 5-325 MG per tablet 1 tablet, Q6H PRN  glucose chewable tablet 16 g, PRN  dextrose bolus 10% 125 mL, PRN   Or  dextrose bolus 10% 250 mL, PRN  glucagon (rDNA) injection 1 mg, PRN  dextrose 10 % infusion, Continuous PRN  insulin lispro (HUMALOG) injection vial 0-8 Units, TID WC  insulin lispro (HUMALOG) injection vial 0-4 Units, Nightly  morphine (PF) injection 2 mg, Q3H PRN        All medication charted and reviewed. ALLERGIES     is allergic to blueberry [vaccinium angustifolium] and shrimp flavor. FAMILY HISTORY     She indicated that her mother is . She indicated that her father is . The patient states both parents are . I have reviewed and agree with the family history entered. I have reviewed the Family History and it is not significant to the case    SOCIAL HISTORY      reports that she has been smoking cigarettes.  She has been smoking an average of .5 packs per day. She has never used smokeless tobacco. She reports current drug use. Drug: Marijuana Mesharebecca Carlson). She reports that she does not drink alcohol. I have reviewed and agree with all Social.  There are concerns for substance abuse/use. PHYSICAL EXAM     INITIAL VITALS:  height is 5' 5\" (1.651 m) and weight is 110 lb (49.9 kg). Her oral temperature is 98.3 °F (36.8 °C). Her blood pressure is 132/99 (abnormal) and her pulse is 64. Her respiration is 18 and oxygen saturation is 98%. CONSTITUTIONAL: AOx4, no apparent distress, appears stated age    HEAD: normocephalic, atraumatic   EYES: PERRLA, EOMI    ENT: moist mucous membranes, uvula midline   NECK: supple, symmetric   BACK: symmetric   LUNGS: clear to auscultation bilaterally   CARDIOVASCULAR: regular rate and rhythm, no murmurs, rubs or gallops   ABDOMEN: soft, tender, non-distended with normal active bowel sounds   NEUROLOGIC:  MAEx4, no focal sensory or motor deficits   MUSCULOSKELETAL: no clubbing, cyanosis or edema   SKIN: no rash or wounds       DIFFERENTIAL DIAGNOSIS/MDM:   Vomiting:  DDX: Cannabinoid hyperemesis syndrome, SBO, DKA, Abdominal (gastroenteritis, appendicitis, gallbladder, pancreatitis, PUD, perforation), ICH, meningitis, vertigo, hyperemesis, abnormal lytes, EtOH intoxication/ tox, post-tussive, ACS/ MI.    DIAGNOSTIC RESULTS   RADIOLOGY:   I directly visualized the following  images and reviewed the radiologist interpretations:    CT ABDOMEN PELVIS WO CONTRAST Additional Contrast? None    Result Date: 12/30/2022  EXAMINATION: CT OF THE ABDOMEN AND PELVIS WITHOUT CONTRAST 12/30/2022 10:49 am TECHNIQUE: CT of the abdomen and pelvis was performed without the administration of intravenous contrast. Multiplanar reformatted images are provided for review.  Automated exposure control, iterative reconstruction, and/or weight based adjustment of the mA/kV was utilized to reduce the radiation dose to as low as reasonably achievable. COMPARISON: 04/11/2022 HISTORY: ORDERING SYSTEM PROVIDED HISTORY: severe abd pain and emesis TECHNOLOGIST PROVIDED HISTORY: severe abd pain and emesis Decision Support Exception - unselect if not a suspected or confirmed emergency medical condition->Emergency Medical Condition (MA) Is the patient pregnant?->No FINDINGS: Lower Chest: The visualized heart and lungs show no acute abnormalities. Organs: Liver, spleen, pancreas, adrenal glands show no significant abnormalities. Gallbladder unremarkable. A few scattered bilateral calcifications averaging 2 mm in size at the renal hilum. On coronal imaging these appear to be vascular calcifications rather nephrolithiasis. No ureteric calculus. GI/Bowel: There is limited evaluation due to absence of oral contrast. The stomach shows no focal lesions. Small bowel loops normal in caliber showing no focal abnormalities. Normal appendix. Evaluation of the colon shows no acute process. Pelvis: No bladder calculus. Unremarkable uterus. No suspicious pelvic mass. Peritoneum/Retroperitoneum: No free intraperitoneal fluid or significant lymphadenopathy. Mild atherosclerotic disease. Bones/Soft Tissues: No acute abnormality of the bones. The superficial soft tissues show no significant abnormalities. 1. Bilateral 2 mm size calculi at the renal hilar most suggestive of vascular calcifications rather than nephrolithiasis. No hydronephrosis. No ureteric or bladder calculus. 2. No acute infective or inflammatory process. XR ACUTE ABD SERIES CHEST 1 VW    Result Date: 12/30/2022  EXAMINATION: TWO XRAY VIEWS OF THE ABDOMEN AND SINGLE  XRAY VIEW OF THE CHEST 12/30/2022 8:37 am COMPARISON: None. HISTORY: ORDERING SYSTEM PROVIDED HISTORY: looking for free air TECHNOLOGIST PROVIDED HISTORY: Upright sitting looking for free air FINDINGS: The lungs are without consolidation or effusion. There is no pneumothorax. The mediastinal structures are unremarkable.   The extrathoracic soft tissues are unremarkable. There is a nonobstructive bowel gas pattern. There is no free air. There are no suspicious calcifications. There is no acute osseous abnormality. The surrounding soft tissues are unremarkable. No acute cardiopulmonary process. Nonobstructive bowel gas pattern. LABS:  I have reviewed and interpreted all available lab results.   Labs Reviewed   CBC WITH AUTO DIFFERENTIAL - Abnormal; Notable for the following components:       Result Value    RBC 3.73 (*)     Hemoglobin 10.3 (*)     Hematocrit 31.3 (*)     RDW 19.8 (*)     Monocytes 14 (*)     Eosinophils % 0 (*)     Immature Granulocytes 1 (*)     All other components within normal limits   LACTIC ACID - Abnormal; Notable for the following components:    Lactic Acid, Whole Blood 2.2 (*)     All other components within normal limits   BLOOD GAS, VENOUS - Abnormal; Notable for the following components:    pH, Magen 7.461 (*)     pCO2, Magen 34.3 (*)     All other components within normal limits   BASIC METABOLIC PANEL - Abnormal; Notable for the following components:    Glucose 203 (*)     Creatinine 1.59 (*)     Est, Glom Filt Rate 45 (*)     Calcium 8.1 (*)     Sodium 151 (*)     Chloride 116 (*)     All other components within normal limits   HEPATIC FUNCTION PANEL - Abnormal; Notable for the following components:    Albumin 3.2 (*)     Albumin/Globulin Ratio 0.7 (*)     All other components within normal limits   POC GLUCOSE FINGERSTICK - Abnormal; Notable for the following components:    POC Glucose 211 (*)     All other components within normal limits   POC GLUCOSE FINGERSTICK - Abnormal; Notable for the following components:    POC Glucose 201 (*)     All other components within normal limits   POC GLUCOSE FINGERSTICK - Abnormal; Notable for the following components:    POC Glucose 188 (*)     All other components within normal limits   COVID-19, RAPID   HCG, SERUM, QUALITATIVE   SPECIMEN REJECTION   LIPASE MAGNESIUM   LACTIC ACID   PREVIOUS SPECIMEN   POC GLUCOSE FINGERSTICK   POCT GLUCOSE   POCT GLUCOSE   POCT GLUCOSE   POCT GLUCOSE     CDU IMPRESSION / PLAN      Tran De La Cruz is a 32 y.o. female who presents with hyperemesis, abdominal pain, KAIT    KAIT  Patient received 2 L normal saline boluses while in the emergency department. Will recheck BMP this morning. If creatinine is still elevated we will give 1L NS fluid bolus. Repeat BMP showed creatinine improving. Hyperemesis  Symptom management  Continue home medications and pain control  Monitor vitals, labs, and imaging    DISPO: Patient was able to eat some of her breakfast and lunch without vomiting. Patient is medically cleared for discharge. CONSULTS:    IP CONSULT TO INTERNAL MEDICINE    PROCEDURES:  Not indicated       PATIENT REFERRED TO:    No follow-up provider specified. --  Poppy Davis MD   Emergency Medicine Resident     This dictation was generated by voice recognition computer software. Although all attempts are made to edit the dictation for accuracy, there may be errors in the transcription that are not intended.

## 2022-12-31 NOTE — DISCHARGE INSTRUCTIONS
You need to call and schedule follow-up appointment with your primary care provider. Take your medication as prescribed. Return to the emergency department immediately if you experience worsening symptoms including fever, chest pain, shortness of breath, worsening abdominal pain, blood in your vomit, back pain, changes in urinary function including painful urination, frequency, urgency, blood in your urine, vaginal discharge.

## 2022-12-31 NOTE — PLAN OF CARE
Problem: Chronic Conditions and Co-morbidities  Goal: Patient's chronic conditions and co-morbidity symptoms are monitored and maintained or improved  12/30/2022 2023 by Jessika Feliciano RN  Outcome: Progressing  12/30/2022 1746 by Jaswinder Henry RN  Outcome: Progressing     Problem: Pain  Goal: Verbalizes/displays adequate comfort level or baseline comfort level  12/30/2022 2023 by Jessika Feliciano RN  Outcome: Progressing  Flowsheets (Taken 12/30/2022 1959)  Verbalizes/displays adequate comfort level or baseline comfort level: Assess pain using appropriate pain scale  12/30/2022 1746 by Jaswinder Henry RN  Outcome: Progressing     Problem: Safety - Adult  Goal: Free from fall injury  Outcome: Progressing

## 2023-01-04 ENCOUNTER — CARE COORDINATION (OUTPATIENT)
Dept: CARE COORDINATION | Age: 28
End: 2023-01-04

## 2023-01-05 NOTE — CARE COORDINATION
called and spoke to Kaylee. Kaylee reports that she is doing \"ok\". Kaylee reports that she is still behind on rent. Kaylee reports that she does not have an eviction notice however her landlord is requesting back rent paid by February.  inquired if Kaylee wanted to apply for help through [employer] again? Kaylee will consider it and get back with .  also informed Kaylee she has a bus pass for her however has not resources that assist with reduce prices of bus passes. Plan:    will follow up with Kaylee regarding [employer] appication.  will follow up with [employer] regarding bus pass.

## 2023-01-07 ENCOUNTER — HOSPITAL ENCOUNTER (EMERGENCY)
Age: 28
Discharge: HOME | End: 2023-01-07
Attending: EMERGENCY MEDICINE
Payer: MEDICARE

## 2023-01-07 ENCOUNTER — APPOINTMENT (OUTPATIENT)
Dept: GENERAL RADIOLOGY | Age: 28
End: 2023-01-07
Payer: MEDICARE

## 2023-01-07 VITALS
RESPIRATION RATE: 20 BRPM | HEART RATE: 81 BPM | DIASTOLIC BLOOD PRESSURE: 85 MMHG | WEIGHT: 100 LBS | BODY MASS INDEX: 16.64 KG/M2 | TEMPERATURE: 98.2 F | OXYGEN SATURATION: 95 % | SYSTOLIC BLOOD PRESSURE: 159 MMHG

## 2023-01-07 DIAGNOSIS — S62.307A CLOSED NONDISPLACED FRACTURE OF FIFTH METACARPAL BONE OF LEFT HAND, UNSPECIFIED PORTION OF METACARPAL, INITIAL ENCOUNTER: Primary | ICD-10-CM

## 2023-01-07 PROCEDURE — 73130 X-RAY EXAM OF HAND: CPT

## 2023-01-07 PROCEDURE — 99283 EMERGENCY DEPT VISIT LOW MDM: CPT

## 2023-01-07 PROCEDURE — 29125 APPL SHORT ARM SPLINT STATIC: CPT

## 2023-01-07 PROCEDURE — 6370000000 HC RX 637 (ALT 250 FOR IP): Performed by: STUDENT IN AN ORGANIZED HEALTH CARE EDUCATION/TRAINING PROGRAM

## 2023-01-07 RX ORDER — OXYCODONE HYDROCHLORIDE 5 MG/1
5 TABLET ORAL EVERY 6 HOURS PRN
Qty: 4 TABLET | Refills: 0 | Status: SHIPPED | OUTPATIENT
Start: 2023-01-07 | End: 2023-01-10

## 2023-01-07 RX ORDER — IBUPROFEN 600 MG/1
600 TABLET ORAL 4 TIMES DAILY PRN
Qty: 40 TABLET | Refills: 0 | Status: SHIPPED | OUTPATIENT
Start: 2023-01-07 | End: 2023-01-12

## 2023-01-07 RX ORDER — ACETAMINOPHEN 500 MG
1000 TABLET ORAL ONCE
Status: COMPLETED | OUTPATIENT
Start: 2023-01-07 | End: 2023-01-07

## 2023-01-07 RX ORDER — IBUPROFEN 800 MG/1
800 TABLET ORAL ONCE
Status: COMPLETED | OUTPATIENT
Start: 2023-01-07 | End: 2023-01-07

## 2023-01-07 RX ORDER — ACETAMINOPHEN 500 MG
1000 TABLET ORAL 3 TIMES DAILY
Qty: 60 TABLET | Refills: 0 | Status: SHIPPED | OUTPATIENT
Start: 2023-01-07 | End: 2023-01-17

## 2023-01-07 RX ADMIN — IBUPROFEN 800 MG: 800 TABLET, FILM COATED ORAL at 10:24

## 2023-01-07 RX ADMIN — ACETAMINOPHEN 1000 MG: 500 TABLET ORAL at 10:24

## 2023-01-07 ASSESSMENT — ENCOUNTER SYMPTOMS
DIARRHEA: 0
NAUSEA: 0
SHORTNESS OF BREATH: 0
VOMITING: 0
ABDOMINAL PAIN: 0
RHINORRHEA: 0
COLOR CHANGE: 0

## 2023-01-07 ASSESSMENT — PAIN - FUNCTIONAL ASSESSMENT: PAIN_FUNCTIONAL_ASSESSMENT: 0-10

## 2023-01-07 ASSESSMENT — PAIN SCALES - GENERAL: PAINLEVEL_OUTOF10: 10

## 2023-01-07 NOTE — ED PROVIDER NOTES
[facility] ED  Emergency Department Encounter  Emergency Medicine Resident     Pt Rinaldo Epley  MRN: [id number]  [place] 1995  Date of evaluation: 23  PCP:  Lashay Romero MD      CHIEF COMPLAINT       Chief Complaint   Patient presents with    Hand Injury     left       HISTORY OF PRESENT ILLNESS  (Location/Symptom, Timing/Onset, Context/Setting, Quality, Duration, Modifying Factors, Severity.)      Rebecca Shirley is a 32 y.o. female who presents with left hand pain and swelling. Past medical history sniffing for type 1 diabetes. Patient states she fell down some steps yesterday while getting some laundry and fell weird on her left hand. Denies any loss of consciousness numbness or tingling in the hand. Did not strike her head. Denies a blood thinner use. PAST MEDICAL / SURGICAL / SOCIAL / FAMILY HISTORY      has a past medical history of Hypertension, Kidney infection, MRSA (methicillin resistant staph aureus) culture positive, Nausea & vomiting, Ovarian cyst, Secondary osteoporosis, Type 1 diabetes mellitus ([address] and UTI (urinary tract infection). has a past surgical history that includes  section and Upper gastrointestinal endoscopy (N/A, 2021).       Social History     Socioeconomic History    Marital status: Single     Spouse name: Not on file    Number of children: Not on file    Years of education: Not on file    Highest education level: Not on file   Occupational History    Not on file   Tobacco Use    Smoking status: Every Day     Packs/day: 0.50     Types: Cigarettes    Smokeless tobacco: Never    Tobacco comments:     5/day   Substance and Sexual Activity    Alcohol use: No    Drug use: Yes     Types: Marijuana (Weed)     Comment: 1-2x per day    Sexual activity: Never   Other Topics Concern    Not on file   Social History Narrative    Not on file     Social Determinants of Health     Financial Resource Strain: High Risk Difficulty of Paying Living Expenses: Hard   Food Insecurity: Food Insecurity Present    Worried About Running Out of Food in the Last Year: Often true    Ran Out of Food in the Last Year: Often true   Transportation Needs: Not on file   Physical Activity: Not on file   Stress: Not on file   Social Connections: Not on file   Intimate Partner Violence: Not on file   Housing Stability: Not on file       History reviewed. No pertinent family history. Allergies:  Blueberry [vaccinium angustifolium] and Shrimp flavor    Home Medications:  Prior to Admission medications    Medication Sig Start Date End Date Taking? Authorizing Provider   acetaminophen (TYLENOL) 500 MG tablet Take 2 tablets by mouth 3 times daily for 10 days 1/7/23 1/17/23 Yes Creed Pries, DO   ibuprofen (ADVIL;MOTRIN) 600 MG tablet Take 1 tablet by mouth 4 times daily as needed for Pain 1/7/23 1/17/23 Yes Creed Pries, DO   oxyCODONE (ROXICODONE) 5 MG immediate release tablet Take 1 tablet by mouth every 6 hours as needed for Pain for up to 3 days. Intended supply: 3 days. Take lowest dose possible to manage pain Max Daily Amount: 20 mg 1/7/23 1/10/23 Yes Creed Prijulia, DO   Blood Glucose Monitoring Suppl (ONE TOUCH ULTRA 2) w/Device KIT 1 kit by Does not apply route in the morning, at noon, in the evening, and at bedtime 11/15/22   Emperatriz Coelho MD   pantoprazole (PROTONIX) 40 MG tablet Take 1 tablet by mouth every morning (before breakfast) 11/8/22   Emperatriz Coelho MD   dicyclomine (BENTYL) 10 MG capsule TAKE 1 CAPSULE THREE TIMES DAILY AS NEEDED FOR ABDOMINAL PAIN 11/8/22   Emperatriz Coelho MD   Insulin Pen Needle (DROPLET PEN NEEDLES) 31G X 8 MM MISC Use 4 times daily as directed 11/8/22   Emperatriz Coelho MD   TRUEplus Lancets 33G MISC Use 4 times daily as directed 11/8/22   Emperatriz Coelho MD   blood glucose test strips (TRUE METRIX BLOOD GLUCOSE TEST) strip As needed.  11/8/22   Emperatriz Coelho MD   pregabalin (LYRICA) 25 MG capsule Take 1 capsule by mouth every evening for 30 days. 11/8/22 12/8/22  Emperatriz Ny MD   insulin glargine (LANTUS SOLOSTAR) 100 UNIT/ML injection pen Inject 10 Units into the skin nightly 11/8/22   Emperatriz Ny MD   lisinopril (PRINIVIL;ZESTRIL) 2.5 MG tablet TAKE 1 TABLET EVERY DAY 11/8/22   Emperatriz Ny MD   rOPINIRole (REQUIP) 0.25 MG tablet Take 1 tablet every night 11/8/22   Emperatriz Ny MD   insulin aspart (NOVOLOG FLEXPEN) 100 UNIT/ML injection pen Inject 2 to 12 units subcutaneously three times daily per sliding scale 11/8/22   Emperatriz Ny MD   Nutritional Supplements (GLUCERNA 1.5 REJI) LIQD Take 1 each by mouth in the morning, at noon, and at bedtime 11/8/22   Emperatriz Ny MD   Calcium Carbonate-Vitamin D (OYSTER SHELL CALCIUM/D) 500-200 MG-UNIT TABS Take 1 tablet by mouth daily 11/1/22 11/1/23  Amy Sebastian MD   Alcohol Swabs (B-D SINGLE USE SWABS REGULAR) PADS USE AS DIRECTED FOUR TIMES DAILY  Patient not taking: No sig reported 8/15/22   Emperatriz Ny MD   azelastine (OPTIVAR) 0.05 % ophthalmic solution  4/27/22   Tito Estrada   ketoconazole (NIZORAL) 2 % cream APPLY TOPICALLY DAILY 5/19/22   Emperatriz Ny MD   Nutritional Supplements (ENSURE HIGH PROTEIN) LIQD Take 1 Bottle by mouth in the morning, at noon, and at bedtime 4/13/22   Emperatriz Ny MD       REVIEW OF SYSTEMS    (2-9 systems for level 4, 10 or more for level 5)      Review of Systems   Constitutional:  Negative for chills and fever. HENT:  Negative for congestion and rhinorrhea. Respiratory:  Negative for shortness of breath. Cardiovascular:  Negative for chest pain. Gastrointestinal:  Negative for abdominal pain, diarrhea, nausea and vomiting. Musculoskeletal:  Positive for joint swelling. Skin:  Negative for color change, pallor, rash and wound. Neurological:  Negative for weakness and headaches.      PHYSICAL EXAM   (up to 7 for level 4, 8 or more for level 5) INITIAL VITALS:   BP (!) 159/85   Pulse 81   Temp 98.2 °F (36.8 °C) (Oral)   Resp 20   Wt 100 lb (45.4 kg)   LMP 12/19/2022 (Approximate)   SpO2 95%   BMI 16.64 kg/m²     Physical Exam  Constitutional:       General: She is not in acute distress. Appearance: She is not ill-appearing, toxic-appearing or diaphoretic. HENT:      Head: Normocephalic and atraumatic. Cardiovascular:      Rate and Rhythm: Normal rate and regular rhythm. Heart sounds: No murmur heard. No friction rub. No gallop. Pulmonary:      Effort: No respiratory distress. Breath sounds: No stridor. No wheezing, rhonchi or rales. Chest:      Chest wall: No tenderness. Abdominal:      General: There is no distension. Palpations: There is no mass. Tenderness: There is no abdominal tenderness. There is no guarding or rebound. Hernia: No hernia is present. Musculoskeletal:         General: Swelling and tenderness present. No deformity or signs of injury. Comments: Left hand swelling and tenderness noted most noted over the fifth and fourth metacarpal region, ecchymosis on the palmar surface, neurovascular intact with 2+ peripheral pulses bilateral upper extremities, full range of motion of the hand, cap refill intact, no overlying warmth erythema the joint space, no scaphoid tenderness bilaterally. No wrist or forearm tenderness. Cervical spine nontender palpation no bony step-off deformity, thoracic and lumbar spine nontender palpation no bony step-off or deformity. Skin:     Capillary Refill: Capillary refill takes less than 2 seconds. Coloration: Skin is not jaundiced or pale. Findings: Bruising present. No erythema, lesion or rash. Neurological:      Mental Status: She is oriented to person, place, and time.        DIFFERENTIAL  DIAGNOSIS     PLAN (LABS / IMAGING / EKG):  Orders Placed This Encounter   Procedures    XR HAND LEFT (MIN 3 VIEWS)    Inpatient consult to Plastic Surgery       MEDICATIONS ORDERED:  Orders Placed This Encounter   Medications    acetaminophen (TYLENOL) tablet 1,000 mg    ibuprofen (ADVIL;MOTRIN) tablet 800 mg    acetaminophen (TYLENOL) 500 MG tablet     Sig: Take 2 tablets by mouth 3 times daily for 10 days     Dispense:  60 tablet     Refill:  0    ibuprofen (ADVIL;MOTRIN) 600 MG tablet     Sig: Take 1 tablet by mouth 4 times daily as needed for Pain     Dispense:  40 tablet     Refill:  0    oxyCODONE (ROXICODONE) 5 MG immediate release tablet     Sig: Take 1 tablet by mouth every 6 hours as needed for Pain for up to 3 days. Intended supply: 3 days. Take lowest dose possible to manage pain Max Daily Amount: 20 mg     Dispense:  4 tablet     Refill:  0       DDX: Fracture, strain/sprain, septic arthritis, gout    DIAGNOSTIC RESULTS / EMERGENCY DEPARTMENT COURSE / MDM   LAB RESULTS:  No results found for this visit on 01/07/23. IMPRESSION: n/a    RADIOLOGY:  XR HAND LEFT (MIN 3 VIEWS)   Final Result   Acute mildly displaced fracture of the 5th proximal metacarpal.  No evidence   of intra-articular extension. EMERGENCY DEPARTMENT COURSE:  Patient is a 22-year-old female presenting with left hand injury swelling and pain. There is obvious swelling to the left hand. No overlying warmth erythema Evalose patient for septic arthritis or gout. Patient afebrile. Neurovascularly intact. X-ray imaging showing a mildly displaced fifth proximal metacarpal fracture. Patient placed in ulnar gutter splint. Neurovascular intact afterwards. Patient discharged home with plastic surgery follow-up. Patient amenable discharge. ED Course as of 01/07/23 1625   Sat Jan 07, 2023   1104 Left fifth metacarpal fracture. We will consult hand surgery and splint in emergency department with outpatient follow-up. [MS]   1140 Awaiting hand surgery consultation. [MS]   1201 Discussed with Dr. Rafy Peraza. Patient follow-up in plastic surgery clinic.   Patient ulnar gutter splint [MS]      ED Course User Index  [MS] Amaris Johnson DO       No notes of [facility] Admission Criteria type on file. PROCEDURES:  PROCEDURE NOTE - SPLINT APPLICATION    PATIENT NAME: [address]  MEDICAL RECORD NO. [id number]  DATE: 1/7/2023  ATTENDING PHYSICIAN: Dr Sameera Hoover DIAGNOSIS:  left hand fracture  POSTOPERATIVE DIAGNOSIS:  Same  PROCEDURE PERFORMED:  Application of an ulnar gutter splint. splint  PERFORMING PHYSICIAN: Daniel Quispe DO    DISCUSSION:  Kolton Head is a 32y.o.-year-old female who requires application of  splint due to left hand fracture. The history and physical examination were reviewed and confirmed. CONSENT: The patient provided verbal consent for this procedure. PROCEDURE:  The pre-splint application exam showed distal perfusion & neurologic function to be normal. The patient was placed in the appropriate position. Gauze applied prior. Orthoglass splint material used. an ulnar gutter splint. splint applied with Ace wrap used to secure. A post-splinting exam revealed distal perfusion & neurologic function to be normal.     The patient tolerated the procedure well. COMPLICATIONS:  None     Daniel Quispe DO  4:25 PM, 1/7/23     CONSULTS:  IP CONSULT TO PLASTIC SURGERY    CRITICAL CARE:  N/a    FINAL IMPRESSION      1.  Closed nondisplaced fracture of fifth metacarpal bone of left hand, unspecified portion of metacarpal, initial encounter          DISPOSITION / PLAN     DISPOSITION Decision To Discharge 01/07/2023 12:01:32 PM      PATIENT REFERRED TO:  Emperatriz Luna, [address] [facility] [id number] [phone]    Schedule an appointment as soon as possible for a visit       [facility]  [address] [place] [address]  [phone]    If symptoms worsen    [facility]  [address]  [address] [facility] [address] [facility]  [phone]  On 1/10/2023  Plastic surgery eval    DISCHARGE MEDICATIONS:  Discharge Medication List as of 1/7/2023 12:04 PM        START taking these medications    Details   oxyCODONE (ROXICODONE) 5 MG immediate release tablet Take 1 tablet by mouth every 6 hours as needed for Pain for up to 3 days. Intended supply: 3 days.  Take lowest dose possible to manage pain Max Daily Amount: 20 mg, Disp-4 tablet, R-0Normal             Rebecca Vallecillo DO  Emergency Medicine Resident    (Please note that portions of thisnote were completed with a voice recognition program.  Efforts were made to edit the dictations but occasionally words are mis-transcribed.)        Madeleine Morfin DO  Resident  01/07/23 2953

## 2023-01-07 NOTE — ED TRIAGE NOTES
Patient ambulated to room 33 from triage with c/o of having fallen going up the stairs yesterday and slamming her left hand in the door. Patients hand is swollen and bruised. No numbness and tingling noted. Patient can freely  her fingers. Pt respirations are even and unlabored, pt is alert and oriented X 4, speaking in complete sentences, bed is in the lowest position, call light is within reach.

## 2023-01-07 NOTE — ED PROVIDER NOTES
[address] [facility]     Emergency Department     Faculty Attestation    I performed a history and physical examination of the patient and discussed management with the resident. I reviewed the resident´s note and agree with the documented findings and plan of care. Any areas of disagreement are noted on the chart. I was personally present for the key portions of any procedures. I have documented in the chart those procedures where I was not present during the key portions. I have reviewed the emergency nurses triage note. I agree with the chief complaint, past medical history, past surgical history, allergies, medications, social and family history as documented unless otherwise noted below. For Physician Assistant/ Nurse Practitioner cases/documentation I have personally evaluated this patient and have completed at least one if not all key elements of the E/M (history, physical exam, and MDM). Additional findings are as noted. Patient is right-hand dominant, pain dorsum of the left hand over the fourth and fifth metacarpals, no obvious deformity, no open lesions, no wrist or snuffbox pain.      Trenton Huang MD  01/07/23 [id number]

## 2023-01-07 NOTE — DISCHARGE INSTRUCTIONS
Please take your medications as prescribed. Please call and schedule appointment at the burn/plastic surgery clinic for follow-up of your broken hand bone. Do not drink alcohol while while taking your pain medications. Do not get the cast wet. Return the emergency department immediately if you develop any numbness or tingling in the hand, swelling or color change of the finger, severe worsening of your pain, chest pain or shortness of breath fevers uncontrolled with Tylenol or Motrin or any other general concerns.

## 2023-01-09 ENCOUNTER — TELEPHONE (OUTPATIENT)
Dept: BURN CARE | Age: 28
End: 2023-01-09

## 2023-01-09 NOTE — TELEPHONE ENCOUNTER
Pt called to schedule a NP/hospital follow up karthikeyan. Writer unable to find appropriate appt. Please contact pt.

## 2023-01-12 ENCOUNTER — TELEPHONE (OUTPATIENT)
Dept: FAMILY MEDICINE CLINIC | Age: 28
End: 2023-01-12

## 2023-01-12 ENCOUNTER — OFFICE VISIT (OUTPATIENT)
Dept: FAMILY MEDICINE CLINIC | Age: 28
End: 2023-01-12
Payer: MEDICARE

## 2023-01-12 VITALS
BODY MASS INDEX: 17.81 KG/M2 | WEIGHT: 107 LBS | TEMPERATURE: 97.3 F | SYSTOLIC BLOOD PRESSURE: 140 MMHG | OXYGEN SATURATION: 98 % | DIASTOLIC BLOOD PRESSURE: 96 MMHG | HEART RATE: 79 BPM

## 2023-01-12 DIAGNOSIS — E43 PROTEIN-CALORIE MALNUTRITION, SEVERE (HCC): ICD-10-CM

## 2023-01-12 DIAGNOSIS — R80.9 MICROALBUMINURIA DUE TO TYPE 1 DIABETES MELLITUS (HCC): ICD-10-CM

## 2023-01-12 DIAGNOSIS — E10.29 MICROALBUMINURIA DUE TO TYPE 1 DIABETES MELLITUS (HCC): ICD-10-CM

## 2023-01-12 DIAGNOSIS — Z59.41 FOOD INSECURITY: ICD-10-CM

## 2023-01-12 DIAGNOSIS — Z59.811 HOUSING INSTABILITY, CURRENTLY HOUSED, AT RISK FOR HOMELESSNESS: ICD-10-CM

## 2023-01-12 DIAGNOSIS — K29.50 CHRONIC GASTRITIS WITHOUT BLEEDING, UNSPECIFIED GASTRITIS TYPE: ICD-10-CM

## 2023-01-12 DIAGNOSIS — S62.307D CLOSED NONDISPLACED FRACTURE OF FIFTH METACARPAL BONE OF LEFT HAND WITH ROUTINE HEALING, UNSPECIFIED PORTION OF METACARPAL, SUBSEQUENT ENCOUNTER: ICD-10-CM

## 2023-01-12 DIAGNOSIS — E10.42 TYPE 1 DIABETES MELLITUS WITH DIABETIC POLYNEUROPATHY (HCC): Primary | ICD-10-CM

## 2023-01-12 LAB
CREATININE URINE POCT: 100
MICROALBUMIN/CREAT 24H UR: 150 MG/G{CREAT}
MICROALBUMIN/CREAT UR-RTO: >300

## 2023-01-12 PROCEDURE — 82044 UR ALBUMIN SEMIQUANTITATIVE: CPT | Performed by: INTERNAL MEDICINE

## 2023-01-12 PROCEDURE — 99214 OFFICE O/P EST MOD 30 MIN: CPT | Performed by: INTERNAL MEDICINE

## 2023-01-12 RX ORDER — ONDANSETRON 4 MG/1
TABLET, FILM COATED ORAL
COMMUNITY
Start: 2023-01-04

## 2023-01-12 RX ORDER — OXYCODONE HYDROCHLORIDE AND ACETAMINOPHEN 5; 325 MG/1; MG/1
TABLET ORAL
COMMUNITY
Start: 2023-01-04

## 2023-01-12 RX ORDER — LISINOPRIL 5 MG/1
TABLET ORAL
Qty: 90 TABLET | Refills: 1 | Status: SHIPPED | OUTPATIENT
Start: 2023-01-12

## 2023-01-12 RX ORDER — NAPROXEN 500 MG/1
500 TABLET ORAL 2 TIMES DAILY PRN
Qty: 60 TABLET | Refills: 0 | Status: SHIPPED | OUTPATIENT
Start: 2023-01-12

## 2023-01-12 SDOH — ECONOMIC STABILITY - HOUSING INSECURITY: HOUSING INSTABILITY WITH RISK OF HOMELESSNESS: Z59.811

## 2023-01-12 SDOH — ECONOMIC STABILITY - FOOD INSECURITY: FOOD INSECURITY: Z59.41

## 2023-01-12 ASSESSMENT — ENCOUNTER SYMPTOMS
DIARRHEA: 0
ABDOMINAL PAIN: 0
CHEST TIGHTNESS: 0
WHEEZING: 0
COUGH: 0
NAUSEA: 0
BLOOD IN STOOL: 0
CONSTIPATION: 0
CHOKING: 0
VOMITING: 0
SHORTNESS OF BREATH: 0
ANAL BLEEDING: 0

## 2023-01-12 ASSESSMENT — PATIENT HEALTH QUESTIONNAIRE - PHQ9
SUM OF ALL RESPONSES TO PHQ QUESTIONS 1-9: 0
SUM OF ALL RESPONSES TO PHQ QUESTIONS 1-9: 0
1. LITTLE INTEREST OR PLEASURE IN DOING THINGS: 0
SUM OF ALL RESPONSES TO PHQ QUESTIONS 1-9: 0
SUM OF ALL RESPONSES TO PHQ QUESTIONS 1-9: 0
2. FEELING DOWN, DEPRESSED OR HOPELESS: 0
SUM OF ALL RESPONSES TO PHQ9 QUESTIONS 1 & 2: 0

## 2023-01-12 ASSESSMENT — VISUAL ACUITY: OU: 1

## 2023-01-12 NOTE — TELEPHONE ENCOUNTER
----- Message from Chris Tovar MD sent at 1/12/2023 11:24 AM EST -----  Get endo notes - Dr Elaina Tolbert

## 2023-01-12 NOTE — PROGRESS NOTES
Pt is here today for a 4 mo f/u    Pt would like a bus pass fr her to get back and forth to her appts, pt does not have her own transportation,

## 2023-01-12 NOTE — PATIENT INSTRUCTIONS
Increase lisinopril to 5mg daily - take two pills daily of current 2.5mg dose till they are gone then start 5mg pills called in today

## 2023-01-12 NOTE — PROGRESS NOTES
[facility]  [facility] [address] [address]  Dept: [phone]  Dept Fax: [phone]      Steve Villanueva is a 32 y.o. female who presents today for hermedical conditions/complaints as noted below. Steve Villanueva is c/o of Diabetes (F/u) and Hypertension (F/u)        Assessment/Plan:     1. Type 1 diabetes mellitus with diabetic polyneuropathy (HCC)  -     POCT microalbumin  2. Microalbuminuria due to type 1 diabetes mellitus (HCC)  -     lisinopril (PRINIVIL;ZESTRIL) 5 MG tablet; TAKE 1 TABLET EVERY DAY, Disp-90 tablet, R-1Normal  3. Protein-calorie malnutrition, severe ([address]  4. Chronic gastritis without bleeding, unspecified gastritis type  5. Closed nondisplaced fracture of fifth metacarpal bone of left hand with routine healing, unspecified portion of metacarpal, subsequent encounter  -     naproxen (NAPROSYN) 500 MG tablet; Take 1 tablet by mouth 2 times daily as needed for Pain, Disp-60 tablet, R-0Normal  6. Housing instability, currently housed, at risk for homelessness  -     [facility] Referral for Social Determinants of Health (Primary Care Practices)  7. [address] [place] [address] Referral for Social Determinants of Health (Primary Care Practices)          No follow-ups on file. HPI     Ishaan Donnellys down her basement steps carrying laundry on L hand, sustained fracture 5th metacarpal, seeing a surgeon 1/17 at [facility] to figure out if she needs surgery or not. Ibuprofen not helping, not tolerating oxycodone due to nausea and vomiting. Not sleeping at night  Eating fine. Appetite is back. Her sister has her kids back so Eladio Flynn is currently only caring for her own daughter   Has been diagnosed with osteoporosis by gyn, mgmt deferred to endo     Diabetes - doing well with current regimen - following with endo - Dr Tru No. Denies hypoglycemia, hyperglycemia, fatigue, polyuria, polydipsia, paresthesias, vision changes, dizziness.   Eye exam was done. Not following with podiatry. Patient is taking ACE inhibitor/ARB. Complications of diabetes include osteoporosis. Hypertension-tolerating current regimen without chest pain, palpitations, dizziness, peripheral edema, dyspnea on exertion, orthopnea, paroxysmal nocturnal dyspnea. Hyperlipidemia-tolerating current regimen without myalgias, dyspepsia, jaundice. Mostly compliant with diet recommendations for low carb diet, not very compliant with exercise recommendations. Cardiovascular risk factors: diabetes mellitus, dyslipidemia, hypertension, and sedentary lifestyle. Smoking 1/2 ppd/       BP Readings from Last 3 Encounters:   23 (!) 140/96   23 (!) 159/85   22 (!) 132/99              Past Medical History:   Diagnosis Date    Hypertension     Kidney infection     MRSA (methicillin resistant staph aureus) culture positive 2016    abdomen    Nausea & vomiting     Ovarian cyst 2019    Right side    Secondary osteoporosis     Type 1 diabetes mellitus ([facility] [address]     UTI (urinary tract infection)       Past Surgical History:   Procedure Laterality Date     SECTION      UPPER GASTROINTESTINAL ENDOSCOPY N/A 2021    EGD BIOPSY performed by Julia Spaulding MD at [address] [place] [address]       No family history on file. Social History     Tobacco Use    Smoking status: Every Day     Packs/day: 0.50     Types: Cigarettes    Smokeless tobacco: Never    Tobacco comments:     5/day   Substance Use Topics    Alcohol use: No        Allergies   Allergen Reactions    Blueberry [Vaccinium Angustifolium] Anaphylaxis     Tongue swells up, needs epipen    Shrimp Flavor Anaphylaxis     OK with fish, allergic to shrimp and lobster  OK with IV contrast     Prior to Visit Medications    Medication Sig Taking?  Authorizing Provider   ondansetron (ZOFRAN) 4 MG tablet TAKE 1 TABLET BY MOUTH EVERY 6 HOURS AS NEEDED FOR NAUSEA Yes Historical Provider, MD   oxyCODONE-acetaminophen (PERCOCET) 5-325 MG per tablet TAKE 1 TABLET BY MOUTH EVERY 6 HOURS AS NEEDED FOR PAIN Yes Conner Moreau MD   acetaminophen (TYLENOL) 500 MG tablet Take 2 tablets by mouth 3 times daily for 10 days Yes Xiomy Gillette DO   ibuprofen (ADVIL;MOTRIN) 600 MG tablet Take 1 tablet by mouth 4 times daily as needed for Pain Yes Xiomy Gillette DO   Blood Glucose Monitoring Suppl (ONE TOUCH ULTRA 2) w/Device KIT 1 kit by Does not apply route in the morning, at noon, in the evening, and at bedtime Yes Emperatriz Gallego MD   pantoprazole (PROTONIX) 40 MG tablet Take 1 tablet by mouth every morning (before breakfast) Yes Emperatriz Gallego MD   dicyclomine (BENTYL) 10 MG capsule TAKE 1 CAPSULE THREE TIMES DAILY AS NEEDED FOR ABDOMINAL PAIN Yes Emperatriz Gallego MD   Insulin Pen Needle (DROPLET PEN NEEDLES) 31G X 8 MM MISC Use 4 times daily as directed Yes Emperatriz Gallego MD   TRUEplus Lancets 33G MISC Use 4 times daily as directed Yes Ada Victor MD   blood glucose test strips (TRUE METRIX BLOOD GLUCOSE TEST) strip As needed. Yes Ada Victor MD   pregabalin (LYRICA) 25 MG capsule Take 1 capsule by mouth every evening for 30 days.  Yes Ada Victor MD   insulin glargine (LANTUS SOLOSTAR) 100 UNIT/ML injection pen Inject 10 Units into the skin nightly Yes Ada Victor MD   lisinopril (PRINIVIL;ZESTRIL) 2.5 MG tablet TAKE 1 TABLET EVERY DAY Yes Emperatriz Gallego MD   rOPINIRole (REQUIP) 0.25 MG tablet Take 1 tablet every night Yes Emperatriz Gallego MD   insulin aspart (NOVOLOG FLEXPEN) 100 UNIT/ML injection pen Inject 2 to 12 units subcutaneously three times daily per sliding scale Yes Emperatriz Gallego MD   Nutritional Supplements (GLUCERNA 1.5 REJI) LIQD Take 1 each by mouth in the morning, at noon, and at bedtime Yes Emperatriz Gallego MD   Calcium Carbonate-Vitamin D (OYSTER SHELL CALCIUM/D) 500-200 MG-UNIT TABS Take 1 tablet by mouth daily Yes King Kelly MD   Alcohol Swabs (B-D SINGLE USE SWABS REGULAR) PADS USE AS DIRECTED FOUR TIMES DAILY Yes Emperatriz Issa MD   azelastine (OPTIVAR) 0.05 % ophthalmic solution  Yes Frutoso [place]   ketoconazole (NIZORAL) 2 % cream APPLY TOPICALLY DAILY Yes Emperatriz Issa MD   Nutritional Supplements (ENSURE HIGH PROTEIN) LIQD Take 1 Bottle by mouth in the morning, at noon, and at bedtime Yes Jackson Steve MD       Review of Systems     Review of Systems   Constitutional:  Negative for fatigue, fever and unexpected weight change. Respiratory:  Negative for cough, choking, chest tightness, shortness of breath and wheezing. Cardiovascular:  Negative for chest pain, palpitations and leg swelling. Gastrointestinal:  Negative for abdominal pain, anal bleeding, blood in stool, constipation, diarrhea, nausea and vomiting. Endocrine: Negative. Musculoskeletal:  Negative for joint swelling and myalgias. Skin: Negative. Neurological:  Negative for dizziness. Psychiatric/Behavioral:  Negative for sleep disturbance. All other systems reviewed and are negative. Objective     BP (!) 140/96 (Site: Right Upper Arm, Position: Sitting, Cuff Size: Child)   Pulse 79   Temp 97.3 °F (36.3 °C)   Wt 107 lb (48.5 kg)   LMP 12/19/2022 (Approximate)   SpO2 98%   BMI 17.81 kg/m²   Wt Readings from Last 3 Encounters:   01/12/23 107 lb (48.5 kg)   01/07/23 100 lb (45.4 kg)   12/30/22 110 lb (49.9 kg)       Physical Exam  Vitals and nursing note reviewed. Constitutional:       General: She is not in acute distress. Appearance: She is well-developed. She is not ill-appearing. Eyes:      General: Lids are normal. Vision grossly intact. Cardiovascular:      Rate and Rhythm: Normal rate and regular rhythm. Heart sounds: Normal heart sounds, S1 normal and S2 normal. No murmur heard. No friction rub. No gallop. Pulmonary:      Effort: Pulmonary effort is normal. No respiratory distress. Breath sounds: Normal breath sounds. No wheezing.    Abdominal:      General: Bowel sounds are normal.      Palpations: Abdomen is soft. There is no mass. Tenderness: There is no abdominal tenderness. There is no guarding. Musculoskeletal:         General: Normal range of motion. Skin:     General: Skin is warm and dry. Capillary Refill: Capillary refill takes less than 2 seconds. Neurological:      General: No focal deficit present. Mental Status: She is alert and oriented to person, place, and time. Data Review       Health Maintenance Due   Topic Date Due    Diabetic retinal exam  01/21/2020    Diabetic Alb to Cr ratio (uACR) test  03/22/2022           Patient given educational materials- see patient instructions. Discussed use, benefit, and side effects of prescribedmedications. All patient questions answered. Pt voiced understanding. Reviewedhealth maintenance. Instructed to continue current medications, diet and exercise. Patient agreed with treatment plan. Follow up as directed.      Electronically signedby Good Parada MD on 1/12/2023

## 2023-01-17 ENCOUNTER — OFFICE VISIT (OUTPATIENT)
Dept: BURN CARE | Age: 28
End: 2023-01-17
Payer: MEDICARE

## 2023-01-17 ENCOUNTER — TELEPHONE (OUTPATIENT)
Dept: BURN CARE | Age: 28
End: 2023-01-17

## 2023-01-17 VITALS
HEART RATE: 70 BPM | SYSTOLIC BLOOD PRESSURE: 181 MMHG | WEIGHT: 107 LBS | HEIGHT: 65 IN | BODY MASS INDEX: 17.83 KG/M2 | DIASTOLIC BLOOD PRESSURE: 85 MMHG

## 2023-01-17 DIAGNOSIS — S62.327A CLOSED DISPLACED FRACTURE OF SHAFT OF FIFTH METACARPAL BONE OF LEFT HAND, INITIAL ENCOUNTER: Primary | ICD-10-CM

## 2023-01-17 PROCEDURE — 99213 OFFICE O/P EST LOW 20 MIN: CPT | Performed by: PLASTIC SURGERY

## 2023-01-17 RX ORDER — OXYCODONE HYDROCHLORIDE AND ACETAMINOPHEN 5; 325 MG/1; MG/1
1 TABLET ORAL NIGHTLY
Qty: 28 TABLET | Refills: 0 | Status: SHIPPED | OUTPATIENT
Start: 2023-01-17 | End: 2023-01-17

## 2023-01-17 RX ORDER — OXYCODONE HYDROCHLORIDE AND ACETAMINOPHEN 5; 325 MG/1; MG/1
1 TABLET ORAL NIGHTLY
Qty: 28 TABLET | Refills: 0 | Status: SHIPPED | OUTPATIENT
Start: 2023-01-17 | End: 2023-01-22

## 2023-01-17 NOTE — PROGRESS NOTES
[facility]   New Patient Visit    CHIEF COMPLAINT:    Chief Complaint   Patient presents with    New Patient     Fx L hand       HISTORY OF PRESENT ILLNESS:      The patient is a 32 y.o. female who is being seen for consultation and evaluation of left 5th metacarpal fracture. She presented to the ED on 23 following a fall down some stairs. She was placed in a splint and sent here for follow up. Denies any new injury since initial injury. Denies any numbness, burning, tingling. Review of Systems   Constitutional: Negative for fever and chills. HENT: Negative for congestion. Eyes: Negative for blurred vision and double vision. Respiratory: Negative for cough, shortness of breath and wheezing. Cardiovascular: Negative for chest pain and palpitations. Gastrointestinal: Negative for nausea. Negative for vomiting. Musculoskeletal: Positive for myalgias and joint pain. Skin: Negative for itching and rash. Neurological: Negative for dizziness, sensory change and headaches. Psychiatric/Behavioral: Negative for depression and suicidal ideas.      Past Medical History:    Past Medical History:   Diagnosis Date    Hypertension     Kidney infection     MRSA (methicillin resistant staph aureus) culture positive 2016    abdomen    Nausea & vomiting     Ovarian cyst 2019    Right side    Secondary osteoporosis     Type 1 diabetes mellitus ([facility] [address]     UTI (urinary tract infection)      Past SurgicalHistory:    Past Surgical History:   Procedure Laterality Date     SECTION      UPPER GASTROINTESTINAL ENDOSCOPY N/A 2021    EGD BIOPSY performed by Shira Che MD at [facility] [place] OR     Current Medications:   Current Outpatient Medications   Medication Sig Dispense Refill    ondansetron (ZOFRAN) 4 MG tablet TAKE 1 TABLET BY MOUTH EVERY 6 HOURS AS NEEDED FOR NAUSEA      oxyCODONE-acetaminophen (PERCOCET) 5-325 MG per tablet TAKE 1 TABLET BY MOUTH EVERY 6 HOURS AS NEEDED FOR PAIN      lisinopril (PRINIVIL;ZESTRIL) 5 MG tablet TAKE 1 TABLET EVERY DAY 90 tablet 1    naproxen (NAPROSYN) 500 MG tablet Take 1 tablet by mouth 2 times daily as needed for Pain 60 tablet 0    acetaminophen (TYLENOL) 500 MG tablet Take 2 tablets by mouth 3 times daily for 10 days 60 tablet 0    Blood Glucose Monitoring Suppl (ONE TOUCH ULTRA 2) w/Device KIT 1 kit by Does not apply route in the morning, at noon, in the evening, and at bedtime 1 kit 0    pantoprazole (PROTONIX) 40 MG tablet Take 1 tablet by mouth every morning (before breakfast) 90 tablet 1    dicyclomine (BENTYL) 10 MG capsule TAKE 1 CAPSULE THREE TIMES DAILY AS NEEDED FOR ABDOMINAL PAIN 270 capsule 1    Insulin Pen Needle (DROPLET PEN NEEDLES) 31G X 8 MM MISC Use 4 times daily as directed 400 each 1    TRUEplus Lancets 33G MISC Use 4 times daily as directed 400 each 1    blood glucose test strips (TRUE METRIX BLOOD GLUCOSE TEST) strip As needed. 400 strip 1    insulin glargine (LANTUS SOLOSTAR) 100 UNIT/ML injection pen Inject 10 Units into the skin nightly 15 mL 3    rOPINIRole (REQUIP) 0.25 MG tablet Take 1 tablet every night 90 tablet 1    insulin aspart (NOVOLOG FLEXPEN) 100 UNIT/ML injection pen Inject 2 to 12 units subcutaneously three times daily per sliding scale 45 mL 5    Nutritional Supplements (GLUCERNA 1.5 REJI) LIQD Take 1 each by mouth in the morning, at noon, and at bedtime 100 each 6    Calcium Carbonate-Vitamin D (OYSTER SHELL CALCIUM/D) 500-200 MG-UNIT TABS Take 1 tablet by mouth daily 60 tablet 5    Alcohol Swabs (B-D SINGLE USE SWABS REGULAR) PADS USE AS DIRECTED FOUR TIMES DAILY 100 each 5    azelastine (OPTIVAR) 0.05 % ophthalmic solution       ketoconazole (NIZORAL) 2 % cream APPLY TOPICALLY DAILY 60 g 1    Nutritional Supplements (ENSURE HIGH PROTEIN) LIQD Take 1 Bottle by mouth in the morning, at noon, and at bedtime 100 each 5    pregabalin (LYRICA) 25 MG capsule Take 1 capsule by mouth every evening for 30 days.  90 capsule 1     No current facility-administered medications for this visit. Allergies:    Blueberry [vaccinium angustifolium] and Shrimp flavor    History:   Social History     Socioeconomic History    Marital status: Single     Spouse name: Not on file    Number of children: Not on file    Years of education: Not on file    Highest education level: Not on file   Occupational History    Not on file   Tobacco Use    Smoking status: Every Day     Packs/day: 0.50     Types: Cigarettes    Smokeless tobacco: Never    Tobacco comments:     5/day   Substance and Sexual Activity    Alcohol use: No    Drug use: Yes     Types: Marijuana (Weed)     Comment: 1-2x per day    Sexual activity: Never   Other Topics Concern    Not on file   Social History Narrative    Not on file     Social Determinants of Health     Financial Resource Strain: High Risk    Difficulty of Paying Living Expenses: Hard   Food Insecurity: Food Insecurity Present    Worried About Running Out of Food in the Last Year: Often true    Ran Out of Food in the Last Year: Often true   Transportation Needs: Not on file   Physical Activity: Not on file   Stress: Not on file   Social Connections: Not on file   Intimate Partner Violence: Not on file   Housing Stability: Not on file       Family History:  No family history on file. PHYSICAL EXAM:  Vitals:    01/17/23 0757   BP: (!) 181/85   Pulse: 70     General: AAOx3, NAD. Neuro: Alert. Oriented. Eyes: Extra-Ocular Muscles Intact. Mouth: Oral Mucosa Moist. No Perioral Lesions. Pulm: Respirations Unlabored and Regular. Skin: Warm, Well Perfused. Psych: Patient has good fund of knowledge and displays understanding of Exam, Diagnosis, and Plan. MSK:   LUE   No ecchymoses, abrasion, deformity, or lacerations. Skin intact. Dressings C/D/I. Non-tender to palpation. Compartments soft. Ulnar/Median/AIN/PIN/Radial motor intact. Axillary/Median/Radial/Ulnar nerve SILT. Radial pulse 2+ with BCR.     Radiology:   None obtained at this time    Assessment:      1. Closed displaced fracture of shaft of fifth metacarpal bone of left hand, initial encounter         Plan:     -WB Status: NWB LUE  -Maintain L ulnar gutter splint for 3 weeks. -Tylenol/Ibuprofen for pain control  -Gave script for 5 percocet (5mg/325mg) - Q night. Expressed this was the only time she's receiving anything other than motrin or tylenol. Patient was agreeable. -F/u 3 weeks for splint off  -All questions were answered to patient's satisfaction. _________________________________  Sajan Mcpherson DO  Orthopedic Surgery Resident, PGY-1  [facility], [facility]     Attending Physician Statement  I have discussed the case, including pertinent history and exam findings with the resident. I have seen and examined the patient and the key elements of all parts of the encounter have been performed by me. I agree with the assessment, plan and orders as documented by the resident.   Rodolof Gary MD

## 2023-01-17 NOTE — TELEPHONE ENCOUNTER
Patient stated she was in the office today and is requesting a call regarding her medication needing to be sent to the pharmacy at [facility] pharmacy instead of where it was sent. She can be reached at [phone]. Okay to leave a message.

## 2023-01-24 ENCOUNTER — TELEPHONE (OUTPATIENT)
Dept: FAMILY MEDICINE CLINIC | Age: 28
End: 2023-01-24

## 2023-01-24 NOTE — TELEPHONE ENCOUNTER
Nazario Cooper was contacted by a [employer] to discuss a referral for SDOH related needs. Writer spoke with: Patient and explained the services and assistance that can be provided through the [employer] program.     Patient agreeable to receiving resources and support from [address] [place] [address]. Intake Notes:   Patient is seeking assistance with food and rent. She receives Social Security monthly and food stamps; however, it's not enough for nutrition. Rent is current, but difficult to pay. Patient scheduled an appointment to meet with me at 10am on Friday, January 27 to fill out the [employer] rent assistance application. I provided directions on my office location and ensured she has my phone number. Plan of Care: N/A    Action steps to be completed by writer:  I will prepare for appointment on 1/27/23. Action steps to be completed by patient:  Patient will attend appointment on 1/27/23.     Patient has given verbal permission to leave detailed messages regarding SDOH referral on their phone: N/A    Patient has given verbal permission to submit applications on their behalf: yes    Patient voiced understanding of action plan and responsibilities, was provided with writer's contact information, and agrees to call should they require additional assistance: yes      Lisbeth Mckinley MA

## 2023-01-26 ENCOUNTER — CARE COORDINATION (OUTPATIENT)
Dept: CARE COORDINATION | Age: 28
End: 2023-01-26

## 2023-01-27 NOTE — CARE COORDINATION
called and spoke with Maurizio Boone. Maurizio Boone reports that she is unable to pay this months rent and will receive an eviction notice Monday.  and Maurizio Boone completed an online application for the [employer].   created an account with Maurizio Boone. Tran answered questions regarding the need for assistance. Plan:   Maurizio Boone will continue to check her online account for updates.

## 2023-01-31 ENCOUNTER — CARE COORDINATION (OUTPATIENT)
Dept: CARE COORDINATION | Age: 28
End: 2023-01-31

## 2023-01-31 NOTE — CARE COORDINATION
called and spoke with Brandi Qureshi. Brandi Titus reports that she has not logged into her portal to see if there was a response regarding her emergency rental assistance application. Brandi Qureshi reports that she just was getting home from [place] due to a family emergency. After reviewing portal it reports that Tran's application was submitted however has not been review.  encouraged Tran to continue to check portal regarding her application for updates. Brandi Qureshi reports that she should be getting a eviction notice this week.  informed Brandi Qureshi if she does receive eviction notice they can upload that into the portal.     Plan:   Brandi Qureshi will continue to check portal.  Brandi Titus will contact  if she receives an eviction notice.

## 2023-02-02 NOTE — TELEPHONE ENCOUNTER
Holly Coburn was contacted by [employer] for follow-up regarding SDOH related needs. Writer spoke with: Patient    Progress Notes: Patient was a no show for appointment at 10am this morning for help filling out the [employer] [place]. When I reviewed her chart again, I saw that Mercedes Smith has been working with Patient and already completed the [place] [employer] [place]. I spoke with Patient and encouraged her to continue following up with Ferny Renteria. I let her know I'm closing the SDOH referral.    Action steps to be completed by writer: I'm closing the SDOH referral    Action steps to be completed by patient:  Patient will continue to follow up with Mercedes Smith.     Patient has given verbal permission to leave detailed messages regarding SDOH referral on their phone: N/A    Patient has given verbal permission to submit applications on their behalf: N/A    Raji Salcido

## 2023-02-06 ENCOUNTER — CARE COORDINATION (OUTPATIENT)
Dept: CARE COORDINATION | Age: 28
End: 2023-02-06

## 2023-02-07 ENCOUNTER — OFFICE VISIT (OUTPATIENT)
Dept: BURN CARE | Age: 28
End: 2023-02-07
Payer: MEDICAID

## 2023-02-07 VITALS
BODY MASS INDEX: 17.83 KG/M2 | WEIGHT: 107 LBS | SYSTOLIC BLOOD PRESSURE: 160 MMHG | HEIGHT: 65 IN | HEART RATE: 88 BPM | DIASTOLIC BLOOD PRESSURE: 98 MMHG

## 2023-02-07 DIAGNOSIS — S62.347D CLOSED NONDISPLACED FRACTURE OF BASE OF FIFTH METACARPAL BONE OF LEFT HAND WITH ROUTINE HEALING, SUBSEQUENT ENCOUNTER: Primary | ICD-10-CM

## 2023-02-07 PROCEDURE — 99212 OFFICE O/P EST SF 10 MIN: CPT | Performed by: PLASTIC SURGERY

## 2023-02-07 NOTE — PROGRESS NOTES
[facility] PLASTIC SURGERY CLINIC PROGRESS NOTE    Date: February 7, 2023     Subjective:  Magalys Brady is a 32 y.o. female who returns to the [facility] Plastic Surgery clinic today for follow-up of her fractured nondisplaced left fifth metacarpal.  She has been in her left ulnar gutter splint for 3 weeks. She reports no issues since her last visit. Patient is currently unemployed and does not do any tasks that involve heavy lifting. Objective:  Vitals:    02/07/23 0911   BP: (!) 160/98   Pulse: 88       GEN: Appears healthy. Alert; in no acute distress. Pleasant. EXT: Left hand and arm with cap refill < 2 seconds, good ROM, no strength or sensory deficits noted, no cyanosis, clubbing or edema present    SKIN: color normal, vascularity normal, no lesions noted, no edema, temperature normal, mobility and turgor normal          Assessment:   Diagnosis Orders   1. Closed nondisplaced fracture of base of fifth metacarpal bone of left hand with routine healing, subsequent encounter            Plan:  Patient may follow-up as needed. No need for further splinting or physical/occupational therapy. Ezio Miles DO  2/7/2023      Attending Physician Statement  I have discussed the case, including pertinent history and exam findings with the resident. I have seen and examined the patient and the key elements of all parts of the encounter have been performed by me. I agree with the assessment, plan and orders as documented by the resident.   Liza Luz MD on2/7/2023 on 9:43 AM

## 2023-02-08 NOTE — CARE COORDINATION
called and spoke with Francy Bolanos. Francy Bolanos reports that she is behind on her rent and has shut off notices for gas, electric, and water.  inquired about Tran's budgeting due to being behind on all utilities and rent. Francy Bolanos reports that she bought her daughter clothes, uniforms, and shoes.  empathized with buy daughter clothing however informed Francy Bolanos the importance of trying to keep up on most of the bills.  provided Francy Bolanos with [facility] phone number for their \"Neighborhoods helping Neighbors Program.\"  Francy Bolanos called [employer] and contacted  back stating that she will need to pay $580 of her bills before they will assist.  Francy Bolanos denied having the money.  and Francy Bolanos 3 way called [employer] Utility assistance. Francy Bolanos has a phone interview on 2/9 @10:30 confirmation number is [id number]. Francy Bolanos will answer the call when contacted tomorrow regarding utility assistance. Plan:   Francy Bolanos will complete interview with [employer] regarding shut off notices for utilities.

## 2023-02-10 ENCOUNTER — CARE COORDINATION (OUTPATIENT)
Dept: CARE COORDINATION | Age: 28
End: 2023-02-10

## 2023-02-10 NOTE — CARE COORDINATION
called and spoke to Kaylee. Kaylee reports that she had her appointment with [employer] however it was only for water. Kaylee reports that they will pay her water bill to prevent shut off.  inquired if she Kaylee has checked her portal for Neighborhood works? Kaylee declined that she has checked.  encouraged Kaylee to review. Plan:   GreeneGordonCollette will log onto portal to check application. Kaylee will attempt to contact [employer] to request gas and electric assistance.

## 2023-02-14 DIAGNOSIS — D64.9 ANEMIA, UNSPECIFIED TYPE: Primary | ICD-10-CM

## 2023-02-15 ENCOUNTER — OFFICE VISIT (OUTPATIENT)
Dept: ONCOLOGY | Age: 28
End: 2023-02-15
Payer: MEDICAID

## 2023-02-15 ENCOUNTER — TELEPHONE (OUTPATIENT)
Dept: ONCOLOGY | Age: 28
End: 2023-02-15

## 2023-02-15 ENCOUNTER — CARE COORDINATION (OUTPATIENT)
Dept: CARE COORDINATION | Age: 28
End: 2023-02-15

## 2023-02-15 ENCOUNTER — HOSPITAL ENCOUNTER (OUTPATIENT)
Age: 28
Setting detail: SPECIMEN
Discharge: HOME | End: 2023-02-15
Payer: MEDICAID

## 2023-02-15 VITALS
SYSTOLIC BLOOD PRESSURE: 133 MMHG | DIASTOLIC BLOOD PRESSURE: 98 MMHG | WEIGHT: 100.2 LBS | TEMPERATURE: 97.5 F | RESPIRATION RATE: 16 BRPM | BODY MASS INDEX: 16.67 KG/M2 | HEART RATE: 93 BPM

## 2023-02-15 DIAGNOSIS — D64.9 ANEMIA, UNSPECIFIED TYPE: Primary | ICD-10-CM

## 2023-02-15 DIAGNOSIS — R10.84 GENERALIZED ABDOMINAL PAIN: ICD-10-CM

## 2023-02-15 DIAGNOSIS — E10.42 TYPE 1 DIABETES MELLITUS WITH DIABETIC POLYNEUROPATHY (HCC): ICD-10-CM

## 2023-02-15 DIAGNOSIS — D58.2 HEMOGLOBIN C TRAIT (HCC): ICD-10-CM

## 2023-02-15 DIAGNOSIS — D64.9 ANEMIA, UNSPECIFIED TYPE: ICD-10-CM

## 2023-02-15 DIAGNOSIS — A53.0 LATENT SYPHILIS: ICD-10-CM

## 2023-02-15 DIAGNOSIS — R20.2 PARESTHESIA OF HAND, BILATERAL: ICD-10-CM

## 2023-02-15 LAB
ABSOLUTE EOS #: 0.17 K/UL (ref 0–0.44)
ABSOLUTE IMMATURE GRANULOCYTE: 0 K/UL (ref 0–0.3)
ABSOLUTE LYMPH #: 2.98 K/UL (ref 1.1–3.7)
ABSOLUTE MONO #: 0.77 K/UL (ref 0.1–1.2)
BASOPHILS # BLD: 1 % (ref 0–2)
BASOPHILS ABSOLUTE: 0.09 K/UL (ref 0–0.2)
EOSINOPHILS RELATIVE PERCENT: 2 % (ref 1–4)
HCT VFR BLD AUTO: 27.4 % (ref 36.3–47.1)
HGB BLD-MCNC: 9.4 G/DL (ref 11.9–15.1)
IMMATURE GRANULOCYTES: 0 %
LYMPHOCYTES # BLD: 35 % (ref 24–43)
MCH RBC QN AUTO: 29.1 PG (ref 25.2–33.5)
MCHC RBC AUTO-ENTMCNC: 34.3 G/DL (ref 28.4–34.8)
MCV RBC AUTO: 84.8 FL (ref 82.6–102.9)
MONOCYTES # BLD: 9 % (ref 3–12)
MORPHOLOGY: ABNORMAL
MORPHOLOGY: ABNORMAL
NRBC AUTOMATED: 0 PER 100 WBC
PDW BLD-RTO: 20.2 % (ref 11.8–14.4)
PLATELET # BLD AUTO: 472 K/UL (ref 138–453)
PMV BLD AUTO: 9 FL (ref 8.1–13.5)
RBC # BLD: 3.23 M/UL (ref 3.95–5.11)
SEG NEUTROPHILS: 53 % (ref 36–65)
SEGMENTED NEUTROPHILS ABSOLUTE COUNT: 4.49 K/UL (ref 1.5–8.1)
WBC # BLD AUTO: 8.5 K/UL (ref 3.5–11.3)

## 2023-02-15 PROCEDURE — 99211 OFF/OP EST MAY X REQ PHY/QHP: CPT

## 2023-02-15 PROCEDURE — 85025 COMPLETE CBC W/AUTO DIFF WBC: CPT

## 2023-02-15 PROCEDURE — 99214 OFFICE O/P EST MOD 30 MIN: CPT | Performed by: INTERNAL MEDICINE

## 2023-02-15 PROCEDURE — 36415 COLL VENOUS BLD VENIPUNCTURE: CPT

## 2023-02-15 RX ORDER — FERROUS SULFATE 325(65) MG
325 TABLET ORAL
Qty: 90 TABLET | Refills: 1 | Status: SHIPPED | OUTPATIENT
Start: 2023-02-15

## 2023-02-15 RX ORDER — PREGABALIN 25 MG/1
CAPSULE ORAL
Qty: 30 CAPSULE | OUTPATIENT
Start: 2023-02-15

## 2023-02-15 RX ORDER — IBUPROFEN 600 MG/1
TABLET ORAL
Qty: 120 TABLET | Refills: 1 | OUTPATIENT
Start: 2023-02-15

## 2023-02-15 NOTE — PROGRESS NOTES
Patient ID: Francis Bryant, 1995, [id number], 32 y.o. Referred by : Deneen Vasquez MD  Diagnosis:   Anemia, mild chronic anemia and hemoglobin metaphysis showing HBc trait  Malnutrition  HISTORY OF PRESENT ILLNESS:    Oncologic History:  Francis Bryant is a 32 y.o. female with history of diabetes, hypertension, malnutrition, was seen there initial consultation visit for anemia. Patient has recent lab work which showed hemoglobin around 9.4,  and normal iron studies therefore was referred to hematology for further evaluation. Patient has history of significant malnutrition and has lost more than 50 pounds over past 2 years. She is very thinly built and cachectic. She has been following with gastroenterologist and has been diagnosed with malnutrition and H. pylori gastritis. She is taking Ensure 3 times daily. She has a history of heavy alcohol use in the past but has been sober for past 2 years. She smokes 1/3 pack daily. Her recent lab work showed hemoglobin 9.4, iron 43, iron saturation 38 and ferritin 33. She denies any heavy menstrual periods or blood in stool. She gets her menstrual period every 3 months. She has been seen by her PCP recently and DEXA scan as well as left mammogram and ultrasound was ordered. Interval history:  Patient is return for follow-up visit and to discuss lab results and further recommendations. Her recent hemoglobin is stable. Denies any fatigue tiredness. Her weight has been stable. She is drinking protein supplements. Her recent lab work showed mild increase in her platelets. During this visit patient's allergy, social, medical, surgical history and medications were reviewed and updated.     Past Medical History:   Diagnosis Date    Hypertension     Kidney infection     MRSA (methicillin resistant staph aureus) culture positive 12/21/2016    abdomen    Nausea & vomiting     Ovarian cyst 2019    Right side    Secondary osteoporosis     Type 1 diabetes mellitus (HCC)     UTI (urinary tract infection)        Past Surgical History:   Procedure Laterality Date     SECTION      UPPER GASTROINTESTINAL ENDOSCOPY N/A 2021    EGD BIOPSY performed by Leandro Koehler MD at [address] [facility]   Allergen Reactions    Blueberry [Vaccinium Angustifolium] Anaphylaxis     Tongue swells up, needs epipen    Shrimp Flavor Anaphylaxis     OK with fish, allergic to shrimp and lobster  OK with IV contrast       Current Outpatient Medications   Medication Sig Dispense Refill    ondansetron (ZOFRAN) 4 MG tablet TAKE 1 TABLET BY MOUTH EVERY 6 HOURS AS NEEDED FOR NAUSEA      lisinopril (PRINIVIL;ZESTRIL) 5 MG tablet TAKE 1 TABLET EVERY DAY 90 tablet 1    naproxen (NAPROSYN) 500 MG tablet Take 1 tablet by mouth 2 times daily as needed for Pain 60 tablet 0    Blood Glucose Monitoring Suppl (ONE TOUCH ULTRA 2) w/Device KIT 1 kit by Does not apply route in the morning, at noon, in the evening, and at bedtime 1 kit 0    pantoprazole (PROTONIX) 40 MG tablet Take 1 tablet by mouth every morning (before breakfast) 90 tablet 1    dicyclomine (BENTYL) 10 MG capsule TAKE 1 CAPSULE THREE TIMES DAILY AS NEEDED FOR ABDOMINAL PAIN 270 capsule 1    Insulin Pen Needle (DROPLET PEN NEEDLES) 31G X 8 MM MISC Use 4 times daily as directed 400 each 1    TRUEplus Lancets 33G MISC Use 4 times daily as directed 400 each 1    blood glucose test strips (TRUE METRIX BLOOD GLUCOSE TEST) strip As needed. 400 strip 1    pregabalin (LYRICA) 25 MG capsule Take 1 capsule by mouth every evening for 30 days.  90 capsule 1    insulin glargine (LANTUS SOLOSTAR) 100 UNIT/ML injection pen Inject 10 Units into the skin nightly 15 mL 3    rOPINIRole (REQUIP) 0.25 MG tablet Take 1 tablet every night 90 tablet 1    insulin aspart (NOVOLOG FLEXPEN) 100 UNIT/ML injection pen Inject 2 to 12 units subcutaneously three times daily per sliding scale 45 mL 5    Nutritional Supplements (GLUCERNA 1.5 REJI) LIQD Take 1 each by mouth in the morning, at noon, and at bedtime 100 each 6    Calcium Carbonate-Vitamin D (OYSTER SHELL CALCIUM/D) 500-200 MG-UNIT TABS Take 1 tablet by mouth daily 60 tablet 5    Alcohol Swabs (B-D SINGLE USE SWABS REGULAR) PADS USE AS DIRECTED FOUR TIMES DAILY 100 each 5    azelastine (OPTIVAR) 0.05 % ophthalmic solution       ketoconazole (NIZORAL) 2 % cream APPLY TOPICALLY DAILY 60 g 1    Nutritional Supplements (ENSURE HIGH PROTEIN) LIQD Take 1 Bottle by mouth in the morning, at noon, and at bedtime 100 each 5     No current facility-administered medications for this visit. Social History     Socioeconomic History    Marital status: Single     Spouse name: Not on file    Number of children: Not on file    Years of education: Not on file    Highest education level: Not on file   Occupational History    Not on file   Tobacco Use    Smoking status: Every Day     Packs/day: 0.50     Types: Cigarettes    Smokeless tobacco: Never    Tobacco comments:     5/day   Substance and Sexual Activity    Alcohol use: No    Drug use: Yes     Types: Marijuana (Weed)     Comment: 1-2x per day    Sexual activity: Never   Other Topics Concern    Not on file   Social History Narrative    Not on file     Social Determinants of Health     Financial Resource Strain: High Risk    Difficulty of Paying Living Expenses: Hard   Food Insecurity: Food Insecurity Present    Worried About Running Out of Food in the Last Year: Often true    Ran Out of Food in the Last Year: Often true   Transportation Needs: Not on file   Physical Activity: Not on file   Stress: Not on file   Social Connections: Not on file   Intimate Partner Violence: Not on file   Housing Stability: Not on file       No family history on file. REVIEW OF SYSTEM:     Constitutional: No fever or chills.  No night sweats, no weight loss   Eyes: No eye discharge, double vision, or eye pain   HEENT: negative for sore mouth, sore throat, hoarseness and voice change   Respiratory: negative for cough , sputum, dyspnea, wheezing, hemoptysis, chest pain   Cardiovascular: negative for chest pain, dyspnea, palpitations, orthopnea, PND   Gastrointestinal: negative for nausea, vomiting, diarrhea, constipation, abdominal pain, Dysphagia, hematemesis and hematochezia   Genitourinary: negative for frequency, dysuria, nocturia, urinary incontinence, and hematuria   Integument: negative for rash, skin lesions, bruises.    Hematologic/Lymphatic: negative for easy bruising, bleeding, lymphadenopathy, petechiae and swelling/edema   Endocrine: negative for heat or cold intolerance, tremor, weight changes, change in bowel habits and hair loss   Musculoskeletal: negative for myalgias, arthralgias, pain, joint swelling,and bone pain   Neurological: negative for headaches, dizziness, seizures, weakness, numbness       OBJECTIVE:         Vitals:    02/15/23 1033   BP: (!) 133/98   Pulse: 93   Resp:    Temp:        PHYSICAL EXAM:   General appearance -thinly built and cachectic  Mental status - alert and cooperative   Eyes - pupils equal and reactive, extraocular eye movements intact   Ears - bilateral TM's and external ear canals normal   Mouth - mucous membranes moist, pharynx normal without lesions   Neck - supple, no significant adenopathy   Lymphatics - no palpable lymphadenopathy, no hepatosplenomegaly   Chest - clear to auscultation, no wheezes, rales or rhonchi, symmetric air entry   Heart - normal rate, regular rhythm, normal S1, S2, no murmurs, rubs, clicks or gallops   Abdomen - soft, nontender, nondistended, no masses or organomegaly   Neurological - alert, oriented, normal speech, no focal findings or movement disorder noted   Musculoskeletal - no joint tenderness, deformity or swelling   Extremities - peripheral pulses normal, no pedal edema, no clubbing or cyanosis   Skin - normal coloration and turgor, no rashes, no suspicious skin lesions noted ,      LABORATORY DATA:     Lab Results   Component Value Date    WBC 8.5 02/15/2023    HGB 9.4 (L) 02/15/2023    HCT 27.4 (L) 02/15/2023    MCV 84.8 02/15/2023     (H) 02/15/2023    LYMPHOPCT 35 02/15/2023    RBC 3.23 (L) 02/15/2023    MCH 29.1 02/15/2023    MCHC 34.3 02/15/2023    RDW 20.2 (H) 02/15/2023    MONOPCT 9 02/15/2023    BASOPCT 1 02/15/2023    NEUTROABS 4.49 02/15/2023    LYMPHSABS 2.98 02/15/2023    MONOSABS 0.77 02/15/2023    EOSABS 0.17 02/15/2023    BASOSABS 0.09 02/15/2023         Chemistry        Component Value Date/Time     12/31/2022 0831    K 3.6 (L) 12/31/2022 0831     (H) 12/31/2022 0831    CO2 20 12/31/2022 0831    BUN 9 12/31/2022 0831    CREATININE 1.41 (H) 12/31/2022 0831        Component Value Date/Time    CALCIUM 7.6 (L) 12/31/2022 0831    ALKPHOS 89 12/30/2022 0846    AST 23 12/30/2022 0846    ALT 9 12/30/2022 0846    BILITOT 0.3 12/30/2022 0846            PATHOLOGY DATA:   Reviewed  IMAGING DATA:      XR HAND LEFT (MIN 3 VIEWS)  Narrative: EXAMINATION:  THREE XRAY VIEWS OF THE LEFT HAND    1/7/2023 10:26 am    COMPARISON:  None. HISTORY:  ORDERING SYSTEM PROVIDED HISTORY: fall, hand swelling and bruising  TECHNOLOGIST PROVIDED HISTORY:  fall, hand swelling and bruising    FINDINGS:  Acute mildly displaced fracture of the 5th proximal metacarpal.  No evidence  of intra-articular extension. No dislocation. Joint spaces appear  preserved. No radiopaque foreign bodies. Impression: Acute mildly displaced fracture of the 5th proximal metacarpal.  No evidence  of intra-articular extension. ASSESSMENT:    Imer Dan is a 32 y.o. female with significant malnutrition with normocytic anemia. I reviewed the lab work, discussed imaging studies diagnosis, prognosis and treatment recommendations. Her lab work suggesting normal iron studies and her TSH has been normal as well. She denies any bleeding symptoms including heavy periods or GI bleed.     Her hemoglobin is consistent with HBc trait and her mild anemia is most likely secondary to that    Follow-up with PCP for regular health checkups. Patient will need close follow-up with dietitian and nutritionist for her malnutrition. Patient's questions were sought and answered to her satisfaction and she agreed to proceed with the plan   PLAN:   I reviewed her recent lab work, discussed diagnosis and treatment recommendations   Her hemoglobin is slightly reduced and her platelets are slightly increased therefore assessment possible iron deficiency on top of her HbC trait  I will start her on iron pill once daily  Return to clinic in 4 months with CBC and iron studies prior    Osman Vela MD  Hematologist/Medical Oncologist    On this date 2/15/23  I have spent 40 minutes reviewing previous notes, test results and face to face with the patient discussing the diagnosis and importance of compliance with the treatment plan. Greater than 50% of that time was spent face-to-face with the patient in counseling and coordinating her care. This note is created with the assistance of a speech recognition program.  While intending to generate a document that actually reflects the content of the visit, the document can still have some errors including those of syntax and sound a like substitutions which may escape proof reading. It such instances, actual meaning can be extrapolated by contextual diversion.

## 2023-02-15 NOTE — TELEPHONE ENCOUNTER
Rosie Cunningham MD VISIT  DR Alka Barron IN TO SEE PATIENT  ORDERS RECEIVED  RV 4 MONTHS WITH LABS 1 WEEK PRIOR  LABS CDP FE TIBC FERRITIN DUE 06/14/23, ORDERS GIVEN TO PT  MD VISIT 06/21/23 @ 11AM  PER PT REQUEST WRITER LEFT  FOR TAYLA (HITESH) TO ASSIST WITH TRANSPORTATION   SCRIPT CALLED INTO PATIENT'S PHARMACY  AVS PRINTED AND GIVEN TO PATIENT WITH INSTRUCTION  PATIENT DISCHARGED AMBULATORY

## 2023-02-15 NOTE — TELEPHONE ENCOUNTER
Last visit: 1/12/23  Last Med refill: 8/15/22  Does patient have enough medication for 72 hours: No:     Next Visit Date:  Future Appointments   Date Time Provider Hayden Chica   6/21/2023 11:00 AM Chasidy Dickson MD [facility]   7/12/2023 10:30 AM Emperatriz Perales  [address] [facility]   Topic Date Due    Diabetic retinal exam  01/21/2020    Hepatitis B vaccine (3 of 3 - Risk 3-dose series) 01/12/2024 (Originally 2/14/1998)    Flu vaccine (1) 01/12/2024 (Originally 8/1/2022)    COVID-19 Vaccine (1) 03/23/2025 (Originally 6/5/1994)    Varicella vaccine (2 of 2 - 2-dose childhood series) 09/22/2026 (Originally 8/9/2000)    Pneumococcal 0-64 years Vaccine (1 - PCV) 09/22/2026 (Originally 12/5/1999)    Diabetic foot exam  03/23/2023    Lipids  08/17/2023    A1C test (Diabetic or Prediabetic)  10/19/2023    GFR test (Diabetes, CKD 3-4, OR last GFR 15-59)  12/31/2023    Diabetic Alb to Cr ratio (uACR) test  01/12/2024    Depression Screen  01/12/2024    Pap smear  05/04/2025    DTaP/Tdap/Td vaccine (6 - Td or Tdap) 08/27/2030    Hib vaccine  Completed    Hepatitis C screen  Completed    HIV screen  Completed    Hepatitis A vaccine  Aged Out    Meningococcal (ACWY) vaccine  Aged Out       Hemoglobin A1C (%)   Date Value   10/19/2022 6.3   07/19/2022 5.3   03/23/2022 6.3             ( goal A1C is < 7)   Microalb/Crt.  Ratio (mcg/mg creat)   Date Value   02/11/2020 511 (H)     LDL Cholesterol (mg/dL)   Date Value   08/17/2022 55   05/24/2021 45       (goal LDL is <100)   AST (U/L)   Date Value   12/30/2022 23     ALT (U/L)   Date Value   12/30/2022 9     BUN (mg/dL)   Date Value   12/31/2022 9     BP Readings from Last 3 Encounters:   02/15/23 (!) 133/98   02/07/23 (!) 160/98   01/17/23 (!) 181/85          (goal 120/80)    All Future Testing planned in [facility]  Lab Frequency Next Occurrence   Clostridium Difficile Toxin/Antigen Once 04/15/2022   Fecal lactoferrin Once 04/15/2022 Gastrointestinal Panel, Molecular Once 04/15/2022   CBC Once 04/15/2022   PAP SMEAR Once 06/04/2022   BO DIGITAL DIAGNOSTIC W OR WO CAD LEFT Once 05/04/2022   US BREAST COMPLETE LEFT Once 05/09/2022   CBC with Auto Differential Once 02/15/2023   Ferritin Once 02/15/2023   Iron and TIBC Once 02/15/2023               Patient Active Problem List:     Type 1 diabetes mellitus with diabetic polyneuropathy (HCC)     Intractable vomiting with nausea     Intractable abdominal pain     Microalbuminuria due to type 1 diabetes mellitus (HCC)     Generalized abdominal pain     Restless leg syndrome     Protein-calorie malnutrition, severe ([address]     Marijuana use, continuous     Onychomycosis     Cachexia ([address]     Oligomenorrhea     Positive Tpal     Left breast mass     Latent syphilis     Premenopausal idiopathic osteoporosis     Osteoporosis     Acute kidney injury ([address]     Closed nondisplaced fracture of fifth metacarpal bone of left hand with routine healing     Chronic gastritis without bleeding

## 2023-02-16 ENCOUNTER — TELEPHONE (OUTPATIENT)
Dept: ONCOLOGY | Age: 28
End: 2023-02-16

## 2023-02-16 RX ORDER — ISOPROPYL ALCOHOL 70 ML/100ML
SWAB TOPICAL
Qty: 100 EACH | Refills: 5 | Status: SHIPPED | OUTPATIENT
Start: 2023-02-16

## 2023-02-17 NOTE — CARE COORDINATION
called and spoke to Kaylee. Kaylee reports that [employer] paid her water bill however did not assist with other resources.  and Kaylee completed online request for gas. Kaylee has an appointment on 3/1 @9:45 Confirmation number [id number].  and Kaylee contacted "[employer] [facility] who encouraged Kaylee to use a medical certificate for electric since I diabetic.  provided Kaylee with fax number and encouraged her to call Alicia Farooq to have them fax for to her PCP.  and Kaylee contact [employer] who informed Kaylee applications can take up to a month for approval.     was able to speak to Chai Diaz, Manager of rental assistance program.  Chai Diaz assisted  with uploading Tran's eviction notice. Chai Diaz reports that she will follow up with Tran's application. Plan:   Kaylee will contact Alicia Farooq. Kaylee will work with [employer]. Kaylee will complete phone interview on 3/1.

## 2023-02-21 ENCOUNTER — CARE COORDINATION (OUTPATIENT)
Dept: CARE COORDINATION | Age: 28
End: 2023-02-21

## 2023-02-22 NOTE — CARE COORDINATION
spoke with Edward Arellano who reports that she has her letter from [employer] that they will be filing for eviction since Edward Arellano is still living in the home.  spoke with [employer] who reports her application was assigned and is being reviewed.  and Edward Arellano talked in length about finances and budgeting for bills. Edward Arellano reports that she has left 2 messages with [employer] and has been unsuccessful making contact with someone in the rent assistance program.    Edward Arellano reports that she has attempted to make contact with [employer] regarding her income tax credit. Edward Arellano reports that she has left messages on [employer]. Plan:   Edward Arellano and  will continue to work with [employer] to prevent eviction.

## 2023-02-24 ENCOUNTER — TELEPHONE (OUTPATIENT)
Dept: OBGYN | Age: 28
End: 2023-02-24

## 2023-02-24 ENCOUNTER — CARE COORDINATION (OUTPATIENT)
Dept: CARE COORDINATION | Age: 28
End: 2023-02-24

## 2023-02-24 RX ORDER — DOXYCYCLINE HYCLATE 100 MG
TABLET ORAL
Qty: 56 TABLET | Refills: 0 | OUTPATIENT
Start: 2023-02-24

## 2023-02-27 NOTE — CARE COORDINATION
spoke with Kaylee regarding rent assistance. Kaylee reports that she has not heard from [employer].  encouraged Tran to continue to try and call and follow up.  encouraged Tran to continue to contact Alina Roy to inquire about other assistance. Kaylee reports that she gets paid at the end of the month.  encouraged Tran to try and setup a payment plan.  attempted to contact [employer].  left message with name and number. Plan:   Kaylenee will contact [employer] regarding rent assistance. Kaylee will contact [facility] and request payment plan for back rent. Kaylee called  and informed her that she was approved for rent assistance through [employer]. Kaylee reports that they paid her back rent and paid her rent till May. Kaylee was very appreciative of help with application.  encouraged Tran to work with [employer] budgeting program that would help [employer] for her bills.

## 2023-03-03 ENCOUNTER — TELEPHONE (OUTPATIENT)
Dept: GASTROENTEROLOGY | Age: 28
End: 2023-03-03

## 2023-03-03 NOTE — TELEPHONE ENCOUNTER
Mediation refill denied as Dr Klaudia Jaures is no longer part of this practice and her last appointment was over a year ago.

## 2023-03-08 ENCOUNTER — CARE COORDINATION (OUTPATIENT)
Dept: CARE COORDINATION | Age: 28
End: 2023-03-08

## 2023-03-09 NOTE — CARE COORDINATION
called and spoke to Kaylee. Kaylee reports that she is doing well. Kaylee reports that [employer] helped her with her rent, gas, and electric. Kaylee reports that she has no bills for 3 months.  encouraged Tran to save her money and try to budget upcoming bills once she is back to paying them. Kaylee was agreeable. Plan:   Kaylee will continue to try and budget her money for upcoming bills. Kaylee will be agreeable to work with [facility] financial classes.

## 2023-03-12 ENCOUNTER — APPOINTMENT (OUTPATIENT)
Dept: GENERAL RADIOLOGY | Age: 28
End: 2023-03-12
Payer: MEDICARE

## 2023-03-12 ENCOUNTER — HOSPITAL ENCOUNTER (EMERGENCY)
Age: 28
Discharge: HOME | End: 2023-03-13
Attending: EMERGENCY MEDICINE
Payer: MEDICARE

## 2023-03-12 VITALS
DIASTOLIC BLOOD PRESSURE: 102 MMHG | OXYGEN SATURATION: 100 % | SYSTOLIC BLOOD PRESSURE: 157 MMHG | HEART RATE: 103 BPM | BODY MASS INDEX: 17.99 KG/M2 | RESPIRATION RATE: 16 BRPM | WEIGHT: 108 LBS | TEMPERATURE: 99.1 F | HEIGHT: 65 IN

## 2023-03-12 DIAGNOSIS — M70.41 PREPATELLAR BURSITIS OF RIGHT KNEE: ICD-10-CM

## 2023-03-12 DIAGNOSIS — M25.561 ACUTE PAIN OF RIGHT KNEE: Primary | ICD-10-CM

## 2023-03-12 LAB
ABSOLUTE EOS #: 0.27 K/UL (ref 0–0.44)
ABSOLUTE IMMATURE GRANULOCYTE: <0.03 K/UL (ref 0–0.3)
ABSOLUTE LYMPH #: 2.98 K/UL (ref 1.1–3.7)
ABSOLUTE MONO #: 0.85 K/UL (ref 0.1–1.2)
ANION GAP SERPL CALCULATED.3IONS-SCNC: 8 MMOL/L (ref 9–17)
BASOPHILS # BLD: 0 % (ref 0–2)
BASOPHILS ABSOLUTE: 0.03 K/UL (ref 0–0.2)
BUN SERPL-MCNC: 7 MG/DL (ref 6–20)
CALCIUM SERPL-MCNC: 7.8 MG/DL (ref 8.6–10.4)
CHLORIDE SERPL-SCNC: 104 MMOL/L (ref 98–107)
CO2 SERPL-SCNC: 21 MMOL/L (ref 20–31)
CREAT SERPL-MCNC: 0.87 MG/DL (ref 0.5–0.9)
CRP SERPL HS-MCNC: 15.1 MG/L (ref 0–5)
EOSINOPHILS RELATIVE PERCENT: 3 % (ref 1–4)
ERYTHROCYTE [SEDIMENTATION RATE] IN BLOOD BY WESTERGREN METHOD: 45 MM/HR (ref 0–20)
GFR SERPL CREATININE-BSD FRML MDRD: >60 ML/MIN/1.73M2
GLUCOSE SERPL-MCNC: 172 MG/DL (ref 70–99)
HCT VFR BLD AUTO: 26.8 % (ref 36.3–47.1)
HGB BLD-MCNC: 9.2 G/DL (ref 11.9–15.1)
IMMATURE GRANULOCYTES: 0 %
LYMPHOCYTES # BLD: 36 % (ref 24–43)
MCH RBC QN AUTO: 30.7 PG (ref 25.2–33.5)
MCHC RBC AUTO-ENTMCNC: 34.3 G/DL (ref 28.4–34.8)
MCV RBC AUTO: 89.3 FL (ref 82.6–102.9)
MONOCYTES # BLD: 10 % (ref 3–12)
NRBC AUTOMATED: 0 PER 100 WBC
PDW BLD-RTO: 18.8 % (ref 11.8–14.4)
PLATELET # BLD AUTO: ABNORMAL K/UL (ref 138–453)
PLATELET, FLUORESCENCE: NORMAL K/UL (ref 138–453)
POTASSIUM SERPL-SCNC: 4.1 MMOL/L (ref 3.7–5.3)
RBC # BLD: 3 M/UL (ref 3.95–5.11)
RBC # BLD: ABNORMAL 10*6/UL
SEG NEUTROPHILS: 49 % (ref 36–65)
SEGMENTED NEUTROPHILS ABSOLUTE COUNT: 4.06 K/UL (ref 1.5–8.1)
SODIUM SERPL-SCNC: 133 MMOL/L (ref 135–144)
URATE SERPL-MCNC: 5.7 MG/DL (ref 2.4–5.7)
WBC # BLD AUTO: 8.2 K/UL (ref 3.5–11.3)

## 2023-03-12 PROCEDURE — 85652 RBC SED RATE AUTOMATED: CPT

## 2023-03-12 PROCEDURE — 85055 RETICULATED PLATELET ASSAY: CPT

## 2023-03-12 PROCEDURE — 99284 EMERGENCY DEPT VISIT MOD MDM: CPT

## 2023-03-12 PROCEDURE — 86140 C-REACTIVE PROTEIN: CPT

## 2023-03-12 PROCEDURE — 73562 X-RAY EXAM OF KNEE 3: CPT

## 2023-03-12 PROCEDURE — 6370000000 HC RX 637 (ALT 250 FOR IP): Performed by: HEALTH CARE PROVIDER

## 2023-03-12 PROCEDURE — 80048 BASIC METABOLIC PNL TOTAL CA: CPT

## 2023-03-12 PROCEDURE — 84550 ASSAY OF BLOOD/URIC ACID: CPT

## 2023-03-12 PROCEDURE — 85025 COMPLETE CBC W/AUTO DIFF WBC: CPT

## 2023-03-12 RX ORDER — IBUPROFEN 800 MG/1
800 TABLET ORAL ONCE
Status: COMPLETED | OUTPATIENT
Start: 2023-03-12 | End: 2023-03-12

## 2023-03-12 RX ORDER — CLINDAMYCIN HYDROCHLORIDE 150 MG/1
300 CAPSULE ORAL ONCE
Status: COMPLETED | OUTPATIENT
Start: 2023-03-13 | End: 2023-03-12

## 2023-03-12 RX ADMIN — IBUPROFEN 800 MG: 800 TABLET, FILM COATED ORAL at 22:55

## 2023-03-12 RX ADMIN — CLINDAMYCIN HYDROCHLORIDE 300 MG: 150 CAPSULE ORAL at 23:57

## 2023-03-12 ASSESSMENT — ENCOUNTER SYMPTOMS: SHORTNESS OF BREATH: 0

## 2023-03-12 ASSESSMENT — PAIN SCALES - GENERAL
PAINLEVEL_OUTOF10: 9
PAINLEVEL_OUTOF10: 9

## 2023-03-12 ASSESSMENT — PAIN - FUNCTIONAL ASSESSMENT: PAIN_FUNCTIONAL_ASSESSMENT: 0-10

## 2023-03-13 RX ORDER — IBUPROFEN 800 MG/1
800 TABLET ORAL 2 TIMES DAILY PRN
Qty: 30 TABLET | Refills: 0 | Status: ON HOLD | OUTPATIENT
Start: 2023-03-13 | End: 2023-03-18 | Stop reason: HOSPADM

## 2023-03-13 RX ORDER — ACETAMINOPHEN 500 MG
1000 TABLET ORAL 3 TIMES DAILY
Qty: 30 TABLET | Refills: 0 | Status: ON HOLD | OUTPATIENT
Start: 2023-03-13 | End: 2023-03-18 | Stop reason: HOSPADM

## 2023-03-13 RX ORDER — CLINDAMYCIN HYDROCHLORIDE 300 MG/1
300 CAPSULE ORAL 3 TIMES DAILY
Qty: 30 CAPSULE | Refills: 0 | Status: ON HOLD | OUTPATIENT
Start: 2023-03-13 | End: 2023-03-18 | Stop reason: HOSPADM

## 2023-03-13 NOTE — ED PROVIDER NOTES
[facility]  Emergency Department  Faculty Attestation     I performed a history and physical examination of the patient and discussed management with the resident. I reviewed the residents note and agree with the documented findings and plan of care. Any areas of disagreement are noted on the chart. I was personally present for the key portions of any procedures. I have documented in the chart those procedures where I was not present during the key portions. I have reviewed the emergency nurses triage note. I agree with the chief complaint, past medical history, past surgical history, allergies, medications, social and family history as documented unless otherwise noted below. For Physician Assistant/ Nurse Practitioner cases/documentation I have personally evaluated this patient and have completed at least one if not all key elements of the E/M (history, physical exam, and MDM). Additional findings are as noted. Primary Care Physician:  Amy Manuel MD    Screenings:  [unfilled]    CHIEF COMPLAINT       Chief Complaint   Patient presents with    Knee Pain       RECENT VITALS:   Temp: 99.1 °F (37.3 °C),  Heart Rate: (!) 103, Resp: 16, BP: (!) 157/102    LABS:  Labs Reviewed   C-REACTIVE PROTEIN   SEDIMENTATION RATE   BASIC METABOLIC PANEL   CBC WITH AUTO DIFFERENTIAL   URIC ACID       Radiology  XR KNEE RIGHT (3 VIEWS)    (Results Pending)         Attending Physician Additional  Notes    Patient has severe right anterior knee pain of uncertain cause. There is been no trauma. No fever chills or sweats. No swelling. No abrasion. She is diabetic, type I, no history of gout rheumatoid lupus or other type of arthritis. No other joint pains. No myalgias or arthralgias. No bruising or bleeding. On exam she is in significant amount of pain, currently afebrile, tachycardic, hypertensive. No respiratory distress and no odor of ketones on the breath.   Skin is warm and dry with normal cap refill. Right knee is without effusion. There is mild tenderness especially over the prepatellar bursa. There is subtle prepatellar bursa discoloration and localized tenderness. There is normal extensor mechanism. Negative drawer sign or laxity. Normal pulses. No calf tenderness or edema or cords. Impression is knee pain in a diabetic without trauma. Likely bursitis, possibly septic. Unlikely septic arthritis. Plan is imaging, labs, fluids, analgesics. Anticipate discharge home on analgesics and antibiotics with follow-up to orthopedics. [facility].  Saskia Cee MD, [facility]  Attending Emergency  Physician                Murphy Song MD  03/12/23 9581

## 2023-03-13 NOTE — ED TRIAGE NOTES
Pt comes to ED with c/o bilateral knee pain. Pt states pain started two days ago after waking up, tried ice and ibuprofen without relief, still able to walk but difficult. Pt denies chest pain, SOB, injuries, falls, fever, nausea, vomiting. Pt a/o x4, resting on stretcher, RR even and non labored, call light within reach, CMS intact.

## 2023-03-13 NOTE — DISCHARGE INSTRUCTIONS
You were seen in the emergency department for pain in your right knee. Your pain appears to be due to inflammation and possible infection of the fluid-filled sac below the knee that helps with range of motion, called the bursa. Your condition is called prepatellar bursitis. Please take your Tylenol and ibuprofen in alternating fashion for pain control. Please take your antibiotics exactly as prescribed until they are finished. Please call the orthopedic surgery clinic tomorrow morning and schedule follow-up appointment to be seen in the next few days. If you experience worsening pain, fever, chills, difficulty walking, instability of the joint, worsening swelling, or any other symptom you find concerning, please return to the emergency department immediately for reevaluation.

## 2023-03-13 NOTE — ED PROVIDER NOTES
[address] [facility]  ED  Emergency Department Encounter  Emergency Medicine Resident     Pt Balbir Reyna  MRN: [id number]  [place] 1995  Date of evaluation: 3/12/23  PCP:  Janelle Barrios MD  Note Started: 10:19 PM EDT      CHIEF COMPLAINT       Chief Complaint   Patient presents with    Knee Pain       HISTORY OF PRESENT ILLNESS  (Location/Symptom, Timing/Onset, Context/Setting, Quality, Duration, Modifying Factors, Severity.)      Genia Ruelas is a 32 y.o. female who presents with knee pain. Started yesterday. Has been become progressively worse since that time. Pain is constant, throbbing and 9/10 in severity. Localized to the right knee without radiation. Exacerbated by bearing weight. Patient states that she is in so much pain that she can barely walk. No alleviating factors. She denies any fevers, chills, IV drug use, weakness, numbness, tingling, instability, clicking, popping or locking. Patient does have a history of diabetes takes insulin. Denies any history of gout. PAST MEDICAL / SURGICAL / SOCIAL / FAMILY HISTORY      has a past medical history of Hypertension, Kidney infection, MRSA (methicillin resistant staph aureus) culture positive, Nausea & vomiting, Ovarian cyst, Secondary osteoporosis, Type 1 diabetes mellitus ([facility] [address] and UTI (urinary tract infection). has a past surgical history that includes  section and Upper gastrointestinal endoscopy (N/A, 2021).       Social History     Socioeconomic History    Marital status: Single     Spouse name: Not on file    Number of children: Not on file    Years of education: Not on file    Highest education level: Not on file   Occupational History    Not on file   Tobacco Use    Smoking status: Every Day     Packs/day: 0.50     Types: Cigarettes    Smokeless tobacco: Never    Tobacco comments:     5/day   Substance and Sexual Activity    Alcohol use: No    Drug use: Yes     Types: Marijuana (Weed)     Comment: 1-2x per day    Sexual activity: Never   Other Topics Concern    Not on file   Social History Narrative    Not on file     Social Determinants of Health     Financial Resource Strain: High Risk    Difficulty of Paying Living Expenses: Hard   Food Insecurity: Food Insecurity Present    Worried About Running Out of Food in the Last Year: Often true    Ran Out of Food in the Last Year: Often true   Transportation Needs: Not on file   Physical Activity: Not on file   Stress: Not on file   Social Connections: Not on file   Intimate Partner Violence: Not on file   Housing Stability: Not on file       History reviewed. No pertinent family history. Allergies:  Blueberry [vaccinium angustifolium] and Shrimp flavor    Home Medications:  Prior to Admission medications    Medication Sig Start Date End Date Taking?  Authorizing Provider   clindamycin (CLEOCIN) 300 MG capsule Take 1 capsule by mouth 3 times daily for 10 days 3/13/23 3/23/23 Yes Chao Lopez MD   ibuprofen (ADVIL;MOTRIN) 800 MG tablet Take 1 tablet by mouth 2 times daily as needed for Pain 3/13/23  Yes Chao Lopez MD   acetaminophen (TYLENOL) 500 MG tablet Take 2 tablets by mouth 3 times daily 3/13/23  Yes Chao Lopez MD   Alcohol Swabs (DROPSAFE ALCOHOL PREP) 70 % PADS USE FOUR TIMES DAILY AS DIRECTED 2/16/23   Emperatriz Jc MD   ferrous sulfate (IRON 325) 325 (65 Fe) MG tablet Take 1 tablet by mouth daily (with breakfast) 2/15/23   Stu Reyna MD   ondansetron (ZOFRAN) 4 MG tablet TAKE 1 TABLET BY MOUTH EVERY 6 HOURS AS NEEDED FOR NAUSEA 1/4/23   Historical Provider, MD   lisinopril (PRINIVIL;ZESTRIL) 5 MG tablet TAKE 1 TABLET EVERY DAY 1/12/23   Emperatriz Jc MD   Blood Glucose Monitoring Suppl (ONE TOUCH ULTRA 2) w/Device KIT 1 kit by Does not apply route in the morning, at noon, in the evening, and at bedtime 11/15/22   Emperatriz Jc MD   pantoprazole (PROTONIX) 40 MG tablet Take 1 tablet by mouth every morning (before breakfast) 11/8/22   Emperatriz Rodriguez MD   dicyclomine (BENTYL) 10 MG capsule TAKE 1 CAPSULE THREE TIMES DAILY AS NEEDED FOR ABDOMINAL PAIN 11/8/22   Emperatriz Rodriguez MD   Insulin Pen Needle (DROPLET PEN NEEDLES) 31G X 8 MM MISC Use 4 times daily as directed 11/8/22   Emperatriz Rodriguez MD   TRUEplus Lancets 33G MISC Use 4 times daily as directed 11/8/22   Emperatriz Rodriguez MD   blood glucose test strips (TRUE METRIX BLOOD GLUCOSE TEST) strip As needed. 11/8/22   Emperatriz Rodriguez MD   pregabalin (LYRICA) 25 MG capsule Take 1 capsule by mouth every evening for 30 days. 11/8/22 2/15/23  Emperatriz Rodriguez MD   insulin glargine (LANTUS SOLOSTAR) 100 UNIT/ML injection pen Inject 10 Units into the skin nightly 11/8/22   Emperatriz Rodriguez MD   rOPINIRole (REQUIP) 0.25 MG tablet Take 1 tablet every night 11/8/22   Emperatriz Rodriguez MD   insulin aspart (NOVOLOG FLEXPEN) 100 UNIT/ML injection pen Inject 2 to 12 units subcutaneously three times daily per sliding scale 11/8/22   Emperatriz Rodriguez MD   Nutritional Supplements (GLUCERNA 1.5 REJI) LIQD Take 1 each by mouth in the morning, at noon, and at bedtime 11/8/22   Emperatriz Rodriguez MD   Calcium Carbonate-Vitamin D (OYSTER SHELL CALCIUM/D) 500-200 MG-UNIT TABS Take 1 tablet by mouth daily 11/1/22 11/1/23  Mathew Santos MD   azelastine (OPTIVAR) 0.05 % ophthalmic solution  4/27/22   Radha Burks   ketoconazole (NIZORAL) 2 % cream APPLY TOPICALLY DAILY 5/19/22   Emperatriz Rodriguez MD   Nutritional Supplements (ENSURE HIGH PROTEIN) LIQD Take 1 Bottle by mouth in the morning, at noon, and at bedtime 4/13/22   Empeartriz Rodriguez MD         REVIEW OF SYSTEMS       Review of Systems   Constitutional:  Negative for fever. Respiratory:  Negative for shortness of breath. Cardiovascular:  Negative for chest pain and leg swelling. Musculoskeletal:  Positive for arthralgias and joint swelling. Allergic/Immunologic: Negative for immunocompromised state. Neurological:  Negative for weakness and numbness. Hematological:  Does not bruise/bleed easily. PHYSICAL EXAM      INITIAL VITALS:   BP (!) 157/102   Pulse (!) 103   Temp 99.1 °F (37.3 °C) (Oral)   Resp 16   Ht 5' 5\" (1.651 m)   Wt 108 lb (49 kg)   SpO2 100%   BMI 17.97 kg/m²     Physical Exam  Constitutional:       General: She is in acute distress. Cardiovascular:      Rate and Rhythm: Normal rate and regular rhythm. Pulses: Normal pulses. Heart sounds: Normal heart sounds. Musculoskeletal:         General: Swelling and tenderness present. Comments: Right knee has swelling and effusion and warmth compared to the left knee. Effusion is localized to the prepatellar bursa. Patient is significantly tender to palpation on the right. Does have full range of motion, but is somewhat limited due to pain. Skin:     General: Skin is warm and dry. Capillary Refill: Capillary refill takes less than 2 seconds. Neurological:      Mental Status: She is alert. Sensory: No sensory deficit. Motor: No weakness. DDX/DIAGNOSTIC RESULTS / EMERGENCY DEPARTMENT COURSE / MDM     Medical Decision Making  18-year-old female with history of diabetes and worsening right knee pain. Swelling is localized to prepatellar bursa, so likely bursitis. Septic joint and gout also possibilities. Will obtain x-ray, BMP, CBC, uric acid, CRP and ESR. Additional work-up and treatment pending the results of the above listed studies. Will give ibuprofen for pain, but will likely need stronger medication. Amount and/or Complexity of Data Reviewed  Labs: ordered. Radiology: ordered. Risk  OTC drugs. Prescription drug management. EMERGENCY DEPARTMENT COURSE:      ED Course as of 03/13/23 0037   Mon Mar 13, 2023   0036 No acute abnormalities on x-ray. Uric acid normal.  CRP and ESR both elevated. No leukocytosis. Clinical picture consistent with prepatellar bursitis.   Given patient's history of diabetes and level of tenderness to palpation, will cover empirically with clindamycin and pain control with ibuprofen and Tylenol. Discussed with orthopedics, who are in agreement with plan and will see her as an outpatient. Discussed return precautions with patient who is in agreement with plan. [GG]      ED Course User Index  [GG] Tammy Abad MD       PROCEDURES:  None    CONSULTS:  None    CRITICAL CARE:  None    FINAL IMPRESSION      1. Acute pain of right knee    2.  Prepatellar bursitis of right knee          DISPOSITION / PLAN     DISPOSITION Decision To Discharge 03/13/2023 12:19:53 AM      PATIENT REFERRED TO:  [facility]  [address] [facility] [address]  [phone]    If symptoms worsen     [address] [place] [address]  [phone]  In 2 days      DISCHARGE MEDICATIONS:  Discharge Medication List as of 3/13/2023 12:24 AM        START taking these medications    Details   clindamycin (CLEOCIN) 300 MG capsule Take 1 capsule by mouth 3 times daily for 10 days, Disp-30 capsule, R-0Print      ibuprofen (ADVIL;MOTRIN) 800 MG tablet Take 1 tablet by mouth 2 times daily as needed for Pain, Disp-30 tablet, R-0Print      acetaminophen (TYLENOL) 500 MG tablet Take 2 tablets by mouth 3 times daily, Disp-30 tablet, R-0Print             Tammy Abad MD  Emergency Medicine Resident    (Please note that portions of thisnote were completed with a voice recognition program.  Efforts were made to edit the dictations but occasionally words are mis-transcribed.)       Tammy Abad MD  Resident  03/13/23 4667

## 2023-03-13 NOTE — ED NOTES
The following labs were labeled with appropriate pt sticker and tubed to lab:     [] Blue     [x] Lavender   [] on ice  [x] Green/yellow  [] Green/black [] on ice  [] Cathren Aland  [] on ice  [] Yellow  [] Red  [] Type/ Screen  [] ABG  [] VBG    [] COVID-19 swab    [] Rapid  [] PCR  [] Flu swab  [] Peds Viral Panel     [] Urine Sample  [] Fecal Sample  [] Pelvic Cultures  [] Blood Cultures  [] X 2  [] STREP Cultures         Kelly Santana RN  03/12/23 0900

## 2023-03-17 ENCOUNTER — HOSPITAL ENCOUNTER (OUTPATIENT)
Age: 28
Setting detail: OBSERVATION
Discharge: HOME | End: 2023-03-18
Attending: EMERGENCY MEDICINE | Admitting: EMERGENCY MEDICINE
Payer: MEDICARE

## 2023-03-17 DIAGNOSIS — M25.532 LEFT WRIST PAIN: Primary | ICD-10-CM

## 2023-03-17 DIAGNOSIS — B35.1 ONYCHOMYCOSIS: ICD-10-CM

## 2023-03-17 LAB
ABSOLUTE EOS #: 0.17 K/UL (ref 0–0.4)
ABSOLUTE IMMATURE GRANULOCYTE: 0 K/UL (ref 0–0.3)
ABSOLUTE LYMPH #: 5.08 K/UL (ref 1–4.8)
ABSOLUTE MONO #: 0.34 K/UL (ref 0.1–0.8)
ANION GAP SERPL CALCULATED.3IONS-SCNC: 8 MMOL/L (ref 9–17)
BASOPHILS # BLD: 0 % (ref 0–2)
BASOPHILS ABSOLUTE: 0 K/UL (ref 0–0.2)
BUN SERPL-MCNC: 3 MG/DL (ref 6–20)
CALCIUM SERPL-MCNC: 8.2 MG/DL (ref 8.6–10.4)
CHLORIDE SERPL-SCNC: 105 MMOL/L (ref 98–107)
CO2 SERPL-SCNC: 24 MMOL/L (ref 20–31)
CREAT SERPL-MCNC: 0.71 MG/DL (ref 0.5–0.9)
CRP SERPL HS-MCNC: <3 MG/L (ref 0–5)
EOSINOPHILS RELATIVE PERCENT: 2 % (ref 1–4)
GFR SERPL CREATININE-BSD FRML MDRD: >60 ML/MIN/1.73M2
GLUCOSE SERPL-MCNC: 101 MG/DL (ref 70–99)
HCT VFR BLD AUTO: 29.3 % (ref 36.3–47.1)
HGB BLD-MCNC: 10 G/DL (ref 11.9–15.1)
IMMATURE GRANULOCYTES: 0 %
LYMPHOCYTES # BLD: 59 % (ref 24–44)
MCH RBC QN AUTO: 29.8 PG (ref 25.2–33.5)
MCHC RBC AUTO-ENTMCNC: 34.1 G/DL (ref 28.4–34.8)
MCV RBC AUTO: 87.2 FL (ref 82.6–102.9)
MONOCYTES # BLD: 4 % (ref 1–7)
MORPHOLOGY: ABNORMAL
MORPHOLOGY: ABNORMAL
NRBC AUTOMATED: 0 PER 100 WBC
PDW BLD-RTO: 17.6 % (ref 11.8–14.4)
PLATELET # BLD AUTO: 579 K/UL (ref 138–453)
PMV BLD AUTO: 9.6 FL (ref 8.1–13.5)
POTASSIUM SERPL-SCNC: 3.4 MMOL/L (ref 3.7–5.3)
RBC # BLD: 3.36 M/UL (ref 3.95–5.11)
SEG NEUTROPHILS: 35 % (ref 36–66)
SEGMENTED NEUTROPHILS ABSOLUTE COUNT: 3.01 K/UL (ref 1.8–7.7)
SODIUM SERPL-SCNC: 137 MMOL/L (ref 135–144)
WBC # BLD AUTO: 8.6 K/UL (ref 3.5–11.3)

## 2023-03-17 PROCEDURE — 96374 THER/PROPH/DIAG INJ IV PUSH: CPT

## 2023-03-17 PROCEDURE — 86140 C-REACTIVE PROTEIN: CPT

## 2023-03-17 PROCEDURE — 87205 SMEAR GRAM STAIN: CPT

## 2023-03-17 PROCEDURE — 85652 RBC SED RATE AUTOMATED: CPT

## 2023-03-17 PROCEDURE — 89051 BODY FLUID CELL COUNT: CPT

## 2023-03-17 PROCEDURE — 80048 BASIC METABOLIC PNL TOTAL CA: CPT

## 2023-03-17 PROCEDURE — 99285 EMERGENCY DEPT VISIT HI MDM: CPT

## 2023-03-17 PROCEDURE — 85025 COMPLETE CBC W/AUTO DIFF WBC: CPT

## 2023-03-17 PROCEDURE — 89060 EXAM SYNOVIAL FLUID CRYSTALS: CPT

## 2023-03-17 PROCEDURE — 6360000002 HC RX W HCPCS: Performed by: STUDENT IN AN ORGANIZED HEALTH CARE EDUCATION/TRAINING PROGRAM

## 2023-03-17 PROCEDURE — 87070 CULTURE OTHR SPECIMN AEROBIC: CPT

## 2023-03-17 PROCEDURE — 96375 TX/PRO/DX INJ NEW DRUG ADDON: CPT

## 2023-03-17 PROCEDURE — 87075 CULTR BACTERIA EXCEPT BLOOD: CPT

## 2023-03-17 RX ORDER — FENTANYL CITRATE 50 UG/ML
50 INJECTION, SOLUTION INTRAMUSCULAR; INTRAVENOUS ONCE
Status: COMPLETED | OUTPATIENT
Start: 2023-03-17 | End: 2023-03-17

## 2023-03-17 RX ORDER — KETOROLAC TROMETHAMINE 30 MG/ML
30 INJECTION, SOLUTION INTRAMUSCULAR; INTRAVENOUS ONCE
Status: DISCONTINUED | OUTPATIENT
Start: 2023-03-17 | End: 2023-03-17

## 2023-03-17 RX ORDER — KETOROLAC TROMETHAMINE 15 MG/ML
15 INJECTION, SOLUTION INTRAMUSCULAR; INTRAVENOUS ONCE
Status: COMPLETED | OUTPATIENT
Start: 2023-03-17 | End: 2023-03-17

## 2023-03-17 RX ADMIN — KETOROLAC TROMETHAMINE 15 MG: 15 INJECTION, SOLUTION INTRAMUSCULAR; INTRAVENOUS at 23:43

## 2023-03-17 RX ADMIN — FENTANYL CITRATE 50 MCG: 50 INJECTION, SOLUTION INTRAMUSCULAR; INTRAVENOUS at 22:23

## 2023-03-17 ASSESSMENT — PAIN SCALES - GENERAL
PAINLEVEL_OUTOF10: 10

## 2023-03-17 ASSESSMENT — PAIN DESCRIPTION - ORIENTATION: ORIENTATION: LEFT;LOWER

## 2023-03-17 ASSESSMENT — PAIN DESCRIPTION - FREQUENCY: FREQUENCY: CONTINUOUS

## 2023-03-17 ASSESSMENT — PAIN DESCRIPTION - LOCATION: LOCATION: ARM

## 2023-03-18 VITALS
SYSTOLIC BLOOD PRESSURE: 129 MMHG | HEIGHT: 65 IN | WEIGHT: 105 LBS | HEART RATE: 73 BPM | BODY MASS INDEX: 17.49 KG/M2 | OXYGEN SATURATION: 99 % | RESPIRATION RATE: 16 BRPM | TEMPERATURE: 98.2 F | DIASTOLIC BLOOD PRESSURE: 66 MMHG

## 2023-03-18 PROBLEM — M25.532 ACUTE PAIN OF LEFT WRIST: Status: ACTIVE | Noted: 2023-03-18

## 2023-03-18 LAB
ERYTHROCYTE [SEDIMENTATION RATE] IN BLOOD BY WESTERGREN METHOD: 94 MM/HR (ref 0–20)
GLUCOSE BLD-MCNC: 130 MG/DL (ref 65–105)
GLUCOSE BLD-MCNC: 98 MG/DL (ref 65–105)
LYMPHOCYTES, BODY FLUID: 1 %
NEUTROPHIL, FLUID: 93 %
OTHER CELLS FLUID: NORMAL %
RBC FLUID: NORMAL CELLS/UL
SARS-COV-2 RDRP RESP QL NAA+PROBE: NOT DETECTED
SPECIMEN DESCRIPTION: NORMAL
SPECIMEN TYPE: NORMAL
WBC FLUID: NORMAL CELLS/UL

## 2023-03-18 PROCEDURE — 20605 DRAIN/INJ JOINT/BURSA W/O US: CPT

## 2023-03-18 PROCEDURE — 82947 ASSAY GLUCOSE BLOOD QUANT: CPT

## 2023-03-18 PROCEDURE — 6370000000 HC RX 637 (ALT 250 FOR IP): Performed by: STUDENT IN AN ORGANIZED HEALTH CARE EDUCATION/TRAINING PROGRAM

## 2023-03-18 PROCEDURE — 6360000002 HC RX W HCPCS: Performed by: STUDENT IN AN ORGANIZED HEALTH CARE EDUCATION/TRAINING PROGRAM

## 2023-03-18 PROCEDURE — G0378 HOSPITAL OBSERVATION PER HR: HCPCS

## 2023-03-18 PROCEDURE — 87635 SARS-COV-2 COVID-19 AMP PRB: CPT

## 2023-03-18 PROCEDURE — 6360000002 HC RX W HCPCS

## 2023-03-18 PROCEDURE — 96376 TX/PRO/DX INJ SAME DRUG ADON: CPT

## 2023-03-18 PROCEDURE — 96375 TX/PRO/DX INJ NEW DRUG ADDON: CPT

## 2023-03-18 RX ORDER — SODIUM CHLORIDE 0.9 % (FLUSH) 0.9 %
5-40 SYRINGE (ML) INJECTION PRN
Status: DISCONTINUED | OUTPATIENT
Start: 2023-03-18 | End: 2023-03-18 | Stop reason: HOSPADM

## 2023-03-18 RX ORDER — KETOROLAC TROMETHAMINE 15 MG/ML
15 INJECTION, SOLUTION INTRAMUSCULAR; INTRAVENOUS EVERY 6 HOURS PRN
Status: DISCONTINUED | OUTPATIENT
Start: 2023-03-18 | End: 2023-03-18 | Stop reason: HOSPADM

## 2023-03-18 RX ORDER — COLCHICINE 0.6 MG/1
0.6 TABLET ORAL 2 TIMES DAILY
Qty: 14 TABLET | Refills: 0 | Status: SHIPPED | OUTPATIENT
Start: 2023-03-18

## 2023-03-18 RX ORDER — MORPHINE SULFATE 4 MG/ML
4 INJECTION, SOLUTION INTRAMUSCULAR; INTRAVENOUS ONCE
Status: COMPLETED | OUTPATIENT
Start: 2023-03-18 | End: 2023-03-18

## 2023-03-18 RX ORDER — DEXTROSE MONOHYDRATE 100 MG/ML
INJECTION, SOLUTION INTRAVENOUS CONTINUOUS PRN
Status: DISCONTINUED | OUTPATIENT
Start: 2023-03-18 | End: 2023-03-18 | Stop reason: HOSPADM

## 2023-03-18 RX ORDER — INSULIN LISPRO 100 [IU]/ML
0-8 INJECTION, SOLUTION INTRAVENOUS; SUBCUTANEOUS
Status: DISCONTINUED | OUTPATIENT
Start: 2023-03-18 | End: 2023-03-18 | Stop reason: HOSPADM

## 2023-03-18 RX ORDER — ONDANSETRON 2 MG/ML
4 INJECTION INTRAMUSCULAR; INTRAVENOUS EVERY 6 HOURS PRN
Status: DISCONTINUED | OUTPATIENT
Start: 2023-03-18 | End: 2023-03-18 | Stop reason: HOSPADM

## 2023-03-18 RX ORDER — ACETAMINOPHEN 500 MG
1000 TABLET ORAL EVERY 6 HOURS PRN
Status: DISCONTINUED | OUTPATIENT
Start: 2023-03-18 | End: 2023-03-18

## 2023-03-18 RX ORDER — INSULIN GLARGINE 100 [IU]/ML
10 INJECTION, SOLUTION SUBCUTANEOUS NIGHTLY
Status: DISCONTINUED | OUTPATIENT
Start: 2023-03-18 | End: 2023-03-18 | Stop reason: HOSPADM

## 2023-03-18 RX ORDER — ONDANSETRON 4 MG/1
4 TABLET, ORALLY DISINTEGRATING ORAL EVERY 8 HOURS PRN
Status: DISCONTINUED | OUTPATIENT
Start: 2023-03-18 | End: 2023-03-18 | Stop reason: HOSPADM

## 2023-03-18 RX ORDER — INSULIN LISPRO 100 [IU]/ML
0-4 INJECTION, SOLUTION INTRAVENOUS; SUBCUTANEOUS NIGHTLY
Status: DISCONTINUED | OUTPATIENT
Start: 2023-03-18 | End: 2023-03-18 | Stop reason: HOSPADM

## 2023-03-18 RX ORDER — POLYETHYLENE GLYCOL 3350 17 G/17G
17 POWDER, FOR SOLUTION ORAL DAILY PRN
Status: DISCONTINUED | OUTPATIENT
Start: 2023-03-18 | End: 2023-03-18 | Stop reason: HOSPADM

## 2023-03-18 RX ORDER — ENOXAPARIN SODIUM 100 MG/ML
30 INJECTION SUBCUTANEOUS DAILY
Status: DISCONTINUED | OUTPATIENT
Start: 2023-03-18 | End: 2023-03-18 | Stop reason: HOSPADM

## 2023-03-18 RX ORDER — SODIUM CHLORIDE 0.9 % (FLUSH) 0.9 %
5-40 SYRINGE (ML) INJECTION EVERY 12 HOURS SCHEDULED
Status: DISCONTINUED | OUTPATIENT
Start: 2023-03-18 | End: 2023-03-18 | Stop reason: HOSPADM

## 2023-03-18 RX ORDER — ACETAMINOPHEN 650 MG/1
650 SUPPOSITORY RECTAL EVERY 6 HOURS PRN
Status: DISCONTINUED | OUTPATIENT
Start: 2023-03-18 | End: 2023-03-18

## 2023-03-18 RX ORDER — SODIUM CHLORIDE 9 MG/ML
INJECTION, SOLUTION INTRAVENOUS PRN
Status: DISCONTINUED | OUTPATIENT
Start: 2023-03-18 | End: 2023-03-18 | Stop reason: HOSPADM

## 2023-03-18 RX ORDER — ACETAMINOPHEN 500 MG
1000 TABLET ORAL EVERY 8 HOURS
Status: DISCONTINUED | OUTPATIENT
Start: 2023-03-18 | End: 2023-03-18 | Stop reason: HOSPADM

## 2023-03-18 RX ORDER — COLCHICINE 0.6 MG/1
1.2 TABLET ORAL ONCE
Status: COMPLETED | OUTPATIENT
Start: 2023-03-18 | End: 2023-03-18

## 2023-03-18 RX ORDER — ACETAMINOPHEN 325 MG/1
650 TABLET ORAL EVERY 6 HOURS PRN
Status: DISCONTINUED | OUTPATIENT
Start: 2023-03-18 | End: 2023-03-18

## 2023-03-18 RX ADMIN — KETOROLAC TROMETHAMINE 15 MG: 15 INJECTION, SOLUTION INTRAMUSCULAR; INTRAVENOUS at 02:06

## 2023-03-18 RX ADMIN — MORPHINE SULFATE 4 MG: 4 INJECTION INTRAVENOUS at 04:26

## 2023-03-18 RX ADMIN — COLCHICINE 1.2 MG: 0.6 TABLET, FILM COATED ORAL at 14:55

## 2023-03-18 RX ADMIN — ACETAMINOPHEN 1000 MG: 500 TABLET ORAL at 14:55

## 2023-03-18 RX ADMIN — ACETAMINOPHEN 1000 MG: 500 TABLET ORAL at 02:40

## 2023-03-18 ASSESSMENT — PAIN SCALES - GENERAL
PAINLEVEL_OUTOF10: 10
PAINLEVEL_OUTOF10: 7
PAINLEVEL_OUTOF10: 10
PAINLEVEL_OUTOF10: 10
PAINLEVEL_OUTOF10: 8

## 2023-03-18 ASSESSMENT — PAIN DESCRIPTION - LOCATION
LOCATION: ARM
LOCATION: HAND
LOCATION: ARM

## 2023-03-18 ASSESSMENT — PAIN DESCRIPTION - ORIENTATION
ORIENTATION: LEFT

## 2023-03-18 ASSESSMENT — ENCOUNTER SYMPTOMS
BACK PAIN: 0
CONSTIPATION: 0
SINUS PAIN: 0
EYE PAIN: 0
EYE REDNESS: 0
DIARRHEA: 0
SHORTNESS OF BREATH: 0
RHINORRHEA: 0
COLOR CHANGE: 0
NAUSEA: 0
SINUS PRESSURE: 0
VOMITING: 0
ABDOMINAL PAIN: 0
WHEEZING: 0
PHOTOPHOBIA: 0

## 2023-03-18 ASSESSMENT — PAIN DESCRIPTION - DESCRIPTORS: DESCRIPTORS: SHARP

## 2023-03-18 NOTE — H&P
[facility]  CDU / OBSERVATION ENCOUNTER  ATTENDING NOTE     Pt Name: Steve Villanueva  MRN: [id number]  [place] 1995  Date of evaluation: 3/18/23  Patient's PCP is :  Nimesh Davis MD    CHIEF COMPLAINT       Chief Complaint   Patient presents with    Arm Pain     Left         HISTORY OF PRESENT ILLNESS    Steve Villanueva is a 32 y.o. female past medical history of hypertension, type 1 diabetes, secondary osteoporosis presents emergency department with numbness and pain in the left wrist.  The onset was sudden. Radiates up the arm. No injury she can recall. No associated fevers or chills. She denies history of drug use, rash, sexual activity. In the emergency department, patient with unremarkable vital signs with exception of elevated blood pressure 178/98. In the emergency department, potassium 3.4 CRP less than 3 hemoglobin 10 which is baseline, no leukocytosis. ESR 94. The joint was aspirated sent for culture and crystals. Location/Symptom: Left wrist  Timing/Onset: Sudden  Provocation: None  Quality: Sharp  Radiation: Up the forearm  Severity: Severe  Timing/Duration: Constant  Modifying Factors: None    History was obtained in part through review of the ED chart.  When possible, a direct discussion was had with ED nurses, residents, and attendings  REVIEW OF SYSTEMS       General ROS - No fevers, No malaise   Ophthalmic ROS - No discharge, No changes in vision  ENT ROS -  No sore throat, No rhinorrhea,   Respiratory ROS - no shortness of breath, no cough, no  wheezing  Cardiovascular ROS - No chest pain, no dyspnea on exertion  Gastrointestinal ROS - No abdominal pain, no nausea or vomiting, no change in bowel habits, no black or bloody stools  Genito-Urinary ROS - No dysuria, trouble voiding, or hematuria  Musculoskeletal ROS -left wrist swelling and pain  Neurological ROS - No headache, no dizziness/lightheadedness, No focal weakness, no loss of sensation  Dermatological ROS - No lesions, No rash     (PQRS) Advance directives on face sheet per hospital policy. No change unless specifically mentioned in chart    PAST MEDICAL HISTORY    has a past medical history of Hypertension, Kidney infection, MRSA (methicillin resistant staph aureus) culture positive, Nausea & vomiting, Ovarian cyst, Secondary osteoporosis, Type 1 diabetes mellitus ([address] and UTI (urinary tract infection). I have reviewed the past medical history with the patient and it is pertinent to this complaint. SURGICAL HISTORY      has a past surgical history that includes  section and Upper gastrointestinal endoscopy (N/A, 2021). I have reviewed and agree with Surgical History entered and it is pertinent to this complaint. CURRENT MEDICATIONS     sodium chloride flush 0.9 % injection 5-40 mL, 2 times per day  sodium chloride flush 0.9 % injection 5-40 mL, PRN  0.9 % sodium chloride infusion, PRN  enoxaparin Sodium (LOVENOX) injection 30 mg, Daily  ondansetron (ZOFRAN-ODT) disintegrating tablet 4 mg, Q8H PRN   Or  ondansetron (ZOFRAN) injection 4 mg, Q6H PRN  polyethylene glycol (GLYCOLAX) packet 17 g, Daily PRN  ketorolac (TORADOL) injection 15 mg, Q6H PRN  acetaminophen (TYLENOL) tablet 1,000 mg, q8h  insulin glargine (LANTUS) injection vial 10 Units, Nightly  insulin lispro (HUMALOG) injection vial 0-8 Units, TID WC  insulin lispro (HUMALOG) injection vial 0-4 Units, Nightly  glucose chewable tablet 16 g, PRN  dextrose bolus 10% 125 mL, PRN   Or  dextrose bolus 10% 250 mL, PRN  glucagon (rDNA) injection 1 mg, PRN  dextrose 10 % infusion, Continuous PRN      All medication charted and reviewed. ALLERGIES     is allergic to blueberry [vaccinium angustifolium] and shrimp flavor. FAMILY HISTORY     She indicated that her mother is . She indicated that her father is . family history is not on file.   The patient denies any pertinent family history. I have reviewed and agree with the family history entered. I have reviewed the Family History and it is not significant to the case    SOCIAL HISTORY      reports that she has been smoking cigarettes. She has been smoking an average of .5 packs per day. She has never used smokeless tobacco. She reports current drug use. Drug: Marijuana Moe Griffin). She reports that she does not drink alcohol. I have reviewed and agree with all Social.  There are no concerns for substance abuse/use. PHYSICAL EXAM     INITIAL VITALS:  height is 5' 5\" (1.651 m) and weight is 105 lb (47.6 kg). Her temperature is 98.2 °F (36.8 °C). Her blood pressure is 129/66 and her pulse is 73. Her respiration is 16 and oxygen saturation is 99%. CONSTITUTIONAL: AOx4, no apparent distress, appears stated age    HEAD: normocephalic, atraumatic   EYES: PERRLA, EOMI    ENT: moist mucous membranes, uvula midline   NECK: supple, symmetric   BACK: symmetric   LUNGS: clear to auscultation bilaterally   CARDIOVASCULAR: regular rate and rhythm, no murmurs, rubs or gallops   ABDOMEN: soft, non-tender, non-distended with normal active bowel sounds   NEUROLOGIC:  MAEx4, no focal sensory or motor deficits   MUSCULOSKELETAL: no clubbing, cyanosis or edema; pain to palpation of left wrist   SKIN: no rash or wounds            DIAGNOSTIC RESULTS        RADIOLOGY:   I directly visualized the following  images and reviewed the radiologist interpretations:    No results found. LABS:  I have reviewed and interpreted all available lab results.   Labs Reviewed   CULTURE, ANAEROBIC AND AEROBIC - Abnormal; Notable for the following components:       Result Value    Direct Exam FEW NEUTROPHILS (*)     All other components within normal limits   SEDIMENTATION RATE - Abnormal; Notable for the following components:    Sed Rate 94 (*)     All other components within normal limits   BASIC METABOLIC PANEL - Abnormal; Notable for the following components:    Glucose 101 (*)     BUN 3 (*)     Calcium 8.2 (*)     Potassium 3.4 (*)     Anion Gap 8 (*)     All other components within normal limits   CBC WITH AUTO DIFFERENTIAL - Abnormal; Notable for the following components:    RBC 3.36 (*)     Hemoglobin 10.0 (*)     Hematocrit 29.3 (*)     RDW 17.6 (*)     Platelets 080 (*)     Seg Neutrophils 35 (*)     Lymphocytes 59 (*)     Absolute Lymph # 5.08 (*)     All other components within normal limits   POC GLUCOSE FINGERSTICK - Abnormal; Notable for the following components:    POC Glucose 130 (*)     All other components within normal limits   COVID-19, RAPID   C-REACTIVE PROTEIN   CELL COUNT WITH DIFFERENTIAL, BODY FLUID   CRYSTALS, BODY FLUID   POC GLUCOSE FINGERSTICK   POCT GLUCOSE   POCT GLUCOSE   POCT GLUCOSE         CDU IMPRESSION / PLAN      Tran Borden is a 32 y.o. female who presents with left wrist pain      Left wrist pain  - Preliminary joint aspirate results are suggestive of inflammatory arthropathy  - Await crystal and culture results; supportive care at this time  -Tylenol 1 g every 8    Elevated blood pressure  - Lisinopril 5 mg daily, consider titrating up if BP remains elevated    Type 1 diabetes  - Home regimen Lantus 10 units nightly; aspart sliding scale 3 times daily 2 to 12 units  -Initiate Lantus 10 units nightly, medium intensity sliding scale.   Titrate insulin as needed    [facility]  Emergency Medicine

## 2023-03-18 NOTE — DISCHARGE SUMMARY
CDU Discharge Summary        Patient:  Jyothi Rocha  YOB: 1995    MRN: [id number]   Acct: [de-identified]    Primary Care Physician: Rodri Baez MD    Admit date:  3/17/2023 3/17/2023  9:26 PM  Discharge date:   3/18/2023      Discharge Diagnoses:   Acute inflammatory arthropathy, likely secondary to gout, resolved with colchicine and splinting    Discharge Medications:         Medication List        START taking these medications      colchicine 0.6 MG tablet  Commonly known as: Colcrys  Take 1 tablet by mouth 2 times daily Until symptoms resolve            CONTINUE taking these medications      dicyclomine 10 MG capsule  Commonly known as: BENTYL  TAKE 1 CAPSULE THREE TIMES DAILY AS NEEDED FOR ABDOMINAL PAIN     Droplet Pen Needles 31G X 8 MM Misc  Generic drug: Insulin Pen Needle  Use 4 times daily as directed     DropSafe Alcohol Prep 70 % Pads  USE FOUR TIMES DAILY AS DIRECTED     * Ensure High Protein Liqd  Take 1 Bottle by mouth in the morning, at noon, and at bedtime     * Glucerna 1.5 Abel Liqd  Take 1 each by mouth in the morning, at noon, and at bedtime     ketoconazole 2 % cream  Commonly known as: NIZORAL  APPLY TOPICALLY DAILY     Lantus SoloStar 100 UNIT/ML injection pen  Generic drug: insulin glargine  Inject 10 Units into the skin nightly     lisinopril 5 MG tablet  Commonly known as: PRINIVIL;ZESTRIL  TAKE 1 TABLET EVERY DAY     NovoLOG FlexPen 100 UNIT/ML injection pen  Generic drug: insulin aspart  Inject 2 to 12 units subcutaneously three times daily per sliding scale     ondansetron 4 MG tablet  Commonly known as: ZOFRAN     ONE TOUCH ULTRA 2 w/Device Kit  1 kit by Does not apply route in the morning, at noon, in the evening, and at bedtime     pantoprazole 40 MG tablet  Commonly known as: PROTONIX  Take 1 tablet by mouth every morning (before breakfast)     pregabalin 25 MG capsule  Commonly known as: Lyrica  Take 1 capsule by mouth every evening for 30 days. rOPINIRole 0.25 MG tablet  Commonly known as: REQUIP  Take 1 tablet every night     True Metrix Blood Glucose Test strip  Generic drug: blood glucose test strips  As needed. TRUEplus Lancets 33G Misc  Use 4 times daily as directed           * This list has 2 medication(s) that are the same as other medications prescribed for you. Read the directions carefully, and ask your doctor or other care provider to review them with you. STOP taking these medications      acetaminophen 500 MG tablet  Commonly known as: TYLENOL     azelastine 0.05 % ophthalmic solution  Commonly known as: OPTIVAR     clindamycin 300 MG capsule  Commonly known as: CLEOCIN     ferrous sulfate 325 (65 Fe) MG tablet  Commonly known as: IRON 325     ibuprofen 800 MG tablet  Commonly known as: ADVIL;MOTRIN     Oyster Shell Calcium/D 500-200 MG-UNIT Tabs               Where to Get Your Medications        [facility] [address] [facility], [address] [place] [address] Rd, [address] [address]      Phone: [phone]   colchicine 0.6 MG tablet         Diet:  ADULT DIET; Regular; 3 carb choices (45 gm/meal)     Activity:  As tolerated    Follow-up:  Call today/tomorrow for a follow up appointment with JADYN Saha MD     Consultants: None    Procedures:      Diagnostic Test:         Physical Exam:    General appearance - NAD, AOx 3  Lungs -CTA Bilateraly  Heart - Normal S1/S2  Abdomen - Soft NT/ND  Neurological:  No focal motor or sensory weakness. Extremities - Cap refil <2 sec in all ext. Skin -warm, dry      Hospital Course:   Mae Quevedo originally presented to the hospital on 3/17/2023  9:26 PM. Labs and imaging were followed. She was given a splint as well as a dose of colchicine. Reported adequate relief of symptoms on reassessment. Unfortunately her joint aspirate crystal analysis will not result until Monday.   Given good response to empiric colchicine, was provided a prescription for this. At time of discharge, Good Alfred was tolerating a PO intake well, passing flatus, urinating adequately, ambulating and had adequate analgesia on oral pain medications. She is medically stable to be discharged. Clinical course has improved. I feel the patient can be safely discharged to home with outpatient follow up. Instructions have been given for the patient to return to the ED for any worsening of the symptoms, including but not limited to increased pain, shortness of breath, weakness, or any deterioration of their current condition     She was strongly advised to follow-up with her PCP for reassessment and follow-up. Disposition: Home    Condition: Good      Patient stable and ready for discharge home. I have discussed plan of care with patient and they are in understanding. They were instructed to read discharge paperwork. All of their questions and concerns were addressed.      Patient states that they understand the plan and agree with the plan      Time Spent: 0        Joseph Shaver MD  Emergency Medicine Resident Physician

## 2023-03-18 NOTE — ED NOTES
Report given to [facility] RN     [place], [address] [facility]  03/18/23 0186

## 2023-03-18 NOTE — ED PROVIDER NOTES
I performed a history and physical examination of the patient and discussed management with the resident. I reviewed the residents note and agree with the documented findings and plan of care. Any areas of disagreement are noted on the chart. I was personally present for the key portions of any procedures. I have documented in the chart those procedures where I was not present during the key portions. I have reviewed the emergency nurses triage note. I agree with the chief complaint, past medical history, past surgical history, allergies, medications, social and family history as documented unless otherwise noted below. Documentation of the HPI, Physical Exam and Medical Decision Making performed by medical students or scribes is based on my personal performance of the HPI, PE and MDM. For Phys Assistant/ Nurse Practitioner cases/documentation I have personally evaluated this patient and have completed at least one if not all key elements of the E/M (history, physical exam, and MDM). I find the patient's history and physical exam are consistent with the NP/PA documentation. I agree with the care provided, treatment rendered, disposition and followup plan. Additional findings are as noted. Steffanie Ramirez MD  Attending Emergency  Physician        Patient presented to the emergency part with complaints of pain in the left wrist which began several days ago and is gotten significantly worse over the past 24 hours. Patient reports the symptoms began when she was here for evaluation and treatment of bilateral knee pain 3 days ago and she had an IV placed in the left forearm. She denies any fall or other traumatic injury to the left upper extremity. She denies any fevers or chills. No rash. No other joint pains. She reports that the knee pain resolved within 24 hours. She denies any vaginal discharge. She reports she has not been sexually active in over a year.   Does have a prior history of STD but she is unsure as to which specific infection. Patient is right-hand dominant. On examination patient is awake and alert. She is cooperative and responsive. She is in moderate distress secondary to discomfort. Examination of the left upper extremity reveals some mild dorsal erythema and swelling and what I believe is a palpable joint effusion of the left wrist.  She has limited range of motion of the hand on the wrist secondary to discomfort. No lymphangitis or lymphadenopathy. Examination of the recent IV site demonstrates very mild ecchymosis but no swelling or tenderness. Distal sensation and capillary refill are normal.  Range of motion of the digits on the hand are somewhat limited by the discomfort. Impression: Pain and swelling in possible effusion of the left wrist.  Concern for possible septic arthritis. Plan: We will repeat blood work including inflammatory markers as well as aspirate the joint and obtain appropriate blood work in the arthrocentesis fluid. Patient will receive analgesics in the meantime.      Jin Rosales MD  03/17/23 220       Jin Rosales MD  03/17/23 3157

## 2023-03-18 NOTE — CARE COORDINATION
Case Management Assessment  Initial Evaluation    Date/Time of Evaluation: 3/18/2023 9:31 AM  Assessment Completed by: Jose Be RN    If patient is discharged prior to next notation, then this note serves as note for discharge by case management. Patient Name: Kumar Ji                   YOB: 1995  Diagnosis: Left wrist pain [M25.532]  Acute pain of left wrist [M25.532]                   Date / Time: 3/17/2023  9:26 PM    Patient Admission Status: Observation   Readmission Risk (Low < 19, Mod (19-27), High > 27): No data recorded  Current PCP: JADYN SPENCER MD  PCP verified by CM? Yes    Chart Reviewed: Yes      History Provided by: Patient  Patient Orientation: Alert and Oriented    Patient Cognition: Alert    Hospitalization in the last 30 days (Readmission):  No    If yes, Readmission Assessment in  Navigator will be completed. Advance Directives:      Code Status: Full Code   Patient's Primary Decision Maker is:        Discharge Planning:    Patient lives with: Children Type of Home: House  Primary Care Giver: Self  Patient Support Systems include: Family Members   Current Financial resources:    Current community resources:    Current services prior to admission: None            Current DME:              Type of Home Care services:  None    ADLS  Prior functional level: Independent in ADLs/IADLs  Current functional level: Independent in ADLs/IADLs    PT AM-PAC:   /24  OT AM-PAC:   /24    Family can provide assistance at DC: Would you like Case Management to discuss the discharge plan with any other family members/significant others, and if so, who?     Plans to Return to Present Housing: Yes  Other Identified Issues/Barriers to RETURNING to current housing: none  Potential Assistance needed at discharge: N/A            Potential DME:    Patient expects to discharge to: [address] [place] [address] for transportation at discharge:      Financial    Payor: Allison [place] / Plan: [employer] GOLD [employer]US HMO / Product Type: *No Product type* /     Does insurance require precert for SNF: Yes    Potential assistance Purchasing Medications: No  Meds-to-Beds request: Yes      [facility] [address] [address]  Phone: [phone] Fax: [address] [facility], [address] [place] [address]  [address] [facility] [address]  Phone: [phone] Fax: [phone]    [address] Mail Delivery (Now [address] Mail Delivery) - [facility], [address] [phone] Sean Amato [phone]  [address] [address]  Phone: [phone] Fax: [phone]    [address] Mail Delivery - [facility] [phone] - F [phone]  [address] [facility] [address]  Phone: [phone] Fax: [phone]      Notes:    Factors facilitating achievement of predicted outcomes:     Barriers to discharge: Additional Case Management Notes: home independently    The Plan for Transition of Care is related to the following treatment goals of Left wrist pain [M25.532]  Acute pain of left wrist [I99.458]    IF APPLICABLE: The Patient and/or patient representative Cristian Joseph and her family were provided with a choice of provider and agrees with the discharge plan. Freedom of choice list with basic dialogue that supports the patient's individualized plan of care/goals and shares the quality data associated with the providers was provided to:     Patient Representative Name:       The Patient and/or Patient Representative Agree with the Discharge Plan?       Jarett Chowdhury RN  Case Management Department  Ph: [phone] Fax:

## 2023-03-18 NOTE — PROGRESS NOTES
[facility]  CDU / OBSERVATION ENCOUNTER  ATTENDING NOTE       I performed a history and physical examination of the patient and discussed management with the resident or midlevel provider. I reviewed the resident or midlevel provider's note and agree with the documented findings and plan of care. Any areas of disagreement are noted on the chart. I was personally present for the key portions of any procedures. I have documented in the chart those procedures where I was not present during the key portions. I have reviewed the nurses notes. I agree with the chief complaint, past medical history, past surgical history, allergies, medications, social and family history as documented unless otherwise noted below. The Family history, social history, and ROS are effectively unchanged since admission unless noted elsewhere in the chart. This patient was placed in the observation unit for reevaluation for possible admission to the hospital    Admitted to await results of wrist aspiration. Patient has monoarticular arthritis. Concern for potential gout. Awaiting crystals. Minimal white cells noted. Patient not considered with a septic joint but concern for potential gout arthritis. Patient treated therefore with colchicine. This is a presumptive therapy as crystals not be available till Monday. I believe the patient would realize significant benefit if this is gout which it does have some characteristics of. Patient for close outpatient follow-up.   Patient discharged in good condition with specific instructions for return    Jeovany Mejia MD  Attending Emergency  Physician

## 2023-03-18 NOTE — ED NOTES
Pt to ED for pain in left arm x2 days. Pt states she had an IV in her left forearm 2 days ago and it has been painful since. Rates pain 10/10. Pt states she had a period of numbness in left hand yesterday. Pt states she broke her hand in 3 months ago. Patient alert and oriented x4, talking in complete sentences. Respirations even and unlabored.  Call light in reach, all needs met at this time     Tigre Lockwood RN  03/17/23 6692

## 2023-03-18 NOTE — ED PROVIDER NOTES
[facility]  ED  Emergency Department Encounter  Emergency Medicine Resident     Pt Neelam Ibarra  MRN: [id number]  [place] 1995  Date of evaluation: 3/17/23  PCP:  Margaret Garcia MD  Note Started: 10:09 PM EDT      CHIEF COMPLAINT       Chief Complaint   Patient presents with    Arm Pain     Left       HISTORY OF PRESENT ILLNESS  (Location/Symptom, Timing/Onset, Context/Setting, Quality, Duration, Modifying Factors, Severity.)      Jamilah Penaloza is a 32 y.o. female with PMH including type I DM and HTN who presents emergency department with numbness and significant pain in her left hand/wrist which radiates up her forearm. Per patient she was recently seen in the hospital for left knee pain. At that time her inflammatory markers were elevated with an ESR 45. She states that she also broke her left forearm back in January and recently got the cast off about 1 month ago. No issues with the cast and with her arm immediately after the cast was removed. However within the past 24 hours she developed sudden 10/10 wrist and hand pain which brought her to tears. Significantly decreased strength in her left hand however has full sensation. She denies fever, chills, N/V/D, shortness of breath, chest pain. Additionally she states that she has not been sexually active in over a year. She denies IV drug use and new rashes. Additionally she denies previous joint aches, changes in vision, and mouth ulcers. PAST MEDICAL / SURGICAL / SOCIAL / FAMILY HISTORY      has a past medical history of Hypertension, Kidney infection, MRSA (methicillin resistant staph aureus) culture positive, Nausea & vomiting, Ovarian cyst, Secondary osteoporosis, Type 1 diabetes mellitus ([address] and UTI (urinary tract infection). has a past surgical history that includes  section and Upper gastrointestinal endoscopy (N/A, 2021).       Social History     Socioeconomic History Marital status: Single     Spouse name: Not on file    Number of children: Not on file    Years of education: Not on file    Highest education level: Not on file   Occupational History    Not on file   Tobacco Use    Smoking status: Every Day     Packs/day: 0.50     Types: Cigarettes    Smokeless tobacco: Never    Tobacco comments:     5/day   Substance and Sexual Activity    Alcohol use: No    Drug use: Yes     Types: Marijuana (Weed)     Comment: 1-2x per day    Sexual activity: Never   Other Topics Concern    Not on file   Social History Narrative    Not on file     Social Determinants of Health     Financial Resource Strain: High Risk    Difficulty of Paying Living Expenses: Hard   Food Insecurity: Food Insecurity Present    Worried About Running Out of Food in the Last Year: Often true    Ran Out of Food in the Last Year: Often true   Transportation Needs: Not on file   Physical Activity: Not on file   Stress: Not on file   Social Connections: Not on file   Intimate Partner Violence: Not on file   Housing Stability: Not on file       History reviewed. No pertinent family history. Allergies:  Blueberry [vaccinium angustifolium] and Shrimp flavor    Home Medications:  Prior to Admission medications    Medication Sig Start Date End Date Taking?  Authorizing Provider   clindamycin (CLEOCIN) 300 MG capsule Take 1 capsule by mouth 3 times daily for 10 days 3/13/23 3/23/23  Krystle Bashir MD   ibuprofen (ADVIL;MOTRIN) 800 MG tablet Take 1 tablet by mouth 2 times daily as needed for Pain 3/13/23   Krystle Bashir MD   acetaminophen (TYLENOL) 500 MG tablet Take 2 tablets by mouth 3 times daily 3/13/23   Krystle Bashir MD   Alcohol Swabs (DROPSAFE ALCOHOL PREP) 70 % PADS USE FOUR TIMES DAILY AS DIRECTED 2/16/23   Emperatriz Luis MD   ferrous sulfate (IRON 325) 325 (65 Fe) MG tablet Take 1 tablet by mouth daily (with breakfast) 2/15/23   Ousmane Pepper MD   ondansetron (ZOFRAN) 4 MG tablet TAKE 1 TABLET BY MOUTH EVERY 6 HOURS AS NEEDED FOR NAUSEA 1/4/23   Historical Provider, MD   lisinopril (PRINIVIL;ZESTRIL) 5 MG tablet TAKE 1 TABLET EVERY DAY 1/12/23   Emperatriz Rashid MD   Blood Glucose Monitoring Suppl (ONE TOUCH ULTRA 2) w/Device KIT 1 kit by Does not apply route in the morning, at noon, in the evening, and at bedtime 11/15/22   Emperatriz Rashid MD   pantoprazole (PROTONIX) 40 MG tablet Take 1 tablet by mouth every morning (before breakfast) 11/8/22   Emperatriz Rashid MD   dicyclomine (BENTYL) 10 MG capsule TAKE 1 CAPSULE THREE TIMES DAILY AS NEEDED FOR ABDOMINAL PAIN 11/8/22   Emperatriz Rashid MD   Insulin Pen Needle (DROPLET PEN NEEDLES) 31G X 8 MM MISC Use 4 times daily as directed 11/8/22   Emperatriz Rashid MD   TRUEplus Lancets 33G MISC Use 4 times daily as directed 11/8/22   Emperatriz Rashid MD   blood glucose test strips (TRUE METRIX BLOOD GLUCOSE TEST) strip As needed. 11/8/22   Emperatriz Rashid MD   pregabalin (LYRICA) 25 MG capsule Take 1 capsule by mouth every evening for 30 days.  11/8/22 2/15/23  Emperatriz Rashid MD   insulin glargine (LANTUS SOLOSTAR) 100 UNIT/ML injection pen Inject 10 Units into the skin nightly 11/8/22   Emperatriz Rashid MD   rOPINIRole (REQUIP) 0.25 MG tablet Take 1 tablet every night 11/8/22   Emperatriz Rashid MD   insulin aspart (NOVOLOG FLEXPEN) 100 UNIT/ML injection pen Inject 2 to 12 units subcutaneously three times daily per sliding scale 11/8/22   Emperatriz Rashid MD   Nutritional Supplements (GLUCERNA 1.5 REJI) LIQD Take 1 each by mouth in the morning, at noon, and at bedtime 11/8/22   Emperatriz Rashid MD   Calcium Carbonate-Vitamin D (OYSTER SHELL CALCIUM/D) 500-200 MG-UNIT TABS Take 1 tablet by mouth daily 11/1/22 11/1/23  Collette Baton, MD   azelastine (OPTIVAR) 0.05 % ophthalmic solution  4/27/22   Kelsie Alpers   ketoconazole (NIZORAL) 2 % cream APPLY TOPICALLY DAILY 5/19/22   Emperatriz Rashid MD   Nutritional Supplements (ENSURE HIGH PROTEIN) LIQD Take 1 Bottle by mouth in the morning, at noon, and at bedtime 4/13/22   Emperatriz Morales MD         REVIEW OF SYSTEMS       Review of Systems   Constitutional:  Negative for activity change, chills, fatigue and fever. HENT:  Negative for congestion, drooling, ear discharge, ear pain, nosebleeds, rhinorrhea, sinus pressure and sinus pain. Eyes:  Negative for photophobia, pain, redness and visual disturbance. Respiratory:  Negative for shortness of breath and wheezing. Cardiovascular:  Negative for chest pain. Gastrointestinal:  Negative for abdominal pain, constipation, diarrhea, nausea and vomiting. Musculoskeletal:  Positive for joint swelling (left wrist effusion). Negative for arthralgias, back pain and neck stiffness. Skin:  Negative for color change and rash. Neurological:  Negative for seizures, syncope, weakness and headaches. Psychiatric/Behavioral:  Negative for agitation and confusion. The patient is not nervous/anxious. PHYSICAL EXAM      INITIAL VITALS:   BP (!) 178/98   Pulse 93   Temp 98.4 °F (36.9 °C)   Resp 20   Ht 5' 5\" (1.651 m)   Wt 105 lb (47.6 kg)   SpO2 100%   BMI 17.47 kg/m²     Physical Exam  Constitutional:       Appearance: She is not toxic-appearing. Comments: Thin female with left wrist pain   HENT:      Head: Normocephalic and atraumatic. Right Ear: Ear canal and external ear normal.      Left Ear: Ear canal and external ear normal.      Nose: Nose normal. No congestion or rhinorrhea. Mouth/Throat:      Mouth: Mucous membranes are moist.      Pharynx: Oropharynx is clear. No oropharyngeal exudate or posterior oropharyngeal erythema. Comments: Poor dentition  Eyes:      General: No scleral icterus. Right eye: No discharge. Left eye: No discharge. Extraocular Movements: Extraocular movements intact. Pupils: Pupils are equal, round, and reactive to light.    Cardiovascular:      Rate and Rhythm: Normal rate and regular rhythm. Pulses: Normal pulses. Heart sounds: Normal heart sounds. No murmur heard. Pulmonary:      Effort: Pulmonary effort is normal. No respiratory distress. Breath sounds: Normal breath sounds. No stridor. No wheezing. Abdominal:      General: There is no distension. Palpations: Abdomen is soft. Tenderness: There is no abdominal tenderness. There is no guarding. Musculoskeletal:         General: Swelling (left wrist) and tenderness (left wrist) present. No signs of injury. Cervical back: Neck supple. No tenderness. Right lower leg: No edema. Left lower leg: No edema. Skin:     General: Skin is warm and dry. Findings: No erythema (mild erythema at left wrist). Neurological:      Mental Status: She is alert and oriented to person, place, and time. Mental status is at baseline. Cranial Nerves: No cranial nerve deficit. Motor: No weakness. Psychiatric:         Mood and Affect: Mood normal.         Behavior: Behavior normal.         Thought Content: Thought content normal.         Judgment: Judgment normal.         DDX/DIAGNOSTIC RESULTS / EMERGENCY DEPARTMENT COURSE / MDM     Medical Decision Making  27-year-old female with PMH including type I DM who presents to the emergency department with acute left wrist swelling, pain, decreased range of motion, and mild erythema. Recent history of left arm fracture with the cast being removed about a month ago. No issues with the cast and no symptoms of compartment syndrome. Since the cast has been removed she has been doing well. Over the past 24 to 48 hours she has developed acute left wrist pain and swelling. She denies recent trauma, fevers, IV drug use. Of note, she recently was hospitalized for knee pain with elevated inflammatory markers. Given the PMH of autoimmune disease, significant concerns that these arthropathies are connected. High on the differential includes crystal arthropathies. Ruling out septic joint or infective arthropathy with left wrist arthrocentesis. Will send cell count, crystal studies, culture, Gram stain. Also will check basic labs including inflammatory markers. Plan to control pain with Toradol     Amount and/or Complexity of Data Reviewed  Labs: ordered. Decision-making details documented in ED Course. Risk  OTC drugs. Prescription drug management. Decision regarding hospitalization. EKG  N/a    All EKG's are interpreted by the Emergency Department Physician who either signs or Co-signs this chart in the absence of a cardiologist.    EMERGENCY DEPARTMENT COURSE:    ED Course as of 03/18/23 0116   Fri Mar 17, 2023   2322 WBC within normal limits. CRP unremarkable. BMP relatively unremarkable. [GC]   3358 Athrocentesis performed and synovial fluid sent for anaerobic and aerobic culture, crystals, cell count with differential, and Gram stain [GC]   2337 CRP: <3.0 [GC]   2348 Pain controlled with IV Toradol [GC]   Sat Mar 18, 2023   0016 Sed Rate(!): 94 [GC]   0023 Discussed case with microbiology/hematology regarding the urgent need for cell counts  [GC]   0056 WBC, Fluid: 17,500  Cannot rule out septic joint without crystal studies. The WBC count is greater than 2000 which suggests an inflammatory or septic arthritis, but does not confirm that diagnosis. If there are no crystals present, then will need to evaluate Gram stain and culture data. If Gram stain and culture data positive, then septic arthritis is diagnosis. However, if crystals are present then will need to identify the type of crystals in addition to the Gram stain and bacterial culture. Again, if the Gram stain and bacterial culture are positive then septic arthritis and crystal induced arthritis is the diagnosis.    [GC]   0112 Plan to place in the observation unit for pain control while crystal studies, Gram stain, and culture data result [GC]      ED Course User Index  [GC] Rodolfo Goel,        PROCEDURES:  PROCEDURE NOTE - ARTHROCENTESIS    PATIENT NAME: Stefanie Deng  MEDICAL RECORD NO. [id number]  DATE: 3/17/2023  ATTENDING PHYSICIAN: Ruperto Espino    PREOPERATIVE DIAGNOSIS:  Unexplained joint effusion of the left wrist   POSTOPERATIVE DIAGNOSIS:  Same  PROCEDURE PERFORMED:  Arthrocentesis of the left wrist   PERFORMING PHYSICIAN: Kimberil Handley DO      DISCUSSION:  Loreta Schmidt is a 32y.o.-year-old female who requires an arthrocentesis of the  left wrist for Unexplained joint effusion. The history and physical examination were reviewed and confirmed. CONSENT: The patient provided verbal consent for this procedure. PROCEDURE: The left wrist was positioned appropriately and the landmarks were identified. Local anesthesia was obtained by infiltration using 1% Lidocaine without epinephrine. The area was then prepped and draped in the usual sterile fashion. A needle was then introduced into the joint space at which point 2 cc of slightly yellow, bloody fluid was aspirated. A joint injection was not performed. The aspirated fluid was sent to the lab for appropriate testing. A sterile dressing was then applied to the site. The patient tolerated the procedure well. COMPLICATIONS: None     Kimberli Handley DO  1:16 AM, 3/18/23     CONSULTS:  None    CRITICAL CARE:  There was significant risk of life threatening deterioration of patient's condition requiring my direct management. Critical care time <30 minutes, excluding any documented procedures. FINAL IMPRESSION      1. Left wrist pain          DISPOSITION / PLAN     DISPOSITION Admitted 03/18/2023 01:06:18 AM      PATIENT REFERRED TO:  No follow-up provider specified.     DISCHARGE MEDICATIONS:  New Prescriptions    No medications on file       Kimberli Handley DO  Emergency Medicine Resident    (Please note that portions of thisnote were completed with a voice recognition program.  Efforts were made to edit the dictations but occasionally words are mis-transcribed.)       Shanti Mcmahon, DO  Resident  03/18/23 [facility] Malika, DO  Resident  03/18/23 7507

## 2023-03-19 LAB
MICROORGANISM SPEC CULT: ABNORMAL
MICROORGANISM/AGENT SPEC: ABNORMAL
MICROORGANISM/AGENT SPEC: ABNORMAL
SPECIMEN DESCRIPTION: ABNORMAL

## 2023-03-20 ENCOUNTER — TELEPHONE (OUTPATIENT)
Dept: FAMILY MEDICINE CLINIC | Age: 28
End: 2023-03-20

## 2023-03-20 LAB
CRYSTALS, FLUID: NEGATIVE
LYMPHOCYTES, BODY FLUID: 1 %
NEUTROPHIL, FLUID: 93 %
OTHER CELLS FLUID: NORMAL %
RBC FLUID: NORMAL CELLS/UL
SPECIMEN TYPE: NORMAL
SPECIMEN TYPE: NORMAL
WBC FLUID: NORMAL CELLS/UL

## 2023-03-20 RX ORDER — KETOCONAZOLE 20 MG/G
CREAM TOPICAL
Qty: 60 G | Refills: 1 | Status: SHIPPED | OUTPATIENT
Start: 2023-03-20

## 2023-03-20 NOTE — TELEPHONE ENCOUNTER
I cannot do anything about the cost of the colchicine-the fluid that was drained from her wrist did not show gout Crystals, so we can try calling in a Medrol Dosepak and see if that helps.

## 2023-03-20 NOTE — TELEPHONE ENCOUNTER
Patient called stating she went to the ED and was prescribed colchicine. Patient states she was told by the pharmacy this is not covered, but she could pay $15 out of pocket for the medication. Patient is asking if there is something we can do. Please advise.

## 2023-03-20 NOTE — TELEPHONE ENCOUNTER
04/15/2022   Gastrointestinal Panel, Molecular Once 04/15/2022   CBC Once 04/15/2022   PAP SMEAR Once 06/04/2022   BO DIGITAL DIAGNOSTIC W OR WO CAD LEFT Once 05/04/2022   US BREAST COMPLETE LEFT Once 05/09/2022   CBC with Auto Differential Once 02/15/2023   Ferritin Once 02/15/2023   Iron and TIBC Once 02/15/2023               Patient Active Problem List:     Type 1 diabetes mellitus with diabetic polyneuropathy (HCC)     Intractable vomiting with nausea     Intractable abdominal pain     Microalbuminuria due to type 1 diabetes mellitus (HCC)     Generalized abdominal pain     Restless leg syndrome     Protein-calorie malnutrition, severe (HCC)     Marijuana use, continuous     Onychomycosis     Cachexia ([address]     Oligomenorrhea     Positive Tpal     Left breast mass     Latent syphilis     Premenopausal idiopathic osteoporosis     Osteoporosis     Acute kidney injury ([address]     Closed nondisplaced fracture of fifth metacarpal bone of left hand with routine healing     Chronic gastritis without bleeding     Acute pain of left wrist

## 2023-03-21 ENCOUNTER — OFFICE VISIT (OUTPATIENT)
Dept: FAMILY MEDICINE CLINIC | Age: 28
End: 2023-03-21
Payer: MEDICARE

## 2023-03-21 VITALS
HEART RATE: 95 BPM | WEIGHT: 101.4 LBS | BODY MASS INDEX: 16.87 KG/M2 | OXYGEN SATURATION: 100 % | DIASTOLIC BLOOD PRESSURE: 86 MMHG | SYSTOLIC BLOOD PRESSURE: 136 MMHG | TEMPERATURE: 97.2 F

## 2023-03-21 DIAGNOSIS — M79.10 MYALGIA: ICD-10-CM

## 2023-03-21 DIAGNOSIS — G43.909 ACUTE MIGRAINE: Primary | ICD-10-CM

## 2023-03-21 PROCEDURE — 99214 OFFICE O/P EST MOD 30 MIN: CPT | Performed by: INTERNAL MEDICINE

## 2023-03-21 PROCEDURE — 4004F PT TOBACCO SCREEN RCVD TLK: CPT | Performed by: INTERNAL MEDICINE

## 2023-03-21 PROCEDURE — G8419 CALC BMI OUT NRM PARAM NOF/U: HCPCS | Performed by: INTERNAL MEDICINE

## 2023-03-21 PROCEDURE — 96372 THER/PROPH/DIAG INJ SC/IM: CPT | Performed by: INTERNAL MEDICINE

## 2023-03-21 PROCEDURE — G8427 DOCREV CUR MEDS BY ELIG CLIN: HCPCS | Performed by: INTERNAL MEDICINE

## 2023-03-21 PROCEDURE — G8484 FLU IMMUNIZE NO ADMIN: HCPCS | Performed by: INTERNAL MEDICINE

## 2023-03-21 RX ORDER — TIZANIDINE 2 MG/1
2 TABLET ORAL 3 TIMES DAILY PRN
Qty: 30 TABLET | Refills: 0 | Status: SHIPPED | OUTPATIENT
Start: 2023-03-21

## 2023-03-21 RX ORDER — PROMETHAZINE HYDROCHLORIDE 25 MG/1
25 TABLET ORAL 4 TIMES DAILY PRN
Qty: 20 TABLET | Refills: 0 | Status: SHIPPED | OUTPATIENT
Start: 2023-03-21 | End: 2023-03-28

## 2023-03-21 RX ORDER — KETOROLAC TROMETHAMINE 30 MG/ML
30 INJECTION, SOLUTION INTRAMUSCULAR; INTRAVENOUS ONCE
Status: COMPLETED | OUTPATIENT
Start: 2023-03-21 | End: 2023-03-21

## 2023-03-21 RX ORDER — METHYLPREDNISOLONE 4 MG/1
TABLET ORAL
Qty: 1 KIT | Refills: 0 | Status: SHIPPED | OUTPATIENT
Start: 2023-03-21

## 2023-03-21 RX ADMIN — KETOROLAC TROMETHAMINE 30 MG: 30 INJECTION, SOLUTION INTRAMUSCULAR; INTRAVENOUS at 13:58

## 2023-03-21 SDOH — ECONOMIC STABILITY: INCOME INSECURITY: HOW HARD IS IT FOR YOU TO PAY FOR THE VERY BASICS LIKE FOOD, HOUSING, MEDICAL CARE, AND HEATING?: HARD

## 2023-03-21 SDOH — ECONOMIC STABILITY: HOUSING INSECURITY
IN THE LAST 12 MONTHS, WAS THERE A TIME WHEN YOU DID NOT HAVE A STEADY PLACE TO SLEEP OR SLEPT IN A SHELTER (INCLUDING NOW)?: YES

## 2023-03-21 SDOH — ECONOMIC STABILITY: FOOD INSECURITY: WITHIN THE PAST 12 MONTHS, YOU WORRIED THAT YOUR FOOD WOULD RUN OUT BEFORE YOU GOT MONEY TO BUY MORE.: SOMETIMES TRUE

## 2023-03-21 SDOH — ECONOMIC STABILITY: FOOD INSECURITY: WITHIN THE PAST 12 MONTHS, THE FOOD YOU BOUGHT JUST DIDN'T LAST AND YOU DIDN'T HAVE MONEY TO GET MORE.: SOMETIMES TRUE

## 2023-03-21 ASSESSMENT — ENCOUNTER SYMPTOMS
CHOKING: 0
NAUSEA: 0
VOMITING: 0
SHORTNESS OF BREATH: 0
COUGH: 0
DIARRHEA: 0
ANAL BLEEDING: 0
WHEEZING: 0
BLOOD IN STOOL: 0
ABDOMINAL PAIN: 0
CONSTIPATION: 0
CHEST TIGHTNESS: 0

## 2023-03-21 ASSESSMENT — VISUAL ACUITY: OU: 1

## 2023-03-21 NOTE — PROGRESS NOTES
Pt is here today for not feeling well    Pt states she has a bad headache, and body aches, chills, feeling warm, started about 3 days ago, eyes sensitive to light     No sinus pressure, no sore throat, no ear issues, no cough, no vomiting, no diarrhea     Pt was at [facility] on 03/17/2023 for her LT wrist medrol dose pack was suppose to be sent     COVID at hospital was negative labs were drawn on 03/17/2023
(BENTYL) 10 MG capsule TAKE 1 CAPSULE THREE TIMES DAILY AS NEEDED FOR ABDOMINAL PAIN Yes Emperatriz Sahni MD   Insulin Pen Needle (DROPLET PEN NEEDLES) 31G X 8 MM MISC Use 4 times daily as directed Yes Amee Cole MD   TRUEplus Lancets 33G MISC Use 4 times daily as directed Yes Amee Cole MD   blood glucose test strips (TRUE METRIX BLOOD GLUCOSE TEST) strip As needed. Yes Emperatriz Sahni MD   insulin glargine (LANTUS SOLOSTAR) 100 UNIT/ML injection pen Inject 10 Units into the skin nightly Yes Emperatriz Sahni MD   rOPINIRole (REQUIP) 0.25 MG tablet Take 1 tablet every night Yes Emperatriz Sahni MD   insulin aspart (NOVOLOG FLEXPEN) 100 UNIT/ML injection pen Inject 2 to 12 units subcutaneously three times daily per sliding scale Yes Emperatriz Sahni MD   Nutritional Supplements (GLUCERNA 1.5 REJI) LIQD Take 1 each by mouth in the morning, at noon, and at bedtime Yes Emperatriz Sahni MD   Nutritional Supplements (ENSURE HIGH PROTEIN) LIQD Take 1 Bottle by mouth in the morning, at noon, and at bedtime Yes Emperatriz Sahni MD   pregabalin (LYRICA) 25 MG capsule Take 1 capsule by mouth every evening for 30 days. Amee Cole MD       Review of Systems     Review of Systems   Constitutional:  Positive for fatigue. Negative for fever and unexpected weight change. Respiratory:  Negative for cough, choking, chest tightness, shortness of breath and wheezing. Cardiovascular:  Negative for chest pain, palpitations and leg swelling. Gastrointestinal:  Negative for abdominal pain, anal bleeding, blood in stool, constipation, diarrhea, nausea and vomiting. Endocrine: Negative. Musculoskeletal:  Positive for myalgias. Negative for joint swelling. Skin: Negative. Neurological:  Positive for headaches. Negative for dizziness. Psychiatric/Behavioral:  Negative for sleep disturbance. All other systems reviewed and are negative.     Objective     BP (!) 140/88 (Site: Right Upper Arm,

## 2023-03-24 ENCOUNTER — CARE COORDINATION (OUTPATIENT)
Dept: CARE COORDINATION | Age: 28
End: 2023-03-24

## 2023-03-24 NOTE — CARE COORDINATION
spoke with Sanjeev Epley who reports that her rent has not been paid by [employer]. Philis Epley reports that she received a letter in the mail that she has court on March 28th.  informed Jayvonne Epley that she would contact Paty Mc,  at [facility] and follow up on payments.  attempted to call and Sarah Liu to follow up on rent payment.

## 2023-03-28 ENCOUNTER — CARE COORDINATION (OUTPATIENT)
Dept: CARE COORDINATION | Age: 28
End: 2023-03-28

## 2023-03-28 NOTE — CARE COORDINATION
called and left another message for Reymundo Poll at [employer].  informed her that Darryn Sethi has court due to not receiving payment for rent.  called and informed Darryn Sethi that she has called and left messages and E-mail for Reymundo Poll. Darryn Sethi reports that her court date was rescheduled for another 2 weeks.  informed Darryn Sethi that if she does not hear back from Reymundo Poll she will request to speak to manager.      Plan:    will contact [employer]  for the rent assistance program.

## 2023-04-18 ENCOUNTER — CARE COORDINATION (OUTPATIENT)
Dept: CARE COORDINATION | Age: 28
End: 2023-04-18

## 2023-04-19 NOTE — CARE COORDINATION
attempt to contact [address].  received VM. Tran contacted . Tran informed  that [address] did receive the check. Danyelle informed Vishnu Jones has a credit of $867.00. Vishnu Jones reports that she will have to pay $10 towards May's rent. Vishnu Joens reports that as of right now she does not have a court date with Danyelle. Plan:    ill follow up with [address] in 1-2 weeks to confirm she has no court date for eviction.

## 2023-05-04 ENCOUNTER — CARE COORDINATION (OUTPATIENT)
Dept: CARE COORDINATION | Age: 28
End: 2023-05-04

## 2023-05-04 NOTE — CARE COORDINATION
called and spoke to Kaylee. Kaylee reports that she has not received another court date since [employer] paid the her back rent. Kaylee reports that she is doing well. Tran inquired about the number she calls to remove an old eviction off her record.  called the municipal court who informed  encourage to call civil courts.  contacted civil court.  was informed to come to the court office at [address] [place] [address]. It cost $3.00 for the request.  Office can assist with application and patient can use copy machine to make copy to mail to [facility].  provided this information to Kaylee. Plan:   Kaylee will complete form and request removal of old eviction.

## 2023-05-09 DIAGNOSIS — E10.42 TYPE 1 DIABETES MELLITUS WITH DIABETIC POLYNEUROPATHY (HCC): ICD-10-CM

## 2023-05-10 DIAGNOSIS — E10.42 TYPE 1 DIABETES MELLITUS WITH DIABETIC POLYNEUROPATHY (HCC): ICD-10-CM

## 2023-05-10 DIAGNOSIS — G25.81 RESTLESS LEG SYNDROME: ICD-10-CM

## 2023-05-10 RX ORDER — ISOPROPYL ALCOHOL 70 ML/100ML
SWAB TOPICAL
OUTPATIENT
Start: 2023-05-10

## 2023-05-10 RX ORDER — ROPINIROLE 0.25 MG/1
TABLET, FILM COATED ORAL
Qty: 90 TABLET | Refills: 1 | Status: SHIPPED | OUTPATIENT
Start: 2023-05-10

## 2023-05-10 RX ORDER — PEN NEEDLE, DIABETIC 31 GX5/16"
NEEDLE, DISPOSABLE MISCELLANEOUS
Qty: 400 EACH | Refills: 1 | Status: SHIPPED | OUTPATIENT
Start: 2023-05-10

## 2023-05-10 RX ORDER — GLUCOSAM/CHON-MSM1/C/MANG/BOSW 500-416.6
TABLET ORAL
Qty: 400 EACH | Refills: 1 | Status: SHIPPED | OUTPATIENT
Start: 2023-05-10

## 2023-05-10 RX ORDER — CALCIUM CITRATE/VITAMIN D3 200MG-6.25
TABLET ORAL
Qty: 400 STRIP | Refills: 1 | Status: SHIPPED | OUTPATIENT
Start: 2023-05-10

## 2023-05-10 NOTE — TELEPHONE ENCOUNTER
Last visit: 3/21/23  Last Med refill: 11/8/22  Does patient have enough medication for 72 hours: No:     Next Visit Date:  Future Appointments   Date Time Provider Hayden Chica   6/21/2023 11:00 AM Ovi Acuna MD [facility]   7/12/2023 10:30 AM Emperatriz Doran  [address] [facility]   Topic Date Due    Diabetic retinal exam  01/21/2020    Diabetic foot exam  03/23/2023    Hepatitis B vaccine (3 of 3 - Risk 3-dose series) 01/12/2024 (Originally 2/14/1998)    Flu vaccine (Season Ended) 01/12/2024 (Originally 8/1/2023)    COVID-19 Vaccine (1) 03/23/2025 (Originally 6/5/1994)    Varicella vaccine (2 of 2 - 2-dose childhood series) 09/22/2026 (Originally 8/9/2000)    Pneumococcal 0-64 years Vaccine (1 - PCV) 09/22/2026 (Originally 12/5/1999)    Lipids  08/17/2023    A1C test (Diabetic or Prediabetic)  10/19/2023    Diabetic Alb to Cr ratio (uACR) test  01/12/2024    Depression Screen  01/12/2024    GFR test (Diabetes, CKD 3-4, OR last GFR 15-59)  03/17/2024    Pap smear  05/04/2025    DTaP/Tdap/Td vaccine (6 - Td or Tdap) 08/27/2030    Hib vaccine  Completed    Hepatitis C screen  Completed    HIV screen  Completed    Hepatitis A vaccine  Aged Out    Meningococcal (ACWY) vaccine  Aged Out       Hemoglobin A1C (%)   Date Value   10/19/2022 6.3   07/19/2022 5.3   03/23/2022 6.3             ( goal A1C is < 7)   Microalb/Crt.  Ratio (mcg/mg creat)   Date Value   02/11/2020 511 (H)     LDL Cholesterol (mg/dL)   Date Value   08/17/2022 55   05/24/2021 45       (goal LDL is <100)   AST (U/L)   Date Value   12/30/2022 23     ALT (U/L)   Date Value   12/30/2022 9     BUN (mg/dL)   Date Value   03/17/2023 3 (L)     BP Readings from Last 3 Encounters:   03/21/23 136/86   03/18/23 129/66   03/12/23 (!) 157/102          (goal 120/80)    All Future Testing planned in [facility]  Lab Frequency Next Occurrence   BO DIGITAL DIAGNOSTIC W OR WO CAD LEFT Once 05/04/2022   CBC with Auto

## 2023-05-10 NOTE — TELEPHONE ENCOUNTER
Last visit: 03/21/2023  Last Med refill: 02/24/2023  Does patient have enough medication for 72 hours: Yes    Next Visit Date:  Future Appointments   Date Time Provider Hayden Castilloi   6/21/2023 11:00 AM Lolita Ward MD [facility]   7/12/2023 10:30 AM Emperatriz Parks  [address] [facility]   Topic Date Due    Diabetic retinal exam  01/21/2020    Diabetic foot exam  03/23/2023    Hepatitis B vaccine (3 of 3 - Risk 3-dose series) 01/12/2024 (Originally 2/14/1998)    Flu vaccine (Season Ended) 01/12/2024 (Originally 8/1/2023)    COVID-19 Vaccine (1) 03/23/2025 (Originally 6/5/1994)    Varicella vaccine (2 of 2 - 2-dose childhood series) 09/22/2026 (Originally 8/9/2000)    Pneumococcal 0-64 years Vaccine (1 - PCV) 09/22/2026 (Originally 12/5/1999)    Lipids  08/17/2023    A1C test (Diabetic or Prediabetic)  10/19/2023    Diabetic Alb to Cr ratio (uACR) test  01/12/2024    Depression Screen  01/12/2024    GFR test (Diabetes, CKD 3-4, OR last GFR 15-59)  03/17/2024    Pap smear  05/04/2025    DTaP/Tdap/Td vaccine (6 - Td or Tdap) 08/27/2030    Hib vaccine  Completed    Hepatitis C screen  Completed    HIV screen  Completed    Hepatitis A vaccine  Aged Out    Meningococcal (ACWY) vaccine  Aged Out       Hemoglobin A1C (%)   Date Value   10/19/2022 6.3   07/19/2022 5.3   03/23/2022 6.3             ( goal A1C is < 7)   Microalb/Crt.  Ratio (mcg/mg creat)   Date Value   02/11/2020 511 (H)     LDL Cholesterol (mg/dL)   Date Value   08/17/2022 55   05/24/2021 45       (goal LDL is <100)   AST (U/L)   Date Value   12/30/2022 23     ALT (U/L)   Date Value   12/30/2022 9     BUN (mg/dL)   Date Value   03/17/2023 3 (L)     BP Readings from Last 3 Encounters:   03/21/23 136/86   03/18/23 129/66   03/12/23 (!) 157/102          (goal 120/80)    All Future Testing planned in [facility]  Lab Frequency Next Occurrence   BO DIGITAL DIAGNOSTIC W OR WO CAD LEFT Once 05/04/2022   CBC with Auto

## 2023-05-18 DIAGNOSIS — E10.42 TYPE 1 DIABETES MELLITUS WITH DIABETIC POLYNEUROPATHY (HCC): ICD-10-CM

## 2023-05-18 DIAGNOSIS — B35.1 ONYCHOMYCOSIS: ICD-10-CM

## 2023-05-18 RX ORDER — KETOCONAZOLE 20 MG/G
CREAM TOPICAL
Qty: 60 G | Refills: 1 | Status: SHIPPED | OUTPATIENT
Start: 2023-05-18

## 2023-05-18 RX ORDER — ISOPROPYL ALCOHOL 70 ML/100ML
SWAB TOPICAL
OUTPATIENT
Start: 2023-05-18

## 2023-05-18 NOTE — TELEPHONE ENCOUNTER
Last visit: 3/21/23  Last Med refill: 3/20/23  Does patient have enough medication for 72 hours: No:     Next Visit Date:  Future Appointments   Date Time Provider Hayden Coto   6/21/2023 11:00 AM Santiago Ramachandran MD [facility]   7/12/2023 10:30 AM Emperatriz Gar  [address] [facility]   Topic Date Due    Diabetic retinal exam  01/21/2020    Diabetic foot exam  03/23/2023    Hepatitis B vaccine (3 of 3 - Risk 3-dose series) 01/12/2024 (Originally 2/14/1998)    Flu vaccine (Season Ended) 01/12/2024 (Originally 8/1/2023)    COVID-19 Vaccine (1) 03/23/2025 (Originally 6/5/1994)    Varicella vaccine (2 of 2 - 2-dose childhood series) 09/22/2026 (Originally 8/9/2000)    Pneumococcal 0-64 years Vaccine (1 - PCV) 09/22/2026 (Originally 12/5/1999)    Lipids  08/17/2023    A1C test (Diabetic or Prediabetic)  10/19/2023    Diabetic Alb to Cr ratio (uACR) test  01/12/2024    Depression Screen  01/12/2024    GFR test (Diabetes, CKD 3-4, OR last GFR 15-59)  03/17/2024    Pap smear  05/04/2025    DTaP/Tdap/Td vaccine (6 - Td or Tdap) 08/27/2030    Hib vaccine  Completed    Hepatitis C screen  Completed    HIV screen  Completed    Hepatitis A vaccine  Aged Out    Meningococcal (ACWY) vaccine  Aged Out       Hemoglobin A1C (%)   Date Value   10/19/2022 6.3   07/19/2022 5.3   03/23/2022 6.3             ( goal A1C is < 7)   Microalb/Crt.  Ratio (mcg/mg creat)   Date Value   02/11/2020 511 (H)     LDL Cholesterol (mg/dL)   Date Value   08/17/2022 55   05/24/2021 45       (goal LDL is <100)   AST (U/L)   Date Value   12/30/2022 23     ALT (U/L)   Date Value   12/30/2022 9     BUN (mg/dL)   Date Value   03/17/2023 3 (L)     BP Readings from Last 3 Encounters:   03/21/23 136/86   03/18/23 129/66   03/12/23 (!) 157/102          (goal 120/80)    All Future Testing planned in [facility]  Lab Frequency Next Occurrence   BO DIGITAL DIAGNOSTIC W OR WO CAD LEFT Once 05/04/2022   CBC with Auto

## 2023-05-19 ENCOUNTER — CARE COORDINATION (OUTPATIENT)
Dept: CARE COORDINATION | Age: 28
End: 2023-05-19

## 2023-05-22 NOTE — CARE COORDINATION
attempted to follow up with Smiley Mallory.  left name and number.  requested a call back regarding social needs. Plan:    will speak with Smiley Mallory regarding social needs.

## 2023-06-02 ENCOUNTER — CARE COORDINATION (OUTPATIENT)
Dept: CARE COORDINATION | Age: 28
End: 2023-06-02

## 2023-06-02 NOTE — CARE COORDINATION
attempted to contact Bertokeena Jones.  left message with name and number.  left message requesting a call back to [phone].

## 2023-06-21 ENCOUNTER — TELEPHONE (OUTPATIENT)
Dept: ONCOLOGY | Age: 28
End: 2023-06-21

## 2023-06-21 ENCOUNTER — HOSPITAL ENCOUNTER (OUTPATIENT)
Age: 28
Setting detail: SPECIMEN
Discharge: HOME | End: 2023-06-21
Payer: MEDICARE

## 2023-06-21 ENCOUNTER — OFFICE VISIT (OUTPATIENT)
Dept: ONCOLOGY | Age: 28
End: 2023-06-21
Payer: MEDICARE

## 2023-06-21 VITALS
RESPIRATION RATE: 16 BRPM | WEIGHT: 98.1 LBS | SYSTOLIC BLOOD PRESSURE: 176 MMHG | TEMPERATURE: 97.4 F | DIASTOLIC BLOOD PRESSURE: 102 MMHG | HEART RATE: 69 BPM | BODY MASS INDEX: 16.32 KG/M2

## 2023-06-21 DIAGNOSIS — D64.9 ANEMIA, UNSPECIFIED TYPE: ICD-10-CM

## 2023-06-21 DIAGNOSIS — D64.9 ANEMIA, UNSPECIFIED TYPE: Primary | ICD-10-CM

## 2023-06-21 DIAGNOSIS — D58.2 HEMOGLOBIN C TRAIT (HCC): ICD-10-CM

## 2023-06-21 LAB
BASOPHILS # BLD: 0.06 K/UL (ref 0–0.2)
BASOPHILS NFR BLD: 1 % (ref 0–2)
EOSINOPHIL # BLD: 0.17 K/UL (ref 0–0.44)
EOSINOPHILS RELATIVE PERCENT: 3 % (ref 1–4)
ERYTHROCYTE [DISTWIDTH] IN BLOOD BY AUTOMATED COUNT: 20.2 % (ref 11.8–14.4)
FERRITIN SERPL-MCNC: 9 NG/ML (ref 13–150)
HCT VFR BLD AUTO: 23.9 % (ref 36.3–47.1)
HGB BLD-MCNC: 8.2 G/DL (ref 11.9–15.1)
IMM GRANULOCYTES # BLD AUTO: 0 K/UL (ref 0–0.3)
IMM GRANULOCYTES NFR BLD: 0 %
IRON SATN MFR SERPL: 11 % (ref 20–55)
IRON SERPL-MCNC: 22 UG/DL (ref 37–145)
LYMPHOCYTES # BLD: 37 % (ref 24–43)
LYMPHOCYTES NFR BLD: 2.11 K/UL (ref 1.1–3.7)
MCH RBC QN AUTO: 31.8 PG (ref 25.2–33.5)
MCHC RBC AUTO-ENTMCNC: 34.3 G/DL (ref 28.4–34.8)
MCV RBC AUTO: 92.6 FL (ref 82.6–102.9)
MONOCYTES NFR BLD: 0.57 K/UL (ref 0.1–1.2)
MONOCYTES NFR BLD: 10 % (ref 3–12)
MORPHOLOGY: ABNORMAL
NEUTROPHILS NFR BLD: 49 % (ref 36–65)
NEUTS SEG NFR BLD: 2.79 K/UL (ref 1.5–8.1)
NRBC AUTOMATED: 0 PER 100 WBC
PLATELET # BLD AUTO: 416 K/UL (ref 138–453)
PMV BLD AUTO: 9.5 FL (ref 8.1–13.5)
RBC # BLD AUTO: 2.58 M/UL (ref 3.95–5.11)
TIBC SERPL-MCNC: 192 UG/DL (ref 250–450)
UNSATURATED IRON BINDING CAPACITY: 170 UG/DL (ref 112–347)
WBC OTHER # BLD: 5.7 K/UL (ref 3.5–11.3)

## 2023-06-21 PROCEDURE — 99214 OFFICE O/P EST MOD 30 MIN: CPT | Performed by: INTERNAL MEDICINE

## 2023-06-21 PROCEDURE — 85027 COMPLETE CBC AUTOMATED: CPT

## 2023-06-21 PROCEDURE — G8419 CALC BMI OUT NRM PARAM NOF/U: HCPCS | Performed by: INTERNAL MEDICINE

## 2023-06-21 PROCEDURE — G8427 DOCREV CUR MEDS BY ELIG CLIN: HCPCS | Performed by: INTERNAL MEDICINE

## 2023-06-21 PROCEDURE — 83540 ASSAY OF IRON: CPT

## 2023-06-21 PROCEDURE — 99211 OFF/OP EST MAY X REQ PHY/QHP: CPT | Performed by: INTERNAL MEDICINE

## 2023-06-21 PROCEDURE — 82728 ASSAY OF FERRITIN: CPT

## 2023-06-21 PROCEDURE — 83550 IRON BINDING TEST: CPT

## 2023-06-21 PROCEDURE — 36415 COLL VENOUS BLD VENIPUNCTURE: CPT

## 2023-06-21 PROCEDURE — 4004F PT TOBACCO SCREEN RCVD TLK: CPT | Performed by: INTERNAL MEDICINE

## 2023-06-21 RX ORDER — FERROUS SULFATE 325(65) MG
325 TABLET ORAL
Qty: 60 TABLET | Refills: 1 | Status: SHIPPED | OUTPATIENT
Start: 2023-06-21

## 2023-06-21 NOTE — PROGRESS NOTES
no masses or organomegaly   Neurological - alert, oriented, normal speech, no focal findings or movement disorder noted   Musculoskeletal - no joint tenderness, deformity or swelling   Extremities - peripheral pulses normal, no pedal edema, no clubbing or cyanosis   Skin - normal coloration and turgor, no rashes, no suspicious skin lesions noted ,      LABORATORY DATA:     Lab Results   Component Value Date    WBC 5.7 06/21/2023    HGB 8.2 (L) 06/21/2023    HCT 23.9 (L) 06/21/2023    MCV 92.6 06/21/2023     06/21/2023    LYMPHOPCT 37 06/21/2023    RBC 2.58 (L) 06/21/2023    MCH 31.8 06/21/2023    MCHC 34.3 06/21/2023    RDW 20.2 (H) 06/21/2023    MONOPCT 10 06/21/2023    BASOPCT 1 06/21/2023    NEUTROABS 2.79 06/21/2023    LYMPHSABS 2.11 06/21/2023    MONOSABS 0.57 06/21/2023    EOSABS 0.17 06/21/2023    BASOSABS 0.06 06/21/2023         Chemistry        Component Value Date/Time     03/17/2023 2221    K 3.4 (L) 03/17/2023 2221     03/17/2023 2221    CO2 24 03/17/2023 2221    BUN 3 (L) 03/17/2023 2221    CREATININE 0.71 03/17/2023 2221        Component Value Date/Time    CALCIUM 8.2 (L) 03/17/2023 2221    ALKPHOS 89 12/30/2022 0846    AST 23 12/30/2022 0846    ALT 9 12/30/2022 0846    BILITOT 0.3 12/30/2022 0846            PATHOLOGY DATA:   Reviewed  IMAGING DATA:      XR KNEE RIGHT (3 VIEWS)  Narrative: EXAMINATION:  THREE XRAY VIEWS OF THE RIGHT KNEE    3/12/2023 10:42 pm    COMPARISON:  None. HISTORY:  ORDERING SYSTEM PROVIDED HISTORY: Right knee pain  TECHNOLOGIST PROVIDED HISTORY:  Right knee pain  Reason for Exam: rt knee pain, no known injury    FINDINGS:  No evidence of acute fracture or dislocation. No focal osseous lesion. No  evidence of joint effusion. No focal soft tissue abnormality. Impression: No acute abnormality of the knee. ASSESSMENT:    Cookevilleya Webster is a 32 y.o. female with significant malnutrition with normocytic anemia.     I reviewed the lab work,

## 2023-06-21 NOTE — TELEPHONE ENCOUNTER
Chestre Callejas MD VISIT  DR Melissa Del Castillo IN TO SEE PATIENT  ORDERS RECEIVED  RV 3 MONTHS WITH LABS  LABS CDP PATH REVIEW SMEAR FE TIBC FERRITIN VITAMIN B12 & FOLATE DUE 09/13/23, ORDERS GIVEN TO PT  MD VISIT 09/20/23 @11:45AM  SCRIPT CALLED INTO PATIENT'S PHARMACY  AVS PRINTED AND GIVEN TO PATIENT WITH INSTRUCTIONS  PATIENT DISCHARGED AMBULATORY

## 2023-07-12 ENCOUNTER — OFFICE VISIT (OUTPATIENT)
Dept: FAMILY MEDICINE CLINIC | Age: 28
End: 2023-07-12
Payer: MEDICARE

## 2023-07-12 VITALS
OXYGEN SATURATION: 99 % | HEART RATE: 83 BPM | TEMPERATURE: 97.7 F | BODY MASS INDEX: 16.67 KG/M2 | WEIGHT: 100.2 LBS | SYSTOLIC BLOOD PRESSURE: 178 MMHG | DIASTOLIC BLOOD PRESSURE: 100 MMHG

## 2023-07-12 DIAGNOSIS — E10.29 MICROALBUMINURIA DUE TO TYPE 1 DIABETES MELLITUS (HCC): ICD-10-CM

## 2023-07-12 DIAGNOSIS — R80.9 MICROALBUMINURIA DUE TO TYPE 1 DIABETES MELLITUS (HCC): ICD-10-CM

## 2023-07-12 DIAGNOSIS — R20.2 PARESTHESIA OF HAND, BILATERAL: ICD-10-CM

## 2023-07-12 DIAGNOSIS — R64 CACHEXIA (HCC): ICD-10-CM

## 2023-07-12 DIAGNOSIS — F12.90 MARIJUANA USE, CONTINUOUS: ICD-10-CM

## 2023-07-12 DIAGNOSIS — I10 UNCONTROLLED HYPERTENSION: ICD-10-CM

## 2023-07-12 DIAGNOSIS — E43 PROTEIN-CALORIE MALNUTRITION, SEVERE (HCC): ICD-10-CM

## 2023-07-12 DIAGNOSIS — M81.0 OSTEOPOROSIS, UNSPECIFIED OSTEOPOROSIS TYPE, UNSPECIFIED PATHOLOGICAL FRACTURE PRESENCE: ICD-10-CM

## 2023-07-12 DIAGNOSIS — E10.42 TYPE 1 DIABETES MELLITUS WITH DIABETIC POLYNEUROPATHY (HCC): Primary | ICD-10-CM

## 2023-07-12 PROBLEM — N17.9 ACUTE KIDNEY INJURY (HCC): Status: RESOLVED | Noted: 2022-12-30 | Resolved: 2023-07-12

## 2023-07-12 LAB — HBA1C MFR BLD: 6.4 %

## 2023-07-12 PROCEDURE — 3077F SYST BP >= 140 MM HG: CPT | Performed by: INTERNAL MEDICINE

## 2023-07-12 PROCEDURE — 3046F HEMOGLOBIN A1C LEVEL >9.0%: CPT | Performed by: INTERNAL MEDICINE

## 2023-07-12 PROCEDURE — G8419 CALC BMI OUT NRM PARAM NOF/U: HCPCS | Performed by: INTERNAL MEDICINE

## 2023-07-12 PROCEDURE — 2022F DILAT RTA XM EVC RTNOPTHY: CPT | Performed by: INTERNAL MEDICINE

## 2023-07-12 PROCEDURE — 4004F PT TOBACCO SCREEN RCVD TLK: CPT | Performed by: INTERNAL MEDICINE

## 2023-07-12 PROCEDURE — 99214 OFFICE O/P EST MOD 30 MIN: CPT | Performed by: INTERNAL MEDICINE

## 2023-07-12 PROCEDURE — 3080F DIAST BP >= 90 MM HG: CPT | Performed by: INTERNAL MEDICINE

## 2023-07-12 PROCEDURE — 83037 HB GLYCOSYLATED A1C HOME DEV: CPT | Performed by: INTERNAL MEDICINE

## 2023-07-12 PROCEDURE — G8427 DOCREV CUR MEDS BY ELIG CLIN: HCPCS | Performed by: INTERNAL MEDICINE

## 2023-07-12 RX ORDER — PREGABALIN 25 MG/1
25 CAPSULE ORAL EVERY EVENING
Qty: 90 CAPSULE | Refills: 0 | Status: SHIPPED | OUTPATIENT
Start: 2023-07-12 | End: 2023-10-10

## 2023-07-12 RX ORDER — LISINOPRIL 20 MG/1
20 TABLET ORAL DAILY
Qty: 30 TABLET | Refills: 1 | Status: SHIPPED | OUTPATIENT
Start: 2023-07-12

## 2023-07-12 ASSESSMENT — VISUAL ACUITY: OU: 1

## 2023-07-12 ASSESSMENT — ENCOUNTER SYMPTOMS
ANAL BLEEDING: 0
ABDOMINAL PAIN: 0
DIARRHEA: 0
CHOKING: 0
COUGH: 0
CHEST TIGHTNESS: 0
CONSTIPATION: 0
VOMITING: 0
BLOOD IN STOOL: 0
SHORTNESS OF BREATH: 0
WHEEZING: 0
NAUSEA: 0

## 2023-07-12 NOTE — PROGRESS NOTES
[facility]  [place] [address]  Dept: [phone]  Dept Fax: [phone]      Junior Panda is a 32 y.o. female who presents today for hermedical conditions/complaints as noted below. Junior Panda is c/o of Diabetes (F/u)        Assessment/Plan:     1. Type 1 diabetes mellitus with diabetic polyneuropathy (HCC)  -     POCT glycosylated hemoglobin (Hb A1C)  -     pregabalin (LYRICA) 25 MG capsule; Take 1 capsule by mouth every evening for 90 days. Max Daily Amount: 25 mg, Disp-90 capsule, R-0Normal  2. Uncontrolled hypertension  -     lisinopril (PRINIVIL;ZESTRIL) 20 MG tablet; Take 1 tablet by mouth daily, Disp-30 tablet, R-1Normal  3. Microalbuminuria due to type 1 diabetes mellitus (HCC)  -     lisinopril (PRINIVIL;ZESTRIL) 20 MG tablet; Take 1 tablet by mouth daily, Disp-30 tablet, R-1Normal  4. Paresthesia of hand, bilateral  -     pregabalin (LYRICA) 25 MG capsule; Take 1 capsule by mouth every evening for 90 days. Max Daily Amount: 25 mg, Disp-90 capsule, R-0Normal  5. Protein-calorie malnutrition, severe ([address])  6. Cachexia ([address])  7. Osteoporosis, unspecified osteoporosis type, unspecified pathological fracture presence  8. Marijuana use, continuous    Will try marinol once BP controlled   2 week BP check     No follow-ups on file. HPI     .eating 4-5 times a day   Wants to try marinol to help gain weight, a friend is apparently on it so believes it will work for her   Taking lyrica, helps, would like refill  Ropinirole is working well for restless legs, not moving any more at night     Diabetes - doing well with current regimen. Denies hypoglycemia, hyperglycemia, fatigue, polyuria, polydipsia, paresthesias, vision changes, dizziness. Eye exam was done. Not following with podiatry. Patient is taking ACE inhibitor/ARB. Complications of diabetes include neuropathy.    Hypertension-tolerating current regimen without

## 2023-07-12 NOTE — PROGRESS NOTES
Pt is here today for a regular DM f/u    Pt has no got in with endo yet     Pt has no complaints at this time     Pt is due for diabetic eye and foot exam pt reports no issues with feet or vision     Pt states she has a friend that has a prescription that makes you gain weight, pt would like to discuss

## 2023-07-26 ENCOUNTER — NURSE ONLY (OUTPATIENT)
Dept: FAMILY MEDICINE CLINIC | Age: 28
End: 2023-07-26
Payer: MEDICARE

## 2023-07-26 VITALS
WEIGHT: 104.8 LBS | HEART RATE: 62 BPM | SYSTOLIC BLOOD PRESSURE: 136 MMHG | OXYGEN SATURATION: 94 % | DIASTOLIC BLOOD PRESSURE: 84 MMHG | BODY MASS INDEX: 17.44 KG/M2

## 2023-07-26 DIAGNOSIS — R03.0 ELEVATED BP WITHOUT DIAGNOSIS OF HYPERTENSION: Primary | ICD-10-CM

## 2023-07-26 PROCEDURE — 99211 OFF/OP EST MAY X REQ PHY/QHP: CPT | Performed by: INTERNAL MEDICINE

## 2023-07-26 NOTE — PROGRESS NOTES
Pt is here today for a BP check. Vitals are as follows. Sitting   LT arm              see vitals  Pt denies CP, HA or SOB.

## 2023-08-10 DIAGNOSIS — E10.42 TYPE 1 DIABETES MELLITUS WITH DIABETIC POLYNEUROPATHY (HCC): ICD-10-CM

## 2023-08-14 RX ORDER — INSULIN ASPART 100 [IU]/ML
INJECTION, SOLUTION INTRAVENOUS; SUBCUTANEOUS
Qty: 45 ML | Refills: 5 | Status: SHIPPED | OUTPATIENT
Start: 2023-08-14

## 2023-08-14 RX ORDER — INSULIN GLARGINE 100 [IU]/ML
INJECTION, SOLUTION SUBCUTANEOUS
Qty: 15 ML | Refills: 3 | Status: SHIPPED | OUTPATIENT
Start: 2023-08-14

## 2023-09-03 ENCOUNTER — HOSPITAL ENCOUNTER (EMERGENCY)
Age: 28
Discharge: LEFT BEFORE DISCHARGE | End: 2023-09-03
Attending: EMERGENCY MEDICINE
Payer: MEDICARE

## 2023-09-03 VITALS
OXYGEN SATURATION: 100 % | DIASTOLIC BLOOD PRESSURE: 98 MMHG | HEIGHT: 66 IN | WEIGHT: 110 LBS | RESPIRATION RATE: 18 BRPM | TEMPERATURE: 98.7 F | BODY MASS INDEX: 17.68 KG/M2 | SYSTOLIC BLOOD PRESSURE: 161 MMHG | HEART RATE: 94 BPM

## 2023-09-03 DIAGNOSIS — M79.604 RIGHT LEG PAIN: Primary | ICD-10-CM

## 2023-09-03 LAB
CHP ED QC CHECK: YES
D DIMER PPP FEU-MCNC: 1.12 UG/ML FEU (ref 0–0.57)
GLUCOSE BLD-MCNC: 200 MG/DL
GLUCOSE BLD-MCNC: 200 MG/DL (ref 65–105)

## 2023-09-03 PROCEDURE — 85379 FIBRIN DEGRADATION QUANT: CPT

## 2023-09-03 PROCEDURE — 82947 ASSAY GLUCOSE BLOOD QUANT: CPT

## 2023-09-03 PROCEDURE — 99283 EMERGENCY DEPT VISIT LOW MDM: CPT

## 2023-09-03 PROCEDURE — 6370000000 HC RX 637 (ALT 250 FOR IP): Performed by: PEDIATRICS

## 2023-09-03 RX ORDER — HYDROCODONE BITARTRATE AND ACETAMINOPHEN 5; 325 MG/1; MG/1
1 TABLET ORAL ONCE
Status: COMPLETED | OUTPATIENT
Start: 2023-09-03 | End: 2023-09-03

## 2023-09-03 RX ADMIN — HYDROCODONE BITARTRATE AND ACETAMINOPHEN 1 TABLET: 5; 325 TABLET ORAL at 05:23

## 2023-09-03 ASSESSMENT — PAIN SCALES - GENERAL
PAINLEVEL_OUTOF10: 10
PAINLEVEL_OUTOF10: 10

## 2023-09-03 NOTE — ED PROVIDER NOTES
[address] ED  Emergency Department Encounter  Emergency Medicine Resident     Pt Edmar Salazar  MRN: [id number]  [address] [place] 1995  Date of evaluation: 9/3/23  PCP:  Huong Shaver MD    3:59 AM EDT     CHIEF COMPLAINT       Chief Complaint   Patient presents with    Leg Pain     Right side       HISTORY OF PRESENT ILLNESS  (Location/Symptom, Timing/Onset, Context/Setting, Quality, Duration, Modifying Factors, Severity.)      Liliana Rm is a 32 y.o. female who presents with right leg pain worsening swelling no injury endorses some calf pain and exacerbated by ambulation states it is from the mid thigh to the mid calf worse in the knee joint region does intermittently pop and will feel wet on the back of her calf however states that this pain feels different today no fever no chills no hip pain no ankle pain  No AC no AP no CP no SOB  PAST MEDICAL / SURGICAL / SOCIAL / FAMILY HISTORY      has a past medical history of Hypertension, Kidney infection, MRSA (methicillin resistant staph aureus) culture positive, Nausea & vomiting, Ovarian cyst, Secondary osteoporosis, Type 1 diabetes mellitus ([address]), and UTI (urinary tract infection). has a past surgical history that includes  section and Upper gastrointestinal endoscopy (N/A, 2021).       Social History     Socioeconomic History    Marital status: Single     Spouse name: Not on file    Number of children: Not on file    Years of education: Not on file    Highest education level: Not on file   Occupational History    Not on file   Tobacco Use    Smoking status: Every Day     Packs/day: 0.50     Types: Cigarettes    Smokeless tobacco: Never    Tobacco comments:     5/day   Substance and Sexual Activity    Alcohol use: No    Drug use: Yes     Types: Marijuana (Weed)     Comment: 1-2x per day    Sexual activity: Never   Other Topics Concern    Not on file   Social History Narrative    Not on file

## 2023-09-03 NOTE — ED NOTES
Pt is kept on side lying position comfortably, no complaints as the moment. No signs of respiratory distress. Call light within reach.       339 Daniel So RN  09/03/23 9752

## 2023-09-03 NOTE — ED NOTES
Pt presents to ED with c/o right leg pain, from thigh to above the ankle. Pt stated pain started suddenly and it comes and goes for almost a month now. Pt is alert an oriented x 4, no complaints of chest pain and SOB. Call light within reach.      [address] Daniel So RN  09/03/23 7135

## 2023-09-03 NOTE — ED NOTES
Dr. Jenna Joyce at bedside completing [facility] discussion with the patient     Caroline Fenton RN  09/03/23 4508

## 2023-09-03 NOTE — ED NOTES
Patient signed AMA paperwork and IV removed. Steady gait to triage.      Sue Montoya RN  09/03/23 7578

## 2023-09-03 NOTE — ED NOTES
Pt removed self from monitor. Writer entered room, patient reports she wants to see her nurse, notified patient Corey Chakraborty is a nurse and can help her. Patient declining, pt continuously saying \"I want my nurse. \" Patient refusing to tell writer what she wants.       Rocky Ttutle RN  09/03/23 2360

## 2023-09-03 NOTE — ED NOTES
Pt is alert and oriented x 4, still in pain. Kept the pt in a comfortable position, side lying on the right. Call light within reach.      [address] Daniel So RN  09/03/23 3809

## 2023-09-20 ENCOUNTER — OFFICE VISIT (OUTPATIENT)
Dept: ONCOLOGY | Age: 28
End: 2023-09-20
Payer: MEDICARE

## 2023-09-20 ENCOUNTER — TELEPHONE (OUTPATIENT)
Dept: ONCOLOGY | Age: 28
End: 2023-09-20

## 2023-09-20 ENCOUNTER — HOSPITAL ENCOUNTER (OUTPATIENT)
Age: 28
Setting detail: SPECIMEN
Discharge: HOME | End: 2023-09-20
Payer: MEDICARE

## 2023-09-20 VITALS
DIASTOLIC BLOOD PRESSURE: 112 MMHG | WEIGHT: 106.9 LBS | SYSTOLIC BLOOD PRESSURE: 172 MMHG | RESPIRATION RATE: 16 BRPM | TEMPERATURE: 97.8 F | HEART RATE: 82 BPM | BODY MASS INDEX: 17.25 KG/M2

## 2023-09-20 DIAGNOSIS — D64.9 ANEMIA, UNSPECIFIED TYPE: ICD-10-CM

## 2023-09-20 DIAGNOSIS — D64.9 ANEMIA, UNSPECIFIED TYPE: Primary | ICD-10-CM

## 2023-09-20 DIAGNOSIS — K90.9 IRON MALABSORPTION: ICD-10-CM

## 2023-09-20 DIAGNOSIS — D50.0 IRON DEFICIENCY ANEMIA DUE TO CHRONIC BLOOD LOSS: ICD-10-CM

## 2023-09-20 LAB
BASOPHILS # BLD: 0.03 K/UL (ref 0–0.2)
BASOPHILS NFR BLD: 0 % (ref 0–2)
EOSINOPHIL # BLD: 0.3 K/UL (ref 0–0.44)
EOSINOPHILS RELATIVE PERCENT: 4 % (ref 1–4)
ERYTHROCYTE [DISTWIDTH] IN BLOOD BY AUTOMATED COUNT: 19.3 % (ref 11.8–14.4)
FERRITIN SERPL-MCNC: 9 NG/ML (ref 13–150)
FOLATE SERPL-MCNC: 5 NG/ML
HCT VFR BLD AUTO: 24.1 % (ref 36.3–47.1)
HGB BLD-MCNC: 7.9 G/DL (ref 11.9–15.1)
IMM GRANULOCYTES # BLD AUTO: 0.04 K/UL (ref 0–0.3)
IMM GRANULOCYTES NFR BLD: 1 %
IMM RETICS NFR: 17.6 % (ref 2.7–18.3)
IRON SATN MFR SERPL: 6 % (ref 20–55)
IRON SERPL-MCNC: 13 UG/DL (ref 37–145)
LYMPHOCYTES NFR BLD: 2.34 K/UL (ref 1.1–3.7)
LYMPHOCYTES RELATIVE PERCENT: 34 % (ref 24–43)
MCH RBC QN AUTO: 25.1 PG (ref 25.2–33.5)
MCHC RBC AUTO-ENTMCNC: 32.8 G/DL (ref 28.4–34.8)
MCV RBC AUTO: 76.5 FL (ref 82.6–102.9)
MONOCYTES NFR BLD: 0.43 K/UL (ref 0.1–1.2)
MONOCYTES NFR BLD: 6 % (ref 3–12)
NEUTROPHILS NFR BLD: 55 % (ref 36–65)
NEUTS SEG NFR BLD: 3.81 K/UL (ref 1.5–8.1)
NRBC BLD-RTO: 0 PER 100 WBC
PLATELET # BLD AUTO: 515 K/UL (ref 138–453)
PMV BLD AUTO: 8.9 FL (ref 8.1–13.5)
RBC # BLD AUTO: 3.15 M/UL (ref 3.95–5.11)
RBC # BLD: ABNORMAL 10*6/UL
RETIC HEMOGLOBIN: 26.7 PG (ref 28.2–35.7)
RETICS # AUTO: 0.04 M/UL (ref 0.03–0.08)
RETICS/RBC NFR AUTO: 1.4 % (ref 0.5–1.9)
TIBC SERPL-MCNC: 217 UG/DL (ref 250–450)
UNSATURATED IRON BINDING CAPACITY: 204 UG/DL (ref 112–347)
VIT B12 SERPL-MCNC: 217 PG/ML (ref 232–1245)
WBC OTHER # BLD: 7 K/UL (ref 3.5–11.3)

## 2023-09-20 PROCEDURE — 85045 AUTOMATED RETICULOCYTE COUNT: CPT

## 2023-09-20 PROCEDURE — 82746 ASSAY OF FOLIC ACID SERUM: CPT

## 2023-09-20 PROCEDURE — 85025 COMPLETE CBC W/AUTO DIFF WBC: CPT

## 2023-09-20 PROCEDURE — 82728 ASSAY OF FERRITIN: CPT

## 2023-09-20 PROCEDURE — 83540 ASSAY OF IRON: CPT

## 2023-09-20 PROCEDURE — 99211 OFF/OP EST MAY X REQ PHY/QHP: CPT

## 2023-09-20 PROCEDURE — G8419 CALC BMI OUT NRM PARAM NOF/U: HCPCS | Performed by: INTERNAL MEDICINE

## 2023-09-20 PROCEDURE — 36415 COLL VENOUS BLD VENIPUNCTURE: CPT

## 2023-09-20 PROCEDURE — 4004F PT TOBACCO SCREEN RCVD TLK: CPT | Performed by: INTERNAL MEDICINE

## 2023-09-20 PROCEDURE — 83550 IRON BINDING TEST: CPT

## 2023-09-20 PROCEDURE — 99214 OFFICE O/P EST MOD 30 MIN: CPT | Performed by: INTERNAL MEDICINE

## 2023-09-20 PROCEDURE — 82607 VITAMIN B-12: CPT

## 2023-09-20 PROCEDURE — G8427 DOCREV CUR MEDS BY ELIG CLIN: HCPCS | Performed by: INTERNAL MEDICINE

## 2023-09-20 RX ORDER — SODIUM CHLORIDE 9 MG/ML
5-250 INJECTION, SOLUTION INTRAVENOUS PRN
Status: CANCELLED | OUTPATIENT
Start: 2023-09-20

## 2023-09-20 RX ORDER — ONDANSETRON 2 MG/ML
8 INJECTION INTRAMUSCULAR; INTRAVENOUS
Status: CANCELLED | OUTPATIENT
Start: 2023-09-20

## 2023-09-20 RX ORDER — FAMOTIDINE 10 MG/ML
20 INJECTION, SOLUTION INTRAVENOUS
Status: CANCELLED | OUTPATIENT
Start: 2023-09-20

## 2023-09-20 RX ORDER — FERROUS SULFATE 325(65) MG
325 TABLET ORAL
Qty: 60 TABLET | Refills: 1 | Status: SHIPPED | OUTPATIENT
Start: 2023-09-20

## 2023-09-20 RX ORDER — ACETAMINOPHEN 325 MG/1
650 TABLET ORAL
Status: CANCELLED | OUTPATIENT
Start: 2023-09-20

## 2023-09-20 RX ORDER — EPINEPHRINE 1 MG/ML
0.3 INJECTION, SOLUTION, CONCENTRATE INTRAVENOUS PRN
Status: CANCELLED | OUTPATIENT
Start: 2023-09-20

## 2023-09-20 RX ORDER — SODIUM CHLORIDE 0.9 % (FLUSH) 0.9 %
5-40 SYRINGE (ML) INJECTION PRN
Status: CANCELLED | OUTPATIENT
Start: 2023-09-20

## 2023-09-20 RX ORDER — ALBUTEROL SULFATE 90 UG/1
4 AEROSOL, METERED RESPIRATORY (INHALATION) PRN
Status: CANCELLED | OUTPATIENT
Start: 2023-09-20

## 2023-09-20 RX ORDER — SODIUM CHLORIDE 9 MG/ML
INJECTION, SOLUTION INTRAVENOUS CONTINUOUS
Status: CANCELLED | OUTPATIENT
Start: 2023-09-20

## 2023-09-20 RX ORDER — DIPHENHYDRAMINE HYDROCHLORIDE 50 MG/ML
50 INJECTION INTRAMUSCULAR; INTRAVENOUS
Status: CANCELLED | OUTPATIENT
Start: 2023-09-20

## 2023-09-20 RX ORDER — HEPARIN SODIUM (PORCINE) LOCK FLUSH IV SOLN 100 UNIT/ML 100 UNIT/ML
500 SOLUTION INTRAVENOUS PRN
Status: CANCELLED | OUTPATIENT
Start: 2023-09-20

## 2023-09-20 NOTE — TELEPHONE ENCOUNTER
DALIA ARRIVES AMBULATORY FOR MD VISIT  DR Ursula House IN TO SEE PATIENT  ORDERS RECEIVED  IV IRON  RV 6 WEEKS WITH LABS  NEW ORDERS FOR IV IRON NEED AUTHORIZATION, NOTE GIVEN TO JENNA  LABS CDP FE TIBC FERRITIN DUE 11/01/23, ORDERS GIVEN TO PT  MD VISIT 11/08/23 @11:30AM  AVS PRINTED AND GIVEN TO PATIENT WITH INSTRUCTIONS  PATIENT DISCHARGED AMBULATORY

## 2023-09-20 NOTE — PROGRESS NOTES
Lack of Transportation (Non-Medical): Yes   Physical Activity: Not on file   Stress: Not on file   Social Connections: Not on file   Intimate Partner Violence: Not on file   Housing Stability: High Risk (3/21/2023)    Housing Stability Vital Sign     Unable to Pay for Housing in the Last Year: Not on file     Number of Places Lived in the Last Year: Not on file     Unstable Housing in the Last Year: Yes       History reviewed. No pertinent family history. REVIEW OF SYSTEM:     Constitutional: No fever or chills. No night sweats, no weight loss   Eyes: No eye discharge, double vision, or eye pain   HEENT: negative for sore mouth, sore throat, hoarseness and voice change   Respiratory: negative for cough , sputum, dyspnea, wheezing, hemoptysis, chest pain   Cardiovascular: negative for chest pain, dyspnea, palpitations, orthopnea, PND   Gastrointestinal: negative for nausea, vomiting, diarrhea, constipation, abdominal pain, Dysphagia, hematemesis and hematochezia   Genitourinary: negative for frequency, dysuria, nocturia, urinary incontinence, and hematuria   Integument: negative for rash, skin lesions, bruises.    Hematologic/Lymphatic: negative for easy bruising, bleeding, lymphadenopathy, petechiae and swelling/edema   Endocrine: negative for heat or cold intolerance, tremor, weight changes, change in bowel habits and hair loss   Musculoskeletal: negative for myalgias, arthralgias, pain, joint swelling,and bone pain   Neurological: negative for headaches, dizziness, seizures, weakness, numbness       OBJECTIVE:         Vitals:    09/20/23 1155   BP: (!) 172/112   Pulse:    Resp:    Temp:        PHYSICAL EXAM:   General appearance -thinly built and cachectic  Mental status - alert and cooperative   Eyes - pupils equal and reactive, extraocular eye movements intact   Ears - bilateral TM's and external ear canals normal   Mouth - mucous membranes moist, pharynx normal without lesions   Neck - supple, no

## 2023-09-22 RX ORDER — SODIUM CHLORIDE 9 MG/ML
5-250 INJECTION, SOLUTION INTRAVENOUS PRN
OUTPATIENT
Start: 2023-09-22

## 2023-09-22 RX ORDER — SODIUM CHLORIDE 0.9 % (FLUSH) 0.9 %
5-40 SYRINGE (ML) INJECTION PRN
OUTPATIENT
Start: 2023-09-22

## 2023-09-22 RX ORDER — HEPARIN 100 UNIT/ML
500 SYRINGE INTRAVENOUS PRN
OUTPATIENT
Start: 2023-09-22

## 2023-09-22 RX ORDER — SODIUM CHLORIDE 9 MG/ML
INJECTION, SOLUTION INTRAVENOUS CONTINUOUS
OUTPATIENT
Start: 2023-09-22

## 2023-09-22 RX ORDER — ONDANSETRON 2 MG/ML
8 INJECTION INTRAMUSCULAR; INTRAVENOUS
OUTPATIENT
Start: 2023-09-22

## 2023-09-22 RX ORDER — DIPHENHYDRAMINE HYDROCHLORIDE 50 MG/ML
50 INJECTION INTRAMUSCULAR; INTRAVENOUS
OUTPATIENT
Start: 2023-09-22

## 2023-09-22 RX ORDER — EPINEPHRINE 1 MG/ML
0.3 INJECTION, SOLUTION, CONCENTRATE INTRAVENOUS PRN
OUTPATIENT
Start: 2023-09-22

## 2023-09-22 RX ORDER — ALBUTEROL SULFATE 90 UG/1
4 AEROSOL, METERED RESPIRATORY (INHALATION) PRN
OUTPATIENT
Start: 2023-09-22

## 2023-09-22 RX ORDER — ACETAMINOPHEN 325 MG/1
650 TABLET ORAL
OUTPATIENT
Start: 2023-09-22

## 2023-09-25 LAB
PATH REV BLD -IMP: NORMAL
SURGICAL PATHOLOGY REPORT: NORMAL

## 2023-10-03 ENCOUNTER — HOSPITAL ENCOUNTER (OUTPATIENT)
Age: 28
Setting detail: SPECIMEN
Discharge: HOME | End: 2023-10-03
Payer: MEDICARE

## 2023-10-03 DIAGNOSIS — D64.9 ANEMIA, UNSPECIFIED TYPE: ICD-10-CM

## 2023-10-03 LAB
BASOPHILS # BLD: 0.05 K/UL (ref 0–0.2)
BASOPHILS NFR BLD: 1 % (ref 0–2)
EOSINOPHIL # BLD: 0.28 K/UL (ref 0–0.44)
EOSINOPHILS RELATIVE PERCENT: 5 % (ref 1–4)
ERYTHROCYTE [DISTWIDTH] IN BLOOD BY AUTOMATED COUNT: 19.9 % (ref 11.8–14.4)
FERRITIN SERPL-MCNC: 64 NG/ML (ref 13–150)
HCT VFR BLD AUTO: 22 % (ref 36.3–47.1)
HGB BLD-MCNC: 7.1 G/DL (ref 11.9–15.1)
IMM GRANULOCYTES # BLD AUTO: 0.01 K/UL (ref 0–0.3)
IMM GRANULOCYTES NFR BLD: 0 %
IRON SATN MFR SERPL: 61 % (ref 20–55)
IRON SERPL-MCNC: 124 UG/DL (ref 37–145)
LYMPHOCYTES NFR BLD: 2.72 K/UL (ref 1.1–3.7)
LYMPHOCYTES RELATIVE PERCENT: 49 % (ref 24–43)
MCH RBC QN AUTO: 24.6 PG (ref 25.2–33.5)
MCHC RBC AUTO-ENTMCNC: 32.3 G/DL (ref 28.4–34.8)
MCV RBC AUTO: 76.1 FL (ref 82.6–102.9)
MONOCYTES NFR BLD: 0.57 K/UL (ref 0.1–1.2)
MONOCYTES NFR BLD: 10 % (ref 3–12)
NEUTROPHILS NFR BLD: 35 % (ref 36–65)
NEUTS SEG NFR BLD: 1.92 K/UL (ref 1.5–8.1)
NRBC BLD-RTO: 0 PER 100 WBC
PLATELET # BLD AUTO: 448 K/UL (ref 138–453)
PMV BLD AUTO: 8.8 FL (ref 8.1–13.5)
RBC # BLD AUTO: 2.89 M/UL (ref 3.95–5.11)
RBC # BLD: ABNORMAL 10*6/UL
TIBC SERPL-MCNC: 202 UG/DL (ref 250–450)
UNSATURATED IRON BINDING CAPACITY: 78 UG/DL (ref 112–347)
WBC OTHER # BLD: 5.6 K/UL (ref 3.5–11.3)

## 2023-10-03 PROCEDURE — 85025 COMPLETE CBC W/AUTO DIFF WBC: CPT

## 2023-10-03 PROCEDURE — 83550 IRON BINDING TEST: CPT

## 2023-10-03 PROCEDURE — 83540 ASSAY OF IRON: CPT

## 2023-10-03 PROCEDURE — 82728 ASSAY OF FERRITIN: CPT

## 2023-10-03 PROCEDURE — 36415 COLL VENOUS BLD VENIPUNCTURE: CPT

## 2023-10-04 ENCOUNTER — HOSPITAL ENCOUNTER (OUTPATIENT)
Dept: INFUSION THERAPY | Facility: MEDICAL CENTER | Age: 28
Discharge: HOME | End: 2023-10-04
Payer: MEDICARE

## 2023-10-04 VITALS
SYSTOLIC BLOOD PRESSURE: 151 MMHG | RESPIRATION RATE: 16 BRPM | TEMPERATURE: 98.2 F | DIASTOLIC BLOOD PRESSURE: 91 MMHG | HEART RATE: 78 BPM

## 2023-10-04 DIAGNOSIS — K90.9 IRON MALABSORPTION: ICD-10-CM

## 2023-10-04 DIAGNOSIS — D50.0 IRON DEFICIENCY ANEMIA DUE TO CHRONIC BLOOD LOSS: Primary | ICD-10-CM

## 2023-10-04 PROCEDURE — 6360000002 HC RX W HCPCS: Performed by: INTERNAL MEDICINE

## 2023-10-04 PROCEDURE — 2580000003 HC RX 258: Performed by: INTERNAL MEDICINE

## 2023-10-04 PROCEDURE — 96365 THER/PROPH/DIAG IV INF INIT: CPT

## 2023-10-04 RX ORDER — SODIUM CHLORIDE 9 MG/ML
5-250 INJECTION, SOLUTION INTRAVENOUS PRN
Status: CANCELLED | OUTPATIENT
Start: 2023-10-11

## 2023-10-04 RX ORDER — HEPARIN 100 UNIT/ML
500 SYRINGE INTRAVENOUS PRN
Status: CANCELLED | OUTPATIENT
Start: 2023-10-11

## 2023-10-04 RX ORDER — FAMOTIDINE 10 MG/ML
20 INJECTION, SOLUTION INTRAVENOUS
Status: CANCELLED | OUTPATIENT
Start: 2023-10-11

## 2023-10-04 RX ORDER — DIPHENHYDRAMINE HYDROCHLORIDE 50 MG/ML
50 INJECTION INTRAMUSCULAR; INTRAVENOUS
Status: CANCELLED | OUTPATIENT
Start: 2023-10-11

## 2023-10-04 RX ORDER — SODIUM CHLORIDE 9 MG/ML
5-250 INJECTION, SOLUTION INTRAVENOUS PRN
Status: DISCONTINUED | OUTPATIENT
Start: 2023-10-04 | End: 2023-10-05 | Stop reason: HOSPADM

## 2023-10-04 RX ORDER — SODIUM CHLORIDE 9 MG/ML
INJECTION, SOLUTION INTRAVENOUS CONTINUOUS
Status: CANCELLED | OUTPATIENT
Start: 2023-10-11

## 2023-10-04 RX ORDER — ONDANSETRON 2 MG/ML
8 INJECTION INTRAMUSCULAR; INTRAVENOUS
Status: CANCELLED | OUTPATIENT
Start: 2023-10-11

## 2023-10-04 RX ORDER — ALBUTEROL SULFATE 90 UG/1
4 AEROSOL, METERED RESPIRATORY (INHALATION) PRN
Status: CANCELLED | OUTPATIENT
Start: 2023-10-11

## 2023-10-04 RX ORDER — ACETAMINOPHEN 325 MG/1
650 TABLET ORAL
Status: CANCELLED | OUTPATIENT
Start: 2023-10-11

## 2023-10-04 RX ORDER — EPINEPHRINE 1 MG/ML
0.3 INJECTION, SOLUTION INTRAMUSCULAR; SUBCUTANEOUS PRN
Status: CANCELLED | OUTPATIENT
Start: 2023-10-11

## 2023-10-04 RX ORDER — SODIUM CHLORIDE 0.9 % (FLUSH) 0.9 %
5-40 SYRINGE (ML) INJECTION PRN
Status: CANCELLED | OUTPATIENT
Start: 2023-10-11

## 2023-10-04 RX ADMIN — SODIUM CHLORIDE 50 ML/HR: 9 INJECTION, SOLUTION INTRAVENOUS at 09:33

## 2023-10-04 RX ADMIN — IRON SUCROSE 200 MG: 20 INJECTION, SOLUTION INTRAVENOUS at 09:37

## 2023-10-04 NOTE — PROGRESS NOTES
Pt arrives per amb per self and orders reviewed and NS flushing line before and after and no reactions or complaints and blood return present throughout infusion. Pt discharged per amb per self with calendar with next appts.

## 2023-10-11 ENCOUNTER — HOSPITAL ENCOUNTER (OUTPATIENT)
Dept: INFUSION THERAPY | Facility: MEDICAL CENTER | Age: 28
Discharge: HOME | End: 2023-10-11
Payer: MEDICARE

## 2023-10-11 VITALS
RESPIRATION RATE: 16 BRPM | TEMPERATURE: 98 F | HEART RATE: 83 BPM | DIASTOLIC BLOOD PRESSURE: 90 MMHG | SYSTOLIC BLOOD PRESSURE: 138 MMHG

## 2023-10-11 DIAGNOSIS — K90.9 IRON MALABSORPTION: ICD-10-CM

## 2023-10-11 DIAGNOSIS — D50.0 IRON DEFICIENCY ANEMIA DUE TO CHRONIC BLOOD LOSS: Primary | ICD-10-CM

## 2023-10-11 PROCEDURE — 2580000003 HC RX 258: Performed by: INTERNAL MEDICINE

## 2023-10-11 PROCEDURE — 96365 THER/PROPH/DIAG IV INF INIT: CPT

## 2023-10-11 PROCEDURE — 6360000002 HC RX W HCPCS: Performed by: INTERNAL MEDICINE

## 2023-10-11 RX ORDER — HEPARIN 100 UNIT/ML
500 SYRINGE INTRAVENOUS PRN
Status: CANCELLED | OUTPATIENT
Start: 2023-10-18

## 2023-10-11 RX ORDER — SODIUM CHLORIDE 9 MG/ML
5-250 INJECTION, SOLUTION INTRAVENOUS PRN
Status: CANCELLED | OUTPATIENT
Start: 2023-10-18

## 2023-10-11 RX ORDER — SODIUM CHLORIDE 0.9 % (FLUSH) 0.9 %
5-40 SYRINGE (ML) INJECTION PRN
Status: CANCELLED | OUTPATIENT
Start: 2023-10-18

## 2023-10-11 RX ORDER — EPINEPHRINE 1 MG/ML
0.3 INJECTION, SOLUTION INTRAMUSCULAR; SUBCUTANEOUS PRN
Status: CANCELLED | OUTPATIENT
Start: 2023-10-18

## 2023-10-11 RX ORDER — FAMOTIDINE 10 MG/ML
20 INJECTION, SOLUTION INTRAVENOUS
Status: CANCELLED | OUTPATIENT
Start: 2023-10-18

## 2023-10-11 RX ORDER — SODIUM CHLORIDE 9 MG/ML
5-250 INJECTION, SOLUTION INTRAVENOUS PRN
Status: DISCONTINUED | OUTPATIENT
Start: 2023-10-11 | End: 2023-10-12 | Stop reason: HOSPADM

## 2023-10-11 RX ORDER — SODIUM CHLORIDE 9 MG/ML
INJECTION, SOLUTION INTRAVENOUS CONTINUOUS
Status: CANCELLED | OUTPATIENT
Start: 2023-10-18

## 2023-10-11 RX ORDER — ACETAMINOPHEN 325 MG/1
650 TABLET ORAL
Status: CANCELLED | OUTPATIENT
Start: 2023-10-18

## 2023-10-11 RX ORDER — DIPHENHYDRAMINE HYDROCHLORIDE 50 MG/ML
50 INJECTION INTRAMUSCULAR; INTRAVENOUS
Status: CANCELLED | OUTPATIENT
Start: 2023-10-18

## 2023-10-11 RX ORDER — ONDANSETRON 2 MG/ML
8 INJECTION INTRAMUSCULAR; INTRAVENOUS
Status: CANCELLED | OUTPATIENT
Start: 2023-10-18

## 2023-10-11 RX ORDER — ALBUTEROL SULFATE 90 UG/1
4 AEROSOL, METERED RESPIRATORY (INHALATION) PRN
Status: CANCELLED | OUTPATIENT
Start: 2023-10-18

## 2023-10-11 RX ADMIN — SODIUM CHLORIDE 50 ML/HR: 9 INJECTION, SOLUTION INTRAVENOUS at 09:14

## 2023-10-11 RX ADMIN — IRON SUCROSE 200 MG: 20 INJECTION, SOLUTION INTRAVENOUS at 09:18

## 2023-10-11 NOTE — PROGRESS NOTES
Pt here for 2 of 5 venofer infusions   Arrives ambulatory   Denies complaint/concern   Tx complete without incident   Pt discharged in stable condition   Returns 10/18 for 3 of 5 venofer

## 2023-10-18 ENCOUNTER — HOSPITAL ENCOUNTER (OUTPATIENT)
Dept: INFUSION THERAPY | Facility: MEDICAL CENTER | Age: 28
Discharge: HOME | End: 2023-10-18
Payer: MEDICARE

## 2023-10-18 VITALS
RESPIRATION RATE: 16 BRPM | SYSTOLIC BLOOD PRESSURE: 136 MMHG | HEART RATE: 86 BPM | TEMPERATURE: 98.3 F | DIASTOLIC BLOOD PRESSURE: 92 MMHG

## 2023-10-18 DIAGNOSIS — D50.0 IRON DEFICIENCY ANEMIA DUE TO CHRONIC BLOOD LOSS: Primary | ICD-10-CM

## 2023-10-18 DIAGNOSIS — K90.9 IRON MALABSORPTION: ICD-10-CM

## 2023-10-18 PROCEDURE — 2580000003 HC RX 258: Performed by: INTERNAL MEDICINE

## 2023-10-18 PROCEDURE — 6360000002 HC RX W HCPCS: Performed by: INTERNAL MEDICINE

## 2023-10-18 PROCEDURE — 96374 THER/PROPH/DIAG INJ IV PUSH: CPT

## 2023-10-18 RX ORDER — SODIUM CHLORIDE 9 MG/ML
5-250 INJECTION, SOLUTION INTRAVENOUS PRN
Status: DISCONTINUED | OUTPATIENT
Start: 2023-10-18 | End: 2023-10-19 | Stop reason: HOSPADM

## 2023-10-18 RX ORDER — SODIUM CHLORIDE 9 MG/ML
5-250 INJECTION, SOLUTION INTRAVENOUS PRN
Status: CANCELLED | OUTPATIENT
Start: 2023-10-25

## 2023-10-18 RX ORDER — ACETAMINOPHEN 325 MG/1
650 TABLET ORAL
Status: CANCELLED | OUTPATIENT
Start: 2023-10-25

## 2023-10-18 RX ORDER — DIPHENHYDRAMINE HYDROCHLORIDE 50 MG/ML
50 INJECTION INTRAMUSCULAR; INTRAVENOUS
Status: CANCELLED | OUTPATIENT
Start: 2023-10-25

## 2023-10-18 RX ORDER — ONDANSETRON 2 MG/ML
8 INJECTION INTRAMUSCULAR; INTRAVENOUS
Status: CANCELLED | OUTPATIENT
Start: 2023-10-25

## 2023-10-18 RX ORDER — SODIUM CHLORIDE 9 MG/ML
INJECTION, SOLUTION INTRAVENOUS CONTINUOUS
Status: CANCELLED | OUTPATIENT
Start: 2023-10-25

## 2023-10-18 RX ORDER — EPINEPHRINE 1 MG/ML
0.3 INJECTION, SOLUTION INTRAMUSCULAR; SUBCUTANEOUS PRN
Status: CANCELLED | OUTPATIENT
Start: 2023-10-25

## 2023-10-18 RX ORDER — HEPARIN 100 UNIT/ML
500 SYRINGE INTRAVENOUS PRN
Status: CANCELLED | OUTPATIENT
Start: 2023-10-25

## 2023-10-18 RX ORDER — FAMOTIDINE 10 MG/ML
20 INJECTION, SOLUTION INTRAVENOUS
Status: CANCELLED | OUTPATIENT
Start: 2023-10-25

## 2023-10-18 RX ORDER — ALBUTEROL SULFATE 90 UG/1
4 AEROSOL, METERED RESPIRATORY (INHALATION) PRN
Status: CANCELLED | OUTPATIENT
Start: 2023-10-25

## 2023-10-18 RX ORDER — SODIUM CHLORIDE 0.9 % (FLUSH) 0.9 %
5-40 SYRINGE (ML) INJECTION PRN
Status: CANCELLED | OUTPATIENT
Start: 2023-10-25

## 2023-10-18 RX ADMIN — IRON SUCROSE 200 MG: 20 INJECTION, SOLUTION INTRAVENOUS at 09:36

## 2023-10-18 RX ADMIN — SODIUM CHLORIDE 25 ML/HR: 9 INJECTION, SOLUTION INTRAVENOUS at 09:35

## 2023-10-25 ENCOUNTER — HOSPITAL ENCOUNTER (OUTPATIENT)
Dept: INFUSION THERAPY | Facility: MEDICAL CENTER | Age: 28
Discharge: HOME | End: 2023-10-25
Payer: MEDICARE

## 2023-10-25 VITALS
RESPIRATION RATE: 16 BRPM | DIASTOLIC BLOOD PRESSURE: 86 MMHG | HEART RATE: 77 BPM | TEMPERATURE: 98 F | SYSTOLIC BLOOD PRESSURE: 163 MMHG

## 2023-10-25 DIAGNOSIS — D50.0 IRON DEFICIENCY ANEMIA DUE TO CHRONIC BLOOD LOSS: Primary | ICD-10-CM

## 2023-10-25 DIAGNOSIS — K90.9 IRON MALABSORPTION: ICD-10-CM

## 2023-10-25 PROCEDURE — 2580000003 HC RX 258: Performed by: INTERNAL MEDICINE

## 2023-10-25 PROCEDURE — 6360000002 HC RX W HCPCS: Performed by: INTERNAL MEDICINE

## 2023-10-25 PROCEDURE — 96365 THER/PROPH/DIAG IV INF INIT: CPT

## 2023-10-25 RX ORDER — HEPARIN 100 UNIT/ML
500 SYRINGE INTRAVENOUS PRN
Status: CANCELLED | OUTPATIENT
Start: 2023-11-01

## 2023-10-25 RX ORDER — ACETAMINOPHEN 325 MG/1
650 TABLET ORAL
Status: CANCELLED | OUTPATIENT
Start: 2023-11-01

## 2023-10-25 RX ORDER — SODIUM CHLORIDE 9 MG/ML
5-250 INJECTION, SOLUTION INTRAVENOUS PRN
Status: CANCELLED | OUTPATIENT
Start: 2023-11-01

## 2023-10-25 RX ORDER — ALBUTEROL SULFATE 90 UG/1
4 AEROSOL, METERED RESPIRATORY (INHALATION) PRN
Status: CANCELLED | OUTPATIENT
Start: 2023-11-01

## 2023-10-25 RX ORDER — FAMOTIDINE 10 MG/ML
20 INJECTION, SOLUTION INTRAVENOUS
Status: CANCELLED | OUTPATIENT
Start: 2023-11-01

## 2023-10-25 RX ORDER — ONDANSETRON 2 MG/ML
8 INJECTION INTRAMUSCULAR; INTRAVENOUS
Status: CANCELLED | OUTPATIENT
Start: 2023-11-01

## 2023-10-25 RX ORDER — SODIUM CHLORIDE 0.9 % (FLUSH) 0.9 %
5-40 SYRINGE (ML) INJECTION PRN
Status: CANCELLED | OUTPATIENT
Start: 2023-11-01

## 2023-10-25 RX ORDER — SODIUM CHLORIDE 9 MG/ML
INJECTION, SOLUTION INTRAVENOUS CONTINUOUS
Status: CANCELLED | OUTPATIENT
Start: 2023-11-01

## 2023-10-25 RX ORDER — DIPHENHYDRAMINE HYDROCHLORIDE 50 MG/ML
50 INJECTION INTRAMUSCULAR; INTRAVENOUS
Status: CANCELLED | OUTPATIENT
Start: 2023-11-01

## 2023-10-25 RX ORDER — SODIUM CHLORIDE 9 MG/ML
5-250 INJECTION, SOLUTION INTRAVENOUS PRN
Status: DISCONTINUED | OUTPATIENT
Start: 2023-10-25 | End: 2023-10-26 | Stop reason: HOSPADM

## 2023-10-25 RX ORDER — EPINEPHRINE 1 MG/ML
0.3 INJECTION, SOLUTION INTRAMUSCULAR; SUBCUTANEOUS PRN
Status: CANCELLED | OUTPATIENT
Start: 2023-11-01

## 2023-10-25 RX ADMIN — SODIUM CHLORIDE 100 ML/HR: 9 INJECTION, SOLUTION INTRAVENOUS at 09:23

## 2023-10-25 RX ADMIN — IRON SUCROSE 200 MG: 20 INJECTION, SOLUTION INTRAVENOUS at 09:24

## 2023-10-25 NOTE — PROGRESS NOTES
Pt here for 4 of 5 venofer   Arrives ambulatory   Denies complaint/concern   Tx complete without incident   Pt discharged in stable condition   Returns 11/1 for 5 of 5 venofer

## 2023-11-01 ENCOUNTER — HOSPITAL ENCOUNTER (OUTPATIENT)
Dept: INFUSION THERAPY | Facility: MEDICAL CENTER | Age: 28
Discharge: HOME | End: 2023-11-01
Payer: MEDICARE

## 2023-11-01 VITALS
DIASTOLIC BLOOD PRESSURE: 83 MMHG | TEMPERATURE: 97.6 F | SYSTOLIC BLOOD PRESSURE: 158 MMHG | RESPIRATION RATE: 16 BRPM | HEART RATE: 76 BPM

## 2023-11-01 DIAGNOSIS — D50.0 IRON DEFICIENCY ANEMIA DUE TO CHRONIC BLOOD LOSS: Primary | ICD-10-CM

## 2023-11-01 DIAGNOSIS — K90.9 IRON MALABSORPTION: ICD-10-CM

## 2023-11-01 PROCEDURE — 2580000003 HC RX 258: Performed by: INTERNAL MEDICINE

## 2023-11-01 PROCEDURE — 6360000002 HC RX W HCPCS: Performed by: INTERNAL MEDICINE

## 2023-11-01 PROCEDURE — 96365 THER/PROPH/DIAG IV INF INIT: CPT

## 2023-11-01 RX ORDER — SODIUM CHLORIDE 9 MG/ML
5-250 INJECTION, SOLUTION INTRAVENOUS PRN
OUTPATIENT
Start: 2023-11-01

## 2023-11-01 RX ORDER — SODIUM CHLORIDE 0.9 % (FLUSH) 0.9 %
5-40 SYRINGE (ML) INJECTION PRN
OUTPATIENT
Start: 2023-11-01

## 2023-11-01 RX ORDER — DIPHENHYDRAMINE HYDROCHLORIDE 50 MG/ML
50 INJECTION INTRAMUSCULAR; INTRAVENOUS
OUTPATIENT
Start: 2023-11-01

## 2023-11-01 RX ORDER — ONDANSETRON 2 MG/ML
8 INJECTION INTRAMUSCULAR; INTRAVENOUS
OUTPATIENT
Start: 2023-11-01

## 2023-11-01 RX ORDER — SODIUM CHLORIDE 9 MG/ML
INJECTION, SOLUTION INTRAVENOUS CONTINUOUS
OUTPATIENT
Start: 2023-11-01

## 2023-11-01 RX ORDER — EPINEPHRINE 1 MG/ML
0.3 INJECTION, SOLUTION INTRAMUSCULAR; SUBCUTANEOUS PRN
OUTPATIENT
Start: 2023-11-01

## 2023-11-01 RX ORDER — FAMOTIDINE 10 MG/ML
20 INJECTION, SOLUTION INTRAVENOUS
OUTPATIENT
Start: 2023-11-01

## 2023-11-01 RX ORDER — ACETAMINOPHEN 325 MG/1
650 TABLET ORAL
OUTPATIENT
Start: 2023-11-01

## 2023-11-01 RX ORDER — ALBUTEROL SULFATE 90 UG/1
4 AEROSOL, METERED RESPIRATORY (INHALATION) PRN
OUTPATIENT
Start: 2023-11-01

## 2023-11-01 RX ORDER — SODIUM CHLORIDE 9 MG/ML
5-250 INJECTION, SOLUTION INTRAVENOUS PRN
Status: DISCONTINUED | OUTPATIENT
Start: 2023-11-01 | End: 2023-11-02 | Stop reason: HOSPADM

## 2023-11-01 RX ORDER — HEPARIN 100 UNIT/ML
500 SYRINGE INTRAVENOUS PRN
OUTPATIENT
Start: 2023-11-01

## 2023-11-01 RX ADMIN — IRON SUCROSE 200 MG: 20 INJECTION, SOLUTION INTRAVENOUS at 09:42

## 2023-11-01 RX ADMIN — SODIUM CHLORIDE 25 ML/HR: 9 INJECTION, SOLUTION INTRAVENOUS at 09:40

## 2023-11-01 NOTE — PROGRESS NOTES
Pt here for 5/5 Venofer  Arrives ambulatory  Denies complaints/concerns  Tx complete without incident  Pt declines to stay 30 min post tx;VSS  Pt discharged in stable condition

## 2023-11-03 ENCOUNTER — OFFICE VISIT (OUTPATIENT)
Dept: FAMILY MEDICINE CLINIC | Age: 28
End: 2023-11-03

## 2023-11-03 ENCOUNTER — HOSPITAL ENCOUNTER (EMERGENCY)
Age: 28
Discharge: HOME | End: 2023-11-03

## 2023-11-03 VITALS
HEART RATE: 115 BPM | RESPIRATION RATE: 22 BRPM | SYSTOLIC BLOOD PRESSURE: 230 MMHG | OXYGEN SATURATION: 97 % | DIASTOLIC BLOOD PRESSURE: 134 MMHG

## 2023-11-03 VITALS
SYSTOLIC BLOOD PRESSURE: 142 MMHG | WEIGHT: 107.4 LBS | BODY MASS INDEX: 17.26 KG/M2 | HEIGHT: 66 IN | HEART RATE: 71 BPM | RESPIRATION RATE: 18 BRPM | OXYGEN SATURATION: 100 % | DIASTOLIC BLOOD PRESSURE: 86 MMHG

## 2023-11-03 DIAGNOSIS — K02.9 DENTAL CARIES: Primary | ICD-10-CM

## 2023-11-03 RX ORDER — IBUPROFEN 600 MG/1
600 TABLET ORAL EVERY 8 HOURS PRN
Qty: 60 TABLET | Refills: 0 | Status: ON HOLD | OUTPATIENT
Start: 2023-11-03

## 2023-11-03 RX ORDER — AMOXICILLIN AND CLAVULANATE POTASSIUM 875; 125 MG/1; MG/1
1 TABLET, FILM COATED ORAL 2 TIMES DAILY
Qty: 14 TABLET | Refills: 0 | Status: ON HOLD | OUTPATIENT
Start: 2023-11-03 | End: 2023-11-10

## 2023-11-03 ASSESSMENT — PAIN SCALES - GENERAL: PAINLEVEL_OUTOF10: 10

## 2023-11-03 ASSESSMENT — ENCOUNTER SYMPTOMS
SORE THROAT: 0
VOMITING: 0
RHINORRHEA: 1
ABDOMINAL PAIN: 0
COUGH: 0

## 2023-11-03 ASSESSMENT — PAIN - FUNCTIONAL ASSESSMENT: PAIN_FUNCTIONAL_ASSESSMENT: 0-10

## 2023-11-03 NOTE — ED NOTES
Patient refusing EKG completion in triage due to not being able to lay back due to emesis. Patient stated she cant until she get some medication for symptoms.       Jasmyne Julian RN  11/03/23 8535

## 2023-11-03 NOTE — PROGRESS NOTES
Rakan El (:  1995) is a 32 y.o. female,Established patient, here for evaluation of the following chief complaint(s):  Otalgia (Left ear-x 1 week. /No fever. /)         ASSESSMENT/PLAN:  1. Dental caries  -     amoxicillin-clavulanate (AUGMENTIN) 875-125 MG per tablet; Take 1 tablet by mouth 2 times daily for 7 days, Disp-14 tablet, R-0Normal  -     ibuprofen (ADVIL;MOTRIN) 600 MG tablet; Take 1 tablet by mouth every 8 hours as needed for Pain or Fever, Disp-60 tablet, R-0Normal  -     benzocaine (ORAJEL) 20 % GEL mucosal gel; Take 1 Application by mouth 2 times daily as needed for Pain, Disp-14 g, R-0Normal    Left ear exam negative for s/s of otitis media/externa  Rx for augmentin, ibuprofen and orajel   Needs to follow up with dentist regarding several dental caries/fractured teeth and significant tooth decay noted during exam      Return if symptoms worsen or fail to improve, for As scheduled 2023. Subjective   SUBJECTIVE/OBJECTIVE:  Presents for acute visit to discuss ear pain     Left ear pain, feels like it is radiating into left lower jaw  Does have broken molar on bottom left jaw - did go to dentist a few months ago, did not feel like they properly numbed the area and she left the appointment. Denies fever, chills     Otalgia   There is pain in the left ear. This is a new problem. The current episode started in the past 7 days. The problem occurs constantly. The problem has been gradually worsening. There has been no fever. The pain is at a severity of 10/10. The pain is severe. Associated symptoms include rhinorrhea. Pertinent negatives include no abdominal pain, coughing, ear discharge, sore throat or vomiting. She has tried acetaminophen and NSAIDs for the symptoms. The treatment provided no relief. Her past medical history is significant for a chronic ear infection. There is no history of hearing loss or a tympanostomy tube.        Review of Systems   HENT:

## 2023-11-04 ENCOUNTER — HOSPITAL ENCOUNTER (INPATIENT)
Age: 28
LOS: 2 days | Discharge: HOME | End: 2023-11-06
Attending: EMERGENCY MEDICINE | Admitting: INTERNAL MEDICINE
Payer: MEDICARE

## 2023-11-04 DIAGNOSIS — N17.9 AKI (ACUTE KIDNEY INJURY) (HCC): Primary | ICD-10-CM

## 2023-11-04 LAB
ALBUMIN SERPL-MCNC: 3.1 G/DL (ref 3.5–5.2)
ALBUMIN/GLOB SERPL: 0.7 {RATIO} (ref 1–2.5)
ALP SERPL-CCNC: 87 U/L (ref 35–104)
ALT SERPL-CCNC: 15 U/L (ref 5–33)
ANION GAP SERPL CALCULATED.3IONS-SCNC: 11 MMOL/L (ref 9–17)
AST SERPL-CCNC: 46 U/L
B-OH-BUTYR SERPL-MCNC: 0.58 MMOL/L (ref 0.02–0.27)
BILIRUB SERPL-MCNC: 0.4 MG/DL (ref 0.3–1.2)
BODY TEMPERATURE: 37
BUN SERPL-MCNC: 15 MG/DL (ref 6–20)
CALCIUM SERPL-MCNC: 9.1 MG/DL (ref 8.6–10.4)
CHLORIDE SERPL-SCNC: 107 MMOL/L (ref 98–107)
CO2 SERPL-SCNC: 23 MMOL/L (ref 20–31)
COHGB MFR BLD: 2.4 % (ref 0–5)
CREAT SERPL-MCNC: 1.7 MG/DL (ref 0.5–0.9)
FIO2 ON VENT: ABNORMAL %
GFR SERPL CREATININE-BSD FRML MDRD: 41 ML/MIN/1.73M2
GLUCOSE BLD-MCNC: 184 MG/DL (ref 65–105)
GLUCOSE BLD-MCNC: 242 MG/DL (ref 65–105)
GLUCOSE SERPL-MCNC: 242 MG/DL (ref 70–99)
HCG SERPL QL: NEGATIVE
HCO3 VENOUS: 25 MMOL/L (ref 24–30)
LIPASE SERPL-CCNC: 30 U/L (ref 13–60)
MAGNESIUM SERPL-MCNC: 1.8 MG/DL (ref 1.6–2.6)
NEGATIVE BASE EXCESS, VEN: 0.3 MMOL/L (ref 0–2)
O2 SAT, VEN: 83.2 % (ref 60–85)
PCO2, VEN: 46.7 MM HG (ref 39–55)
PH VENOUS: 7.35 (ref 7.32–7.42)
PO2, VEN: 50.7 MM HG (ref 30–50)
POTASSIUM SERPL-SCNC: 5.1 MMOL/L (ref 3.7–5.3)
PROT SERPL-MCNC: 7.7 G/DL (ref 6.4–8.3)
SODIUM SERPL-SCNC: 141 MMOL/L (ref 135–144)

## 2023-11-04 PROCEDURE — 83690 ASSAY OF LIPASE: CPT

## 2023-11-04 PROCEDURE — 85025 COMPLETE CBC W/AUTO DIFF WBC: CPT

## 2023-11-04 PROCEDURE — 6360000002 HC RX W HCPCS: Performed by: STUDENT IN AN ORGANIZED HEALTH CARE EDUCATION/TRAINING PROGRAM

## 2023-11-04 PROCEDURE — 96374 THER/PROPH/DIAG INJ IV PUSH: CPT

## 2023-11-04 PROCEDURE — 1200000000 HC SEMI PRIVATE

## 2023-11-04 PROCEDURE — 36415 COLL VENOUS BLD VENIPUNCTURE: CPT

## 2023-11-04 PROCEDURE — A4216 STERILE WATER/SALINE, 10 ML: HCPCS | Performed by: STUDENT IN AN ORGANIZED HEALTH CARE EDUCATION/TRAINING PROGRAM

## 2023-11-04 PROCEDURE — 80053 COMPREHEN METABOLIC PANEL: CPT

## 2023-11-04 PROCEDURE — 83735 ASSAY OF MAGNESIUM: CPT

## 2023-11-04 PROCEDURE — 85055 RETICULATED PLATELET ASSAY: CPT

## 2023-11-04 PROCEDURE — 99285 EMERGENCY DEPT VISIT HI MDM: CPT

## 2023-11-04 PROCEDURE — 2580000003 HC RX 258: Performed by: STUDENT IN AN ORGANIZED HEALTH CARE EDUCATION/TRAINING PROGRAM

## 2023-11-04 PROCEDURE — 82805 BLOOD GASES W/O2 SATURATION: CPT

## 2023-11-04 PROCEDURE — 2500000003 HC RX 250 WO HCPCS: Performed by: STUDENT IN AN ORGANIZED HEALTH CARE EDUCATION/TRAINING PROGRAM

## 2023-11-04 PROCEDURE — 82947 ASSAY GLUCOSE BLOOD QUANT: CPT

## 2023-11-04 PROCEDURE — 82010 KETONE BODYS QUAN: CPT

## 2023-11-04 PROCEDURE — 96375 TX/PRO/DX INJ NEW DRUG ADDON: CPT

## 2023-11-04 PROCEDURE — 84703 CHORIONIC GONADOTROPIN ASSAY: CPT

## 2023-11-04 RX ORDER — SODIUM CHLORIDE 0.9 % (FLUSH) 0.9 %
5-40 SYRINGE (ML) INJECTION EVERY 12 HOURS SCHEDULED
Status: DISCONTINUED | OUTPATIENT
Start: 2023-11-05 | End: 2023-11-06 | Stop reason: HOSPADM

## 2023-11-04 RX ORDER — ACETAMINOPHEN 650 MG/1
650 SUPPOSITORY RECTAL EVERY 6 HOURS PRN
Status: DISCONTINUED | OUTPATIENT
Start: 2023-11-04 | End: 2023-11-06 | Stop reason: HOSPADM

## 2023-11-04 RX ORDER — MORPHINE SULFATE 4 MG/ML
4 INJECTION, SOLUTION INTRAMUSCULAR; INTRAVENOUS ONCE
Status: COMPLETED | OUTPATIENT
Start: 2023-11-04 | End: 2023-11-04

## 2023-11-04 RX ORDER — INSULIN LISPRO 100 [IU]/ML
0-4 INJECTION, SOLUTION INTRAVENOUS; SUBCUTANEOUS NIGHTLY
Status: DISCONTINUED | OUTPATIENT
Start: 2023-11-05 | End: 2023-11-06 | Stop reason: HOSPADM

## 2023-11-04 RX ORDER — SODIUM CHLORIDE 9 MG/ML
INJECTION, SOLUTION INTRAVENOUS PRN
Status: DISCONTINUED | OUTPATIENT
Start: 2023-11-04 | End: 2023-11-06 | Stop reason: HOSPADM

## 2023-11-04 RX ORDER — LANOLIN ALCOHOL/MO/W.PET/CERES
325 CREAM (GRAM) TOPICAL
Status: DISCONTINUED | OUTPATIENT
Start: 2023-11-05 | End: 2023-11-06 | Stop reason: HOSPADM

## 2023-11-04 RX ORDER — SODIUM CHLORIDE 0.9 % (FLUSH) 0.9 %
5-40 SYRINGE (ML) INJECTION PRN
Status: DISCONTINUED | OUTPATIENT
Start: 2023-11-04 | End: 2023-11-06 | Stop reason: HOSPADM

## 2023-11-04 RX ORDER — LISINOPRIL 20 MG/1
20 TABLET ORAL DAILY
Status: DISCONTINUED | OUTPATIENT
Start: 2023-11-05 | End: 2023-11-06 | Stop reason: HOSPADM

## 2023-11-04 RX ORDER — ROPINIROLE 0.25 MG/1
0.25 TABLET, FILM COATED ORAL NIGHTLY
Status: DISCONTINUED | OUTPATIENT
Start: 2023-11-05 | End: 2023-11-06 | Stop reason: HOSPADM

## 2023-11-04 RX ORDER — DICYCLOMINE HYDROCHLORIDE 10 MG/1
10 CAPSULE ORAL 4 TIMES DAILY PRN
Status: DISCONTINUED | OUTPATIENT
Start: 2023-11-04 | End: 2023-11-06 | Stop reason: HOSPADM

## 2023-11-04 RX ORDER — ACETAMINOPHEN 325 MG/1
650 TABLET ORAL EVERY 6 HOURS PRN
Status: DISCONTINUED | OUTPATIENT
Start: 2023-11-04 | End: 2023-11-06 | Stop reason: HOSPADM

## 2023-11-04 RX ORDER — SODIUM CHLORIDE 9 MG/ML
INJECTION, SOLUTION INTRAVENOUS CONTINUOUS
Status: DISCONTINUED | OUTPATIENT
Start: 2023-11-05 | End: 2023-11-06 | Stop reason: HOSPADM

## 2023-11-04 RX ORDER — 0.9 % SODIUM CHLORIDE 0.9 %
1000 INTRAVENOUS SOLUTION INTRAVENOUS ONCE
Status: COMPLETED | OUTPATIENT
Start: 2023-11-04 | End: 2023-11-04

## 2023-11-04 RX ORDER — ONDANSETRON 2 MG/ML
4 INJECTION INTRAMUSCULAR; INTRAVENOUS EVERY 6 HOURS PRN
Status: DISCONTINUED | OUTPATIENT
Start: 2023-11-04 | End: 2023-11-06 | Stop reason: HOSPADM

## 2023-11-04 RX ORDER — INSULIN GLARGINE 100 [IU]/ML
10 INJECTION, SOLUTION SUBCUTANEOUS NIGHTLY
Status: DISCONTINUED | OUTPATIENT
Start: 2023-11-05 | End: 2023-11-06 | Stop reason: HOSPADM

## 2023-11-04 RX ORDER — ONDANSETRON 2 MG/ML
4 INJECTION INTRAMUSCULAR; INTRAVENOUS ONCE
Status: COMPLETED | OUTPATIENT
Start: 2023-11-04 | End: 2023-11-04

## 2023-11-04 RX ORDER — ENOXAPARIN SODIUM 100 MG/ML
40 INJECTION SUBCUTANEOUS DAILY
Status: DISCONTINUED | OUTPATIENT
Start: 2023-11-05 | End: 2023-11-06 | Stop reason: HOSPADM

## 2023-11-04 RX ORDER — SODIUM POLYSTYRENE SULFONATE 15 G/60ML
15 SUSPENSION ORAL; RECTAL
Status: ACTIVE | OUTPATIENT
Start: 2023-11-04 | End: 2023-11-05

## 2023-11-04 RX ORDER — DEXTROSE MONOHYDRATE 100 MG/ML
INJECTION, SOLUTION INTRAVENOUS CONTINUOUS PRN
Status: DISCONTINUED | OUTPATIENT
Start: 2023-11-04 | End: 2023-11-06 | Stop reason: HOSPADM

## 2023-11-04 RX ORDER — PANTOPRAZOLE SODIUM 40 MG/1
40 TABLET, DELAYED RELEASE ORAL
Status: DISCONTINUED | OUTPATIENT
Start: 2023-11-05 | End: 2023-11-06 | Stop reason: HOSPADM

## 2023-11-04 RX ORDER — INSULIN LISPRO 100 [IU]/ML
0-4 INJECTION, SOLUTION INTRAVENOUS; SUBCUTANEOUS
Status: DISCONTINUED | OUTPATIENT
Start: 2023-11-05 | End: 2023-11-06 | Stop reason: HOSPADM

## 2023-11-04 RX ORDER — ONDANSETRON 4 MG/1
4 TABLET, ORALLY DISINTEGRATING ORAL EVERY 8 HOURS PRN
Status: DISCONTINUED | OUTPATIENT
Start: 2023-11-04 | End: 2023-11-06 | Stop reason: HOSPADM

## 2023-11-04 RX ADMIN — MORPHINE SULFATE 4 MG: 4 INJECTION INTRAVENOUS at 21:11

## 2023-11-04 RX ADMIN — ONDANSETRON 4 MG: 2 INJECTION INTRAMUSCULAR; INTRAVENOUS at 20:55

## 2023-11-04 RX ADMIN — SODIUM CHLORIDE 1000 ML: 9 INJECTION, SOLUTION INTRAVENOUS at 21:11

## 2023-11-04 RX ADMIN — FAMOTIDINE 20 MG: 10 INJECTION, SOLUTION INTRAVENOUS at 21:12

## 2023-11-04 ASSESSMENT — ENCOUNTER SYMPTOMS
COLOR CHANGE: 0
CONSTIPATION: 0
SHORTNESS OF BREATH: 0
ABDOMINAL PAIN: 1
VOMITING: 1
DIARRHEA: 0
RHINORRHEA: 0
NAUSEA: 1

## 2023-11-04 ASSESSMENT — PAIN DESCRIPTION - LOCATION: LOCATION: ABDOMEN

## 2023-11-04 ASSESSMENT — PAIN SCALES - GENERAL: PAINLEVEL_OUTOF10: 10

## 2023-11-05 PROBLEM — Z72.0 TOBACCO ABUSE: Status: ACTIVE | Noted: 2023-11-05

## 2023-11-05 PROBLEM — K04.7 DENTAL INFECTION: Status: ACTIVE | Noted: 2023-11-05

## 2023-11-05 PROBLEM — R11.14 BILIOUS VOMITING WITH NAUSEA: Status: ACTIVE | Noted: 2023-11-05

## 2023-11-05 LAB
ALBUMIN SERPL-MCNC: 2.3 G/DL (ref 3.5–5.2)
ALBUMIN/GLOB SERPL: 0.7 {RATIO} (ref 1–2.5)
ALP SERPL-CCNC: 65 U/L (ref 35–104)
ALT SERPL-CCNC: 7 U/L (ref 5–33)
AMPHET UR QL SCN: NEGATIVE
ANION GAP SERPL CALCULATED.3IONS-SCNC: 10 MMOL/L (ref 9–17)
AST SERPL-CCNC: 12 U/L
BACTERIA URNS QL MICRO: ABNORMAL
BARBITURATES UR QL SCN: NEGATIVE
BASOPHILS # BLD: 0.1 K/UL (ref 0–0.2)
BASOPHILS NFR BLD: 1 % (ref 0–2)
BENZODIAZ UR QL: NEGATIVE
BILIRUB SERPL-MCNC: 0.2 MG/DL (ref 0.3–1.2)
BILIRUB UR QL STRIP: NEGATIVE
BUN SERPL-MCNC: 12 MG/DL (ref 6–20)
CALCIUM SERPL-MCNC: 7.8 MG/DL (ref 8.6–10.4)
CANNABINOIDS UR QL SCN: POSITIVE
CASTS #/AREA URNS LPF: ABNORMAL /LPF (ref 0–8)
CHLORIDE SERPL-SCNC: 111 MMOL/L (ref 98–107)
CLARITY UR: ABNORMAL
CO2 SERPL-SCNC: 21 MMOL/L (ref 20–31)
COCAINE UR QL SCN: NEGATIVE
COLOR UR: YELLOW
CREAT SERPL-MCNC: 1.6 MG/DL (ref 0.5–0.9)
EOSINOPHIL # BLD: 0 K/UL (ref 0–0.4)
EOSINOPHILS RELATIVE PERCENT: 0 % (ref 1–4)
EPI CELLS #/AREA URNS HPF: ABNORMAL /HPF (ref 0–5)
ERYTHROCYTE [DISTWIDTH] IN BLOOD BY AUTOMATED COUNT: 24.4 % (ref 11.8–14.4)
EST. AVERAGE GLUCOSE BLD GHB EST-MCNC: 123 MG/DL
FENTANYL UR QL: NEGATIVE
GFR SERPL CREATININE-BSD FRML MDRD: 44 ML/MIN/1.73M2
GLUCOSE BLD-MCNC: 101 MG/DL (ref 65–105)
GLUCOSE BLD-MCNC: 169 MG/DL (ref 65–105)
GLUCOSE BLD-MCNC: 85 MG/DL (ref 65–105)
GLUCOSE BLD-MCNC: 97 MG/DL (ref 65–105)
GLUCOSE SERPL-MCNC: 73 MG/DL (ref 70–99)
GLUCOSE UR STRIP-MCNC: NEGATIVE MG/DL
HBA1C MFR BLD: 5.9 % (ref 4–6)
HCT VFR BLD AUTO: 33.6 % (ref 36.3–47.1)
HGB BLD-MCNC: 11.4 G/DL (ref 11.9–15.1)
HGB UR QL STRIP.AUTO: ABNORMAL
IMM GRANULOCYTES # BLD AUTO: 0 K/UL (ref 0–0.3)
IMM GRANULOCYTES NFR BLD: 0 %
INR PPP: 1.1
KETONES UR STRIP-MCNC: NEGATIVE MG/DL
LEUKOCYTE ESTERASE UR QL STRIP: ABNORMAL
LYMPHOCYTES NFR BLD: 2.43 K/UL (ref 1–4.8)
LYMPHOCYTES RELATIVE PERCENT: 25 % (ref 24–44)
MAGNESIUM SERPL-MCNC: 1.6 MG/DL (ref 1.6–2.6)
MCH RBC QN AUTO: 27.8 PG (ref 25.2–33.5)
MCHC RBC AUTO-ENTMCNC: 33.9 G/DL (ref 28.4–34.8)
MCV RBC AUTO: 82 FL (ref 82.6–102.9)
METHADONE UR QL: NEGATIVE
MICROORGANISM SPEC CULT: NORMAL
MONOCYTES NFR BLD: 0.78 K/UL (ref 0.1–0.8)
MONOCYTES NFR BLD: 8 % (ref 1–7)
MORPHOLOGY: ABNORMAL
NEUTROPHILS NFR BLD: 66 % (ref 36–66)
NEUTS SEG NFR BLD: 6.39 K/UL (ref 1.8–7.7)
NITRITE UR QL STRIP: NEGATIVE
NRBC BLD-RTO: 0 PER 100 WBC
OPIATES UR QL SCN: POSITIVE
OXYCODONE UR QL SCN: NEGATIVE
PCP UR QL SCN: NEGATIVE
PH UR STRIP: 7 [PH] (ref 5–8)
PLATELET # BLD AUTO: ABNORMAL K/UL (ref 138–453)
PLATELET, FLUORESCENCE: ABNORMAL K/UL (ref 138–453)
POTASSIUM SERPL-SCNC: 4.2 MMOL/L (ref 3.7–5.3)
PROT SERPL-MCNC: 5.6 G/DL (ref 6.4–8.3)
PROT UR STRIP-MCNC: ABNORMAL MG/DL
PROTHROMBIN TIME: 13.9 SEC (ref 11.7–14.9)
RBC # BLD AUTO: 4.1 M/UL (ref 3.95–5.11)
RBC #/AREA URNS HPF: ABNORMAL /HPF (ref 0–4)
SODIUM SERPL-SCNC: 142 MMOL/L (ref 135–144)
SODIUM UR-SCNC: 67 MMOL/L
SP GR UR STRIP: 1.01 (ref 1–1.03)
SPECIMEN DESCRIPTION: NORMAL
TEST INFORMATION: ABNORMAL
TOTAL PROTEIN, URINE: 206 MG/DL
UROBILINOGEN UR STRIP-ACNC: NORMAL EU/DL (ref 0–1)
WBC #/AREA URNS HPF: ABNORMAL /HPF (ref 0–5)
WBC OTHER # BLD: 9.7 K/UL (ref 3.5–11.3)

## 2023-11-05 PROCEDURE — 2580000003 HC RX 258: Performed by: NURSE PRACTITIONER

## 2023-11-05 PROCEDURE — 84300 ASSAY OF URINE SODIUM: CPT

## 2023-11-05 PROCEDURE — 80053 COMPREHEN METABOLIC PANEL: CPT

## 2023-11-05 PROCEDURE — 85610 PROTHROMBIN TIME: CPT

## 2023-11-05 PROCEDURE — 87186 SC STD MICRODIL/AGAR DIL: CPT

## 2023-11-05 PROCEDURE — 81001 URINALYSIS AUTO W/SCOPE: CPT

## 2023-11-05 PROCEDURE — 6370000000 HC RX 637 (ALT 250 FOR IP): Performed by: NURSE PRACTITIONER

## 2023-11-05 PROCEDURE — 82947 ASSAY GLUCOSE BLOOD QUANT: CPT

## 2023-11-05 PROCEDURE — 6360000002 HC RX W HCPCS: Performed by: NURSE PRACTITIONER

## 2023-11-05 PROCEDURE — 83036 HEMOGLOBIN GLYCOSYLATED A1C: CPT

## 2023-11-05 PROCEDURE — 2580000003 HC RX 258: Performed by: HOSPITALIST

## 2023-11-05 PROCEDURE — 36415 COLL VENOUS BLD VENIPUNCTURE: CPT

## 2023-11-05 PROCEDURE — 87086 URINE CULTURE/COLONY COUNT: CPT

## 2023-11-05 PROCEDURE — 83735 ASSAY OF MAGNESIUM: CPT

## 2023-11-05 PROCEDURE — 87088 URINE BACTERIA CULTURE: CPT

## 2023-11-05 PROCEDURE — 51798 US URINE CAPACITY MEASURE: CPT

## 2023-11-05 PROCEDURE — 6360000002 HC RX W HCPCS: Performed by: HOSPITALIST

## 2023-11-05 PROCEDURE — 1200000000 HC SEMI PRIVATE

## 2023-11-05 PROCEDURE — 6370000000 HC RX 637 (ALT 250 FOR IP): Performed by: INTERNAL MEDICINE

## 2023-11-05 PROCEDURE — 99222 1ST HOSP IP/OBS MODERATE 55: CPT | Performed by: INTERNAL MEDICINE

## 2023-11-05 PROCEDURE — 80307 DRUG TEST PRSMV CHEM ANLYZR: CPT

## 2023-11-05 PROCEDURE — 84156 ASSAY OF PROTEIN URINE: CPT

## 2023-11-05 RX ORDER — CHLORHEXIDINE GLUCONATE ORAL RINSE 1.2 MG/ML
15 SOLUTION DENTAL 2 TIMES DAILY
Status: DISCONTINUED | OUTPATIENT
Start: 2023-11-05 | End: 2023-11-06 | Stop reason: HOSPADM

## 2023-11-05 RX ORDER — AMOXICILLIN AND CLAVULANATE POTASSIUM 875; 125 MG/1; MG/1
1 TABLET, FILM COATED ORAL EVERY 12 HOURS SCHEDULED
Status: DISCONTINUED | OUTPATIENT
Start: 2023-11-05 | End: 2023-11-05

## 2023-11-05 RX ADMIN — CHLORHEXIDINE GLUCONATE 15 ML: 1.2 SOLUTION ORAL at 22:08

## 2023-11-05 RX ADMIN — INSULIN GLARGINE 10 UNITS: 100 INJECTION, SOLUTION SUBCUTANEOUS at 00:17

## 2023-11-05 RX ADMIN — DICYCLOMINE HYDROCHLORIDE 10 MG: 10 CAPSULE ORAL at 08:23

## 2023-11-05 RX ADMIN — ENOXAPARIN SODIUM 40 MG: 100 INJECTION SUBCUTANEOUS at 08:24

## 2023-11-05 RX ADMIN — ONDANSETRON 4 MG: 2 INJECTION INTRAMUSCULAR; INTRAVENOUS at 14:16

## 2023-11-05 RX ADMIN — AMOXICILLIN AND CLAVULANATE POTASSIUM 1 TABLET: 875; 125 TABLET, FILM COATED ORAL at 11:56

## 2023-11-05 RX ADMIN — ACETAMINOPHEN 650 MG: 325 TABLET ORAL at 01:13

## 2023-11-05 RX ADMIN — DICYCLOMINE HYDROCHLORIDE 10 MG: 10 CAPSULE ORAL at 01:13

## 2023-11-05 RX ADMIN — LISINOPRIL 20 MG: 20 TABLET ORAL at 08:24

## 2023-11-05 RX ADMIN — FERROUS SULFATE TAB EC 325 MG (65 MG FE EQUIVALENT) 325 MG: 325 (65 FE) TABLET DELAYED RESPONSE at 08:23

## 2023-11-05 RX ADMIN — ACETAMINOPHEN 650 MG: 325 TABLET ORAL at 08:23

## 2023-11-05 RX ADMIN — INSULIN GLARGINE 10 UNITS: 100 INJECTION, SOLUTION SUBCUTANEOUS at 22:07

## 2023-11-05 RX ADMIN — CHLORHEXIDINE GLUCONATE 15 ML: 1.2 SOLUTION ORAL at 11:55

## 2023-11-05 RX ADMIN — SODIUM CHLORIDE: 9 INJECTION, SOLUTION INTRAVENOUS at 00:11

## 2023-11-05 RX ADMIN — Medication 1000 MG: at 15:58

## 2023-11-05 RX ADMIN — ROPINIROLE HYDROCHLORIDE 0.25 MG: 0.25 TABLET, FILM COATED ORAL at 22:07

## 2023-11-05 ASSESSMENT — PAIN DESCRIPTION - LOCATION
LOCATION: ABDOMEN
LOCATION: ABDOMEN

## 2023-11-05 ASSESSMENT — PAIN DESCRIPTION - PAIN TYPE: TYPE: ACUTE PAIN

## 2023-11-05 ASSESSMENT — PAIN DESCRIPTION - DESCRIPTORS
DESCRIPTORS: ACHING
DESCRIPTORS: CRAMPING;DISCOMFORT

## 2023-11-05 ASSESSMENT — PAIN DESCRIPTION - FREQUENCY: FREQUENCY: CONTINUOUS

## 2023-11-05 ASSESSMENT — PAIN SCALES - GENERAL
PAINLEVEL_OUTOF10: 9
PAINLEVEL_OUTOF10: 5

## 2023-11-05 NOTE — H&P
[facility]  Office: [phone]  1826, DO, Dashawn Dys, DO, Loreaa Finely, DO, Sonya Pastures Blood, DO, Naheed Cornelius MD, Rajwinder Denson MD, Sonido Sarmiento MD, Ivis Orona MD,  Bentley Ohara MD, Wanda Downing MD, Wood Ross MD,  Adriana Borden MD, Doreen Chisholm MD, Mary Jo Logan, DO, Luther Jimenez MD,  Gustavo Ward, DO, Esther Steen MD, Anand Guillermo MD, Wendy Lock MD, Leandro Purcell MD,  uLla Landers MD, Akbar Bai MD, Eleazar Jara MD, Zoraida Abraham MD, Juani Rodriguez MD, Milton Cobb, DO, Daniella Helton, DO, Talon Weston MD,  Ab Malave MD, Samson Parmar, CNP,  Jose Schaffer, CNP, Dominik Scruggs, CNP,  Cheryl John, IAN, Mercedes Ji, CNP, Julee Ascencio, CNP, Cortes Busby, CNP, Alley Chatman, CNP, Cheri Serrano, CNP, Erick Ho, CNP, Jorgito Prabhakar, CNS, Nemesio Pierce, CNP, Renetta Graham, [address] [place] [address]    HISTORY AND PHYSICAL EXAMINATION            Date:   11/5/2023  Patient name:  Kelton Aguiar  Date of admission:  11/4/2023  8:28 PM  MRN:   [id number]  Account:  [de-identified]  YOB: 1995  PCP:    Pradeep Che MD  Room:   0339/0339-02  Code Status:    Full Code    Chief Complaint:     Chief Complaint   Patient presents with    Emesis    Abdominal Pain   \" Pain\"    History Obtained From:     patient    History of Present Illness:     Kelton Aguiar is a 32 y.o.  female with longstanding type 1 diabetes who presents with Emesis and Abdominal Pain   and is admitted to the hospital for the management of KAIT (acute kidney injury) ([address] [facility]). Patient has been struggling intermittently with a left posterior molar dental infection for the past 3 to 4 weeks with poor p.o. intake with increasing jaw pain status post cracked tooth years ago. She has noted increasing pain over the past 3 to 4 weeks.   Previously attempted to Collection Time: 11/04/23  9:02 PM   Result Value Ref Range    hCG Qual NEGATIVE NEGATIVE   BLOOD GAS, VENOUS    Collection Time: 11/04/23  9:03 PM   Result Value Ref Range    pH, Magen 7.349 7.320 - 7.420    pCO2, Magen 46.7 39 - 55 mm Hg    pO2, Magen 50.7 (H) 30 - 50 mm Hg    HCO3, Venous 25.0 24 - 30 mmol/L    Negative Base Excess, Magen 0.3 0.0 - 2.0 mmol/L    O2 Sat, Magen 83.2 60.0 - 85.0 %    Carboxyhemoglobin 2.4 0 - 5 %    Pt Temp 37.0     FIO2 INFORMATION NOT PROVIDED    POC Glucose Fingerstick    Collection Time: 11/04/23 11:50 PM   Result Value Ref Range    POC Glucose 184 (H) 65 - 105 mg/dL   Hemoglobin A1c    Collection Time: 11/05/23 12:02 AM   Result Value Ref Range    Hemoglobin A1C 5.9 4.0 - 6.0 %    Estimated Avg Glucose 123 mg/dL   Urinalysis with Microscopic    Collection Time: 11/05/23  2:32 AM   Result Value Ref Range    Color, UA Yellow Yellow    Turbidity UA Turbid (A) Clear    Glucose, Ur NEGATIVE NEGATIVE mg/dL    Bilirubin Urine NEGATIVE NEGATIVE    Ketones, Urine NEGATIVE NEGATIVE mg/dL    Specific Gravity, UA 1.013 1.005 - 1.030    Urine Hgb SMALL (A) NEGATIVE    pH, UA 7.0 5.0 - 8.0    Protein, UA 3+ (A) NEGATIVE mg/dL    Urobilinogen, Urine Normal 0.0 - 1.0 EU/dL    Nitrite, Urine NEGATIVE NEGATIVE    Leukocyte Esterase, Urine LARGE (A) NEGATIVE    WBC, UA TOO NUMEROUS TO COUNT 0 - 5 /HPF    RBC, UA 5 TO 10 0 - 4 /HPF    Casts UA  0 - 8 /LPF     2 TO 5 HYALINE Reference range defined for non-centrifuged specimen.     Epithelial Cells UA 2 TO 5 0 - 5 /HPF    Bacteria, UA MANY (A) None   Sodium, urine, random    Collection Time: 11/05/23  2:32 AM   Result Value Ref Range    Sodium,Ur 67 mmol/L   Protein, urine, random    Collection Time: 11/05/23  2:32 AM   Result Value Ref Range    Total Protein, Urine 206 mg/dL   Comprehensive Metabolic Panel w/ Reflex to MG    Collection Time: 11/05/23  6:38 AM   Result Value Ref Range    Sodium 142 135 - 144 mmol/L    Potassium 4.2 3.7 - 5.3 mmol/L

## 2023-11-05 NOTE — ED PROVIDER NOTES
[facility] ED  Emergency Department Encounter  Emergency Medicine Resident     Pt Anrdew Nichole  MRN: [id number]  [address] [facility] 1995  Date of evaluation: 23  PCP:  Sena Banerjee MD  Note Started: 8:46 PM EDT      CHIEF COMPLAINT       Chief Complaint   Patient presents with    Emesis    Abdominal Pain       HISTORY OF PRESENT ILLNESS  (Location/Symptom, Timing/Onset, Context/Setting, Quality, Duration, Modifying Factors, Severity.)      Tameka Rahman is a 32 y.o. female who presents with complaint of nausea, vomiting as well as epigastric abdominal pain. Past medical history sniffer hypertension, nausea vomiting and type 1 diabetes. Patient states that for the past 2 days she had significant nausea and vomiting as well as epigastric abdominal pain. Family member states that this started after she had some cupcakes at night before. Has not been able to keep down any oral foods. Has not take anything at home for it. Denies any fever or chills. No one has similar symptoms. No hematochezia, melena or hematemesis. Denies any viral URI-like symptoms. PAST MEDICAL / SURGICAL / SOCIAL / FAMILY HISTORY      has a past medical history of Hypertension, Iron deficiency anemia due to chronic blood loss, Kidney infection, MRSA (methicillin resistant staph aureus) culture positive, Nausea & vomiting, Ovarian cyst, Secondary osteoporosis, Type 1 diabetes mellitus ([address]), and UTI (urinary tract infection). has a past surgical history that includes  section and Upper gastrointestinal endoscopy (N/A, 2021).       Social History     Socioeconomic History    Marital status: Single     Spouse name: Not on file    Number of children: Not on file    Years of education: Not on file    Highest education level: Not on file   Occupational History    Not on file   Tobacco Use    Smoking status: Every Day     Packs/day: .5     Types: Cigarettes    Smokeless area. There is guarding. There is no rebound. Negative signs include Corbin's sign, Rovsing's sign, McBurney's sign and psoas sign. Comments: Voluntary guarding, epigastric region   Skin:     Capillary Refill: Capillary refill takes less than 2 seconds. Coloration: Skin is not cyanotic, jaundiced, mottled or pale. Findings: No erythema or rash. Neurological:      Mental Status: She is alert and oriented to person, place, and time. DDX/DIAGNOSTIC RESULTS / EMERGENCY DEPARTMENT COURSE / MDM     Medical Decision Making  23-year-old female presenting with epigastric abdominal pain, nausea and vomiting for the past 2 days. On exam patient is actively dry heaving in room. Type I diabetic has not been using any of her insulin at home as she was getting new monitoring system. Denies hematochezia or melena or known sick contacts. No one has similar symptoms. Will obtain abdominal work-up including IV fluids antiemetics and analgesia and reassess. Differential diagnosis of pancreatitis, peptic ulcer disease, diabetic gastroparesis, cholecystitis, ectopic pregnancy, dehydration, electro abnormality, anemia    Pain is reproducible with patient stating this feels similar to last time she had this. Low suspicion for ACS at this time. Amount and/or Complexity of Data Reviewed  Labs: ordered. Risk  Prescription drug management. Decision regarding hospitalization. Risk Details: Patient admitted to [facility] for IV fluid hydration, symptomatic control as well as KAIT. No findings of DKA. Patient amenable to this plan. EMERGENCY DEPARTMENT COURSE:      ED Course as of 11/04/23 2304   Sat Nov 04, 2023   2145 Patient with worsening KAIT. We will plan on admission.  [MS]   [id number] Patient accepted by [facility]   [MS]      ED Course User Index  [MS] Maryjane Hancock DO       PROCEDURES:      CONSULTS:  IP CONSULT TO HOSPITALIST    CRITICAL CARE:  There was significant risk of life threatening

## 2023-11-05 NOTE — PROGRESS NOTES
Comprehensive Nutrition Assessment    Type and Reason for Visit:  Initial, Positive Nutrition Screen (Weight Loss; Poor Intakes/Appetite)    Nutrition Recommendations/Plan:   Continue current diet. Will modify ONS order to provide clear liquid ONS. Encourage intakes as tolerated. Monitor dental pain and offer a modified diet as/if needed. Will monitor nausea/vomiting, labs, weights, and plan of care. Malnutrition Assessment:  Malnutrition Status: Moderate malnutrition (11/05/23 1453)    Context:  Acute Illness     Findings of the 6 clinical characteristics of malnutrition:  Energy Intake:  50% or less of estimated energy requirements for 5 or more days  Weight Loss:  No significant weight loss - Weight gain noted per chart review - h/o weight loss. Body Fat Loss:  Mild body fat loss Orbital, Triceps   Muscle Mass Loss:  Mild muscle mass loss Clavicles (pectoralis & deltoids)  Fluid Accumulation:  No significant fluid accumulation     Strength:  Not Performed    Nutrition Assessment:    Admitted with c/o nausea, vomiting, and abdominal pain. Pt reports being unable to tolerate oral intakes. Pt also reports having dental issues/infection x past 3-4 weeks with poor intakes and worsening jaw pain. Lunch at bedside - pt reports she tried a manzo and then had an episode of vomiting. Continue to be unable to tolerate PO. Glucerna supplements ordered with meals - pt reports she has drank them before - discussed providing clear liquid ONS while having nausea/vomiting. Pt reports h/o weight loss - states  lb before issues with a \"stomach infection\". Per chart review, noted recent weight gain possible. PMH: HTN, type 1 DM. Labs reviewed. Meds include: Lantus, Zofran PRN. Nutrition Related Findings:    Labs/Meds reviewed. Loss of appetite, nausea/vomiting.  Wound Type: None       Current Nutrition Intake & Therapies:    Average Meal Intake: 0%, 1-25%  Average Supplements Intake: 0%  ADULT DIET; Regular; 4 carb choices (60 gm/meal)  ADULT ORAL NUTRITION SUPPLEMENT; Breakfast, Lunch, Dinner; Diabetic Oral Supplement  Additional Calorie Sources:  None    Anthropometric Measures:  Height: 165.1 cm (5' 5\")  Ideal Body Weight (IBW): 125 lbs (57 kg)    Admission Body Weight: 51 kg (112 lb 7 oz)  Current Body Weight: 51 kg (112 lb 7 oz), 89.9 % IBW. Weight Source: Bed Scale  Current BMI (kg/m2): 18.7  Usual Body Weight: 72.6 kg (160 lb) (per pt report)  % Weight Change (Calculated): -29.7                    BMI Categories: Normal Weight (BMI 18.5-24. 9)    Estimated Daily Nutrient Needs:  Energy Requirements Based On: Kcal/kg  Weight Used for Energy Requirements: Admission  Energy (kcal/day): 8003-9249 kcals/day  Weight Used for Protein Requirements: Admission  Protein (g/day): 60-80 gm pro/day  Method Used for Fluid Requirements: ml/Kg  Fluid (ml/day): 35 mL/kg = 1800 mL/day    Nutrition Diagnosis:   Inadequate oral intake related to  (dental pain/issues; decreased appetite) as evidenced by poor intake prior to admission, nausea, vomiting, intake 0-25%    Nutrition Interventions:   Food and/or Nutrient Delivery: Continue Current Diet, Modify Oral Nutrition Supplement  Nutrition Education/Counseling: No recommendation at this time  Coordination of Nutrition Care: Continue to monitor while inpatient  Plan of Care discussed with: Patient; Family    Goals:  Previous Goal Met:  (Goal Set)  Goals: PO intake 50% or greater, by next RD assessment       Nutrition Monitoring and Evaluation:   Behavioral-Environmental Outcomes: None Identified  Food/Nutrient Intake Outcomes: Food and Nutrient Intake, Supplement Intake  Physical Signs/Symptoms Outcomes: Biochemical Data, Chewing or Swallowing, GI Status, Nausea or Vomiting, Fluid Status or Edema, Hemodynamic Status, Weight, Nutrition Focused Physical Findings, Skin    Discharge Planning:     Too soon to determine     Theadora NACHO Morrow, LD  Contact: [phone]

## 2023-11-05 NOTE — CARE COORDINATION
Case Management Assessment  Initial Evaluation    Date/Time of Evaluation: 11/5/2023 12:00 PM  Assessment Completed by: Key Cloud RN    If patient is discharged prior to next notation, then this note serves as note for discharge by case management. Patient Name: Shanta Pickens                   YOB: 1995  Diagnosis: KAIT (acute kidney injury) [facility]) [N17.9]                   Date / Time: 11/4/2023  8:28 PM    Patient Admission Status: Inpatient   Readmission Risk (Low < 19, Mod (19-27), High > 27): Readmission Risk Score: 15.8    Current PCP: Gavin Ruiz MD  PCP verified by CM? (P) Yes    Chart Reviewed: Yes      History Provided by: (P) Patient  Patient Orientation: (P) Alert and Oriented    Patient Cognition: (P) Alert    Hospitalization in the last 30 days (Readmission):  No    If yes, Readmission Assessment in  Navigator will be completed.     Advance Directives:      Code Status: Full Code   Patient's Primary Decision Maker is:        Discharge Planning:    Patient lives with: (P) Family Members, Children Type of Home: (P) House  Primary Care Giver: (P) Self  Patient Support Systems include: (P) Children, Family Members   Current Financial resources: (P) Medicare, Medicaid  Current community resources:    Current services prior to admission: (P) None            Current DME:              Type of Home Care services:  (P) None    ADLS  Prior functional level: (P) Independent in ADLs/IADLs  Current functional level: (P) Independent in ADLs/IADLs    PT AM-PAC:   /24  OT AM-PAC:   /24    Family can provide assistance at DC: (P) Yes  Would you like Case Management to discuss the discharge plan with any other family members/significant others, and if so, who? (P) No  Plans to Return to Present Housing: (P) Yes  Other Identified Issues/Barriers to RETURNING to current housing: none  Potential Assistance needed at discharge: (P) N/A            Potential DME:    Patient expects

## 2023-11-05 NOTE — ED NOTES
Pt presents to ER with c/o emesis and abdominal pain since yesterday. Pt is actively vomiting now. Pt states she hasn't been keeping anything down. Pt states she had 6 cupcakes the night before nausea vomiting symptoms started. Pt is Ox4&A, denies chest pain, SOB, dizziness. Continue with plan of care.        Shanti Howell  11/04/23 5882

## 2023-11-05 NOTE — PLAN OF CARE
Problem: Chronic Conditions and Co-morbidities  Goal: Patient's chronic conditions and co-morbidity symptoms are monitored and maintained or improved  11/5/2023 1758 by Leigh Wu RN  Outcome: Progressing  11/5/2023 0503 by Bailee Beavers  Outcome: Progressing     Problem: Safety - Adult  Goal: Free from fall injury  11/5/2023 1758 by Leigh Wu, RN  Outcome: Progressing  11/5/2023 0503 by Bailee Beavers  Outcome: Progressing     Problem: Pain  Goal: Verbalizes/displays adequate comfort level or baseline comfort level  11/5/2023 1758 by Leigh Wu RN  Outcome: Progressing  11/5/2023 0503 by Bailee Beavers  Outcome: Progressing     Problem: Nutrition Deficit:  Goal: Optimize nutritional status  Outcome: Progressing

## 2023-11-06 ENCOUNTER — APPOINTMENT (OUTPATIENT)
Dept: ULTRASOUND IMAGING | Age: 28
End: 2023-11-06
Payer: MEDICARE

## 2023-11-06 VITALS
WEIGHT: 112.43 LBS | HEIGHT: 65 IN | DIASTOLIC BLOOD PRESSURE: 99 MMHG | TEMPERATURE: 98.4 F | SYSTOLIC BLOOD PRESSURE: 144 MMHG | BODY MASS INDEX: 18.73 KG/M2 | RESPIRATION RATE: 16 BRPM | HEART RATE: 76 BPM | OXYGEN SATURATION: 100 %

## 2023-11-06 LAB
ALBUMIN SERPL-MCNC: 2.4 G/DL (ref 3.5–5.2)
ALBUMIN/GLOB SERPL: 0.8 {RATIO} (ref 1–2.5)
ALP SERPL-CCNC: 70 U/L (ref 35–104)
ALT SERPL-CCNC: 9 U/L (ref 5–33)
ANION GAP SERPL CALCULATED.3IONS-SCNC: 6 MMOL/L (ref 9–17)
ANION GAP SERPL CALCULATED.3IONS-SCNC: 9 MMOL/L (ref 9–17)
AST SERPL-CCNC: 19 U/L
BILIRUB SERPL-MCNC: <0.1 MG/DL (ref 0.3–1.2)
BUN SERPL-MCNC: 7 MG/DL (ref 6–20)
BUN SERPL-MCNC: 8 MG/DL (ref 6–20)
CALCIUM SERPL-MCNC: 7.4 MG/DL (ref 8.6–10.4)
CALCIUM SERPL-MCNC: 8 MG/DL (ref 8.6–10.4)
CHLORIDE SERPL-SCNC: 109 MMOL/L (ref 98–107)
CHLORIDE SERPL-SCNC: 111 MMOL/L (ref 98–107)
CO2 SERPL-SCNC: 20 MMOL/L (ref 20–31)
CO2 SERPL-SCNC: 21 MMOL/L (ref 20–31)
CREAT SERPL-MCNC: 1.4 MG/DL (ref 0.5–0.9)
CREAT SERPL-MCNC: 1.5 MG/DL (ref 0.5–0.9)
ERYTHROCYTE [DISTWIDTH] IN BLOOD BY AUTOMATED COUNT: 24.6 % (ref 11.8–14.4)
GFR SERPL CREATININE-BSD FRML MDRD: 48 ML/MIN/1.73M2
GFR SERPL CREATININE-BSD FRML MDRD: 52 ML/MIN/1.73M2
GLUCOSE BLD-MCNC: 119 MG/DL (ref 65–105)
GLUCOSE BLD-MCNC: 134 MG/DL (ref 65–105)
GLUCOSE BLD-MCNC: 47 MG/DL (ref 65–105)
GLUCOSE BLD-MCNC: 66 MG/DL (ref 65–105)
GLUCOSE BLD-MCNC: 79 MG/DL (ref 65–105)
GLUCOSE SERPL-MCNC: 61 MG/DL (ref 70–99)
GLUCOSE SERPL-MCNC: 68 MG/DL (ref 70–99)
HCT VFR BLD AUTO: 23.5 % (ref 36.3–47.1)
HGB BLD-MCNC: 8.7 G/DL (ref 11.9–15.1)
MCH RBC QN AUTO: 31 PG (ref 25.2–33.5)
MCHC RBC AUTO-ENTMCNC: 37 G/DL (ref 28.4–34.8)
MCV RBC AUTO: 83.6 FL (ref 82.6–102.9)
NRBC BLD-RTO: 0.4 PER 100 WBC
PLATELET # BLD AUTO: 669 K/UL (ref 138–453)
PMV BLD AUTO: 11.4 FL (ref 8.1–13.5)
POTASSIUM SERPL-SCNC: 4.3 MMOL/L (ref 3.7–5.3)
POTASSIUM SERPL-SCNC: 4.6 MMOL/L (ref 3.7–5.3)
PROT SERPL-MCNC: 5.4 G/DL (ref 6.4–8.3)
RBC # BLD AUTO: 2.81 M/UL (ref 3.95–5.11)
SODIUM SERPL-SCNC: 138 MMOL/L (ref 135–144)
SODIUM SERPL-SCNC: 138 MMOL/L (ref 135–144)
WBC OTHER # BLD: 7.2 K/UL (ref 3.5–11.3)

## 2023-11-06 PROCEDURE — 85027 COMPLETE CBC AUTOMATED: CPT

## 2023-11-06 PROCEDURE — 80053 COMPREHEN METABOLIC PANEL: CPT

## 2023-11-06 PROCEDURE — 36415 COLL VENOUS BLD VENIPUNCTURE: CPT

## 2023-11-06 PROCEDURE — 6370000000 HC RX 637 (ALT 250 FOR IP): Performed by: INTERNAL MEDICINE

## 2023-11-06 PROCEDURE — 82947 ASSAY GLUCOSE BLOOD QUANT: CPT

## 2023-11-06 PROCEDURE — 99232 SBSQ HOSP IP/OBS MODERATE 35: CPT | Performed by: NURSE PRACTITIONER

## 2023-11-06 PROCEDURE — 76770 US EXAM ABDO BACK WALL COMP: CPT

## 2023-11-06 PROCEDURE — 6370000000 HC RX 637 (ALT 250 FOR IP): Performed by: NURSE PRACTITIONER

## 2023-11-06 PROCEDURE — 80048 BASIC METABOLIC PNL TOTAL CA: CPT

## 2023-11-06 PROCEDURE — 6360000002 HC RX W HCPCS: Performed by: NURSE PRACTITIONER

## 2023-11-06 PROCEDURE — 6360000002 HC RX W HCPCS: Performed by: HOSPITALIST

## 2023-11-06 RX ADMIN — ENOXAPARIN SODIUM 40 MG: 100 INJECTION SUBCUTANEOUS at 11:20

## 2023-11-06 RX ADMIN — CHLORHEXIDINE GLUCONATE 15 ML: 1.2 SOLUTION ORAL at 11:20

## 2023-11-06 RX ADMIN — Medication 16 G: at 11:19

## 2023-11-06 RX ADMIN — PANTOPRAZOLE SODIUM 40 MG: 40 TABLET, DELAYED RELEASE ORAL at 06:22

## 2023-11-06 RX ADMIN — Medication 1000 MG: at 14:34

## 2023-11-06 ASSESSMENT — PAIN SCALES - GENERAL: PAINLEVEL_OUTOF10: 7

## 2023-11-06 NOTE — PROGRESS NOTES
[facility]  Office: [phone]  Asha Philippe, DO, Mary Clark Blood, DO, Geeta Bush MD, Bob Hopper MD, Sameer Clark MD, Missy Gutiérrez MD,  Cyndie Miller MD, Saw Eason MD, Alejandro Morrow MD,  Orlando Mendoza MD, Ileana Moore MD, Zulema Hodgson, DO, Rick Man MD,  Janett Amato DO, Jose Rfaael Stapleton MD, Marline Marin MD, Natalee lAlison MD, Royce Barros MD,  Elham Kaur MD, Lanie Parham MD, Rolanda Olivo MD, Abdi Zapata MD, Saw Martinez MD, Neeta Uribe DO, Delcia Epley, DO, Amina Mcpherson MD,  Clau Schmidt MD, Virgie Coe, Hayden March, CNP, Pat Schreiber, CNP,  Kwaku Dockery, [facility], Marco A Pearson, CNP, Haroldo Childers, CNP, Megan Kaufman, CNP, Babatunde Ko, CNP, Gisselle Ye, CNP, Oleg Luz, CNP, Huber Agneles, CNS, Ketan Harrison, CNP, Tere Kent, [address] [place] [address] Dexter Todd    Progress Note    11/6/2023    8:56 AM    Name:   Rachel Shetty  MRN:     [id number]     [address]:      [de-identified]   Room:   [address] [place] [address] Day:  2  Admit Date:  11/4/2023  8:28 PM    PCP:   Maru Landeros MD  Code Status:  Full Code    Subjective:     C/C:   Chief Complaint   Patient presents with    Emesis    Abdominal Pain     Interval History Status: improved. Patient seen and evaluated in room resting in bed. States nausea and emesis improving. Wants to know she can be discharged later today. IV fluids continue. Renal function essentially unchanged since admission    Brief History:     Patient has been struggling intermittently with a left posterior molar dental infection for the past 3 to 4 weeks with poor p.o. intake with increasing jaw pain status post cracked tooth years ago. She has noted increasing pain over the past 3 to 4 weeks.   Previously attempted to see dentist but was dissatisfied with care and left 3.1*  --   --  2.3*  --   --   --   --  2.4*  --    LABA1C  --   --  5.9  --   --   --   --   --   --   --    AST 46*  --   --  12  --   --   --   --  19  --    ALT 15  --   --  7  --   --   --   --  9  --    ALKPHOS 87  --   --  65  --   --   --   --  70  --    BILITOT 0.4  --   --  0.2*  --   --   --   --  <0.1*  --    LIPASE 30  --   --   --   --   --   --   --   --   --    POCGLU  --  184*  --   --  85 101 97 169*  --  79     ABG:  Lab Results   Component Value Date/Time    FIO2 INFORMATION NOT PROVIDED 11/04/2023 09:03 PM     Lab Results   Component Value Date/Time    SPECIAL NOT REPORTED 02/21/2021 12:11 PM     Lab Results   Component Value Date/Time    CULTURE DUPLICATE ORDER 25/33/1297 04:57 PM       Radiology:  No results found. Physical Examination:        General appearance:  alert, cooperative and no distress  Mental Status:  oriented to person, place and time and normal affect  Lungs:  clear to auscultation bilaterally, normal effort  Heart:  regular rate and rhythm, no murmur  Abdomen:  soft, nontender, nondistended, normal bowel sounds, no masses, hepatomegaly, splenomegaly  Extremities:  no edema, redness, tenderness in the calves  Skin:  no gross lesions, rashes, induration    Assessment:        Hospital Problems             Last Modified POA    * (Principal) KAIT (acute kidney injury) ([address] ) 11/4/2023 Yes    Type 1 diabetes mellitus with diabetic polyneuropathy ([address]) 11/5/2023 Yes    Chronic gastritis without bleeding 11/5/2023 Yes    Marijuana use, continuous 11/5/2023 Yes    Dental infection 11/5/2023 Yes    Bilious vomiting with nausea 11/5/2023 Yes    Tobacco abuse 11/5/2023 Yes       Plan:        Acute kidney injury: Due to poor p.o. intake in the setting of dental infection. Continue IV fluids. Check renal ultrasound.   Will recheck BMP later today and if creatinine is downtrending patient can be discharged with follow-up with her PCP as outpatient  DMT 1: With fluctuating blood glucose

## 2023-11-06 NOTE — PROGRESS NOTES
Writer gave discharge instruction and removed pt IV and telemetry. Pt said pt feels shaky and writer checked pt glucose and it was 66. Writer told to pt that writer will recheck sugar and don't leave the unit.

## 2023-11-06 NOTE — PLAN OF CARE
Problem: Chronic Conditions and Co-morbidities  Goal: Patient's chronic conditions and co-morbidity symptoms are monitored and maintained or improved  11/6/2023 1617 by Jovon Wu RN  Outcome: Completed  11/6/2023 0414 by Yoon Mejia  Outcome: Progressing     Problem: Safety - Adult  Goal: Free from fall injury  11/6/2023 1617 by Jovon Wu RN  Outcome: Completed  11/6/2023 0414 by Yoon Mejia  Outcome: Progressing     Problem: Pain  Goal: Verbalizes/displays adequate comfort level or baseline comfort level  11/6/2023 1617 by Jovon Wu RN  Outcome: Completed  11/6/2023 0414 by Yoon Mejia  Outcome: Progressing     Problem: Nutrition Deficit:  Goal: Optimize nutritional status  11/6/2023 1617 by Jovon Wu RN  Outcome: Completed  11/6/2023 0414 by Yoon Mejia  Outcome: Progressing

## 2023-11-06 NOTE — PROGRESS NOTES
While writer was in other pt room pt left the unit with her belongings. Writer notified to ROMANA Lawton.

## 2023-11-06 NOTE — PLAN OF CARE
Problem: Chronic Conditions and Co-morbidities  Goal: Patient's chronic conditions and co-morbidity symptoms are monitored and maintained or improved  11/6/2023 0414 by Josette Crockett  Outcome: Progressing  11/5/2023 1758 by Edel Wu RN  Outcome: Progressing     Problem: Safety - Adult  Goal: Free from fall injury  11/6/2023 0414 by Josette Crockett  Outcome: Progressing  11/5/2023 1758 by Edel Wu RN  Outcome: Progressing     Problem: Pain  Goal: Verbalizes/displays adequate comfort level or baseline comfort level  11/6/2023 0414 by Josette Crockett  Outcome: Progressing  11/5/2023 1758 by Edel Wu RN  Outcome: Progressing     Problem: Nutrition Deficit:  Goal: Optimize nutritional status  11/6/2023 0414 by Josette Crockett  Outcome: Progressing  11/5/2023 1758 by Edel Wu RN  Outcome: Progressing

## 2023-11-06 NOTE — PROGRESS NOTES
Physician Progress Note      PATIENT:               Espinoza Cotter  CSN #:                  [id number]  :                       1995  ADMIT DATE:       2023 8:28 PM  [address] [facility] DATE:  RESPONDING  PROVIDER #:        Haim Leyva NP          QUERY TEXT:    Patient admitted with KAIT. Noted to have dietician assessment with   malnutrition diagnosis in  note. If possible, please document in progress   notes and discharge summary if you are evaluating and /or treating any of the   following: The medical record reflects the following:  Risk Factors: c/o nausea, vomiting, and abdominal pain, worsening jaw pain   with dental infection  Clinical Indicators: per dietician assessment, meets criteria for moderate   malnutrition, pt states has been unable to tolerate oral intakes, tried a bite   of her lunch and then had an episode of vomiting, intake 50% or less of   estimated energy requirements for 5 or more days, Mild body fat loss Orbital,   Triceps, Mild muscle mass loss Clavicles (pectoralis & deltoids), BMI 18.71  Treatment: dietician consult, Zofran, providing clear liquid ONS while having   nausea/vomiting, monitor weight, intake, skin and fluid status    ASPEN Criteria:    https://aspenjournals. onlinelibrary. garcia. com/doi/full/10.1177/871386228316905  5    Thank you, Jesus Dougherty, [address] [place] [address]  [email or web address]  office hours [phone]  Options provided:  -- Protein calorie malnutrition moderate  -- Other - I will add my own diagnosis  -- Disagree - Not applicable / Not valid  -- Disagree - Clinically unable to determine / Unknown  -- Refer to Clinical Documentation Reviewer    PROVIDER RESPONSE TEXT:    This patient has moderate protein calorie malnutrition.     Query created by: Gómez Saab on 2023 12:47 PM      Electronically signed by:  Pratibha Leyva NP 2023 1:02 PM

## 2023-11-07 ENCOUNTER — CARE COORDINATION (OUTPATIENT)
Dept: CASE MANAGEMENT | Age: 28
End: 2023-11-07

## 2023-11-07 DIAGNOSIS — N17.9 AKI (ACUTE KIDNEY INJURY) (HCC): Primary | ICD-10-CM

## 2023-11-07 LAB
MICROORGANISM SPEC CULT: ABNORMAL
SPECIMEN DESCRIPTION: ABNORMAL

## 2023-11-07 PROCEDURE — 1111F DSCHRG MED/CURRENT MED MERGE: CPT | Performed by: INTERNAL MEDICINE

## 2023-11-07 NOTE — DISCHARGE SUMMARY
[facility]  Office: [id number], DO, [place], DO, Dorita Dross, DO, Niki Ramos Blood, DO, Mayur Mendenhall MD, Parker Melgoza MD, Carlos Holman MD, Sherin Morris MD,  Selene Ruiz MD, Thanh Sanders MD, Ruby Oviedo MD,  Bernarda Whitlock, DO, Leandro Bruner MD, Calos Mario MD, Sara Bonner, DO, Cayetano Steel MD,  Bernarda Rdz, DO, Júnior Barboza MD, Emily Mcdonnell MD, Narayan Hidalgo MD, Annalise Khan MD,  Luís Germain MD, Radha Cintron MD, Siddhartha Lucas MD, Kat Salazar MD, Marcie March MD, Jake Scott, DO, Trupti Cook, DO, Claudia Walker MD,  Angella Wilson MD, Maya Brooks, CNP,  Malcom Heimlich, CNP, Shannen Arreola, CNP,  Melissa Lainez, IAN, Alberto Everett, CNP, Melba Ortiz, CNP, Marcia Luo, CNP, Jermaine Ruiz, CNP, Isidro Ponce, CNP, Conrado Epps, CNP, Serg Borges, CNS, Liz Chapa, [facility], Cyndie Small, [address]    Discharge Summary     Patient ID: Brigido Zabala  :  1995   MRN: [id number]     ACCOUNT:  [de-identified]   Patient's PCP: Esme Moody MD  Admit Date: 2023   Discharge Date: 23  Length of Stay: 2  Code Status:  Prior  Admitting Physician: Selene Ruiz MD  Discharge Physician: ELISE Guevara NP     Active Discharge Diagnoses:     Hospital Problem Lists:  Principal Problem:    KAIT (acute kidney injury) [facility])  Active Problems:    Type 1 diabetes mellitus with diabetic polyneuropathy ([address] [facility])    Chronic gastritis without bleeding    Marijuana use, continuous    Dental infection    Bilious vomiting with nausea    Tobacco abuse  Resolved Problems:    * No resolved hospital problems.  *      Admission Condition:  good     Discharged Condition: stable    Hospital Stay:     Hospital Course:  Brigido Zabala is a 32 y.o. female who was admitted for the management of   KAIT (acute kidney injury) ([address]) , presented 11/06/2023 02:04 PM    BUNCRER NOT REPORTED 05/24/2021 12:02 PM    CALCIUM 8.0 11/06/2023 02:04 PM    LABGLOM 52 11/06/2023 02:04 PM    GFRAA >60 07/19/2022 12:00 PM    GFR      07/19/2022 12:00 PM     HFP:    Lab Results   Component Value Date/Time    PROT 5.4 11/06/2023 05:49 AM     CMP:    Lab Results   Component Value Date/Time    GLUCOSE 61 11/06/2023 02:04 PM     11/06/2023 02:04 PM    K 4.3 11/06/2023 02:04 PM     11/06/2023 02:04 PM    CO2 20 11/06/2023 02:04 PM    BUN 7 11/06/2023 02:04 PM    CREATININE 1.4 11/06/2023 02:04 PM    ANIONGAP 9 11/06/2023 02:04 PM    ALKPHOS 70 11/06/2023 05:49 AM    ALT 9 11/06/2023 05:49 AM    AST 19 11/06/2023 05:49 AM    BILITOT <0.1 11/06/2023 05:49 AM    LABALBU 2.4 11/06/2023 05:49 AM    ALBUMIN 0.8 11/06/2023 05:49 AM    LABGLOM 52 11/06/2023 02:04 PM    GFRAA >60 07/19/2022 12:00 PM    GFR      07/19/2022 12:00 PM    PROT 5.4 11/06/2023 05:49 AM    CALCIUM 8.0 11/06/2023 02:04 PM     PT/INR:    Lab Results   Component Value Date/Time    PROTIME 13.9 11/05/2023 06:38 AM    INR 1.1 11/05/2023 06:38 AM     PTT: No results found for: \"APTT\"  FLP:    Lab Results   Component Value Date/Time    CHOL 149 08/17/2022 02:19 PM    TRIG 94 08/17/2022 02:19 PM    HDL 75 08/17/2022 02:19 PM     U/A:    Lab Results   Component Value Date/Time    COLORU Yellow 11/05/2023 02:32 AM    TURBIDITY Turbid 11/05/2023 02:32 AM    SPECGRAV 1.013 11/05/2023 02:32 AM    HGBUR SMALL 11/05/2023 02:32 AM    PHUR 7.0 11/05/2023 02:32 AM    PROTEINU 3+ 11/05/2023 02:32 AM    GLUCOSEU NEGATIVE 11/05/2023 02:32 AM    KETUA NEGATIVE 11/05/2023 02:32 AM    BILIRUBINUR NEGATIVE 11/05/2023 02:32 AM    UROBILINOGEN Normal 11/05/2023 02:32 AM    NITRU NEGATIVE 11/05/2023 02:32 AM    LEUKOCYTESUR LARGE 11/05/2023 02:32 AM     TSH:    Lab Results   Component Value Date/Time    TSH 1.25 07/19/2022 12:00 PM        Radiology:  US RENAL COMPLETE    Result Date: 11/6/2023  1. 1.6 cm peripelvic cyst at

## 2023-11-07 NOTE — CARE COORDINATION
Care Transitions Initial Follow Up Call    Call within 2 business days of discharge: Yes    Patient Current Location:  Home: [address] [place] [address]    LPN Care Coordinator contacted the patient by telephone to perform post hospital discharge assessment. Verified name and  with patient as identifiers. Provided introduction to self, and explanation of the LPN Care Coordinator role. Patient: Kirti Trujillo Patient : 1995   MRN: [id number]  Reason for Admission: Emesis Abdominal Pain   Discharge Date: 23 RARS: Readmission Risk Score: 15.5      Last Discharge Facility       Date Complaint Diagnosis Description Type Department Provider    23 Emesis; Abdominal Pain KAIT (acute kidney injury) [facility]) ED to Hosp-Admission (Discharged) (ADMITTED) [facility] 3C Edith Flor MD; Natalie Hurley. .. Was this an external facility discharge? No Discharge Facility: [facility]    Challenges to be reviewed by the provider   Additional needs identified to be addressed with provider: No  none               Method of communication with provider: none. Transitions of Care Initial Call:  Explained the role of Care Transition Nurse and the Transition program, patient is agreeable to follow up calls Post discharge from the [facility] to [place] today she reports she is feeling better than she was. She denies HA dizziness sob vomiting nausea . She reports she has just a little abdominal pain today. It is not nothing I can't handle . Writer asked what type of foods she eat. She states potatoes it all I have ate my entire life since I was a baby. Writer will send referral to dietician. She is voiding okay and moving her bowels. Writer discussed s/s of dehydration. Reminded to hydrate and drink supplements. Writer discussed with patient that she should be testing BS 4 times daily. She will take her script to get a glucometer today.      Discussed HFU writer called PCP office to see if office visit

## 2023-11-08 ENCOUNTER — HOSPITAL ENCOUNTER (OUTPATIENT)
Age: 28
Setting detail: SPECIMEN
Discharge: HOME | End: 2023-11-08
Payer: MEDICARE

## 2023-11-08 ENCOUNTER — TELEPHONE (OUTPATIENT)
Dept: ONCOLOGY | Age: 28
End: 2023-11-08

## 2023-11-08 ENCOUNTER — OFFICE VISIT (OUTPATIENT)
Dept: ONCOLOGY | Age: 28
End: 2023-11-08
Payer: MEDICARE

## 2023-11-08 ENCOUNTER — CARE COORDINATION (OUTPATIENT)
Dept: CARE COORDINATION | Age: 28
End: 2023-11-08

## 2023-11-08 VITALS
WEIGHT: 113.5 LBS | TEMPERATURE: 97.5 F | RESPIRATION RATE: 16 BRPM | HEART RATE: 70 BPM | BODY MASS INDEX: 18.89 KG/M2 | DIASTOLIC BLOOD PRESSURE: 103 MMHG | SYSTOLIC BLOOD PRESSURE: 178 MMHG

## 2023-11-08 DIAGNOSIS — D50.0 IRON DEFICIENCY ANEMIA DUE TO CHRONIC BLOOD LOSS: ICD-10-CM

## 2023-11-08 DIAGNOSIS — D50.0 IRON DEFICIENCY ANEMIA DUE TO CHRONIC BLOOD LOSS: Primary | ICD-10-CM

## 2023-11-08 DIAGNOSIS — D64.9 ANEMIA, UNSPECIFIED TYPE: Primary | ICD-10-CM

## 2023-11-08 LAB
BASOPHILS # BLD: 0.05 K/UL (ref 0–0.2)
BASOPHILS NFR BLD: 1 % (ref 0–2)
EOSINOPHIL # BLD: 0.3 K/UL (ref 0–0.44)
EOSINOPHILS RELATIVE PERCENT: 5 % (ref 1–4)
ERYTHROCYTE [DISTWIDTH] IN BLOOD BY AUTOMATED COUNT: 23.1 % (ref 11.8–14.4)
FERRITIN SERPL-MCNC: 230 NG/ML (ref 13–150)
HCT VFR BLD AUTO: 33.4 % (ref 36.3–47.1)
HGB BLD-MCNC: 10.9 G/DL (ref 11.9–15.1)
IMM GRANULOCYTES # BLD AUTO: 0.01 K/UL (ref 0–0.3)
IMM GRANULOCYTES NFR BLD: 0 %
IRON SATN MFR SERPL: 35 % (ref 20–55)
IRON SERPL-MCNC: 52 UG/DL (ref 37–145)
LYMPHOCYTES NFR BLD: 2.2 K/UL (ref 1.1–3.7)
LYMPHOCYTES RELATIVE PERCENT: 36 % (ref 24–43)
MCH RBC QN AUTO: 28.5 PG (ref 25.2–33.5)
MCHC RBC AUTO-ENTMCNC: 32.6 G/DL (ref 28.4–34.8)
MCV RBC AUTO: 87.2 FL (ref 82.6–102.9)
MONOCYTES NFR BLD: 0.38 K/UL (ref 0.1–1.2)
MONOCYTES NFR BLD: 6 % (ref 3–12)
NEUTROPHILS NFR BLD: 51 % (ref 36–65)
NEUTS SEG NFR BLD: 3.12 K/UL (ref 1.5–8.1)
NRBC BLD-RTO: 0 PER 100 WBC
PLATELET # BLD AUTO: 352 K/UL (ref 138–453)
PMV BLD AUTO: 9.4 FL (ref 8.1–13.5)
RBC # BLD AUTO: 3.83 M/UL (ref 3.95–5.11)
RBC # BLD: ABNORMAL 10*6/UL
TIBC SERPL-MCNC: 147 UG/DL (ref 250–450)
UNSATURATED IRON BINDING CAPACITY: 95 UG/DL (ref 112–347)
WBC OTHER # BLD: 6.1 K/UL (ref 3.5–11.3)

## 2023-11-08 PROCEDURE — 4004F PT TOBACCO SCREEN RCVD TLK: CPT | Performed by: INTERNAL MEDICINE

## 2023-11-08 PROCEDURE — 85025 COMPLETE CBC W/AUTO DIFF WBC: CPT

## 2023-11-08 PROCEDURE — 1111F DSCHRG MED/CURRENT MED MERGE: CPT | Performed by: INTERNAL MEDICINE

## 2023-11-08 PROCEDURE — G8420 CALC BMI NORM PARAMETERS: HCPCS | Performed by: INTERNAL MEDICINE

## 2023-11-08 PROCEDURE — 99214 OFFICE O/P EST MOD 30 MIN: CPT | Performed by: INTERNAL MEDICINE

## 2023-11-08 PROCEDURE — 99211 OFF/OP EST MAY X REQ PHY/QHP: CPT

## 2023-11-08 PROCEDURE — G8484 FLU IMMUNIZE NO ADMIN: HCPCS | Performed by: INTERNAL MEDICINE

## 2023-11-08 PROCEDURE — 83540 ASSAY OF IRON: CPT

## 2023-11-08 PROCEDURE — 82728 ASSAY OF FERRITIN: CPT

## 2023-11-08 PROCEDURE — G8428 CUR MEDS NOT DOCUMENT: HCPCS | Performed by: INTERNAL MEDICINE

## 2023-11-08 PROCEDURE — 83550 IRON BINDING TEST: CPT

## 2023-11-08 PROCEDURE — 36415 COLL VENOUS BLD VENIPUNCTURE: CPT

## 2023-11-08 NOTE — CARE COORDINATION
Referral from CTN, Allyssa Denis LPN-  Hi, spoke to patient today. She reports the only thing she eat is mash potatoes since birth. Wanted to see if you could assist her. She is open to referral. She does supplements already. Thank you. Nery Denis LPN     Spoke with patient who relays she is at the [address] [place] [address] office now. Will attempt to reach patient later today. ABRAHAN Everett    11/9/23- Patient saw Oncology yesterday encounter reviewed-  Patient has history of significant malnutrition and has lost more than 50 pounds over past 2 years. She is very thinly built and cachectic. She has been following with gastroenterologist and has been diagnosed with malnutrition and H. pylori gastritis. She is taking Ensure 3 times daily. Call to patient, [facility] with contact information requesting a return call.   ABRAHAN Everett

## 2023-11-08 NOTE — PROGRESS NOTES
due to chronic blood loss 2023    Kidney infection     MRSA (methicillin resistant staph aureus) culture positive 2016    abdomen    Nausea & vomiting     Ovarian cyst 2019    Right side    Secondary osteoporosis     Type 1 diabetes mellitus (HCC)     UTI (urinary tract infection)        Past Surgical History:   Procedure Laterality Date     SECTION      UPPER GASTROINTESTINAL ENDOSCOPY N/A 2021    EGD BIOPSY performed by Juliet Gurrola MD at [address] [place] [address] Dr   Allergen Reactions    Blueberry [Vaccinium Angustifolium] Anaphylaxis     Tongue swells up, needs epipen    Shrimp Flavor Anaphylaxis     OK with fish, allergic to shrimp and lobster  OK with IV contrast       Current Outpatient Medications   Medication Sig Dispense Refill    amoxicillin-clavulanate (AUGMENTIN) 875-125 MG per tablet Take 1 tablet by mouth 2 times daily for 7 days 14 tablet 0    benzocaine (ORAJEL) 20 % GEL mucosal gel Take 1 Application by mouth 2 times daily as needed for Pain 14 g 0    ferrous sulfate (IRON 325) 325 (65 Fe) MG tablet Take 1 tablet by mouth daily (with breakfast) 60 tablet 1    LANTUS SOLOSTAR 100 UNIT/ML injection pen INJECT 10 UNITS INTO THE SKIN NIGHTLY (DISCARD PEN 28 DAYS AFTER OPENING) 15 mL 3    insulin aspart (NOVOLOG FLEXPEN) 100 UNIT/ML injection pen INJECT 2 TO 12 UNITS SUBCUTANEOUSLY THREE TIMES DAILY PER SLIDING SCALE (DISCARD PEN 28 DAYS AFTER OPENING) 45 mL 5    tiZANidine (ZANAFLEX) 4 MG tablet Take 1 tablet by mouth every 8 hours as needed (back pain) 30 tablet 0    ketoconazole (NIZORAL) 2 % cream APPLY TOPICALLY DAILY 60 g 1    Insulin Pen Needle (DROPLET PEN NEEDLES) 31G X 8 MM MISC USE FOUR TIMES DAILY AS DIRECTED 400 each 1    blood glucose test strips (TRUE METRIX BLOOD GLUCOSE TEST) strip CHECK BLOOD SUGAR FOUR TIMES DAILY AS DIRECTED 400 strip 1    rOPINIRole (REQUIP) 0.25 MG tablet TAKE 1 TABLET EVERY NIGHT 90 tablet 1    TRUEplus Lancets 33G MISC TEST BLOOD SUGAR

## 2023-11-08 NOTE — TELEPHONE ENCOUNTER
Jolynn Doyle MD VISIT  RV 2 MTHS W/ LABS  LABS CDP FERRITIN FE TIBC ORDERS GIVEN TO PT TO BE DONE 12/22/23  MD VISIT 1/3/24 @ 3PM  AVS PRINTED W/ INSTRUCTIONS AND GIVEN TO PT ON EXIT

## 2023-11-09 NOTE — CARE COORDINATION
Registered Dietitian Initial Assessment for [address] [employer]  November 9, 2023    Initial Referral Reason: severe protein calorie malnutrition    Patient Care Team:  Steven Branch MD as PCP - General (Internal Medicine)  Steven Branch MD as PCP - Empaneled Provider  Antolin Xie MD as Consulting Physician (Gastroenterology)  Clarissa Rodney RN as Care Transitions Nurse  Osmani Marie RD, LD as Dietitian    Patient Active Problem List   Diagnosis    Type 1 diabetes mellitus with diabetic polyneuropathy ([address])    Intractable vomiting with nausea    Intractable abdominal pain    Microalbuminuria due to type 1 diabetes mellitus ([address])    Generalized abdominal pain    Restless leg syndrome    Protein-calorie malnutrition, severe ([address])    Marijuana use, continuous    Onychomycosis    Cachexia ([address])    Oligomenorrhea    Positive Tpal    Left breast mass    Latent syphilis    Premenopausal idiopathic osteoporosis    Osteoporosis    Closed nondisplaced fracture of fifth metacarpal bone of left hand with routine healing    Chronic gastritis without bleeding    Acute pain of left wrist    Iron deficiency anemia due to chronic blood loss    Iron malabsorption    KAIT (acute kidney injury) ([address])    Dental infection    Bilious vomiting with nausea    Tobacco abuse       Current Outpatient Medications   Medication Sig Dispense Refill    amoxicillin-clavulanate (AUGMENTIN) 875-125 MG per tablet Take 1 tablet by mouth 2 times daily for 7 days 14 tablet 0    benzocaine (ORAJEL) 20 % GEL mucosal gel Take 1 Application by mouth 2 times daily as needed for Pain 14 g 0    ferrous sulfate (IRON 325) 325 (65 Fe) MG tablet Take 1 tablet by mouth daily (with breakfast) 60 tablet 1    LANTUS SOLOSTAR 100 UNIT/ML injection pen INJECT 10 UNITS INTO THE SKIN NIGHTLY (DISCARD PEN 28 DAYS AFTER OPENING) 15 mL 3    insulin aspart (NOVOLOG FLEXPEN) 100 UNIT/ML injection pen INJECT 2 TO 12 UNITS

## 2023-11-10 ENCOUNTER — CARE COORDINATION (OUTPATIENT)
Dept: CARE COORDINATION | Age: 28
End: 2023-11-10

## 2023-11-10 NOTE — CARE COORDINATION
received referral from [employer], licensed dietician.  attempted to contact Carmelita Koch.  left message with name and number.  requested a call back to [phone].     Plan:    will follow up with Carmelita Koch in 3-4 days regarding  referral.

## 2023-11-13 ENCOUNTER — CARE COORDINATION (OUTPATIENT)
Dept: CASE MANAGEMENT | Age: 28
End: 2023-11-13

## 2023-11-13 NOTE — CARE COORDINATION
Care Transitions Follow Up Call    Patient Current Location:  Home: [address] [facility]    Care Transition Nurse contacted the patient by telephone to follow up after admission on . Verified name and  with patient as identifiers. Patient: Rakan Angle  Patient : 1995   MRN: [id number]  Reason for Admission: KAIT  Discharge Date: 23 RARS: Readmission Risk Score: 15.5      Needs to be reviewed by the provider   Additional needs identified to be addressed with provider: No  none             Method of communication with provider: none. Spoke with Evelyn Pozo today for follow up. She states she is feeling ok today, denies any abdominal pain, n/v or other symptoms. She had her follow up with oncology last week, she is to return in 6 weeks and have repeat blood work done again. Patient was taken off the iron at discharge. She said she has increased her food intake some this past week, has not had any episodes of n/v or other GI issues. Patient has lost approx 50 lbs in past 2 years and has severe malnutrition. She is doing nutritional supplements she states as well. She continues to have dental pain from cracked molar. She was placed on Augmentin which she is almost finished with. She needs to follow up with a dentist however will not go back to the last one she saw (on [facility] states they did not get her completely numb so she left. Expressed I would ask the SW to see about suggesting or helping find local dentist for her bad molar. Patient denies any new needs or concerns at this time. Expressed I would reach back out to Laurie our SW to let her know about need for dentist.  Per notes, Laurie did reach out to patient but did not reach. Patient said she returned call but has not heard back. Will route to Laurie. Addressed changes since last contact:  none  Discussed follow-up appointments.  If no appointment was previously scheduled, appointment scheduling offered:

## 2023-11-15 ENCOUNTER — CARE COORDINATION (OUTPATIENT)
Dept: CARE COORDINATION | Age: 28
End: 2023-11-15

## 2023-11-15 NOTE — CARE COORDINATION
called and spoke to Kaylee. Kaylee reports that she is in pain from one of her teeth cracking.  confirmed with her co-worker Marvin Galdamez that you Kaylee can go to [address] [place] [address] and utilize their ED for dental.       contact Kaylee and provided her with [employer] and contact information. Kaylee reports that she will find a ride to take her. Plan:    will follow up with Kaylee regarding food resources.

## 2023-11-20 ENCOUNTER — CARE COORDINATION (OUTPATIENT)
Dept: CARE COORDINATION | Age: 28
End: 2023-11-20

## 2023-11-20 ENCOUNTER — CARE COORDINATION (OUTPATIENT)
Dept: CASE MANAGEMENT | Age: 28
End: 2023-11-20

## 2023-11-20 NOTE — CARE COORDINATION
Care Transitions Follow Up Call    Patient Current Location:  Home: [address] [place] [address] [place] contacted the patient by telephone to follow up after admission on . Verified name and  with patient as identifiers. Patient: Elna Bamberger  Patient : 1995   MRN: [id number]  Reason for Admission: KAIT  Discharge Date: 23 RARS: Readmission Risk Score: 15.5      Needs to be reviewed by the provider   Additional needs identified to be addressed with provider: No  none             Method of communication with provider: none. Writer spoke to Reji for a sub call. She reports she is doing okay. She states her appetite is back to normal. She is drinking nutritional shakes. She denies N/V/D or abdominal pain. She said that she has a little bit of tooth pain. She denies fever or chills. She has a appointment in January but unsure of the date. She voices no other questions or concerns at this time. Addressed changes since last contact:   Dental appointment, appetite shakes   Discussed follow-up appointments. If no appointment was previously scheduled, appointment scheduling offered: Yes. Is follow up appointment scheduled within 7 days of discharge? Yes. Follow Up  Future Appointments   Date Time Provider [address]   2023 10:45 AM Alexsander Mauricio MD [facility]   1/3/2024  3:00 PM Quinn Cazares [facility]  Coordinator reviewed discharge instructions, medical action plan, and red flags with patient and discussed any barriers to care and/or understanding of plan of care after discharge. Discussed appropriate site of care based on symptoms and resources available to patient including: PCP  Specialist  When to call [address] The patient agrees to contact the PCP office for questions related to their healthcare. Advance Care Planning:   not on file; education provided.      Patients top risk

## 2023-11-21 ENCOUNTER — OFFICE VISIT (OUTPATIENT)
Dept: FAMILY MEDICINE CLINIC | Age: 28
End: 2023-11-21
Payer: MEDICARE

## 2023-11-21 VITALS
BODY MASS INDEX: 18.47 KG/M2 | TEMPERATURE: 97.4 F | OXYGEN SATURATION: 100 % | WEIGHT: 111 LBS | DIASTOLIC BLOOD PRESSURE: 84 MMHG | HEART RATE: 81 BPM | SYSTOLIC BLOOD PRESSURE: 140 MMHG

## 2023-11-21 DIAGNOSIS — N92.6 IRREGULAR MENSES: Primary | ICD-10-CM

## 2023-11-21 DIAGNOSIS — Z09 HOSPITAL DISCHARGE FOLLOW-UP: ICD-10-CM

## 2023-11-21 DIAGNOSIS — G43.911 INTRACTABLE MIGRAINE WITH STATUS MIGRAINOSUS, UNSPECIFIED MIGRAINE TYPE: ICD-10-CM

## 2023-11-21 DIAGNOSIS — Z13.220 SCREENING, LIPID: ICD-10-CM

## 2023-11-21 DIAGNOSIS — J30.1 SEASONAL ALLERGIC RHINITIS DUE TO POLLEN: ICD-10-CM

## 2023-11-21 DIAGNOSIS — E10.42 TYPE 1 DIABETES MELLITUS WITH DIABETIC POLYNEUROPATHY (HCC): ICD-10-CM

## 2023-11-21 DIAGNOSIS — I10 UNCONTROLLED HYPERTENSION: ICD-10-CM

## 2023-11-21 PROCEDURE — 3078F DIAST BP <80 MM HG: CPT | Performed by: INTERNAL MEDICINE

## 2023-11-21 PROCEDURE — 81025 URINE PREGNANCY TEST: CPT | Performed by: INTERNAL MEDICINE

## 2023-11-21 PROCEDURE — 1111F DSCHRG MED/CURRENT MED MERGE: CPT | Performed by: INTERNAL MEDICINE

## 2023-11-21 PROCEDURE — 3044F HG A1C LEVEL LT 7.0%: CPT | Performed by: INTERNAL MEDICINE

## 2023-11-21 PROCEDURE — G8427 DOCREV CUR MEDS BY ELIG CLIN: HCPCS | Performed by: INTERNAL MEDICINE

## 2023-11-21 PROCEDURE — 2022F DILAT RTA XM EVC RTNOPTHY: CPT | Performed by: INTERNAL MEDICINE

## 2023-11-21 PROCEDURE — 99214 OFFICE O/P EST MOD 30 MIN: CPT | Performed by: INTERNAL MEDICINE

## 2023-11-21 PROCEDURE — G8419 CALC BMI OUT NRM PARAM NOF/U: HCPCS | Performed by: INTERNAL MEDICINE

## 2023-11-21 PROCEDURE — G8484 FLU IMMUNIZE NO ADMIN: HCPCS | Performed by: INTERNAL MEDICINE

## 2023-11-21 PROCEDURE — 4004F PT TOBACCO SCREEN RCVD TLK: CPT | Performed by: INTERNAL MEDICINE

## 2023-11-21 PROCEDURE — 96372 THER/PROPH/DIAG INJ SC/IM: CPT | Performed by: INTERNAL MEDICINE

## 2023-11-21 PROCEDURE — 3074F SYST BP LT 130 MM HG: CPT | Performed by: INTERNAL MEDICINE

## 2023-11-21 RX ORDER — PROMETHAZINE HYDROCHLORIDE 25 MG/ML
6.25 INJECTION, SOLUTION INTRAMUSCULAR; INTRAVENOUS ONCE
Status: COMPLETED | OUTPATIENT
Start: 2023-11-21 | End: 2023-11-21

## 2023-11-21 RX ORDER — NIFEDIPINE 30 MG/1
30 TABLET, EXTENDED RELEASE ORAL DAILY
Qty: 90 TABLET | Refills: 1 | Status: SHIPPED | OUTPATIENT
Start: 2023-11-21

## 2023-11-21 RX ORDER — KETOROLAC TROMETHAMINE 30 MG/ML
15 INJECTION, SOLUTION INTRAMUSCULAR; INTRAVENOUS ONCE
Status: COMPLETED | OUTPATIENT
Start: 2023-11-21 | End: 2023-11-21

## 2023-11-21 RX ORDER — LORATADINE 10 MG/1
10 TABLET ORAL DAILY
Qty: 30 TABLET | Refills: 3 | Status: SHIPPED | OUTPATIENT
Start: 2023-11-21

## 2023-11-21 RX ORDER — RIZATRIPTAN BENZOATE 10 MG/1
10 TABLET ORAL DAILY PRN
Qty: 9 TABLET | Refills: 5 | Status: SHIPPED | OUTPATIENT
Start: 2023-11-21

## 2023-11-21 RX ADMIN — PROMETHAZINE HYDROCHLORIDE 6.25 MG: 25 INJECTION, SOLUTION INTRAMUSCULAR; INTRAVENOUS at 12:19

## 2023-11-21 RX ADMIN — KETOROLAC TROMETHAMINE 15 MG: 30 INJECTION, SOLUTION INTRAMUSCULAR; INTRAVENOUS at 12:21

## 2023-11-21 NOTE — PROGRESS NOTES
oriented to person, place, and time. Data Review     Urine hcg negative   ER notes, testing reviewed in chart     Health Maintenance Due   Topic Date Due    Diabetic retinal exam  01/21/2020    Diabetic foot exam  03/23/2023    Lipids  08/17/2023           Patient given educational materials- see patient instructions. Discussed use, benefit, and side effects of prescribedmedications. All patient questions answered. Pt voiced understanding. Reviewedhealth maintenance. Instructed to continue current medications, diet and exercise. Patient agreed with treatment plan. Follow up as directed.      Electronically signedby Alexsander Mauricio MD on 11/21/2023

## 2023-11-21 NOTE — CARE COORDINATION
called and follow up with Megan Sesay. Megan Sesay reports that she did not go to [facility] ER to have her tooth looked at. Megan Sesay reports that she is unsure what she is going to do about her tooth.  inquired if her tooth still hurts? Tran confirmed her that it still hurts but not as bad as it was.  encouraged Tran to call her dentist office to schedule an appointment, contact [address] [place] [address], or go to [facility] ER.  spoke to Megan Sesay regarding food. Megan Sesay reports that she gets $100 in food stamps a month and then around $150 a month for supplements. Megan Sesay reports that she has been using some of that money for food since she has been short on money.  inquired if [facility] Megan Sesay confirms she still does.  informed Megan Sesay that she will mail her a list of a few local food pantries to use. Plan:   Megan Sesay will make dental appointment. Megan Sesay will utilize food pantries.

## 2023-11-22 NOTE — CARE COORDINATION
spoke with [employer] regarding food resources.  informed Kaylee that she will mail a list of local food pantries.  also inquired if Tran's daughter has a winter coat and boots and if not was the school assisting with those items? Kaylee reports that her daughter does need these items. Kaylee reports that she is unsure if the school will be helping with these items.  attempted to contact COLETTE Ramirez.  left message with MsStephanie Suzanne Serra .  requested a call back to [phone]. Plan:    will follow up with school regarding boots and coats.  will follow up with Kaylee regarding resources.

## 2023-11-27 ENCOUNTER — CARE COORDINATION (OUTPATIENT)
Dept: CARE COORDINATION | Age: 28
End: 2023-11-27

## 2023-11-28 ENCOUNTER — CARE COORDINATION (OUTPATIENT)
Dept: CARE COORDINATION | Age: 28
End: 2023-11-28

## 2023-11-28 ASSESSMENT — ENCOUNTER SYMPTOMS
CONSTIPATION: 0
WHEEZING: 0
CHEST TIGHTNESS: 0
ANAL BLEEDING: 0
SHORTNESS OF BREATH: 0
BLOOD IN STOOL: 0
ABDOMINAL PAIN: 0
COUGH: 0
DIARRHEA: 0
CHOKING: 0
NAUSEA: 1
VOMITING: 0

## 2023-11-28 ASSESSMENT — VISUAL ACUITY: OU: 1

## 2023-11-28 NOTE — CARE COORDINATION
Nutrition Care Coordinator Follow-Up visit:    Food Recall: 3 small meals plus Ensure Complete 3 times/day    Activity Level:  Sedentary:  Lightly Active:X  Moderately Active:  Very Active:    Adult BMI:  Underweight (below 18.5)X  Normal Weight (18.5-24.9)  Overweight (25-29. 9)  Obese (30-39. 9)  Morbidly Obese (>40)    Plan:  Plan was established with patient:  Small frequent meals: X  Ensure Complete 3X/day: X  Increase protein intake: X    Monitoring: Will monitor weight:X  Will monitor adherence to meal plan: Will monitor adherence to exercise plan: Will monitor HGA1c:X    Handouts Provided :  Low Carb/ high protein snacking:X  Carb counting /individual meal plan:X  Portion Control:  Food Labels:X  Physical Activity:  Low Fat/Cholesterol:  Hypo/Hyperglycemia:  Calorie Controlled Meal Plan:  High energy diabetic guidelines: X    Goals:  Patient goals set: 1. Patient skipping meals- eats twice a day normally. Reviewed working on eating more often small amounts every 2-3 hours 4-6 times/day. Reviewed set meal times daily and consistency of meal times and how this can effect her sugars. 2. Reviewed what foods contain carbohydrate, we discussed avoiding sugary foods but increasing carbs/protien at meals. Encouraged 60gms carb/meal 15-30gms/snack. Reviewed snacking- will send list of snacks ideas to to patient. 3. Reviewed plate method, encouraged patient to increase intake of protein at each meal/snack. Focused on increasing protein intake and discussed high energy/protein diabetic guidelines. Encouraged to make  every bite count by choosing foods that are high in energy and protein. For example, choose cream soup over broth, or canned fruit in juice over watermelon. Use drinks to add extra calories: choose milk, smoothies, juice, or milkshakes over water, tea, coffee, or diet sodas. Encouraged to include a serving of protein at each meal -meats, cheese, [place] yogurt, peanut butter, nuts/seeds, cereals-

## 2023-11-28 NOTE — CARE COORDINATION
spoke with Kaylee. Kaylee has not yet received list of food pantries  had mailed to her.  discussed resources for [employer] to assist with [place]. Social work attempted to contact Ms. Jason Rhoades regarding assisting Kaylee with a coat/boots.  contacted School and left another message with Ms. Jason Rhoades regarding help with winter coat for Tran's daughter.  requested a call back to [phone]. Plan:    will follow up with Kaylee regarding list of local food pantries.  will follow up with Ms. Jason Rhoades regarding a coat for Tran's daughter.

## 2023-11-29 ENCOUNTER — CARE COORDINATION (OUTPATIENT)
Dept: CASE MANAGEMENT | Age: 28
End: 2023-11-29

## 2023-11-29 NOTE — CARE COORDINATION
Care Transitions Follow Up Call    Patient Current Location: [address] [place] [address] Transition Nurse contacted the patient by telephone to follow up after admission on 23. Verified name and  with patient as identifiers. Patient: Medhat Soto  Patient : 1995   MRN: [id number]  Reason for Admission: KAIT  Discharge Date: 23 RARS: Readmission Risk Score: 15.5      Needs to be reviewed by the provider   Additional needs identified to be addressed with provider: No  none             Method of communication with provider: none. Spoke to [facility]  for transitions call. Stated she is doing OK has new medications, taking Maxalt as needed for migraines. Pt has nifedipine and loratadine. No new/worsening symptoms. Reminded pt to check BP, contact office if remains elevated. Has not checked BP today. Appetite is improved. Stated she did not receive  from . Per progress notes,  is re mailing resources for family. Pt has not heard from daughter's school regarding need for coat. SW will follow up. Reminded pt to complete ordered lab for pregnancy test.     Addressed changes since last contact:  medications-stated she picked up new meds and taking as directed. Discussed follow-up appointments. Follow Up  Future Appointments   Date Time Provider [address]   1/3/2024  3:00 PM Max Choe MD [facility]   2024 10:00 AM Josefa Jasso MD [facility] [address] [place] Transition Nurse reviewed discharge instructions with patient and discussed any barriers to care and/or understanding of plan of care after discharge. Discussed appropriate site of care based on symptoms and resources available to patient including: PCP  Specialist  When to call Niya Hubbardhamida Paul. The patient agrees to contact the PCP office for questions related to their healthcare.        Patients top risk factors for readmission: medical condition-anemia  Interventions to address risk

## 2023-12-05 ENCOUNTER — HOSPITAL ENCOUNTER (OUTPATIENT)
Age: 28
Setting detail: SPECIMEN
Discharge: HOME | End: 2023-12-05

## 2023-12-05 DIAGNOSIS — D64.9 ANEMIA, UNSPECIFIED TYPE: ICD-10-CM

## 2023-12-05 DIAGNOSIS — Z13.220 SCREENING, LIPID: ICD-10-CM

## 2023-12-05 LAB
BASOPHILS # BLD: 0.04 K/UL (ref 0–0.2)
BASOPHILS NFR BLD: 1 % (ref 0–2)
EOSINOPHIL # BLD: 0.43 K/UL (ref 0–0.44)
EOSINOPHILS RELATIVE PERCENT: 6 % (ref 1–4)
ERYTHROCYTE [DISTWIDTH] IN BLOOD BY AUTOMATED COUNT: 19.9 % (ref 11.8–14.4)
HCT VFR BLD AUTO: 30.4 % (ref 36.3–47.1)
HGB BLD-MCNC: 11.1 G/DL (ref 11.9–15.1)
IMM GRANULOCYTES # BLD AUTO: 0.05 K/UL (ref 0–0.3)
IMM GRANULOCYTES NFR BLD: 1 %
LYMPHOCYTES NFR BLD: 2.74 K/UL (ref 1.1–3.7)
LYMPHOCYTES RELATIVE PERCENT: 35 % (ref 24–43)
MCH RBC QN AUTO: 32.1 PG (ref 25.2–33.5)
MCHC RBC AUTO-ENTMCNC: 36.5 G/DL (ref 28.4–34.8)
MCV RBC AUTO: 87.9 FL (ref 82.6–102.9)
MONOCYTES NFR BLD: 0.6 K/UL (ref 0.1–1.2)
MONOCYTES NFR BLD: 8 % (ref 3–12)
NEUTROPHILS NFR BLD: 49 % (ref 36–65)
NEUTS SEG NFR BLD: 4.01 K/UL (ref 1.5–8.1)
NRBC BLD-RTO: 0 PER 100 WBC
PLATELET # BLD AUTO: 420 K/UL (ref 138–453)
PMV BLD AUTO: 10 FL (ref 8.1–13.5)
RBC # BLD AUTO: 3.46 M/UL (ref 3.95–5.11)
RBC # BLD: ABNORMAL 10*6/UL
WBC OTHER # BLD: 7.9 K/UL (ref 3.5–11.3)

## 2023-12-06 ENCOUNTER — CARE COORDINATION (OUTPATIENT)
Dept: CASE MANAGEMENT | Age: 28
End: 2023-12-06

## 2023-12-06 LAB
CHOLEST SERPL-MCNC: 182 MG/DL
CHOLESTEROL/HDL RATIO: 2.9
FERRITIN SERPL-MCNC: 100 NG/ML (ref 13–150)
HDLC SERPL-MCNC: 62 MG/DL
IRON SATN MFR SERPL: 25 % (ref 20–55)
IRON SERPL-MCNC: 34 UG/DL (ref 37–145)
LDLC SERPL CALC-MCNC: 107 MG/DL (ref 0–130)
TIBC SERPL-MCNC: 134 UG/DL (ref 250–450)
TRIGL SERPL-MCNC: 67 MG/DL
UNSATURATED IRON BINDING CAPACITY: 100 UG/DL (ref 112–347)

## 2023-12-06 NOTE — CARE COORDINATION
Care Transitions Follow Up Call    Patient Current Location:  Home: [address] [place] [address]    Care Transition Nurse contacted the patient by telephone to follow up after admission on 23. Verified name and  with patient as identifiers. Patient: Srinivas Hernandez  Patient : 1995   MRN: [id number]  Reason for Admission: KAIT  Discharge Date: 23 RARS: Readmission Risk Score: 15.5      Needs to be reviewed by the provider   Additional needs identified to be addressed with provider: No  none             Method of communication with provider: none. Spoke to [facility]  for final transitions call. Reviewed labs, noted iron decreased to 34, TIBC 134. Lipids wnl, hgb improved to 11.1. Stated BP running OK, continues to have occ migraines, taking Maxalt as directed, does help. Pt did receive  with Ensure coupons and resources for food pantries. Reminded pt to eat protein with meals, continue with Ensure supplements. Stated she ended up purchasing a coat for her daughter. Will refer to [facility] for continued care management. Addressed changes since last contact:  labs-Reviewed labs from . Discussed follow-up appointments. Follow Up  Future Appointments   Date Time Provider [address]   1/3/2024  3:00 PM Katie Kumar MD [facility]   2024 10:00 AM Cabrera Leiva MD [facility] [address] Transition Nurse reviewed discharge instructions with patient and discussed any barriers to care and/or understanding of plan of care after discharge. Discussed appropriate site of care based on symptoms and resources available to patient including: PCP  Specialist  When to call Niya Paul. The patient agrees to contact the PCP office for questions related to their healthcare.        Patients top risk factors for readmission: medical condition-DM1, iron deficiency anemia  Interventions to address risk factors: Obtained and reviewed

## 2023-12-11 ENCOUNTER — CARE COORDINATION (OUTPATIENT)
Dept: CARE COORDINATION | Age: 28
End: 2023-12-11

## 2023-12-11 NOTE — CARE COORDINATION
attempted to contact Carmelita Koch. Tran's phone is currently not accepting calls at this time.  will attempt to follow up with Carmelita Koch in 2-4 days.

## 2023-12-12 ENCOUNTER — CARE COORDINATION (OUTPATIENT)
Dept: CARE COORDINATION | Age: 28
End: 2023-12-12

## 2023-12-12 NOTE — CARE COORDINATION
Attempted to call for care management assessment/enrollment, unable to leave a message, phone not accepting calls at this time. Will call again next week.     Future Appointments   Date Time Provider [address]   1/3/2024  3:00 PM [facility]   1/11/2024 10:00 AM Naya Neal MD [facility]

## 2023-12-15 ENCOUNTER — CARE COORDINATION (OUTPATIENT)
Dept: CARE COORDINATION | Age: 28
End: 2023-12-15

## 2023-12-15 NOTE — CARE COORDINATION
attempted to contact Carmelita Jacome's phone reports it is not accepting calls at this time. Plan:    will continue to attempt to contact Carmelita Small

## 2023-12-27 ENCOUNTER — CARE COORDINATION (OUTPATIENT)
Dept: CARE COORDINATION | Age: 28
End: 2023-12-27

## 2023-12-27 NOTE — CARE COORDINATION
called and spoke to Kaylee. Kaylee reports that she is doing \"ok\". Kaylee reports that she has not been able to get her tooth fixed yet. Kaylee reports that she did contact [facility] and they are unable to get her in till after the 1st of the year. GreeneGordonCollette confirmed she received items that  left on front porch. BinaMurdock denied questions or concerns. Plan:    will follow up with Ms. Noel Aquino at the school regarding boots or coat for Tran's daughter.

## 2023-12-29 ENCOUNTER — CARE COORDINATION (OUTPATIENT)
Dept: CARE COORDINATION | Age: 28
End: 2023-12-29

## 2023-12-29 NOTE — CARE COORDINATION
Ambulatory Care Coordination Note  2023    Patient Current Location:  Home: [address] [facility] [address]     ACM contacted the patient by telephone. Verified name and  with patient as identifiers. Blood sugars are good, less than 130 throughout the day, a few less than 100, no hypoglycemia symptoms. She saw eye doctor and got new glasses this year. Does not plan on going back to [facility] endocrinologist and no plans on returning to  right now. Discussed ACP decision maker, she stated will think about who she wants to list.  CC Plan:   -Follow up in a few weeks to review blood sugars, appts, review ACP again. Diabetes Assessment    Medic Alert ID: No  Meal Planning: Calorie counting, Avoidance of concentrated sweets   How often do you test your blood sugar?: Meals, Bedtime   Do you have barriers with adherence to non-pharmacologic self-management interventions? (Nutrition/Exercise/Self-Monitoring): Yes   Have you ever had to go to the ED for symptoms of low blood sugar?: No       No patient-reported symptoms   Do you have hyperglycemia symptoms?: No   Do you have hypoglycemia symptoms?: No   Blood Sugar Monitoring Regimen: Before Meals, At Bedtime   Blood Sugar Trends: No Change         and   General Assessment    Do you have any symptoms that are causing concern?: No            Offered patient enrollment in the Remote Patient Monitoring (RPM) program for in-home monitoring: Patient declined. Lab Results       None            Care Coordination Interventions    Referral from Primary Care Provider: No  Suggested Interventions and Community Resources  Medi Set or Pill Pack: Declined  Registered Dietician: Completed  Social Work: Completed  Transportation Support: Completed (Comment: [place] [employer])          Goals Addressed                   This Visit's Progress     Conditions and Symptoms   On track     I will schedule office visits, as directed by my provider.   I will keep my appointment or

## 2024-01-03 ENCOUNTER — OFFICE VISIT (OUTPATIENT)
Dept: ONCOLOGY | Age: 29
End: 2024-01-03
Payer: MEDICARE

## 2024-01-03 ENCOUNTER — TELEPHONE (OUTPATIENT)
Dept: ONCOLOGY | Age: 29
End: 2024-01-03

## 2024-01-03 VITALS
HEART RATE: 81 BPM | DIASTOLIC BLOOD PRESSURE: 96 MMHG | RESPIRATION RATE: 16 BRPM | SYSTOLIC BLOOD PRESSURE: 145 MMHG | TEMPERATURE: 97.9 F | BODY MASS INDEX: 19.75 KG/M2 | WEIGHT: 118.7 LBS

## 2024-01-03 DIAGNOSIS — D58.2 HEMOGLOBIN C TRAIT (HCC): ICD-10-CM

## 2024-01-03 DIAGNOSIS — D64.9 ANEMIA, UNSPECIFIED TYPE: Primary | ICD-10-CM

## 2024-01-03 PROCEDURE — G8420 CALC BMI NORM PARAMETERS: HCPCS | Performed by: INTERNAL MEDICINE

## 2024-01-03 PROCEDURE — 99214 OFFICE O/P EST MOD 30 MIN: CPT | Performed by: INTERNAL MEDICINE

## 2024-01-03 PROCEDURE — 4004F PT TOBACCO SCREEN RCVD TLK: CPT | Performed by: INTERNAL MEDICINE

## 2024-01-03 PROCEDURE — G8427 DOCREV CUR MEDS BY ELIG CLIN: HCPCS | Performed by: INTERNAL MEDICINE

## 2024-01-03 PROCEDURE — 99211 OFF/OP EST MAY X REQ PHY/QHP: CPT

## 2024-01-03 PROCEDURE — G8484 FLU IMMUNIZE NO ADMIN: HCPCS | Performed by: INTERNAL MEDICINE

## 2024-01-03 NOTE — PROGRESS NOTES
Patient ID: Tran Deng, 1995, [id number], 28 y.o.  Referred by : Emperatriz Ortiz MD  Diagnosis:   Anemia, mild chronic anemia and hemoglobin metaphysis showing HBc trait  Malnutrition  Currently on oral iron pills  IV Venofer completed on 11/1/2023  HISTORY OF PRESENT ILLNESS:    Oncologic History:  Tran Deng is a 28 y.o. female with history of diabetes, hypertension, malnutrition, was seen there initial consultation visit for anemia.    Patient has recent lab work which showed hemoglobin around 9.4,  and normal iron studies therefore was referred to hematology for further evaluation.    Patient has history of significant malnutrition and has lost more than 50 pounds over past 2 years.  She is very thinly built and cachectic.  She has been following with gastroenterologist and has been diagnosed with malnutrition and H. pylori gastritis.    She is taking Ensure 3 times daily.  She has a history of heavy alcohol use in the past but has been sober for past 2 years.  She smokes 1/3 pack daily.  Her recent lab work showed hemoglobin 9.4, iron 43, iron saturation 38 and ferritin 33.  She denies any heavy menstrual periods or blood in stool.  She gets her menstrual period every 3 months.  She has been seen by her PCP recently and DEXA scan as well as left mammogram and ultrasound was ordered.  Interval history:  Patient is returning for follow visit and to discuss lab results and further recommendations.  Her hemoglobin has improved to 11.1.  She is not taking any iron supplements.  Her blood pressure tend to be on the high side and advised her to follow-up with her primary care physician.      She report that she was in the ER with abdominal pain and that time her iron pills were stopped.      During this visit patient's allergy, social, medical, surgical history and medications were reviewed and updated.    Past Medical History:   Diagnosis Date    Hypertension     Iron

## 2024-01-10 ENCOUNTER — CARE COORDINATION (OUTPATIENT)
Dept: CARE COORDINATION | Age: 29
End: 2024-01-10

## 2024-01-10 NOTE — CARE COORDINATION
Nutrition Care Coordinator Follow-Up visit:    Food Recall:eating 3 meals/d    Activity Level:  Sedentary:  Lightly Active:X  Moderately Active:  Very Active:    Adult BMI:  Underweight (below 18.5)  Normal Weight (18.5-24.9)X  Overweight (25-29.9)  Obese (30-39.9)  Morbidly Obese (>40)    Weight Change:up about 5-6 pounds over past 2 months    Plan:  Plan was established with patient:  Small frequent meals: X  Increase protein intake: X:  Ensure Complete 3-4 times/day: X    Monitoring:  Will monitor weight:  Will monitor adherence to meal plan:X  Will monitor adherence to exercise plan:  Will monitor HGA1c:    Handouts Provided :  High protein foods/snacks: X    Goals:  Patient goals set:  1.Reviewed working on eating more often small amounts every 2-3 hours 4-6 times/day.  Reviewed set meal times daily and consistency of meal times and how this can effect her sugars.   2. Reviewed what foods contain carbohydrate, we discussed avoiding sugary foods but increasing complex carbs/protien at meals. Encouraged 60gms carb/meal 15-30gms/snack. Reviewed snacking- will send list of snacks ideas to to patient.   3. Reviewed plate method, encouraged patient to increase intake of protein at each meal/snack. Focused on increasing protein intake and discussed high energy/protein diabetic guidelines.Encouraged to make  every bite count by choosing foods that are high in energy and protein.For example, choose cream soup over broth, or canned fruit in juice over watermelon.Use drinks to add extra calories: choose milk, smoothies, juice, or milkshakes over water, tea, coffee, or diet sodas.Encouraged to include a serving of protein at each meal -meats, cheese, Greek yogurt, peanut butter, nuts/seeds, cereals- patient only likes a few of these but states she will try to include what she like daily at meals. We also discussed adding extra sauces, gravies, cream, or fats to your food.  4. Reviewed using nutritional supplement

## 2024-01-10 NOTE — CARE COORDINATION
contacted [employer] and spoke with Ms. Toscano.   informed Ms. Toscano that she had left a few messages regarding a child needing a winter coat, hats, gloves, and is possible boots.   Ms. Toscano informed  that she would gather coat, hats, and gloves.  Ms. Toscano reports that she does not believe they have boots but if they do have shoes will provide a pair to the child.     contacted Zachariahbrandie and informed her of the items that will be sent home with her daughter.  Tran was appreciative.     Denied questions or concerns.    Plan:    will follow up with Tran in 2 weeks.

## 2024-01-11 ENCOUNTER — TELEPHONE (OUTPATIENT)
Dept: FAMILY MEDICINE CLINIC | Age: 29
End: 2024-01-11

## 2024-01-11 ENCOUNTER — OFFICE VISIT (OUTPATIENT)
Dept: FAMILY MEDICINE CLINIC | Age: 29
End: 2024-01-11
Payer: MEDICARE

## 2024-01-11 VITALS
DIASTOLIC BLOOD PRESSURE: 98 MMHG | OXYGEN SATURATION: 100 % | WEIGHT: 116.2 LBS | BODY MASS INDEX: 19.34 KG/M2 | SYSTOLIC BLOOD PRESSURE: 148 MMHG | TEMPERATURE: 97.3 F | HEART RATE: 87 BPM

## 2024-01-11 DIAGNOSIS — M54.41 ACUTE RIGHT-SIDED BACK PAIN WITH SCIATICA: ICD-10-CM

## 2024-01-11 DIAGNOSIS — E10.42 TYPE 1 DIABETES MELLITUS WITH DIABETIC POLYNEUROPATHY (HCC): ICD-10-CM

## 2024-01-11 DIAGNOSIS — E10.42 TYPE 1 DIABETES MELLITUS WITH DIABETIC POLYNEUROPATHY (HCC): Primary | ICD-10-CM

## 2024-01-11 DIAGNOSIS — G25.81 RESTLESS LEG SYNDROME: ICD-10-CM

## 2024-01-11 DIAGNOSIS — N28.9 ABNORMAL RENAL FUNCTION FINDING: ICD-10-CM

## 2024-01-11 DIAGNOSIS — E43 PROTEIN-CALORIE MALNUTRITION, SEVERE (HCC): ICD-10-CM

## 2024-01-11 DIAGNOSIS — E10.649 HYPOGLYCEMIA DUE TO TYPE 1 DIABETES MELLITUS (HCC): ICD-10-CM

## 2024-01-11 DIAGNOSIS — K29.50 CHRONIC GASTRITIS WITHOUT BLEEDING, UNSPECIFIED GASTRITIS TYPE: ICD-10-CM

## 2024-01-11 DIAGNOSIS — R11.2 NAUSEA AND VOMITING, UNSPECIFIED VOMITING TYPE: ICD-10-CM

## 2024-01-11 DIAGNOSIS — I10 UNCONTROLLED HYPERTENSION: ICD-10-CM

## 2024-01-11 DIAGNOSIS — B35.1 ONYCHOMYCOSIS: ICD-10-CM

## 2024-01-11 DIAGNOSIS — G43.911 INTRACTABLE MIGRAINE WITH STATUS MIGRAINOSUS, UNSPECIFIED MIGRAINE TYPE: ICD-10-CM

## 2024-01-11 DIAGNOSIS — G47.9 SLEEPING DIFFICULTY: ICD-10-CM

## 2024-01-11 PROCEDURE — 3075F SYST BP GE 130 - 139MM HG: CPT | Performed by: INTERNAL MEDICINE

## 2024-01-11 PROCEDURE — 2022F DILAT RTA XM EVC RTNOPTHY: CPT | Performed by: INTERNAL MEDICINE

## 2024-01-11 PROCEDURE — 3046F HEMOGLOBIN A1C LEVEL >9.0%: CPT | Performed by: INTERNAL MEDICINE

## 2024-01-11 PROCEDURE — 4004F PT TOBACCO SCREEN RCVD TLK: CPT | Performed by: INTERNAL MEDICINE

## 2024-01-11 PROCEDURE — 99214 OFFICE O/P EST MOD 30 MIN: CPT | Performed by: INTERNAL MEDICINE

## 2024-01-11 PROCEDURE — G8484 FLU IMMUNIZE NO ADMIN: HCPCS | Performed by: INTERNAL MEDICINE

## 2024-01-11 PROCEDURE — G8427 DOCREV CUR MEDS BY ELIG CLIN: HCPCS | Performed by: INTERNAL MEDICINE

## 2024-01-11 PROCEDURE — 3080F DIAST BP >= 90 MM HG: CPT | Performed by: INTERNAL MEDICINE

## 2024-01-11 PROCEDURE — G8420 CALC BMI NORM PARAMETERS: HCPCS | Performed by: INTERNAL MEDICINE

## 2024-01-11 RX ORDER — NIFEDIPINE 60 MG/1
60 TABLET, EXTENDED RELEASE ORAL DAILY
Qty: 30 TABLET | Refills: 3 | Status: SHIPPED | OUTPATIENT
Start: 2024-01-11

## 2024-01-11 RX ORDER — PANTOPRAZOLE SODIUM 40 MG/1
40 TABLET, DELAYED RELEASE ORAL
Qty: 90 TABLET | Refills: 1 | Status: SHIPPED | OUTPATIENT
Start: 2024-01-11

## 2024-01-11 RX ORDER — INSULIN ASPART 100 [IU]/ML
INJECTION, SOLUTION INTRAVENOUS; SUBCUTANEOUS
Qty: 45 ML | Refills: 5 | Status: SHIPPED | OUTPATIENT
Start: 2024-01-11

## 2024-01-11 RX ORDER — AMITRIPTYLINE HYDROCHLORIDE 10 MG/1
10 TABLET, FILM COATED ORAL NIGHTLY
Qty: 14 TABLET | Refills: 0 | Status: SHIPPED | OUTPATIENT
Start: 2024-01-11 | End: 2024-01-25

## 2024-01-11 RX ORDER — PROCHLORPERAZINE 25 MG/1
1 SUPPOSITORY RECTAL
Qty: 1 EACH | Refills: 3 | Status: SHIPPED | OUTPATIENT
Start: 2024-01-11

## 2024-01-11 RX ORDER — INSULIN GLARGINE 100 [IU]/ML
INJECTION, SOLUTION SUBCUTANEOUS
Qty: 15 ML | Refills: 3 | Status: SHIPPED | OUTPATIENT
Start: 2024-01-11

## 2024-01-11 RX ORDER — PROCHLORPERAZINE 25 MG/1
1 SUPPOSITORY RECTAL DAILY
Qty: 1 EACH | Refills: 0 | Status: SHIPPED | OUTPATIENT
Start: 2024-01-11

## 2024-01-11 RX ORDER — PROCHLORPERAZINE 25 MG/1
1 SUPPOSITORY RECTAL DAILY
Qty: 3 EACH | Refills: 5 | Status: SHIPPED | OUTPATIENT
Start: 2024-01-11

## 2024-01-11 RX ORDER — TIZANIDINE 4 MG/1
4 TABLET ORAL EVERY 8 HOURS PRN
Qty: 30 TABLET | Refills: 0 | Status: SHIPPED | OUTPATIENT
Start: 2024-01-11

## 2024-01-11 RX ORDER — KETOCONAZOLE 20 MG/G
CREAM TOPICAL
Qty: 60 G | Refills: 1 | Status: SHIPPED | OUTPATIENT
Start: 2024-01-11

## 2024-01-11 RX ORDER — NIFEDIPINE 30 MG/1
30 TABLET, EXTENDED RELEASE ORAL DAILY
Qty: 90 TABLET | Refills: 1 | Status: CANCELLED | OUTPATIENT
Start: 2024-01-11

## 2024-01-11 RX ORDER — RIZATRIPTAN BENZOATE 10 MG/1
10 TABLET ORAL DAILY PRN
Qty: 9 TABLET | Refills: 5 | Status: SHIPPED | OUTPATIENT
Start: 2024-01-11

## 2024-01-11 RX ORDER — ROPINIROLE 0.25 MG/1
0.25 TABLET, FILM COATED ORAL NIGHTLY
Qty: 90 TABLET | Refills: 1 | Status: SHIPPED | OUTPATIENT
Start: 2024-01-11

## 2024-01-11 RX ORDER — ONDANSETRON 4 MG/1
TABLET, FILM COATED ORAL
Qty: 90 TABLET | Refills: 1 | Status: SHIPPED | OUTPATIENT
Start: 2024-01-11

## 2024-01-11 RX ORDER — AMITRIPTYLINE HYDROCHLORIDE 10 MG/1
10 TABLET, FILM COATED ORAL NIGHTLY
Qty: 90 TABLET | Refills: 1 | Status: SHIPPED | OUTPATIENT
Start: 2024-01-11

## 2024-01-11 ASSESSMENT — ENCOUNTER SYMPTOMS
ANAL BLEEDING: 0
COUGH: 0
NAUSEA: 0
DIARRHEA: 0
VOMITING: 0
CHOKING: 0
BLOOD IN STOOL: 0
CONSTIPATION: 0
SHORTNESS OF BREATH: 0
ABDOMINAL PAIN: 0
CHEST TIGHTNESS: 0
WHEEZING: 0

## 2024-01-11 ASSESSMENT — PATIENT HEALTH QUESTIONNAIRE - PHQ9
SUM OF ALL RESPONSES TO PHQ QUESTIONS 1-9: 0
SUM OF ALL RESPONSES TO PHQ QUESTIONS 1-9: 0
1. LITTLE INTEREST OR PLEASURE IN DOING THINGS: 0
2. FEELING DOWN, DEPRESSED OR HOPELESS: 0
SUM OF ALL RESPONSES TO PHQ QUESTIONS 1-9: 0
SUM OF ALL RESPONSES TO PHQ QUESTIONS 1-9: 0
SUM OF ALL RESPONSES TO PHQ9 QUESTIONS 1 & 2: 0

## 2024-01-11 ASSESSMENT — VISUAL ACUITY: OU: 1

## 2024-01-11 NOTE — PATIENT INSTRUCTIONS
Increase your nifedipine to 60 mg daily - take 2 pills daily of current 30 mg dose till you run out then start the 60 mg pills called in today.

## 2024-01-11 NOTE — PROGRESS NOTES
noon, in the evening, and at bedtime Yes Emperatriz Ortiz MD   pantoprazole (PROTONIX) 40 MG tablet Take 1 tablet by mouth every morning (before breakfast) Yes Emperatriz Ortiz MD   dicyclomine (BENTYL) 10 MG capsule TAKE 1 CAPSULE THREE TIMES DAILY AS NEEDED FOR ABDOMINAL PAIN Yes Emperatriz Ortiz MD   Nutritional Supplements (GLUCERNA 1.5 REJI) LIQD Take 1 each by mouth in the morning, at noon, and at bedtime Yes Emperatriz Ortiz MD   Nutritional Supplements (ENSURE HIGH PROTEIN) LIQD Take 1 Bottle by mouth in the morning, at noon, and at bedtime Yes Emperatriz Ortiz MD   ferrous sulfate (IRON 325) 325 (65 Fe) MG tablet Take 1 tablet by mouth daily (with breakfast)  Patient not taking: Reported on 1/11/2024  Osman Roman MD       Review of Systems     Review of Systems   Constitutional:  Negative for fatigue, fever and unexpected weight change.   Respiratory:  Negative for cough, choking, chest tightness, shortness of breath and wheezing.    Cardiovascular:  Negative for chest pain, palpitations and leg swelling.   Gastrointestinal:  Negative for abdominal pain, anal bleeding, blood in stool, constipation, diarrhea, nausea and vomiting.   Endocrine: Negative.    Musculoskeletal:  Negative for joint swelling and myalgias.   Skin: Negative.    Neurological:  Positive for headaches. Negative for dizziness.   Psychiatric/Behavioral:  Negative for sleep disturbance.    All other systems reviewed and are negative.      Objective     BP (!) 138/96 (Site: Right Upper Arm, Position: Sitting, Cuff Size: Medium Adult)   Pulse 87   Temp 97.3 °F (36.3 °C)   Wt 52.7 kg (116 lb 3.2 oz)   LMP  (LMP Unknown)   SpO2 100%   BMI 19.34 kg/m²   Physical Exam  Vitals and nursing note reviewed.   Constitutional:       General: She is not in acute distress.     Appearance: She is well-developed. She is not ill-appearing.   Eyes:      General: Lids are normal. Vision grossly intact.   Cardiovascular:      Rate and

## 2024-01-11 NOTE — TELEPHONE ENCOUNTER
Patient calling in for scripts for dexcom to be faxed to [facility] on [address]. Patient states she was given scripts but she can not take them there today because she does not have that much gas in her car. Please fax orders

## 2024-01-12 ENCOUNTER — CARE COORDINATION (OUTPATIENT)
Dept: CARE COORDINATION | Age: 29
End: 2024-01-12

## 2024-01-12 NOTE — CARE COORDINATION
She had PCP appt yesterday, was ordered Dexcom but orders given to patient. She wants them sent to [facility].    Stated blood sugars \"good\" didn't give any numbers.  Still working with dietician and .  Did not talk long today, will follow up in a few weeks, office staff will fax Dexcom orders to [facility] after physician back in office to sign them.    Future Appointments   Date Time Provider Department Center   1/17/2024 10:30 AM STA CT SCAN RM 2 [facility] CT SCAN [facility] Radiolog   2/28/2024  5:00 PM Emperatriz Ortiz MD [facility]   3/15/2024 11:15 AM Osman Roman MD [facility]

## 2024-01-16 RX ORDER — PROCHLORPERAZINE 25 MG/1
1 SUPPOSITORY RECTAL DAILY
Qty: 3 EACH | Refills: 5 | Status: SHIPPED | OUTPATIENT
Start: 2024-01-16

## 2024-01-16 RX ORDER — PROCHLORPERAZINE 25 MG/1
1 SUPPOSITORY RECTAL DAILY
Qty: 1 EACH | Refills: 0 | Status: SHIPPED | OUTPATIENT
Start: 2024-01-16

## 2024-01-16 RX ORDER — PROCHLORPERAZINE 25 MG/1
1 SUPPOSITORY RECTAL
Qty: 1 EACH | Refills: 3 | Status: SHIPPED | OUTPATIENT
Start: 2024-01-16

## 2024-01-17 ENCOUNTER — HOSPITAL ENCOUNTER (OUTPATIENT)
Dept: CT IMAGING | Age: 29
Discharge: HOME | End: 2024-01-19
Payer: MEDICARE

## 2024-01-17 DIAGNOSIS — G43.911 INTRACTABLE MIGRAINE WITH STATUS MIGRAINOSUS, UNSPECIFIED MIGRAINE TYPE: ICD-10-CM

## 2024-01-17 PROCEDURE — 70450 CT HEAD/BRAIN W/O DYE: CPT

## 2024-01-18 DIAGNOSIS — J01.90 ACUTE BACTERIAL SINUSITIS: Primary | ICD-10-CM

## 2024-01-18 DIAGNOSIS — B96.89 ACUTE BACTERIAL SINUSITIS: Primary | ICD-10-CM

## 2024-01-18 RX ORDER — AMOXICILLIN 875 MG/1
875 TABLET, COATED ORAL 2 TIMES DAILY
Qty: 14 TABLET | Refills: 0 | Status: SHIPPED | OUTPATIENT
Start: 2024-01-18 | End: 2024-01-25

## 2024-01-19 ENCOUNTER — TELEPHONE (OUTPATIENT)
Dept: FAMILY MEDICINE CLINIC | Age: 29
End: 2024-01-19

## 2024-01-23 ENCOUNTER — APPOINTMENT (OUTPATIENT)
Dept: GENERAL RADIOLOGY | Age: 29
DRG: 683 | End: 2024-01-23
Payer: MEDICARE

## 2024-01-23 ENCOUNTER — APPOINTMENT (OUTPATIENT)
Dept: CT IMAGING | Age: 29
DRG: 683 | End: 2024-01-23
Payer: MEDICARE

## 2024-01-23 ENCOUNTER — HOSPITAL ENCOUNTER (INPATIENT)
Age: 29
LOS: 1 days | Discharge: LEFT BEFORE DISCHARGE | DRG: 683 | End: 2024-01-26
Attending: EMERGENCY MEDICINE | Admitting: STUDENT IN AN ORGANIZED HEALTH CARE EDUCATION/TRAINING PROGRAM
Payer: MEDICARE

## 2024-01-23 DIAGNOSIS — R11.15 CYCLICAL VOMITING: Primary | ICD-10-CM

## 2024-01-23 PROBLEM — R11.2 INTRACTABLE NAUSEA AND VOMITING: Status: ACTIVE | Noted: 2024-01-23

## 2024-01-23 PROBLEM — S32.10XA SACRAL FRACTURE, CLOSED (HCC): Status: ACTIVE | Noted: 2024-01-23

## 2024-01-23 LAB
25(OH)D3 SERPL-MCNC: 9.8 NG/ML
ALBUMIN SERPL-MCNC: 3.4 G/DL (ref 3.5–5.2)
ALBUMIN/GLOB SERPL: 0.8 {RATIO} (ref 1–2.5)
ALP SERPL-CCNC: 102 U/L (ref 35–104)
ALT SERPL-CCNC: 13 U/L (ref 5–33)
ANION GAP SERPL CALCULATED.3IONS-SCNC: 12 MMOL/L (ref 9–17)
AST SERPL-CCNC: 23 U/L
B-OH-BUTYR SERPL-MCNC: 0.52 MMOL/L (ref 0.02–0.27)
BASOPHILS # BLD: 0.05 K/UL (ref 0–0.2)
BASOPHILS NFR BLD: 0 % (ref 0–2)
BILIRUB SERPL-MCNC: 0.5 MG/DL (ref 0.3–1.2)
BUN SERPL-MCNC: 14 MG/DL (ref 6–20)
CALCIUM SERPL-MCNC: 8.8 MG/DL (ref 8.6–10.4)
CHLORIDE SERPL-SCNC: 106 MMOL/L (ref 98–107)
CO2 SERPL-SCNC: 17 MMOL/L (ref 20–31)
CREAT SERPL-MCNC: 1.2 MG/DL (ref 0.5–0.9)
EOSINOPHIL # BLD: 0.12 K/UL (ref 0–0.44)
EOSINOPHILS RELATIVE PERCENT: 1 % (ref 1–4)
ERYTHROCYTE [DISTWIDTH] IN BLOOD BY AUTOMATED COUNT: 15.5 % (ref 11.8–14.4)
GFR SERPL CREATININE-BSD FRML MDRD: >60 ML/MIN/1.73M2
GLUCOSE BLD-MCNC: 214 MG/DL (ref 65–105)
GLUCOSE BLD-MCNC: 238 MG/DL (ref 65–105)
GLUCOSE SERPL-MCNC: 203 MG/DL (ref 70–99)
HCG SERPL QL: NEGATIVE
HCO3 VENOUS: 22.1 MMOL/L (ref 22–29)
HCT VFR BLD AUTO: 37.6 % (ref 36.3–47.1)
HGB BLD-MCNC: 12.9 G/DL (ref 11.9–15.1)
IMM GRANULOCYTES # BLD AUTO: 0.07 K/UL (ref 0–0.3)
IMM GRANULOCYTES NFR BLD: 1 %
LIPASE SERPL-CCNC: 15 U/L (ref 13–60)
LYMPHOCYTES NFR BLD: 3.08 K/UL (ref 1.1–3.7)
LYMPHOCYTES RELATIVE PERCENT: 22 % (ref 24–43)
MCH RBC QN AUTO: 32.3 PG (ref 25.2–33.5)
MCHC RBC AUTO-ENTMCNC: 34.3 G/DL (ref 28.4–34.8)
MCV RBC AUTO: 94.2 FL (ref 82.6–102.9)
MONOCYTES NFR BLD: 1.43 K/UL (ref 0.1–1.2)
MONOCYTES NFR BLD: 10 % (ref 3–12)
NEGATIVE BASE EXCESS, VEN: 4 MMOL/L (ref 0–2)
NEUTROPHILS NFR BLD: 66 % (ref 36–65)
NEUTS SEG NFR BLD: 9.33 K/UL (ref 1.5–8.1)
NRBC BLD-RTO: 0 PER 100 WBC
O2 SAT, VEN: 48.5 % (ref 60–85)
PCO2, VEN: 43.5 MM HG (ref 41–51)
PH VENOUS: 7.31 (ref 7.32–7.43)
PLATELET # BLD AUTO: 413 K/UL (ref 138–453)
PMV BLD AUTO: 9.7 FL (ref 8.1–13.5)
PO2, VEN: 28.6 MM HG (ref 30–50)
POTASSIUM SERPL-SCNC: 4.5 MMOL/L (ref 3.7–5.3)
PROT SERPL-MCNC: 7.8 G/DL (ref 6.4–8.3)
RBC # BLD AUTO: 3.99 M/UL (ref 3.95–5.11)
RBC # BLD: ABNORMAL 10*6/UL
SODIUM SERPL-SCNC: 135 MMOL/L (ref 135–144)
TROPONIN I SERPL HS-MCNC: 11 NG/L (ref 0–14)
TROPONIN I SERPL HS-MCNC: 8 NG/L (ref 0–14)
WBC OTHER # BLD: 14.1 K/UL (ref 3.5–11.3)

## 2024-01-23 PROCEDURE — 70450 CT HEAD/BRAIN W/O DYE: CPT

## 2024-01-23 PROCEDURE — 96374 THER/PROPH/DIAG INJ IV PUSH: CPT

## 2024-01-23 PROCEDURE — 6360000002 HC RX W HCPCS

## 2024-01-23 PROCEDURE — 96372 THER/PROPH/DIAG INJ SC/IM: CPT

## 2024-01-23 PROCEDURE — 96375 TX/PRO/DX INJ NEW DRUG ADDON: CPT

## 2024-01-23 PROCEDURE — 80053 COMPREHEN METABOLIC PANEL: CPT

## 2024-01-23 PROCEDURE — 84484 ASSAY OF TROPONIN QUANT: CPT

## 2024-01-23 PROCEDURE — 84703 CHORIONIC GONADOTROPIN ASSAY: CPT

## 2024-01-23 PROCEDURE — 99285 EMERGENCY DEPT VISIT HI MDM: CPT

## 2024-01-23 PROCEDURE — 72192 CT PELVIS W/O DYE: CPT

## 2024-01-23 PROCEDURE — 71045 X-RAY EXAM CHEST 1 VIEW: CPT

## 2024-01-23 PROCEDURE — G0378 HOSPITAL OBSERVATION PER HR: HCPCS

## 2024-01-23 PROCEDURE — 85025 COMPLETE CBC W/AUTO DIFF WBC: CPT

## 2024-01-23 PROCEDURE — 96361 HYDRATE IV INFUSION ADD-ON: CPT

## 2024-01-23 PROCEDURE — 72190 X-RAY EXAM OF PELVIS: CPT

## 2024-01-23 PROCEDURE — 82010 KETONE BODYS QUAN: CPT

## 2024-01-23 PROCEDURE — 2580000003 HC RX 258

## 2024-01-23 PROCEDURE — 6370000000 HC RX 637 (ALT 250 FOR IP)

## 2024-01-23 PROCEDURE — 99222 1ST HOSP IP/OBS MODERATE 55: CPT | Performed by: SURGERY

## 2024-01-23 PROCEDURE — 82306 VITAMIN D 25 HYDROXY: CPT

## 2024-01-23 PROCEDURE — 93005 ELECTROCARDIOGRAM TRACING: CPT

## 2024-01-23 PROCEDURE — 72131 CT LUMBAR SPINE W/O DYE: CPT

## 2024-01-23 PROCEDURE — 82803 BLOOD GASES ANY COMBINATION: CPT

## 2024-01-23 PROCEDURE — 83690 ASSAY OF LIPASE: CPT

## 2024-01-23 PROCEDURE — 82947 ASSAY GLUCOSE BLOOD QUANT: CPT

## 2024-01-23 PROCEDURE — 96376 TX/PRO/DX INJ SAME DRUG ADON: CPT

## 2024-01-23 RX ORDER — ACETAMINOPHEN 325 MG/1
650 TABLET ORAL EVERY 4 HOURS PRN
Status: DISCONTINUED | OUTPATIENT
Start: 2024-01-23 | End: 2024-01-26 | Stop reason: HOSPADM

## 2024-01-23 RX ORDER — INSULIN LISPRO 100 [IU]/ML
10 INJECTION, SOLUTION INTRAVENOUS; SUBCUTANEOUS ONCE
Status: COMPLETED | OUTPATIENT
Start: 2024-01-23 | End: 2024-01-23

## 2024-01-23 RX ORDER — KETOROLAC TROMETHAMINE 15 MG/ML
15 INJECTION, SOLUTION INTRAMUSCULAR; INTRAVENOUS ONCE
Status: COMPLETED | OUTPATIENT
Start: 2024-01-23 | End: 2024-01-23

## 2024-01-23 RX ORDER — 0.9 % SODIUM CHLORIDE 0.9 %
1000 INTRAVENOUS SOLUTION INTRAVENOUS ONCE
Status: COMPLETED | OUTPATIENT
Start: 2024-01-23 | End: 2024-01-23

## 2024-01-23 RX ORDER — SODIUM CHLORIDE 9 MG/ML
INJECTION, SOLUTION INTRAVENOUS CONTINUOUS
Status: DISCONTINUED | OUTPATIENT
Start: 2024-01-23 | End: 2024-01-23

## 2024-01-23 RX ORDER — ROPINIROLE 0.25 MG/1
0.25 TABLET, FILM COATED ORAL NIGHTLY
Status: DISCONTINUED | OUTPATIENT
Start: 2024-01-23 | End: 2024-01-26 | Stop reason: HOSPADM

## 2024-01-23 RX ORDER — ONDANSETRON 4 MG/1
4 TABLET, ORALLY DISINTEGRATING ORAL EVERY 8 HOURS PRN
Status: DISCONTINUED | OUTPATIENT
Start: 2024-01-23 | End: 2024-01-24

## 2024-01-23 RX ORDER — METOCLOPRAMIDE HYDROCHLORIDE 5 MG/ML
10 INJECTION INTRAMUSCULAR; INTRAVENOUS ONCE
Status: COMPLETED | OUTPATIENT
Start: 2024-01-23 | End: 2024-01-23

## 2024-01-23 RX ORDER — DIPHENHYDRAMINE HYDROCHLORIDE 50 MG/ML
25 INJECTION INTRAMUSCULAR; INTRAVENOUS ONCE
Status: DISCONTINUED | OUTPATIENT
Start: 2024-01-23 | End: 2024-01-23

## 2024-01-23 RX ORDER — 0.9 % SODIUM CHLORIDE 0.9 %
1000 INTRAVENOUS SOLUTION INTRAVENOUS ONCE
Status: COMPLETED | OUTPATIENT
Start: 2024-01-23 | End: 2024-01-24

## 2024-01-23 RX ORDER — NIFEDIPINE 60 MG/1
60 TABLET, FILM COATED, EXTENDED RELEASE ORAL ONCE
Status: DISCONTINUED | OUTPATIENT
Start: 2024-01-23 | End: 2024-01-23

## 2024-01-23 RX ORDER — ENOXAPARIN SODIUM 100 MG/ML
40 INJECTION SUBCUTANEOUS DAILY
Status: DISCONTINUED | OUTPATIENT
Start: 2024-01-24 | End: 2024-01-26 | Stop reason: HOSPADM

## 2024-01-23 RX ORDER — SODIUM CHLORIDE 0.9 % (FLUSH) 0.9 %
5-40 SYRINGE (ML) INJECTION EVERY 12 HOURS SCHEDULED
Status: DISCONTINUED | OUTPATIENT
Start: 2024-01-23 | End: 2024-01-26 | Stop reason: HOSPADM

## 2024-01-23 RX ORDER — DEXTROSE AND SODIUM CHLORIDE 5; .45 G/100ML; G/100ML
INJECTION, SOLUTION INTRAVENOUS CONTINUOUS
Status: DISCONTINUED | OUTPATIENT
Start: 2024-01-23 | End: 2024-01-23

## 2024-01-23 RX ORDER — NIFEDIPINE 30 MG/1
60 TABLET, EXTENDED RELEASE ORAL DAILY
Status: DISCONTINUED | OUTPATIENT
Start: 2024-01-24 | End: 2024-01-26

## 2024-01-23 RX ORDER — AMITRIPTYLINE HYDROCHLORIDE 10 MG/1
10 TABLET, FILM COATED ORAL NIGHTLY
Status: DISCONTINUED | OUTPATIENT
Start: 2024-01-23 | End: 2024-01-26 | Stop reason: HOSPADM

## 2024-01-23 RX ORDER — ONDANSETRON 2 MG/ML
4 INJECTION INTRAMUSCULAR; INTRAVENOUS ONCE
Status: COMPLETED | OUTPATIENT
Start: 2024-01-23 | End: 2024-01-23

## 2024-01-23 RX ORDER — NIFEDIPINE 60 MG/1
60 TABLET, FILM COATED, EXTENDED RELEASE ORAL DAILY
Status: DISCONTINUED | OUTPATIENT
Start: 2024-01-23 | End: 2024-01-23

## 2024-01-23 RX ORDER — ONDANSETRON 2 MG/ML
4 INJECTION INTRAMUSCULAR; INTRAVENOUS EVERY 6 HOURS PRN
Status: DISCONTINUED | OUTPATIENT
Start: 2024-01-23 | End: 2024-01-24

## 2024-01-23 RX ORDER — LABETALOL HYDROCHLORIDE 5 MG/ML
10 INJECTION, SOLUTION INTRAVENOUS ONCE
Status: COMPLETED | OUTPATIENT
Start: 2024-01-23 | End: 2024-01-23

## 2024-01-23 RX ORDER — SODIUM CHLORIDE 0.9 % (FLUSH) 0.9 %
5-40 SYRINGE (ML) INJECTION PRN
Status: DISCONTINUED | OUTPATIENT
Start: 2024-01-23 | End: 2024-01-26 | Stop reason: HOSPADM

## 2024-01-23 RX ORDER — INSULIN LISPRO 100 [IU]/ML
0-8 INJECTION, SOLUTION INTRAVENOUS; SUBCUTANEOUS
Status: DISCONTINUED | OUTPATIENT
Start: 2024-01-24 | End: 2024-01-24

## 2024-01-23 RX ORDER — PROMETHAZINE HYDROCHLORIDE 25 MG/ML
25 INJECTION, SOLUTION INTRAMUSCULAR; INTRAVENOUS ONCE
Status: COMPLETED | OUTPATIENT
Start: 2024-01-23 | End: 2024-01-23

## 2024-01-23 RX ORDER — HALOPERIDOL 5 MG/ML
5 INJECTION INTRAMUSCULAR ONCE
Status: COMPLETED | OUTPATIENT
Start: 2024-01-23 | End: 2024-01-23

## 2024-01-23 RX ORDER — TIZANIDINE 4 MG/1
4 TABLET ORAL EVERY 8 HOURS PRN
Status: DISCONTINUED | OUTPATIENT
Start: 2024-01-23 | End: 2024-01-26 | Stop reason: HOSPADM

## 2024-01-23 RX ORDER — KETOROLAC TROMETHAMINE 30 MG/ML
30 INJECTION, SOLUTION INTRAMUSCULAR; INTRAVENOUS ONCE
Status: COMPLETED | OUTPATIENT
Start: 2024-01-23 | End: 2024-01-23

## 2024-01-23 RX ORDER — DEXTROSE MONOHYDRATE 100 MG/ML
INJECTION, SOLUTION INTRAVENOUS CONTINUOUS PRN
Status: DISCONTINUED | OUTPATIENT
Start: 2024-01-23 | End: 2024-01-26 | Stop reason: HOSPADM

## 2024-01-23 RX ORDER — DIPHENHYDRAMINE HYDROCHLORIDE 50 MG/ML
25 INJECTION INTRAMUSCULAR; INTRAVENOUS ONCE
Status: COMPLETED | OUTPATIENT
Start: 2024-01-23 | End: 2024-01-23

## 2024-01-23 RX ORDER — PANTOPRAZOLE SODIUM 40 MG/1
40 TABLET, DELAYED RELEASE ORAL
Status: DISCONTINUED | OUTPATIENT
Start: 2024-01-24 | End: 2024-01-26 | Stop reason: HOSPADM

## 2024-01-23 RX ORDER — INSULIN LISPRO 100 [IU]/ML
0-4 INJECTION, SOLUTION INTRAVENOUS; SUBCUTANEOUS NIGHTLY
Status: DISCONTINUED | OUTPATIENT
Start: 2024-01-23 | End: 2024-01-24

## 2024-01-23 RX ORDER — SODIUM CHLORIDE 9 MG/ML
INJECTION, SOLUTION INTRAVENOUS PRN
Status: DISCONTINUED | OUTPATIENT
Start: 2024-01-23 | End: 2024-01-26 | Stop reason: HOSPADM

## 2024-01-23 RX ORDER — PROCHLORPERAZINE EDISYLATE 5 MG/ML
10 INJECTION INTRAMUSCULAR; INTRAVENOUS ONCE
Status: DISCONTINUED | OUTPATIENT
Start: 2024-01-23 | End: 2024-01-23

## 2024-01-23 RX ADMIN — KETOROLAC TROMETHAMINE 15 MG: 15 INJECTION, SOLUTION INTRAMUSCULAR; INTRAVENOUS at 14:28

## 2024-01-23 RX ADMIN — INSULIN LISPRO 10 UNITS: 100 INJECTION, SOLUTION INTRAVENOUS; SUBCUTANEOUS at 23:38

## 2024-01-23 RX ADMIN — PROMETHAZINE HYDROCHLORIDE 25 MG: 25 INJECTION INTRAMUSCULAR; INTRAVENOUS at 15:22

## 2024-01-23 RX ADMIN — METOCLOPRAMIDE 10 MG: 5 INJECTION, SOLUTION INTRAMUSCULAR; INTRAVENOUS at 21:54

## 2024-01-23 RX ADMIN — DIPHENHYDRAMINE HYDROCHLORIDE 25 MG: 50 INJECTION INTRAMUSCULAR; INTRAVENOUS at 21:54

## 2024-01-23 RX ADMIN — SODIUM CHLORIDE 1000 ML: 9 INJECTION, SOLUTION INTRAVENOUS at 23:02

## 2024-01-23 RX ADMIN — ONDANSETRON 4 MG: 2 INJECTION INTRAMUSCULAR; INTRAVENOUS at 14:29

## 2024-01-23 RX ADMIN — HALOPERIDOL LACTATE 5 MG: 5 INJECTION, SOLUTION INTRAMUSCULAR at 17:55

## 2024-01-23 RX ADMIN — LABETALOL HYDROCHLORIDE 10 MG: 5 INJECTION, SOLUTION INTRAVENOUS at 21:54

## 2024-01-23 RX ADMIN — KETOROLAC TROMETHAMINE 30 MG: 30 INJECTION, SOLUTION INTRAMUSCULAR; INTRAVENOUS at 17:50

## 2024-01-23 RX ADMIN — SODIUM CHLORIDE: 9 INJECTION, SOLUTION INTRAVENOUS at 22:50

## 2024-01-23 RX ADMIN — LABETALOL HYDROCHLORIDE 10 MG: 5 INJECTION, SOLUTION INTRAVENOUS at 20:35

## 2024-01-23 RX ADMIN — SODIUM CHLORIDE 1000 ML: 9 INJECTION, SOLUTION INTRAVENOUS at 14:30

## 2024-01-23 ASSESSMENT — ENCOUNTER SYMPTOMS
VOMITING: 1
NAUSEA: 1
BACK PAIN: 1
SHORTNESS OF BREATH: 0
ABDOMINAL PAIN: 1

## 2024-01-23 ASSESSMENT — PAIN DESCRIPTION - DESCRIPTORS: DESCRIPTORS: DISCOMFORT

## 2024-01-23 ASSESSMENT — PAIN DESCRIPTION - PAIN TYPE: TYPE: ACUTE PAIN

## 2024-01-23 ASSESSMENT — PAIN SCALES - GENERAL: PAINLEVEL_OUTOF10: 10

## 2024-01-23 ASSESSMENT — PAIN - FUNCTIONAL ASSESSMENT: PAIN_FUNCTIONAL_ASSESSMENT: 0-10

## 2024-01-23 ASSESSMENT — PAIN DESCRIPTION - LOCATION: LOCATION: CHEST;BACK

## 2024-01-23 NOTE — ED PROVIDER NOTES
[facility]     Emergency Department     Faculty Attestation    I performed a history and physical examination of the patient and discussed management with the resident. I reviewed the resident’s note and agree with the documented findings including all diagnostic interpretations and plan of care. Any areas of disagreement are noted on the chart. I was personally present for the key portions of any procedures. I have documented in the chart those procedures where I was not present during the key portions. I have reviewed the emergency nurses triage note. I agree with the chief complaint, past medical history, past surgical history, allergies, medications, social and family history as documented unless otherwise noted below. Documentation of the HPI, Physical Exam and Medical Decision Making performed by abdiel is based on my personal performance of the HPI, PE and MDM. For Physician Assistant/ Nurse Practitioner cases/documentation I have personally evaluated this patient and have completed at least one if not all key elements of the E/M (history, physical exam, and MDM). Additional findings are as noted.    Primary Care Physician: Emperatriz Ortiz MD    Note Started: 3:55 PM EST     VITAL SIGNS:   oral temperature is 98.3 °F (36.8 °C). Her blood pressure is 187/109 (abnormal) and her pulse is 78. Her respiration is 18 and oxygen saturation is 100%.      Medical Decision Making  Several complaints, fall with back pain, nausea and vomiting.  Patient reports she slipped on ice earlier today, reports she fell onto buttocks and is pain in the low back.  Denies striking her head.  Since then she has had nausea vomiting and some abdominal pain which she attributes to the retching.  Does have a history of type 1 diabetes.  On exam ill-appearing, actively retching, cardiac exam regular rate and rhythm no murmurs rubs gallops, pulmonary clear bilaterally abdomen

## 2024-01-23 NOTE — ED TRIAGE NOTES
27 yo female arrived through Ed through triage with c/o mid/lower back pain along with chest pain after falling on ice this. Patient states she fell this morning and landed on back/coccyx. Patient is actively vomiting, diaphoretic, and ill appearing.

## 2024-01-23 NOTE — H&P
TRAUMA H&P/CONSULT    PATIENT NAME: Tran Deng  YOB: 1995  MEDICAL RECORD NO. [id number]   DATE: 1/23/2024  PRIMARY CARE PHYSICIAN: Emperatriz Ortiz MD  PATIENT EVALUATED AT THE REQUEST OF Dr. Turk    ACTIVATION   []Trauma Alert     [] Trauma Priority     [x]Trauma Consult: Time at bedside: 1730    Patient Active Problem List   Diagnosis    Type 1 diabetes mellitus with diabetic polyneuropathy (HCC)    Intractable vomiting with nausea    Intractable abdominal pain    Microalbuminuria due to type 1 diabetes mellitus (HCC)    Generalized abdominal pain    Restless leg syndrome    Protein-calorie malnutrition, severe (HCC)    Marijuana use, continuous    Onychomycosis    Cachexia (HCC)    Oligomenorrhea    Positive Tpal    Left breast mass    Latent syphilis    Premenopausal idiopathic osteoporosis    Osteoporosis    Closed nondisplaced fracture of fifth metacarpal bone of left hand with routine healing    Chronic gastritis without bleeding    Acute pain of left wrist    Iron deficiency anemia due to chronic blood loss    Iron malabsorption    KAIT (acute kidney injury) ([facility])    Dental infection    Bilious vomiting with nausea    Tobacco abuse       IMPRESSION AND PLAN:     upper 2nd sacral segment mild superior endplate compression and bony fragment seen anteriorly   Orthopedic Surgery consult - appreciate recommendations   MRI to further evaluate   Possible admission to observation unit    If intracranial hemorrhage is present, is it a:  [] BIG 1  [] BIG 2  [] BIG 3  If chest wall injury: Rib score___    CONSULT SERVICES    [] Neurosurgery     [x] Orthopedic Surgery    [] Cardiothoracic     [] Facial Trauma    [] Plastic Surgery (Burn)    [] Pediatric Surgery     [] Internal Medicine    [] Pulmonary Medicine    [] Geriatrics    [] Other:        HISTORY:     Chief Complaint:  \"Lower back pain\"    GENERAL DATA  Patient information was obtained from patient.  History/Exam limitations:  Medication/Dose: atorvastatin (Lipitor) 10 MG  Patient last seen by PCP: 3/1/2021  Next office visit with PCP: 8/10/2022  Last Lab:8/3/2021    Above information requires contacting patient: No    Prescription does not require PDMP check    Sent to provider to approve or deny       none.  Injury Date: 1/23/24   Approximate Injury Time: Unknown        Transport mode:   []Ambulance      [] Helicopter     [x]Car       [] Other  Referring Hospital:     SETTING OF TRAUMATIC EVENT   Location (e.g., home, farm, industry, street): Outside  Specific Details of Location (e.g., bedroom, kitchen, garage, highway): Unknown    MECHANISM OF INJURY    [] Motor Vehicle Collision   Specific vehicle type involved (e.g., sedan, minivan, SUV, pickup truck):     Type of collision  [] Single Vehicle Collision  []Multiple Vehicle Collision  [] unknown collision type  Collision with (e.g., type of vehicle, building, barn, ditch, tree):     Mechanism considerations  [] Fatality in Same Vehicle      []Ejected       []Rollover          []Extricated    Internal Compartment   []                      []Passenger:      []Front Seat        []Rear Seat     Personal Restraints  [] Unrestrained   []Lap Belt Only Restrained   [] Shoulder Belt Only Restrained  [] 3 Point Restrained  [] unknown     Air Bags  [] Front Air Bag  []Side Air Bag  []Curtain Airbag []Air Bag Not Deployed    []No Air Bag equipped in vehicle    Pediatric Consideration:      [] Booster Seat  []Infant Car Seat  [] Child Car Seat      [] Motorcycle     []Electric Bike/Moped   [] ATV   [] Bicycle/Scooter (manual)   Wearing Helmet     []Yes     []No    []Unknown         [x] Fall    [x]From Standing     []From Height __ Ft     []Down ___steps  []Other___    [] Assault with ____    [] Gunshot  Specify caliber / type of gun: ____________________________    [] Stabbing  Specify weapon type, size: _____________________________    [] Burn  []Flame   []Scald   []Electrical   []Chemical  []Inhalation   []House fire    [] Other ______________________________________________________    [] Eye protection  []Boots   []Flotation device   []Leather outerwear  []Sports gear []Other:___       HISTORY:     Tran Deng is a female that presented to the Emergency

## 2024-01-23 NOTE — ED PROVIDER NOTES
[facility] ED  Emergency Department Encounter  Emergency Medicine Resident     Pt Name:Tran Deng  MRN: [id number]  Birthdate 1995  Date of evaluation: 24  PCP:  Emperatriz Ortiz MD  Note Started: 2:37 PM EST      CHIEF COMPLAINT       Chief Complaint   Patient presents with    Fall     Mechanical fall slipped on ice    Back Pain    Chest Pain     \"From fall\"    Nausea     With emesis       HISTORY OF PRESENT ILLNESS  (Location/Symptom, Timing/Onset, Context/Setting, Quality, Duration, Modifying Factors, Severity.)      Tran Deng is a 28 y.o. female who presents with fall.  Patient states that she fell while she was walking on ice earlier today.  Denies hitting her head loss of conscious or blood thinner use.  States that she landed on her butt and is having lower back pain.  States that she is having nausea and vomiting and chest pain.  States that she has a history of type 2 diabetes however on chart states that she has type 1 diabetes.  Denies any radiating chest pain.  Denies prior history of DVTs or PEs.    PAST MEDICAL / SURGICAL / SOCIAL / FAMILY HISTORY      has a past medical history of Hypertension, Iron deficiency anemia due to chronic blood loss, Kidney infection, MRSA (methicillin resistant staph aureus) culture positive, Nausea & vomiting, Ovarian cyst, Secondary osteoporosis, Type 1 diabetes mellitus (HCC), and UTI (urinary tract infection).       has a past surgical history that includes  section and Upper gastrointestinal endoscopy (N/A, 2021).      Social History     Socioeconomic History    Marital status: Single     Spouse name: Not on file    Number of children: Not on file    Years of education: Not on file    Highest education level: Not on file   Occupational History    Not on file   Tobacco Use    Smoking status: Every Day     Current packs/day: 0.50     Types: Cigarettes    Smokeless tobacco: Never    Tobacco comments:  fracture, trauma and ortho s/o, f/u ortho 1 week, intractable n/v, going to be placed in obs if htn improved, s/p labetalol, home nifedipine added [AR]      ED Course User Index  [AR] Kirti Douglas MD  [GI] Louis Turk DO       PROCEDURES:      CONSULTS:  IP CONSULT TO TRAUMA SURGERY  IP CONSULT TO IV TEAM  IP CONSULT TO ORTHOPEDIC SURGERY    CRITICAL CARE:  There was significant risk of life threatening deterioration of patient's condition requiring my direct management. Critical care time minutes, excluding any documented procedures.    FINAL IMPRESSION      1. Cyclical vomiting          DISPOSITION / PLAN     DISPOSITION        PATIENT REFERRED TO:  No follow-up provider specified.    DISCHARGE MEDICATIONS:  New Prescriptions    No medications on file       Louis Turk DO  Emergency Medicine Resident    (Please note that portions of this note were completed with a voice recognition program.  Efforts were made to edit the dictations but occasionally words are mis-transcribed.)

## 2024-01-23 NOTE — ED NOTES
Patient will not stay on monitor, wants to drink water from sink and says nausea medicine is not working, jason blair

## 2024-01-23 NOTE — CONSULTS
Orthopedic Surgery Consult  (Dr. Cramer)      CC/Reason for consult:  S2 sacral fracture    HPI:      The patient is a 28 y.o. female who was sitting height on her buttocks when she slipped on ice.  Patient had immediate low back and sacral pain.  Patient was able to ambulate after the fall.  Patient denies any numbness, tingling, weakness.  Patient has significant nausea and emesis secondary to marijuana consumption.  Patient denies hitting her head or loss consciousness.  Patient retching when first evaluated though once nausea was under control was resting comfortably.  Patient has no other orthopedic place this time.    No chemical AC, Community Ambulator,   Prior orthopedic injuries/surgeries: Left hand fracture  Medical history: Type 1 diabetes, osteoporosis, latent syphilis, and remaining history as below    Past Medical History  Tran Deng has a past medical history of Hypertension, Iron deficiency anemia due to chronic blood loss, Kidney infection, MRSA (methicillin resistant staph aureus) culture positive, Nausea & vomiting, Ovarian cyst, Secondary osteoporosis, Type 1 diabetes mellitus (HCC), and UTI (urinary tract infection).    Past Surgical History  Tran Deng has a past surgical history that includes  section and Upper gastrointestinal endoscopy (N/A, 2021).    Current Medications  Reviewed. See EMR for details.    Allergies  Allergies reviewed: Blueberry [vaccinium angustifolium] and Shrimp flavor    Social History  Patient reports that she has been smoking cigarettes. She has never used smokeless tobacco. She reports current drug use. Drug: Marijuana (Weed). She reports that she does not drink alcohol.     Family History  Patient family history is not on file.     ROS:   General: Afebrile, no chills.  Nauseous  CV: No chest pain.  Resp: No SOB.  MSK: Low back pain  Neuro: No numbness, tingling, weakness  10 point ROS negative other than stated

## 2024-01-24 ENCOUNTER — APPOINTMENT (OUTPATIENT)
Dept: GENERAL RADIOLOGY | Age: 29
DRG: 683 | End: 2024-01-24
Payer: MEDICARE

## 2024-01-24 LAB
ANION GAP SERPL CALCULATED.3IONS-SCNC: 10 MMOL/L (ref 9–17)
BACTERIA URNS QL MICRO: ABNORMAL
BASOPHILS # BLD: <0.03 K/UL (ref 0–0.2)
BASOPHILS NFR BLD: 0 % (ref 0–2)
BILIRUB UR QL STRIP: NEGATIVE
BUN SERPL-MCNC: 15 MG/DL (ref 6–20)
CALCIUM SERPL-MCNC: 8.1 MG/DL (ref 8.6–10.4)
CASTS #/AREA URNS LPF: ABNORMAL /LPF (ref 0–8)
CHLORIDE SERPL-SCNC: 110 MMOL/L (ref 98–107)
CLARITY UR: ABNORMAL
CO2 SERPL-SCNC: 18 MMOL/L (ref 20–31)
COLOR UR: YELLOW
CREAT SERPL-MCNC: 1.1 MG/DL (ref 0.5–0.9)
EKG ATRIAL RATE: 90 BPM
EKG P AXIS: 80 DEGREES
EKG P-R INTERVAL: 92 MS
EKG Q-T INTERVAL: 392 MS
EKG QRS DURATION: 82 MS
EKG QTC CALCULATION (BAZETT): 479 MS
EKG R AXIS: 82 DEGREES
EKG T AXIS: 4 DEGREES
EKG VENTRICULAR RATE: 90 BPM
EOSINOPHIL # BLD: <0.03 K/UL (ref 0–0.44)
EOSINOPHILS RELATIVE PERCENT: 0 % (ref 1–4)
EPI CELLS #/AREA URNS HPF: ABNORMAL /HPF (ref 0–5)
ERYTHROCYTE [DISTWIDTH] IN BLOOD BY AUTOMATED COUNT: 15.1 % (ref 11.8–14.4)
GFR SERPL CREATININE-BSD FRML MDRD: >60 ML/MIN/1.73M2
GLUCOSE BLD-MCNC: 185 MG/DL (ref 65–105)
GLUCOSE BLD-MCNC: 201 MG/DL (ref 65–105)
GLUCOSE BLD-MCNC: 244 MG/DL (ref 65–105)
GLUCOSE BLD-MCNC: 249 MG/DL (ref 65–105)
GLUCOSE BLD-MCNC: 61 MG/DL (ref 65–105)
GLUCOSE BLD-MCNC: 70 MG/DL (ref 65–105)
GLUCOSE SERPL-MCNC: 209 MG/DL (ref 70–99)
GLUCOSE UR STRIP-MCNC: NEGATIVE MG/DL
HCT VFR BLD AUTO: 29.2 % (ref 36.3–47.1)
HGB BLD-MCNC: 10.2 G/DL (ref 11.9–15.1)
HGB UR QL STRIP.AUTO: ABNORMAL
IMM GRANULOCYTES # BLD AUTO: 0.07 K/UL (ref 0–0.3)
IMM GRANULOCYTES NFR BLD: 1 %
KETONES UR STRIP-MCNC: NEGATIVE MG/DL
LEUKOCYTE ESTERASE UR QL STRIP: ABNORMAL
LYMPHOCYTES NFR BLD: 1.59 K/UL (ref 1.1–3.7)
LYMPHOCYTES RELATIVE PERCENT: 12 % (ref 24–43)
MCH RBC QN AUTO: 32.3 PG (ref 25.2–33.5)
MCHC RBC AUTO-ENTMCNC: 34.9 G/DL (ref 28.4–34.8)
MCV RBC AUTO: 92.4 FL (ref 82.6–102.9)
MONOCYTES NFR BLD: 0.94 K/UL (ref 0.1–1.2)
MONOCYTES NFR BLD: 7 % (ref 3–12)
NEUTROPHILS NFR BLD: 80 % (ref 36–65)
NEUTS SEG NFR BLD: 10.17 K/UL (ref 1.5–8.1)
NITRITE UR QL STRIP: NEGATIVE
NRBC BLD-RTO: 0 PER 100 WBC
PH UR STRIP: 6.5 [PH] (ref 5–8)
PLATELET # BLD AUTO: 320 K/UL (ref 138–453)
PMV BLD AUTO: 9.8 FL (ref 8.1–13.5)
POTASSIUM SERPL-SCNC: 4 MMOL/L (ref 3.7–5.3)
PROT UR STRIP-MCNC: ABNORMAL MG/DL
RBC # BLD AUTO: 3.16 M/UL (ref 3.95–5.11)
RBC # BLD: ABNORMAL 10*6/UL
RBC #/AREA URNS HPF: ABNORMAL /HPF (ref 0–4)
SODIUM SERPL-SCNC: 138 MMOL/L (ref 135–144)
SP GR UR STRIP: 1.01 (ref 1–1.03)
UROBILINOGEN UR STRIP-ACNC: NORMAL EU/DL (ref 0–1)
WBC #/AREA URNS HPF: ABNORMAL /HPF (ref 0–5)
WBC OTHER # BLD: 12.8 K/UL (ref 3.5–11.3)

## 2024-01-24 PROCEDURE — 6360000002 HC RX W HCPCS: Performed by: NURSE PRACTITIONER

## 2024-01-24 PROCEDURE — 36415 COLL VENOUS BLD VENIPUNCTURE: CPT

## 2024-01-24 PROCEDURE — G0378 HOSPITAL OBSERVATION PER HR: HCPCS

## 2024-01-24 PROCEDURE — 96375 TX/PRO/DX INJ NEW DRUG ADDON: CPT

## 2024-01-24 PROCEDURE — 6370000000 HC RX 637 (ALT 250 FOR IP): Performed by: NURSE PRACTITIONER

## 2024-01-24 PROCEDURE — 72220 X-RAY EXAM SACRUM TAILBONE: CPT

## 2024-01-24 PROCEDURE — 93010 ELECTROCARDIOGRAM REPORT: CPT | Performed by: INTERNAL MEDICINE

## 2024-01-24 PROCEDURE — 99222 1ST HOSP IP/OBS MODERATE 55: CPT | Performed by: STUDENT IN AN ORGANIZED HEALTH CARE EDUCATION/TRAINING PROGRAM

## 2024-01-24 PROCEDURE — 6370000000 HC RX 637 (ALT 250 FOR IP)

## 2024-01-24 PROCEDURE — 96376 TX/PRO/DX INJ SAME DRUG ADON: CPT

## 2024-01-24 PROCEDURE — 2580000003 HC RX 258

## 2024-01-24 PROCEDURE — 96372 THER/PROPH/DIAG INJ SC/IM: CPT

## 2024-01-24 PROCEDURE — 96361 HYDRATE IV INFUSION ADD-ON: CPT

## 2024-01-24 PROCEDURE — 96365 THER/PROPH/DIAG IV INF INIT: CPT

## 2024-01-24 PROCEDURE — 85025 COMPLETE CBC W/AUTO DIFF WBC: CPT

## 2024-01-24 PROCEDURE — 81001 URINALYSIS AUTO W/SCOPE: CPT

## 2024-01-24 PROCEDURE — 87186 SC STD MICRODIL/AGAR DIL: CPT

## 2024-01-24 PROCEDURE — 80048 BASIC METABOLIC PNL TOTAL CA: CPT

## 2024-01-24 PROCEDURE — 6360000002 HC RX W HCPCS

## 2024-01-24 PROCEDURE — 2580000003 HC RX 258: Performed by: NURSE PRACTITIONER

## 2024-01-24 PROCEDURE — 87086 URINE CULTURE/COLONY COUNT: CPT

## 2024-01-24 PROCEDURE — 6370000000 HC RX 637 (ALT 250 FOR IP): Performed by: STUDENT IN AN ORGANIZED HEALTH CARE EDUCATION/TRAINING PROGRAM

## 2024-01-24 PROCEDURE — 6360000002 HC RX W HCPCS: Performed by: STUDENT IN AN ORGANIZED HEALTH CARE EDUCATION/TRAINING PROGRAM

## 2024-01-24 PROCEDURE — 87088 URINE BACTERIA CULTURE: CPT

## 2024-01-24 PROCEDURE — 82947 ASSAY GLUCOSE BLOOD QUANT: CPT

## 2024-01-24 RX ORDER — PROCHLORPERAZINE EDISYLATE 5 MG/ML
10 INJECTION INTRAMUSCULAR; INTRAVENOUS ONCE
Status: COMPLETED | OUTPATIENT
Start: 2024-01-24 | End: 2024-01-24

## 2024-01-24 RX ORDER — HYDRALAZINE HYDROCHLORIDE 20 MG/ML
10 INJECTION INTRAMUSCULAR; INTRAVENOUS EVERY 6 HOURS PRN
Status: DISCONTINUED | OUTPATIENT
Start: 2024-01-24 | End: 2024-01-26 | Stop reason: HOSPADM

## 2024-01-24 RX ORDER — NICARDIPINE HYDROCHLORIDE 0.1 MG/ML
2.5-15 INJECTION INTRAVENOUS CONTINUOUS
Status: DISCONTINUED | OUTPATIENT
Start: 2024-01-24 | End: 2024-01-26

## 2024-01-24 RX ORDER — SCOLOPAMINE TRANSDERMAL SYSTEM 1 MG/1
1 PATCH, EXTENDED RELEASE TRANSDERMAL
Status: DISCONTINUED | OUTPATIENT
Start: 2024-01-24 | End: 2024-01-26 | Stop reason: HOSPADM

## 2024-01-24 RX ORDER — SODIUM CHLORIDE 0.9 % (FLUSH) 0.9 %
5-40 SYRINGE (ML) INJECTION EVERY 12 HOURS SCHEDULED
Status: DISCONTINUED | OUTPATIENT
Start: 2024-01-24 | End: 2024-01-26 | Stop reason: HOSPADM

## 2024-01-24 RX ORDER — INSULIN LISPRO 100 [IU]/ML
0-8 INJECTION, SOLUTION INTRAVENOUS; SUBCUTANEOUS
Status: DISCONTINUED | OUTPATIENT
Start: 2024-01-24 | End: 2024-01-26 | Stop reason: HOSPADM

## 2024-01-24 RX ORDER — NICARDIPINE HYDROCHLORIDE 0.1 MG/ML
2.5-15 INJECTION INTRAVENOUS CONTINUOUS
Status: DISCONTINUED | OUTPATIENT
Start: 2024-01-24 | End: 2024-01-24

## 2024-01-24 RX ORDER — ONDANSETRON 2 MG/ML
4 INJECTION INTRAMUSCULAR; INTRAVENOUS EVERY 6 HOURS PRN
Status: DISCONTINUED | OUTPATIENT
Start: 2024-01-24 | End: 2024-01-26 | Stop reason: HOSPADM

## 2024-01-24 RX ORDER — POTASSIUM CHLORIDE 7.45 MG/ML
10 INJECTION INTRAVENOUS PRN
Status: DISCONTINUED | OUTPATIENT
Start: 2024-01-24 | End: 2024-01-26 | Stop reason: HOSPADM

## 2024-01-24 RX ORDER — ERGOCALCIFEROL 1.25 MG/1
50000 CAPSULE ORAL WEEKLY
Qty: 8 CAPSULE | Refills: 0 | Status: SHIPPED | OUTPATIENT
Start: 2024-01-24

## 2024-01-24 RX ORDER — INSULIN LISPRO 100 [IU]/ML
0-4 INJECTION, SOLUTION INTRAVENOUS; SUBCUTANEOUS NIGHTLY
Status: DISCONTINUED | OUTPATIENT
Start: 2024-01-24 | End: 2024-01-26 | Stop reason: HOSPADM

## 2024-01-24 RX ORDER — ACETAMINOPHEN 650 MG/1
650 SUPPOSITORY RECTAL EVERY 6 HOURS PRN
Status: DISCONTINUED | OUTPATIENT
Start: 2024-01-24 | End: 2024-01-26 | Stop reason: HOSPADM

## 2024-01-24 RX ORDER — SODIUM CHLORIDE 9 MG/ML
INJECTION, SOLUTION INTRAVENOUS CONTINUOUS
Status: ACTIVE | OUTPATIENT
Start: 2024-01-24 | End: 2024-01-26

## 2024-01-24 RX ORDER — POTASSIUM CHLORIDE 20 MEQ/1
40 TABLET, EXTENDED RELEASE ORAL PRN
Status: DISCONTINUED | OUTPATIENT
Start: 2024-01-24 | End: 2024-01-26 | Stop reason: HOSPADM

## 2024-01-24 RX ORDER — MAGNESIUM SULFATE 1 G/100ML
1000 INJECTION INTRAVENOUS PRN
Status: DISCONTINUED | OUTPATIENT
Start: 2024-01-24 | End: 2024-01-26 | Stop reason: HOSPADM

## 2024-01-24 RX ORDER — SODIUM CHLORIDE 9 MG/ML
INJECTION, SOLUTION INTRAVENOUS PRN
Status: DISCONTINUED | OUTPATIENT
Start: 2024-01-24 | End: 2024-01-26 | Stop reason: HOSPADM

## 2024-01-24 RX ORDER — ACETAMINOPHEN 325 MG/1
650 TABLET ORAL EVERY 6 HOURS PRN
Status: DISCONTINUED | OUTPATIENT
Start: 2024-01-24 | End: 2024-01-26 | Stop reason: HOSPADM

## 2024-01-24 RX ORDER — SODIUM CHLORIDE 0.9 % (FLUSH) 0.9 %
10 SYRINGE (ML) INJECTION PRN
Status: DISCONTINUED | OUTPATIENT
Start: 2024-01-24 | End: 2024-01-26 | Stop reason: HOSPADM

## 2024-01-24 RX ORDER — POLYETHYLENE GLYCOL 3350 17 G/17G
17 POWDER, FOR SOLUTION ORAL DAILY PRN
Status: DISCONTINUED | OUTPATIENT
Start: 2024-01-24 | End: 2024-01-26 | Stop reason: HOSPADM

## 2024-01-24 RX ORDER — INSULIN GLARGINE 100 [IU]/ML
10 INJECTION, SOLUTION SUBCUTANEOUS NIGHTLY
Status: DISCONTINUED | OUTPATIENT
Start: 2024-01-24 | End: 2024-01-26 | Stop reason: HOSPADM

## 2024-01-24 RX ORDER — ONDANSETRON 4 MG/1
4 TABLET, ORALLY DISINTEGRATING ORAL EVERY 8 HOURS PRN
Status: DISCONTINUED | OUTPATIENT
Start: 2024-01-24 | End: 2024-01-26 | Stop reason: HOSPADM

## 2024-01-24 RX ORDER — OXYCODONE HYDROCHLORIDE AND ACETAMINOPHEN 5; 325 MG/1; MG/1
1 TABLET ORAL EVERY 4 HOURS PRN
Status: DISCONTINUED | OUTPATIENT
Start: 2024-01-24 | End: 2024-01-26 | Stop reason: HOSPADM

## 2024-01-24 RX ADMIN — SODIUM CHLORIDE: 9 INJECTION, SOLUTION INTRAVENOUS at 03:02

## 2024-01-24 RX ADMIN — TIZANIDINE 4 MG: 4 TABLET ORAL at 21:07

## 2024-01-24 RX ADMIN — INSULIN LISPRO 2 UNITS: 100 INJECTION, SOLUTION INTRAVENOUS; SUBCUTANEOUS at 17:01

## 2024-01-24 RX ADMIN — HYDRALAZINE HYDROCHLORIDE 10 MG: 20 INJECTION INTRAMUSCULAR; INTRAVENOUS at 15:24

## 2024-01-24 RX ADMIN — NIFEDIPINE 60 MG: 30 TABLET, FILM COATED, EXTENDED RELEASE ORAL at 09:00

## 2024-01-24 RX ADMIN — ONDANSETRON 4 MG: 2 INJECTION INTRAMUSCULAR; INTRAVENOUS at 11:24

## 2024-01-24 RX ADMIN — ROPINIROLE HYDROCHLORIDE 0.25 MG: 0.25 TABLET, FILM COATED ORAL at 21:02

## 2024-01-24 RX ADMIN — ONDANSETRON 4 MG: 2 INJECTION INTRAMUSCULAR; INTRAVENOUS at 05:35

## 2024-01-24 RX ADMIN — PROCHLORPERAZINE EDISYLATE 10 MG: 5 INJECTION INTRAMUSCULAR; INTRAVENOUS at 06:43

## 2024-01-24 RX ADMIN — PANTOPRAZOLE SODIUM 40 MG: 40 TABLET, DELAYED RELEASE ORAL at 06:31

## 2024-01-24 RX ADMIN — ACETAMINOPHEN 650 MG: 325 TABLET ORAL at 06:31

## 2024-01-24 RX ADMIN — ENOXAPARIN SODIUM 40 MG: 100 INJECTION SUBCUTANEOUS at 09:00

## 2024-01-24 RX ADMIN — INSULIN LISPRO 2 UNITS: 100 INJECTION, SOLUTION INTRAVENOUS; SUBCUTANEOUS at 09:02

## 2024-01-24 RX ADMIN — NICARDIPINE HYDROCHLORIDE 2.5 MG/HR: 0.1 INJECTION INTRAVENOUS at 00:58

## 2024-01-24 RX ADMIN — TIZANIDINE 4 MG: 4 TABLET ORAL at 05:16

## 2024-01-24 RX ADMIN — SODIUM CHLORIDE, PRESERVATIVE FREE 10 ML: 5 INJECTION INTRAVENOUS at 21:02

## 2024-01-24 RX ADMIN — AMITRIPTYLINE HYDROCHLORIDE 10 MG: 10 TABLET, FILM COATED ORAL at 21:01

## 2024-01-24 RX ADMIN — SODIUM CHLORIDE, PRESERVATIVE FREE 10 ML: 5 INJECTION INTRAVENOUS at 21:08

## 2024-01-24 RX ADMIN — OXYCODONE HYDROCHLORIDE AND ACETAMINOPHEN 1 TABLET: 5; 325 TABLET ORAL at 21:02

## 2024-01-24 ASSESSMENT — PAIN SCALES - GENERAL
PAINLEVEL_OUTOF10: 9
PAINLEVEL_OUTOF10: 7
PAINLEVEL_OUTOF10: 9
PAINLEVEL_OUTOF10: 9
PAINLEVEL_OUTOF10: 8

## 2024-01-24 ASSESSMENT — ENCOUNTER SYMPTOMS
BACK PAIN: 1
NAUSEA: 1
VOMITING: 1
RESPIRATORY NEGATIVE: 1

## 2024-01-24 ASSESSMENT — PAIN DESCRIPTION - PAIN TYPE: TYPE: ACUTE PAIN

## 2024-01-24 ASSESSMENT — PAIN DESCRIPTION - DESCRIPTORS: DESCRIPTORS: DISCOMFORT

## 2024-01-24 ASSESSMENT — PAIN DESCRIPTION - LOCATION: LOCATION: CHEST;BACK

## 2024-01-24 NOTE — CARE COORDINATION
SBIRT- completed  Met with pt this date with RN present  Pt denies any alcohol or drug use.  Pt also denies having any SI or feelings of depression.              Alcohol Screening and Brief Intervention        No results for input(s): \"ALC\" in the last 72 hours.    Alcohol Pre-screening     (WOMEN ONLY) How many times in the past year have you had 4 or more drinks in a day?: None       Drug Pre-Screening none       Drug Screening DAST       Mood Pre-Screening (PHQ-2)  During the past 2 weeks, have you been bothered by, feeling down, depressed or hopeless?  No        I have interviewed Tran Deng, [phone] regarding  Her alcohol consumption/drug use and risk for excessive use. Screenings were negative.  Patient  N/A intervention at this time.     Deferred []    Completed on: 1/24/2024   LETICIA MARCIAL

## 2024-01-24 NOTE — PROGRESS NOTES
Tran Deng was evaluated today and a DME order was entered for a standard walker because she requires this to successfully complete daily living tasks of ambulating.  A standard walker is necessary due to the patient's impaired ambulation and mobility restrictions and she can ambulate only by using a walker instead of a lesser assistive device, such as a cane or crutch.  The need for this equipment was discussed with the patient and she understands and is in agreement.

## 2024-01-24 NOTE — DISCHARGE INSTRUCTIONS
Orthopedic Instructions:  -Weight bearing status: Weight bearing as tolerated with walker till follow-up for the right leg and left leg  -Utilize TLSO with ambulation, OK to remove for hygiene.  -Always look for signs of compartment syndrome: pain out of proportion to the injury, pain not controlled with pain medication, numbness in digits, changing of color of digits (paleness). If these signs occur return to ED immediately for reassessment.   -Always work on motion (to non-injured toes) to decrease swelling.  -Ok to shower but no soaks in a bath, hot tub, pool, etc  -Ice (20 minutes on and off 1 hour) and elevate above the level of the heart to reduce swelling and throbbing pain.  -Drink plenty of fluids.  -Call the office or come to Emergency Room if signs of infection appear (hot, swollen, red, draining pus, fever)  -Take medications as prescribed.  -Wean off narcotics (percocet/norco) as soon as possible. Do not take tylenol if still taking narcotics.  -Follow up with Dr. Cramer in their office on 2/6/23 at 9 am after injury. Call [phone] to schedule/confirm.    
Attending Only

## 2024-01-24 NOTE — ED NOTES
ED to inpatient nurses report      Chief Complaint:  Chief Complaint   Patient presents with    Fall     Mechanical fall slipped on ice    Back Pain    Chest Pain     \"From fall\"    Nausea     With emesis     Present to ED from: Home    MOA:     LOC: alert and orientated to name, place, date  Mobility: Requires assistance * 1  Oxygen Baseline: RA    Current needs required: N/A   Pending ED orders: urine  Present condition: stable    Why did the patient come to the ED?   27 yo female arrived through Ed through triage with c/o mid/lower back pain along with chest pain after falling on ice this. Patient states she fell this morning and landed on back/coccyx.  What is the plan?   Admission due to cyclical vomiting and elevated BP  Any procedures or intervention occur?   Sx control  Any safety concerns??    Mental Status:  Level of Consciousness: Alert (0)    Psych Assessment:   Psychosocial  Psychosocial (WDL): Within Defined Limits  Vital signs   Vitals:    01/23/24 2045 01/23/24 2049 01/23/24 2100 01/23/24 2106   BP: (!) 196/104  (!) 207/103 (!) 188/98   Pulse: 75  73 74   Resp: 24 19 22 20   Temp:       TempSrc:       SpO2:            Vitals:  Patient Vitals for the past 24 hrs:   BP Temp Temp src Pulse Resp SpO2   01/23/24 2106 (!) 188/98 -- -- 74 20 --   01/23/24 2100 (!) 207/103 -- -- 73 22 --   01/23/24 2049 -- -- -- -- 19 --   01/23/24 2045 (!) 196/104 -- -- 75 24 --   01/23/24 2030 (!) 195/89 -- -- 77 -- --   01/23/24 2019 (!) 199/108 -- -- 73 -- 97 %   01/23/24 2015 (!) 200/102 -- -- 76 -- 99 %   01/23/24 2000 (!) 209/105 -- -- 71 -- 98 %   01/23/24 1957 (!) 181/105 -- -- 82 -- 98 %   01/23/24 1945 -- -- -- 70 -- 99 %   01/23/24 1938 -- -- -- 72 19 100 %   01/23/24 1648 -- -- -- 68 16 100 %   01/23/24 1646 -- -- -- 80 24 --   01/23/24 1645 (!) 211/106 -- -- 69 24 --   01/23/24 1317 (!) 187/109 -- -- -- -- --   01/23/24 1316 -- 98.3 °F (36.8 °C) Oral -- -- --   01/23/24 1313 -- -- -- 78 18 100 %      Visit  nightly    AMITRIPTYLINE (ELAVIL) 10 MG TABLET    Take 1 tablet by mouth nightly for 14 days    AMOXICILLIN (AMOXIL) 875 MG TABLET    Take 1 tablet by mouth 2 times daily for 7 days    BLOOD GLUCOSE MONITORING SUPPL (ONE TOUCH ULTRA 2) W/DEVICE KIT    1 kit by Does not apply route in the morning, at noon, in the evening, and at bedtime    BLOOD GLUCOSE TEST STRIPS (TRUE METRIX BLOOD GLUCOSE TEST) STRIP    CHECK BLOOD SUGAR FOUR TIMES DAILY AS DIRECTED    CONTINUOUS BLOOD GLUC  (DEXCOM G6 ) MARIIA    1 each by Does not apply route daily    CONTINUOUS BLOOD GLUC SENSOR (DEXCOM G6 SENSOR) MISC    1 each by Does not apply route daily    CONTINUOUS BLOOD GLUC TRANSMIT (DEXCOM G6 TRANSMITTER) MISC    1 each by Does not apply route every 3 months    DICYCLOMINE (BENTYL) 10 MG CAPSULE    TAKE 1 CAPSULE THREE TIMES DAILY AS NEEDED FOR ABDOMINAL PAIN    DIPHENHYDRAMINE (SOMINEX) 25 MG TABLET    Take 1 tablet by mouth every 4-6 hours as needed for Sleep (migraine)    INSULIN ASPART (NOVOLOG FLEXPEN) 100 UNIT/ML INJECTION PEN    INJECT 2 TO 12 UNITS SUBCUTANEOUSLY THREE TIMES DAILY PER SLIDING SCALE (DISCARD PEN 28 DAYS AFTER OPENING)    INSULIN GLARGINE (LANTUS SOLOSTAR) 100 UNIT/ML INJECTION PEN    INJECT 10 UNITS INTO THE SKIN NIGHTLY (DISCARD PEN 28 DAYS AFTER OPENING)    INSULIN PEN NEEDLE (DROPLET PEN NEEDLES) 31G X 8 MM MISC    USE FOUR TIMES DAILY AS DIRECTED    KETOCONAZOLE (NIZORAL) 2 % CREAM    Use twice daily    LORATADINE (CLARITIN) 10 MG TABLET    Take 1 tablet by mouth daily    NIFEDIPINE (NIFEDICAL XL) 60 MG EXTENDED RELEASE TABLET    Take 1 tablet by mouth daily    NUTRITIONAL SUPPLEMENTS (ENSURE HIGH PROTEIN) LIQD    Take 1 Bottle by mouth in the morning, at noon, and at bedtime    NUTRITIONAL SUPPLEMENTS (GLUCERNA 1.5 REJI) LIQD    Take 1 each by mouth in the morning, at noon, and at bedtime    ONDANSETRON (ZOFRAN) 4 MG TABLET    As needed    PANTOPRAZOLE (PROTONIX) 40 MG TABLET    Take 1 tablet  by mouth every morning (before breakfast)    RIZATRIPTAN (MAXALT) 10 MG TABLET    Take 1 tablet by mouth daily as needed for Migraine May repeat in 2 hours if needed    ROPINIROLE (REQUIP) 0.25 MG TABLET    Take 1 tablet by mouth nightly    TIZANIDINE (ZANAFLEX) 4 MG TABLET    Take 1 tablet by mouth every 8 hours as needed (back pain)    TRUEPLUS LANCETS 33G MISC    TEST BLOOD SUGAR FOUR TIMES DAILY     Orders Placed This Encounter   Medications    ondansetron (ZOFRAN) injection 4 mg    ketorolac (TORADOL) injection 15 mg    sodium chloride 0.9 % bolus 1,000 mL    promethazine (PHENERGAN) injection 25 mg    ketorolac (TORADOL) injection 30 mg    DISCONTD: prochlorperazine (COMPAZINE) injection 10 mg    DISCONTD: diphenhydrAMINE (BENADRYL) injection 25 mg    DISCONTD: diphenhydrAMINE (BENADRYL) injection 25 mg    haloperidol lactate (HALDOL) injection 5 mg    labetalol (NORMODYNE;TRANDATE) injection 10 mg    DISCONTD: NIFEdipine (ADALAT CC) extended release tablet 60 mg    NIFEdipine (ADALAT CC) extended release tablet 60 mg       SURGICAL HISTORY       Past Surgical History:   Procedure Laterality Date     SECTION      UPPER GASTROINTESTINAL ENDOSCOPY N/A 2021    EGD BIOPSY performed by Liz Banegas MD at [facility] OR       PAST MEDICAL HISTORY       Past Medical History:   Diagnosis Date    Hypertension     Iron deficiency anemia due to chronic blood loss 2023    Kidney infection     MRSA (methicillin resistant staph aureus) culture positive 2016    abdomen    Nausea & vomiting     Ovarian cyst 2019    Right side    Secondary osteoporosis     Type 1 diabetes mellitus (HCC)     UTI (urinary tract infection)        Labs:  Labs Reviewed   CBC WITH AUTO DIFFERENTIAL - Abnormal; Notable for the following components:       Result Value    WBC 14.1 (*)     RDW 15.5 (*)     Neutrophils % 66 (*)     Lymphocytes % 22 (*)     Immature Granulocytes 1 (*)     Neutrophils Absolute 9.33 (*)     Monocytes

## 2024-01-24 NOTE — ED NOTES
Pt received Benadryl, Reglan and Labetalol. (See MAR)  Pt can be heard dry heaving.   Pt remains on cardiac monitor with call light within reach.

## 2024-01-24 NOTE — ED PROVIDER NOTES
[facility] ED  Emergency Department  Emergency Medicine Resident Sign-out     Care of Tran Deng was assumed from Dr. Douglas and is being seen for Fall (Mechanical fall slipped on ice), Back Pain, Chest Pain (\"From fall\"), and Nausea (With emesis)  .  The patient's initial evaluation and plan have been discussed with the prior provider who initially evaluated the patient.     EMERGENCY DEPARTMENT COURSE / MEDICAL DECISION MAKING:       MEDICATIONS GIVEN:  Orders Placed This Encounter   Medications    ondansetron (ZOFRAN) injection 4 mg    ketorolac (TORADOL) injection 15 mg    sodium chloride 0.9 % bolus 1,000 mL    promethazine (PHENERGAN) injection 25 mg    ketorolac (TORADOL) injection 30 mg    DISCONTD: prochlorperazine (COMPAZINE) injection 10 mg    DISCONTD: diphenhydrAMINE (BENADRYL) injection 25 mg    DISCONTD: diphenhydrAMINE (BENADRYL) injection 25 mg    haloperidol lactate (HALDOL) injection 5 mg    labetalol (NORMODYNE;TRANDATE) injection 10 mg    DISCONTD: NIFEdipine (ADALAT CC) extended release tablet 60 mg    DISCONTD: NIFEdipine (ADALAT CC) extended release tablet 60 mg    metoclopramide (REGLAN) injection 10 mg    diphenhydrAMINE (BENADRYL) injection 25 mg    labetalol (NORMODYNE;TRANDATE) injection 10 mg    sodium chloride flush 0.9 % injection 5-40 mL    sodium chloride flush 0.9 % injection 5-40 mL    0.9 % sodium chloride infusion    enoxaparin (LOVENOX) injection 40 mg     Order Specific Question:   Indication of Use     Answer:   Prophylaxis-DVT/PE    acetaminophen (TYLENOL) tablet 650 mg    DISCONTD: ondansetron (ZOFRAN-ODT) disintegrating tablet 4 mg    DISCONTD: ondansetron (ZOFRAN) injection 4 mg    DISCONTD: dextrose 5 % and 0.45 % sodium chloride infusion    amitriptyline (ELAVIL) tablet 10 mg    NIFEdipine (PROCARDIA XL) extended release tablet 60 mg    pantoprazole (PROTONIX) tablet 40 mg    rOPINIRole (REQUIP) tablet 0.25 mg    tiZANidine (ZANAFLEX)  from Dr. Douglas. [HO]      ED Course User Index  [AR] Kirti Douglas MD  [GI] Louis Turk DO  [HO] Carlita Saravia MD       OUTSTANDING TASKS / RECOMMENDATIONS:    Admission orders pending     FINAL IMPRESSION:     1. Cyclical vomiting        DISPOSITION:         DISPOSITION:  []  Discharge   []  Transfer -    [x]  Admission -internal medicine   []  Against Medical Advice   []  Eloped   FOLLOW-UP: No follow-up provider specified.   DISCHARGE MEDICATIONS: Current Discharge Medication List        START taking these medications    Details   vitamin D (ERGOCALCIFEROL) 1.25 MG (75025 UT) CAPS capsule Take 1 capsule by mouth once a week  Qty: 8 capsule, Refills: 0                Carlita Saravia MD  Emergency Medicine Resident  [facility]

## 2024-01-24 NOTE — H&P
[facility]  Office: [phone]  Hardeep Augustine DO, Rod Slaughter DO, Dae Corbett DO, Freddy Guzman DO, Mau Valerio MD, Kindra Marshall MD, Torri Pettit MD, Janette Bryant MD,  Nito Quigley MD, Koki Mcintyre MD, William German MD,  Bry Lutz DO, Kendall Yeager MD, Juan Vasquez MD, Alf Augustine DO, Shahana Delarosa MD,  Aung Parry DO, Chika Wall MD, Genoveva Costa MD, Echo Espinosa MD, Prema Dodd MD,  Tung De La O MD, Anita Billingsley MD, Aravind Cardoso MD, Marlon Arias MD, Leonidas Garcia MD, Peg Resendiz MD, Juno Santacruz DO, Edmond Singh DO, Marcio Roa MD,  Ari Chun MD, Shirley Waterhouse, CNP,  Sushila Paige, CNP, Wagner Rodriguez, CNP,  Genny Agrawal, IAN, Shreya Ding, CNP, Diane Hayden, CNP, Carly Hamilton CNP, Alexandra Suarez, CNP, Patricia Gary, CNP, Demi Linder, PA-C, Tere Cota PA-C, Sherry Lo, CNP, Fatou Sneed, CNS, Gracia Menchaca, CNP, Antoinette Gordillo, CNP, Tracy Schwab, CNP         [facility]   IN-PATIENT SERVICE   [facility]    HISTORY AND PHYSICAL EXAMINATION            Date:   1/24/2024  Patient name:  Tran Deng  Date of admission:  1/23/2024  1:54 PM  MRN:   [id number]  Account:  [id number]  YOB: 1995  PCP:    Emperatriz Ortiz MD  Room:   OBS 12/12-1  Code Status:    Full Code    Chief Complaint:     Chief Complaint   Patient presents with    Fall     Mechanical fall slipped on ice    Back Pain    Chest Pain     \"From fall\"    Nausea     With emesis       History Obtained From:     patient    History of Present Illness:     Tran Deng is a 28 y.o. Non- / non  female who presents with Fall (Mechanical fall slipped on ice), Back Pain, Chest Pain (\"From fall\"), and Nausea (With emesis)   and is admitted to the hospital for the management of Intractable nausea and vomiting.    48-year-old female with past medical history of hypertension,  °F (36.8 °C), Min:98.2 °F (36.8 °C), Max:98.2 °F (36.8 °C)    Recent Labs     01/24/24  0011 01/24/24  0512 01/24/24  0856 01/24/24  1123   POCGLU 185* 201* 249* 70     No intake or output data in the 24 hours ending 01/24/24 1427    Physical Exam  Constitutional:       General: She is not in acute distress.     Appearance: Normal appearance.   HENT:      Head: Normocephalic.      Mouth/Throat:      Mouth: Mucous membranes are moist.   Eyes:      General: No scleral icterus.     Extraocular Movements: Extraocular movements intact.      Pupils: Pupils are equal, round, and reactive to light.   Cardiovascular:      Rate and Rhythm: Normal rate and regular rhythm.      Heart sounds: No murmur heard.  Pulmonary:      Effort: Pulmonary effort is normal. No respiratory distress.      Breath sounds: Normal breath sounds. No wheezing or rales.   Abdominal:      General: There is no distension.      Tenderness: There is no abdominal tenderness. There is no rebound.   Musculoskeletal:         General: No tenderness or deformity.      Cervical back: Normal range of motion. No rigidity or tenderness.      Right lower leg: No edema.      Left lower leg: No edema.   Skin:     Coloration: Skin is not jaundiced.      Findings: No lesion or rash.   Neurological:      General: No focal deficit present.      Mental Status: She is alert and oriented to person, place, and time.      Sensory: No sensory deficit.      Motor: No weakness.   Psychiatric:         Mood and Affect: Mood normal.         Investigations:      Laboratory Testing:  Recent Results (from the past 24 hour(s))   CBC with Auto Differential    Collection Time: 01/23/24  2:40 PM   Result Value Ref Range    WBC 14.1 (H) 3.5 - 11.3 k/uL    RBC 3.99 3.95 - 5.11 m/uL    Hemoglobin 12.9 11.9 - 15.1 g/dL    Hematocrit 37.6 36.3 - 47.1 %    MCV 94.2 82.6 - 102.9 fL    MCH 32.3 25.2 - 33.5 pg    MCHC 34.3 28.4 - 34.8 g/dL    RDW 15.5 (H) 11.8 - 14.4 %    Platelets 413 138 - 453  suggestion of mild superior endplate compression and bony fragment seen anteriorly. This can best be evaluated on the lateral view. 4. Chronic sinusitis. 5. No acute chest abnormality on the chest x-ray. RECOMMENDATIONS: Sacral MRI for further evaluation.     XR CHEST PORTABLE    Result Date: 1/24/2024  1. No acute osseous abnormality of the lumbar spine. 2. No acute intracranial abnormality. 3. At the level of the upper 2nd sacral segment there is a suggestion of mild superior endplate compression and bony fragment seen anteriorly. This can best be evaluated on the lateral view. 4. Chronic sinusitis. 5. No acute chest abnormality on the chest x-ray. RECOMMENDATIONS: Sacral MRI for further evaluation.     CT HEAD WO CONTRAST    Result Date: 1/24/2024  1. No acute osseous abnormality of the lumbar spine. 2. No acute intracranial abnormality. 3. At the level of the upper 2nd sacral segment there is a suggestion of mild superior endplate compression and bony fragment seen anteriorly. This can best be evaluated on the lateral view. 4. Chronic sinusitis. 5. No acute chest abnormality on the chest x-ray. RECOMMENDATIONS: Sacral MRI for further evaluation.     XR PELVIS (MIN 3 VIEWS)    Result Date: 1/23/2024  Known S2 fracture is not radiographically evident.       Assessment :      Hospital Problems             Last Modified POA    * (Principal) Intractable nausea and vomiting 1/23/2024 Yes    Sacral fracture, closed (HCC) 1/23/2024 Yes       Plan:     Patient status inpatient in the Med/Surge    Nausea and vomiting likely due to marijuana use, continue symptomatic treatment, continue Zofran for nausea, IV fluids, patient was counseled regarding marijuana use disorder and she was advised to cut down and stop smoking   Sacral fracture, Trauma team on board appreciate input   Leukocytosis likely reactive and dehydration, continue IV fluids, will monitor  hypertension uncontrolled continue home medication on nifedipine,

## 2024-01-24 NOTE — CARE COORDINATION
DISCHARGE PLANNING EVALUATION: OP/OBSERVATION        1/24/24, 12:01 PM EST    Tran Deng         Location: OBS 12/12-1   Reason for hospitalization: Cyclical vomiting [R11.15]  Intractable nausea and vomiting [R11.2]     CM Services requested for transitional needs.     PCP: Emperatriz Ortiz MD    Transportation/Food Security/Housekeeping Addressed:  No issues identified.     Equipment needs:     Transition plan: home with family, requesting walker; PS Dr Arias for order    1243 RW referral to [facility]    1440 call from Gracia at [facility], states [facility] is only provider for patient's insurance.  Referral faxed to [facility] [phone], phone number is [phone]

## 2024-01-24 NOTE — ED PROVIDER NOTES
This patient was signed out to me by  at the completion of his shift.  Patient presents with complaints of a fall onto her buttocks with subsequent development of some nausea and vomiting.  No apparent abdominal or head injury.  CT scan of the low back demonstrated possible fracture of the second segment of the sacrum.  Trauma surgery and orthopedic surgery were both consulted and are currently evaluating the patient studies to determine their next course of action.  Anticipate admission if not to the trauma or the orthopedic service then at least 2 the observation service due to persistent nausea and vomiting despite all antiemetics provided.     Alf Murguia MD  01/23/24 2015    Admit advised at the trauma surgery and orthopedic services feel the patient does not require admission from their standpoint.  She would therefore be admitted to the observation service due to persistent nausea and vomiting.  Further treatment and evaluation will continue.       Alf Murguia MD  01/23/24 2016

## 2024-01-24 NOTE — ED PROVIDER NOTES
[facility] ED  Emergency Department  Emergency Medicine Sign-out   Care of Tran Deng was assumed from Dr. Lopez and is being seen for Fall (Mechanical fall slipped on ice), Back Pain, Chest Pain (\"From fall\"), and Nausea (With emesis)  .  The patient's initial evaluation and plan have been discussed with the prior provider who initially evaluated the patient.     EMERGENCY DEPARTMENT COURSE / MEDICAL DECISION MAKING:       MEDICATIONS GIVEN:  Orders Placed This Encounter   Medications    ondansetron (ZOFRAN) injection 4 mg    ketorolac (TORADOL) injection 15 mg    sodium chloride 0.9 % bolus 1,000 mL    promethazine (PHENERGAN) injection 25 mg    ketorolac (TORADOL) injection 30 mg    DISCONTD: prochlorperazine (COMPAZINE) injection 10 mg    DISCONTD: diphenhydrAMINE (BENADRYL) injection 25 mg    DISCONTD: diphenhydrAMINE (BENADRYL) injection 25 mg    haloperidol lactate (HALDOL) injection 5 mg    labetalol (NORMODYNE;TRANDATE) injection 10 mg    DISCONTD: NIFEdipine (ADALAT CC) extended release tablet 60 mg    NIFEdipine (ADALAT CC) extended release tablet 60 mg       LABS / RADIOLOGY:     Labs Reviewed   CBC WITH AUTO DIFFERENTIAL - Abnormal; Notable for the following components:       Result Value    WBC 14.1 (*)     RDW 15.5 (*)     Neutrophils % 66 (*)     Lymphocytes % 22 (*)     Immature Granulocytes 1 (*)     Neutrophils Absolute 9.33 (*)     Monocytes Absolute 1.43 (*)     All other components within normal limits   COMPREHENSIVE METABOLIC PANEL - Abnormal; Notable for the following components:    CO2 17 (*)     Glucose 203 (*)     Creatinine 1.2 (*)     Albumin 3.4 (*)     Albumin/Globulin Ratio 0.8 (*)     All other components within normal limits   VITAMIN D 25 HYDROXY - Abnormal; Notable for the following components:    Vit D, 25-Hydroxy 9.8 (*)     All other components within normal limits   POC GLUCOSE FINGERSTICK - Abnormal; Notable for the following components:  the administration of intravenous contrast. Automated exposure control, iterative reconstruction, and/or weight based adjustment of the mA/kV was utilized to reduce the radiation dose to as low as reasonably achievable. COMPARISON: None. HISTORY: ORDERING SYSTEM PROVIDED HISTORY: Intractable migraine with status migrainosus, unspecified migraine type TECHNOLOGIST PROVIDED HISTORY: intractable headaches x 2 months Is the patient pregnant?->No Reason for Exam: history of migraines FINDINGS: BRAIN/VENTRICLES: There is no acute intracranial hemorrhage, mass effect or midline shift.  No abnormal extra-axial fluid collection.  The gray-white differentiation is maintained without evidence of an acute infarct.  There is no evidence of hydrocephalus. ORBITS: The visualized portion of the orbits demonstrate no acute abnormality. SINUSES: Sinusitis involving the sphenoid and ethmoid sinuses and to lesser degree the maxillary sinuses.  Mild left mastoid effusion. SOFT TISSUES/SKULL:  No acute abnormality of the visualized skull or soft tissues.     1. No acute intracranial abnormality. 2. Sinusitis. 3. Mild left mastoid effusion.       RECENT VITALS:     Temp: 98.3 °F (36.8 °C),  Pulse: 74, Respirations: 20, BP: (!) 188/98, SpO2: 97 %      This patient is a 28 y.o. Female with fall and sustained sacral fracture for which trauma and orthopedics evaluated and recommended outpatient follow-up for sacral fracture.  Patient also hypertensive that has been treated in ED.  Plan to place patient in observation unit due to intractable nausea and vomiting patient has had.    Nausea vomiting in ED continued.  Hypertension improved after second dose of labetalol.  Observation unit but patient with difficulty controlling blood pressure with IV pushes of medication and patient with intractable nausea, vomiting so started on Cardene drip.  Patient also with borderline DKA but does appear to have pH greater than 7.3 and glucose improved after

## 2024-01-24 NOTE — ED NOTES
ED to inpatient nurses report      Chief Complaint:  Chief Complaint   Patient presents with    Fall     Mechanical fall slipped on ice    Back Pain    Chest Pain     \"From fall\"    Nausea     With emesis     Present to ED from: Home    MOA:     LOC: alert and orientated to name, place, date  Mobility: Requires assistance * 1  Oxygen Baseline: RA    Current needs required: N/A   Pending ED orders: urine  Present condition: stable    Why did the patient come to the ED?   29 yo female arrived through Ed through triage with c/o mid/lower back pain along with chest pain after falling on ice this. Patient states she fell this morning and landed on back/coccyx.  What is the plan?   Admission due to cyclical vomiting and elevated BP  Any procedures or intervention occur?   Sx control  Any safety concerns??    Pt's pressure's began to elevate and were in the 200s and 190s.   Pt placed on Cardene. Rate started at 2.5. See MAR.     Mental Status:  Level of Consciousness: Alert (0)    Psych Assessment:   Psychosocial  Psychosocial (WDL): Within Defined Limits  Vital signs   Vitals:    01/24/24 0323 01/24/24 0333 01/24/24 0343 01/24/24 0353   BP: (!) 155/86 131/71 120/70    Pulse: 71 81 84 85   Resp:       Temp:       TempSrc:       SpO2: 100% 99% 98% 98%        Vitals:  Patient Vitals for the past 24 hrs:   BP Temp Temp src Pulse Resp SpO2   01/24/24 0353 -- -- -- 85 -- 98 %   01/24/24 0343 120/70 -- -- 84 -- 98 %   01/24/24 0333 131/71 -- -- 81 -- 99 %   01/24/24 0323 (!) 155/86 -- -- 71 -- 100 %   01/24/24 0313 (!) 166/68 -- -- 82 -- --   01/24/24 0303 122/73 -- -- 91 -- 99 %   01/24/24 0253 -- -- -- 85 -- --   01/24/24 0210 132/68 -- -- 92 -- --   01/24/24 0205 125/75 -- -- 98 -- --   01/24/24 0200 126/70 -- -- 86 -- --   01/24/24 0155 122/74 -- -- 87 -- --   01/24/24 0150 110/61 -- -- 85 -- --   01/24/24 0145 (!) 98/53 -- -- 84 -- --   01/24/24 0140 (!) 96/53 -- -- 86 -- --   01/24/24 0135 (!) 99/53 -- -- 86 -- --  01/23/24 Right Forearm (Active)       Ambulatory Status:  Presents to emergency department  because of falls (Syncope, seizure, or loss of consciousness): Yes, Age > 70: No, Altered Mental Status, Intoxication with alcohol or substance confusion (Disorientation, impaired judgment, poor safety awaremess, or inability to follow instructions): No, Impaired Mobility: Ambulates or transfers with assistive devices or assistance; Unable to ambulate or transer.: No, Nursing Judgement: No    Diagnosis:  DISPOSITION Admitted 01/24/2024 02:08:30 AM   Final diagnoses:   Cyclical vomiting        Consults:  IP CONSULT TO TRAUMA SURGERY  IP CONSULT TO IV TEAM  IP CONSULT TO ORTHOPEDIC SURGERY  IP CONSULT TO HOSPITALIST     Treatment Team:   Treatment Team: Attending Provider: William German MD; Consulting Physician: Mikey Cramer DO; Resident: Dilip Lo DO; Resident: Everardo Hernandez DO; Resident: Padua, Fortunato G, MD; Resident: Henry Ramirez DO; Resident: Yoav Flynn DO; Resident: Daniel Irving DO; Resident: Jaja Leon DO; Resident: Torey Barbosa, DO; Resident: Juan Mcbride, DO; Resident: Roberto Bateman, DO; Resident: Kevin Abdullahi, DO; Resident: Sven Goode, DO; Resident: Juno Corrigan, DO; Resident: Kofi Oconnor, DO; Resident: Paco Enamorado, DO; Consulting Physician: Antoinette Gordillo APRN - CNP; Resident: Carlita Saravai MD; Registered Nurse: Evelio Bills RN    Treatment:  ED Course as of 01/24/24 0409   Tue Jan 23, 2024   1426 POC Glucose(!): 214 [GI]   1528 hCG Qual: NEGATIVE [GI]   1529 Patient received Zofran and Phenergan is still actively retching in the room.  She is initially states that she did not hit her head loss of conscious or any blood thinner use however with her continued active vomiting after her fall we will get CT scan of her head without contrast [GI]   1603 Troponin, High Sensitivity: 11 [GI]   1612 Troponin, High Sensitivity: 8 [GI]   1721  the following components:    POC Glucose 214 (*)     All other components within normal limits   VENOUS BLOOD GAS, POINT OF CARE - Abnormal; Notable for the following components:    pH, Magen 7.314 (*)     pO2, Magen 28.6 (*)     Negative Base Excess, Magen 4.0 (*)     O2 Sat, Magen 48.5 (*)     All other components within normal limits   POC GLUCOSE FINGERSTICK - Abnormal; Notable for the following components:    POC Glucose 238 (*)     All other components within normal limits   POC GLUCOSE FINGERSTICK - Abnormal; Notable for the following components:    POC Glucose 185 (*)     All other components within normal limits   TROPONIN   TROPONIN   HCG, SERUM, QUALITATIVE   LIPASE   URINALYSIS WITH REFLEX TO CULTURE   BASIC METABOLIC PANEL W/ REFLEX TO MG FOR LOW K   CBC WITH AUTO DIFFERENTIAL   POCT GLUCOSE   POC BLOOD GAS   POCT GLUCOSE   POCT GLUCOSE   POCT GLUCOSE   POCT GLUCOSE       Electronically signed by Evelio Bills RN on 1/24/2024 at 4:09 AM

## 2024-01-24 NOTE — PROGRESS NOTES
Trauma Tertiary Survey    Admit Date: 1/23/2024  Hospital day 0      Subjective:     Patient seen and evaluated at bedside.  Complaining of along with nausea and vomiting.    Objective:   PHYSICAL EXAM:   Physical Exam  Vitals and nursing note reviewed.   Constitutional:       General: She is not in acute distress.     Appearance: She is well-developed. She is not diaphoretic.   HENT:      Head: Normocephalic and atraumatic.      Nose: Nose normal.   Eyes:      Pupils: Pupils are equal, round, and reactive to light.   Cardiovascular:      Rate and Rhythm: Normal rate and regular rhythm.      Pulses:           Radial pulses are 2+ on the right side and 2+ on the left side.        Dorsalis pedis pulses are 2+ on the right side and 2+ on the left side.      Heart sounds: Normal heart sounds.   Pulmonary:      Effort: Pulmonary effort is normal. No respiratory distress.      Breath sounds: Normal breath sounds.   Abdominal:      Palpations: Abdomen is soft.      Tenderness: There is no abdominal tenderness.      Comments: Nausea and vomiting   Musculoskeletal:         General: Tenderness present. Normal range of motion.      Comments: Midline sacral tenderness   Skin:     General: Skin is warm and dry.      Findings: No rash.   Neurological:      Mental Status: She is alert and oriented to person, place, and time.           Spine:     Spine Tenderness ROM   Cervical 0 /10 Normal   Thoracic 0 /10 Normal   Lumbar 5/10 Decreased     Musculoskeletal    Joint Tenderness Swelling ROM   Right shoulder absent absent normal   Left shoulder absent absent normal   Right elbow absent absent normal   Left elbow absent absent normal   Right wrist absent absent normal   Left wrist absent absent normal   Right hand grasp absent absent normal   Left hand grasp absent absent normal   Right hip absent absent normal   Left hip absent absent normal   Right knee absent absent normal   Left knee absent absent normal   Right ankle absent  absent normal   Left ankle absent absent normal   Right foot absent absent normal   Left foot absent absent normal       CONSULTS: Orthopedic surgery    PROCEDURES:      [x] Reviewed radiology reports  (All radiology findings correlate to diagnoses/problem list)    [] Incidental findings:    [] Patient/family notified and letter given    Assessment/Plan:      1. S2 sacral fracture   1.  No orthopedic intervention    Weightbearing with walker    Outpatient follow-up with orthopedics in 1 week

## 2024-01-24 NOTE — PROGRESS NOTES
[facility] [place]    PATIENT NAME: Tran Deng    Shift date: 1/23/23  Shift day: Tuesday   Shift # 2    Room # 28/28   Name: Tran Deng            Age: 28 y.o.  Gender: female          Anglican: Non-Yarsani   Place of Quaker:     Trauma/Incident type: Adult Trauma Consult  Admit Date & Time: 1/23/2024  1:54 PM  TRAUMA NAME: N/A    ADVANCE DIRECTIVES IN CHART?  No    NAME OF DECISION MAKER: N/A    RELATIONSHIP OF DECISION MAKER TO PATIENT: n/A    PATIENT/EVENT DESCRIPTION:  Tran Deng is a 28 y.o. female who arrived at [facility].  received perfect serve for trauma consult to ED 28.       SPIRITUAL ASSESSMENT-INTERVENTION-OUTCOME:   was a ministry of presence to patient and medical staff during several attempts to see patient.  met with RN on fourth attempt who stated patient was sleeping.      PATIENT BELONGINGS:   writing did not handle patient belongings    ANY BELONGINGS OF SIGNIFICANT VALUE NOTED:  N/A    REGISTRATION STAFF NOTIFIED?  Yes      WHAT IS YOUR SPIRITUAL CARE PLAN FOR THIS PATIENT?:   Chaplains will remain available to offer spiritual and emotional support as needed.    Electronically signed by Chaplain Ge, on 1/24/2024 at 12:11 AM.  [facility]  [phone]

## 2024-01-25 ENCOUNTER — APPOINTMENT (OUTPATIENT)
Dept: GENERAL RADIOLOGY | Age: 29
DRG: 683 | End: 2024-01-25
Payer: MEDICARE

## 2024-01-25 PROBLEM — N17.9 ACUTE KIDNEY INJURY (HCC): Status: ACTIVE | Noted: 2024-01-25

## 2024-01-25 LAB
ANION GAP SERPL CALCULATED.3IONS-SCNC: 10 MMOL/L (ref 9–17)
BUN SERPL-MCNC: 14 MG/DL (ref 6–20)
CALCIUM SERPL-MCNC: 8.3 MG/DL (ref 8.6–10.4)
CHLORIDE SERPL-SCNC: 113 MMOL/L (ref 98–107)
CO2 SERPL-SCNC: 22 MMOL/L (ref 20–31)
CREAT SERPL-MCNC: 1.4 MG/DL (ref 0.5–0.9)
ERYTHROCYTE [DISTWIDTH] IN BLOOD BY AUTOMATED COUNT: 15.1 % (ref 11.8–14.4)
GFR SERPL CREATININE-BSD FRML MDRD: 52 ML/MIN/1.73M2
GLUCOSE BLD-MCNC: 130 MG/DL (ref 65–105)
GLUCOSE BLD-MCNC: 157 MG/DL (ref 65–105)
GLUCOSE BLD-MCNC: 171 MG/DL (ref 65–105)
GLUCOSE BLD-MCNC: 211 MG/DL (ref 65–105)
GLUCOSE BLD-MCNC: 237 MG/DL (ref 65–105)
GLUCOSE SERPL-MCNC: 123 MG/DL (ref 70–99)
HCT VFR BLD AUTO: 27.3 % (ref 36.3–47.1)
HGB BLD-MCNC: 9.6 G/DL (ref 11.9–15.1)
MCH RBC QN AUTO: 31.7 PG (ref 25.2–33.5)
MCHC RBC AUTO-ENTMCNC: 35.2 G/DL (ref 28.4–34.8)
MCV RBC AUTO: 90.1 FL (ref 82.6–102.9)
MICROORGANISM SPEC CULT: ABNORMAL
NRBC BLD-RTO: 0 PER 100 WBC
PLATELET # BLD AUTO: 330 K/UL (ref 138–453)
PMV BLD AUTO: 9.8 FL (ref 8.1–13.5)
POTASSIUM SERPL-SCNC: 4.1 MMOL/L (ref 3.7–5.3)
RBC # BLD AUTO: 3.03 M/UL (ref 3.95–5.11)
SODIUM SERPL-SCNC: 145 MMOL/L (ref 135–144)
SPECIMEN DESCRIPTION: ABNORMAL
WBC OTHER # BLD: 12.6 K/UL (ref 3.5–11.3)

## 2024-01-25 PROCEDURE — 97166 OT EVAL MOD COMPLEX 45 MIN: CPT

## 2024-01-25 PROCEDURE — 96376 TX/PRO/DX INJ SAME DRUG ADON: CPT

## 2024-01-25 PROCEDURE — 99232 SBSQ HOSP IP/OBS MODERATE 35: CPT | Performed by: STUDENT IN AN ORGANIZED HEALTH CARE EDUCATION/TRAINING PROGRAM

## 2024-01-25 PROCEDURE — 6370000000 HC RX 637 (ALT 250 FOR IP): Performed by: STUDENT IN AN ORGANIZED HEALTH CARE EDUCATION/TRAINING PROGRAM

## 2024-01-25 PROCEDURE — 2580000003 HC RX 258: Performed by: NURSE PRACTITIONER

## 2024-01-25 PROCEDURE — 82947 ASSAY GLUCOSE BLOOD QUANT: CPT

## 2024-01-25 PROCEDURE — 72220 X-RAY EXAM SACRUM TAILBONE: CPT

## 2024-01-25 PROCEDURE — G0378 HOSPITAL OBSERVATION PER HR: HCPCS

## 2024-01-25 PROCEDURE — 6370000000 HC RX 637 (ALT 250 FOR IP)

## 2024-01-25 PROCEDURE — 6360000002 HC RX W HCPCS: Performed by: NURSE PRACTITIONER

## 2024-01-25 PROCEDURE — 80048 BASIC METABOLIC PNL TOTAL CA: CPT

## 2024-01-25 PROCEDURE — 2580000003 HC RX 258: Performed by: STUDENT IN AN ORGANIZED HEALTH CARE EDUCATION/TRAINING PROGRAM

## 2024-01-25 PROCEDURE — 6360000002 HC RX W HCPCS: Performed by: STUDENT IN AN ORGANIZED HEALTH CARE EDUCATION/TRAINING PROGRAM

## 2024-01-25 PROCEDURE — 1200000000 HC SEMI PRIVATE

## 2024-01-25 PROCEDURE — 85027 COMPLETE CBC AUTOMATED: CPT

## 2024-01-25 PROCEDURE — 97535 SELF CARE MNGMENT TRAINING: CPT

## 2024-01-25 PROCEDURE — 6370000000 HC RX 637 (ALT 250 FOR IP): Performed by: NURSE PRACTITIONER

## 2024-01-25 PROCEDURE — 36415 COLL VENOUS BLD VENIPUNCTURE: CPT

## 2024-01-25 RX ORDER — HYDROXYZINE HYDROCHLORIDE 50 MG/ML
25 INJECTION, SOLUTION INTRAMUSCULAR ONCE
Status: COMPLETED | OUTPATIENT
Start: 2024-01-25 | End: 2024-01-25

## 2024-01-25 RX ORDER — CARVEDILOL 12.5 MG/1
12.5 TABLET ORAL 2 TIMES DAILY WITH MEALS
Status: DISCONTINUED | OUTPATIENT
Start: 2024-01-25 | End: 2024-01-26 | Stop reason: HOSPADM

## 2024-01-25 RX ADMIN — NIFEDIPINE 60 MG: 30 TABLET, FILM COATED, EXTENDED RELEASE ORAL at 09:13

## 2024-01-25 RX ADMIN — INSULIN LISPRO 2 UNITS: 100 INJECTION, SOLUTION INTRAVENOUS; SUBCUTANEOUS at 11:53

## 2024-01-25 RX ADMIN — HYDROXYZINE HYDROCHLORIDE 25 MG: 50 INJECTION, SOLUTION INTRAMUSCULAR at 20:16

## 2024-01-25 RX ADMIN — SODIUM CHLORIDE, PRESERVATIVE FREE 10 ML: 5 INJECTION INTRAVENOUS at 07:59

## 2024-01-25 RX ADMIN — OXYCODONE HYDROCHLORIDE AND ACETAMINOPHEN 1 TABLET: 5; 325 TABLET ORAL at 00:04

## 2024-01-25 RX ADMIN — OXYCODONE HYDROCHLORIDE AND ACETAMINOPHEN 1 TABLET: 5; 325 TABLET ORAL at 03:21

## 2024-01-25 RX ADMIN — SODIUM CHLORIDE, PRESERVATIVE FREE 10 ML: 5 INJECTION INTRAVENOUS at 20:17

## 2024-01-25 RX ADMIN — HYDRALAZINE HYDROCHLORIDE 10 MG: 20 INJECTION INTRAMUSCULAR; INTRAVENOUS at 08:05

## 2024-01-25 RX ADMIN — HYDRALAZINE HYDROCHLORIDE 10 MG: 20 INJECTION INTRAMUSCULAR; INTRAVENOUS at 14:36

## 2024-01-25 RX ADMIN — ONDANSETRON 4 MG: 2 INJECTION INTRAMUSCULAR; INTRAVENOUS at 00:04

## 2024-01-25 RX ADMIN — ONDANSETRON 4 MG: 2 INJECTION INTRAMUSCULAR; INTRAVENOUS at 14:27

## 2024-01-25 RX ADMIN — OXYCODONE HYDROCHLORIDE AND ACETAMINOPHEN 1 TABLET: 5; 325 TABLET ORAL at 09:12

## 2024-01-25 RX ADMIN — SODIUM CHLORIDE: 9 INJECTION, SOLUTION INTRAVENOUS at 19:22

## 2024-01-25 RX ADMIN — HYDROMORPHONE HYDROCHLORIDE 0.5 MG: 1 INJECTION, SOLUTION INTRAMUSCULAR; INTRAVENOUS; SUBCUTANEOUS at 21:26

## 2024-01-25 RX ADMIN — AMITRIPTYLINE HYDROCHLORIDE 10 MG: 10 TABLET, FILM COATED ORAL at 20:16

## 2024-01-25 RX ADMIN — ONDANSETRON 4 MG: 2 INJECTION INTRAMUSCULAR; INTRAVENOUS at 07:58

## 2024-01-25 RX ADMIN — CARVEDILOL 12.5 MG: 12.5 TABLET, FILM COATED ORAL at 17:34

## 2024-01-25 ASSESSMENT — PAIN SCALES - GENERAL
PAINLEVEL_OUTOF10: 9
PAINLEVEL_OUTOF10: 9
PAINLEVEL_OUTOF10: 8
PAINLEVEL_OUTOF10: 9
PAINLEVEL_OUTOF10: 4
PAINLEVEL_OUTOF10: 9

## 2024-01-25 ASSESSMENT — PAIN DESCRIPTION - DESCRIPTORS
DESCRIPTORS: DISCOMFORT
DESCRIPTORS: DISCOMFORT

## 2024-01-25 ASSESSMENT — ENCOUNTER SYMPTOMS
VOMITING: 1
RESPIRATORY NEGATIVE: 1
BACK PAIN: 1
NAUSEA: 1

## 2024-01-25 ASSESSMENT — PAIN DESCRIPTION - LOCATION
LOCATION: CHEST;BACK
LOCATION: CHEST;BACK

## 2024-01-25 ASSESSMENT — PAIN DESCRIPTION - PAIN TYPE: TYPE: ACUTE PAIN

## 2024-01-25 NOTE — PROGRESS NOTES
Orthopedic Progress Note    Patient:  Tran Deng  YOB: 1995     28 y.o. female    Subjective:  Patient seen and examined this morning. No complaints or concerns. Per nursing, pt had an episode of vomiting overnight that responded well to zofran and scopolamine patch. Pain is well controlled on current regimen. Denies fever, HA, CP, SOB, dysuria, new numbness/tingling. Pt has not been evaluated by PT/OT, however patient has been able to ambulate on her own without assistive devices since admission.    Vitals reviewed, afebrile    Objective:   Vitals:    01/25/24 0351   BP:    Pulse:    Resp: 16   Temp:    SpO2:      Gen: NAD, cooperative    Cardiovascular: Regular rate   Respiratory: No acute respiratory distress, breathing comfortably    Orthopedic Exam  Pelvis: Stable to AP and lateral compression without pain. Mildly TTP with direct palpation over sacrum.     BLE: Compartments soft and compressible. TA/EHL/FHL/GS motor intact. Deep and Superficial Peroneal/Saphenous/Sural SILT. 2+ DP pulse. Able to actively SLR. Able to flexx knee 0-120, flex hip past 90 without pain.     Recent Labs     01/25/24  0302   WBC 12.6*   HGB 9.6*   HCT 27.3*      *   K 4.1   BUN 14   CREATININE 1.4*   GLUCOSE 123*      Meds:   DVT ppx: Lovenox  See rec for complete list    Impression 28 y.o. female who FFSH being seen for:    -S2 sacral fx.     Plan  - No plans for orthopedic intervention at this time.  - WBAT  to the BLE  -TLSO at bedside.  - Pain control and medical management per primary: Obs.  - DVT ppx:   Lovenox, per primary.    - Ice to control pain and edema  - Diet: Adult diet okay from orthopedic perspective  - Encourage Incentive Spirometry use   - F/u with Dr. Still's 2/6/24.    - Please page Ortho on call with any questions    Dev Loredo DO  Orthopedic Surgery Resident, PGY-1  [place], [place]

## 2024-01-25 NOTE — PROGRESS NOTES
Occupational Therapy  Facility/Department: [facility] OBSERVATION UNIT  Occupational Therapy Initial Assessment    Name: Tran Deng  : 1995  MRN: [id number]  Date of Service: 2024    Chief Complaint   Patient presents with    Fall     Mechanical fall slipped on ice    Back Pain    Chest Pain     \"From fall\"    Nausea     With emesis       Discharge Recommendations:  No therapy recommended at discharge.  OT Equipment Recommendations  Equipment Needed: Yes  Mobility Devices: Walker;ADL Assistive Devices  Walker: Rolling  ADL Assistive Devices: Shower Chair with back       Patient Diagnosis(es): The encounter diagnosis was Cyclical vomiting.  Past Medical History:  has a past medical history of Hypertension, Iron deficiency anemia due to chronic blood loss, Kidney infection, MRSA (methicillin resistant staph aureus) culture positive, Nausea & vomiting, Ovarian cyst, Secondary osteoporosis, Type 1 diabetes mellitus (HCC), and UTI (urinary tract infection).  Past Surgical History:  has a past surgical history that includes  section and Upper gastrointestinal endoscopy (N/A, 2021).           Assessment   Performance deficits / Impairments: Decreased functional mobility ;Decreased ADL status;Decreased high-level IADLs;Decreased balance  Assessment: Pt completed bed mobility IND to EOB. Pt educated on TLSO brace with good return. Pt donned TLSO at EOB with set up assistance and SBA for Min VCs for appropriate strap placement/adjustment. Pt donned hospital socks with good dynamic sitting balance Mod I after encouraged of utilizing figure 4 technique. Pt performed functional transfer at EOB with RW with CGA and performed functional mobility in hallway with CGA and RW to simulate household distances. Pt educated on safety and use of RW as well as fitting of RW prior with good return. Pt returned to EOB and doffed TLSO with SBA and remained at EOB upon exit with call light within reach. Continued

## 2024-01-25 NOTE — PROGRESS NOTES
[facility]  Office: [phone]  Hardeep Augustine DO, Rod Slaughter DO, Dae Corbett DO, Freddy Guzman DO, Mau Vlaerio MD, Kindra Marshall MD, Torri Pettit MD, Janette Bryant MD,  Nito Quigley MD, Koki Mcintyre MD, William German MD,  Bry Lutz DO, Kendall Yeager MD, Juan Vasquez MD, Alf Augustine DO, Shahana Delarosa MD,  Aung Parry DO, Chika Wlal MD, Genoveva Costa MD, Echo Espinosa MD, Prema Dodd MD,  Tung De La O MD, Anita Billingsley MD, Aravind Cardoso MD, Marlon Arias MD, Leonidas Garcia MD, Peg Resendiz MD, Juno Santacruz DO, Edmond Singh DO, Marcio Roa MD,  Ari Chun MD, Shirley Waterhouse, CNP,  Sushila Paige, CNP, Wagner Rodriguez, CNP,  Genny Agrawal, IAN, Shreya Ding, CNP, Diane Hayden, CNP, Carly Hamilton CNP, Alexandra Suarez, CNP, Patricia Gary, CNP, Demi Linder, PA-C, Tere Cota, PA-C, Sherry Lo, CNP, Fatou Sneed, CNS, Gracia Menchaca, CNP, Antoinette Gordillo, CNP, Tracy Schwab, CNP         [facility]   IN-PATIENT SERVICE   [facility]    Progress Note    1/25/2024    2:56 PM    Name:   Tran Deng  MRN:     [id number]     Acct:      [id number]   Room:   OBS 12/12-1   Day:  0  Admit Date:  1/23/2024  1:54 PM    PCP:   Emperatriz Ortiz MD  Code Status:  Full Code    Subjective:     C/C:   Chief Complaint   Patient presents with    Fall     Mechanical fall slipped on ice    Back Pain    Chest Pain     \"From fall\"    Nausea     With emesis     Interval History Status: not changed.     Patient was seen and examined at bedside, she continued to report nausea and vomiting  but better compared with yesterday, denied chest pain fever chills, denies abdominal pain  Overnight patient refused IV fluid, her kidney function worse today creatinine up to 1.4, I advised the patient that she need to take IV fluid to avoid dehydration and to help management of her acute kidney injury who agreed will continue

## 2024-01-25 NOTE — CARE COORDINATION
Followed up on walker referral to [facility], they have order.  Patient will need to call [phone] to arrange delivery, walker will be shipped

## 2024-01-26 ENCOUNTER — APPOINTMENT (OUTPATIENT)
Dept: GENERAL RADIOLOGY | Age: 29
DRG: 683 | End: 2024-01-26
Payer: MEDICARE

## 2024-01-26 VITALS
HEART RATE: 100 BPM | BODY MASS INDEX: 19.33 KG/M2 | OXYGEN SATURATION: 99 % | TEMPERATURE: 98.3 F | DIASTOLIC BLOOD PRESSURE: 91 MMHG | SYSTOLIC BLOOD PRESSURE: 166 MMHG | WEIGHT: 116.18 LBS | RESPIRATION RATE: 16 BRPM

## 2024-01-26 PROBLEM — R11.15 CYCLICAL VOMITING: Status: ACTIVE | Noted: 2024-01-26

## 2024-01-26 LAB
ANION GAP SERPL CALCULATED.3IONS-SCNC: 10 MMOL/L (ref 9–17)
ANION GAP SERPL CALCULATED.3IONS-SCNC: 17 MMOL/L (ref 9–17)
B-OH-BUTYR SERPL-MCNC: 2.38 MMOL/L (ref 0.02–0.27)
BODY TEMPERATURE: 37
BUN SERPL-MCNC: 15 MG/DL (ref 6–20)
BUN SERPL-MCNC: 16 MG/DL (ref 6–20)
CALCIUM SERPL-MCNC: 8.2 MG/DL (ref 8.6–10.4)
CALCIUM SERPL-MCNC: 8.6 MG/DL (ref 8.6–10.4)
CHLORIDE SERPL-SCNC: 112 MMOL/L (ref 98–107)
CHLORIDE SERPL-SCNC: 114 MMOL/L (ref 98–107)
CO2 SERPL-SCNC: 15 MMOL/L (ref 20–31)
CO2 SERPL-SCNC: 21 MMOL/L (ref 20–31)
COHGB MFR BLD: 0.9 % (ref 0–5)
CREAT SERPL-MCNC: 1.3 MG/DL (ref 0.5–0.9)
CREAT SERPL-MCNC: 1.3 MG/DL (ref 0.5–0.9)
ERYTHROCYTE [DISTWIDTH] IN BLOOD BY AUTOMATED COUNT: 16 % (ref 11.8–14.4)
FIO2 ON VENT: ABNORMAL %
GFR SERPL CREATININE-BSD FRML MDRD: 57 ML/MIN/1.73M2
GFR SERPL CREATININE-BSD FRML MDRD: 57 ML/MIN/1.73M2
GLUCOSE BLD-MCNC: 152 MG/DL (ref 65–105)
GLUCOSE BLD-MCNC: 161 MG/DL (ref 65–105)
GLUCOSE BLD-MCNC: 219 MG/DL (ref 65–105)
GLUCOSE SERPL-MCNC: 155 MG/DL (ref 70–99)
GLUCOSE SERPL-MCNC: 206 MG/DL (ref 70–99)
HCO3 VENOUS: 20.2 MMOL/L (ref 24–30)
HCT VFR BLD AUTO: 33.3 % (ref 36.3–47.1)
HGB BLD-MCNC: 11.2 G/DL (ref 11.9–15.1)
MCH RBC QN AUTO: 32.2 PG (ref 25.2–33.5)
MCHC RBC AUTO-ENTMCNC: 33.6 G/DL (ref 28.4–34.8)
MCV RBC AUTO: 95.7 FL (ref 82.6–102.9)
NEGATIVE BASE EXCESS, VEN: 3.6 MMOL/L (ref 0–2)
NRBC BLD-RTO: 0 PER 100 WBC
O2 SAT, VEN: 95.1 % (ref 60–85)
PCO2, VEN: 34.2 MM HG (ref 39–55)
PH VENOUS: 7.39 (ref 7.32–7.42)
PLATELET # BLD AUTO: 359 K/UL (ref 138–453)
PMV BLD AUTO: 10.2 FL (ref 8.1–13.5)
PO2, VEN: 75 MM HG (ref 30–50)
POTASSIUM SERPL-SCNC: 3.7 MMOL/L (ref 3.7–5.3)
POTASSIUM SERPL-SCNC: 4.3 MMOL/L (ref 3.7–5.3)
RBC # BLD AUTO: 3.48 M/UL (ref 3.95–5.11)
SODIUM SERPL-SCNC: 143 MMOL/L (ref 135–144)
SODIUM SERPL-SCNC: 146 MMOL/L (ref 135–144)
WBC OTHER # BLD: 21.3 K/UL (ref 3.5–11.3)

## 2024-01-26 PROCEDURE — 6360000002 HC RX W HCPCS: Performed by: NURSE PRACTITIONER

## 2024-01-26 PROCEDURE — 97530 THERAPEUTIC ACTIVITIES: CPT

## 2024-01-26 PROCEDURE — 6360000002 HC RX W HCPCS

## 2024-01-26 PROCEDURE — 6360000002 HC RX W HCPCS: Performed by: INTERNAL MEDICINE

## 2024-01-26 PROCEDURE — 6360000002 HC RX W HCPCS: Performed by: STUDENT IN AN ORGANIZED HEALTH CARE EDUCATION/TRAINING PROGRAM

## 2024-01-26 PROCEDURE — 97162 PT EVAL MOD COMPLEX 30 MIN: CPT

## 2024-01-26 PROCEDURE — 6370000000 HC RX 637 (ALT 250 FOR IP): Performed by: NURSE PRACTITIONER

## 2024-01-26 PROCEDURE — 36415 COLL VENOUS BLD VENIPUNCTURE: CPT

## 2024-01-26 PROCEDURE — 74018 RADEX ABDOMEN 1 VIEW: CPT

## 2024-01-26 PROCEDURE — 99222 1ST HOSP IP/OBS MODERATE 55: CPT | Performed by: INTERNAL MEDICINE

## 2024-01-26 PROCEDURE — 2580000003 HC RX 258: Performed by: STUDENT IN AN ORGANIZED HEALTH CARE EDUCATION/TRAINING PROGRAM

## 2024-01-26 PROCEDURE — 6370000000 HC RX 637 (ALT 250 FOR IP): Performed by: STUDENT IN AN ORGANIZED HEALTH CARE EDUCATION/TRAINING PROGRAM

## 2024-01-26 PROCEDURE — 82947 ASSAY GLUCOSE BLOOD QUANT: CPT

## 2024-01-26 PROCEDURE — 6370000000 HC RX 637 (ALT 250 FOR IP): Performed by: INTERNAL MEDICINE

## 2024-01-26 PROCEDURE — 6370000000 HC RX 637 (ALT 250 FOR IP)

## 2024-01-26 PROCEDURE — 82010 KETONE BODYS QUAN: CPT

## 2024-01-26 PROCEDURE — 82805 BLOOD GASES W/O2 SATURATION: CPT

## 2024-01-26 PROCEDURE — 80048 BASIC METABOLIC PNL TOTAL CA: CPT

## 2024-01-26 PROCEDURE — 2580000003 HC RX 258: Performed by: NURSE PRACTITIONER

## 2024-01-26 PROCEDURE — 99232 SBSQ HOSP IP/OBS MODERATE 35: CPT | Performed by: STUDENT IN AN ORGANIZED HEALTH CARE EDUCATION/TRAINING PROGRAM

## 2024-01-26 PROCEDURE — 85027 COMPLETE CBC AUTOMATED: CPT

## 2024-01-26 RX ORDER — KETOROLAC TROMETHAMINE 15 MG/ML
15 INJECTION, SOLUTION INTRAMUSCULAR; INTRAVENOUS EVERY 6 HOURS PRN
Status: DISCONTINUED | OUTPATIENT
Start: 2024-01-26 | End: 2024-01-26 | Stop reason: HOSPADM

## 2024-01-26 RX ORDER — PROMETHAZINE HYDROCHLORIDE 25 MG/ML
25 INJECTION, SOLUTION INTRAMUSCULAR; INTRAVENOUS ONCE
Status: COMPLETED | OUTPATIENT
Start: 2024-01-26 | End: 2024-01-26

## 2024-01-26 RX ORDER — BISACODYL 10 MG
10 SUPPOSITORY, RECTAL RECTAL EVERY 6 HOURS PRN
Status: DISCONTINUED | OUTPATIENT
Start: 2024-01-26 | End: 2024-01-26 | Stop reason: HOSPADM

## 2024-01-26 RX ORDER — NIFEDIPINE 30 MG/1
90 TABLET, EXTENDED RELEASE ORAL DAILY
Status: DISCONTINUED | OUTPATIENT
Start: 2024-01-26 | End: 2024-01-26 | Stop reason: HOSPADM

## 2024-01-26 RX ORDER — LORAZEPAM 2 MG/ML
0.5 INJECTION INTRAMUSCULAR EVERY 4 HOURS PRN
Status: DISCONTINUED | OUTPATIENT
Start: 2024-01-26 | End: 2024-01-26 | Stop reason: HOSPADM

## 2024-01-26 RX ORDER — SODIUM CHLORIDE 9 MG/ML
INJECTION, SOLUTION INTRAVENOUS CONTINUOUS
Status: DISCONTINUED | OUTPATIENT
Start: 2024-01-26 | End: 2024-01-26 | Stop reason: HOSPADM

## 2024-01-26 RX ORDER — POLYETHYLENE GLYCOL 3350 17 G/17G
17 POWDER, FOR SOLUTION ORAL 2 TIMES DAILY
Status: DISCONTINUED | OUTPATIENT
Start: 2024-01-26 | End: 2024-01-26 | Stop reason: HOSPADM

## 2024-01-26 RX ADMIN — LORAZEPAM 0.5 MG: 2 INJECTION INTRAMUSCULAR; INTRAVENOUS at 04:11

## 2024-01-26 RX ADMIN — ONDANSETRON 4 MG: 2 INJECTION INTRAMUSCULAR; INTRAVENOUS at 08:48

## 2024-01-26 RX ADMIN — PANTOPRAZOLE SODIUM 40 MG: 40 TABLET, DELAYED RELEASE ORAL at 06:14

## 2024-01-26 RX ADMIN — HYDRALAZINE HYDROCHLORIDE 10 MG: 20 INJECTION INTRAMUSCULAR; INTRAVENOUS at 08:48

## 2024-01-26 RX ADMIN — Medication 240 ML: at 05:44

## 2024-01-26 RX ADMIN — LORAZEPAM 0.5 MG: 2 INJECTION INTRAMUSCULAR; INTRAVENOUS at 08:48

## 2024-01-26 RX ADMIN — ACETAMINOPHEN 650 MG: 325 TABLET ORAL at 06:14

## 2024-01-26 RX ADMIN — INSULIN LISPRO 2 UNITS: 100 INJECTION, SOLUTION INTRAVENOUS; SUBCUTANEOUS at 08:48

## 2024-01-26 RX ADMIN — PROMETHAZINE HYDROCHLORIDE 25 MG: 25 INJECTION INTRAMUSCULAR; INTRAVENOUS at 04:11

## 2024-01-26 RX ADMIN — ENOXAPARIN SODIUM 40 MG: 100 INJECTION SUBCUTANEOUS at 08:48

## 2024-01-26 RX ADMIN — SODIUM CHLORIDE, PRESERVATIVE FREE 10 ML: 5 INJECTION INTRAVENOUS at 08:49

## 2024-01-26 RX ADMIN — ONDANSETRON 4 MG: 4 TABLET, ORALLY DISINTEGRATING ORAL at 00:44

## 2024-01-26 RX ADMIN — HYDRALAZINE HYDROCHLORIDE 10 MG: 20 INJECTION INTRAMUSCULAR; INTRAVENOUS at 01:29

## 2024-01-26 RX ADMIN — KETOROLAC TROMETHAMINE 15 MG: 15 INJECTION, SOLUTION INTRAMUSCULAR; INTRAVENOUS at 09:01

## 2024-01-26 RX ADMIN — ONDANSETRON 4 MG: 2 INJECTION INTRAMUSCULAR; INTRAVENOUS at 02:05

## 2024-01-26 RX ADMIN — SODIUM CHLORIDE: 9 INJECTION, SOLUTION INTRAVENOUS at 12:35

## 2024-01-26 RX ADMIN — POLYETHYLENE GLYCOL 3350 17 G: 17 POWDER, FOR SOLUTION ORAL at 05:52

## 2024-01-26 RX ADMIN — Medication 1000 MG: at 00:44

## 2024-01-26 ASSESSMENT — ENCOUNTER SYMPTOMS
BACK PAIN: 1
NAUSEA: 1
VOMITING: 1
RESPIRATORY NEGATIVE: 1

## 2024-01-26 ASSESSMENT — PAIN DESCRIPTION - PAIN TYPE: TYPE: ACUTE PAIN

## 2024-01-26 ASSESSMENT — PAIN SCALES - GENERAL
PAINLEVEL_OUTOF10: 9
PAINLEVEL_OUTOF10: 10
PAINLEVEL_OUTOF10: 9

## 2024-01-26 ASSESSMENT — PAIN DESCRIPTION - LOCATION: LOCATION: BACK

## 2024-01-26 ASSESSMENT — PAIN DESCRIPTION - DESCRIPTORS: DESCRIPTORS: DISCOMFORT

## 2024-01-26 NOTE — PROGRESS NOTES
Physical Therapy  Facility/Department: [facility] OBSERVATION UNIT  Physical Therapy Initial Assessment    Name: Tran Deng  : 1995  MRN: [id number]  Date of Service: 2024    Chief Complaint   Patient presents with    Fall     Mechanical fall slipped on ice    Back Pain    Chest Pain     \"From fall\"    Nausea     With emesis       Discharge Recommendations:    No further therapy required at discharge.       PT Equipment Recommendations  Equipment Needed: No      Patient Diagnosis(es): The encounter diagnosis was Cyclical vomiting.  Past Medical History:  has a past medical history of Hypertension, Iron deficiency anemia due to chronic blood loss, Kidney infection, MRSA (methicillin resistant staph aureus) culture positive, Nausea & vomiting, Ovarian cyst, Secondary osteoporosis, Type 1 diabetes mellitus (HCC), and UTI (urinary tract infection).  Past Surgical History:  has a past surgical history that includes  section and Upper gastrointestinal endoscopy (N/A, 2021).    Assessment   Body Structures, Functions, Activity Limitations Requiring Skilled Therapeutic Intervention: Decreased functional mobility ;Decreased balance  Assessment: Pt grossly CGA to SBA, amb 200' no AD CGA.  Therapy Prognosis: Good  Decision Making: Medium Complexity  Requires PT Follow-Up: Yes  Activity Tolerance  Activity Tolerance: Patient tolerated treatment well     Plan   Physical Therapy Plan  General Plan: 3-5 times per week  Current Treatment Recommendations: Strengthening, Balance training, Gait training, Functional mobility training, Stair training, Transfer training, Endurance training, Home exercise program, Safety education & training, Patient/Caregiver education & training, Equipment evaluation, education, & procurement, Therapeutic activities  Safety Devices  Type of Devices: Call light within reach, Nurse notified, Gait belt, Patient at risk for falls, All fall risk precautions in place, Left in  is needed moving to and from a bed to a chair?: None  How much help is needed standing up from a chair using your arms?: None  How much help is needed walking in hospital room?: None  How much help is needed climbing 3-5 steps with a railing?: A Little  AM-PAC Inpatient Mobility Raw Score : 23  AM-PAC Inpatient T-Scale Score : 56.93  Mobility Inpatient CMS 0-100% Score: 11.2  Mobility Inpatient CMS G-Code Modifier : CI       Goals  Short Term Goals  Time Frame for Short Term Goals: 6 visits  Short Term Goal 1: Pt will be Aric bed mobility  Short Term Goal 2: Pt will be Aric transfers  Short Term Goal 3: Pt will be Aric amb 250'  Short Term Goal 4: Pt will navigate flight of steps Aric       Education  Patient Education  Education Given To: Patient  Education Provided: Role of Therapy;Plan of Care  Education Method: Demonstration;Verbal  Barriers to Learning: None  Education Outcome: Verbalized understanding;Demonstrated understanding      Therapy Time   Individual Concurrent Group Co-treatment   Time In 1118         Time Out 1132         Minutes 14         Timed Code Treatment Minutes: 8 Minutes       Trenton Szymanski, PT

## 2024-01-26 NOTE — PROGRESS NOTES
Informed NP on call of patient continuously vomiting after zofran. Patient is unable to take oral medication without vomiting.

## 2024-01-26 NOTE — CARE COORDINATION
Case Management Assessment  Initial Evaluation    Date/Time of Evaluation: 1/26/2024 10:26 AM  Assessment Completed by: Lianne Davies RN    If patient is discharged prior to next notation, then this note serves as note for discharge by case management.    Patient Name: Tran Deng                   YOB: 1995  Diagnosis: Cyclical vomiting [R11.15]  Intractable nausea and vomiting [R11.2]  Acute kidney injury (HCC) [N17.9]                   Date / Time: 1/23/2024  1:54 PM    Patient Admission Status: Inpatient   Readmission Risk (Low < 19, Mod (19-27), High > 27): Readmission Risk Score: 17.6    Current PCP: Emperatriz Ortiz MD  PCP verified by CM? (P) Yes    Chart Reviewed: Yes      History Provided by: (P) Patient  Patient Orientation: (P) Alert and Oriented    Patient Cognition: (P) Alert    Hospitalization in the last 30 days (Readmission):  Yes    If yes, Readmission Assessment in CM Navigator will be completed.    Advance Directives:      Code Status: Full Code   Patient's Primary Decision Maker is:        Discharge Planning:    Patient lives with: (P) Spouse/Significant Other Type of Home: (P) House  Primary Care Giver: (P) Self  Patient Support Systems include: (P) Spouse/Significant Other, Children, Family Members   Current Financial resources: (P) Medicaid, Medicare  Current community resources:    Current services prior to admission: (P) None            Current DME:              Type of Home Care services:  (P) None    ADLS  Prior functional level: (P) Independent in ADLs/IADLs  Current functional level: (P) Independent in ADLs/IADLs    PT AM-PAC:   /24  OT AM-PAC: 20 /24    Family can provide assistance at DC: (P) Yes  Would you like Case Management to discuss the discharge plan with any other family members/significant others, and if so, who?    Plans to Return to Present Housing: (P) Yes  Other Identified Issues/Barriers to RETURNING to current housing: none  Potential  Assistance needed at discharge: (P) N/A            Potential DME:    Patient expects to discharge to: (P) House  Plan for transportation at discharge: (P) Self    Financial    Payor: [employer] MEDICARE / Plan: [employer] GOLD PLUS HMO / Product Type: *No Product type* /     Does insurance require precert for SNF: Yes    Potential assistance Purchasing Medications: (P) No  Meds-to-Beds request: Yes      [facility] - [place], [place] - [address] -  [phone] - F [phone]  [address] [address]  Phone: [phone] Fax: [phone]    [facility] - [place], [place] - [address] -  [phone] - F [phone]  [address] [address]  Phone: [phone] Fax: [phone]    [facility] (Now [facility]) - [place], [place] - [address] - P [phone] - F [phone]  [address] [address]  Phone: [phone] Fax: [phone]    [facility] - [place], [place] - [address] - P [phone] - F [phone]  [address] [address]  Phone: [phone] Fax: [phone]      Notes:    Factors facilitating achievement of predicted outcomes: Family support, Cooperative, and Pleasant    Barriers to discharge: clinical status    Additional Case Management Notes: Home independently, does not want walker, has transportation    The Plan for Transition of Care is related to the following treatment goals of Cyclical vomiting [R11.15]  Intractable nausea and vomiting [R11.2]  Acute kidney injury (HCC) [N17.9]    IF APPLICABLE: The Patient and/or patient representative Tran and her family were provided with a choice of provider and agrees with the discharge plan. Freedom of choice list with basic dialogue that supports the patient's individualized plan of care/goals and shares the quality data associated with the providers was provided to:     Patient Representative Name:

## 2024-01-26 NOTE — PROGRESS NOTES
Notified by nsg patient with intractable n/v and unable to keep oral opiates down.  No improvement with zofran.  Labs and chart reviewed independently    Ct abd/pelvis 01/24 suggesting impaction.  Pending repeat xray read by radiology but reviewed independently by myself, nonobst pattern.      Labs and chart reviewed.    Would caution opiates with concern for abdominal pain with impaction/ CVC.  Would start aggressive bowel regimen, enema x1, suppositories prn, and miralax scheduled.  Trial of ativan for intractable n/v unrelieved by anti-emetics.  1x IM dose phenergan.

## 2024-01-26 NOTE — PROGRESS NOTES
Physician Progress Note      PATIENT:               DALIA LEWIS  [facility] #:                  [id number]  :                       1995  ADMIT DATE:       2024 1:54 PM  DISCH DATE:        2024 5:31 PM  RESPONDING  PROVIDER #:        Marlon Arias MD          QUERY TEXT:    Pt admitted with intractable nausea and vomiting. Pt noted to have   documentation in  progress notes of uncontrolled HTN. BP on admission was   196/104 >187/109>211/106. treated with iv labetalol in ED and IV Apresoline   and started on po Procardia .patient did c/o chest pain on admission. If   possible, please document in progress notes and discharge summary if you are   evaluating and/or treating any of the following:    The medical record reflects the following:  Risk Factors: History of HTN, dm, nausea and vomiting  Clinical Indicators: admitted with intractable nausea and vomiting. Pt noted   to have documentation in  progress notes of uncontrolled HTN.BP on   admission was 196/104 >187/109>211/106. treated with iv labetalol in ED and IV   Apresoline and started on po Procardia  Treatment: iv Labetalol, iv apresoline, Po Procardia XL,    Hypertensive Urgency: Defined as SBP of >180 OR DBP >120 w/o associated organ   damage. S/s may or may not be present, but can include severe headache, SOB,   epistaxis, severe anxiety. Tx: adjustment of oral BP medication; IV meds not   usually required.  Hypertensive Emergency: SBP of >180 OR DBP >120 w/ associated organ damage   (CVA, MI, acute CHF, KAIT, encephalopathy, pulmonary edema, and unstable   angina). Documentation should include specific organ affected.  Requires   immediate treatment, usually with IV meds.  Hypertensive Crisis, unspecified: SBP of > 180 OR DBP > 120 and includes   damage to blood vessels, including inflammation, leakage of fluid or blood and   can cause stroke, headache, heart failure and eclampsia.  Source: [employer]-DRG Training Guide and Quick  Juana HUDDLESTON 1/26/2024 6:00 PM

## 2024-01-26 NOTE — PROGRESS NOTES
Patient requesting medication for pain. Writer gave Tylenol and scheduled Protonix however afterwards patient began to vomit.

## 2024-01-26 NOTE — PROGRESS NOTES
[facility]  Office: [phone]  Hardeep Augustine DO, Rod Slaughter DO, Dae Cobrett DO, Freddy Guzman DO, Mau Valerio MD, Kindra Marshall MD, Torri Pettit MD, Janette Bryant MD,  Nito Quigley MD, Koki Mcintyre MD, William German MD,  Bry Lutz DO, Kendall Yeager MD, Juan Vasquez MD, Alf Augustine DO, Shahana Delarosa MD,  Aung Parry DO, Chika Wall MD, Genoveva Costa MD, Echo Espinosa MD, Prema Dodd MD,  Tung De La O MD, Anita Billingsley MD, Aravind Cardoso MD, Marlon Arias MD, Leonidas Garcia MD, Peg Resendiz MD, Juno Santacruz DO, Edmond Singh DO, Marcio Roa MD,  Ari Chun MD, Shirley Waterhouse, CNP,  Sushila Paige, CNP, Wagner Rodriguez, CNP,  Genny Agrawal, IAN, Shreya Ding, CNP, Diane Hayden, CNP, Carly Hamilton CNP, Alexandra Suarez, CNP, Patricia Gary, CNP, Demi Linder, PA-C, Tere Cota, PA-C, Sherry Lo, CNP, Fatou Sneed, CNS, Gracia Menchaca, CNP, Antoinette Gordillo, CNP, Tracy Schwab, CNP         [facility]   IN-PATIENT SERVICE   [facility]    Progress Note    1/26/2024    11:45 AM    Name:   Tran Deng  MRN:     [id number]     Acct:      [id number]   Room:   OBS 12/12-1  IP Day:  1  Admit Date:  1/23/2024  1:54 PM    PCP:   Emperatriz Ortiz MD  Code Status:  Full Code    Subjective:     C/C:   Chief Complaint   Patient presents with    Fall     Mechanical fall slipped on ice    Back Pain    Chest Pain     \"From fall\"    Nausea     With emesis     Interval History Status: improved    Seen and examined patient continues to report nausea and vomiting but improved compared with yesterday, she had 5 episodes overnight and 1 episodes today morning, she continued to denies abdominal pain or chest pain or pain or burning with urination  Had urine culture showed E coli and she was started on ceftriaxone  GI was consulted for intractable vomiting  Creatinine improving today we will continue with IV fluid  sacral segment there is a suggestion of mild superior endplate compression and bony fragment seen anteriorly. This can best be evaluated on the lateral view. 4. Chronic sinusitis. 5. No acute chest abnormality on the chest x-ray. RECOMMENDATIONS: Sacral MRI for further evaluation.     CT HEAD WO CONTRAST    Result Date: 1/24/2024  1. No acute osseous abnormality of the lumbar spine. 2. No acute intracranial abnormality. 3. At the level of the upper 2nd sacral segment there is a suggestion of mild superior endplate compression and bony fragment seen anteriorly. This can best be evaluated on the lateral view. 4. Chronic sinusitis. 5. No acute chest abnormality on the chest x-ray. RECOMMENDATIONS: Sacral MRI for further evaluation.     XR PELVIS (MIN 3 VIEWS)    Result Date: 1/23/2024  Known S2 fracture is not radiographically evident.       Physical Examination:     Physical Exam  Constitutional:       General: She is not in acute distress.     Appearance: Normal appearance.   HENT:      Head: Normocephalic.      Mouth/Throat:      Mouth: Mucous membranes are moist.   Eyes:      General: No scleral icterus.     Extraocular Movements: Extraocular movements intact.      Pupils: Pupils are equal, round, and reactive to light.   Cardiovascular:      Rate and Rhythm: Normal rate and regular rhythm.      Heart sounds: No murmur heard.  Pulmonary:      Effort: Pulmonary effort is normal. No respiratory distress.      Breath sounds: Normal breath sounds. No wheezing or rales.   Abdominal:      General: There is no distension.      Tenderness: There is no abdominal tenderness. There is no rebound.   Musculoskeletal:         General: No tenderness or deformity.      Cervical back: Normal range of motion. No rigidity or tenderness.      Right lower leg: No edema.      Left lower leg: No edema.   Skin:     Coloration: Skin is not jaundiced.      Findings: No lesion or rash.   Neurological:      General: No focal deficit present.

## 2024-01-27 NOTE — CONSULTS
Gastroenterology  Consultation Note     .      REASON FOR CONSULTATION:  intractable vomiting      HISTORY OF PRESENT ILLNESS:       This is a 28-year-old female with past medical history of hypertension, diabetes, ovarian cyst who presented to the ED with complaints of intractable nausea/vomiting.  The patient reports that she had a similar episode 1 year ago.  She is currently diagnosed with UTI.  Patient does not have much symptoms.  She does endorse smoking marijuana daily.  In the hospital she was given IV fluids, symptomatic management for nausea and was counseled regarding cannabis cessation.  She was able to tolerate a diet today.    Previous GI history:  EGD 2021:  edematous folds in proximal stomach. Biopsies were obtained to exclude underlying H. pylori infection.    STOMACH, BIOPSY:     HELICOBACTER GASTRITIS     PAST MEDICAL HISTORY:  Past Medical History:   Diagnosis Date    Hypertension     Iron deficiency anemia due to chronic blood loss 2023    Kidney infection     MRSA (methicillin resistant staph aureus) culture positive 2016    abdomen    Nausea & vomiting     Ovarian cyst 2019    Right side    Secondary osteoporosis     Type 1 diabetes mellitus (HCC)     UTI (urinary tract infection)      Past Surgical History:   Procedure Laterality Date     SECTION      UPPER GASTROINTESTINAL ENDOSCOPY N/A 2021    EGD BIOPSY performed by Liz Banegas MD at [facility] OR       ALLERGIES:  Allergies   Allergen Reactions    Blueberry [Vaccinium Angustifolium] Anaphylaxis     Tongue swells up, needs epipen    Shrimp Flavor Anaphylaxis     OK with fish, allergic to shrimp and lobster  OK with IV contrast       HOME MEDICATIONS:  Prior to Admission medications    Medication Sig Start Date End Date Taking? Authorizing Provider   vitamin D (ERGOCALCIFEROL) 1.25 MG (72356 UT) CAPS capsule Take 1 capsule by mouth once a week 24  Yes Molina Noriega MD   Continuous Blood Gluc   (DEXCOM G6 ) MARIIA 1 each by Does not apply route daily 1/16/24   Emperatriz Ortiz MD   Continuous Blood Gluc Sensor (DEXCOM G6 SENSOR) MISC 1 each by Does not apply route daily 1/16/24   Emperatriz Ortiz MD   Continuous Blood Gluc Transmit (DEXCOM G6 TRANSMITTER) MISC 1 each by Does not apply route every 3 months 1/16/24   Emperatriz Ortiz MD   insulin aspart (NOVOLOG FLEXPEN) 100 UNIT/ML injection pen INJECT 2 TO 12 UNITS SUBCUTANEOUSLY THREE TIMES DAILY PER SLIDING SCALE (DISCARD PEN 28 DAYS AFTER OPENING) 1/11/24   Emperatriz Ortiz MD   insulin glargine (LANTUS SOLOSTAR) 100 UNIT/ML injection pen INJECT 10 UNITS INTO THE SKIN NIGHTLY (DISCARD PEN 28 DAYS AFTER OPENING) 1/11/24   Emperatriz Ortiz MD   rOPINIRole (REQUIP) 0.25 MG tablet Take 1 tablet by mouth nightly 1/11/24   Emperatriz Ortiz MD   tiZANidine (ZANAFLEX) 4 MG tablet Take 1 tablet by mouth every 8 hours as needed (back pain) 1/11/24   Emperatriz Ortiz MD   rizatriptan (MAXALT) 10 MG tablet Take 1 tablet by mouth daily as needed for Migraine May repeat in 2 hours if needed 1/11/24   Emperatriz Ortiz MD   pantoprazole (PROTONIX) 40 MG tablet Take 1 tablet by mouth every morning (before breakfast) 1/11/24   Emperatriz Ortiz MD   ondansetron (ZOFRAN) 4 MG tablet As needed 1/11/24   Emperatriz Ortiz MD   ketoconazole (NIZORAL) 2 % cream Use twice daily 1/11/24   Emperatriz Ortiz MD   NIFEdipine (NIFEDICAL XL) 60 MG extended release tablet Take 1 tablet by mouth daily 1/11/24   Emperatriz Ortiz MD   amitriptyline (ELAVIL) 10 MG tablet Take 1 tablet by mouth nightly 1/11/24   Emperatriz Ortiz MD   amitriptyline (ELAVIL) 10 MG tablet Take 1 tablet by mouth nightly for 14 days 1/11/24 1/25/24  Emperatriz Ortiz MD   loratadine (CLARITIN) 10 MG tablet Take 1 tablet by mouth daily 11/21/23   Emperatriz Ortiz MD   Insulin Pen Needle (DROPLET PEN NEEDLES) 31G X 8 MM MISC USE FOUR TIMES DAILY AS DIRECTED 5/10/23   Angel  PM    HEPBIGM NONREACTIVE 07/19/2022 12:00 PM    HEPAIGM NONREACTIVE 07/19/2022 12:00 PM       HCV Genotype   No components found for: \"HEPATITISCGENOTYPE\"    HCV Quantitative   No results found for: \"HCVQNT\"    Metabolic work up:  Ceruloplasmin  AA work up:  Alpha 1 antitrypsin   No results found for: \"A1A\"  AMA:  No results found for: \"MITOAB\"    ASMA:  No results found for: \"SMOOTHMUSCAB\"    ANCA:    SANTANA:  No results found for: \"SANTANA\"    Anti - Liver/Kidney Ab:  No results found for: \"LIVER-KIDNEYMICROSOMALAB\"    Ceruloplasmin:  No results found for: \"CERULOPLSM\"    Celiac panel:  Lab Results   Component Value Date/Time    TTGIGA 0.9 05/24/2021 12:02 PM     05/24/2021 12:02 PM       Cancer Markers:  CEA:    No results for input(s): \"CEA\" in the last 72 hours.  Ca 125:   No results for input(s): \"\" in the last 72 hours.  Ca 19-9:     Invalid input(s): \"\"  AFP: No results for input(s): \"AFP\" in the last 72 hours.     ASSESSMENT and PLAN:     This is a 28-year-old female with past medical history of hypertension, diabetes, ovarian cyst who presented to the ED with complaints of intractable nausea/vomiting.    # Intractable Nausea/Vomiting  # Daily Cannabis user  # UTI  # Elevated Creatinine (CKD vs KAIT?)  # DM  # GERD  # Chronic anemia    Plan:  - Antibiotics management per primary  - Antiemetics PRN  - Counseling regarding marijuana cessation  - No urgent need for endoscopy  - Okay to discharge if tolerating diet and symptoms better  - PPI      Thank you for allowing us to take part in the patients care  Contact GI for any remaining questions, we are available.     Electronically signed by:  Lakia Hernandez MD   Gastroenterology  1/26/2024    10:22 PM       This note is created with the assistance of a speech recognition program.  While intending to generate a document that actually reflects the content of the visit, the document can still have some errors including those of syntax and sound a like

## 2024-01-29 ENCOUNTER — CARE COORDINATION (OUTPATIENT)
Dept: CARE COORDINATION | Age: 29
End: 2024-01-29

## 2024-01-29 ENCOUNTER — CARE COORDINATION (OUTPATIENT)
Dept: CASE MANAGEMENT | Age: 29
End: 2024-01-29

## 2024-01-29 ENCOUNTER — TELEPHONE (OUTPATIENT)
Dept: FAMILY MEDICINE CLINIC | Age: 29
End: 2024-01-29

## 2024-01-29 DIAGNOSIS — M54.41 ACUTE RIGHT-SIDED BACK PAIN WITH SCIATICA: ICD-10-CM

## 2024-01-29 LAB
EKG ATRIAL RATE: 68 BPM
EKG P AXIS: 67 DEGREES
EKG P-R INTERVAL: 122 MS
EKG Q-T INTERVAL: 424 MS
EKG QRS DURATION: 78 MS
EKG QTC CALCULATION (BAZETT): 450 MS
EKG R AXIS: 86 DEGREES
EKG T AXIS: 47 DEGREES
EKG VENTRICULAR RATE: 68 BPM

## 2024-01-29 RX ORDER — CEPHALEXIN 500 MG/1
500 CAPSULE ORAL 2 TIMES DAILY
Qty: 14 CAPSULE | Refills: 0 | Status: SHIPPED | OUTPATIENT
Start: 2024-01-29 | End: 2024-02-05

## 2024-01-29 NOTE — CARE COORDINATION
Care Transitions Initial Follow Up Call    Call within 2 business days of discharge: Yes    Patient Current Location:  [place]    Care Transition Nurse contacted the patient by telephone to perform post hospital discharge assessment. Verified name and  with patient as identifiers. Provided introduction to self, and explanation of the Care Transition Nurse role.     Patient: Tran Deng Patient : 1995   MRN: [id number]  Reason for Admission: Fall  Discharge Date: 24 RARS: Readmission Risk Score: 17.7      Last Discharge Facility       Date Complaint Diagnosis Description Type Department Provider    24 Fall; Back Pain; Chest Pain; Nausea Cyclical vomiting ED to Hosp-Admission (Discharged) (ADMITTED) [facility] Marlon Arias MD; Dzu...            Was this an external facility discharge? No Discharge Facility: [facility]    Challenges to be reviewed by the provider   Additional needs identified to be addressed with provider: Yes  Same day or recent appointment                Method of communication with provider: phone.    Was able to contact Tran.  She stated that she was still having some back/butt pain, but she was dealing with it.  She said that she has not vomited in the last 2 days, but she did say that she was not eating, only drinking water.  She said that there was something wrong with her tongue.  She said that it is \"raw\" and things stick to it. She said that she had all her home medications, but she did leave AMA and medication review and 1111F could not be done.  She was diagnosised  with UTI, HTN and N/V,but since she left AMA, no medications were ordered for this problems.  Call PCP office and spoke with Kirti.  Explained that pt left AMA and would need to see PCP as soon as possible for the UTI, \"raw\" tongue, HTN and N/V.  She said that she will send a message to the provider to see if they can get her in sooner.  They will call Tran with the appointment.  Call

## 2024-01-29 NOTE — TELEPHONE ENCOUNTER
Bisi called from [facility] in regards to pt hospital visit on 01/23/2024. Pt was seen for cyclical vomiting and a UTI. Labs and urine were completed but pt left AMA and was not treated for UTI. Pt is requesting medication based on urine results from ED visit. Pt was also complaining of her tongue being raw.   Please advise.

## 2024-01-30 ENCOUNTER — CARE COORDINATION (OUTPATIENT)
Dept: CARE COORDINATION | Age: 29
End: 2024-01-30

## 2024-01-30 RX ORDER — TIZANIDINE 4 MG/1
TABLET ORAL
Qty: 30 TABLET | Refills: 3 | Status: SHIPPED | OUTPATIENT
Start: 2024-01-30

## 2024-01-30 NOTE — TELEPHONE ENCOUNTER
[facility]  Lab Frequency Next Occurrence   CBC with Auto Differential Once 01/03/2024   Ferritin Once 01/03/2024   Iron and TIBC Once 01/03/2024   Basic Metabolic Panel Once 01/11/2024               Patient Active Problem List:     Type 1 diabetes mellitus with diabetic polyneuropathy ([facility])     Intractable vomiting with nausea     Intractable abdominal pain     Microalbuminuria due to type 1 diabetes mellitus ([facility])     Generalized abdominal pain     Restless leg syndrome     Protein-calorie malnutrition, severe ([facility])     Marijuana use, continuous     Onychomycosis     Cachexia ([facility])     Oligomenorrhea     Positive Tpal     Left breast mass     Latent syphilis     Premenopausal idiopathic osteoporosis     Osteoporosis     Closed nondisplaced fracture of fifth metacarpal bone of left hand with routine healing     Chronic gastritis without bleeding     Acute pain of left wrist     Iron deficiency anemia due to chronic blood loss     Iron malabsorption     KAIT (acute kidney injury) ([facility])     Dental infection     Bilious vomiting with nausea     Tobacco abuse     Sacral fracture, closed ([facility])     Intractable nausea and vomiting     Acute kidney injury ([facility])     Cyclical vomiting

## 2024-01-30 NOTE — CARE COORDINATION
Nutrition Care Coordinator Follow-Up visit:    Food Recall:eating 2-3 meals/d    Activity Level:  Sedentary:X  Lightly Active:  Moderately Active:  Very Active:    Adult BMI:  Underweight (below 18.5)  Normal Weight (18.5-24.9)X  Overweight (25-29.9)  Obese (30-39.9)  Morbidly Obese (>40)    Plan:  Plan was established with patient:  Small frequent meals: X  Increase protein intake: X  Ensure Complete 3-4 tines/d: X    Monitoring:  Will monitor weight:  Will monitor adherence to meal plan:X  Will monitor adherence to exercise plan:  Will monitor HGA1c:    Handouts Provided :  Low Carb snacking:  Carb counting /individual meal plan:  Portion Control:  Food Labels:  Physical Activity:  Low Fat/Cholesterol:  Hypo/Hyperglycemia:  Calorie Controlled Meal Plan:  High protein snacks: X    Goals:  Patient goals set:  1.Reviewed working on eating more often small amounts every 2-3 hours 4-6 times/day.  Reviewed set meal times daily and consistency of meal times and how this can effect her sugars.   2. Reviewed what foods contain carbohydrate, we discussed avoiding sugary foods but increasing complex carbs/protien at meals. Encouraged 60gms carb/meal 15-30gms/snack. Reviewed snacking- will send list of snacks ideas to to patient.   3. Reviewed plate method, encouraged patient to increase intake of protein at each meal/snack. Focused on increasing protein intake and discussed high energy/protein diabetic guidelines.Encouraged to make  every bite count by choosing foods that are high in energy and protein.For example, choose cream soup over broth, or canned fruit in juice over watermelon.Use drinks to add extra calories: choose milk, smoothies, juice, or milkshakes over water, tea, coffee, or diet sodas.Encouraged to include a serving of protein at each meal -meats, cheese, Greek yogurt, peanut butter, nuts/seeds, cereals- patient only likes a few of these but states she will try to include what she like daily at meals.

## 2024-01-31 NOTE — CARE COORDINATION
contacted [facility] to follow up on rent assistance programs.  Currently still no rent assistance.     reached out to the [facility] that assist with homeless populations to inquire if they assist individuals struggling to pay rent.   was informed they do not assist with rent.     attempted to contact [employer].   left message.     Plan:   will order a box of food from [facility] to be delivered to [employer].   will speak to [employer] about outpatient treatment.

## 2024-02-01 ENCOUNTER — OFFICE VISIT (OUTPATIENT)
Dept: FAMILY MEDICINE CLINIC | Age: 29
End: 2024-02-01
Payer: MEDICARE

## 2024-02-01 VITALS
HEART RATE: 87 BPM | BODY MASS INDEX: 18.64 KG/M2 | DIASTOLIC BLOOD PRESSURE: 100 MMHG | SYSTOLIC BLOOD PRESSURE: 156 MMHG | WEIGHT: 112 LBS | OXYGEN SATURATION: 100 %

## 2024-02-01 DIAGNOSIS — I10 UNCONTROLLED HYPERTENSION: ICD-10-CM

## 2024-02-01 DIAGNOSIS — S32.10XD CLOSED FRACTURE OF SACRUM WITH ROUTINE HEALING, UNSPECIFIED PORTION OF SACRUM, SUBSEQUENT ENCOUNTER: Primary | ICD-10-CM

## 2024-02-01 DIAGNOSIS — E10.42 TYPE 1 DIABETES MELLITUS WITH DIABETIC POLYNEUROPATHY (HCC): ICD-10-CM

## 2024-02-01 DIAGNOSIS — G43.911 INTRACTABLE MIGRAINE WITH STATUS MIGRAINOSUS, UNSPECIFIED MIGRAINE TYPE: ICD-10-CM

## 2024-02-01 DIAGNOSIS — B37.0 ORAL THRUSH: ICD-10-CM

## 2024-02-01 PROCEDURE — 2022F DILAT RTA XM EVC RTNOPTHY: CPT | Performed by: INTERNAL MEDICINE

## 2024-02-01 PROCEDURE — 99214 OFFICE O/P EST MOD 30 MIN: CPT | Performed by: INTERNAL MEDICINE

## 2024-02-01 PROCEDURE — G8420 CALC BMI NORM PARAMETERS: HCPCS | Performed by: INTERNAL MEDICINE

## 2024-02-01 PROCEDURE — 3046F HEMOGLOBIN A1C LEVEL >9.0%: CPT | Performed by: INTERNAL MEDICINE

## 2024-02-01 PROCEDURE — 1111F DSCHRG MED/CURRENT MED MERGE: CPT | Performed by: INTERNAL MEDICINE

## 2024-02-01 PROCEDURE — 3080F DIAST BP >= 90 MM HG: CPT | Performed by: INTERNAL MEDICINE

## 2024-02-01 PROCEDURE — G8427 DOCREV CUR MEDS BY ELIG CLIN: HCPCS | Performed by: INTERNAL MEDICINE

## 2024-02-01 PROCEDURE — G8484 FLU IMMUNIZE NO ADMIN: HCPCS | Performed by: INTERNAL MEDICINE

## 2024-02-01 PROCEDURE — 3077F SYST BP >= 140 MM HG: CPT | Performed by: INTERNAL MEDICINE

## 2024-02-01 PROCEDURE — 4004F PT TOBACCO SCREEN RCVD TLK: CPT | Performed by: INTERNAL MEDICINE

## 2024-02-01 RX ORDER — NIFEDIPINE 90 MG/1
90 TABLET, EXTENDED RELEASE ORAL DAILY
Qty: 90 TABLET | Refills: 1 | Status: SHIPPED | OUTPATIENT
Start: 2024-02-01

## 2024-02-01 RX ORDER — OXYCODONE HYDROCHLORIDE 5 MG/1
5 TABLET ORAL EVERY 6 HOURS PRN
Qty: 20 TABLET | Refills: 0 | Status: SHIPPED | OUTPATIENT
Start: 2024-02-01 | End: 2024-02-06

## 2024-02-01 NOTE — PROGRESS NOTES
Patient is here for a ER follow up due to a fracture of the 2nd segment of the sacrum involving the superior endplate as described previously.  
sounds: Normal heart sounds, S1 normal and S2 normal. No murmur heard.     No friction rub. No gallop.   Pulmonary:      Effort: Pulmonary effort is normal. No respiratory distress.      Breath sounds: Normal breath sounds. No wheezing.   Abdominal:      General: Bowel sounds are normal.      Palpations: Abdomen is soft. There is no mass.      Tenderness: There is no abdominal tenderness. There is no guarding.   Musculoskeletal:         General: Normal range of motion.   Skin:     General: Skin is warm and dry.      Capillary Refill: Capillary refill takes less than 2 seconds.   Neurological:      General: No focal deficit present.      Mental Status: She is alert and oriented to person, place, and time.           Data Review   Controlled Substance Monitoring:    Acute and Chronic Pain Monitoring:   RX Monitoring Periodic Controlled Substance Monitoring   2/1/2024   5:41 PM No signs of potential drug abuse or diversion identified.;Assessed functional status (ability to engage in work or other purposeful activities, the pain intensity and its interference with activities of daily living, quality of family life and social activities, and the physical activity)           Health Maintenance Due   Topic Date Due    Hepatitis B vaccine (4 of 4 - 4-dose series) 10/20/1997    Diabetic retinal exam  01/21/2020    Diabetic foot exam  03/23/2023    Annual Wellness Visit (Medicare Advantage)  Never done    Diabetic Alb to Cr ratio (uACR) test  01/12/2024           Patient given educational materials- see patient instructions.  Discussed use, benefit, and side effects of prescribedmedications.  All patient questions answered.  Pt voiced understanding. Reviewedhealth maintenance.  Instructed to continue current medications, diet and exercise.Patient agreed with treatment plan. Follow up as directed.     Electronically signedby JADYN SPENCER MD on 2/1/2024

## 2024-02-02 RX ORDER — PEN NEEDLE, DIABETIC 31 GX5/16"
NEEDLE, DISPOSABLE MISCELLANEOUS
Qty: 400 EACH | Refills: 3 | Status: SHIPPED | OUTPATIENT
Start: 2024-02-02

## 2024-02-02 RX ORDER — CALCIUM CITRATE/VITAMIN D3 200MG-6.25
TABLET ORAL
Qty: 400 STRIP | Refills: 3 | Status: SHIPPED | OUTPATIENT
Start: 2024-02-02

## 2024-02-02 RX ORDER — GLUCOSAM/CHON-MSM1/C/MANG/BOSW 500-416.6
TABLET ORAL
Qty: 400 EACH | Refills: 3 | Status: SHIPPED | OUTPATIENT
Start: 2024-02-02

## 2024-02-05 ENCOUNTER — CARE COORDINATION (OUTPATIENT)
Dept: CARE COORDINATION | Age: 29
End: 2024-02-05

## 2024-02-05 NOTE — CARE COORDINATION
Ambulatory Care Coordination Note  2024    Patient Current Location:  Home: [address] [address]     ACM contacted the patient by telephone. Verified name and  with patient as identifiers.      She had admission - for intractable vomiting, UTI, fall. Left AMA. PCP prescribed Keflex for the UTI.  Saw PCP  was prescribed short term oxycodone for pain from sacral fracture. Stated a little better. Ortho appt tomorrow.  No symptoms of UTI right now, discussed drinking more fluids, try to prevent constipation (CT pelvis showed constipation and stool impaction) since pain medication may worsen constipation.  No vomiting since discharge. Eating a little bit, also drinking 2 cans Ensure Plus a day.  [facility] told her they don't take her insurance, unable to get Dexcom. Writer sent message to staff member to send order to [facility].  DM- blood sugars\"ok\" didn't give any numbers, is checking a few times a day, denied hypoglycemia.  Offered patient enrollment in the Remote Patient Monitoring (RPM) program for in-home monitoring: Patient declined.  CC Plan:   -Follow up in a week to check about Dexcom, review medications, blood sugars.  Diabetes Assessment    Medic Alert ID: No  Meal Planning: Calorie counting, Avoidance of concentrated sweets   How often do you test your blood sugar?: Meals, Bedtime   Do you have barriers with adherence to non-pharmacologic self-management interventions? (Nutrition/Exercise/Self-Monitoring): Yes   Have you ever had to go to the ED for symptoms of low blood sugar?: No       Do you have hyperglycemia symptoms?: No   Do you have hypoglycemia symptoms?: No   Blood Sugar Monitoring Regimen: Before Meals   Blood Sugar Trends: No Change         and   General Assessment    Do you have any symptoms that are causing concern?: Yes  Progression since Onset: Gradually Improving  Reported Symptoms: Pain (Comment: sacral pain from fall)           Lab Results       None

## 2024-02-06 ENCOUNTER — OFFICE VISIT (OUTPATIENT)
Dept: ORTHOPEDIC SURGERY | Age: 29
End: 2024-02-06
Payer: MEDICARE

## 2024-02-06 VITALS — WEIGHT: 112 LBS | HEIGHT: 65 IN | BODY MASS INDEX: 18.66 KG/M2

## 2024-02-06 DIAGNOSIS — R52 PAIN: ICD-10-CM

## 2024-02-06 DIAGNOSIS — S32.10XD CLOSED FRACTURE OF SACRUM WITH ROUTINE HEALING, UNSPECIFIED PORTION OF SACRUM, SUBSEQUENT ENCOUNTER: Primary | ICD-10-CM

## 2024-02-06 PROCEDURE — G8427 DOCREV CUR MEDS BY ELIG CLIN: HCPCS | Performed by: ORTHOPAEDIC SURGERY

## 2024-02-06 PROCEDURE — G8484 FLU IMMUNIZE NO ADMIN: HCPCS | Performed by: ORTHOPAEDIC SURGERY

## 2024-02-06 PROCEDURE — 1111F DSCHRG MED/CURRENT MED MERGE: CPT | Performed by: ORTHOPAEDIC SURGERY

## 2024-02-06 PROCEDURE — 4004F PT TOBACCO SCREEN RCVD TLK: CPT | Performed by: ORTHOPAEDIC SURGERY

## 2024-02-06 PROCEDURE — 27197 CLSD TX PELVIC RING FX: CPT | Performed by: ORTHOPAEDIC SURGERY

## 2024-02-06 PROCEDURE — 99203 OFFICE O/P NEW LOW 30 MIN: CPT | Performed by: ORTHOPAEDIC SURGERY

## 2024-02-06 PROCEDURE — G8420 CALC BMI NORM PARAMETERS: HCPCS | Performed by: ORTHOPAEDIC SURGERY

## 2024-02-06 RX ORDER — LIDOCAINE 50 MG/G
1 PATCH TOPICAL DAILY
Qty: 10 PATCH | Refills: 0 | Status: SHIPPED | OUTPATIENT
Start: 2024-02-06 | End: 2024-02-16

## 2024-02-06 NOTE — PROGRESS NOTES
Appropriate affect; Appropriate knowledge base; Appropriate mood; No hallucinations  Head: normocephalic atraumatic   Chest: symmetric chest excursion  Ortho Exam  MSK: Tenderness to palpation diffusely through the gluteal and posterior thigh regions but most significantly over the sacrum.  No TTP over lumbar spine.  Motor and sensory function intact to bilateral lower extremities.  Pain in sacrum exacerbated with the range of motion.    Radiology:   Imaging studies from today were independently reviewed and read as listed below. Any relevant images obtained prior to today's visit were also independently interpreted.    History: Transverse sacral fracture    Comparison: 1/23/2024    Findings: 3 views of the pelvic ring (AP, inlet, outlet) in a skeletally mature patient showing no observed bony abnormality.  No paradoxical inlet on the AP view.    Impression: Normal appearance of pelvic ring       ASSESSMENT:  28 y.o. female with transverse sacral fracture    PLAN:  Recommend avoiding excessive forward flexion or carrying weighted objects or any other maneuvers or activities that are known to exacerbate symptoms.  Cautioned if she begins to have bowel or bladder incontinence or sustained motor or sensory changes lower extremities to come to the ER immediately.  Lidocaine patches prescribed for application to sacral area as needed.    Return in about 4 weeks (around 3/5/2024) for evaluation with X-rays OUT of cast/splint/brace.    No orders of the defined types were placed in this encounter.      Orders Placed This Encounter   Procedures    XR PELVIS (MIN 3 VIEWS)     Standing Status:   Future     Number of Occurrences:   1     Standing Expiration Date:   1/31/2025        Electronically signed by Mikey Cramer DO on 2/6/2024 at 9:40 AM    This note is created with the assistance of a speech recognition program.  While intending to generate a document that actually reflects the content of the visit, the document can

## 2024-02-08 ASSESSMENT — VISUAL ACUITY: OU: 1

## 2024-02-08 ASSESSMENT — ENCOUNTER SYMPTOMS
CONSTIPATION: 0
DIARRHEA: 0
VOMITING: 0
SHORTNESS OF BREATH: 0
ABDOMINAL PAIN: 0
NAUSEA: 0
COUGH: 0
BLOOD IN STOOL: 0
ANAL BLEEDING: 0
WHEEZING: 0
CHOKING: 0
BACK PAIN: 1
CHEST TIGHTNESS: 0

## 2024-02-09 ENCOUNTER — CARE COORDINATION (OUTPATIENT)
Dept: CARE COORDINATION | Age: 29
End: 2024-02-09

## 2024-02-09 NOTE — CARE COORDINATION
Judith from Tran's daughter's school contacted .  Judith inquired if  was working with daughter.   noted that she is following Tran not daughter for care management.    Judith inquired if family would benefit from additional resources such as school supplies and clothing.   encouraged Judith to follow up with mom but noted that she is sure mom would appreciate as much help/resources as she can get.      spoke to Tran regarding resources.   Tran denies being behind on rent or utilities.  Tran reports she has plenty of food currently.     and Tran discussed previous hospital stay.  Tran reports that her sister watched her daughter while in hospital.  Tran denied issues or concern with marijuana use.     Plan:    will follow up one more time before signing off.

## 2024-02-12 ENCOUNTER — CARE COORDINATION (OUTPATIENT)
Dept: CARE COORDINATION | Age: 29
End: 2024-02-12

## 2024-02-12 NOTE — CARE COORDINATION
Left VM message asking patient to return call for care management follow up, check blood sugars and symptoms, see if got Dex Com from [facility]. Will call again next week.    Future Appointments   Date Time Provider Department Center   2/28/2024  5:00 PM Emperatriz Ortiz MD [facility]   3/5/2024  9:30 AM Mikey Cramer,  ORTHO SPECIA [facility]   3/15/2024 11:15 AM Osman Roman MD [facility]

## 2024-02-16 ENCOUNTER — CARE COORDINATION (OUTPATIENT)
Dept: CARE COORDINATION | Age: 29
End: 2024-02-16

## 2024-02-16 NOTE — CARE COORDINATION
Nutrition Care Coordinator Follow-Up visit:    Food Recall:eating 3 meals/d    Activity Level:  Sedentary:  Lightly Active:X  Moderately Active:  Very Active:    Adult BMI:  Underweight (below 18.5)  Normal Weight (18.5-24.9)X    Plan:  Plan was established with patient:  Increase dietary fiber by consuming whole grains, fruits and vegetables:X  Limit dietary cholesterol to >200mg/day:  Increase water intake:  Avoid added sugar:X  Avoid sweetened beverages:X  Choose lean meats:      Monitoring:  Will monitor weight:  Will monitor adherence to meal plan:X  Will monitor adherence to exercise plan:  Will monitor HGA1c:    Handouts Provided :  Low Carb snacking:  Carb counting /individual meal plan:  Portion Control:  Food Labels:X  Physical Activity:  Low Fat/Cholesterol:  Hypo/Hyperglycemia:  Calorie Controlled Meal Plan:    Goals:  Increase water consumption to 8oz. 6-8 times daily:  Manage blood sugars by consuming 3 meals spaced every 4-5 hours with 2-3 snacks daily:reviewed  Increase fiber and decrease fat intake by consuming 1-2 fruit servings and 2-3 vegetable servings per day.  Increase physical activity by:  Consume less than 2,000mg of sodium/day  Avoid consumption of sweetened beverages and added sugar by reading food labels:reviewed  Monitor blood sugars by using meter to check blood glucose before morning meal and 2 hours after a meal daily:states BS have been good but did not provide readings  Decrease risk of coronary heart disease by consuming fish that contains omega-3 fatty acids at least twice a week, avoiding partially hydrogenated oil/trans fats and limiting saturated fat intake by reading food labels:    Patient goals set:  1.Reviewed working on eating more often small amounts every 2-3 hours 4-6 times/day.  Reviewed set meal times daily and consistency of meal times and how this can effect her sugars.   2. Reviewed what foods contain carbohydrate, we discussed avoiding sugary foods but

## 2024-02-19 ENCOUNTER — CARE COORDINATION (OUTPATIENT)
Dept: CARE COORDINATION | Age: 29
End: 2024-02-19

## 2024-02-19 NOTE — CARE COORDINATION
Ambulatory Care Coordination Note  2024    Patient Current Location:  Home: [address] [address]     ACM contacted the patient by telephone. Verified name and  with patient as identifiers.      She got Dexcom from [facility] [employer]. Numbers were good in mornings, near 100, a few higher ones in afternoons, no low alerts. Sensor came off yesterday, wouldn't \"stick\" any more. She put a new one on but is \"not turned on yet\". Gave her phone number for [employer] to call to ask about the Dexcom issues, advice on adhesive and make sure she's using it correctly.  No new concerns, not really nauseated right now, comes and goes. Has PCP appt next week.  Follow up after appt to check on Dexcom and blood sugar readings, discuss medications.  Diabetes Assessment    Medic Alert ID: No  Meal Planning: Calorie counting, Avoidance of concentrated sweets   How often do you test your blood sugar?: Meals, Bedtime   Do you have barriers with adherence to non-pharmacologic self-management interventions? (Nutrition/Exercise/Self-Monitoring): Yes   Have you ever had to go to the ED for symptoms of low blood sugar?: No       Do you have hyperglycemia symptoms?: No   Do you have hypoglycemia symptoms?: No   Blood Sugar Monitoring Regimen: Before Meals, At Bedtime   Blood Sugar Trends: Fluctuating         and   General Assessment    Do you have any symptoms that are causing concern?: No            Offered patient enrollment in the Remote Patient Monitoring (RPM) program for in-home monitoring: Patient declined.    Lab Results       None            Care Coordination Interventions    Referral from Primary Care Provider: No  Suggested Interventions and Community Resources  Medi Set or Pill Pack: Declined  Registered Dietician: Completed  Social Work: Completed  Transportation Support: Completed (Comment: [employer])          Goals Addressed                   This Visit's Progress     Conditions and Symptoms   Improving     I

## 2024-02-27 ENCOUNTER — CARE COORDINATION (OUTPATIENT)
Dept: CARE COORDINATION | Age: 29
End: 2024-02-27

## 2024-02-27 NOTE — CARE COORDINATION
called and spoke to Tran.  Tran reports that she is feeling a little better.  Tran noted that her insurance sent her a walker, however does not feel she needs it.     Tran denied food insecurities.  Tran denied being behind on rent or utilities.    Tran requested phone number and address for [facility] dental.   provided Tran with information.    Plan:   Tran will contact [facility] and have her tooth fixed.   Social work will follow up with Tran regarding social needs.

## 2024-02-28 ENCOUNTER — OFFICE VISIT (OUTPATIENT)
Dept: FAMILY MEDICINE CLINIC | Age: 29
End: 2024-02-28
Payer: MEDICARE

## 2024-02-28 VITALS
WEIGHT: 112.2 LBS | SYSTOLIC BLOOD PRESSURE: 116 MMHG | HEIGHT: 65 IN | BODY MASS INDEX: 18.69 KG/M2 | DIASTOLIC BLOOD PRESSURE: 68 MMHG | OXYGEN SATURATION: 100 % | HEART RATE: 106 BPM | RESPIRATION RATE: 20 BRPM

## 2024-02-28 DIAGNOSIS — M81.8 PREMENOPAUSAL IDIOPATHIC OSTEOPOROSIS: ICD-10-CM

## 2024-02-28 DIAGNOSIS — E10.42 TYPE 1 DIABETES MELLITUS WITH DIABETIC POLYNEUROPATHY (HCC): ICD-10-CM

## 2024-02-28 DIAGNOSIS — E10.649 HYPOGLYCEMIA DUE TO TYPE 1 DIABETES MELLITUS (HCC): ICD-10-CM

## 2024-02-28 DIAGNOSIS — E43 PROTEIN-CALORIE MALNUTRITION, SEVERE (HCC): ICD-10-CM

## 2024-02-28 DIAGNOSIS — R80.9 MICROALBUMINURIA DUE TO TYPE 1 DIABETES MELLITUS (HCC): ICD-10-CM

## 2024-02-28 DIAGNOSIS — Z72.0 TOBACCO ABUSE: ICD-10-CM

## 2024-02-28 DIAGNOSIS — S32.10XD CLOSED FRACTURE OF SACRUM WITH ROUTINE HEALING, UNSPECIFIED PORTION OF SACRUM, SUBSEQUENT ENCOUNTER: Primary | ICD-10-CM

## 2024-02-28 DIAGNOSIS — G43.911 INTRACTABLE MIGRAINE WITH STATUS MIGRAINOSUS, UNSPECIFIED MIGRAINE TYPE: ICD-10-CM

## 2024-02-28 DIAGNOSIS — E10.29 MICROALBUMINURIA DUE TO TYPE 1 DIABETES MELLITUS (HCC): ICD-10-CM

## 2024-02-28 PROBLEM — S62.307D CLOSED NONDISPLACED FRACTURE OF FIFTH METACARPAL BONE OF LEFT HAND WITH ROUTINE HEALING: Status: RESOLVED | Noted: 2023-01-12 | Resolved: 2024-02-28

## 2024-02-28 PROBLEM — N17.9 AKI (ACUTE KIDNEY INJURY) (HCC): Status: RESOLVED | Noted: 2023-11-04 | Resolved: 2024-02-28

## 2024-02-28 PROCEDURE — 2022F DILAT RTA XM EVC RTNOPTHY: CPT | Performed by: INTERNAL MEDICINE

## 2024-02-28 PROCEDURE — G8427 DOCREV CUR MEDS BY ELIG CLIN: HCPCS | Performed by: INTERNAL MEDICINE

## 2024-02-28 PROCEDURE — G8484 FLU IMMUNIZE NO ADMIN: HCPCS | Performed by: INTERNAL MEDICINE

## 2024-02-28 PROCEDURE — 3046F HEMOGLOBIN A1C LEVEL >9.0%: CPT | Performed by: INTERNAL MEDICINE

## 2024-02-28 PROCEDURE — 99214 OFFICE O/P EST MOD 30 MIN: CPT | Performed by: INTERNAL MEDICINE

## 2024-02-28 PROCEDURE — G8420 CALC BMI NORM PARAMETERS: HCPCS | Performed by: INTERNAL MEDICINE

## 2024-02-28 PROCEDURE — 4004F PT TOBACCO SCREEN RCVD TLK: CPT | Performed by: INTERNAL MEDICINE

## 2024-02-28 RX ORDER — PROCHLORPERAZINE 25 MG/1
1 SUPPOSITORY RECTAL DAILY
Qty: 9 EACH | Refills: 3 | Status: SHIPPED | OUTPATIENT
Start: 2024-02-28

## 2024-02-28 NOTE — PATIENT INSTRUCTIONS
See if you can  adhesive overpatches for the Dexcom to make the sensor last the full ten days on your body.

## 2024-02-28 NOTE — PROGRESS NOTES
[facility]  [facility]  [address] [place] [id number]  Dept: [phone]  Dept Fax: [phone]      Tran Deng is a 28 y.o. female who presents today for hermedical conditions/complaints as noted below.  Tran Deng is c/o of Follow-up        Assessment/Plan:     1. Closed fracture of sacrum with routine healing, unspecified portion of sacrum, subsequent encounter  2. Type 1 diabetes mellitus with diabetic polyneuropathy (HCC)  -     Continuous Blood Gluc Sensor (DEXCOM G6 SENSOR) MISC; 1 each by Does not apply route daily, Disp-9 each, R-3Normal  -     Hemoglobin A1C; Future  3. Hypoglycemia due to type 1 diabetes mellitus (HCC)  -     Continuous Blood Gluc Sensor (DEXCOM G6 SENSOR) MISC; 1 each by Does not apply route daily, Disp-9 each, R-3Normal  4. Intractable migraine with status migrainosus, unspecified migraine type  5. Protein-calorie malnutrition, severe (HCC)  6. Microalbuminuria due to type 1 diabetes mellitus (HCC)  7. Tobacco abuse  8. Premenopausal idiopathic osteoporosis  Likely has osteoporosis due to combination of T1D, underweight and poor nutritional status, and smoking   Has appointment coming up with ortho for follow=up   Will need to start therapy for osteoporosis, pt to discuss options with them at visit       No follow-ups on file.      HPI     Doing much better with pain -not needing pain meds any more  BP is much better today as well, sugars are comign down   Diabetes - doing well with current regimen. Denies hypoglycemia, hyperglycemia, fatigue, polyuria, polydipsia, paresthesias, vision changes, dizziness.  Eye exam was not done.  Not following with podiatry.  Patient is taking ACE inhibitor/ARB.  Complications of diabetes include osteoporosis, microalbuminuria.   Hypertension-tolerating current regimen without chest pain, palpitations, dizziness, peripheral edema, dyspnea on exertion, orthopnea, paroxysmal nocturnal

## 2024-03-05 ENCOUNTER — OFFICE VISIT (OUTPATIENT)
Dept: ORTHOPEDIC SURGERY | Age: 29
End: 2024-03-05

## 2024-03-05 ENCOUNTER — CARE COORDINATION (OUTPATIENT)
Dept: CARE COORDINATION | Age: 29
End: 2024-03-05

## 2024-03-05 VITALS — BODY MASS INDEX: 18.67 KG/M2 | HEIGHT: 65 IN

## 2024-03-05 DIAGNOSIS — R52 PAIN: Primary | ICD-10-CM

## 2024-03-05 PROCEDURE — G8420 CALC BMI NORM PARAMETERS: HCPCS | Performed by: ORTHOPAEDIC SURGERY

## 2024-03-05 PROCEDURE — G8427 DOCREV CUR MEDS BY ELIG CLIN: HCPCS | Performed by: ORTHOPAEDIC SURGERY

## 2024-03-05 PROCEDURE — 4004F PT TOBACCO SCREEN RCVD TLK: CPT | Performed by: ORTHOPAEDIC SURGERY

## 2024-03-05 PROCEDURE — G8484 FLU IMMUNIZE NO ADMIN: HCPCS | Performed by: ORTHOPAEDIC SURGERY

## 2024-03-05 ASSESSMENT — ENCOUNTER SYMPTOMS
CHEST TIGHTNESS: 0
CONSTIPATION: 0
NAUSEA: 0
ABDOMINAL PAIN: 0
BLOOD IN STOOL: 0
COUGH: 0
WHEEZING: 0
CHOKING: 0
ANAL BLEEDING: 0
DIARRHEA: 0
SHORTNESS OF BREATH: 0
VOMITING: 0

## 2024-03-05 ASSESSMENT — VISUAL ACUITY: OU: 1

## 2024-03-05 NOTE — CARE COORDINATION
informed Tran of current rental assistance program.    Rental assistance is being offered to parents of [employer] students   Contact by ph:  [phone]  Or E-mail [email or web address]    Plan:   Tran will follow up with rental assistance.

## 2024-03-05 NOTE — PROGRESS NOTES
understanging of exam, diagnosis, and plan.    MSK:    Pelvis: Patient has no tenderness palpation about the sacrum.  Patient does have slight discomfort with forward flexion however 90 degrees.  No pain with extension, rotation, side bending.    BLE: Skin intact.  Compartments are soft and compressible.  Patient has full range of motion at hip, knee, ankle without difficulty.  Normal gait.  EHL/FHL/TA/GSC motor complex is intact.  L2-S1 sensation intact to light touch.  Extremities warm and well-perfused.    Radiology:  History: U-type sacral fracture    Comparison: 2/6/2024    Findings: 3 views views of the pelvis (AP, inlet, lateral) in a skeletally mature individual showing no acute fractures or dislocations about the pelvis or sacrum.  Previous sacral fractures without any displacement or subsidence.  Fractures appear stable.  Femoral acetabular joints bilaterally without any arthritic changes.  No subluxations or dislocations appreciated.    Impression: Stable U-type sacral fracture without displacement.     Assessment:   28 y.o. year old female with U-type sacral fracture treated nonoperatively.  Date of injury was 1/23/2024.  Plan:      -New images were taken today in the clinic.  Radiographs were reviewed and findings were discussed with the patient.   -In-depth discussion with the patient regarding her current clinical presentation.  There is no evidence of fracture subsidence or displacement on plain films.  Additionally patient is tolerating most activities of daily life without any significant pain.  Therefore at this point we encouraged the patient to focus on stretching exercises of the back to help decrease muscle related soreness.  -Activity as tolerated.  -Patient was instructed to follow-up on an as-needed basis if pain returns or any new problems arise.    Follow up:No follow-ups on file.    No orders of the defined types were placed in this encounter.         Orders Placed This Encounter

## 2024-03-06 DIAGNOSIS — B35.1 ONYCHOMYCOSIS: ICD-10-CM

## 2024-03-06 RX ORDER — KETOCONAZOLE 20 MG/G
CREAM TOPICAL
Qty: 60 G | Refills: 11 | Status: SHIPPED | OUTPATIENT
Start: 2024-03-06

## 2024-03-06 NOTE — TELEPHONE ENCOUNTER
Last visit: 02/28/2024  Last Med refill: 01/11/2024  Does patient have enough medication for 72 hours: Yes-requesting 11 refills.       Next Visit Date:  Future Appointments   Date Time Provider Department Center   3/15/2024 11:15 AM Osman Roman MD [facility]   6/3/2024 10:30 AM Emperatriz Ortiz MD [facility]       Health Maintenance   Topic Date Due    Hepatitis B vaccine (4 of 4 - 4-dose series) 10/20/1997    Diabetic retinal exam  01/21/2020    Diabetic foot exam  03/23/2023    Annual Wellness Visit (Medicare Advantage)  Never done    Diabetic Alb to Cr ratio (uACR) test  01/12/2024    Flu vaccine (1) 06/30/2024 (Originally 8/1/2023)    COVID-19 Vaccine (1) 03/23/2025 (Originally 6/5/1994)    Varicella vaccine (2 of 2 - 2-dose childhood series) 09/22/2026 (Originally 8/9/2000)    Pneumococcal 0-64 years Vaccine (1 - PCV) 09/22/2026 (Originally 12/5/1999)    A1C test (Diabetic or Prediabetic)  11/05/2024    Lipids  12/05/2024    Depression Screen  01/11/2025    GFR test (Diabetes, CKD 3-4, OR last GFR 15-59)  01/26/2025    Cervical cancer screen  05/04/2025    DTaP/Tdap/Td vaccine (6 - Td or Tdap) 08/27/2030    Hib vaccine  Completed    Polio vaccine  Completed    Hepatitis C screen  Completed    HIV screen  Completed    Hepatitis A vaccine  Aged Out    HPV vaccine  Aged Out    Meningococcal (ACWY) vaccine  Aged Out       Hemoglobin A1C (%)   Date Value   11/05/2023 5.9   07/12/2023 6.4   10/19/2022 6.3             ( goal A1C is < 7)   No components found for: \"LABMICR\"  LDL Cholesterol (mg/dL)   Date Value   12/05/2023 107   08/17/2022 55       (goal LDL is <100)   AST (U/L)   Date Value   01/23/2024 23     ALT (U/L)   Date Value   01/23/2024 13     BUN (mg/dL)   Date Value   01/26/2024 16     BP Readings from Last 3 Encounters:   02/28/24 116/68   02/01/24 (!) 156/100   01/26/24 (!) 166/91          (goal 120/80)    All Future Testing planned in [facility]  Lab Frequency Next Occurrence

## 2024-03-11 ENCOUNTER — CARE COORDINATION (OUTPATIENT)
Dept: CARE COORDINATION | Age: 29
End: 2024-03-11

## 2024-03-11 NOTE — CARE COORDINATION
She saw PCP, was told to get adhesive patches for her Dexcom 6 as her sensor wasn't staying on her arm. Writer called [employer] where she gets Dexcom from, they do not stock them, is not covered by insurance, was suggested she get from [employer].    Left VM message asking patient to return call for care management follow up. Informed her about getting adhesive patches for her Dexcom at [employer], if doesn't shop there maybe ask a friend who can order them for her.   Sent a message at [employer] asking they ship her adhesive patches.    Will call again next week.    Future Appointments   Date Time Provider Department Center   3/15/2024 11:15 AM Osman Roman MD [facility]   6/3/2024 10:30 AM Emperatriz Ortiz MD [facility]

## 2024-03-12 ENCOUNTER — CARE COORDINATION (OUTPATIENT)
Dept: CARE COORDINATION | Age: 29
End: 2024-03-12

## 2024-03-12 NOTE — CARE COORDINATION
Goals reviewed-  1.Reviewed working on eating more often small amounts every 2-3 hours 4-6 times/day.  Reviewed set meal times daily and consistency of meal times and how this can effect her sugars.   2. Reviewed what foods contain carbohydrate, we discussed avoiding sugary foods but increasing complex carbs/protien at meals. Encouraged 60gms carb/meal 15-30gms/snack. Reviewed snacking- will send list of snacks ideas to to patient.   3. Reviewed plate method, encouraged patient to increase intake of protein at each meal/snack. Focused on increasing protein intake and discussed high energy/protein diabetic guidelines.Encouraged to make  every bite count by choosing foods that are high in energy and protein.For example, choose cream soup over broth, or canned fruit in juice over watermelon.Use drinks to add extra calories: choose milk, smoothies, juice, or milkshakes over water, tea, coffee, or diet sodas.Encouraged to include a serving of protein at each meal -meats, cheese, Greek yogurt, peanut butter, nuts/seeds, cereals- patient only likes a few of these but states she will try to include what she like daily at meals. We also discussed adding extra sauces, gravies, cream, or fats to your food.  4. Reviewed using nutritional supplement daily, she is drinking Ensure Complete 3 times/day, does have plenty now. SW team is helping her with other resources.  Spoke with patient who relays she is doing well, states sugars are normally <200 except when she drinks pop. Patient has been educated on avoiding sugary drinks and sweets but continues to make poor choices. She has also been educated every contact on increasing her protein intake and what protein sources are, she is a very picky eater the only protein sources she will eat are peanuts and peanut butter. She states she is still drinking the 3 Ensure Completes/day, unsure she is really doing this- discussed increasing to 4 - patient states she can't afford to do this.

## 2024-03-13 NOTE — CARE COORDINATION
spoke with Tran regading rental assistance application.   encouraged Tran to call the number on the flyer to inquire if she qualifies for assistance since she does live in [employer] [place] however her daughters goes to a [employer] school.       Tran was agreeable to call.  Tran confirmed she had phone number.    Plan:   Zachariahbrandie will contact [phone] to inquire if she qualifies for assistance with rent.

## 2024-03-15 ENCOUNTER — OFFICE VISIT (OUTPATIENT)
Dept: ONCOLOGY | Age: 29
End: 2024-03-15
Payer: MEDICARE

## 2024-03-15 ENCOUNTER — HOSPITAL ENCOUNTER (OUTPATIENT)
Age: 29
Setting detail: SPECIMEN
Discharge: HOME | End: 2024-03-15
Payer: MEDICARE

## 2024-03-15 ENCOUNTER — CARE COORDINATION (OUTPATIENT)
Dept: CARE COORDINATION | Age: 29
End: 2024-03-15

## 2024-03-15 ENCOUNTER — TELEPHONE (OUTPATIENT)
Dept: ONCOLOGY | Age: 29
End: 2024-03-15

## 2024-03-15 VITALS
DIASTOLIC BLOOD PRESSURE: 92 MMHG | SYSTOLIC BLOOD PRESSURE: 144 MMHG | BODY MASS INDEX: 18.67 KG/M2 | RESPIRATION RATE: 16 BRPM | HEART RATE: 77 BPM | TEMPERATURE: 97.5 F | WEIGHT: 112.2 LBS

## 2024-03-15 DIAGNOSIS — E10.42 TYPE 1 DIABETES MELLITUS WITH DIABETIC POLYNEUROPATHY (HCC): ICD-10-CM

## 2024-03-15 DIAGNOSIS — D64.9 ANEMIA, UNSPECIFIED TYPE: ICD-10-CM

## 2024-03-15 DIAGNOSIS — D64.9 ANEMIA, UNSPECIFIED TYPE: Primary | ICD-10-CM

## 2024-03-15 LAB
BASOPHILS # BLD: 0.03 K/UL (ref 0–0.2)
BASOPHILS NFR BLD: 0 % (ref 0–2)
CREAT UR-MCNC: 84.3 MG/DL (ref 28–217)
EOSINOPHIL # BLD: 0.27 K/UL (ref 0–0.44)
EOSINOPHILS RELATIVE PERCENT: 4 % (ref 1–4)
ERYTHROCYTE [DISTWIDTH] IN BLOOD BY AUTOMATED COUNT: 15.9 % (ref 11.8–14.4)
EST. AVERAGE GLUCOSE BLD GHB EST-MCNC: 126 MG/DL
FERRITIN SERPL-MCNC: 59 NG/ML (ref 13–150)
HBA1C MFR BLD: 6 % (ref 4–6)
HCT VFR BLD AUTO: 30.1 % (ref 36.3–47.1)
HGB BLD-MCNC: 10.7 G/DL (ref 11.9–15.1)
IMM GRANULOCYTES # BLD AUTO: 0.03 K/UL (ref 0–0.3)
IMM GRANULOCYTES NFR BLD: 0 %
IRON SATN MFR SERPL: 23 % (ref 20–55)
IRON SERPL-MCNC: 39 UG/DL (ref 37–145)
LYMPHOCYTES NFR BLD: 2.35 K/UL (ref 1.1–3.7)
LYMPHOCYTES RELATIVE PERCENT: 35 % (ref 24–43)
MCH RBC QN AUTO: 33.2 PG (ref 25.2–33.5)
MCHC RBC AUTO-ENTMCNC: 35.5 G/DL (ref 28.4–34.8)
MCV RBC AUTO: 93.5 FL (ref 82.6–102.9)
MICROALBUMIN UR-MCNC: 461 MG/L (ref 0–20)
MICROALBUMIN/CREAT UR-RTO: 547 MCG/MG CREAT (ref 0–25)
MONOCYTES NFR BLD: 0.49 K/UL (ref 0.1–1.2)
MONOCYTES NFR BLD: 7 % (ref 3–12)
NEUTROPHILS NFR BLD: 54 % (ref 36–65)
NEUTS SEG NFR BLD: 3.63 K/UL (ref 1.5–8.1)
NRBC BLD-RTO: 0 PER 100 WBC
PLATELET # BLD AUTO: 395 K/UL (ref 138–453)
PMV BLD AUTO: 9.9 FL (ref 8.1–13.5)
RBC # BLD AUTO: 3.22 M/UL (ref 3.95–5.11)
RBC # BLD: ABNORMAL 10*6/UL
TIBC SERPL-MCNC: 169 UG/DL (ref 250–450)
UNSATURATED IRON BINDING CAPACITY: 130 UG/DL (ref 112–347)
WBC OTHER # BLD: 6.8 K/UL (ref 3.5–11.3)

## 2024-03-15 PROCEDURE — 36415 COLL VENOUS BLD VENIPUNCTURE: CPT

## 2024-03-15 PROCEDURE — G8427 DOCREV CUR MEDS BY ELIG CLIN: HCPCS | Performed by: INTERNAL MEDICINE

## 2024-03-15 PROCEDURE — 82728 ASSAY OF FERRITIN: CPT

## 2024-03-15 PROCEDURE — 82570 ASSAY OF URINE CREATININE: CPT

## 2024-03-15 PROCEDURE — 85025 COMPLETE CBC W/AUTO DIFF WBC: CPT

## 2024-03-15 PROCEDURE — 83540 ASSAY OF IRON: CPT

## 2024-03-15 PROCEDURE — 83036 HEMOGLOBIN GLYCOSYLATED A1C: CPT

## 2024-03-15 PROCEDURE — 82043 UR ALBUMIN QUANTITATIVE: CPT

## 2024-03-15 PROCEDURE — G8420 CALC BMI NORM PARAMETERS: HCPCS | Performed by: INTERNAL MEDICINE

## 2024-03-15 PROCEDURE — 83550 IRON BINDING TEST: CPT

## 2024-03-15 PROCEDURE — 99211 OFF/OP EST MAY X REQ PHY/QHP: CPT | Performed by: INTERNAL MEDICINE

## 2024-03-15 PROCEDURE — 99214 OFFICE O/P EST MOD 30 MIN: CPT | Performed by: INTERNAL MEDICINE

## 2024-03-15 PROCEDURE — 4004F PT TOBACCO SCREEN RCVD TLK: CPT | Performed by: INTERNAL MEDICINE

## 2024-03-15 PROCEDURE — G8484 FLU IMMUNIZE NO ADMIN: HCPCS | Performed by: INTERNAL MEDICINE

## 2024-03-15 NOTE — PROGRESS NOTES
Patient ID: Tran Deng, 1995, [id number], 28 y.o.  Referred by : Emperatriz Ortiz MD  Diagnosis:   Anemia, mild chronic anemia and hemoglobin metaphysis showing HBc trait  Malnutrition  Currently on oral iron pills  IV Venofer completed on 11/1/2023  HISTORY OF PRESENT ILLNESS:    Oncologic History:  Tran Deng is a 28 y.o. female with history of diabetes, hypertension, malnutrition, was seen there initial consultation visit for anemia.    Patient has recent lab work which showed hemoglobin around 9.4,  and normal iron studies therefore was referred to hematology for further evaluation.    Patient has history of significant malnutrition and has lost more than 50 pounds over past 2 years.  She is very thinly built and cachectic.  She has been following with gastroenterologist and has been diagnosed with malnutrition and H. pylori gastritis.    She is taking Ensure 3 times daily.  She has a history of heavy alcohol use in the past but has been sober for past 2 years.  She smokes 1/3 pack daily.  Her recent lab work showed hemoglobin 9.4, iron 43, iron saturation 38 and ferritin 33.  She denies any heavy menstrual periods or blood in stool.  She gets her menstrual period every 3 months.  She has been seen by her PCP recently and DEXA scan as well as left mammogram and ultrasound was ordered.    Interval history:  Patient is returning for follow up visit and to discuss the lab results and further recommendations after her recent hospitalization for nausea and vomiting. She is currently off oral iron supplementation.  SHe is following with PCP for HTN and recently started on HTN medication  She feels better now   During this visit patient's allergy, social, medical, surgical history and medications were reviewed and updated.    Past Medical History:   Diagnosis Date    KAIT (acute kidney injury) ([facility]) 11/04/2023    Closed nondisplaced fracture of fifth metacarpal bone of left

## 2024-03-15 NOTE — TELEPHONE ENCOUNTER
DALIA HERE FOR MD VISIT  RTC in 3 months with labs  LAB ORDERS GIVEN TO PT ON EXIT  TO BE DONE 6/7/24  MD VISIT 6/14/24 @ 10AM  AVS PRINTED W/ INSTRUCTIONS AND GIVEN  TO PT ON EXIT

## 2024-03-18 ENCOUNTER — CARE COORDINATION (OUTPATIENT)
Dept: CARE COORDINATION | Age: 29
End: 2024-03-18

## 2024-03-18 NOTE — CARE COORDINATION
Ambulatory Care Coordination Note  3/18/2024    Patient Current Location:  Home: [address] [place] [id number]     ACM contacted the patient by telephone. Verified name and  with patient as identifiers.      She is currently wearing DexCom. She did order some adhesive overlays from [employer] to go over the sensor but tape was very irritating to her skin, like a thick plastic.  Blood sugars stable right now, occasional low readings and mild symptoms like more tired than usual and shivers.  Most FBS , a few lower than that.  She is drinking 3 cans of Ensure a day. Doesn't eat much. All she can tolerate is potatoes, some fruit, nuts, peanut butter crackers and some junk food. Doesn't eat any meat or any other vegetables. No recent n/v. Current weight 113.  Stated has all her medications, unable to review today.  CC Plan:   -Follow up in a month to review blood sugars, appts, medications, assess for graduation from care management.  Diabetes Assessment    Medic Alert ID: No  Meal Planning: Calorie counting, Avoidance of concentrated sweets   How often do you test your blood sugar?: Meals, Bedtime   Do you have barriers with adherence to non-pharmacologic self-management interventions? (Nutrition/Exercise/Self-Monitoring): Yes   Have you ever had to go to the ED for symptoms of low blood sugar?: No       Do you have hyperglycemia symptoms?: No   Do you have hypoglycemia symptoms?: Yes   Last Blood Sugar Value: 113   Blood Sugar Monitoring Regimen: Before Meals, At Bedtime   Blood Sugar Trends: Fluctuating         and   General Assessment    Do you have any symptoms that are causing concern?: No              Offered patient enrollment in the Remote Patient Monitoring (RPM) program for in-home monitoring: Patient declined.    Lab Results       None            Care Coordination Interventions    Referral from Primary Care Provider: No  Suggested Interventions and Community Resources  Medi Set or Pill Pack:

## 2024-03-18 NOTE — CARE COORDINATION
called and spoke with Tran.  Tran reports that she has not yet called to see if she qualifies for rent assistance.    encouraged Tran to call soon incase funds are limited.      Plan:   Tran will call to see if she qualifies for rental assistance.

## 2024-03-26 ENCOUNTER — CARE COORDINATION (OUTPATIENT)
Dept: CARE COORDINATION | Age: 29
End: 2024-03-26

## 2024-03-26 NOTE — CARE COORDINATION
Tran contacted  to provide new contact information [phone].     inquired if Tran contacted Rental Assistance to see if she qualifies for help since she live in [facility] school [place] but daughters goes to [facility].  Tran denied following up.    Plan:    has offered available resources to Tran.   No additional questions or concerns.     will sign off.

## 2024-04-10 ENCOUNTER — CARE COORDINATION (OUTPATIENT)
Dept: CARE COORDINATION | Age: 29
End: 2024-04-10

## 2024-04-10 NOTE — CARE COORDINATION
Ambulatory Care Coordination Note  4/10/2024    Patient Current Location:  Home: [address] [address]     ACM contacted the patient by telephone. Verified name and  with patient as identifiers. Provided introduction to self, and explanation of the ACM role.      -Her blood sugars are stable, all under 150. No hypoglycemia. Using DexCom but needs more sensors soon, she doesn't know if needs to order or if they ship to her automatically. Writer gave her [employer] phone number to call to check and order if needs to. No problems with equipment, the sensors have been staying on.  -No n/v, no abd pain. She thinks her weight is the same, hasn't lsot any recently. Still drinking Boost along with eating what she can.  -She denied any needs.  signed off, patient was given information about calling to get help with rent, up to patient to follow through.  -Graduated from care management. Reviewed upcoming appts, need to be vigilant about taking medications regularly and going to appts regularly.  Diabetes Assessment    Medic Alert ID: No  Meal Planning: Calorie counting, Avoidance of concentrated sweets   How often do you test your blood sugar?: Meals, Bedtime   Do you have barriers with adherence to non-pharmacologic self-management interventions? (Nutrition/Exercise/Self-Monitoring): Yes   Have you ever had to go to the ED for symptoms of low blood sugar?: No       Do you have hyperglycemia symptoms?: No   Do you have hypoglycemia symptoms?: No   Blood Sugar Monitoring Regimen: Before Meals   Blood Sugar Trends: No Change         and   General Assessment    Do you have any symptoms that are causing concern?: No            Offered patient enrollment in the Remote Patient Monitoring (RPM) program for in-home monitoring: Patient declined.    Lab Results       None            Care Coordination Interventions    Referral from Primary Care Provider: No  Suggested Interventions and Community

## 2024-04-23 NOTE — TELEPHONE ENCOUNTER
Phone call interaction with mother states patient is having significant vomiting episode.  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  Mother states she has been vomiting at least 10 episodes has not been able to keep anything down the nausea is so significant.  I did recommend Zofran and gastro instructions for age were reviewed and did recommend if your stomach does not settle with the Zofran and she is not able to keep Pedialyte or half-strength Gatorade and small amounts down she may need to go to the emergency room for further intervention and IV fluids   ZEKEANECHINMAY HERE FOR MD VISIT  RTC in 2 months with labs prior  LABS CDP FERRITIN FE TIBC TO BE DONE 3/8/24  MD VISIT 3/15/24 @ 11:15AM  AVS PRINTED W/ INSTRUCTIONS AND GIVEN  TO PT ON EXIT

## 2024-04-26 ENCOUNTER — HOSPITAL ENCOUNTER (EMERGENCY)
Age: 29
Discharge: HOME | End: 2024-04-26
Attending: EMERGENCY MEDICINE
Payer: MEDICARE

## 2024-04-26 ENCOUNTER — APPOINTMENT (OUTPATIENT)
Dept: GENERAL RADIOLOGY | Age: 29
End: 2024-04-26
Payer: MEDICARE

## 2024-04-26 VITALS
TEMPERATURE: 98.1 F | OXYGEN SATURATION: 100 % | DIASTOLIC BLOOD PRESSURE: 87 MMHG | RESPIRATION RATE: 19 BRPM | HEART RATE: 95 BPM | SYSTOLIC BLOOD PRESSURE: 147 MMHG

## 2024-04-26 DIAGNOSIS — M79.672 LEFT FOOT PAIN: Primary | ICD-10-CM

## 2024-04-26 PROCEDURE — 73630 X-RAY EXAM OF FOOT: CPT

## 2024-04-26 PROCEDURE — 6370000000 HC RX 637 (ALT 250 FOR IP): Performed by: EMERGENCY MEDICINE

## 2024-04-26 PROCEDURE — 99283 EMERGENCY DEPT VISIT LOW MDM: CPT

## 2024-04-26 RX ORDER — ACETAMINOPHEN 500 MG
1000 TABLET ORAL EVERY 6 HOURS PRN
Qty: 42 TABLET | Refills: 0 | Status: SHIPPED | OUTPATIENT
Start: 2024-04-26 | End: 2024-05-01

## 2024-04-26 RX ORDER — ACETAMINOPHEN 500 MG
1000 TABLET ORAL ONCE
Status: COMPLETED | OUTPATIENT
Start: 2024-04-26 | End: 2024-04-26

## 2024-04-26 RX ADMIN — ACETAMINOPHEN 1000 MG: 500 TABLET ORAL at 20:50

## 2024-04-26 ASSESSMENT — ENCOUNTER SYMPTOMS
ABDOMINAL PAIN: 0
SHORTNESS OF BREATH: 0

## 2024-04-26 ASSESSMENT — PAIN DESCRIPTION - LOCATION: LOCATION: TOE (COMMENT WHICH ONE)

## 2024-04-26 ASSESSMENT — PAIN SCALES - GENERAL: PAINLEVEL_OUTOF10: 8

## 2024-04-26 ASSESSMENT — LIFESTYLE VARIABLES
HOW OFTEN DO YOU HAVE A DRINK CONTAINING ALCOHOL: NEVER
HOW MANY STANDARD DRINKS CONTAINING ALCOHOL DO YOU HAVE ON A TYPICAL DAY: PATIENT DOES NOT DRINK

## 2024-04-26 ASSESSMENT — PAIN - FUNCTIONAL ASSESSMENT: PAIN_FUNCTIONAL_ASSESSMENT: 0-10

## 2024-04-27 NOTE — ED TRIAGE NOTES
Pt arrived to ED with reports of greater toe pain and swelling after hitting the toe at home.     VSS, RR equal and non labored, NAD, pt is alert and oriented x4    Call light within reach,     Following plan of care

## 2024-04-27 NOTE — ED PROVIDER NOTES
[facility] ED     Emergency Department     Faculty Attestation        I performed a history and physical examination of the patient and discussed management with the resident. I reviewed the resident’s note and agree with the documented findings and plan of care. Any areas of disagreement are noted on the chart. I was personally present for the key portions of any procedures. I have documented in the chart those procedures where I was not present during the key portions. I have reviewed the emergency nurses triage note. I agree with the chief complaint, past medical history, past surgical history, allergies, medications, social and family history as documented unless otherwise noted below.  For Physician Assistant/ Nurse Practitioner cases/documentation I have personally evaluated this patient and have completed at least one if not all key elements of the E/M (history, physical exam, and MDM). Additional findings are as noted.      Vital Signs: BP: (!) 192/112  Pulse: (!) 102  Respirations: 17  Temp: 99 °F (37.2 °C) SpO2: 100 %  PCP:  Emperatriz Ortiz MD  Note Started: 4/26/24, 8:22 PM EDT    Pertinent Comments:         Critical Care  None      (Please note that portions of this note were completed with a voice recognition program. Efforts were made to edit the dictations but occasionally words are mis-transcribed. Whenever words are used in this note in any gender, they shall be construed as though they were used in the gender appropriate to the circumstances; and whenever words are used in this note in the singular or plural form, they shall be construed as though they were used in the form appropriate to the circumstances.)    Corona eHrcules MD [facility]  Attending Emergency Medicine Physician           Corona Hercules MD  04/26/24 2022

## 2024-04-27 NOTE — DISCHARGE INSTRUCTIONS
You were seen here for left foot pain after tripping over the steps.  X-ray was obtained and did not show any fracture or dislocation to the foot.    You were given a prescription for Tylenol, take this medication as needed for pain.    You need to call and schedule follow-up appointment with your PCP for soon as possible.    Return to the emergency department immediately if you experience worsening symptoms, develop any other symptoms, or if you have any other concerns.

## 2024-04-27 NOTE — ED PROVIDER NOTES
[facility] ED  Emergency Department Encounter  Emergency Medicine Resident     Pt Name:Tran Deng  MRN: [id number]  Birthdate 1995  Date of evaluation: 24  PCP:  Emperatriz Ortiz MD  Note Started: 8:01 PM EDT      CHIEF COMPLAINT       Chief Complaint   Patient presents with    Toe Pain       HISTORY OF PRESENT ILLNESS  (Location/Symptom, Timing/Onset, Context/Setting, Quality, Duration, Modifying Factors, Severity.)      Tran Deng is a 28 y.o. female who presents with left foot pain.  Patient stated that this morning she was walking up her steps when she tripped over the second to last step.  Patient states that she injured her foot during this.  Patient states that she tried Epsom salt and a foot massager at home however that did not help her pain.  Patient complains of pain mostly over the first toe but states that it extends to her entire foot.  Patient denies hitting her head or LOC.    PAST MEDICAL / SURGICAL / SOCIAL / FAMILY HISTORY      has a past medical history of KAIT (acute kidney injury) (HCC), Closed nondisplaced fracture of fifth metacarpal bone of left hand with routine healing, Hypertension, Iron deficiency anemia due to chronic blood loss, Kidney infection, MRSA (methicillin resistant staph aureus) culture positive, Nausea & vomiting, Ovarian cyst, Secondary osteoporosis, Type 1 diabetes mellitus (HCC), and UTI (urinary tract infection).     has a past surgical history that includes  section and Upper gastrointestinal endoscopy (N/A, 2021).    Social History     Socioeconomic History    Marital status: Single     Spouse name: Not on file    Number of children: Not on file    Years of education: Not on file    Highest education level: Not on file   Occupational History    Not on file   Tobacco Use    Smoking status: Every Day     Current packs/day: 0.50     Types: Cigarettes    Smokeless tobacco: Never    Tobacco comments:

## 2024-06-02 SDOH — ECONOMIC STABILITY: FOOD INSECURITY: WITHIN THE PAST 12 MONTHS, THE FOOD YOU BOUGHT JUST DIDN'T LAST AND YOU DIDN'T HAVE MONEY TO GET MORE.: OFTEN TRUE

## 2024-06-02 SDOH — ECONOMIC STABILITY: HOUSING INSECURITY
IN THE LAST 12 MONTHS, WAS THERE A TIME WHEN YOU DID NOT HAVE A STEADY PLACE TO SLEEP OR SLEPT IN A SHELTER (INCLUDING NOW)?: NO

## 2024-06-02 SDOH — ECONOMIC STABILITY: INCOME INSECURITY: HOW HARD IS IT FOR YOU TO PAY FOR THE VERY BASICS LIKE FOOD, HOUSING, MEDICAL CARE, AND HEATING?: SOMEWHAT HARD

## 2024-06-02 SDOH — ECONOMIC STABILITY: FOOD INSECURITY: WITHIN THE PAST 12 MONTHS, YOU WORRIED THAT YOUR FOOD WOULD RUN OUT BEFORE YOU GOT MONEY TO BUY MORE.: OFTEN TRUE

## 2024-06-02 SDOH — ECONOMIC STABILITY: TRANSPORTATION INSECURITY
IN THE PAST 12 MONTHS, HAS LACK OF TRANSPORTATION KEPT YOU FROM MEETINGS, WORK, OR FROM GETTING THINGS NEEDED FOR DAILY LIVING?: NO

## 2024-06-02 SDOH — HEALTH STABILITY: PHYSICAL HEALTH: ON AVERAGE, HOW MANY MINUTES DO YOU ENGAGE IN EXERCISE AT THIS LEVEL?: 20 MIN

## 2024-06-02 SDOH — HEALTH STABILITY: PHYSICAL HEALTH: ON AVERAGE, HOW MANY DAYS PER WEEK DO YOU ENGAGE IN MODERATE TO STRENUOUS EXERCISE (LIKE A BRISK WALK)?: 2 DAYS

## 2024-06-02 ASSESSMENT — LIFESTYLE VARIABLES
HOW OFTEN DO YOU HAVE SIX OR MORE DRINKS ON ONE OCCASION: 1
HOW MANY STANDARD DRINKS CONTAINING ALCOHOL DO YOU HAVE ON A TYPICAL DAY: PATIENT DOES NOT DRINK
HOW MANY STANDARD DRINKS CONTAINING ALCOHOL DO YOU HAVE ON A TYPICAL DAY: 0
HOW OFTEN DO YOU HAVE A DRINK CONTAINING ALCOHOL: NEVER
HOW OFTEN DO YOU HAVE A DRINK CONTAINING ALCOHOL: 1

## 2024-06-02 ASSESSMENT — PATIENT HEALTH QUESTIONNAIRE - PHQ9
SUM OF ALL RESPONSES TO PHQ QUESTIONS 1-9: 0
SUM OF ALL RESPONSES TO PHQ QUESTIONS 1-9: 0
1. LITTLE INTEREST OR PLEASURE IN DOING THINGS: NOT AT ALL
2. FEELING DOWN, DEPRESSED OR HOPELESS: NOT AT ALL
SUM OF ALL RESPONSES TO PHQ QUESTIONS 1-9: 0
SUM OF ALL RESPONSES TO PHQ9 QUESTIONS 1 & 2: 0
SUM OF ALL RESPONSES TO PHQ QUESTIONS 1-9: 0

## 2024-06-03 ENCOUNTER — CARE COORDINATION (OUTPATIENT)
Dept: CARE COORDINATION | Age: 29
End: 2024-06-03

## 2024-06-03 ENCOUNTER — OFFICE VISIT (OUTPATIENT)
Dept: FAMILY MEDICINE CLINIC | Age: 29
End: 2024-06-03
Payer: MEDICARE

## 2024-06-03 VITALS
TEMPERATURE: 97.2 F | SYSTOLIC BLOOD PRESSURE: 148 MMHG | HEART RATE: 71 BPM | HEIGHT: 65 IN | OXYGEN SATURATION: 99 % | BODY MASS INDEX: 17.76 KG/M2 | DIASTOLIC BLOOD PRESSURE: 98 MMHG | WEIGHT: 106.6 LBS

## 2024-06-03 DIAGNOSIS — Z13.6 SCREENING FOR CARDIOVASCULAR CONDITION: ICD-10-CM

## 2024-06-03 DIAGNOSIS — Z59.41 SEVERE FOOD INSECURITY: ICD-10-CM

## 2024-06-03 DIAGNOSIS — Z59.819 HOUSING INSTABILITY: ICD-10-CM

## 2024-06-03 DIAGNOSIS — G47.9 SLEEPING DIFFICULTY: ICD-10-CM

## 2024-06-03 DIAGNOSIS — Z00.00 INITIAL MEDICARE ANNUAL WELLNESS VISIT: Primary | ICD-10-CM

## 2024-06-03 DIAGNOSIS — G43.911 INTRACTABLE MIGRAINE WITH STATUS MIGRAINOSUS, UNSPECIFIED MIGRAINE TYPE: ICD-10-CM

## 2024-06-03 DIAGNOSIS — E10.42 TYPE 1 DIABETES MELLITUS WITH DIABETIC POLYNEUROPATHY (HCC): ICD-10-CM

## 2024-06-03 DIAGNOSIS — Z87.891 PERSONAL HISTORY OF TOBACCO USE, PRESENTING HAZARDS TO HEALTH: ICD-10-CM

## 2024-06-03 DIAGNOSIS — M81.8 PREMENOPAUSAL IDIOPATHIC OSTEOPOROSIS: ICD-10-CM

## 2024-06-03 DIAGNOSIS — E46 CALORIC MALNUTRITION (HCC): ICD-10-CM

## 2024-06-03 DIAGNOSIS — Z71.89 ACP (ADVANCE CARE PLANNING): ICD-10-CM

## 2024-06-03 DIAGNOSIS — Z59.9 FINANCIAL DIFFICULTIES: ICD-10-CM

## 2024-06-03 PROBLEM — S32.10XA SACRAL FRACTURE, CLOSED (HCC): Status: RESOLVED | Noted: 2024-01-23 | Resolved: 2024-06-03

## 2024-06-03 PROCEDURE — 99406 BEHAV CHNG SMOKING 3-10 MIN: CPT | Performed by: INTERNAL MEDICINE

## 2024-06-03 PROCEDURE — 99497 ADVNCD CARE PLAN 30 MIN: CPT | Performed by: INTERNAL MEDICINE

## 2024-06-03 PROCEDURE — 3044F HG A1C LEVEL LT 7.0%: CPT | Performed by: INTERNAL MEDICINE

## 2024-06-03 PROCEDURE — G0446 INTENS BEHAVE THER CARDIO DX: HCPCS | Performed by: INTERNAL MEDICINE

## 2024-06-03 PROCEDURE — G0438 PPPS, INITIAL VISIT: HCPCS | Performed by: INTERNAL MEDICINE

## 2024-06-03 RX ORDER — ALENDRONATE SODIUM 70 MG/1
70 TABLET ORAL
Qty: 13 TABLET | Refills: 0 | Status: SHIPPED | OUTPATIENT
Start: 2024-06-03

## 2024-06-03 RX ORDER — ERGOCALCIFEROL (VITAMIN D2) 50 MCG
2000 CAPSULE ORAL DAILY
Qty: 90 CAPSULE | Refills: 3 | Status: SHIPPED | OUTPATIENT
Start: 2024-06-03 | End: 2024-06-14

## 2024-06-03 RX ORDER — NICOTINE 21 MG/24HR
1 PATCH, TRANSDERMAL 24 HOURS TRANSDERMAL DAILY
Qty: 30 PATCH | Refills: 3 | Status: SHIPPED | OUTPATIENT
Start: 2024-06-03 | End: 2024-06-14

## 2024-06-03 RX ORDER — UREA 10 %
1 LOTION (ML) TOPICAL DAILY
Qty: 30 TABLET | Refills: 3 | Status: SHIPPED | OUTPATIENT
Start: 2024-06-03

## 2024-06-03 SDOH — ECONOMIC STABILITY - HOUSING INSECURITY: HOUSING INSTABILITY UNSPECIFIED: Z59.819

## 2024-06-03 SDOH — ECONOMIC STABILITY - FOOD INSECURITY: FOOD INSECURITY: Z59.41

## 2024-06-03 SDOH — ECONOMIC STABILITY - INCOME SECURITY: PROBLEM RELATED TO HOUSING AND ECONOMIC CIRCUMSTANCES, UNSPECIFIED: Z59.9

## 2024-06-03 NOTE — PATIENT INSTRUCTIONS
liability for your use of this information.      Personalized Preventive Plan for Tran Deng - 6/3/2024  Medicare offers a range of preventive health benefits. Some of the tests and screenings are paid in full while other may be subject to a deductible, co-insurance, and/or copay.    Some of these benefits include a comprehensive review of your medical history including lifestyle, illnesses that may run in your family, and various assessments and screenings as appropriate.    After reviewing your medical record and screening and assessments performed today your provider may have ordered immunizations, labs, imaging, and/or referrals for you.  A list of these orders (if applicable) as well as your Preventive Care list are included within your After Visit Summary for your review.    Other Preventive Recommendations:    A preventive eye exam performed by an eye specialist is recommended every 1-2 years to screen for glaucoma; cataracts, macular degeneration, and other eye disorders.  A preventive dental visit is recommended every 6 months.  Try to get at least 150 minutes of exercise per week or 10,000 steps per day on a pedometer .  Order or download the FREE \"Exercise & Physical Activity: Your Everyday Guide\" from [employer] [place] [employer]. Call 1-894.236.7626 or search [employer] [place] [employer] online.  You need 1841-4992 mg of calcium and 1052-7001 IU of vitamin D per day. It is possible to meet your calcium requirement with diet alone, but a vitamin D supplement is usually necessary to meet this goal.  When exposed to the sun, use a sunscreen that protects against both UVA and UVB radiation with an SPF of 30 or greater. Reapply every 2 to 3 hours or after sweating, drying off with a towel, or swimming.  Always wear a seat belt when traveling in a car. Always wear a helmet when riding a bicycle or motorcycle.

## 2024-06-03 NOTE — PROGRESS NOTES
Patient was asked about her current diet and exercise habits, and personalized advice was provided regarding recommended lifestyle changes. Patient's individual cardiovascular disease risk factors, including diabetes mellitus, hypertension, and smoking/tobacco exposure, were discussed, as well as the likely benefits of lifestyle changes. Based upon patient's motivation to change her behavior, the following plan was agreed upon to work toward lowering cardiovascular disease risk: tobacco cessation.  Aspirin use for primary prevention of cardiovascular disease for men 45-79 and women 55-79: Indicated- continue daily aspirin. Educational materials for lifestyle changes were provided. Patient will follow-up in 3 month(s) with PCP. Provider spent 10 minutes counseling patient.            Objective   Vitals:    06/03/24 1043   BP: (!) 144/96   Site: Right Upper Arm   Position: Sitting   Cuff Size: Small Adult   Pulse: 71   Temp: 97.2 °F (36.2 °C)   SpO2: 99%   Weight: 48.4 kg (106 lb 9.6 oz)   Height: 1.651 m (5' 5\")      Body mass index is 17.74 kg/m².      General Appearance: alert and oriented to person, place and time, well developed and well- nourished, in no acute distress  Skin: warm and dry, no rash or erythema  Head: normocephalic and atraumatic  Eyes: pupils equal, round, and reactive to light, extraocular eye movements intact, conjunctivae normal  ENT: tympanic membrane, external ear and ear canal normal bilaterally, nose without deformity, nasal mucosa and turbinates normal without polyps  Neck: supple and non-tender without mass, no thyromegaly or thyroid nodules, no cervical lymphadenopathy  Pulmonary/Chest: clear to auscultation bilaterally- no wheezes, rales or rhonchi, normal air movement, no respiratory distress  Cardiovascular: normal rate, regular rhythm, normal S1 and S2, no murmurs, rubs, clicks, or gallops, distal pulses intact, no carotid bruits  Abdomen: soft, non-tender, non-distended, normal

## 2024-06-03 NOTE — CARE COORDINATION
received a message from practice manager requesting to follow up with Tran.       called and spoke to Tran noted that she was tired of paying $900 for rent at her house.  Tran reports that her lease was month to month and she moved out in May.  Tran reports that she is staying with her sister till she can find a place.     Tran is looking around a placed under $900.   sent Maggy a few listings that were listed on [email or web address].com   provided Tran with the following places that were list on website:    [address] [address]  $800 3 bedroom 1 bath   [place] [phone]    [facility]   [address] [address]  Property management   [phone]    [employer]   [address] [facility]   $542-$656   Property [employer] [phone]    [address]  [address] [place], [place] [address]   [phone]    [address]  [address] [place], [place] [address] [phone]  $475-$775    2 bedroom house for rent  [address]  [address]  $650   [phone]      House for rent $650   [address] [address]  [phone]    Tran Confirmed she received information.    Plan:   Tran will contact housing agencies if interested in location.

## 2024-06-06 ENCOUNTER — TELEPHONE (OUTPATIENT)
Dept: FAMILY MEDICINE CLINIC | Age: 29
End: 2024-06-06

## 2024-06-06 NOTE — TELEPHONE ENCOUNTER
Patient calls requesting something different be called in due to insurance will not pay for  Calcium, Vitamin D or the nicotine patch  I did explain that some OTC med, vitamins are not covered by insurance    PLEASE ADVISE

## 2024-06-07 NOTE — TELEPHONE ENCOUNTER
Pharmacy is unable to tell us what is on formulary. Also stated that with [employer] insurance, most OTC meds are not covered

## 2024-06-12 ENCOUNTER — CARE COORDINATION (OUTPATIENT)
Dept: CARE COORDINATION | Age: 29
End: 2024-06-12

## 2024-06-13 NOTE — CARE COORDINATION
received a text from Tran inquiring how to apply for Medicaid.    made 2 attempts to contact Tran.    No answer.    left message with name and number.     Plan:     will attempt to follow up with Tran regarding insurance questions.

## 2024-06-14 ENCOUNTER — OFFICE VISIT (OUTPATIENT)
Dept: ONCOLOGY | Age: 29
End: 2024-06-14
Payer: MEDICARE

## 2024-06-14 ENCOUNTER — HOSPITAL ENCOUNTER (OUTPATIENT)
Age: 29
Discharge: HOME | End: 2024-06-14
Payer: MEDICARE

## 2024-06-14 ENCOUNTER — TELEPHONE (OUTPATIENT)
Dept: ONCOLOGY | Age: 29
End: 2024-06-14

## 2024-06-14 VITALS
WEIGHT: 106 LBS | DIASTOLIC BLOOD PRESSURE: 105 MMHG | SYSTOLIC BLOOD PRESSURE: 189 MMHG | BODY MASS INDEX: 17.64 KG/M2 | RESPIRATION RATE: 16 BRPM | TEMPERATURE: 97.2 F | HEART RATE: 75 BPM

## 2024-06-14 DIAGNOSIS — D64.9 ANEMIA, UNSPECIFIED TYPE: Primary | ICD-10-CM

## 2024-06-14 DIAGNOSIS — D64.9 ANEMIA, UNSPECIFIED TYPE: ICD-10-CM

## 2024-06-14 DIAGNOSIS — D58.2 HEMOGLOBIN C TRAIT (HCC): ICD-10-CM

## 2024-06-14 LAB
BASOPHILS # BLD: 0.04 K/UL (ref 0–0.2)
BASOPHILS NFR BLD: 1 % (ref 0–2)
EOSINOPHIL # BLD: 0.15 K/UL (ref 0–0.44)
EOSINOPHILS RELATIVE PERCENT: 3 % (ref 1–4)
ERYTHROCYTE [DISTWIDTH] IN BLOOD BY AUTOMATED COUNT: 13.8 % (ref 11.8–14.4)
FERRITIN SERPL-MCNC: 31 NG/ML (ref 13–150)
HCT VFR BLD AUTO: 29.1 % (ref 36.3–47.1)
HGB BLD-MCNC: 10.5 G/DL (ref 11.9–15.1)
IMM GRANULOCYTES # BLD AUTO: 0.01 K/UL (ref 0–0.3)
IMM GRANULOCYTES NFR BLD: 0 %
IRON SATN MFR SERPL: 22 % (ref 20–55)
IRON SERPL-MCNC: 29 UG/DL (ref 37–145)
LYMPHOCYTES NFR BLD: 1.7 K/UL (ref 1.1–3.7)
LYMPHOCYTES RELATIVE PERCENT: 31 % (ref 24–43)
MCH RBC QN AUTO: 35.6 PG (ref 25.2–33.5)
MCHC RBC AUTO-ENTMCNC: 36.1 G/DL (ref 28.4–34.8)
MCV RBC AUTO: 98.6 FL (ref 82.6–102.9)
MONOCYTES NFR BLD: 0.46 K/UL (ref 0.1–1.2)
MONOCYTES NFR BLD: 8 % (ref 3–12)
NEUTROPHILS NFR BLD: 57 % (ref 36–65)
NEUTS SEG NFR BLD: 3.18 K/UL (ref 1.5–8.1)
NRBC BLD-RTO: 0 PER 100 WBC
PLATELET # BLD AUTO: 382 K/UL (ref 138–453)
PMV BLD AUTO: 9.8 FL (ref 8.1–13.5)
RBC # BLD AUTO: 2.95 M/UL (ref 3.95–5.11)
TIBC SERPL-MCNC: 134 UG/DL (ref 250–450)
UNSATURATED IRON BINDING CAPACITY: 105 UG/DL (ref 112–347)
WBC OTHER # BLD: 5.5 K/UL (ref 3.5–11.3)

## 2024-06-14 PROCEDURE — 83540 ASSAY OF IRON: CPT

## 2024-06-14 PROCEDURE — 36415 COLL VENOUS BLD VENIPUNCTURE: CPT

## 2024-06-14 PROCEDURE — 99211 OFF/OP EST MAY X REQ PHY/QHP: CPT

## 2024-06-14 PROCEDURE — G8419 CALC BMI OUT NRM PARAM NOF/U: HCPCS | Performed by: INTERNAL MEDICINE

## 2024-06-14 PROCEDURE — G8427 DOCREV CUR MEDS BY ELIG CLIN: HCPCS | Performed by: INTERNAL MEDICINE

## 2024-06-14 PROCEDURE — 4004F PT TOBACCO SCREEN RCVD TLK: CPT | Performed by: INTERNAL MEDICINE

## 2024-06-14 PROCEDURE — 99214 OFFICE O/P EST MOD 30 MIN: CPT | Performed by: INTERNAL MEDICINE

## 2024-06-14 PROCEDURE — 85025 COMPLETE CBC W/AUTO DIFF WBC: CPT

## 2024-06-14 PROCEDURE — 82728 ASSAY OF FERRITIN: CPT

## 2024-06-14 PROCEDURE — 83550 IRON BINDING TEST: CPT

## 2024-06-14 NOTE — PROGRESS NOTES
Patient ID: Tran Deng, 1995, [id number], 28 y.o.  Referred by : Emperatriz Ortiz MD  Diagnosis:   Anemia, mild chronic anemia and hemoglobin metaphysis showing HBc trait  Malnutrition  NO on iron pill  IV Venofer completed on 11/1/2023  HISTORY OF PRESENT ILLNESS:    Oncologic History:  Tran Deng is a 28 y.o. female with history of diabetes, hypertension, malnutrition, was seen there initial consultation visit for anemia.    Patient has recent lab work which showed hemoglobin around 9.4,  and normal iron studies therefore was referred to hematology for further evaluation.    Patient has history of significant malnutrition and has lost more than 50 pounds over past 2 years.  She is very thinly built and cachectic.  She has been following with gastroenterologist and has been diagnosed with malnutrition and H. pylori gastritis.    She is taking Ensure 3 times daily.  She has a history of heavy alcohol use in the past but has been sober for past 2 years.  She smokes 1/3 pack daily.  Her recent lab work showed hemoglobin 9.4, iron 43, iron saturation 38 and ferritin 33.  She denies any heavy menstrual periods or blood in stool.  She gets her menstrual period every 3 months.  She has been seen by her PCP recently and DEXA scan as well as left mammogram and ultrasound was ordered.    Interval history:  Patient returning for follow-up visit and to discuss lab results and further questions.  She has been off iron pills for past few months.  Recent hemoglobin stable at 10.5.  Denies any chest pain shortness of breath.  She denies any bleeding symptoms.      During this visit patient's allergy, social, medical, surgical history and medications were reviewed and updated.    Past Medical History:   Diagnosis Date    KAIT (acute kidney injury) (HCC) 11/04/2023    Closed nondisplaced fracture of fifth metacarpal bone of left hand with routine healing 01/12/2023    Hypertension     Iron

## 2024-06-14 NOTE — TELEPHONE ENCOUNTER
DALIA HERE FOR FOLLOW UP   RTC in 4 months w lab jace horowitz prior  MD VISIT: 10/11/24 @ 10AM   LABS PRINTED AND GIVEN ON EXIT   AVS PRINTED AND GIVEN ON EXIT

## 2024-06-19 ENCOUNTER — CARE COORDINATION (OUTPATIENT)
Dept: CARE COORDINATION | Age: 29
End: 2024-06-19

## 2024-06-19 NOTE — CARE COORDINATION
spoke to Tran.  Zachariahbrandie reports that she lost her Medicaid and SNAP.       and Tran will contact [employer] tomorrow when they open at 8:30am.    Plan:   Tran and  will re-apply for Medicaid and SNAP on 6/20.

## 2024-06-20 ENCOUNTER — CARE COORDINATION (OUTPATIENT)
Dept: CARE COORDINATION | Age: 29
End: 2024-06-20

## 2024-06-21 NOTE — CARE COORDINATION
spoke with Tran.  Tran reports that her Medicaid stopped.  Tran reports that she is unable to go to appointments or get certain medications without her Medicaid.     and Zachariahy attempted to contact [employer].  Tran and  waited on hold for 50 minutes until the phone disconnected.     attempted to contact CORINE Santa  with [facility].       received a call from Arina regarding Zachariahbrandie.   Tran reports that she can push Tran's new application through for renewal.       informed Zachariahbrandie that renewal is in process for SNAP and Medicaid.    Plan:    will follow up with Arnulfodorys in 1 week to inquire if Tran's Medicaid was active.

## 2024-06-26 ENCOUNTER — CARE COORDINATION (OUTPATIENT)
Dept: CARE COORDINATION | Age: 29
End: 2024-06-26

## 2024-06-27 NOTE — CARE COORDINATION
spoke with Tran.  Zachariahbrandie reports that she has been trying to apply for an apartment on [address] and having issues with online application.     and Tran attempted to 3 way call Property [employer] regarding issues with application.   left message with name and number.      sent E-mail requesting a call back regarding application process.       informed Arnulfoazucenay to wait for them to return call or E-mail.  If no return call/E-mail,  will meet Tran at the [facility] to assist with application.     followed up with Tran.  Tran denied hearing back from [employer].      Plan:   will meet Tran at [facility] to assist with completion of application.

## 2024-06-28 ENCOUNTER — CARE COORDINATION (OUTPATIENT)
Dept: CARE COORDINATION | Age: 29
End: 2024-06-28

## 2024-06-28 NOTE — CARE COORDINATION
met with Tran at the [facility].  Tran and  completed application for house that is for rent on [address].     and Tran completed application and submitted necessary documents.     Plan:    will follow up with Tran regarding housing in 7 days.

## 2024-07-09 ENCOUNTER — CARE COORDINATION (OUTPATIENT)
Dept: CARE COORDINATION | Age: 29
End: 2024-07-09

## 2024-07-10 NOTE — CARE COORDINATION
spoke with Zachariahbrandie regarding housing situation.  Tran has been unsuccessful find an apartment due to having no credit.  Tran inquired about renewing her Medicaid.     spoke with Arina from [facility].  Arina reports that her Tran's daughter's was good till [address] however Tran's needs a renewal.     contacted Tran and completed Medicaid application form.   Due to Tran being homeless and staying at her sisters  contacted Arina at [facility] to discuss questions.  Arina was able to assist  and Tran with completing application.     submitted application and letter stating Tran is staying with sister because of being homeless.      Plan:    will follow up on renewal in 5-6 days.

## 2024-07-22 ENCOUNTER — CARE COORDINATION (OUTPATIENT)
Dept: CARE COORDINATION | Age: 29
End: 2024-07-22

## 2024-07-24 NOTE — CARE COORDINATION
spoke with Arina at [facility].  Arina confirmed that Tran's [place] Medicaid was approved for another year.  Arina did note that [place] Medicaid was approved for QMB due to increase in SS disability.  Arina noted that Tran will now be responsible for her Medicare premiums.     spoke to Tran.  Tran reports that she is getting around $200 taken out of her checks for her premiums for Medicare.     Tran confirmed that she found a place.  Tran reports that she was able to find a trailer in the trailer park her sister lives in.  Tran reports that it is $650 a month, which is much more affordable then her $900 rent at her old place.     Tran thanked  for help with Medicaid.    Plan:    will follow up with Maggy in 2 weeks regarding moving.

## 2024-08-14 ENCOUNTER — CARE COORDINATION (OUTPATIENT)
Dept: CARE COORDINATION | Age: 29
End: 2024-08-14

## 2024-08-14 NOTE — CARE COORDINATION
called and spoke to Tran.  Tran confirmed she is all moved into her new trailer.  Tran reports that she is happy with the move and lot rent is cheaper than her previous location.     Tran confirmed she had her daughters school supplies.      Tran did request help with school uniforms.      contacted [place] [address].   was informed [facility] [phone]  and [facility] [phone].     provided Tran with 2 locations that are assisting with school uniforms.      Plan:    will follow up with Tran in 2 weeks to discuss social service needs.

## 2024-09-03 ENCOUNTER — CARE COORDINATION (OUTPATIENT)
Dept: CARE COORDINATION | Age: 29
End: 2024-09-03

## 2024-09-03 NOTE — CARE COORDINATION
spoke with Tran.  Tran informed  that she moved to [place] to be with her other 2 sisters.    Tran thanked  for all her help.    Plan:    will sign off due to Tran moving to [place].

## 2024-11-15 ENCOUNTER — OFFICE VISIT (OUTPATIENT)
Dept: FAMILY MEDICINE CLINIC | Age: 29
End: 2024-11-15

## 2024-11-15 VITALS
SYSTOLIC BLOOD PRESSURE: 160 MMHG | BODY MASS INDEX: 19.44 KG/M2 | WEIGHT: 116.8 LBS | HEART RATE: 61 BPM | TEMPERATURE: 97.1 F | DIASTOLIC BLOOD PRESSURE: 98 MMHG | OXYGEN SATURATION: 99 %

## 2024-11-15 DIAGNOSIS — E10.42 TYPE 1 DIABETES MELLITUS WITH DIABETIC POLYNEUROPATHY (HCC): ICD-10-CM

## 2024-11-15 DIAGNOSIS — G43.911 INTRACTABLE MIGRAINE WITH STATUS MIGRAINOSUS, UNSPECIFIED MIGRAINE TYPE: Primary | ICD-10-CM

## 2024-11-15 DIAGNOSIS — B35.1 ONYCHOMYCOSIS: ICD-10-CM

## 2024-11-15 DIAGNOSIS — M54.41 ACUTE RIGHT-SIDED BACK PAIN WITH SCIATICA: ICD-10-CM

## 2024-11-15 DIAGNOSIS — J30.1 SEASONAL ALLERGIC RHINITIS DUE TO POLLEN: ICD-10-CM

## 2024-11-15 DIAGNOSIS — G47.9 SLEEPING DIFFICULTY: ICD-10-CM

## 2024-11-15 DIAGNOSIS — I10 ESSENTIAL HYPERTENSION: ICD-10-CM

## 2024-11-15 DIAGNOSIS — M81.8 PREMENOPAUSAL IDIOPATHIC OSTEOPOROSIS: ICD-10-CM

## 2024-11-15 DIAGNOSIS — K29.50 CHRONIC GASTRITIS WITHOUT BLEEDING, UNSPECIFIED GASTRITIS TYPE: ICD-10-CM

## 2024-11-15 DIAGNOSIS — G25.81 RESTLESS LEG SYNDROME: ICD-10-CM

## 2024-11-15 DIAGNOSIS — R11.2 NAUSEA AND VOMITING, UNSPECIFIED VOMITING TYPE: ICD-10-CM

## 2024-11-15 LAB — HBA1C MFR BLD: 6.6 %

## 2024-11-15 RX ORDER — RIZATRIPTAN BENZOATE 10 MG/1
10 TABLET ORAL DAILY PRN
Qty: 9 TABLET | Refills: 5 | Status: SHIPPED | OUTPATIENT
Start: 2024-11-15

## 2024-11-15 RX ORDER — DICYCLOMINE HYDROCHLORIDE 10 MG/1
CAPSULE ORAL
Qty: 270 CAPSULE | Refills: 1 | Status: SHIPPED | OUTPATIENT
Start: 2024-11-15

## 2024-11-15 RX ORDER — CALCIUM CARBONATE 500(1250)
1 TABLET,CHEWABLE ORAL DAILY
Qty: 30 TABLET | Refills: 3 | Status: SHIPPED | OUTPATIENT
Start: 2024-11-15

## 2024-11-15 RX ORDER — ONDANSETRON 4 MG/1
TABLET, FILM COATED ORAL
Qty: 90 TABLET | Refills: 1 | Status: SHIPPED | OUTPATIENT
Start: 2024-11-15

## 2024-11-15 RX ORDER — PANTOPRAZOLE SODIUM 40 MG/1
40 TABLET, DELAYED RELEASE ORAL
Qty: 90 TABLET | Refills: 1 | Status: SHIPPED | OUTPATIENT
Start: 2024-11-15

## 2024-11-15 RX ORDER — INSULIN ASPART 100 [IU]/ML
INJECTION, SOLUTION INTRAVENOUS; SUBCUTANEOUS
Qty: 45 ML | Refills: 5 | Status: SHIPPED | OUTPATIENT
Start: 2024-11-15

## 2024-11-15 RX ORDER — NAPROXEN 500 MG/1
500 TABLET ORAL 2 TIMES DAILY WITH MEALS
Qty: 60 TABLET | Refills: 0 | Status: CANCELLED | OUTPATIENT
Start: 2024-11-15

## 2024-11-15 RX ORDER — KETOCONAZOLE 20 MG/G
CREAM TOPICAL
Qty: 60 G | Refills: 11 | Status: SHIPPED | OUTPATIENT
Start: 2024-11-15

## 2024-11-15 RX ORDER — TOPIRAMATE 25 MG/1
25 TABLET, FILM COATED ORAL 2 TIMES DAILY
Qty: 180 TABLET | Refills: 1 | Status: CANCELLED | OUTPATIENT
Start: 2024-11-15

## 2024-11-15 RX ORDER — ONDANSETRON 4 MG/1
TABLET, FILM COATED ORAL
Qty: 90 TABLET | Refills: 1 | Status: SHIPPED | OUTPATIENT
Start: 2024-11-15 | End: 2024-11-15

## 2024-11-15 RX ORDER — AMITRIPTYLINE HYDROCHLORIDE 10 MG/1
10 TABLET ORAL NIGHTLY
Qty: 90 TABLET | Refills: 1 | Status: SHIPPED | OUTPATIENT
Start: 2024-11-15

## 2024-11-15 RX ORDER — AMLODIPINE BESYLATE 5 MG/1
5 TABLET ORAL DAILY
Qty: 90 TABLET | Refills: 0 | Status: SHIPPED | OUTPATIENT
Start: 2024-11-15 | End: 2024-11-22

## 2024-11-15 RX ORDER — ROPINIROLE 0.25 MG/1
0.25 TABLET, FILM COATED ORAL NIGHTLY
Qty: 90 TABLET | Refills: 1 | Status: SHIPPED | OUTPATIENT
Start: 2024-11-15

## 2024-11-15 RX ORDER — ALENDRONATE SODIUM 70 MG/1
70 TABLET ORAL
Qty: 13 TABLET | Refills: 0 | Status: SHIPPED | OUTPATIENT
Start: 2024-11-15

## 2024-11-15 RX ORDER — INSULIN GLARGINE 100 [IU]/ML
INJECTION, SOLUTION SUBCUTANEOUS
Qty: 15 ML | Refills: 3 | Status: SHIPPED | OUTPATIENT
Start: 2024-11-15

## 2024-11-15 RX ORDER — LORATADINE 10 MG/1
10 TABLET ORAL DAILY
Qty: 30 TABLET | Refills: 3 | Status: SHIPPED | OUTPATIENT
Start: 2024-11-15

## 2024-11-15 NOTE — PROGRESS NOTES
Tran Deng (:  1995) is a 28 y.o. female,Established patient, here for evaluation of the following chief complaint(s):  Headache (Getting headaches every day for the past week, last eye exam was the first of the year, pain is in the back of her head, has taken OTC Tylenol and Ibuprofen, states Excedrin migraine is the only thing that seems to help, causing nausea, light sensitivity,  ) and Diabetes (Has DEXCOM G6, has not missed any meds, sugars been running low )         Assessment & Plan  Intractable migraine with status migrainosus, unspecified migraine type   History of migraines, has been out of her medications   Today states that she does not have it, but has been intermittent   Will refill medications today and patient to follow up with PCP  No red flag symptoms, discussed if symptoms don't improve to follow back up   Discussed proper usage of medications     Orders:    amitriptyline (ELAVIL) 10 MG tablet; Take 1 tablet by mouth nightly    rizatriptan (MAXALT) 10 MG tablet; Take 1 tablet by mouth daily as needed for Migraine May repeat in 2 hours if needed    Essential hypertension   Patient has not been taking medication,   Elevated blood pressures in the office   Patient asymptomatic, will start on amlodipine 5 mg today and patient to follow back up for nursing visit in one week for recheck   Discussed diet and exercise, patient states that she does eat significant amount of junk food, discussed limiting salt intake         Type 1 diabetes mellitus with diabetic polyneuropathy (HCC)   Was recently in [place]   A1c level below 7 at 6.6, stable and compliant with medications   Will refill today  Discussed diet, will also be giving patient ensure to help with nutrition   Patient currently on insulin sliding scale and glargine 10 units nightly     Orders:    POCT glycosylated hemoglobin (Hb A1C)    insulin aspart (NOVOLOG FLEXPEN) 100 UNIT/ML injection pen; INJECT 2 TO 12 UNITS

## 2024-11-19 NOTE — ASSESSMENT & PLAN NOTE
Stable on medication, will refill     Orders:    rOPINIRole (REQUIP) 0.25 MG tablet; Take 1 tablet by mouth nightly

## 2024-11-19 NOTE — ASSESSMENT & PLAN NOTE
Was recently in [place]   A1c level below 7 at 6.6, stable and compliant with medications   Will refill today  Discussed diet, will also be giving patient ensure to help with nutrition   Patient currently on insulin sliding scale and glargine 10 units nightly     Orders:    POCT glycosylated hemoglobin (Hb A1C)    insulin aspart (NOVOLOG FLEXPEN) 100 UNIT/ML injection pen; INJECT 2 TO 12 UNITS SUBCUTANEOUSLY THREE TIMES DAILY PER SLIDING SCALE (DISCARD PEN 28 DAYS AFTER OPENING)    insulin glargine (LANTUS SOLOSTAR) 100 UNIT/ML injection pen; INJECT 10 UNITS INTO THE SKIN NIGHTLY (DISCARD PEN 28 DAYS AFTER OPENING)

## 2024-11-19 NOTE — ASSESSMENT & PLAN NOTE
Stable on medications, will refill     Orders:    alendronate (FOSAMAX) 70 MG tablet; Take 1 tablet by mouth every 7 days    calcium carbonate (OS-REJI) 1250 (500 Ca) MG chewable tablet; Take 1 tablet by mouth daily

## 2024-11-19 NOTE — ASSESSMENT & PLAN NOTE
Stable on medication, will refill     Orders:    ketoconazole (NIZORAL) 2 % cream; APPLY AS DIRECTED TWICE DAILY

## 2024-11-19 NOTE — ASSESSMENT & PLAN NOTE
Stable on medication, will refill     Orders:    pantoprazole (PROTONIX) 40 MG tablet; Take 1 tablet by mouth every morning (before breakfast)

## 2024-11-22 ENCOUNTER — NURSE ONLY (OUTPATIENT)
Dept: FAMILY MEDICINE CLINIC | Age: 29
End: 2024-11-22
Payer: MEDICARE

## 2024-11-22 VITALS — DIASTOLIC BLOOD PRESSURE: 110 MMHG | SYSTOLIC BLOOD PRESSURE: 170 MMHG

## 2024-11-22 DIAGNOSIS — N92.6 IRREGULAR MENSES: Primary | ICD-10-CM

## 2024-11-22 DIAGNOSIS — I10 ESSENTIAL HYPERTENSION: ICD-10-CM

## 2024-11-22 LAB
CONTROL: PRESENT
PREGNANCY TEST URINE, POC: NEGATIVE

## 2024-11-22 PROCEDURE — 99211 OFF/OP EST MAY X REQ PHY/QHP: CPT | Performed by: STUDENT IN AN ORGANIZED HEALTH CARE EDUCATION/TRAINING PROGRAM

## 2024-11-22 RX ORDER — AMLODIPINE BESYLATE 10 MG/1
10 TABLET ORAL DAILY
Qty: 30 TABLET | Refills: 0 | Status: SHIPPED | OUTPATIENT
Start: 2024-11-22

## 2024-11-22 NOTE — PROGRESS NOTES
Elevated blood pressure even after starting amlodipine 5mg, today was at 170/110. Will increase amlodipine to 10mg and will send in today to pharmacy, already discussed with patient, patient asymptomatic.

## 2024-11-22 NOTE — PROGRESS NOTES
Pt is here today for a BP check. Vitals are as follows.  Sitting   180/110 LT arm              Pt denies CP, HA or SOB.      Let pt sit about 5/7 minutes, BP was 170/110 LT arm    Discussed with Dr Melton, she stated she will increase pts amlodipine     Pt informed of increase

## 2025-03-17 ENCOUNTER — HOSPITAL ENCOUNTER (OUTPATIENT)
Age: 30
Setting detail: SPECIMEN
Discharge: HOME | End: 2025-03-17

## 2025-03-17 ENCOUNTER — OFFICE VISIT (OUTPATIENT)
Dept: FAMILY MEDICINE CLINIC | Age: 30
End: 2025-03-17
Payer: MEDICARE

## 2025-03-17 VITALS
SYSTOLIC BLOOD PRESSURE: 138 MMHG | BODY MASS INDEX: 19.44 KG/M2 | HEART RATE: 75 BPM | DIASTOLIC BLOOD PRESSURE: 86 MMHG | TEMPERATURE: 97.1 F | OXYGEN SATURATION: 100 % | WEIGHT: 116.8 LBS

## 2025-03-17 DIAGNOSIS — I10 ESSENTIAL HYPERTENSION: ICD-10-CM

## 2025-03-17 DIAGNOSIS — E10.29 MICROALBUMINURIA DUE TO TYPE 1 DIABETES MELLITUS (HCC): ICD-10-CM

## 2025-03-17 DIAGNOSIS — M54.41 ACUTE RIGHT-SIDED BACK PAIN WITH SCIATICA: ICD-10-CM

## 2025-03-17 DIAGNOSIS — R80.9 MICROALBUMINURIA DUE TO TYPE 1 DIABETES MELLITUS (HCC): ICD-10-CM

## 2025-03-17 DIAGNOSIS — Z59.9 FINANCIAL DIFFICULTIES: ICD-10-CM

## 2025-03-17 DIAGNOSIS — J30.1 SEASONAL ALLERGIC RHINITIS DUE TO POLLEN: ICD-10-CM

## 2025-03-17 DIAGNOSIS — R19.7 DIARRHEA, UNSPECIFIED TYPE: ICD-10-CM

## 2025-03-17 DIAGNOSIS — G47.9 SLEEPING DIFFICULTY: ICD-10-CM

## 2025-03-17 DIAGNOSIS — Z59.00 HOMELESS FAMILY: ICD-10-CM

## 2025-03-17 DIAGNOSIS — E10.42 TYPE 1 DIABETES MELLITUS WITH DIABETIC POLYNEUROPATHY (HCC): ICD-10-CM

## 2025-03-17 DIAGNOSIS — R11.2 NAUSEA AND VOMITING, UNSPECIFIED VOMITING TYPE: ICD-10-CM

## 2025-03-17 DIAGNOSIS — Z13.220 SCREENING, LIPID: ICD-10-CM

## 2025-03-17 DIAGNOSIS — Z59.41 SEVERE FOOD INSECURITY: ICD-10-CM

## 2025-03-17 DIAGNOSIS — G25.81 RESTLESS LEG SYNDROME: ICD-10-CM

## 2025-03-17 DIAGNOSIS — D50.9 IRON DEFICIENCY ANEMIA, UNSPECIFIED IRON DEFICIENCY ANEMIA TYPE: ICD-10-CM

## 2025-03-17 DIAGNOSIS — Z13.29 SCREENING FOR THYROID DISORDER: ICD-10-CM

## 2025-03-17 DIAGNOSIS — E55.9 VITAMIN D DEFICIENCY: ICD-10-CM

## 2025-03-17 DIAGNOSIS — M81.8 PREMENOPAUSAL IDIOPATHIC OSTEOPOROSIS: ICD-10-CM

## 2025-03-17 DIAGNOSIS — E10.42 TYPE 1 DIABETES MELLITUS WITH DIABETIC POLYNEUROPATHY (HCC): Primary | ICD-10-CM

## 2025-03-17 DIAGNOSIS — B35.1 ONYCHOMYCOSIS: ICD-10-CM

## 2025-03-17 DIAGNOSIS — K29.50 CHRONIC GASTRITIS WITHOUT BLEEDING, UNSPECIFIED GASTRITIS TYPE: ICD-10-CM

## 2025-03-17 DIAGNOSIS — G43.911 INTRACTABLE MIGRAINE WITH STATUS MIGRAINOSUS, UNSPECIFIED MIGRAINE TYPE: ICD-10-CM

## 2025-03-17 DIAGNOSIS — I10 UNCONTROLLED HYPERTENSION: ICD-10-CM

## 2025-03-17 LAB
25(OH)D3 SERPL-MCNC: <6 NG/ML (ref 30–100)
ALBUMIN SERPL-MCNC: 2.6 G/DL (ref 3.5–5.2)
ALBUMIN/GLOB SERPL: 0.8 {RATIO} (ref 1–2.5)
ALP SERPL-CCNC: 114 U/L (ref 35–104)
ALT SERPL-CCNC: 8 U/L (ref 10–35)
ANION GAP SERPL CALCULATED.3IONS-SCNC: 7 MMOL/L (ref 9–16)
AST SERPL-CCNC: 17 U/L (ref 10–35)
BILIRUB SERPL-MCNC: <0.2 MG/DL (ref 0–1.2)
BUN SERPL-MCNC: 6 MG/DL (ref 6–20)
CALCIUM SERPL-MCNC: 7.8 MG/DL (ref 8.6–10.4)
CHLORIDE SERPL-SCNC: 107 MMOL/L (ref 98–107)
CHOLEST SERPL-MCNC: 165 MG/DL (ref 0–199)
CHOLESTEROL/HDL RATIO: 3.2
CO2 SERPL-SCNC: 23 MMOL/L (ref 20–31)
CREAT SERPL-MCNC: 1 MG/DL (ref 0.6–0.9)
CREAT UR-MCNC: 61 MG/DL (ref 28–217)
GFR, ESTIMATED: 77 ML/MIN/1.73M2
GLUCOSE SERPL-MCNC: 173 MG/DL (ref 74–99)
HBA1C MFR BLD: 5.8 %
HDLC SERPL-MCNC: 52 MG/DL
LDLC SERPL CALC-MCNC: 86 MG/DL (ref 0–100)
MICROALBUMIN UR-MCNC: 254 MG/L (ref 0–20)
MICROALBUMIN/CREAT UR-RTO: 416 MCG/MG CREAT (ref 0–25)
POTASSIUM SERPL-SCNC: 5.3 MMOL/L (ref 3.7–5.3)
PROT SERPL-MCNC: 6 G/DL (ref 6.6–8.7)
SODIUM SERPL-SCNC: 137 MMOL/L (ref 136–145)
TRIGL SERPL-MCNC: 135 MG/DL (ref 0–149)
TSH SERPL DL<=0.05 MIU/L-ACNC: 1.45 UIU/ML (ref 0.27–4.2)
VLDLC SERPL CALC-MCNC: 27 MG/DL (ref 1–30)

## 2025-03-17 PROCEDURE — 3075F SYST BP GE 130 - 139MM HG: CPT | Performed by: INTERNAL MEDICINE

## 2025-03-17 PROCEDURE — 4004F PT TOBACCO SCREEN RCVD TLK: CPT | Performed by: INTERNAL MEDICINE

## 2025-03-17 PROCEDURE — 99214 OFFICE O/P EST MOD 30 MIN: CPT | Performed by: INTERNAL MEDICINE

## 2025-03-17 PROCEDURE — 83036 HEMOGLOBIN GLYCOSYLATED A1C: CPT | Performed by: INTERNAL MEDICINE

## 2025-03-17 PROCEDURE — 3079F DIAST BP 80-89 MM HG: CPT | Performed by: INTERNAL MEDICINE

## 2025-03-17 PROCEDURE — 2022F DILAT RTA XM EVC RTNOPTHY: CPT | Performed by: INTERNAL MEDICINE

## 2025-03-17 PROCEDURE — G8420 CALC BMI NORM PARAMETERS: HCPCS | Performed by: INTERNAL MEDICINE

## 2025-03-17 PROCEDURE — 3044F HG A1C LEVEL LT 7.0%: CPT | Performed by: INTERNAL MEDICINE

## 2025-03-17 PROCEDURE — G8427 DOCREV CUR MEDS BY ELIG CLIN: HCPCS | Performed by: INTERNAL MEDICINE

## 2025-03-17 RX ORDER — PEN NEEDLE, DIABETIC 31 GX5/16"
NEEDLE, DISPOSABLE MISCELLANEOUS
Qty: 400 EACH | Refills: 3 | Status: SHIPPED | OUTPATIENT
Start: 2025-03-17

## 2025-03-17 RX ORDER — KETOCONAZOLE 20 MG/G
CREAM TOPICAL
Qty: 60 G | Refills: 11 | Status: SHIPPED | OUTPATIENT
Start: 2025-03-17

## 2025-03-17 RX ORDER — ALENDRONATE SODIUM 70 MG/1
70 TABLET ORAL
Qty: 13 TABLET | Refills: 0 | Status: SHIPPED | OUTPATIENT
Start: 2025-03-17

## 2025-03-17 RX ORDER — ACYCLOVIR 400 MG/1
1 TABLET ORAL
Qty: 3 EACH | Refills: 11 | Status: SHIPPED | OUTPATIENT
Start: 2025-03-17

## 2025-03-17 RX ORDER — AMITRIPTYLINE HYDROCHLORIDE 10 MG/1
10 TABLET ORAL NIGHTLY
Qty: 90 TABLET | Refills: 1 | Status: SHIPPED | OUTPATIENT
Start: 2025-03-17

## 2025-03-17 RX ORDER — PANTOPRAZOLE SODIUM 40 MG/1
40 TABLET, DELAYED RELEASE ORAL
Qty: 90 TABLET | Refills: 1 | Status: SHIPPED | OUTPATIENT
Start: 2025-03-17

## 2025-03-17 RX ORDER — CALCIUM CITRATE/VITAMIN D3 200MG-6.25
TABLET ORAL
Qty: 400 STRIP | Refills: 3 | Status: SHIPPED | OUTPATIENT
Start: 2025-03-17

## 2025-03-17 RX ORDER — LORATADINE 10 MG/1
10 TABLET ORAL DAILY
Qty: 30 TABLET | Refills: 3 | Status: SHIPPED | OUTPATIENT
Start: 2025-03-17

## 2025-03-17 RX ORDER — METOPROLOL SUCCINATE 25 MG/1
25 TABLET, EXTENDED RELEASE ORAL DAILY
Qty: 30 TABLET | Refills: 5 | Status: SHIPPED | OUTPATIENT
Start: 2025-03-17

## 2025-03-17 RX ORDER — ONDANSETRON 4 MG/1
TABLET, FILM COATED ORAL
Qty: 90 TABLET | Refills: 1 | Status: SHIPPED | OUTPATIENT
Start: 2025-03-17

## 2025-03-17 RX ORDER — RIZATRIPTAN BENZOATE 10 MG/1
10 TABLET ORAL DAILY PRN
Qty: 9 TABLET | Refills: 5 | Status: SHIPPED | OUTPATIENT
Start: 2025-03-17

## 2025-03-17 RX ORDER — ACYCLOVIR 400 MG/1
1 TABLET ORAL DAILY
Qty: 1 EACH | Refills: 0 | Status: SHIPPED | OUTPATIENT
Start: 2025-03-17

## 2025-03-17 RX ORDER — DICYCLOMINE HYDROCHLORIDE 10 MG/1
CAPSULE ORAL
Qty: 270 CAPSULE | Refills: 1 | Status: SHIPPED | OUTPATIENT
Start: 2025-03-17

## 2025-03-17 RX ORDER — CALCIUM CARBONATE 500(1250)
1 TABLET,CHEWABLE ORAL DAILY
Qty: 30 TABLET | Refills: 3 | Status: SHIPPED | OUTPATIENT
Start: 2025-03-17

## 2025-03-17 RX ORDER — INSULIN GLARGINE 100 [IU]/ML
INJECTION, SOLUTION SUBCUTANEOUS
Qty: 15 ML | Refills: 3 | Status: SHIPPED | OUTPATIENT
Start: 2025-03-17

## 2025-03-17 RX ORDER — INSULIN ASPART 100 [IU]/ML
INJECTION, SOLUTION INTRAVENOUS; SUBCUTANEOUS
Qty: 45 ML | Refills: 5 | Status: SHIPPED | OUTPATIENT
Start: 2025-03-17

## 2025-03-17 RX ORDER — AMLODIPINE BESYLATE 10 MG/1
10 TABLET ORAL DAILY
Qty: 30 TABLET | Refills: 0 | Status: SHIPPED | OUTPATIENT
Start: 2025-03-17

## 2025-03-17 RX ORDER — ISOPROPYL ALCOHOL 70 ML/100ML
SWAB TOPICAL
Qty: 100 EACH | Refills: 5 | Status: SHIPPED | OUTPATIENT
Start: 2025-03-17

## 2025-03-17 RX ORDER — GLUCOSAM/CHON-MSM1/C/MANG/BOSW 500-416.6
TABLET ORAL
Qty: 400 EACH | Refills: 3 | Status: SHIPPED | OUTPATIENT
Start: 2025-03-17

## 2025-03-17 RX ORDER — ROPINIROLE 0.25 MG/1
0.25 TABLET, FILM COATED ORAL NIGHTLY
Qty: 90 TABLET | Refills: 1 | Status: SHIPPED | OUTPATIENT
Start: 2025-03-17

## 2025-03-17 SDOH — ECONOMIC STABILITY: FOOD INSECURITY: WITHIN THE PAST 12 MONTHS, THE FOOD YOU BOUGHT JUST DIDN'T LAST AND YOU DIDN'T HAVE MONEY TO GET MORE.: OFTEN TRUE

## 2025-03-17 SDOH — ECONOMIC STABILITY - FOOD INSECURITY: FOOD INSECURITY: Z59.41

## 2025-03-17 SDOH — ECONOMIC STABILITY: FOOD INSECURITY: WITHIN THE PAST 12 MONTHS, YOU WORRIED THAT YOUR FOOD WOULD RUN OUT BEFORE YOU GOT MONEY TO BUY MORE.: OFTEN TRUE

## 2025-03-17 SDOH — ECONOMIC STABILITY - HOUSING INSECURITY: HOMELESSNESS UNSPECIFIED: Z59.00

## 2025-03-17 SDOH — ECONOMIC STABILITY - INCOME SECURITY: PROBLEM RELATED TO HOUSING AND ECONOMIC CIRCUMSTANCES, UNSPECIFIED: Z59.9

## 2025-03-17 ASSESSMENT — PATIENT HEALTH QUESTIONNAIRE - PHQ9
SUM OF ALL RESPONSES TO PHQ QUESTIONS 1-9: 0
1. LITTLE INTEREST OR PLEASURE IN DOING THINGS: NOT AT ALL
SUM OF ALL RESPONSES TO PHQ QUESTIONS 1-9: 0
2. FEELING DOWN, DEPRESSED OR HOPELESS: NOT AT ALL
SUM OF ALL RESPONSES TO PHQ QUESTIONS 1-9: 0
SUM OF ALL RESPONSES TO PHQ QUESTIONS 1-9: 0

## 2025-03-17 ASSESSMENT — ENCOUNTER SYMPTOMS
DIARRHEA: 0
CHEST TIGHTNESS: 0
WHEEZING: 0
CONSTIPATION: 0
SHORTNESS OF BREATH: 0
NAUSEA: 0
BLOOD IN STOOL: 0
ANAL BLEEDING: 0
COUGH: 0
ABDOMINAL PAIN: 0
CHOKING: 0
VOMITING: 0

## 2025-03-17 ASSESSMENT — VISUAL ACUITY: OU: 1

## 2025-03-17 NOTE — PROGRESS NOTES
guarding.   Musculoskeletal:         General: Normal range of motion.   Skin:     General: Skin is warm and dry.      Capillary Refill: Capillary refill takes less than 2 seconds.   Neurological:      General: No focal deficit present.      Mental Status: She is alert and oriented to person, place, and time.           Data Review     A1c today 5.8% - 6.6% last visit    Health Maintenance Due   Topic Date Due    Hepatitis B vaccine (4 of 4 - 4-dose series) 10/20/1997    Diabetic retinal exam  01/21/2020    Diabetic foot exam  03/23/2023    COVID-19 Vaccine (1 - 2024-25 season) Never done    Lipids  12/05/2024    Annual Wellness Visit (Medicare Advantage)  01/01/2025    GFR test (Diabetes, CKD 3-4, OR last GFR 15-59)  01/26/2025    Diabetic Alb to Cr ratio (uACR) test  03/15/2025           Patient given educational materials- see patient instructions.  Discussed use, benefit, and side effects of prescribedmedications.  All patient questions answered.  Pt voiced understanding. Reviewedhealth maintenance.  Instructed to continue current medications, diet and exercise.Patient agreed with treatment plan. Follow up as directed.     Electronically signedby JADYN SPENCER MD on 3/17/2025

## 2025-03-20 ENCOUNTER — TELEPHONE (OUTPATIENT)
Dept: FAMILY MEDICINE CLINIC | Age: 30
End: 2025-03-20

## 2025-03-20 NOTE — TELEPHONE ENCOUNTER
Tran Simeontri Junelucy was contacted by a Community Health Navigator to discuss a referral for SDOH related needs.     Writer spoke with: Patient and explained the services and assistance that can be provided by a Community Health Navigator.     Patient agreeable to receiving resources and support from writer.     Intake Notes: Patient is currently living with sister and is looking to get into a shelter. Patient advised to call 211 for assistance, patient stated she called and has not gotten any response. Patient advised to call back due to [facility] not having access for shelters.  Writer sent patient list of food pantries and low income housing information.   Writer will keep referral open for one month to possibly help patient with security deposit for apartment.       Ro Sahu MA

## 2025-04-08 NOTE — TELEPHONE ENCOUNTER
Tran Deng was contacted by Ro Sahu MA, a Community Health Navigator, regarding a Social Determinants of Health referral.     Patient still waiting for acceptance into apartments for one bedroom. She received housing resource list yesterday and will see if anything is available at this time. Writer will call back in one week for updates.

## 2025-05-05 NOTE — TELEPHONE ENCOUNTER
Tran Deng was contacted by Ro Sahu MA, a Community Health Navigator, regarding a Social Determinants of Health referral.     Writer received no further communication from patient. Patient currently on waiting list for housing.     Will close referral.

## 2025-06-09 ENCOUNTER — OFFICE VISIT (OUTPATIENT)
Dept: FAMILY MEDICINE CLINIC | Age: 30
End: 2025-06-09
Payer: MEDICARE

## 2025-06-09 VITALS
OXYGEN SATURATION: 99 % | DIASTOLIC BLOOD PRESSURE: 82 MMHG | HEIGHT: 65 IN | BODY MASS INDEX: 18.39 KG/M2 | WEIGHT: 110.4 LBS | HEART RATE: 83 BPM | SYSTOLIC BLOOD PRESSURE: 136 MMHG

## 2025-06-09 DIAGNOSIS — E10.649 HYPOGLYCEMIA DUE TO TYPE 1 DIABETES MELLITUS (HCC): ICD-10-CM

## 2025-06-09 DIAGNOSIS — Z00.00 MEDICARE ANNUAL WELLNESS VISIT, SUBSEQUENT: Primary | ICD-10-CM

## 2025-06-09 DIAGNOSIS — M81.8 OTHER OSTEOPOROSIS, UNSPECIFIED PATHOLOGICAL FRACTURE PRESENCE: ICD-10-CM

## 2025-06-09 DIAGNOSIS — E10.42 TYPE 1 DIABETES MELLITUS WITH DIABETIC POLYNEUROPATHY (HCC): ICD-10-CM

## 2025-06-09 DIAGNOSIS — E43 PROTEIN-CALORIE MALNUTRITION, SEVERE: ICD-10-CM

## 2025-06-09 DIAGNOSIS — Z72.0 TOBACCO ABUSE: ICD-10-CM

## 2025-06-09 DIAGNOSIS — Z91.81 AT HIGH RISK FOR FALLS: ICD-10-CM

## 2025-06-09 DIAGNOSIS — Z13.6 SCREENING FOR CARDIOVASCULAR CONDITION: ICD-10-CM

## 2025-06-09 DIAGNOSIS — G25.81 RESTLESS LEG SYNDROME: ICD-10-CM

## 2025-06-09 PROCEDURE — 3044F HG A1C LEVEL LT 7.0%: CPT | Performed by: INTERNAL MEDICINE

## 2025-06-09 PROCEDURE — G0446 INTENS BEHAVE THER CARDIO DX: HCPCS | Performed by: INTERNAL MEDICINE

## 2025-06-09 PROCEDURE — G0439 PPPS, SUBSEQ VISIT: HCPCS | Performed by: INTERNAL MEDICINE

## 2025-06-09 RX ORDER — AZELASTINE HYDROCHLORIDE 0.5 MG/ML
1 SOLUTION/ DROPS OPHTHALMIC DAILY
COMMUNITY
Start: 2025-05-23

## 2025-06-09 ASSESSMENT — PATIENT HEALTH QUESTIONNAIRE - PHQ9
SUM OF ALL RESPONSES TO PHQ QUESTIONS 1-9: 0
2. FEELING DOWN, DEPRESSED OR HOPELESS: NOT AT ALL
SUM OF ALL RESPONSES TO PHQ QUESTIONS 1-9: 0
1. LITTLE INTEREST OR PLEASURE IN DOING THINGS: NOT AT ALL

## 2025-06-09 ASSESSMENT — LIFESTYLE VARIABLES
HOW MANY STANDARD DRINKS CONTAINING ALCOHOL DO YOU HAVE ON A TYPICAL DAY: PATIENT DOES NOT DRINK
HOW OFTEN DO YOU HAVE A DRINK CONTAINING ALCOHOL: NEVER

## 2025-06-09 NOTE — PROGRESS NOTES
Medicare Annual Wellness Visit    Tran Deng is here for Medicare AWV, Diabetes, and Fall (Fell coming out of apartment, states her legs give out on her time to time, has been awhile since the last time )    Assessment & Plan   Type 1 diabetes mellitus with diabetic polyneuropathy (HCC)       No follow-ups on file.     Subjective   The following acute and/or chronic problems were also addressed today:  Moved into her new apartment at end of April, very thankful and happy   Still using food pantries to get food, states doing okay for now - eating three times a day and snacking in between   Sugars are running low, waking her up at night, running in 30-45 at night.    Patient's complete Health Risk Assessment and screening values have been reviewed and are found in Flowsheets. The following problems were reviewed today and where indicated follow up appointments were made and/or referrals ordered.    Positive Risk Factor Screenings with Interventions:    Fall Risk:  Do you feel unsteady or are you worried about falling? : no  2 or more falls in past year?: (!) yes  Fall with injury in past year?: no     Interventions:    Patient comments: legs give out sometimes when going up and down stairs   Reviewed medications, home hazards, visual acuity, and co-morbidities that can increase risk for falls  Needs to eat consistently, including protein        Drug Use:   Substance and Sexual Activity   Drug Use Yes    Types: Marijuana (Weed)    Comment: 1-2x per day     DAST-10 Score: 1  Interpretation: 1-2 indicates low level use. Recommendation: monitor and re-assess at a later date    Interventions:  Patient declined any further intervention or treatment           Abnormal BMI (underweight):  Body mass index is 18.37 kg/m². (!) Abnormal    Interventions:  Patient advised to follow-up in this office for further evaluation and treatment    Dentist Screen:  Have you seen the dentist within the past year?: (!)

## 2025-06-09 NOTE — PATIENT INSTRUCTIONS
Pain, pressure, or a strange feeling in the back, neck, jaw, or upper belly or in one or both shoulders or arms.     Lightheadedness or sudden weakness.     A fast or irregular heartbeat.   After you call 911, the  may tell you to chew 1 adult-strength or 2 to 4 low-dose aspirin. Wait for an ambulance. Do not try to drive yourself.  Watch closely for changes in your health, and be sure to contact your doctor if you have any problems.  Where can you learn more?  Go to https://[email or web address].net/patientEd and enter F075 to learn more about \"A Healthy Heart: Care Instructions.\"  Current as of: July 31, 2024  Content Version: 14.5  © 7968-6648 [employer].   Care instructions adapted under license by [employer]. If you have questions about a medical condition or this instruction, always ask your healthcare professional. [employer] disclaims any warranty or liability for your use of this information.    Personalized Preventive Plan for Tran Deng - 6/9/2025  Medicare offers a range of preventive health benefits. Some of the tests and screenings are paid in full while other may be subject to a deductible, co-insurance, and/or copay.  Some of these benefits include a comprehensive review of your medical history including lifestyle, illnesses that may run in your family, and various assessments and screenings as appropriate.  After reviewing your medical record and screening and assessments performed today your provider may have ordered immunizations, labs, imaging, and/or referrals for you.  A list of these orders (if applicable) as well as your Preventive Care list are included within your After Visit Summary for your review.

## 2025-06-09 NOTE — PROGRESS NOTES
[facility]  [facility]  [address] [place] [id number]  Dept: [phone]  Dept Fax: [phone]      Tran Deng is a 29 y.o. female who presents today for hermedical conditions/complaints as noted below.  Tran Deng is c/o of Medicare AWV, Diabetes, and Fall (Fell coming out of apartment, states her legs give out on her time to time, has been awhile since the last time )        Assessment/Plan:     1. Medicare annual wellness visit, subsequent  2. Type 1 diabetes mellitus with diabetic polyneuropathy (HCC)  -     Hemoglobin A1C; Future  3. Protein-calorie malnutrition, severe  4. Restless leg syndrome  5. Other osteoporosis, unspecified pathological fracture presence  6. Tobacco abuse  7. Hypoglycemia due to type 1 diabetes mellitus (HCC)  8. At high risk for falls  9. Screening for cardiovascular condition  -     CT Intensive Behavior Counseling for Cardiovascular Diseases, 8-15 minutes []          No follow-ups on file.      HPI     Moved into her new apartment at end of April, very thankful and happy   Still using food pantries to get food, states doing okay for now - eating three times a day and snacking in between   Sugars are running low, waking her up at night, running in 30-45 at night.  Gets shaky and sweaty with low sugars   Eats 2-3 tablespoons of peanut butter when sugars get low   Stopped lantus a few weeks ago and the hypoglycemic episodes continue nearly every night. Dinner is 9PM, mostly potatoes, candy, oranges. No protein. Thinking about trying ensure at bedtime again. No meat during the day, mostly eating peantus and peanut butter for protein.         BP Readings from Last 3 Encounters:   06/09/25 136/82   03/17/25 138/86   11/22/24 (!) 170/110              Past Medical History:   Diagnosis Date    KAIT (acute kidney injury) 11/04/2023    Closed nondisplaced fracture of fifth metacarpal bone of left hand with routine healing

## 2025-06-16 ASSESSMENT — ENCOUNTER SYMPTOMS
COUGH: 0
VOMITING: 0
WHEEZING: 0
SHORTNESS OF BREATH: 0
BLOOD IN STOOL: 0
CHEST TIGHTNESS: 0
NAUSEA: 0
CHOKING: 0
CONSTIPATION: 0
ABDOMINAL PAIN: 0
ANAL BLEEDING: 0
DIARRHEA: 0

## 2025-06-16 ASSESSMENT — VISUAL ACUITY: OU: 1

## 2025-07-14 NOTE — TELEPHONE ENCOUNTER
Upon calling to change appointment for October patient mentioned she was getting lidocaine patches before for her back pain and she is asking for a refill. I did tell her that I don't see we've prescribed these in the past but we will call and let her know if we are going to fill or not.

## 2025-07-15 RX ORDER — LIDOCAINE 50 MG/G
1 PATCH TOPICAL DAILY
Qty: 10 PATCH | Refills: 0 | Status: SHIPPED | OUTPATIENT
Start: 2025-07-15 | End: 2025-07-25

## 2025-07-30 ENCOUNTER — HOSPITAL ENCOUNTER (OUTPATIENT)
Age: 30
Setting detail: OBSERVATION
Discharge: LEFT BEFORE DISCHARGE | End: 2025-07-30
Attending: EMERGENCY MEDICINE | Admitting: STUDENT IN AN ORGANIZED HEALTH CARE EDUCATION/TRAINING PROGRAM
Payer: MEDICARE

## 2025-07-30 ENCOUNTER — APPOINTMENT (OUTPATIENT)
Dept: CT IMAGING | Age: 30
End: 2025-07-30
Payer: MEDICARE

## 2025-07-30 VITALS
SYSTOLIC BLOOD PRESSURE: 113 MMHG | HEART RATE: 77 BPM | BODY MASS INDEX: 19.14 KG/M2 | TEMPERATURE: 99.6 F | RESPIRATION RATE: 18 BRPM | OXYGEN SATURATION: 100 % | WEIGHT: 115 LBS | DIASTOLIC BLOOD PRESSURE: 89 MMHG

## 2025-07-30 DIAGNOSIS — K52.9 COLITIS: Primary | ICD-10-CM

## 2025-07-30 PROBLEM — N39.0 UTI (URINARY TRACT INFECTION): Status: ACTIVE | Noted: 2025-07-30

## 2025-07-30 PROBLEM — N39.0 UTI (URINARY TRACT INFECTION): Status: RESOLVED | Noted: 2025-07-30 | Resolved: 2025-07-30

## 2025-07-30 LAB
ALBUMIN SERPL-MCNC: 2.4 G/DL (ref 3.5–5.2)
ALBUMIN/GLOB SERPL: 0.6 {RATIO} (ref 1–2.5)
ALP SERPL-CCNC: 109 U/L (ref 35–104)
ALT SERPL-CCNC: 5 U/L (ref 10–35)
ANION GAP SERPL CALCULATED.3IONS-SCNC: 11 MMOL/L (ref 9–16)
AST SERPL-CCNC: 23 U/L (ref 10–35)
B-OH-BUTYR SERPL-MCNC: 0.67 MMOL/L (ref 0.02–0.27)
BACTERIA URNS QL MICRO: ABNORMAL
BASOPHILS # BLD: 0 K/UL (ref 0–0.2)
BASOPHILS NFR BLD: 0 % (ref 0–2)
BILIRUB DIRECT SERPL-MCNC: 0.4 MG/DL (ref 0–0.2)
BILIRUB INDIRECT SERPL-MCNC: 0.5 MG/DL (ref 0–1)
BILIRUB SERPL-MCNC: 0.9 MG/DL (ref 0–1.2)
BILIRUB UR QL STRIP: NEGATIVE
BODY TEMPERATURE: 37
BUN SERPL-MCNC: 5 MG/DL (ref 6–20)
CALCIUM SERPL-MCNC: 7.7 MG/DL (ref 8.6–10.4)
CANDIDA SPECIES: NEGATIVE
CASTS #/AREA URNS LPF: ABNORMAL /LPF (ref 0–8)
CHLORIDE SERPL-SCNC: 111 MMOL/L (ref 98–107)
CLARITY UR: ABNORMAL
CO2 SERPL-SCNC: 17 MMOL/L (ref 20–31)
COHGB MFR BLD: 2 % (ref 0–5)
COLOR UR: YELLOW
CREAT SERPL-MCNC: 1.2 MG/DL (ref 0.6–0.9)
EOSINOPHIL # BLD: 0 K/UL (ref 0–0.44)
EOSINOPHILS RELATIVE PERCENT: 0 % (ref 1–4)
EPI CELLS #/AREA URNS HPF: ABNORMAL /HPF (ref 0–5)
ERYTHROCYTE [DISTWIDTH] IN BLOOD BY AUTOMATED COUNT: 18.1 % (ref 11.8–14.4)
FIO2 ON VENT: ABNORMAL %
GARDNERELLA VAGINALIS: POSITIVE
GFR, ESTIMATED: 62 ML/MIN/1.73M2
GLOBULIN SER CALC-MCNC: 3.8 G/DL
GLUCOSE BLD-MCNC: 88 MG/DL (ref 65–105)
GLUCOSE SERPL-MCNC: 78 MG/DL (ref 74–99)
GLUCOSE UR STRIP-MCNC: NEGATIVE MG/DL
HCG SERPL QL: NEGATIVE
HCO3 VENOUS: 19.6 MMOL/L (ref 24–30)
HCT VFR BLD AUTO: 27.3 % (ref 36.3–47.1)
HGB BLD-MCNC: 9.6 G/DL (ref 11.9–15.1)
HGB UR QL STRIP.AUTO: ABNORMAL
HIV 1+2 AB+HIV1 P24 AG SERPL QL IA: NONREACTIVE
IMM GRANULOCYTES # BLD AUTO: 0 K/UL (ref 0–0.3)
IMM GRANULOCYTES NFR BLD: 0 %
KETONES UR STRIP-MCNC: NEGATIVE MG/DL
LEUKOCYTE ESTERASE UR QL STRIP: ABNORMAL
LIPASE SERPL-CCNC: 12 U/L (ref 13–60)
LYMPHOCYTES NFR BLD: 0.38 K/UL (ref 1.1–3.7)
LYMPHOCYTES RELATIVE PERCENT: 8 % (ref 24–43)
MCH RBC QN AUTO: 33.7 PG (ref 25.2–33.5)
MCHC RBC AUTO-ENTMCNC: 35.2 G/DL (ref 28.4–34.8)
MCV RBC AUTO: 95.8 FL (ref 82.6–102.9)
MONOCYTES NFR BLD: 0.05 K/UL (ref 0.1–1.2)
MONOCYTES NFR BLD: 1 % (ref 3–12)
MORPHOLOGY: ABNORMAL
NEGATIVE BASE EXCESS, VEN: 5.8 MMOL/L (ref 0–2)
NEUTROPHILS NFR BLD: 91 % (ref 36–65)
NEUTS SEG NFR BLD: 4.37 K/UL (ref 1.5–8.1)
NITRITE UR QL STRIP: POSITIVE
NRBC BLD-RTO: 0.4 PER 100 WBC
O2 SAT, VEN: 95.1 % (ref 60–85)
PCO2 VENOUS: 38 MM HG (ref 39–55)
PH UR STRIP: 7.5 [PH] (ref 5–8)
PH VENOUS: 7.33 (ref 7.32–7.42)
PLATELET # BLD AUTO: 349 K/UL (ref 138–453)
PMV BLD AUTO: 9.5 FL (ref 8.1–13.5)
PO2 VENOUS: 79.6 MM HG (ref 30–50)
POTASSIUM SERPL-SCNC: 3.7 MMOL/L (ref 3.7–5.3)
PROT SERPL-MCNC: 6.2 G/DL (ref 6.6–8.7)
PROT UR STRIP-MCNC: ABNORMAL MG/DL
RBC # BLD AUTO: 2.85 M/UL (ref 3.95–5.11)
RBC #/AREA URNS HPF: ABNORMAL /HPF (ref 0–4)
SODIUM SERPL-SCNC: 139 MMOL/L (ref 136–145)
SOURCE: ABNORMAL
SP GR UR STRIP: 1.01 (ref 1–1.03)
TRICHOMONAS: POSITIVE
UROBILINOGEN UR STRIP-ACNC: NORMAL EU/DL (ref 0–1)
WBC #/AREA URNS HPF: ABNORMAL /HPF (ref 0–5)
WBC OTHER # BLD: 4.8 K/UL (ref 3.5–11.3)

## 2025-07-30 PROCEDURE — 87480 CANDIDA DNA DIR PROBE: CPT

## 2025-07-30 PROCEDURE — 81001 URINALYSIS AUTO W/SCOPE: CPT

## 2025-07-30 PROCEDURE — 2500000003 HC RX 250 WO HCPCS: Performed by: EMERGENCY MEDICINE

## 2025-07-30 PROCEDURE — 36415 COLL VENOUS BLD VENIPUNCTURE: CPT

## 2025-07-30 PROCEDURE — 80076 HEPATIC FUNCTION PANEL: CPT

## 2025-07-30 PROCEDURE — 82947 ASSAY GLUCOSE BLOOD QUANT: CPT

## 2025-07-30 PROCEDURE — 2580000003 HC RX 258: Performed by: EMERGENCY MEDICINE

## 2025-07-30 PROCEDURE — 85025 COMPLETE CBC W/AUTO DIFF WBC: CPT

## 2025-07-30 PROCEDURE — 87389 HIV-1 AG W/HIV-1&-2 AB AG IA: CPT

## 2025-07-30 PROCEDURE — 82805 BLOOD GASES W/O2 SATURATION: CPT

## 2025-07-30 PROCEDURE — 96361 HYDRATE IV INFUSION ADD-ON: CPT

## 2025-07-30 PROCEDURE — 6360000002 HC RX W HCPCS: Performed by: EMERGENCY MEDICINE

## 2025-07-30 PROCEDURE — 96374 THER/PROPH/DIAG INJ IV PUSH: CPT

## 2025-07-30 PROCEDURE — 93005 ELECTROCARDIOGRAM TRACING: CPT | Performed by: EMERGENCY MEDICINE

## 2025-07-30 PROCEDURE — 2580000003 HC RX 258

## 2025-07-30 PROCEDURE — 96365 THER/PROPH/DIAG IV INF INIT: CPT

## 2025-07-30 PROCEDURE — 84703 CHORIONIC GONADOTROPIN ASSAY: CPT

## 2025-07-30 PROCEDURE — 82010 KETONE BODYS QUAN: CPT

## 2025-07-30 PROCEDURE — 87591 N.GONORRHOEAE DNA AMP PROB: CPT

## 2025-07-30 PROCEDURE — G0378 HOSPITAL OBSERVATION PER HR: HCPCS

## 2025-07-30 PROCEDURE — 80048 BASIC METABOLIC PNL TOTAL CA: CPT

## 2025-07-30 PROCEDURE — 6360000002 HC RX W HCPCS

## 2025-07-30 PROCEDURE — 96375 TX/PRO/DX INJ NEW DRUG ADDON: CPT

## 2025-07-30 PROCEDURE — 87660 TRICHOMONAS VAGIN DIR PROBE: CPT

## 2025-07-30 PROCEDURE — 74177 CT ABD & PELVIS W/CONTRAST: CPT

## 2025-07-30 PROCEDURE — 83690 ASSAY OF LIPASE: CPT

## 2025-07-30 PROCEDURE — 99285 EMERGENCY DEPT VISIT HI MDM: CPT

## 2025-07-30 PROCEDURE — 6360000004 HC RX CONTRAST MEDICATION: Performed by: EMERGENCY MEDICINE

## 2025-07-30 PROCEDURE — 96376 TX/PRO/DX INJ SAME DRUG ADON: CPT

## 2025-07-30 PROCEDURE — 87491 CHLMYD TRACH DNA AMP PROBE: CPT

## 2025-07-30 PROCEDURE — 87510 GARDNER VAG DNA DIR PROBE: CPT

## 2025-07-30 RX ORDER — FENTANYL CITRATE 50 UG/ML
50 INJECTION, SOLUTION INTRAMUSCULAR; INTRAVENOUS ONCE
Status: COMPLETED | OUTPATIENT
Start: 2025-07-30 | End: 2025-07-30

## 2025-07-30 RX ORDER — IOPAMIDOL 755 MG/ML
75 INJECTION, SOLUTION INTRAVASCULAR
Status: COMPLETED | OUTPATIENT
Start: 2025-07-30 | End: 2025-07-30

## 2025-07-30 RX ORDER — INSULIN LISPRO 100 [IU]/ML
0-4 INJECTION, SOLUTION INTRAVENOUS; SUBCUTANEOUS
Status: DISCONTINUED | OUTPATIENT
Start: 2025-07-30 | End: 2025-07-30 | Stop reason: HOSPADM

## 2025-07-30 RX ORDER — SODIUM CHLORIDE 0.9 % (FLUSH) 0.9 %
5-40 SYRINGE (ML) INJECTION EVERY 12 HOURS SCHEDULED
Status: DISCONTINUED | OUTPATIENT
Start: 2025-07-30 | End: 2025-07-30 | Stop reason: HOSPADM

## 2025-07-30 RX ORDER — METRONIDAZOLE 500 MG/100ML
500 INJECTION, SOLUTION INTRAVENOUS EVERY 8 HOURS
Status: DISCONTINUED | OUTPATIENT
Start: 2025-07-30 | End: 2025-07-30 | Stop reason: HOSPADM

## 2025-07-30 RX ORDER — SODIUM CHLORIDE 0.9 % (FLUSH) 0.9 %
5-40 SYRINGE (ML) INJECTION PRN
Status: DISCONTINUED | OUTPATIENT
Start: 2025-07-30 | End: 2025-07-30 | Stop reason: HOSPADM

## 2025-07-30 RX ORDER — ONDANSETRON 2 MG/ML
4 INJECTION INTRAMUSCULAR; INTRAVENOUS EVERY 6 HOURS PRN
Status: DISCONTINUED | OUTPATIENT
Start: 2025-07-30 | End: 2025-07-30 | Stop reason: HOSPADM

## 2025-07-30 RX ORDER — SODIUM CHLORIDE, SODIUM LACTATE, POTASSIUM CHLORIDE, AND CALCIUM CHLORIDE .6; .31; .03; .02 G/100ML; G/100ML; G/100ML; G/100ML
500 INJECTION, SOLUTION INTRAVENOUS ONCE
Status: DISCONTINUED | OUTPATIENT
Start: 2025-07-30 | End: 2025-07-30 | Stop reason: HOSPADM

## 2025-07-30 RX ORDER — CIPROFLOXACIN 2 MG/ML
400 INJECTION, SOLUTION INTRAVENOUS EVERY 12 HOURS
Status: DISCONTINUED | OUTPATIENT
Start: 2025-07-30 | End: 2025-07-30 | Stop reason: HOSPADM

## 2025-07-30 RX ORDER — ONDANSETRON 2 MG/ML
4 INJECTION INTRAMUSCULAR; INTRAVENOUS ONCE
Status: COMPLETED | OUTPATIENT
Start: 2025-07-30 | End: 2025-07-30

## 2025-07-30 RX ORDER — HEPARIN SODIUM 5000 [USP'U]/ML
5000 INJECTION, SOLUTION INTRAVENOUS; SUBCUTANEOUS EVERY 8 HOURS SCHEDULED
Status: DISCONTINUED | OUTPATIENT
Start: 2025-07-30 | End: 2025-07-30 | Stop reason: HOSPADM

## 2025-07-30 RX ORDER — ACETAMINOPHEN 650 MG/1
650 SUPPOSITORY RECTAL EVERY 6 HOURS PRN
Status: DISCONTINUED | OUTPATIENT
Start: 2025-07-30 | End: 2025-07-30 | Stop reason: HOSPADM

## 2025-07-30 RX ORDER — MORPHINE SULFATE 4 MG/ML
1 INJECTION INTRAVENOUS EVERY 4 HOURS PRN
Status: DISCONTINUED | OUTPATIENT
Start: 2025-07-30 | End: 2025-07-30 | Stop reason: HOSPADM

## 2025-07-30 RX ORDER — MAGNESIUM SULFATE IN WATER 40 MG/ML
2000 INJECTION, SOLUTION INTRAVENOUS PRN
Status: DISCONTINUED | OUTPATIENT
Start: 2025-07-30 | End: 2025-07-30 | Stop reason: HOSPADM

## 2025-07-30 RX ORDER — ONDANSETRON 4 MG/1
4 TABLET, ORALLY DISINTEGRATING ORAL EVERY 8 HOURS PRN
Status: DISCONTINUED | OUTPATIENT
Start: 2025-07-30 | End: 2025-07-30 | Stop reason: HOSPADM

## 2025-07-30 RX ORDER — POTASSIUM CHLORIDE 1500 MG/1
40 TABLET, EXTENDED RELEASE ORAL PRN
Status: DISCONTINUED | OUTPATIENT
Start: 2025-07-30 | End: 2025-07-30 | Stop reason: HOSPADM

## 2025-07-30 RX ORDER — POLYETHYLENE GLYCOL 3350 17 G/17G
17 POWDER, FOR SOLUTION ORAL DAILY PRN
Status: DISCONTINUED | OUTPATIENT
Start: 2025-07-30 | End: 2025-07-30 | Stop reason: HOSPADM

## 2025-07-30 RX ORDER — ACETAMINOPHEN 325 MG/1
650 TABLET ORAL EVERY 6 HOURS PRN
Status: DISCONTINUED | OUTPATIENT
Start: 2025-07-30 | End: 2025-07-30 | Stop reason: HOSPADM

## 2025-07-30 RX ORDER — POTASSIUM CHLORIDE 7.45 MG/ML
10 INJECTION INTRAVENOUS PRN
Status: DISCONTINUED | OUTPATIENT
Start: 2025-07-30 | End: 2025-07-30 | Stop reason: HOSPADM

## 2025-07-30 RX ORDER — SODIUM CHLORIDE 9 MG/ML
INJECTION, SOLUTION INTRAVENOUS PRN
Status: DISCONTINUED | OUTPATIENT
Start: 2025-07-30 | End: 2025-07-30 | Stop reason: HOSPADM

## 2025-07-30 RX ORDER — METRONIDAZOLE 500 MG/100ML
500 INJECTION, SOLUTION INTRAVENOUS ONCE
Status: COMPLETED | OUTPATIENT
Start: 2025-07-30 | End: 2025-07-30

## 2025-07-30 RX ORDER — 0.9 % SODIUM CHLORIDE 0.9 %
1000 INTRAVENOUS SOLUTION INTRAVENOUS ONCE
Status: COMPLETED | OUTPATIENT
Start: 2025-07-30 | End: 2025-07-30

## 2025-07-30 RX ORDER — SODIUM CHLORIDE, SODIUM LACTATE, POTASSIUM CHLORIDE, CALCIUM CHLORIDE 600; 310; 30; 20 MG/100ML; MG/100ML; MG/100ML; MG/100ML
INJECTION, SOLUTION INTRAVENOUS CONTINUOUS
Status: DISCONTINUED | OUTPATIENT
Start: 2025-07-30 | End: 2025-07-30 | Stop reason: HOSPADM

## 2025-07-30 RX ADMIN — IOPAMIDOL 75 ML: 755 INJECTION, SOLUTION INTRAVENOUS at 12:38

## 2025-07-30 RX ADMIN — METRONIDAZOLE 500 MG: 500 INJECTION, SOLUTION INTRAVENOUS at 15:29

## 2025-07-30 RX ADMIN — CEFTRIAXONE SODIUM 1000 MG: 1 INJECTION, POWDER, FOR SOLUTION INTRAMUSCULAR; INTRAVENOUS at 13:38

## 2025-07-30 RX ADMIN — SODIUM CHLORIDE, SODIUM LACTATE, POTASSIUM CHLORIDE, AND CALCIUM CHLORIDE: .6; .31; .03; .02 INJECTION, SOLUTION INTRAVENOUS at 15:09

## 2025-07-30 RX ADMIN — ONDANSETRON 4 MG: 2 INJECTION, SOLUTION INTRAMUSCULAR; INTRAVENOUS at 12:59

## 2025-07-30 RX ADMIN — ONDANSETRON 4 MG: 2 INJECTION, SOLUTION INTRAMUSCULAR; INTRAVENOUS at 10:35

## 2025-07-30 RX ADMIN — MORPHINE SULFATE 1 MG: 4 INJECTION INTRAVENOUS at 15:32

## 2025-07-30 RX ADMIN — SODIUM CHLORIDE 1000 ML: 9 INJECTION, SOLUTION INTRAVENOUS at 12:19

## 2025-07-30 RX ADMIN — FENTANYL CITRATE 50 MCG: 50 INJECTION INTRAMUSCULAR; INTRAVENOUS at 10:35

## 2025-07-30 ASSESSMENT — ENCOUNTER SYMPTOMS
DIARRHEA: 1
ABDOMINAL PAIN: 1
SORE THROAT: 0
COUGH: 0
RHINORRHEA: 0
BLOOD IN STOOL: 0
SHORTNESS OF BREATH: 0
VOMITING: 1
PHOTOPHOBIA: 0
NAUSEA: 1
BACK PAIN: 0
CONSTIPATION: 0

## 2025-07-30 ASSESSMENT — PAIN DESCRIPTION - LOCATION: LOCATION: ABDOMEN

## 2025-07-30 ASSESSMENT — PAIN - FUNCTIONAL ASSESSMENT: PAIN_FUNCTIONAL_ASSESSMENT: 0-10

## 2025-07-30 ASSESSMENT — PAIN SCALES - GENERAL: PAINLEVEL_OUTOF10: 9

## 2025-07-30 NOTE — ED NOTES
Admitting team perfect served regarding patient request to leave.   
ED to inpatient nurses report      Chief Complaint:  Chief Complaint   Patient presents with    Vomiting     Present to ED from: home    MOA:     LOC: alert and orientated to name, place, date  Mobility: Independent  Oxygen Baseline: RA    Current needs required: RA   Pending ED orders:   Present condition: stable    Why did the patient come to the ED? Abd pain, N/V  What is the plan?   Any procedures or intervention occur? Abx, symptom control   Any safety concerns??    Mental Status:  Level of Consciousness: Alert (0)    Psych Assessment:   Psychosocial  Psychosocial (WDL): Within Defined Limits  Vital signs   Vitals:    07/30/25 1530 07/30/25 1550 07/30/25 1607 07/30/25 1630   BP: 113/89      Pulse:  87 86 77   Resp:  21 24 18   Temp:       TempSrc:       SpO2:       Weight:            Vitals:  Patient Vitals for the past 24 hrs:   BP Temp Temp src Pulse Resp SpO2 Weight   07/30/25 1630 -- -- -- 77 18 -- --   07/30/25 1607 -- -- -- 86 24 -- --   07/30/25 1550 -- -- -- 87 21 -- --   07/30/25 1530 113/89 -- -- -- -- -- --   07/30/25 1429 -- -- -- 93 18 -- --   07/30/25 0947 134/84 99.6 °F (37.6 °C) Oral 99 18 100 % 52.2 kg (115 lb)      Visit Vitals  /89   Pulse 77   Temp 99.6 °F (37.6 °C) (Oral)   Resp 18   Wt 52.2 kg (115 lb)   SpO2 100%   BMI 19.14 kg/m²        LDAs:   Peripheral IV 07/30/25 Left Forearm (Active)   Site Assessment Clean, dry & intact 07/30/25 1313   Line Status Blood return noted;Flushed;Normal saline locked;Specimen collected 07/30/25 1313   Phlebitis Assessment No symptoms 07/30/25 1313   Infiltration Assessment 0 07/30/25 1313   Dressing Status New dressing applied;Clean, dry & intact 07/30/25 1313   Dressing Type Transparent 07/30/25 1313   Dressing Intervention New 07/30/25 1313       Ambulatory Status:  Presents to emergency department  because of falls (Syncope, seizure, or loss of consciousness): No, Age > 70: No, Altered Mental Status, Intoxication with alcohol or substance 
Pt arrived to ED with report of N/V since yesterday night.   Pt states she has vomited approx 6-7 times this morning  Pt reports abd pain   Pt reports diarrhea since yesterday.   Pt reports daily marijuana use but states she has not smoked in 2 days.   Pt A&Ox4, RR even/unlabored, call light within reach   
Pt provided with apple juice, water, and julien crackers.   
TAKE 1 CAPSULE THREE TIMES DAILY AS NEEDED FOR ABDOMINAL PAIN    DIPHENHYDRAMINE (SOMINEX) 25 MG TABLET    Take 1 tablet by mouth every 4-6 hours as needed for Sleep (migraine)    INSULIN ASPART (NOVOLOG FLEXPEN) 100 UNIT/ML INJECTION PEN    INJECT 2 TO 12 UNITS SUBCUTANEOUSLY THREE TIMES DAILY PER SLIDING SCALE (DISCARD PEN 28 DAYS AFTER OPENING)    INSULIN GLARGINE (LANTUS SOLOSTAR) 100 UNIT/ML INJECTION PEN    INJECT 10 UNITS INTO THE SKIN NIGHTLY (DISCARD PEN 28 DAYS AFTER OPENING)    INSULIN PEN NEEDLE (DROPLET PEN NEEDLES) 31G X 8 MM MISC    USE FOUR TIMES DAILY AS DIRECTED    KETOCONAZOLE (NIZORAL) 2 % CREAM    APPLY AS DIRECTED TWICE DAILY    LORATADINE (CLARITIN) 10 MG TABLET    Take 1 tablet by mouth daily    METOPROLOL SUCCINATE (TOPROL XL) 25 MG EXTENDED RELEASE TABLET    Take 1 tablet by mouth daily    NUTRITIONAL SUPPLEMENTS (ENSURE HIGH PROTEIN) LIQD    Take 1 Bottle by mouth in the morning, at noon, and at bedtime    NUTRITIONAL SUPPLEMENTS (GLUCERNA 1.5 REJI) LIQD    Take 1 each by mouth in the morning, at noon, and at bedtime    ONDANSETRON (ZOFRAN) 4 MG TABLET    Q8h As needed    PANTOPRAZOLE (PROTONIX) 40 MG TABLET    Take 1 tablet by mouth every morning (before breakfast)    RIZATRIPTAN (MAXALT) 10 MG TABLET    Take 1 tablet by mouth daily as needed for Migraine May repeat in 2 hours if needed    ROPINIROLE (REQUIP) 0.25 MG TABLET    Take 1 tablet by mouth nightly    TIZANIDINE (ZANAFLEX) 4 MG TABLET    Take 1 tablet by mouth every 8 hours as needed (as needed for muscle pain)    TRUEPLUS LANCETS 33G MISC    TEST BLOOD SUGAR FOUR TIMES DAILY     Orders Placed This Encounter   Medications    ondansetron (ZOFRAN) injection 4 mg    fentaNYL (SUBLIMAZE) injection 50 mcg    sodium chloride 0.9 % bolus 1,000 mL    ondansetron (ZOFRAN) injection 4 mg    iopamidol (ISOVUE-370) 76 % injection 75 mL    cefTRIAXone (ROCEPHIN) 1,000 mg in sterile water 10 mL IV syringe     Antimicrobial Indications:

## 2025-07-30 NOTE — H&P
FOUR TIMES DAILY 3/17/25   Emperatriz Ortiz MD   acetaminophen (TYLENOL) 500 MG tablet Take 2 tablets by mouth every 6 hours as needed for Pain 4/26/24 3/17/25  Edith Lundberg MD   Blood Glucose Monitoring Suppl (ONE TOUCH ULTRA 2) w/Device KIT 1 kit by Does not apply route in the morning, at noon, in the evening, and at bedtime 11/15/22   Emperatriz Ortiz MD       SOCIAL HISTORY     Tobacco:   Social History     Tobacco Use   Smoking Status Every Day    Current packs/day: 0.50    Types: Cigarettes   Smokeless Tobacco Never   Tobacco Comments    5/day      Alcohol:   Illicits:   Occupation:     FAMILY HISTORY     History reviewed. No pertinent family history.    PHYSICAL EXAM     Vitals: /89   Pulse 77   Temp 99.6 °F (37.6 °C) (Oral)   Resp 18   Wt 52.2 kg (115 lb)   SpO2 100%   BMI 19.14 kg/m²   Tmax: Temp (24hrs), Av.6 °F (37.6 °C), Min:99.6 °F (37.6 °C), Max:99.6 °F (37.6 °C)    Last Body weight:   Wt Readings from Last 3 Encounters:   25 52.2 kg (115 lb)   25 50.1 kg (110 lb 6.4 oz)   25 53 kg (116 lb 12.8 oz)       Physical Exam  HENT:      Mouth/Throat:      Mouth: Mucous membranes are dry.   Cardiovascular:      Rate and Rhythm: Regular rhythm.      Heart sounds: Normal heart sounds.   Pulmonary:      Breath sounds: Normal breath sounds.   Abdominal:      Palpations: Abdomen is soft. There is no mass.      Tenderness: There is abdominal tenderness. There is no guarding or rebound.   Neurological:      Mental Status: She is alert and oriented to person, place, and time.             Net IO Since Admission: No IO data has been entered for this period [25]     No intake or output data in the 24 hours ending 25   INVESTIGATIONS     Laboratory Testing:     Recent Results (from the past 24 hours)   Basic Metabolic Panel    Collection Time: 25 10:38 AM   Result Value Ref Range    Sodium 139 136 - 145 mmol/L    Potassium 3.7 3.7 - 5.3 mmol/L    Chloride

## 2025-07-30 NOTE — PROGRESS NOTES
[facility]    Admission Medication Reconciliation       The patient's list of current home medications has been reviewed.     Source(s) of information: patient & dispense report    Based on information provided by the above source(s), I have updated the patient's home med list as described below.     Please review the ACTION REQUESTED section of this note below for any discrepancies on current hospital orders.      I changed or updated the following medications on the patient's home medication list:  Flagged for review Calcium carbonate     Added None     Adjusted   None   Other Notes None         PROVIDER ACTION REQUESTED  Medications that need to be addressed by a physician/nurse practitioner:    Medication Action Requested   None     None         Please feel free to call me with any questions about this encounter. Thank you.  Electronically signed by Yoko Mantilla on 7/30/2025 at 3:19 PM

## 2025-07-31 ENCOUNTER — OFFICE VISIT (OUTPATIENT)
Dept: FAMILY MEDICINE CLINIC | Age: 30
End: 2025-07-31
Payer: MEDICARE

## 2025-07-31 ENCOUNTER — HOSPITAL ENCOUNTER (OUTPATIENT)
Dept: LAB | Age: 30
Discharge: HOME | End: 2025-07-31
Payer: MEDICARE

## 2025-07-31 VITALS
SYSTOLIC BLOOD PRESSURE: 104 MMHG | WEIGHT: 107 LBS | BODY MASS INDEX: 17.81 KG/M2 | DIASTOLIC BLOOD PRESSURE: 70 MMHG | HEART RATE: 72 BPM | OXYGEN SATURATION: 97 %

## 2025-07-31 DIAGNOSIS — G89.29 CHRONIC BILATERAL LOW BACK PAIN WITHOUT SCIATICA: ICD-10-CM

## 2025-07-31 DIAGNOSIS — R82.90 ABNORMAL URINALYSIS: ICD-10-CM

## 2025-07-31 DIAGNOSIS — B37.31 VULVOVAGINAL CANDIDIASIS: ICD-10-CM

## 2025-07-31 DIAGNOSIS — A59.9 TRICHOMONIASIS: ICD-10-CM

## 2025-07-31 DIAGNOSIS — M54.50 CHRONIC BILATERAL LOW BACK PAIN WITHOUT SCIATICA: ICD-10-CM

## 2025-07-31 DIAGNOSIS — K52.9 COLITIS: Primary | ICD-10-CM

## 2025-07-31 DIAGNOSIS — N76.0 BV (BACTERIAL VAGINOSIS): ICD-10-CM

## 2025-07-31 DIAGNOSIS — B96.89 BV (BACTERIAL VAGINOSIS): ICD-10-CM

## 2025-07-31 LAB
C TRACH DNA SPEC QL PROBE+SIG AMP: NEGATIVE
EKG ATRIAL RATE: 103 BPM
EKG P AXIS: 80 DEGREES
EKG P-R INTERVAL: 116 MS
EKG Q-T INTERVAL: 348 MS
EKG QRS DURATION: 74 MS
EKG QTC CALCULATION (BAZETT): 455 MS
EKG R AXIS: 88 DEGREES
EKG T AXIS: 35 DEGREES
EKG VENTRICULAR RATE: 103 BPM
N GONORRHOEA DNA SPEC QL PROBE+SIG AMP: NEGATIVE
SPECIMEN DESCRIPTION: NORMAL

## 2025-07-31 PROCEDURE — G8427 DOCREV CUR MEDS BY ELIG CLIN: HCPCS | Performed by: INTERNAL MEDICINE

## 2025-07-31 PROCEDURE — G8419 CALC BMI OUT NRM PARAM NOF/U: HCPCS | Performed by: INTERNAL MEDICINE

## 2025-07-31 PROCEDURE — 87086 URINE CULTURE/COLONY COUNT: CPT

## 2025-07-31 PROCEDURE — 4004F PT TOBACCO SCREEN RCVD TLK: CPT | Performed by: INTERNAL MEDICINE

## 2025-07-31 PROCEDURE — 99215 OFFICE O/P EST HI 40 MIN: CPT | Performed by: INTERNAL MEDICINE

## 2025-07-31 RX ORDER — CIPROFLOXACIN 500 MG/1
500 TABLET, FILM COATED ORAL 2 TIMES DAILY
Qty: 20 TABLET | Refills: 0 | Status: SHIPPED | OUTPATIENT
Start: 2025-07-31 | End: 2025-08-10

## 2025-07-31 RX ORDER — LIDOCAINE 50 MG/G
1 PATCH TOPICAL DAILY
Qty: 30 PATCH | Refills: 3 | Status: SHIPPED | OUTPATIENT
Start: 2025-07-31

## 2025-07-31 RX ORDER — FLUCONAZOLE 150 MG/1
150 TABLET ORAL
Qty: 2 TABLET | Refills: 0 | Status: SHIPPED | OUTPATIENT
Start: 2025-07-31 | End: 2025-08-06

## 2025-07-31 RX ORDER — METRONIDAZOLE 500 MG/1
500 TABLET ORAL 2 TIMES DAILY
Qty: 20 TABLET | Refills: 0 | Status: SHIPPED | OUTPATIENT
Start: 2025-07-31 | End: 2025-08-10

## 2025-07-31 NOTE — PLAN OF CARE
Followed up with Tarn Deng on 7/30/2025 at 10:54 PM. Patient left the ED with a disposition of AMA on 7/30/2025. Patient cited improved/resolved symptoms as reason. Patient verbalised about the risks associated with leaving AMA, she acknowledges the risks. Advised patient to follow up with a primary care physician or return to the Emergency Department if symptoms worsen.   Darnell Mcclure MD  07/30/25

## 2025-08-01 LAB
MICROORGANISM SPEC CULT: NO GROWTH
SPECIMEN DESCRIPTION: NORMAL

## 2025-08-02 ENCOUNTER — HOSPITAL ENCOUNTER (EMERGENCY)
Age: 30
Discharge: HOME | End: 2025-08-03
Attending: STUDENT IN AN ORGANIZED HEALTH CARE EDUCATION/TRAINING PROGRAM
Payer: MEDICARE

## 2025-08-02 VITALS
HEART RATE: 95 BPM | SYSTOLIC BLOOD PRESSURE: 188 MMHG | RESPIRATION RATE: 16 BRPM | TEMPERATURE: 97.9 F | OXYGEN SATURATION: 100 % | DIASTOLIC BLOOD PRESSURE: 111 MMHG

## 2025-08-02 DIAGNOSIS — T14.8XXA SKIN EXCORIATION: Primary | ICD-10-CM

## 2025-08-02 PROCEDURE — 99283 EMERGENCY DEPT VISIT LOW MDM: CPT

## 2025-08-03 LAB
BASOPHILS # BLD: 0 K/UL (ref 0–0.2)
BASOPHILS NFR BLD: 0 % (ref 0–2)
EOSINOPHIL # BLD: 0.4 K/UL (ref 0–0.4)
EOSINOPHILS RELATIVE PERCENT: 6 % (ref 1–4)
ERYTHROCYTE [DISTWIDTH] IN BLOOD BY AUTOMATED COUNT: 17.2 % (ref 11.8–14.4)
HCT VFR BLD AUTO: 27.2 % (ref 36.3–47.1)
HGB BLD-MCNC: 9.9 G/DL (ref 11.9–15.1)
IMM GRANULOCYTES # BLD AUTO: 0 K/UL (ref 0–0.3)
IMM GRANULOCYTES NFR BLD: 0 %
LYMPHOCYTES NFR BLD: 4.02 K/UL (ref 1–4.8)
LYMPHOCYTES RELATIVE PERCENT: 60 % (ref 24–44)
MCH RBC QN AUTO: 33.8 PG (ref 25.2–33.5)
MCHC RBC AUTO-ENTMCNC: 36.4 G/DL (ref 28.4–34.8)
MCV RBC AUTO: 92.8 FL (ref 82.6–102.9)
MONOCYTES NFR BLD: 0.2 K/UL (ref 0.1–0.8)
MONOCYTES NFR BLD: 3 % (ref 1–7)
MORPHOLOGY: ABNORMAL
NEUTROPHILS NFR BLD: 31 % (ref 36–66)
NEUTS SEG NFR BLD: 2.08 K/UL (ref 1.8–7.7)
NRBC BLD-RTO: 0 PER 100 WBC
PLATELET # BLD AUTO: 339 K/UL (ref 138–453)
PMV BLD AUTO: 9.9 FL (ref 8.1–13.5)
RBC # BLD AUTO: 2.93 M/UL (ref 3.95–5.11)
WBC OTHER # BLD: 6.7 K/UL (ref 3.5–11.3)

## 2025-08-03 PROCEDURE — 6370000000 HC RX 637 (ALT 250 FOR IP)

## 2025-08-03 PROCEDURE — 85025 COMPLETE CBC W/AUTO DIFF WBC: CPT

## 2025-08-03 RX ORDER — DIPHENHYDRAMINE HCL 25 MG
25 TABLET ORAL ONCE
Status: COMPLETED | OUTPATIENT
Start: 2025-08-03 | End: 2025-08-03

## 2025-08-03 RX ORDER — TRIAMCINOLONE ACETONIDE 0.25 MG/G
OINTMENT TOPICAL
Qty: 80 G | Refills: 0 | Status: SHIPPED | OUTPATIENT
Start: 2025-08-03 | End: 2025-08-10

## 2025-08-03 RX ORDER — DIPHENHYDRAMINE HCL 25 MG
25 CAPSULE ORAL EVERY 6 HOURS PRN
Qty: 16 CAPSULE | Refills: 0 | Status: SHIPPED | OUTPATIENT
Start: 2025-08-03 | End: 2025-08-04 | Stop reason: SDUPTHER

## 2025-08-03 RX ADMIN — DIPHENHYDRAMINE HYDROCHLORIDE 25 MG: 25 TABLET ORAL at 00:42

## 2025-08-04 ENCOUNTER — OFFICE VISIT (OUTPATIENT)
Dept: FAMILY MEDICINE CLINIC | Age: 30
End: 2025-08-04
Payer: MEDICARE

## 2025-08-04 VITALS
WEIGHT: 107.4 LBS | BODY MASS INDEX: 17.9 KG/M2 | OXYGEN SATURATION: 98 % | HEART RATE: 87 BPM | RESPIRATION RATE: 20 BRPM | SYSTOLIC BLOOD PRESSURE: 178 MMHG | DIASTOLIC BLOOD PRESSURE: 98 MMHG | HEIGHT: 65 IN

## 2025-08-04 DIAGNOSIS — L29.9 PRURITUS: Primary | ICD-10-CM

## 2025-08-04 PROCEDURE — 99214 OFFICE O/P EST MOD 30 MIN: CPT | Performed by: INTERNAL MEDICINE

## 2025-08-04 PROCEDURE — G8419 CALC BMI OUT NRM PARAM NOF/U: HCPCS | Performed by: INTERNAL MEDICINE

## 2025-08-04 PROCEDURE — G8427 DOCREV CUR MEDS BY ELIG CLIN: HCPCS | Performed by: INTERNAL MEDICINE

## 2025-08-04 PROCEDURE — 4004F PT TOBACCO SCREEN RCVD TLK: CPT | Performed by: INTERNAL MEDICINE

## 2025-08-04 RX ORDER — HYDROXYZINE HYDROCHLORIDE 25 MG/1
25 TABLET, FILM COATED ORAL EVERY 8 HOURS PRN
Qty: 30 TABLET | Refills: 0 | Status: SHIPPED | OUTPATIENT
Start: 2025-08-04 | End: 2025-08-14

## 2025-08-04 ASSESSMENT — VISUAL ACUITY: OU: 1

## 2025-08-20 ENCOUNTER — TELEPHONE (OUTPATIENT)
Dept: FAMILY MEDICINE CLINIC | Age: 30
End: 2025-08-20

## 2025-08-28 ENCOUNTER — OFFICE VISIT (OUTPATIENT)
Dept: FAMILY MEDICINE CLINIC | Age: 30
End: 2025-08-28
Payer: MEDICARE

## 2025-08-28 VITALS
OXYGEN SATURATION: 99 % | HEART RATE: 102 BPM | DIASTOLIC BLOOD PRESSURE: 100 MMHG | BODY MASS INDEX: 17.34 KG/M2 | WEIGHT: 104.2 LBS | SYSTOLIC BLOOD PRESSURE: 156 MMHG

## 2025-08-28 DIAGNOSIS — I10 UNCONTROLLED HYPERTENSION: ICD-10-CM

## 2025-08-28 DIAGNOSIS — E10.42 TYPE 1 DIABETES MELLITUS WITH DIABETIC POLYNEUROPATHY (HCC): ICD-10-CM

## 2025-08-28 DIAGNOSIS — G25.81 RESTLESS LEG SYNDROME: ICD-10-CM

## 2025-08-28 DIAGNOSIS — D64.9 ANEMIA, UNSPECIFIED TYPE: ICD-10-CM

## 2025-08-28 DIAGNOSIS — K52.9 COLITIS: Primary | ICD-10-CM

## 2025-08-28 PROCEDURE — G8419 CALC BMI OUT NRM PARAM NOF/U: HCPCS | Performed by: INTERNAL MEDICINE

## 2025-08-28 PROCEDURE — 2022F DILAT RTA XM EVC RTNOPTHY: CPT | Performed by: INTERNAL MEDICINE

## 2025-08-28 PROCEDURE — 99214 OFFICE O/P EST MOD 30 MIN: CPT | Performed by: INTERNAL MEDICINE

## 2025-08-28 PROCEDURE — G8427 DOCREV CUR MEDS BY ELIG CLIN: HCPCS | Performed by: INTERNAL MEDICINE

## 2025-08-28 PROCEDURE — 4004F PT TOBACCO SCREEN RCVD TLK: CPT | Performed by: INTERNAL MEDICINE

## 2025-08-28 PROCEDURE — 3077F SYST BP >= 140 MM HG: CPT | Performed by: INTERNAL MEDICINE

## 2025-08-28 PROCEDURE — 3080F DIAST BP >= 90 MM HG: CPT | Performed by: INTERNAL MEDICINE

## 2025-08-28 PROCEDURE — 3044F HG A1C LEVEL LT 7.0%: CPT | Performed by: INTERNAL MEDICINE

## 2025-08-28 RX ORDER — ROPINIROLE 0.5 MG/1
0.5 TABLET, FILM COATED ORAL NIGHTLY
Qty: 90 TABLET | Refills: 1 | Status: SHIPPED | OUTPATIENT
Start: 2025-08-28

## 2025-08-28 RX ORDER — PREDNISOLONE ACETATE 10 MG/ML
SUSPENSION/ DROPS OPHTHALMIC
COMMUNITY
Start: 2025-08-22

## 2025-08-28 RX ORDER — CARBOXYMETHYLCELLULOSE SODIUM 5 MG/ML
SOLUTION/ DROPS OPHTHALMIC
COMMUNITY
Start: 2025-08-22

## 2025-08-28 RX ORDER — LOSARTAN POTASSIUM 25 MG/1
25 TABLET ORAL DAILY
Qty: 30 TABLET | Refills: 5 | Status: SHIPPED | OUTPATIENT
Start: 2025-08-28

## 2025-09-03 ENCOUNTER — TELEPHONE (OUTPATIENT)
Dept: GASTROENTEROLOGY | Age: 30
End: 2025-09-03

## 2025-09-03 ENCOUNTER — OFFICE VISIT (OUTPATIENT)
Dept: GASTROENTEROLOGY | Age: 30
End: 2025-09-03
Payer: MEDICARE

## 2025-09-03 VITALS
OXYGEN SATURATION: 100 % | HEART RATE: 87 BPM | TEMPERATURE: 98 F | HEIGHT: 65 IN | WEIGHT: 103.6 LBS | SYSTOLIC BLOOD PRESSURE: 157 MMHG | RESPIRATION RATE: 16 BRPM | BODY MASS INDEX: 17.26 KG/M2 | DIASTOLIC BLOOD PRESSURE: 100 MMHG

## 2025-09-03 DIAGNOSIS — K52.9 COLITIS: Primary | ICD-10-CM

## 2025-09-03 PROCEDURE — G8419 CALC BMI OUT NRM PARAM NOF/U: HCPCS | Performed by: INTERNAL MEDICINE

## 2025-09-03 PROCEDURE — G8427 DOCREV CUR MEDS BY ELIG CLIN: HCPCS | Performed by: INTERNAL MEDICINE

## 2025-09-03 PROCEDURE — 99204 OFFICE O/P NEW MOD 45 MIN: CPT | Performed by: INTERNAL MEDICINE

## 2025-09-03 RX ORDER — POLYETHYLENE GLYCOL 3350 17 G/17G
POWDER, FOR SOLUTION ORAL
Qty: 255 G | Refills: 0 | Status: SHIPPED | OUTPATIENT
Start: 2025-09-03

## 2025-09-05 ASSESSMENT — ENCOUNTER SYMPTOMS
ABDOMINAL PAIN: 0
COUGH: 0
CHEST TIGHTNESS: 0
WHEEZING: 0
VOMITING: 0
CONSTIPATION: 0
DIARRHEA: 0
CHOKING: 0
ANAL BLEEDING: 0
BLOOD IN STOOL: 0
SHORTNESS OF BREATH: 0
NAUSEA: 0

## 2025-09-05 ASSESSMENT — VISUAL ACUITY: OU: 1

## (undated) DEVICE — BLOCK BITE 60FR RUBBER ADLT DENTAL

## (undated) DEVICE — BASIN EMSIS 700ML GRAPHITE PLAS KID SHP GRAD

## (undated) DEVICE — JELLY,LUBE,STERILE,FLIP TOP,TUBE,2-OZ: Brand: MEDLINE

## (undated) DEVICE — CUP MED 1OZ CLR POLYPR FEED GRAD W/O LID

## (undated) DEVICE — CO2 CANNULA,SUPERSOFT, ADLT,7'O2,7'CO2: Brand: MEDLINE

## (undated) DEVICE — FORCEPS BX L240CM WRK CHN 2.8MM STD CAP W/ NDL MIC MESH

## (undated) DEVICE — MEDICINE CUP, GRADUATED, STER: Brand: MEDLINE

## (undated) DEVICE — GAUZE,SPONGE,4"X4",16PLY,STRL,LF,10/TRAY: Brand: MEDLINE

## (undated) DEVICE — ADAPTER TBNG LUER STUB 15 GA INTMED

## (undated) DEVICE — GLOVE SURG 8 11.7IN BEAD CUF LIGHT BRN SENSICARE LTX FREE

## (undated) DEVICE — CONMED DISPOSABLE GASTROINTESTINAL CYTOLOGY BRUSH, STRAIGHT HANDLE, 2.5 MM X 160 CM: Brand: CONMED

## (undated) DEVICE — Device: Brand: DEFENDO VALVE AND CONNECTOR KIT